# Patient Record
Sex: FEMALE | Race: BLACK OR AFRICAN AMERICAN | Employment: OTHER | URBAN - METROPOLITAN AREA
[De-identification: names, ages, dates, MRNs, and addresses within clinical notes are randomized per-mention and may not be internally consistent; named-entity substitution may affect disease eponyms.]

---

## 2017-02-17 ENCOUNTER — PATIENT OUTREACH (OUTPATIENT)
Dept: CARDIOLOGY CLINIC | Age: 77
End: 2017-02-17

## 2017-02-17 ENCOUNTER — OFFICE VISIT (OUTPATIENT)
Dept: CARDIOLOGY CLINIC | Age: 77
End: 2017-02-17

## 2017-02-17 VITALS
SYSTOLIC BLOOD PRESSURE: 166 MMHG | RESPIRATION RATE: 18 BRPM | HEIGHT: 63 IN | DIASTOLIC BLOOD PRESSURE: 90 MMHG | BODY MASS INDEX: 24.41 KG/M2 | WEIGHT: 137.8 LBS | OXYGEN SATURATION: 98 % | HEART RATE: 84 BPM

## 2017-02-17 DIAGNOSIS — E78.2 MIXED HYPERLIPIDEMIA: ICD-10-CM

## 2017-02-17 DIAGNOSIS — I25.10 ATHEROSCLEROSIS OF NATIVE CORONARY ARTERY OF NATIVE HEART WITHOUT ANGINA PECTORIS: Primary | ICD-10-CM

## 2017-02-17 DIAGNOSIS — I44.7 OTHER LEFT BUNDLE BRANCH BLOCK: ICD-10-CM

## 2017-02-17 DIAGNOSIS — Z91.14 NONCOMPLIANCE WITH MEDICATION REGIMEN: ICD-10-CM

## 2017-02-17 DIAGNOSIS — I10 ESSENTIAL HYPERTENSION, BENIGN: ICD-10-CM

## 2017-02-17 DIAGNOSIS — Z86.73 H/O: CVA (CEREBROVASCULAR ACCIDENT): ICD-10-CM

## 2017-02-17 NOTE — PROGRESS NOTES
Subjective/HPI:     Niraj Griffith is a 64 y.o. female is here for f/u appt. Pt has sob with exertion, she is not doing any activity essentially, sitting on the sofa mostly. She hasnt been taking her meds, doesn't know for how long she hasnt been off meds. She lives with her son who helps her but isnt sure what is happening. The patient denies chest pain, orthopnea, PND, LE edema, palpitations, syncope, presyncope or fatigue. 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 ([address] I11.0    Mixed hyperlipidemia E78.2    Essential hypertension, benign I10    Other left bundle branch block I44.7    Coronary atherosclerosis of native coronary artery I25.10    Late effects of cerebrovascular disease, 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    Atherosclerosis of renal artery ([facility]) I70.1    Atherosclerosis of native arteries of the extremities with intermittent claudication I70.219    Tobacco use disorder F17.200    Cerebrovascular accident [facility]) I63.9    Mitral valve disorders I05.9    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   Allergies   Allergen Reactions    Pcn [Penicillins] Hives      Family History   Problem Relation Age of Onset    Coronary Artery Disease Other      grandmother age 76      Current Outpatient Prescriptions   Medication Sig    lisinopril (PRINIVIL, ZESTRIL) 5 mg tablet Take 1 Tab by mouth daily.  carvedilol (COREG) 6.25 mg tablet Take 1 Tab by mouth two (2) times a day.  furosemide (LASIX) 40 mg tablet One pill daily    isosorbide mononitrate ER (IMDUR) 30 mg tablet Take 1 Tab by mouth every morning.  potassium chloride (K-DUR, KLOR-CON) 20 mEq tablet Take 1 Tab by mouth two (2) times a day.  albuterol (PROVENTIL HFA, VENTOLIN HFA, PROAIR HFA) 90 mcg/actuation inhaler Take 2 Puffs by inhalation every four (4) hours as needed for Wheezing.  aspirin delayed-release 81 mg tablet Take 1 Tab by mouth daily.  atorvastatin (LIPITOR) 20 mg tablet Take 1 Tab by mouth nightly.  aspirin 81 mg tablet Take 81 mg by mouth.  atorvastatin (LIPITOR) 40 mg tablet TAKE ONE TABLET BY MOUTH EVERY DAY  **PATIENT MUST BE SEEN FOR MORE REFILLS**     No current facility-administered medications for this visit. Vitals:    02/17/17 1025 02/17/17 1038   BP: (!) 170/94 166/90   Pulse: 84    Resp: 18    SpO2: 98%    Weight: 137 lb 12.8 oz (62.5 kg)    Height: 5' 3\" (1.6 m)      Social History     Social History    Marital status:      Spouse name: N/A    Number of children: N/A    Years of education: N/A     Occupational History    Not on file. Social History Main Topics    Smoking status: Unknown If Ever Smoked    Smokeless tobacco: Never Used    Alcohol use No      Comment: beer every day    Drug use: No    Sexual activity: Not on file     Other Topics Concern    Not on file     Social History Narrative    ** Merged History Encounter **            I have reviewed the nurses notes, vitals, problem list, allergy list, medical history, family, social history and medications. Review of Symptoms:    General: Pt denies excessive weight gain or loss.  Pt is able to conduct ADL's  HEENT: Denies blurred vision, headaches, epistaxis and difficulty swallowing. Respiratory: Denies shortness of breath, +CHRISTINE, denies wheezing or stridor. Cardiovascular: Denies precordial pain, palpitations, edema or PND  Gastrointestinal: Denies poor appetite, indigestion, abdominal pain or blood in stool  Urinary: Denies dysuria, pyuria  Musculoskeletal: Denies pain or swelling from muscles or joints  Neurologic: Denies new onset of tremor, paresthesias, or sensory motor disturbance  Skin: Denies rash, itching or texture change. Psych: Denies depression        Physical Exam:      General: Well developed, in no acute distress, cooperative and alert  HEENT: No carotid bruits, no JVD, trach is midline. Neck Supple, PEERL, EOM intact. Heart:  Normal S1/S2 negative S3 or S4. Regular, no murmur, gallop or rub.   Respiratory: diminished anterior/posterior left sided BS. no wheezing or rales  Abdomen:   Soft, non-tender, no masses, bowel sounds are active.   Extremities:  No edema, normal cap refill, no cyanosis, atraumatic. Neuro: Alert, in walker   Skin: Skin color is normal. No rashes or lesions. Non diaphoretic  Vascular: 2+ pulses symmetric in all extremities    Cardiographics    ECG: Sinus  Rhythm  -With rate variation   -Left bundle branch block. -Right atrial enlargement.      Results for orders placed or performed during the hospital encounter of 07/21/16   EKG, 12 LEAD, INITIAL   Result Value Ref Range    Ventricular Rate 86 BPM    Atrial Rate 86 BPM    P-R Interval 130 ms    QRS Duration 130 ms    Q-T Interval 422 ms    QTC Calculation (Bezet) 504 ms    Calculated P Axis 45 degrees    Calculated R Axis -27 degrees    Calculated T Axis 114 degrees    Diagnosis       Normal sinus rhythm  Possible Left atrial enlargement  Left bundle branch block  When compared with ECG of 01-MAR-2010 05:18,  No significant change was found  Confirmed by Abel Islas ([id number]) on 7/22/2016 1:40:16 PM           Cardiology Labs:  Lab Results   Component Value Date/Time    Cholesterol, total 239 07/15/2016 03:39 AM    HDL Cholesterol 60 07/15/2016 03:39 AM    LDL, calculated 157.2 07/15/2016 03:39 AM    Triglyceride 109 07/15/2016 03:39 AM    CHOL/HDL Ratio 4.0 07/15/2016 03:39 AM       Lab Results   Component Value Date/Time    Sodium 142 07/21/2016 02:20 PM    Potassium 4.4 07/21/2016 02:20 PM    Chloride 105 07/21/2016 02:20 PM    CO2 29 07/21/2016 02:20 PM    Anion gap 8 07/21/2016 02:20 PM    Glucose 102 07/21/2016 02:20 PM    BUN 26 07/21/2016 02:20 PM    Creatinine 1.37 07/21/2016 02:20 PM    BUN/Creatinine ratio 19 07/21/2016 02:20 PM    GFR est AA 45 07/21/2016 02:20 PM    GFR est non-AA 37 07/21/2016 02:20 PM    Calcium 9.0 07/21/2016 02:20 PM    AST (SGOT) 20 07/21/2016 02:20 PM    Alk. phosphatase 87 07/21/2016 02:20 PM    Protein, total 7.3 07/21/2016 02:20 PM    Albumin 3.4 07/21/2016 02:20 PM    Globulin 3.9 07/21/2016 02:20 PM    A-G Ratio 0.9 07/21/2016 02:20 PM    ALT (SGPT) 14 07/21/2016 02:20 PM           Assessment:     Assessment:     Mir Slater was seen today for hypertension.     Diagnoses and all orders for this visit:    Atherosclerosis of native coronary artery of native heart without angina pectoris  -     AMB POC EKG ROUTINE W/ 12 LEADS, INTER & REP  -     LIPID PANEL  -     MAGNESIUM  -     METABOLIC PANEL, COMPREHENSIVE  -     BNP    Essential hypertension, benign  -     AMB POC EKG ROUTINE W/ 12 LEADS, INTER & REP  -     LIPID PANEL  -     MAGNESIUM  -     METABOLIC PANEL, COMPREHENSIVE  -     BNP    Mixed hyperlipidemia  -     AMB POC EKG ROUTINE W/ 12 LEADS, INTER & REP  -     LIPID PANEL  -     MAGNESIUM  -     METABOLIC PANEL, COMPREHENSIVE  -     BNP    Other left bundle branch block  -     AMB POC EKG ROUTINE W/ 12 LEADS, INTER & REP  -     LIPID PANEL  -     MAGNESIUM  -     METABOLIC PANEL, COMPREHENSIVE  -     BNP    H/O: CVA (cerebrovascular accident)  -     AMB POC EKG ROUTINE W/ 12 LEADS, INTER & REP  -     LIPID PANEL  - MAGNESIUM  -     METABOLIC PANEL, COMPREHENSIVE  -     BNP    Noncompliance with medication regimen        ICD-10-CM ICD-9-CM    1. Atherosclerosis of native coronary artery of native heart without angina pectoris I25.10 414.01 AMB POC EKG ROUTINE W/ 12 LEADS, INTER & REP      LIPID PANEL      MAGNESIUM      METABOLIC PANEL, COMPREHENSIVE      BNP   2. Essential hypertension, benign I10 401.1 AMB POC EKG ROUTINE W/ 12 LEADS, INTER & REP      LIPID PANEL      MAGNESIUM      METABOLIC PANEL, COMPREHENSIVE      BNP   3. Mixed hyperlipidemia E78.2 272.2 AMB POC EKG ROUTINE W/ 12 LEADS, INTER & REP      LIPID PANEL      MAGNESIUM      METABOLIC PANEL, COMPREHENSIVE      BNP   4. Other left bundle branch block I44.7 426.3 AMB POC EKG ROUTINE W/ 12 LEADS, INTER & REP      LIPID PANEL      MAGNESIUM      METABOLIC PANEL, COMPREHENSIVE      BNP   5. H/O: CVA (cerebrovascular accident) Z86.73 V12.54 AMB POC EKG ROUTINE W/ 12 LEADS, INTER & REP      LIPID PANEL      MAGNESIUM      METABOLIC PANEL, COMPREHENSIVE      BNP   6. Noncompliance with medication regimen Z91.14 V15.81      Orders Placed This Encounter    LIPID PANEL    MAGNESIUM    METABOLIC PANEL, COMPREHENSIVE    BNP    AMB POC EKG ROUTINE W/ 12 LEADS, INTER & REP     Order Specific Question:   Reason for Exam:     Answer:   routine        Plan:        Chronic systolic HF:  Pt reports she is sob with min exertion, has been out of meds for some time without an explanation of why. She has diminished left sided BS. I will evaluate with a CMP, Mg, and BNP. Advised she needs to get her meds and start taking them again.     CAD:  Continue ASA, statin, BB, and nitrate  H/o very mild nonobstructive CAD by cath in 2009  [address] [place] [address] 7/16, negative for ischemia     Noncompliance with meds:  States she ran out of meds, having MAY Jenkins meet with her today in our office to discuss. Disposition follow-up in 1 month, family to bring medications.     Raven Marquez Matilda Rivas MD    Addendum:  Nurse navigator Brian Bray met with the patient and her family. They expressed that she has some confusion since her stroke but that she is taking her medications under their supervision. Confirmed by Ms. Bond.

## 2017-02-17 NOTE — MR AVS SNAPSHOT
Visit Information Date & Time Provider Department Dept. Phone Encounter #  
 2/17/2017 10:30 AM Flory Castano, [address] [facility] [id number] Upcoming Health Maintenance Date Due DTaP/Tdap/Td series (1 - Tdap) 10/2/1959 ZOSTER VACCINE AGE 60> 10/2/1998 GLAUCOMA SCREENING Q2Y 10/2/2003 OSTEOPOROSIS SCREENING (DEXA) 10/2/2003 Pneumococcal 65+ High/Highest Risk (1 of 2 - PCV13) 10/2/2003 MEDICARE YEARLY EXAM 10/2/2003 INFLUENZA AGE 9 TO ADULT 8/1/2016 Allergies as of 2/17/2017  Review Complete On: 2/17/2017 By: Flory Castano MD  
  
 Severity Noted Reaction Type Reactions Pcn [Penicillins]  07/14/2016    Hives Current Immunizations  Never Reviewed No immunizations on file. Not reviewed this visit You Were Diagnosed With   
  
 Codes Comments Atherosclerosis of native coronary artery of native heart without angina pectoris    -  Primary ICD-10-CM: I25.10 ICD-9-CM: 414.01 Essential hypertension, benign     ICD-10-CM: I10 
ICD-9-CM: 401.1 Mixed hyperlipidemia     ICD-10-CM: E78.2 ICD-9-CM: 272.2 Other left bundle branch block     ICD-10-CM: I44.7 ICD-9-CM: 426.3 H/O: CVA (cerebrovascular accident)     ICD-10-CM: Z86.73 
ICD-9-CM: V12.54 Noncompliance with medication regimen     ICD-10-CM: Z91.14 
ICD-9-CM: V15.81 Vitals BP Pulse Resp Height(growth percentile) Weight(growth percentile) SpO2  
 166/90 (BP 1 Location: Right arm, BP Patient Position: Sitting) 84 18 5' 3\" (1.6 m) 137 lb 12.8 oz (62.5 kg) 98% BMI Smoking Status 24.41 kg/m2 Unknown If Ever Smoked Vitals History BMI and BSA Data Body Mass Index Body Surface Area  
 24.41 kg/m 2 1.67 m 2 Preferred Pharmacy Pharmacy Name Phone [facility] [address], [address] [place] [address] Avenue [phone] Your Updated Medication List  
  
   
 This list is accurate as of: 2/17/17 11:40 AM.  Always use your most recent med list.  
  
  
  
  
 albuterol 90 mcg/actuation inhaler Commonly known as:  PROVENTIL HFA, VENTOLIN HFA, PROAIR HFA Take 2 Puffs by inhalation every four (4) hours as needed for Wheezing. * aspirin 81 mg tablet Take 81 mg by mouth. * aspirin delayed-release 81 mg tablet Take 1 Tab by mouth daily. * atorvastatin 40 mg tablet Commonly known as:  LIPITOR  
TAKE ONE TABLET BY MOUTH EVERY DAY  **PATIENT MUST BE SEEN FOR MORE REFILLS**  
  
 * atorvastatin 20 mg tablet Commonly known as:  LIPITOR Take 1 Tab by mouth nightly. carvedilol 6.25 mg tablet Commonly known as:  Micheline Nina Take 1 Tab by mouth two (2) times a day. furosemide 40 mg tablet Commonly known as:  LASIX One pill daily  
  
 isosorbide mononitrate ER 30 mg tablet Commonly known as:  IMDUR Take 1 Tab by mouth every morning. lisinopril 5 mg tablet Commonly known as:  Aundra Anthony Take 1 Tab by mouth daily. potassium chloride 20 mEq tablet Commonly known as:  K-DUR, KLOR-CON Take 1 Tab by mouth two (2) times a day. * Notice: This list has 4 medication(s) that are the same as other medications prescribed for you. Read the directions carefully, and ask your doctor or other care provider to review them with you. We Performed the Following AMB POC EKG ROUTINE W/ 12 LEADS, INTER & REP [43138 CPT(R)] BNP [id number] CPT(R)] LIPID PANEL [85776 CPT(R)] MAGNESIUM [id number] CPT(R)] METABOLIC PANEL, COMPREHENSIVE [17131 CPT(R)] Introducing \[facility] [place] [facility]\"" & HEALTH SERVICES! [facility] introduces [employer] patient portal. Now you can access parts of your medical record, email your doctor's office, and request medication refills online. 1. In your internet browser, go to [email or web address] 2. Click on the First Time User? Click Here link in the Sign In box.  You will see the New Member Sign Up page. 3. Enter your [employer] Access Code exactly as it appears below. You will not need to use this code after youve completed the sign-up process. If you do not sign up before the expiration date, you must request a new code. · [employer] Access Code: [email or web address] Expires: 2/28/2017  4:25 PM 
 
4. Enter the last four digits of your Social Security Number (xxxx) and Date of Birth (mm/dd/yyyy) as indicated and click Submit. You will be taken to the next sign-up page. 5. Create a [employer] ID. This will be your [employer] login ID and cannot be changed, so think of one that is secure and easy to remember. 6. Create a [employer] password. You can change your password at any time. 7. Enter your Password Reset Question and Answer. This can be used at a later time if you forget your password. 8. Enter your e-mail address. You will receive e-mail notification when new information is available in [address] 9. Click Sign Up. You can now view and download portions of your medical record. 10. Click the Download Summary menu link to download a portable copy of your medical information. If you have questions, please visit the Frequently Asked Questions section of the [employer] website. Remember, [employer] is NOT to be used for urgent needs. For medical emergencies, dial 911. Now available from your iPhone and Android! Please provide this summary of care documentation to your next provider. Your primary care clinician is listed as Anthony Rea. If you have any questions after today's visit, please call [phone].

## 2017-02-17 NOTE — PROGRESS NOTES
Met with pt and her nephew concerning pt's medications. Pt states that she does not take medications. Pt's nephew states that pt does take medications and uses a pillbox that he and pt's son fill. Pt's son and nephew live with pt. Pt's son provided medications to nurse who checked medications. Pt's son is to check BP this weekend at [facility] and will check with family on Monday.

## 2017-02-17 NOTE — PROGRESS NOTES
Chief Complaint   Patient presents with    Hypertension     3 mo appt. Denied cardiac symptoms. Doesn't know her meds.

## 2017-02-27 ENCOUNTER — TELEPHONE (OUTPATIENT)
Dept: CARDIOLOGY CLINIC | Age: 77
End: 2017-02-27

## 2017-03-01 ENCOUNTER — PATIENT OUTREACH (OUTPATIENT)
Dept: CARDIOLOGY CLINIC | Age: 77
End: 2017-03-01

## 2017-03-01 NOTE — PROGRESS NOTES
Called pt and spoke with son Maegan Espitia who states that pt has [facility] coming out to check on pt and son reports BP on 2/28/17 was 127/79. Pt's son states that pt is doing well and is taking her medications. Pt's son states that I may continue to call.

## 2017-03-07 ENCOUNTER — HOSPITAL ENCOUNTER (INPATIENT)
Age: 77
LOS: 7 days | Discharge: HOME HEALTH | DRG: 291 | End: 2017-03-14
Attending: INTERNAL MEDICINE | Admitting: INTERNAL MEDICINE
Payer: COMMERCIAL

## 2017-03-07 PROBLEM — J44.1 COPD EXACERBATION (HCC): Status: ACTIVE | Noted: 2017-03-07

## 2017-03-07 PROCEDURE — 65660000000 HC RM CCU STEPDOWN

## 2017-03-07 RX ORDER — ATORVASTATIN CALCIUM 40 MG/1
40 TABLET, FILM COATED ORAL
Status: DISCONTINUED | OUTPATIENT
Start: 2017-03-08 | End: 2017-03-14 | Stop reason: HOSPADM

## 2017-03-07 RX ORDER — CARVEDILOL 6.25 MG/1
6.25 TABLET ORAL 2 TIMES DAILY
Status: DISCONTINUED | OUTPATIENT
Start: 2017-03-08 | End: 2017-03-14 | Stop reason: HOSPADM

## 2017-03-07 RX ORDER — SODIUM CHLORIDE 0.9 % (FLUSH) 0.9 %
5-10 SYRINGE (ML) INJECTION EVERY 8 HOURS
Status: DISCONTINUED | OUTPATIENT
Start: 2017-03-08 | End: 2017-03-14 | Stop reason: HOSPADM

## 2017-03-07 RX ORDER — ISOSORBIDE MONONITRATE 30 MG/1
30 TABLET, EXTENDED RELEASE ORAL DAILY
Status: DISCONTINUED | OUTPATIENT
Start: 2017-03-08 | End: 2017-03-14 | Stop reason: HOSPADM

## 2017-03-07 RX ORDER — GUAIFENESIN 600 MG/1
600 TABLET, EXTENDED RELEASE ORAL EVERY 12 HOURS
Status: DISCONTINUED | OUTPATIENT
Start: 2017-03-08 | End: 2017-03-10

## 2017-03-07 RX ORDER — HEPARIN SODIUM 5000 [USP'U]/ML
5000 INJECTION, SOLUTION INTRAVENOUS; SUBCUTANEOUS EVERY 8 HOURS
Status: DISCONTINUED | OUTPATIENT
Start: 2017-03-08 | End: 2017-03-14 | Stop reason: HOSPADM

## 2017-03-07 RX ORDER — GUAIFENESIN/DEXTROMETHORPHAN 100-10MG/5
10 SYRUP ORAL
Status: DISCONTINUED | OUTPATIENT
Start: 2017-03-07 | End: 2017-03-14 | Stop reason: HOSPADM

## 2017-03-07 RX ORDER — ASPIRIN 81 MG/1
81 TABLET ORAL DAILY
Status: DISCONTINUED | OUTPATIENT
Start: 2017-03-08 | End: 2017-03-14 | Stop reason: HOSPADM

## 2017-03-07 RX ORDER — SODIUM CHLORIDE 0.9 % (FLUSH) 0.9 %
5-10 SYRINGE (ML) INJECTION AS NEEDED
Status: DISCONTINUED | OUTPATIENT
Start: 2017-03-07 | End: 2017-03-14 | Stop reason: HOSPADM

## 2017-03-07 RX ORDER — AZITHROMYCIN 250 MG/1
500 TABLET, FILM COATED ORAL DAILY
Status: DISCONTINUED | OUTPATIENT
Start: 2017-03-08 | End: 2017-03-12

## 2017-03-07 RX ORDER — SODIUM CHLORIDE 9 MG/ML
75 INJECTION, SOLUTION INTRAVENOUS CONTINUOUS
Status: DISCONTINUED | OUTPATIENT
Start: 2017-03-08 | End: 2017-03-08

## 2017-03-07 NOTE — IP AVS SNAPSHOT
[address] [facility] 
[phone] Patient: Dayanara Hill MRN: [id number] :1940 You are allergic to the following Allergen Reactions Decadron (Dexamethasone Sodium Phosphate) Hives Pcn (Penicillins) Hives Recent Documentation Height Weight BMI Smoking Status 1.6 m 61.9 kg 24.18 kg/m2 Unknown If Ever Smoked Emergency Contacts Name Discharge Info Relation Home Work Mobile Refused,Refused N/A  AT THIS TIME [6] Willis Valdez  Child [2] [phone] Chivo Valdez DISCHARGE CAREGIVER [3] Brother [24]   [phone] Chivo Valdez  Other Relative [6] [phone] About your hospitalization You were admitted on:  2017 You last received care in the:  [facility] You were discharged on:  2017 Unit phone number:  [phone] Why you were hospitalized Your primary diagnosis was:  Not on File Your diagnoses also included:  Acute Respiratory Failure (Hcc), Copd Exacerbation (Hcc) Providers Seen During Your Hospitalizations Provider Role Specialty Primary office phone Suellen Abebe MD Attending Provider Internal Medicine [phone] Merle Hugo MD Attending Provider Hospitalist [phone] Lily Bassett MD Attending Provider Internal Medicine [phone] Umair Zavala MD Attending Provider Internal Medicine [phone] Your Primary Care Physician (PCP) Primary Care Physician Office Phone Office Fax Rendall Curling [phone] Follow-up Information Follow up With Details Comments Contact Info Petar Rodriguez NP Schedule an appointment as soon as possible for a visit in 1 week  [address] [phone]  Gina Beatty MD Go on 4/10/2017 appointment is 10:30. [phone]. [phone] BMP (basic metabolic panel) In 1 week Home health can check and send results to primary care physician Your Appointments Monday April 10, 2017 10:30 AM EDT  
2 MONTH with Nette Ayala MD  
[facility] [address] [place] [address]) [address]. [phone] Current Discharge Medication List  
  
START taking these medications Dose & Instructions Dispensing Information Comments Morning Noon Evening Bedtime  
 amLODIPine 2.5 mg tablet Commonly known as:  Mildred Begumin Your last dose was: Your next dose is: Other:  _________ Dose:  2.5 mg Take 1 Tab by mouth daily. Quantity:  30 Tab Refills:  0  
     
   
   
   
  
 doxycycline 100 mg tablet Commonly known as:  VIBRA-TABS Your last dose was: Your next dose is: Other:  _________ Dose:  100 mg Take 1 Tab by mouth every twelve (12) hours for 3 days. Quantity:  6 Tab Refills:  0  
     
   
   
   
  
 guaiFENesin 1,200 mg Ta12 ER tablet Commonly known as:  Nolberto & Nolberto Your last dose was: Your next dose is: Other:  _________ Dose:  1200 mg Take 1 Tab by mouth every twelve (12) hours for 5 days. Quantity:  20 Tab Refills:  0  
     
   
   
   
  
 predniSONE 10 mg tablet Commonly known as:  Jhony Lever Your last dose was: Your next dose is: Other:  _________  
   
   
 3 tabs daily for 2 days  2 tabs daily for 2 days  1 tab   daily for 2 days Quantity:  12 Tab Refills:  0 CONTINUE these medications which have CHANGED Dose & Instructions Dispensing Information Comments Morning Noon Evening Bedtime  
 aspirin delayed-release 81 mg tablet What changed:  Another medication with the same name was removed.  Continue taking this medication, and follow the directions you see here. Your last dose was: Your next dose is: Other:  _________ Dose:  81 mg Take 1 Tab by mouth daily. Quantity:  100 Tab Refills:  1  
     
   
   
   
  
 atorvastatin 40 mg tablet Commonly known as:  LIPITOR What changed:  Another medication with the same name was removed. Continue taking this medication, and follow the directions you see here. Your last dose was: Your next dose is: Other:  _________ TAKE ONE TABLET BY MOUTH EVERY DAY  **PATIENT MUST BE SEEN FOR MORE REFILLS** Quantity:  30 Tab Refills:  0  
     
   
   
   
  
 furosemide 40 mg tablet Commonly known as:  LASIX What changed:   
- how much to take 
- how to take this - when to take this Your last dose was: Your next dose is: Other:  _________ Dose:  40 mg Take 1 Tab by mouth two (2) times a day. One pill daily Quantity:  60 Tab Refills:  12 CONTINUE these medications which have NOT CHANGED Dose & Instructions Dispensing Information Comments Morning Noon Evening Bedtime  
 albuterol 90 mcg/actuation inhaler Commonly known as:  PROVENTIL HFA, VENTOLIN HFA, PROAIR HFA Your last dose was: Your next dose is: Other:  _________ Dose:  2 Puff Take 2 Puffs by inhalation every four (4) hours as needed for Wheezing. Quantity:  1 Inhaler Refills:  1  
     
   
   
   
  
 carvedilol 6.25 mg tablet Commonly known as:  Juline Balzarine Your last dose was: Your next dose is: Other:  _________ Dose:  6.25 mg Take 1 Tab by mouth two (2) times a day. Quantity:  60 Tab Refills:  12  
     
   
   
   
  
 isosorbide mononitrate ER 30 mg tablet Commonly known as:  IMDUR Your last dose was: Your next dose is: Other:  _________  Dose:  30 mg  
 Take 1 Tab by mouth every morning. Quantity:  30 Tab Refills:  12 STOP taking these medications   
 lisinopril 5 mg tablet Commonly known as:  PRINIVIL, ZESTRIL  
   
  
 potassium chloride 20 mEq tablet Commonly known as:  K-DUR KLOR-CON Where to Get Your Medications Information on where to get these meds will be given to you by the nurse or doctor. ! Ask your nurse or doctor about these medications  
  amLODIPine 2.5 mg tablet  
 doxycycline 100 mg tablet  
 furosemide 40 mg tablet  
 guaiFENesin 1,200 mg Ta12 ER tablet  
 predniSONE 10 mg tablet Discharge Instructions HOSPITALIST DISCHARGE INSTRUCTIONS 
 
NAME: Godwin Anderson :  1940 MRN:  [id number] Date/Time:  3/14/2017 9:58 AM 
 
ADMIT DATE: 3/7/2017 DISCHARGE DATE: 3/14/2017 DISCHARGE DIAGNOSIS: 
Acute Resp failure Acute Bronchitis Acute on chronic systolic heart failure Acute renal Failure POA on ckd stage 3 HTN 
CAD/Old LBBB on EKG (unchanged) H/o CVA/aphasia at baseline Hyperlipidemia MEDICATIONS: 
· It is important that you take the medication exactly as they are prescribed. · Keep your medication in the bottles provided by the pharmacist and keep a list of the medication names, dosages, and times to be taken in your wallet. · Do not take other medications without consulting your doctor. Pain Management: per above medications What to do at [facility] Recommended diet:  Cardiac Diet and Low Sodium Recommended activity: Activity as tolerated If you have questions regarding the hospital related prescriptions or hospital related issues please call Corinne Bruno at . If you experience any of the following symptoms then please call your primary care physician or return to the emergency room if you cannot get hold of your doctor: Fever, chills, nausea, vomiting, diarrhea, change in mentation, falling, bleeding, shortness of breath Information obtained by : 
I understand that if any problems occur once I am at home I am to contact my physician. I understand and acknowledge receipt of the instructions indicated above. Physician's or R.N.'s Signature                                                                  Date/Time Patient or Representative Signature                                                          Date/Time Discharge Orders None [employer] Announcement We are excited to announce that we are making your provider's discharge notes available to you in [employer]. You will see these notes when they are completed and signed by the physician that discharged you from your recent hospital stay. If you have any questions or concerns about any information you see in [employer], please call the Health Information Department where you were seen or reach out to your Primary Care Provider for more information about your plan of care. Introducing [facility]! Shreya Bennett introduces [employer] patient portal. Now you can access parts of your medical record, email your doctor's office, and request medication refills online. 1. In your internet browser, go to [email or web address] 2. Click on the First Time User? Click Here link in the Sign In box. You will see the New Member Sign Up page. 3. Enter your [employer] Access Code exactly as it appears below. You will not need to use this code after youve completed the sign-up process. If you do not sign up before the expiration date, you must request a new code. · [employer] Access Code: [email or web address] Expires: 6/5/2017 11:45 PM 
 
4. Enter the last four digits of your Social Security Number (xxxx) and Date of Birth (mm/dd/yyyy) as indicated and click Submit. You will be taken to the next sign-up page. 5. Create a [employer] ID. This will be your [employer] login ID and cannot be changed, so think of one that is secure and easy to remember. 6. Create a [employer] password. You can change your password at any time. 7. Enter your Password Reset Question and Answer. This can be used at a later time if you forget your password. 8. Enter your e-mail address. You will receive e-mail notification when new information is available in [address] 9. Click Sign Up. You can now view and download portions of your medical record. 10. Click the Download Summary menu link to download a portable copy of your medical information. If you have questions, please visit the Frequently Asked Questions section of the [employer] website. Remember, [employer] is NOT to be used for urgent needs. For medical emergencies, dial 911. Now available from your iPhone and Android! General Information Please provide this summary of care documentation to your next provider. Patient Signature:  ____________________________________________________________ Date:  ____________________________________________________________  
  
Julio C Artist Provider Signature:  ____________________________________________________________ Date:  ____________________________________________________________

## 2017-03-07 NOTE — IP AVS SNAPSHOT
Current Discharge Medication List  
  
Take these medications at their scheduled times Dose & Instructions Dispensing Information Comments Morning Noon Evening Bedtime  
 amLODIPine 2.5 mg tablet Commonly known as:  Pilo Young Your next dose is: Today, Tomorrow Comments:  __________ Dose:  2.5 mg Take 1 Tab by mouth daily. Quantity:  30 Tab Refills:  0  
     
   
   
   
  
 aspirin delayed-release 81 mg tablet Your next dose is: Today, Tomorrow Comments:  __________ Dose:  81 mg Take 1 Tab by mouth daily. Quantity:  100 Tab Refills:  1  
     
   
   
   
  
 carvedilol 6.25 mg tablet Commonly known as:  Osman Geronimo Your next dose is: Today, Tomorrow Comments:  __________ Dose:  6.25 mg Take 1 Tab by mouth two (2) times a day. Quantity:  60 Tab Refills:  12  
     
   
   
   
  
 doxycycline 100 mg tablet Commonly known as:  VIBRA-TABS Your next dose is: Today, Tomorrow Comments:  __________ Dose:  100 mg Take 1 Tab by mouth every twelve (12) hours for 3 days. Quantity:  6 Tab Refills:  0  
     
   
   
   
  
 furosemide 40 mg tablet Commonly known as:  LASIX Your next dose is: Today, Tomorrow Comments:  __________ Dose:  40 mg Take 1 Tab by mouth two (2) times a day. One pill daily Quantity:  60 Tab Refills:  12  
     
   
   
   
  
 guaiFENesin 1,200 mg Ta12 ER tablet Commonly known as:  Nolberto & Nolberto Your next dose is: Today, Tomorrow Comments:  __________ Dose:  1200 mg Take 1 Tab by mouth every twelve (12) hours for 5 days. Quantity:  20 Tab Refills:  0  
     
   
   
   
  
 isosorbide mononitrate ER 30 mg tablet Commonly known as:  IMDUR Your next dose is: Today, Tomorrow Comments:  __________ Dose:  30 mg Take 1 Tab by mouth every morning. Quantity:  30 Tab Refills:  12 Take these medications as needed Dose & Instructions Dispensing Information Comments Morning Noon Evening Bedtime  
 albuterol 90 mcg/actuation inhaler Commonly known as:  PROVENTIL HFA, VENTOLIN HFA, PROAIR HFA Your next dose is: Today, Tomorrow Comments:  __________ Dose:  2 Puff Take 2 Puffs by inhalation every four (4) hours as needed for Wheezing. Quantity:  1 Inhaler Refills:  1 Take these medications as directed Dose & Instructions Dispensing Information Comments Morning Noon Evening Bedtime  
 atorvastatin 40 mg tablet Commonly known as:  LIPITOR Your next dose is: Today, Tomorrow Comments:  __________ TAKE ONE TABLET BY MOUTH EVERY DAY  **PATIENT MUST BE SEEN FOR MORE REFILLS** Quantity:  30 Tab Refills:  0  
     
   
   
   
  
 predniSONE 10 mg tablet Commonly known as:  Florentin Raza Your next dose is: Today, Tomorrow Comments:  __________  
   
   
 3 tabs daily for 2 days  2 tabs daily for 2 days  1 tab   daily for 2 days Quantity:  12 Tab Refills:  0 Where to Get Your Medications Information about where to get these medications is not yet available ! Ask your nurse or doctor about these medications  
  amLODIPine 2.5 mg tablet  
 doxycycline 100 mg tablet  
 furosemide 40 mg tablet  
 guaiFENesin 1,200 mg Ta12 ER tablet  
 predniSONE 10 mg tablet

## 2017-03-08 ENCOUNTER — APPOINTMENT (OUTPATIENT)
Dept: ULTRASOUND IMAGING | Age: 77
DRG: 291 | End: 2017-03-08
Attending: HOSPITALIST
Payer: COMMERCIAL

## 2017-03-08 ENCOUNTER — APPOINTMENT (OUTPATIENT)
Dept: CT IMAGING | Age: 77
DRG: 291 | End: 2017-03-08
Attending: HOSPITALIST
Payer: COMMERCIAL

## 2017-03-08 LAB
ANION GAP BLD CALC-SCNC: 10 MMOL/L (ref 5–15)
BUN SERPL-MCNC: 30 MG/DL (ref 6–20)
BUN/CREAT SERPL: 14 (ref 12–20)
CALCIUM SERPL-MCNC: 9 MG/DL (ref 8.5–10.1)
CHLORIDE SERPL-SCNC: 105 MMOL/L (ref 97–108)
CO2 SERPL-SCNC: 24 MMOL/L (ref 21–32)
CREAT SERPL-MCNC: 2.22 MG/DL (ref 0.55–1.02)
ERYTHROCYTE [DISTWIDTH] IN BLOOD BY AUTOMATED COUNT: 14 % (ref 11.5–14.5)
GLUCOSE SERPL-MCNC: 255 MG/DL (ref 65–100)
HCT VFR BLD AUTO: 34.8 % (ref 35–47)
HGB BLD-MCNC: 11.5 G/DL (ref 11.5–16)
MCH RBC QN AUTO: 26.1 PG (ref 26–34)
MCHC RBC AUTO-ENTMCNC: 33 G/DL (ref 30–36.5)
MCV RBC AUTO: 79.1 FL (ref 80–99)
PLATELET # BLD AUTO: 172 K/UL (ref 150–400)
POTASSIUM SERPL-SCNC: 4.6 MMOL/L (ref 3.5–5.1)
RBC # BLD AUTO: 4.4 M/UL (ref 3.8–5.2)
SODIUM SERPL-SCNC: 139 MMOL/L (ref 136–145)
WBC # BLD AUTO: 3.5 K/UL (ref 3.6–11)

## 2017-03-08 PROCEDURE — 65660000000 HC RM CCU STEPDOWN

## 2017-03-08 PROCEDURE — 94640 AIRWAY INHALATION TREATMENT: CPT

## 2017-03-08 PROCEDURE — 74011250637 HC RX REV CODE- 250/637: Performed by: INTERNAL MEDICINE

## 2017-03-08 PROCEDURE — 74011250636 HC RX REV CODE- 250/636: Performed by: INTERNAL MEDICINE

## 2017-03-08 PROCEDURE — 74011000250 HC RX REV CODE- 250: Performed by: HOSPITALIST

## 2017-03-08 PROCEDURE — 85027 COMPLETE CBC AUTOMATED: CPT | Performed by: INTERNAL MEDICINE

## 2017-03-08 PROCEDURE — 71250 CT THORAX DX C-: CPT

## 2017-03-08 PROCEDURE — 93970 EXTREMITY STUDY: CPT

## 2017-03-08 PROCEDURE — 36415 COLL VENOUS BLD VENIPUNCTURE: CPT | Performed by: INTERNAL MEDICINE

## 2017-03-08 PROCEDURE — 80048 BASIC METABOLIC PNL TOTAL CA: CPT | Performed by: INTERNAL MEDICINE

## 2017-03-08 PROCEDURE — 77030029684 HC NEB SM VOL KT MONA -A

## 2017-03-08 RX ORDER — IPRATROPIUM BROMIDE AND ALBUTEROL SULFATE 2.5; .5 MG/3ML; MG/3ML
3 SOLUTION RESPIRATORY (INHALATION)
Status: DISCONTINUED | OUTPATIENT
Start: 2017-03-08 | End: 2017-03-08

## 2017-03-08 RX ORDER — IPRATROPIUM BROMIDE AND ALBUTEROL SULFATE 2.5; .5 MG/3ML; MG/3ML
3 SOLUTION RESPIRATORY (INHALATION)
Status: DISCONTINUED | OUTPATIENT
Start: 2017-03-08 | End: 2017-03-13

## 2017-03-08 RX ADMIN — METHYLPREDNISOLONE SODIUM SUCCINATE 60 MG: 125 INJECTION, POWDER, FOR SOLUTION INTRAMUSCULAR; INTRAVENOUS at 09:39

## 2017-03-08 RX ADMIN — HEPARIN SODIUM 5000 UNITS: 5000 INJECTION, SOLUTION INTRAVENOUS; SUBCUTANEOUS at 14:51

## 2017-03-08 RX ADMIN — METHYLPREDNISOLONE SODIUM SUCCINATE 60 MG: 125 INJECTION, POWDER, FOR SOLUTION INTRAMUSCULAR; INTRAVENOUS at 17:29

## 2017-03-08 RX ADMIN — SODIUM CHLORIDE 75 ML/HR: 900 INJECTION, SOLUTION INTRAVENOUS at 01:02

## 2017-03-08 RX ADMIN — Medication 10 ML: at 01:03

## 2017-03-08 RX ADMIN — Medication 10 ML: at 21:22

## 2017-03-08 RX ADMIN — ASPIRIN 81 MG: 81 TABLET, COATED ORAL at 09:40

## 2017-03-08 RX ADMIN — IPRATROPIUM BROMIDE AND ALBUTEROL SULFATE 3 ML: .5; 3 SOLUTION RESPIRATORY (INHALATION) at 10:09

## 2017-03-08 RX ADMIN — AZITHROMYCIN 500 MG: 250 TABLET, FILM COATED ORAL at 09:40

## 2017-03-08 RX ADMIN — Medication 10 ML: at 14:52

## 2017-03-08 RX ADMIN — CARVEDILOL 6.25 MG: 6.25 TABLET, FILM COATED ORAL at 17:29

## 2017-03-08 RX ADMIN — CARVEDILOL 6.25 MG: 6.25 TABLET, FILM COATED ORAL at 09:40

## 2017-03-08 RX ADMIN — ISOSORBIDE MONONITRATE 30 MG: 30 TABLET, EXTENDED RELEASE ORAL at 09:40

## 2017-03-08 RX ADMIN — ATORVASTATIN CALCIUM 40 MG: 40 TABLET, FILM COATED ORAL at 01:03

## 2017-03-08 RX ADMIN — ATORVASTATIN CALCIUM 40 MG: 40 TABLET, FILM COATED ORAL at 21:22

## 2017-03-08 RX ADMIN — GUAIFENESIN AND DEXTROMETHORPHAN 10 ML: 100; 10 SYRUP ORAL at 17:29

## 2017-03-08 RX ADMIN — Medication 10 ML: at 17:32

## 2017-03-08 RX ADMIN — IPRATROPIUM BROMIDE AND ALBUTEROL SULFATE 3 ML: .5; 3 SOLUTION RESPIRATORY (INHALATION) at 20:34

## 2017-03-08 RX ADMIN — IPRATROPIUM BROMIDE AND ALBUTEROL SULFATE 3 ML: .5; 3 SOLUTION RESPIRATORY (INHALATION) at 11:32

## 2017-03-08 RX ADMIN — GUAIFENESIN 600 MG: 600 TABLET, EXTENDED RELEASE ORAL at 01:03

## 2017-03-08 RX ADMIN — HEPARIN SODIUM 5000 UNITS: 5000 INJECTION, SOLUTION INTRAVENOUS; SUBCUTANEOUS at 21:21

## 2017-03-08 RX ADMIN — GUAIFENESIN 600 MG: 600 TABLET, EXTENDED RELEASE ORAL at 21:22

## 2017-03-08 RX ADMIN — GUAIFENESIN 600 MG: 600 TABLET, EXTENDED RELEASE ORAL at 09:39

## 2017-03-08 RX ADMIN — HEPARIN SODIUM 5000 UNITS: 5000 INJECTION, SOLUTION INTRAVENOUS; SUBCUTANEOUS at 06:39

## 2017-03-08 RX ADMIN — METHYLPREDNISOLONE SODIUM SUCCINATE 60 MG: 125 INJECTION, POWDER, FOR SOLUTION INTRAMUSCULAR; INTRAVENOUS at 01:03

## 2017-03-08 NOTE — PROGRESS NOTES
PCU SHIFT NURSING NOTE    [facility] Shift Summary:     2256: Patient arrived to room via EMS. VSS, assessment complete at this time. Spoke with Dr. Negra Jaramillo. He will be up to see patient in about 15 minutes, but will place some orders now. Primary Nurse Lance Desouza RN and Chaya Holden RN performed a dual skin assessment on this patient No impairment noted  Nando score is 18         Admission Date 3/7/2017   Admission Diagnosis Shortness of breath  Acute respiratory failure ([facility] [address]  COPD exacerbation ([facility] [address]   Consults None        Consults   []PT   []OT   []Speech   []Case Management      [] Palliative      Cardiac Monitoring Order   [x]Yes   []No     IV drips   []Yes    Drip:                            Dose:  Drip:                            Dose:  Drip:                            Dose:   [x]No     GI Prophylaxis   []Yes   [x]No         DVT Prophylaxis   SCDs:             Jose stockings:         [x] Medication   []Contraindicated   []None      Activity Level           Purposeful Rounding every 1-2 hour? [x]Yes   Delatorre Score      Bed Alarm (If score 3 or >)   [x]Yes   [] Refused (See signed refusal form in chart)   Nando Score      Nando Score (if score 14 or less)   []PMT consult   []Wound Care consult      []Specialty bed   [] Nutrition consult          Needs prior to discharge:   Home O2 required:    []Yes   [x]No    If yes, how much O2 required? Other:    Last Bowel Movement:        Influenza Vaccine          Pneumonia Vaccine           Diet Active Orders   There are no active orders of the following type(s): Diet.       LDAs               Peripheral IV 07/14/16 Right Antecubital (Active)                      Urinary Catheter      Intake & Output        Readmission Risk Assessment Tool Score High Risk            24       Total Score        3 Relationship with PCP    4 More than 1 Admission in calendar year    5 Patient Insurance is Medicare, Medicaid or Self Pay    12 Charlson Comorbidity Score        Criteria that do not apply:    Patient Living Status    Patient Length of Stay > 5       Expected Length of Stay - - -   Actual Length of Stay 0

## 2017-03-08 NOTE — PROGRESS NOTES
Pharmacy Medication Reconciliation     The patient was interviewed regarding current PTA medication list, use and drug allergies. The patient was questioned regarding use of any other inhalers, topical products, over the counter medications, herbal medications, vitamin products or ophthalmic/nasal/otic medication use. Recommendations/Findings: The following amendments were made to the patient's active medication list on file at [facility]:     No additions, deletions, or changes. Patient's PTA medication list is up-to-date. Patient uses [address] on [place]. -Clarified PTA med list with patient and prescription bottles. PTA medication list was corrected to the following:     Prior to Admission Medications   Prescriptions Last Dose Informant Patient Reported? Taking? albuterol (PROVENTIL HFA, VENTOLIN HFA, PROAIR HFA) 90 mcg/actuation inhaler 3/7/2017 at Unknown time  No Yes   Sig: Take 2 Puffs by inhalation every four (4) hours as needed for Wheezing. aspirin delayed-release 81 mg tablet Not Taking at Unknown time  No No   Sig: Take 1 Tab by mouth daily. atorvastatin (LIPITOR) 40 mg tablet 3/6/2017 at Unknown time  No Yes   Sig: TAKE ONE TABLET BY MOUTH EVERY DAY  **PATIENT MUST BE SEEN FOR MORE REFILLS**   carvedilol (COREG) 6.25 mg tablet 3/7/2017 at Unknown time  No Yes   Sig: Take 1 Tab by mouth two (2) times a day. furosemide (LASIX) 40 mg tablet 3/7/2017 at Unknown time  No Yes   Sig: One pill daily   isosorbide mononitrate ER (IMDUR) 30 mg tablet 3/7/2017 at Unknown time  No Yes   Sig: Take 1 Tab by mouth every morning. lisinopril (PRINIVIL, ZESTRIL) 5 mg tablet 3/7/2017 at Unknown time  No Yes   Sig: Take 1 Tab by mouth daily. potassium chloride (K-DUR, KLOR-CON) 20 mEq tablet 3/7/2017 at Unknown time  No Yes   Sig: Take 1 Tab by mouth two (2) times a day.       Facility-Administered Medications: None     Thank you,  Mimi Kennedy  PharmD Candidate [id number]

## 2017-03-08 NOTE — H&P
Hospitalist Admission Note    NAME: Allan Lopez   :  1940   MRN:  [id number]     Date/Time:  3/7/2017 11:50 PM    Patient PCP: Yrn Kelly NP Cardiology= Dr Sanaz Hazel  ________________________________________________________________________    My assessment of this patient's clinical condition and my plan of care is as follows. Assessment / Plan:  Acute Resp failure POA  Acute Bronchitis POA  CXR neg  Flu test neg (as per signout by ED MD Dumont)    Pt directly admitted to Telemetry bed from the Outside ED  IV solumedrol, taper quickly with response  Cont scheduled nebs  Mucinex  Robitussin DM  Empiric azithromycin  Send sputum Cx  Gentle IVF, hold lasix    Acute renal Failure POA  Cr 2.0 at [facility] ED, baseline Cr ~ 1.3    IVF  BMP in AM  Holding lasix & Lisinopril for now    HTN  CAD/Old LBBB on EKG (unchanged)  H/o CVA/aphasia at baseline  Hyperlipidemia    Cont ASA  Cont Coreg, Imdur  Holding Lisinopril , lasix as above  Holding lipitor for now      Code Status: Full  Surrogate Decision Maker: son     DVT Prophylaxis: SQ heparin  GI Prophylaxis: not indicated    Baseline: Pt lives with son at home, is independent with ADLs        Subjective:   CHIEF COMPLAINT: Worsening SOB x 1 day    HISTORY OF PRESENT ILLNESS:     Aggie Britt is a 64 y.o.  female who presents with above complains from home to [facility] ED. Pt was transferred to [address] [place] [address] after presenting initially with CC of Worsening SOB x 1 day  H/o associated Cough & sputum  No h/o COPD but use inhaler at home as needed as per pt. H/o CHF for which she is on Lasix daily  Denies sick contact    Pt was found to have diffuse Wheezing in Outside ER - some response to Nebs & IV steroids but remained SOB with wheezing & difficulty completing sentence in one breath     We were asked to admit for work up and evaluation of the above problems.      Past Medical History:   Diagnosis Date    Acute kidney failure ([address] 5/23/6662    Acute systolic heart failure ([facility] [address] 7/14/2016    CAD (coronary artery disease)     Heart failure ([facility] [address]     Stroke [facility]) 2009    Stroke    Stroke [facility])         No past surgical history on file. Social History   Substance Use Topics    Smoking status: Unknown If Ever Smoked    Smokeless tobacco: Never Used    Alcohol use No      Comment: beer every day        Family History   Problem Relation Age of Onset    Coronary Artery Disease Other      grandmother age 76     Allergies   Allergen Reactions    Pcn [Penicillins] Hives        Prior to Admission medications    Medication Sig Start Date End Date Taking? Authorizing Provider   lisinopril (PRINIVIL, ZESTRIL) 5 mg tablet Take 1 Tab by mouth daily. 11/30/16  Yes Cyril Thorne MD   carvedilol (COREG) 6.25 mg tablet Take 1 Tab by mouth two (2) times a day. 8/3/16  Yes ROME Biswas   furosemide (LASIX) 40 mg tablet One pill daily 8/3/16  Yes ROME Biswas   isosorbide mononitrate ER (IMDUR) 30 mg tablet Take 1 Tab by mouth every morning. 8/3/16  Yes ROME Biswas   potassium chloride (K-DUR, KLOR-CON) 20 mEq tablet Take 1 Tab by mouth two (2) times a day. 8/3/16  Yes ROME Biswas   albuterol (PROVENTIL HFA, VENTOLIN HFA, PROAIR HFA) 90 mcg/actuation inhaler Take 2 Puffs by inhalation every four (4) hours as needed for Wheezing. 7/21/16  Yes Steven Pryor PA-C   atorvastatin (LIPITOR) 40 mg tablet TAKE ONE TABLET BY MOUTH EVERY DAY  **PATIENT MUST BE SEEN FOR MORE REFILLS** 4/29/12  Yes Cyril Thorne MD   aspirin delayed-release 81 mg tablet Take 1 Tab by mouth daily.  7/18/16   Santy Ji MD       REVIEW OF SYSTEMS:           Total of 12 systems reviewed as follows:       POSITIVE= underlined text  Negative = text not underlined  General:  fever, chills, sweats, generalized weakness, weight loss/gain,      loss of appetite   Eyes:    blurred vision, eye pain, loss of vision, double vision  ENT:    rhinorrhea, pharyngitis   Respiratory:   cough, sputum production, SOB, CHRISTINE, wheezing, pleuritic pain   Cardiology:   chest pain, palpitations, orthopnea, PND, edema, syncope   Gastrointestinal:  abdominal pain , N/V, diarrhea, dysphagia, constipation, bleeding   Genitourinary:  frequency, urgency, dysuria, hematuria, incontinence   Muskuloskeletal :  arthralgia, myalgia, back pain  Hematology:  easy bruising, nose or gum bleeding, lymphadenopathy   Dermatological: rash, ulceration, pruritis, color change / jaundice  Endocrine:   hot flashes or polydipsia   Neurological:  headache, dizziness, confusion, focal weakness, paresthesia,     Speech difficulties, memory loss, gait difficulty  Psychological: Feelings of anxiety, depression, agitation    Objective:   VITALS:    Visit Vitals    /79 (BP 1 Location: Left arm, BP Patient Position: At rest)    Pulse 90    Temp 98.7 °F (37.1 °C)    Resp 20    Ht 5' 3\" (1.6 m)    Wt 63.3 kg (139 lb 8.8 oz)    SpO2 94%    BMI 24.72 kg/m2       PHYSICAL EXAM:    General:    Alert, cooperative, no distress, appears stated age. HEENT: Atraumatic, anicteric sclerae, pink conjunctivae     No oral ulcers, mucosa moist, throat clear, dentition fair  Neck:  Supple, symmetrical,  thyroid: non tender  Lungs:    Wheezing bilaterally +. No rales. Chest wall:  No tenderness  No Accessory muscle use. Heart:   Regular  rhythm,  No  murmur   No edema  Abdomen:   Soft, non-tender. Not distended. Bowel sounds normal  Extremities: No cyanosis. No clubbing    Skin:     Not pale. Not Jaundiced  No rashes   Psych:  Good insight. Not depressed. Not anxious or agitated. Neurologic: EOMs intact. No facial asymmetry. baseline aphasia from old CVA noted + Symmetrical strength, Sensation grossly intact.  Alert and oriented X 4.     _______________________________________________________________________  Care Plan discussed with:    Comments   Patient x    Family      RN x    Care Manager Consultant:  zackery Dumont   _______________________________________________________________________  Expected  Disposition:   Home with Family x   HH/PT/OT/RN ?   SNF/LTC    SOLA    ________________________________________________________________________  TOTAL TIME:  48 Minutes    Critical Care Provided     Minutes non procedure based      Comments    x Reviewed previous records   >50% of visit spent in counseling and coordination of care  Discussion with patient and/or family and questions answered       ________________________________________________________________________  Signed: Jojo Downey MD    Procedures: see electronic medical records for all procedures/Xrays and details which were not copied into this note but were reviewed prior to creation of Plan. LAB DATA REVIEWED:    No results found for this or any previous visit (from the past 24 hour(s)).

## 2017-03-08 NOTE — PROGRESS NOTES
Interdisciplinary team rounds were held 3/8/2017 with the following team members:Care Management, Nursing, Nutrition, Pharmacy and Physician and the patient. Plan of care discussed. See clinical pathway and/or care plan for interventions and desired outcomes.     IVF; nebs; transfer to tele; discharge disposition: 1-2 days

## 2017-03-08 NOTE — CARDIO/PULMONARY
C/P Rehab Note:    Chart Reviewed. Admitting diagnosis of Acute Respiratory Failure/Bronchitis. PMH significant for;  -HTN  -CAD  -CVA  -CHF LV EF by Echo 11/30/16    Pt resides with her son. Pt last received teaching from our department on 7/16. She was admitted late last night and sleeping in bed, no family present. CHF folder left at bedside will try to return later.

## 2017-03-08 NOTE — PROGRESS NOTES
Hospitalist Progress Note    NAME: Marga Whitehead   :  1940   MRN:  [id number]       Interim Hospital Summary: 64 y.o. female whom presented on 3/7/2017 with      Assessment / Plan:  Acute Resp failure POA  Acute Bronchitis POA    CXR neg  Flu test neg (as per signout by ED MD Dumont)       Bronchodilators, zithromax and steroids  Mucinex,Robitussin DM  Send sputum Cx  Stop the ivf, lasix pretty soon  If not improving needs further ct chest       Acute renal Failure POA on ckd stage 3  Cr 2.0 at [facility] ED, baseline Cr ~ 1.3  Today still 2.2     Stop the ivf  holding lisinopril, if not improving,In next 24 hours further work up  Need lasix pretty soon     HTN  CAD/Old LBBB on EKG (unchanged)  H/o CVA/aphasia at baseline  Hyperlipidemia     Cont ASA  Cont Coreg, Imdur  Holding Lisinopril ,  Holding lipitor for now   last echo in 71/0357  Systolic function was moderately reduced. Ejection fraction was estimated in the range of 35 % to 40 %. There was moderate diffuse hypokinesis. There was moderate concentric hypertrophy. Had stress in 2016 neg for ischemia  Need to resume lasix soon  Seen by devan cardiology in the last admission     Code Status: Full  Surrogate Decision Maker: son      DVT Prophylaxis: SQ heparin  GI Prophylaxis: not indicated     Baseline: Pt lives with son at home, is independent with ADLs           Subjective:     Chief Complaint / Reason for Physician Visit  Cough and chest tightness   Discussed with RN events overnight. Review of Systems:  Symptom Y/N Comments  Symptom Y/N Comments   Fever/Chills n   Chest Pain n    Poor Appetite    Edema     Cough y   Abdominal Pain n    Sputum y   Joint Pain     SOB/CHRISTINE y   Pruritis/Rash     Nausea/vomit    Tolerating PT/OT     Diarrhea n   Tolerating Diet     Constipation    Other       Could NOT obtain due to:      Objective:     VITALS:   Last 24hrs VS reviewed since prior progress note.  Most recent are:  Patient Vitals for the past 24 hrs:   Temp Pulse Resp BP SpO2   03/08/17 1009 - - - - 97 %   03/08/17 0852 97.9 °F (36.6 °C) 80 18 (!) 160/93 97 %   03/08/17 0333 98.1 °F (36.7 °C) 78 16 140/65 97 %   03/07/17 2256 98.7 °F (37.1 °C) 90 20 138/79 94 %       Intake/Output Summary (Last 24 hours) at 03/08/17 1017  Last data filed at 03/08/17 0333   Gross per 24 hour   Intake           188.75 ml   Output                0 ml   Net           188.75 ml        PHYSICAL EXAM:  General: WD, WN. Alert, cooperative, no acute distress    EENT:  EOMI. Anicteric sclerae. MMM  Resp:  B/l decreased air entry, + wheezing bilateral.  No accessory muscle use  CV:  Regular  rhythm,  No edema  GI:  Soft, Non distended, Non tender.  +Bowel sounds  Neurologic:  Alert and oriented X 3, normal speech,   Psych:   Good insight. Not anxious nor agitated  Skin:  No rashes. No jaundice    Reviewed most current lab test results and cultures  YES  Reviewed most current radiology test results   YES  Review and summation of old records today    NO  Reviewed patient's current orders and MAR    YES  PMH/ reviewed - no change compared to H&P  ________________________________________________________________________  Care Plan discussed with:    Comments   Patient y    Family      RN y    Care Manager     Consultant                        Multidiciplinary team rounds were held today with , nursing, pharmacist and clinical coordinator. Patient's plan of care was discussed; medications were reviewed and discharge planning was addressed.      ________________________________________________________________________  Total NON critical care TIME: 35  Minutes    Total CRITICAL CARE TIME Spent:   Minutes non procedure based      Comments   >50% of visit spent in counseling and coordination of care     ________________________________________________________________________  Garo Pierce MD     Procedures: see electronic medical records for all procedures/Xrays and details which were not copied into this note but were reviewed prior to creation of Plan. LABS:  I reviewed today's most current labs and imaging studies.   Pertinent labs include:  Recent Labs      03/08/17 0337   WBC  3.5*   HGB  11.5   HCT  34.8*   PLT  172     Recent Labs      03/08/17 0337   NA  139   K  4.6   CL  105   CO2  24   GLU  255*   BUN  30*   CREA  2.22*   CA  9.0       Signed: Braydon Russell MD

## 2017-03-08 NOTE — PROGRESS NOTES
.    PCU SHIFT NURSING NOTE      Bedside shift change report given to India Bernard RN (oncoming nurse) by Joetta Epley RN (offgoing nurse). Report included the following information SBAR, Kardex, Intake/Output, MAR and Recent Results. Shift Summary:   46- Notified Dr. Schmid Grary of patient's wheezing and no nebs ordered. Ordered received. Notified RT.  1200- Patient continues to have exp. Wheezing with activity, eating or getting on bed pan. sats remain good. 1500- Patient off unit for chest CT and dopplers. No wheezing and VSS. Admission Date 3/7/2017   Admission Diagnosis Shortness of breath  Acute respiratory failure (HCC)  COPD exacerbation (HCC)   Consults None        Consults   []PT   []OT   []Speech   []Case Management      [] Palliative      Cardiac Monitoring Order   [x]Yes   []No     IV drips   []Yes    Drip:                            Dose:  Drip:                            Dose:  Drip:                            Dose:   [x]No     GI Prophylaxis   []Yes   [x]No         DVT Prophylaxis   SCDs:             Jose stockings:         [x] Medication   []Contraindicated   []None      Activity Level Activity Level: Bed Rest         Purposeful Rounding every 1-2 hour? [x]Yes   Delatorre Score  Total Score: 3   Bed Alarm (If score 3 or >)   [x]Yes   [] Refused (See signed refusal form in chart)   Nando Score  Nando Score: 18   Nando Score (if score 14 or less)   []PMT consult   []Wound Care consult      []Specialty bed   [] Nutrition consult          Needs prior to discharge:   Home O2 required:    []Yes   [x]No    If yes, how much O2 required?     Other:    Last Bowel Movement: Last Bowel Movement Date: 03/08/17      Influenza Vaccine Received Flu Vaccine for Current Season (usually Sept-March): Yes        Pneumonia Vaccine           Diet Active Orders   Diet    DIET CARDIAC Regular; 2 GM NA (House Low NA)      LDAs               Peripheral IV 03/08/17 Right Forearm (Active)   Site Assessment Clean, dry, & intact 3/8/2017 8:55 AM   Phlebitis Assessment 0 3/8/2017  8:55 AM   Infiltration Assessment 0 3/8/2017  8:55 AM   Dressing Status Clean, dry, & intact 3/8/2017  8:55 AM   Dressing Type Tape;Transparent 3/8/2017  8:55 AM   Hub Color/Line Status Blue; Infusing 3/8/2017  8:55 AM   Action Taken Blood drawn 3/8/2017  3:33 AM                      Urinary Catheter      Intake & Output   Date 03/07/17 0700 - 03/08/17 0659 03/08/17 0700 - 03/09/17 0659   Shift [id number] 1900-0659 24 Hour Total 1987-9354 4528-6352 24 Hour Total   I  N  T  A  K  E   I.V.  188.8  (0.2) 188.8  (0.1)         Volume (0.9% sodium chloride infusion)  188.8 188.8       Shift Total  (mL/kg)  188.8  (3) 188.8  (3)      O  U  T  P  U  T   Urine            Urine Occurrence(s)  1 x 1 x       Shift Total  (mL/kg)         NET  188.8 188.8      Weight (kg)  63.3 63.3 63.3 63.3 63.3         Readmission Risk Assessment Tool Score High Risk            24       Total Score        3 Relationship with PCP    4 More than 1 Admission in calendar year    5 Patient Insurance is Medicare, Medicaid or Self Pay    12 Charlson Comorbidity Score        Criteria that do not apply:    Patient Living Status    Patient Length of Stay > 5       Expected Length of Stay - - -   Actual Length of Stay 1

## 2017-03-09 LAB
ANION GAP BLD CALC-SCNC: 11 MMOL/L (ref 5–15)
BUN SERPL-MCNC: 48 MG/DL (ref 6–20)
BUN/CREAT SERPL: 24 (ref 12–20)
CALCIUM SERPL-MCNC: 8.3 MG/DL (ref 8.5–10.1)
CHLORIDE SERPL-SCNC: 105 MMOL/L (ref 97–108)
CO2 SERPL-SCNC: 23 MMOL/L (ref 21–32)
CREAT SERPL-MCNC: 2.03 MG/DL (ref 0.55–1.02)
ERYTHROCYTE [DISTWIDTH] IN BLOOD BY AUTOMATED COUNT: 13.8 % (ref 11.5–14.5)
GLUCOSE SERPL-MCNC: 215 MG/DL (ref 65–100)
HCT VFR BLD AUTO: 32.9 % (ref 35–47)
HGB BLD-MCNC: 11.1 G/DL (ref 11.5–16)
MCH RBC QN AUTO: 26.4 PG (ref 26–34)
MCHC RBC AUTO-ENTMCNC: 33.7 G/DL (ref 30–36.5)
MCV RBC AUTO: 78.1 FL (ref 80–99)
PLATELET # BLD AUTO: 155 K/UL (ref 150–400)
POTASSIUM SERPL-SCNC: 4.9 MMOL/L (ref 3.5–5.1)
RBC # BLD AUTO: 4.21 M/UL (ref 3.8–5.2)
SODIUM SERPL-SCNC: 139 MMOL/L (ref 136–145)
WBC # BLD AUTO: 11.3 K/UL (ref 3.6–11)

## 2017-03-09 PROCEDURE — 94640 AIRWAY INHALATION TREATMENT: CPT

## 2017-03-09 PROCEDURE — 65660000000 HC RM CCU STEPDOWN

## 2017-03-09 PROCEDURE — 74011250637 HC RX REV CODE- 250/637: Performed by: HOSPITALIST

## 2017-03-09 PROCEDURE — 80048 BASIC METABOLIC PNL TOTAL CA: CPT | Performed by: HOSPITALIST

## 2017-03-09 PROCEDURE — 74011000250 HC RX REV CODE- 250: Performed by: HOSPITALIST

## 2017-03-09 PROCEDURE — 74011250636 HC RX REV CODE- 250/636: Performed by: INTERNAL MEDICINE

## 2017-03-09 PROCEDURE — 74011250637 HC RX REV CODE- 250/637: Performed by: INTERNAL MEDICINE

## 2017-03-09 PROCEDURE — 51798 US URINE CAPACITY MEASURE: CPT

## 2017-03-09 PROCEDURE — 85027 COMPLETE CBC AUTOMATED: CPT | Performed by: HOSPITALIST

## 2017-03-09 PROCEDURE — 74011000250 HC RX REV CODE- 250: Performed by: INTERNAL MEDICINE

## 2017-03-09 PROCEDURE — 36415 COLL VENOUS BLD VENIPUNCTURE: CPT | Performed by: HOSPITALIST

## 2017-03-09 RX ORDER — FUROSEMIDE 40 MG/1
40 TABLET ORAL DAILY
Status: DISCONTINUED | OUTPATIENT
Start: 2017-03-09 | End: 2017-03-09

## 2017-03-09 RX ORDER — HYDRALAZINE HYDROCHLORIDE 20 MG/ML
10 INJECTION INTRAMUSCULAR; INTRAVENOUS
Status: DISCONTINUED | OUTPATIENT
Start: 2017-03-09 | End: 2017-03-14 | Stop reason: HOSPADM

## 2017-03-09 RX ORDER — HALOPERIDOL 5 MG/ML
2 INJECTION INTRAMUSCULAR
Status: DISCONTINUED | OUTPATIENT
Start: 2017-03-09 | End: 2017-03-14 | Stop reason: HOSPADM

## 2017-03-09 RX ORDER — ACETYLCYSTEINE 200 MG/ML
600 SOLUTION ORAL; RESPIRATORY (INHALATION) EVERY 12 HOURS
Status: DISCONTINUED | OUTPATIENT
Start: 2017-03-09 | End: 2017-03-10

## 2017-03-09 RX ORDER — FUROSEMIDE 10 MG/ML
40 INJECTION INTRAMUSCULAR; INTRAVENOUS DAILY
Status: DISCONTINUED | OUTPATIENT
Start: 2017-03-09 | End: 2017-03-12

## 2017-03-09 RX ADMIN — HEPARIN SODIUM 5000 UNITS: 5000 INJECTION, SOLUTION INTRAVENOUS; SUBCUTANEOUS at 21:28

## 2017-03-09 RX ADMIN — ASPIRIN 81 MG: 81 TABLET, COATED ORAL at 08:18

## 2017-03-09 RX ADMIN — HYDRALAZINE HYDROCHLORIDE 10 MG: 20 INJECTION INTRAMUSCULAR; INTRAVENOUS at 06:10

## 2017-03-09 RX ADMIN — IPRATROPIUM BROMIDE AND ALBUTEROL SULFATE 3 ML: .5; 3 SOLUTION RESPIRATORY (INHALATION) at 19:57

## 2017-03-09 RX ADMIN — HALOPERIDOL LACTATE 2 MG: 5 INJECTION, SOLUTION INTRAMUSCULAR at 23:02

## 2017-03-09 RX ADMIN — GUAIFENESIN 600 MG: 600 TABLET, EXTENDED RELEASE ORAL at 21:28

## 2017-03-09 RX ADMIN — Medication 1 LOZENGE: at 17:13

## 2017-03-09 RX ADMIN — Medication 10 ML: at 15:52

## 2017-03-09 RX ADMIN — METHYLPREDNISOLONE SODIUM SUCCINATE 60 MG: 125 INJECTION, POWDER, FOR SOLUTION INTRAMUSCULAR; INTRAVENOUS at 17:14

## 2017-03-09 RX ADMIN — IPRATROPIUM BROMIDE AND ALBUTEROL SULFATE 3 ML: .5; 3 SOLUTION RESPIRATORY (INHALATION) at 07:31

## 2017-03-09 RX ADMIN — ISOSORBIDE MONONITRATE 30 MG: 30 TABLET, EXTENDED RELEASE ORAL at 08:18

## 2017-03-09 RX ADMIN — ATORVASTATIN CALCIUM 40 MG: 40 TABLET, FILM COATED ORAL at 21:28

## 2017-03-09 RX ADMIN — METHYLPREDNISOLONE SODIUM SUCCINATE 60 MG: 125 INJECTION, POWDER, FOR SOLUTION INTRAMUSCULAR; INTRAVENOUS at 08:17

## 2017-03-09 RX ADMIN — Medication 1 LOZENGE: at 08:31

## 2017-03-09 RX ADMIN — METHYLPREDNISOLONE SODIUM SUCCINATE 60 MG: 125 INJECTION, POWDER, FOR SOLUTION INTRAMUSCULAR; INTRAVENOUS at 00:53

## 2017-03-09 RX ADMIN — HEPARIN SODIUM 5000 UNITS: 5000 INJECTION, SOLUTION INTRAVENOUS; SUBCUTANEOUS at 15:51

## 2017-03-09 RX ADMIN — HYDRALAZINE HYDROCHLORIDE 10 MG: 20 INJECTION INTRAMUSCULAR; INTRAVENOUS at 19:30

## 2017-03-09 RX ADMIN — GUAIFENESIN 600 MG: 600 TABLET, EXTENDED RELEASE ORAL at 08:18

## 2017-03-09 RX ADMIN — Medication 10 ML: at 06:16

## 2017-03-09 RX ADMIN — IPRATROPIUM BROMIDE AND ALBUTEROL SULFATE 3 ML: .5; 3 SOLUTION RESPIRATORY (INHALATION) at 13:38

## 2017-03-09 RX ADMIN — Medication 10 ML: at 21:28

## 2017-03-09 RX ADMIN — HEPARIN SODIUM 5000 UNITS: 5000 INJECTION, SOLUTION INTRAVENOUS; SUBCUTANEOUS at 06:16

## 2017-03-09 RX ADMIN — CARVEDILOL 6.25 MG: 6.25 TABLET, FILM COATED ORAL at 08:17

## 2017-03-09 RX ADMIN — Medication 10 ML: at 08:18

## 2017-03-09 RX ADMIN — IPRATROPIUM BROMIDE AND ALBUTEROL SULFATE 3 ML: .5; 3 SOLUTION RESPIRATORY (INHALATION) at 01:26

## 2017-03-09 RX ADMIN — Medication 10 ML: at 23:02

## 2017-03-09 RX ADMIN — FUROSEMIDE 40 MG: 10 INJECTION, SOLUTION INTRAMUSCULAR; INTRAVENOUS at 11:00

## 2017-03-09 RX ADMIN — GUAIFENESIN AND DEXTROMETHORPHAN 10 ML: 100; 10 SYRUP ORAL at 21:28

## 2017-03-09 RX ADMIN — AZITHROMYCIN 500 MG: 250 TABLET, FILM COATED ORAL at 08:17

## 2017-03-09 RX ADMIN — ACETYLCYSTEINE 800 MG: 200 INHALANT RESPIRATORY (INHALATION) at 19:58

## 2017-03-09 RX ADMIN — Medication 10 ML: at 19:30

## 2017-03-09 RX ADMIN — CARVEDILOL 6.25 MG: 6.25 TABLET, FILM COATED ORAL at 17:14

## 2017-03-09 NOTE — PROGRESS NOTES
Hospitalist Progress Note    NAME: Stacey Williamson   :  1940   MRN:  [id number]       Interim Hospital Summary: 64 y.o. female whom presented on 3/7/2017 with      Assessment / Plan:  Acute Resp failure POA, now on O2 2LNC, actively wheezing. Acute Bronchitis POA: c/w Z-max, bronchodilators, mucolytics, Steroids. Add Mucomyst.  Acute renal Failure POA on ckd stage 3 Cr 2.0 at [facility] ED, baseline Cr ~ 1.3  Today still 2.03 slowly trending down, monitor on diuretics  HTN  CAD/Old LBBB on EKG (unchanged)  H/o CVA/aphasia at baseline  Hyperlipidemia  Cont ASA  Cont Coreg, Imdur  Holding Lisinopril ,  Holding lipitor for now  Chronic Combined Heart Failure: last echo in   Systolic function was moderately reduced. Ejection fraction was estimated in the range of 35 % to 40 %. There was moderate diffuse hypokinesis. There was moderate concentric hypertrophy. Had stress in 2016 neg for ischemia  Resume lasix, weight is trending up  Seen by devan cardiology in the last admission  Code Status: Full  Surrogate Decision Maker: son   DVT Prophylaxis: SQ heparin  GI Prophylaxis: not indicated  Baseline: Pt lives with son at home, is independent with ADLs     Subjective:     Chief Complaint / Reason for Physician Visit  Cough and chest tightness   Discussed with RN events overnight. Review of Systems:  Symptom Y/N Comments  Symptom Y/N Comments   Fever/Chills n   Chest Pain n    Poor Appetite    Edema     Cough y   Abdominal Pain n    Sputum y   Joint Pain     SOB/CHRISTINE y   Pruritis/Rash     Nausea/vomit    Tolerating PT/OT     Diarrhea n   Tolerating Diet y    Constipation    Other       Could NOT obtain due to:      Objective:     VITALS:   Last 24hrs VS reviewed since prior progress note.  Most recent are:  Patient Vitals for the past 24 hrs:   Temp Pulse Resp BP SpO2   17 0752 97.8 °F (36.6 °C) 98 - 160/72 97 %   17 0732 - - - - 96 %   17 0610 - 90 - (!) 189/92 -   17 1232 - 92 - 169/85 96 %   03/09/17 0408 97.7 °F (36.5 °C) 96 22 (!) 178/103 96 %   03/09/17 0127 - - - - 97 %   03/09/17 0054 98 °F (36.7 °C) 97 22 186/85 96 %   03/08/17 2342 98.1 °F (36.7 °C) (!) 103 22 184/87 95 %   03/08/17 2034 - - - - 97 %   03/1940 98.1 °F (36.7 °C) 92 22 161/87 94 %   03/08/17 1625 98.2 °F (36.8 °C) 95 20 (!) 162/92 96 %   03/08/17 1452 98.4 °F (36.9 °C) 94 20 137/74 94 %   03/08/17 1132 - - - - 94 %       Intake/Output Summary (Last 24 hours) at 03/09/17 1041  Last data filed at 03/09/17 0800   Gross per 24 hour   Intake              150 ml   Output              325 ml   Net             -175 ml        PHYSICAL EXAM:  General: WD, WN. Alert, cooperative, no acute distress    EENT:  EOMI. Anicteric sclerae. MMM  Resp:  B/l decreased air entry, + wheezing bilateral.  No accessory muscle use  CV:  Regular  rhythm,  No edema  GI:  Soft, Non distended, Non tender.  +Bowel sounds  Neurologic:  Alert and oriented X 3, normal speech,   Psych:   Good insight. Not anxious nor agitated  Skin:  No rashes. No jaundice    Reviewed most current lab test results and cultures  YES  Reviewed most current radiology test results   YES  Review and summation of old records today    NO  Reviewed patient's current orders and MAR    YES  PMH/ reviewed - no change compared to H&P  ________________________________________________________________________  Care Plan discussed with:    Comments   Patient y    Family  y    RN y    Care Manager     Consultant                        Multidiciplinary team rounds were held today with , nursing, pharmacist and clinical coordinator. Patient's plan of care was discussed; medications were reviewed and discharge planning was addressed.      ________________________________________________________________________  Total NON critical care TIME: 35  Minutes    Total CRITICAL CARE TIME Spent:   Minutes non procedure based      Comments   >50% of visit spent in counseling and coordination of care     ________________________________________________________________________  Efraín Andrade MD     Procedures: see electronic medical records for all procedures/Xrays and details which were not copied into this note but were reviewed prior to creation of Plan. LABS:  I reviewed today's most current labs and imaging studies.   Pertinent labs include:  Recent Labs      03/09/17 0108 03/08/17 0337   WBC  11.3*  3.5*   HGB  11.1*  11.5   HCT  32.9*  34.8*   PLT  155  172     Recent Labs      03/09/17 0108 03/08/17 0337   NA  139  139   K  4.9  4.6   CL  105  105   CO2  23  24   GLU  215*  255*   BUN  48*  30*   CREA  2.03*  2.22*   CA  8.3*  9.0       Signed: Efraín Andrade MD

## 2017-03-09 NOTE — PROGRESS NOTES
Bedside shift change report given to Nils Phelan RN (oncoming nurse) by Lidya Almaraz RN (offgoing nurse). Report included the following information SBAR, Kardex, MAR and Recent Results.

## 2017-03-09 NOTE — PROGRESS NOTES
.    PCU SHIFT NURSING NOTE      Bedside shift change report given to Bushra Ellington RN (oncoming nurse) by Thuy Mcrae (offgoing nurse). Report included the following information SBAR, Kardex, Intake/Output, MAR and Recent Results. Shift Summary:   0800- Patient received with neb tx, wheezing. Used bedpan. Complained of sore throat. Admission Date 3/7/2017   Admission Diagnosis Shortness of breath  Acute respiratory failure (HCC)  COPD exacerbation ([facility] [address]   Consults None        Consults   []PT   []OT   []Speech   []Case Management      [] Palliative      Cardiac Monitoring Order   [x]Yes   []No     IV drips   []Yes    Drip:                            Dose:  Drip:                            Dose:  Drip:                            Dose:   [x]No     GI Prophylaxis   []Yes   [x]No         DVT Prophylaxis   SCDs:             Jose stockings:         [x] Medication   []Contraindicated   []None      Activity Level Activity Level: Up with Assistance     Activity Assistance: Partial (two people)   Purposeful Rounding every 1-2 hour? [x]Yes   Delatorre Score  Total Score: 3   Bed Alarm (If score 3 or >)   [x]Yes   [] Refused (See signed refusal form in chart)   Nando Score  Nando Score: 17   Nando Score (if score 14 or less)   []PMT consult   []Wound Care consult      []Specialty bed   [] Nutrition consult          Needs prior to discharge:   Home O2 required:    []Yes   [x]No    If yes, how much O2 required?     Other:    Last Bowel Movement: Last Bowel Movement Date: 03/08/17      Influenza Vaccine Received Flu Vaccine for Current Season (usually Sept-March): Yes        Pneumonia Vaccine           Diet Active Orders   Diet    DIET CARDIAC Regular; 2 GM NA (House Low NA)      LDAs               Peripheral IV 03/09/17 Right Arm (Active)   Site Assessment Clean, dry, & intact 3/9/2017  1:27 AM   Phlebitis Assessment 0 3/9/2017  1:27 AM   Infiltration Assessment 0 3/9/2017  1:27 AM   Dressing Status Clean, dry, & intact 3/9/2017 1: 27 AM   Dressing Type Transparent;Tape 3/9/2017  1:27 AM   Hub Color/Line Status Blue;Flushed;Patent 3/9/2017  1:27 AM   Action Taken Blood drawn 3/9/2017  1:27 AM                      Urinary Catheter      Intake & Output   Date 03/08/17 0700 - 03/09/17 0659 03/09/17 0700 - 03/10/17 0659   Shift 0700-1859 1900-0659 24 Hour Total 0700-1859 1900-0659 24 Hour Total   I  N  T  A  K  E   P.O. 300  300         P. O. 300  300       Shift Total  (mL/kg) 300  (4.7)  300  (4.7)      O  U  T  P  U  T   Urine  (mL/kg/hr) 150  (0.2) 100  (0.1) 250  (0.2) 225  225      Urine Voided 150 100 250 225  225    Shift Total  (mL/kg) 150  (2.4) 100  (1.6) 250  (4) 225  (3.6)  225  (3.6)    -100 50 -225  -225   Weight (kg) 63.3 63.2 63.2 63.2 63.2 63.2         Readmission Risk Assessment Tool Score High Risk            24       Total Score        3 Relationship with PCP    4 More than 1 Admission in calendar year    5 Patient Insurance is Medicare, Medicaid or Self Pay    12 Charlson Comorbidity Score        Criteria that do not apply:    Patient Living Status    Patient Length of Stay > 5       Expected Length of Stay 3d 19h   Actual Length of Stay 2

## 2017-03-09 NOTE — CARDIO/PULMONARY
Cardiopulmonary Rehab Nursing Entry:    Chart Reviewed. Admitting diagnosis of Acute Respiratory Failure/Bronchitis.      PMH significant for;  -HTN  -CAD  -CVA  -CHF LV EF by Echo 11/30/16     Pt resides with her son.      Pt last received teaching from our department on 7/16. Met with pt who is having increased cough and c/o sore throat today. This was a f/u visit to reinforce prior CHF teaching. Educational materials referred to during session. Pt able to state that her son helps take care of her at home. He helps with meals, she does not use added table salt, and with her prescriptions. She has a scale that admittedly does not use daily. Encouraged every day weight check and explained rationale. Pt reports walking indoors at home with a walker. Instructed on chair exercises. Session kept brief due to pt condition. No questions voiced.

## 2017-03-09 NOTE — PROGRESS NOTES
03/08/17 2342   Vitals   Temp 98.1 °F (36.7 °C)   Temp Source Oral   Pulse (Heart Rate) (!) 103   Heart Rate Source Monitor   Resp Rate 22   O2 Sat (%) 95 %   Level of Consciousness Alert   /87   MAP (Calculated) 119   MEWS Score 3   Height/Weight   Weight 63.2 kg (139 lb 4.8 oz)   Weight Source Bed   BMI (calculated) 24.7   Post activity, NAD. Will reassess HR and b/p frequently, until back to baseline.

## 2017-03-09 NOTE — PROGRESS NOTES
03/09/17 0408   Vitals   Temp 97.7 °F (36.5 °C)   Temp Source Oral   Pulse (Heart Rate) 96   Heart Rate Source Monitor   Resp Rate 22   O2 Sat (%) 96 %   Level of Consciousness Alert   BP (!) 178/103   MAP (Calculated) 128   BP 1 Location Right arm   BP 1 Method Automatic   BP Patient Position At rest;Head of bed elevated (Comment degrees)   MEWS Score 2   Pain 1   Pain Scale 1 Numeric (0 - 10)   Pain Intensity 1 0   Patient Stated Pain Goal 0   Oxygen Therapy   O2 Device Room air    04:18  Called Dr. Cristobal Pizarro to notify of increasingly elevated b/p. Dr. Cristobal Pizarro asked me to call him back in 10 minutes because he is with a patient. 04:40  Notified Dr. Cristobal Pizarro of above. Orders received for prn hydralazine with parameters. Pt remains asymptomatic. Will continue to monitor. Ingris Morin RN

## 2017-03-09 NOTE — PROGRESS NOTES
Interdisciplinary team rounds were held 3/9/2017 with the following team members:Care Management, Nursing, Nutrition, Pharmacy and Physician and the patient. Plan of care discussed. See clinical pathway and/or care plan for interventions and desired outcomes.     Arabella; nebs; PT/OT; possible d/c tomorrow

## 2017-03-09 NOTE — PROGRESS NOTES
TRANSFER - OUT REPORT:    Verbal report given to [facility] on Etha Boots for routine progression of care       Report consisted of patients Situation, Background, Assessment and   Recommendations(SBAR). Information from the following report(s) SBAR, Kardex, Admission Summary and Recent Results was reviewed with the receiving nurse. Opportunity for questions and clarification was provided.

## 2017-03-10 LAB
ALBUMIN SERPL BCP-MCNC: 3.4 G/DL (ref 3.5–5)
ALBUMIN/GLOB SERPL: 0.9 {RATIO} (ref 1.1–2.2)
ALP SERPL-CCNC: 106 U/L (ref 45–117)
ALT SERPL-CCNC: 13 U/L (ref 12–78)
ANION GAP BLD CALC-SCNC: 14 MMOL/L (ref 5–15)
AST SERPL W P-5'-P-CCNC: 15 U/L (ref 15–37)
BASOPHILS # BLD AUTO: 0 K/UL (ref 0–0.1)
BASOPHILS # BLD: 0 % (ref 0–1)
BILIRUB SERPL-MCNC: 0.3 MG/DL (ref 0.2–1)
BUN SERPL-MCNC: 52 MG/DL (ref 6–20)
BUN/CREAT SERPL: 34 (ref 12–20)
CALCIUM SERPL-MCNC: 8.4 MG/DL (ref 8.5–10.1)
CHLORIDE SERPL-SCNC: 106 MMOL/L (ref 97–108)
CO2 SERPL-SCNC: 21 MMOL/L (ref 21–32)
CREAT SERPL-MCNC: 1.53 MG/DL (ref 0.55–1.02)
DIFFERENTIAL METHOD BLD: ABNORMAL
EOSINOPHIL # BLD: 0 K/UL (ref 0–0.4)
EOSINOPHIL NFR BLD: 0 % (ref 0–7)
ERYTHROCYTE [DISTWIDTH] IN BLOOD BY AUTOMATED COUNT: 13.7 % (ref 11.5–14.5)
GLOBULIN SER CALC-MCNC: 3.9 G/DL (ref 2–4)
GLUCOSE SERPL-MCNC: 165 MG/DL (ref 65–100)
HCT VFR BLD AUTO: 34.2 % (ref 35–47)
HGB BLD-MCNC: 11.8 G/DL (ref 11.5–16)
LYMPHOCYTES # BLD AUTO: 6 % (ref 12–49)
LYMPHOCYTES # BLD: 0.8 K/UL (ref 0.8–3.5)
MAGNESIUM SERPL-MCNC: 2.5 MG/DL (ref 1.6–2.4)
MCH RBC QN AUTO: 26.4 PG (ref 26–34)
MCHC RBC AUTO-ENTMCNC: 34.5 G/DL (ref 30–36.5)
MCV RBC AUTO: 76.5 FL (ref 80–99)
MONOCYTES # BLD: 0.3 K/UL (ref 0–1)
MONOCYTES NFR BLD AUTO: 2 % (ref 5–13)
NEUTS SEG # BLD: 12.6 K/UL (ref 1.8–8)
NEUTS SEG NFR BLD AUTO: 92 % (ref 32–75)
PLATELET # BLD AUTO: 144 K/UL (ref 150–400)
PLATELET COMMENTS,PCOM: ABNORMAL
POTASSIUM SERPL-SCNC: 4.1 MMOL/L (ref 3.5–5.1)
PROT SERPL-MCNC: 7.3 G/DL (ref 6.4–8.2)
RBC # BLD AUTO: 4.47 M/UL (ref 3.8–5.2)
RBC MORPH BLD: ABNORMAL
SODIUM SERPL-SCNC: 141 MMOL/L (ref 136–145)
WBC # BLD AUTO: 13.7 K/UL (ref 3.6–11)
WBC MORPH BLD: ABNORMAL

## 2017-03-10 PROCEDURE — 94640 AIRWAY INHALATION TREATMENT: CPT

## 2017-03-10 PROCEDURE — 97161 PT EVAL LOW COMPLEX 20 MIN: CPT

## 2017-03-10 PROCEDURE — 97116 GAIT TRAINING THERAPY: CPT

## 2017-03-10 PROCEDURE — 74011000250 HC RX REV CODE- 250: Performed by: INTERNAL MEDICINE

## 2017-03-10 PROCEDURE — 83735 ASSAY OF MAGNESIUM: CPT | Performed by: INTERNAL MEDICINE

## 2017-03-10 PROCEDURE — 85025 COMPLETE CBC W/AUTO DIFF WBC: CPT | Performed by: INTERNAL MEDICINE

## 2017-03-10 PROCEDURE — 74011250637 HC RX REV CODE- 250/637: Performed by: INTERNAL MEDICINE

## 2017-03-10 PROCEDURE — 74011000250 HC RX REV CODE- 250: Performed by: HOSPITALIST

## 2017-03-10 PROCEDURE — 65660000000 HC RM CCU STEPDOWN

## 2017-03-10 PROCEDURE — 80053 COMPREHEN METABOLIC PANEL: CPT | Performed by: INTERNAL MEDICINE

## 2017-03-10 PROCEDURE — 74011250636 HC RX REV CODE- 250/636: Performed by: INTERNAL MEDICINE

## 2017-03-10 PROCEDURE — 74011250636 HC RX REV CODE- 250/636: Performed by: HOSPITALIST

## 2017-03-10 PROCEDURE — 74011250637 HC RX REV CODE- 250/637: Performed by: HOSPITALIST

## 2017-03-10 PROCEDURE — 97165 OT EVAL LOW COMPLEX 30 MIN: CPT

## 2017-03-10 PROCEDURE — 36415 COLL VENOUS BLD VENIPUNCTURE: CPT | Performed by: INTERNAL MEDICINE

## 2017-03-10 PROCEDURE — G8978 MOBILITY CURRENT STATUS: HCPCS

## 2017-03-10 PROCEDURE — G8987 SELF CARE CURRENT STATUS: HCPCS

## 2017-03-10 PROCEDURE — G8988 SELF CARE GOAL STATUS: HCPCS

## 2017-03-10 PROCEDURE — 77010033678 HC OXYGEN DAILY

## 2017-03-10 PROCEDURE — 97535 SELF CARE MNGMENT TRAINING: CPT

## 2017-03-10 PROCEDURE — G8979 MOBILITY GOAL STATUS: HCPCS

## 2017-03-10 RX ORDER — GUAIFENESIN 600 MG/1
1200 TABLET, EXTENDED RELEASE ORAL EVERY 12 HOURS
Status: DISCONTINUED | OUTPATIENT
Start: 2017-03-10 | End: 2017-03-14 | Stop reason: HOSPADM

## 2017-03-10 RX ADMIN — IPRATROPIUM BROMIDE AND ALBUTEROL SULFATE 3 ML: .5; 3 SOLUTION RESPIRATORY (INHALATION) at 13:05

## 2017-03-10 RX ADMIN — IPRATROPIUM BROMIDE AND ALBUTEROL SULFATE 3 ML: .5; 3 SOLUTION RESPIRATORY (INHALATION) at 20:33

## 2017-03-10 RX ADMIN — HEPARIN SODIUM 5000 UNITS: 5000 INJECTION, SOLUTION INTRAVENOUS; SUBCUTANEOUS at 21:48

## 2017-03-10 RX ADMIN — GUAIFENESIN 1200 MG: 600 TABLET, EXTENDED RELEASE ORAL at 21:48

## 2017-03-10 RX ADMIN — METHYLPREDNISOLONE SODIUM SUCCINATE 40 MG: 40 INJECTION, POWDER, FOR SOLUTION INTRAMUSCULAR; INTRAVENOUS at 17:00

## 2017-03-10 RX ADMIN — IPRATROPIUM BROMIDE AND ALBUTEROL SULFATE 3 ML: .5; 3 SOLUTION RESPIRATORY (INHALATION) at 01:50

## 2017-03-10 RX ADMIN — METHYLPREDNISOLONE SODIUM SUCCINATE 60 MG: 125 INJECTION, POWDER, FOR SOLUTION INTRAMUSCULAR; INTRAVENOUS at 01:50

## 2017-03-10 RX ADMIN — AZITHROMYCIN 500 MG: 250 TABLET, FILM COATED ORAL at 09:37

## 2017-03-10 RX ADMIN — ACETYLCYSTEINE 800 MG: 200 INHALANT RESPIRATORY (INHALATION) at 10:18

## 2017-03-10 RX ADMIN — CARVEDILOL 6.25 MG: 6.25 TABLET, FILM COATED ORAL at 09:37

## 2017-03-10 RX ADMIN — HYDRALAZINE HYDROCHLORIDE 10 MG: 20 INJECTION INTRAMUSCULAR; INTRAVENOUS at 09:36

## 2017-03-10 RX ADMIN — ISOSORBIDE MONONITRATE 30 MG: 30 TABLET, EXTENDED RELEASE ORAL at 09:37

## 2017-03-10 RX ADMIN — FUROSEMIDE 40 MG: 10 INJECTION, SOLUTION INTRAMUSCULAR; INTRAVENOUS at 09:36

## 2017-03-10 RX ADMIN — Medication 10 ML: at 14:15

## 2017-03-10 RX ADMIN — ATORVASTATIN CALCIUM 40 MG: 40 TABLET, FILM COATED ORAL at 21:48

## 2017-03-10 RX ADMIN — ASPIRIN 81 MG: 81 TABLET, COATED ORAL at 09:37

## 2017-03-10 RX ADMIN — METHYLPREDNISOLONE SODIUM SUCCINATE 40 MG: 40 INJECTION, POWDER, FOR SOLUTION INTRAMUSCULAR; INTRAVENOUS at 09:42

## 2017-03-10 RX ADMIN — CARVEDILOL 6.25 MG: 6.25 TABLET, FILM COATED ORAL at 18:32

## 2017-03-10 RX ADMIN — IPRATROPIUM BROMIDE AND ALBUTEROL SULFATE 3 ML: .5; 3 SOLUTION RESPIRATORY (INHALATION) at 07:18

## 2017-03-10 RX ADMIN — Medication 10 ML: at 09:42

## 2017-03-10 RX ADMIN — Medication 10 ML: at 21:48

## 2017-03-10 RX ADMIN — GUAIFENESIN 600 MG: 600 TABLET, EXTENDED RELEASE ORAL at 09:37

## 2017-03-10 RX ADMIN — HEPARIN SODIUM 5000 UNITS: 5000 INJECTION, SOLUTION INTRAVENOUS; SUBCUTANEOUS at 15:23

## 2017-03-10 NOTE — CARDIO/PULMONARY
Cardiopulmonary Rehab Nursing Entry:     Chart Reviewed. Admitting diagnosis of Acute Respiratory Failure/Bronchitis.       PMH significant for;  -HTN  -CAD  -CVA  -CHF LV EF by Echo 11/30/16      Pt resides with her son.   Radha Palomino  Pt last received teaching from our department on 7/16.     Met with pt who is in distress over needing to go to bathroom. Requested nursing assistance. Nurse with patient at bedside. CP Rehab will follow up.

## 2017-03-10 NOTE — PROGRESS NOTES
Hospitalist Progress Note    NAME: Jo Ann Sharif   :  1940   MRN:  [id number]       Interim Hospital Summary: 64 y.o. female whom presented on 3/7/2017 with      Assessment / Plan:  Acute Resp failure POA  Acute Bronchitis POA    CXR neg  Flu test neg (as per signout by ED MD Dumont)       Bronchodilators, zithromax and steroids  Mucinex,Robitussin DM  Send sputum Cx never sent  Resumed lasix  CT chest There is opacities in the right middle lobe and volume loss. This is  consistent with partial collapse of the right middle lobe. This was not present  previously. Follow-up to resolution is necessary. There does not appear to be an  endobronchial lesion but follow-up exam to evaluate for clearing or worsening is  suggested. 2. No pneumonia    Reviewed CT with dr. Erik Cole, recommended to stop mucomyst and increase robitussin, and cont current management  No concern for mucous plug  Or pneumonia at this time         Acute renal Failure POA on ckd stage 3  Cr 2.0 at [facility] ED, baseline Cr ~ 1.3       Monitor on iv diuresis  holding lisinopril, if not improving,In next 24 hours further work up       HTN  CAD/Old LBBB on EKG (unchanged)  H/o CVA/aphasia at baseline  Hyperlipidemia     Cont ASA  Cont Coreg, Imdur  Holding Lisinopril ,  Holding lipitor for now   last echo in   Systolic function was moderately reduced. Ejection fraction was estimated in the range of 35 % to 40 %. There was moderate diffuse hypokinesis. There was moderate concentric hypertrophy. Had stress in 2016 neg for ischemia  lasix  Seen by [facility] cardiology in the last admission     Code Status: Full  Surrogate Decision Maker: son      DVT Prophylaxis: SQ heparin  GI Prophylaxis: not indicated     Baseline: Pt lives with son at home, is independent with ADLs         Subjective:     Chief Complaint / Reason for Physician Visit  Still wheezing, and cough   Discussed with RN events overnight.      Review of Systems:  Symptom Y/N Comments  Symptom Y/N Comments   Fever/Chills n   Chest Pain n    Poor Appetite    Edema     Cough y   Abdominal Pain n    Sputum y   Joint Pain     SOB/CHRISTINE y   Pruritis/Rash     Nausea/vomit    Tolerating PT/OT     Diarrhea n   Tolerating Diet     Constipation    Other       Could NOT obtain due to:      Objective:     VITALS:   Last 24hrs VS reviewed since prior progress note. Most recent are:  Patient Vitals for the past 24 hrs:   Temp Pulse Resp BP SpO2   03/10/17 1305 - - - - 96 %   03/10/17 1130 98 °F (36.7 °C) 88 20 160/62 97 %   03/10/17 1019 - - - - 96 %   03/10/17 0724 97.9 °F (36.6 °C) 89 22 170/65 97 %   03/10/17 0718 - - - - 96 %   03/10/17 0411 97.4 °F (36.3 °C) 86 22 167/81 95 %   03/09/17 2302 97.6 °F (36.4 °C) (!) 105 22 149/76 97 %   03/09/17 1930 97.9 °F (36.6 °C) 98 24 183/78 97 %   03/09/17 1624 97.1 °F (36.2 °C) 63 20 157/75 98 %       Intake/Output Summary (Last 24 hours) at 03/10/17 1507  Last data filed at 03/10/17 1400   Gross per 24 hour   Intake              300 ml   Output              600 ml   Net             -300 ml        PHYSICAL EXAM:  General: WD, WN. Alert, cooperative,  no acute distress    EENT:  EOMI. Anicteric sclerae. MMM  Resp:  B/l decreased air entry, + wheezing bilateral.  No accessory muscle use  CV:  Regular  rhythm,  No edema  GI:  Soft, Non distended, Non tender.  +Bowel sounds  Neurologic:  Alert and oriented X 3, normal speech,   Psych:   Good insight. Not anxious nor agitated  Skin:  No rashes.   No jaundice    Reviewed most current lab test results and cultures  YES  Reviewed most current radiology test results   YES  Review and summation of old records today    NO  Reviewed patient's current orders and MAR    YES  PMH/SH reviewed - no change compared to H&P  ________________________________________________________________________  Care Plan discussed with:    Comments   Patient y    Family      RN y    Care Manager     Consultant Multidiciplinary team rounds were held today with , nursing, pharmacist and clinical coordinator. Patient's plan of care was discussed; medications were reviewed and discharge planning was addressed. ________________________________________________________________________  Total NON critical care TIME: 30  Minutes    Total CRITICAL CARE TIME Spent:   Minutes non procedure based      Comments   >50% of visit spent in counseling and coordination of care     ________________________________________________________________________  Garo Pierce MD     Procedures: see electronic medical records for all procedures/Xrays and details which were not copied into this note but were reviewed prior to creation of Plan. LABS:  I reviewed today's most current labs and imaging studies.   Pertinent labs include:  Recent Labs      03/10/17   0415  03/09/17   0108  03/08/17   0337   WBC  13.7*  11.3*  3.5*   HGB  11.8  11.1*  11.5   HCT  34.2*  32.9*  34.8*   PLT  144*  155  172     Recent Labs      03/10/17   0415  03/09/17   0108  03/08/17   0337   NA  141  139  139   K  4.1  4.9  4.6   CL  106  105  105   CO2  21  23  24   GLU  165*  215*  255*   BUN  52*  48*  30*   CREA  1.53*  2.03*  2.22*   CA  8.4*  8.3*  9.0   MG  2.5*   --    --    ALB  3.4*   --    --    TBILI  0.3   --    --    SGOT  15   --    --    ALT  13   --    --        Signed: Garo Pierce MD

## 2017-03-10 NOTE — PROGRESS NOTES
Problem: Self Care Deficits Care Plan (Adult)  Goal: *Acute Goals and Plan of Care (Insert Text)  Occupational Therapy Goals  Initiated 3/10/2017  1. Patient will perform lower body dressing using adaptive dressing aides with minimal assistance/contact guard assist within 7 day(s). 2. Patient will perform seated bathing with minimal assistance/contact guard assist within 7 day(s). 3. Patient will perform toilet transfers with supervision/set-up within 7 day(s). 4. Patient will perform all aspects of toileting with supervision/set-up within 7 day(s). 5. Patient will participate in upper extremity therapeutic exercise/activities with supervision/set-up for >=10 minutes within 7 day(s). OCCUPATIONAL THERAPY EVALUATION  Patient: Lisa Aguilar (37 y.o. female)  Date: 3/10/2017  Primary Diagnosis: Shortness of breath  Acute respiratory failure (HCC)  COPD exacerbation (HCC)        Precautions: fall, bed alarm         ASSESSMENT :  Based on the objective data described below, the patient presents with decreased L sided strength from previous CVA, generalized weakness, poor activity tolerance, CHRISTINE and risk for falls s/p admission for SOB and COPD exacerbation. Patient received in bedside chair, CGA to stand and CGA to min A for toilet transfer and CGA for standing hygiene task. Pt became dyspneic attempting to don R sock via cross leg, pt with decreased functional use of L hand and decreased bilateral use to assist with ADL tasks. O2 stable via room air with activity, max  bpm. Pt is currently needing CGA to mod A for ADLs. Recommend discharge home with HHOT, PT and 24 hr. Pt would also benefit from personal care aides. Patient will benefit from skilled intervention to address the above impairments.   Patients rehabilitation potential is considered to be Fair  Factors which may influence rehabilitation potential include:   [ ]             None noted  [X]             Mental ability/status  [X] Medical condition  [ ]             Home/family situation and support systems  [ ]             Safety awareness  [ ]             Pain tolerance/management  [ ]             Other:        PLAN :  Recommendations and Planned Interventions:  [X]               Self Care Training                  [X]        Therapeutic Activities  [X]               Functional Mobility Training    [ ]        Cognitive Retraining  [X]               Therapeutic Exercises           [X]        Endurance Activities  [X]               Balance Training                   [ ]        Neuromuscular Re-Education  [ ]               Visual/Perceptual Training     [X]   Home Safety Training  [X]               Patient Education                 [X]        Family Training/Education  [ ]               Other (comment):     Frequency/Duration: Patient will be followed by occupational therapy 3 times a week to address goals. Discharge Recommendations: HHOT and PT, continued 24 hr support  Further Equipment Recommendations for Discharge: TBD       SUBJECTIVE:   Patient stated I was having a hard time.       OBJECTIVE DATA SUMMARY:   HISTORY:   Past Medical History:   Diagnosis Date    Acute kidney failure ([facility] [address] 0/21/3262    Acute systolic heart failure ([facility] [address] 7/14/2016    CAD (coronary artery disease)      Heart failure ([facility] [address]      Stroke ([facility] [address] 2009     Stroke    Stroke [facility])     No past surgical history on file. Prior Level of Function/Home Situation: lives with family, someone always providing 24 hour support and assist for ADLs. Although pt reported she was able to dress herself, increased difficulty with bowel hygiene.  Pt reported fear of showering, preferred sponge bathing   Expanded or extensive additional review of patient history: CVA 2009     Home Situation  Home Environment: Private residence  One/Two Story Residence: One story  Living Alone: No  Support Systems: Child(nicci), [employer], Family member(s), Friends \ neighbors  Patient Expects to be Discharged to[de-identified] Private residence  Current DME Used/Available at Home: Nebulizer, Walker  [X]  Right hand dominant             [ ]  Left hand dominant     EXAMINATION OF PERFORMANCE DEFICITS:  Cognitive/Behavioral Status:  Neurologic State: Alert  Orientation Level: Oriented X4  Cognition: Appropriate decision making; Follows commands           Skin: intact  Edema: none noted  Hearing: Auditory  Auditory Impairment: None  Vision/Perceptual:    Tracking: Able to track stimulus in all quadrants w/o difficulty                      Acuity: Within Defined Limits    Corrective Lenses: Glasses  Range of Motion:     AROM: Generally decreased, functional                       Strength:     Strength: Generally decreased, functional              Coordination:  Coordination: Generally decreased, functional  Fine Motor Skills-Upper: Left Intact; Right Intact    Gross Motor Skills-Upper: Left Intact; Right Intact  Tone & Sensation:     Tone: Normal  Sensation: Intact                       Balance:  Sitting: Intact  Standing: Impaired (RW, CGA)  Standing - Static: Fair  Standing - Dynamic : Fair     Functional Mobility and Transfers for ADLs:  Bed Mobility:  Rolling: Additional time;Stand-by asssistance  Supine to Sit: Additional time;Stand-by asssistance  Scooting: Additional time;Minimum assistance     Transfers:  Sit to Stand: Additional time;Contact guard assistance  Stand to Sit: Additional time;Contact guard assistance (Poor technique)  Toilet Transfer : Minimum assistance     ADL Assessment:  Feeding: Independent     Oral Facial Hygiene/Grooming: Contact guard assistance     Bathing: Moderate assistance     Upper Body Dressing: Minimum assistance     Lower Body Dressing:  Moderate assistance (pt with increase CHRISTINE donning R sock cross leg)     Toileting: Minimum assistance                 ADL Intervention and task modifications:           Pt educated on role of OT and required verbal cues for safety and proper hand placement during ADLs/functional transfers. Functional Measure:  Barthel Index:      Bathin  Bladder: 0  Bowels: 10  Groomin  Dressin  Feedin  Mobility: 5  Stairs: 0  Toilet Use: 5  Transfer (Bed to Chair and Back): 10  Total: 35         Barthel and G-code impairment scale:  Percentage of impairment CH  0% CI  1-19% CJ  20-39% CK  40-59% CL  60-79% CM  80-99% CN  100%   Barthel Score 0-100 100 99-80 79-60 59-40 20-39 1-19    0   Barthel Score 0-20 20 17-19 13-16 9-12 5-8 1-4 0      The Barthel ADL Index: Guidelines  1. The index should be used as a record of what a patient does, not as a record of what a patient could do. 2. The main aim is to establish degree of independence from any help, physical or verbal, however minor and for whatever reason. 3. The need for supervision renders the patient not independent. 4. A patient's performance should be established using the best available evidence. Asking the patient, friends/relatives and nurses are the usual sources, but direct observation and common sense are also important. However direct testing is not needed. 5. Usually the patient's performance over the preceding 24-48 hours is important, but occasionally longer periods will be relevant. 6. Middle categories imply that the patient supplies over 50 per cent of the effort. 7. Use of aids to be independent is allowed. Shefali Vigil., Barthel, DCyrusW. (4979). Functional evaluation: the Barthel Index. [facility] (14)2. ALCIRA Reardon, Mati Roman., [place]t [place]., [place], [facility] (). Measuring the change indisability after inpatient rehabilitation; comparison of the responsiveness of the Barthel Index and Functional [place] Measure. Journal of Neurology, Neurosurgery, and Psychiatry, 66(4), 072-018.   Juan Diego Barney N.ROSE MARY.SANDRA, COREY Mayen, & Kia Bazan MCyrusA. (2004.) Assessment of post-stroke quality of life in cost-effectiveness studies: The usefulness of the Barthel Index and the EuroQoL-5D. Quality of Life Research, 13, 549-49            G codes: In compliance with CMSs Claims Based Outcome Reporting, the following G-code set was chosen for this patient based on their primary functional limitation being treated: The outcome measure chosen to determine the severity of the functional limitation was the Barthel Index with a score of 35/100 which was correlated with the impairment scale. · Self Care:               - CURRENT STATUS:    CL - 60%-79% impaired, limited or restricted               - GOAL STATUS:           CJ - 20%-39% impaired, limited or restricted               - D/C STATUS:                       ---------------To be determined---------------      Occupational Therapy Evaluation Charge Determination   History Examination Decision-Making   MEDIUM Complexity : Expanded review of history including physical, cognitive and psychosocial  history  MEDIUM Complexity : 3-5 performance deficits relating to physical, cognitive , or psychosocial skils that result in activity limitations and / or participation restrictions MEDIUM Complexity : Patient may present with comorbidities that affect occupational performnce. Miniml to moderate modification of tasks or assistance (eg, physical or verbal ) with assesment(s) is necessary to enable patient to complete evaluation       Based on the above components, the patient evaluation is determined to be of the following complexity level: MEDIUM  Pain:  Pain Scale 1: Numeric (0 - 10)  Pain Intensity 1: 0              Activity Tolerance:   VSS  Please refer to the flowsheet for vital signs taken during this treatment.   After treatment:   [X] Patient left in no apparent distress sitting up in chair  [ ] Patient left in no apparent distress in bed  [X] Call bell left within reach  [X] Nursing notified  [ ] Caregiver present  [X] Bed alarm activated COMMUNICATION/EDUCATION:   The patients plan of care was discussed with: Physical Therapist and Registered Nurse. [ ] Home safety education was provided and the patient/caregiver indicated understanding. [X] Patient/family have participated as able in goal setting and plan of care. [ ] Patient/family agree to work toward stated goals and plan of care. [ ] Patient understands intent and goals of therapy, but is neutral about his/her participation. [ ] Patient is unable to participate in goal setting and plan of care. This patients plan of care is appropriate for delegation to [facility].      Thank you for this referral.  Zachary Lal OT  Time Calculation: 15 mins

## 2017-03-10 NOTE — PROGRESS NOTES
PCU SHIFT NURSING NOTE      Bedside and Verbal shift change report given to Danae Aguilar RN (oncoming nurse) by Meta Kanner, RN (offgoing nurse). Report included the following information SBAR, Kardex, ED Summary and OR Summary. Shift Summary:         Admission Date 3/7/2017   Admission Diagnosis Shortness of breath  Acute respiratory failure (HCC)  COPD exacerbation (HCC)   Consults None        Consults   [x]PT   [x]OT   []Speech   [x]Case Management      [] Palliative      Cardiac Monitoring Order   [x]Yes   []No     IV drips   []Yes    Drip:                            Dose:  Drip:                            Dose:  Drip:                            Dose:   [x]No     GI Prophylaxis   [x]Yes   []No         DVT Prophylaxis   SCDs:             Jose stockings:         [x] Medication   []Contraindicated   []None      Activity Level Activity Level: Up with Assistance     Activity Assistance: Partial (two people)   Purposeful Rounding every 1-2 hour? [x]Yes   Delatorre Score  Total Score: 3   Bed Alarm (If score 3 or >)   [x]Yes   [] Refused (See signed refusal form in chart)   Nando Score  Nando Score: 16   Nando Score (if score 14 or less)   []PMT consult   [x]Wound Care consult      []Specialty bed   [] Nutrition consult          Needs prior to discharge:   Home O2 required:    []Yes   [x]No    If yes, how much O2 required?     Other:    Last Bowel Movement: Last Bowel Movement Date: 03/08/17      Influenza Vaccine Received Flu Vaccine for Current Season (usually Sept-March): Yes        Pneumonia Vaccine           Diet Active Orders   Diet    DIET CARDIAC Regular; 2 GM NA (House Low NA)      LDAs               Peripheral IV 03/09/17 Right Arm (Active)   Site Assessment Clean, dry, & intact 3/10/2017  7:24 AM   Phlebitis Assessment 0 3/10/2017  7:24 AM   Infiltration Assessment 0 3/10/2017  7:24 AM   Dressing Status Clean, dry, & intact 3/10/2017  7:24 AM   Dressing Type Tape;Transparent 3/10/2017  7:24 AM   Hub Color/Line Status Blue;Capped;Flushed 3/10/2017  7:24 AM   Action Taken Blood drawn 3/9/2017  1:27 AM                      Urinary Catheter      Intake & Output   Date 03/09/17 0700 - 03/10/17 0659 03/10/17 0700 - 03/11/17 0659   Shift 1274-7169 0454-1943 24 Hour Total 9228-6989 9889-3750 24 Hour Total   I  N  T  A  K  E   P.O. 300  300         P. O. 300  300       Shift Total  (mL/kg) 300  (4.7)  300  (4.7)      O  U  T  P  U  T   Urine  (mL/kg/hr) 1075  (1.4)  1075  (0.7)         Urine Voided 1075  1075         Urine Occurrence(s)  2 x 2 x 1 x  1 x    Shift Total  (mL/kg) 1075  (17)  1075  (17)      NET -[phone]      Weight (kg) 63.2 63.2 63.2 63.2 63.2 63.2         Readmission Risk Assessment Tool Score High Risk            24       Total Score        3 Relationship with PCP    4 More than 1 Admission in calendar year    5 Patient Insurance is Medicare, Medicaid or Self Pay    12 Charlson Comorbidity Score        Criteria that do not apply:    Patient Living Status    Patient Length of Stay > 5       Expected Length of Stay 3d 19h   Actual Length of Stay 3

## 2017-03-10 NOTE — PROGRESS NOTES
Problem: Mobility Impaired (Adult and Pediatric)  Goal: *Acute Goals and Plan of Care (Insert Text)  Physical Therapy Goals  Initiated 3/10/2017  1. Patient will move from supine to sit and sit to supine , scoot up and down and roll side to side in bed with independence within 7 day(s). 2. Patient will transfer from bed to chair and chair to bed with supervision/set-up using the least restrictive device within 7 day(s). 3. Patient will perform sit to stand with supervision/set-up within 7 day(s). 4. Patient will ambulate with supervision/set-up for 150 feet with the least restrictive device within 7 day(s). 5. Patient will ascend/descend 6 stairs with 1 handrail(s) with minimal assistance/contact guard assist within 7 day(s). PHYSICAL THERAPY EVALUATION  Patient: Vipin Virgen (46 y.o. female)  Date: 3/10/2017  Primary Diagnosis: Shortness of breath  Acute respiratory failure (HCC)  COPD exacerbation (HCC)        Precautions: Fall, aphasia         ASSESSMENT :  Based on the objective data described below, the patient presents with decreased balance/strength/activity tolerance, overall SBA to min assist for mobility, able to amb around her room with RW/CGA. RA VSS. RA VSS. Pt is very difficult to understand d/t aphasia, but pt 's son Marlynn Bosworth who lives with her called while PT with pt. He reports that a cousin stays with pt at night when he is at work so pt is seldom alone. Pt reports difficulty with bathing/toileting. SNF rehab vs HHPT/OT + HHA recommended. Patient will benefit from skilled intervention to address the above impairments.   Patients rehabilitation potential is considered to be Fair  Factors which may influence rehabilitation potential include:   [ ]         None noted  [ ]         Mental ability/status  [X]         Medical condition  [ ]         Home/family situation and support systems  [ ]         Safety awareness  [ ]         Pain tolerance/management  [ ]         Other:        PLAN :  Recommendations and Planned Interventions:  [X]           Bed Mobility Training             [ ]    Neuromuscular Re-Education  [X]           Transfer Training                   [ ]    Orthotic/Prosthetic Training  [X]           Gait Training                         [ ]    Modalities  [X]           Therapeutic Exercises           [ ]    Edema Management/Control  [X]           Therapeutic Activities            [X]    Patient and Family Training/Education  [ ]           Other (comment):     Frequency/Duration: Patient will be followed by physical therapy  4 times a week to address goals. Discharge Recommendations: [facility] for rehab vs HHPT/OT HHA  Further Equipment Recommendations for Discharge: Pt has RW       SUBJECTIVE:   Patient stated Lima Baez every time I move ( Lasix given, very upset by this.)      OBJECTIVE DATA SUMMARY:   HISTORY:    Past Medical History:   Diagnosis Date    Acute kidney failure ([facility] 5/20/3776    Acute systolic heart failure ([facility] 7/14/2016    CAD (coronary artery disease)      Heart failure ([facility]      Stroke ([facility] 2009     Stroke    Stroke [facility])     No past surgical history on file.   Prior Level of Function/Home Situation: Household amb with RW, seldom goes out, sedentary, difficulty with ADL tasks per pt )  Personal factors and/or comorbidities impacting plan of care: remote CVA with aphasia, CHF     Home Situation  Home Environment: Private residence  One/Two Story Residence: One story  Living Alone: No  Support Systems: Child(nicci), Buddhist / irving community, Family member(s), Friends \ neighbors  Patient Expects to be Discharged to[de-identified] Private residence  Current DME Used/Available at Home: Malou Lara     EXAMINATION/PRESENTATION/DECISION MAKING:   Critical Behavior:  Neurologic State: Alert  Orientation Level: Oriented X4  Cognition: Appropriate decision making, Follows commands        Range Of Motion:  AROM: Generally decreased, functional Strength:    Strength: Generally decreased, functional                    Tone & Sensation:   Tone: Normal              Sensation: Intact               Coordination:  Coordination: Generally decreased, functional  Vision:      Functional Mobility:  Bed Mobility:  Rolling: Additional time;Stand-by asssistance  Supine to Sit: Additional time;Stand-by asssistance     Scooting: Additional time;Minimum assistance  Transfers:  Sit to Stand: Additional time;Contact guard assistance  Stand to Sit: Additional time;Contact guard assistance (Poor technique)                       Balance:   Sitting: Intact  Standing: Impaired (RW, CGA)  Standing - Static: Fair  Standing - Dynamic : Fair  Ambulation/Gait Training:  Distance (ft): 30 Feet (ft)  Assistive Device: Gait belt;Walker, rolling  Ambulation - Level of Assistance: Contact guard assistance        Gait Abnormalities: Decreased step clearance        Base of Support: Narrowed     Speed/Brianda: Pace decreased (<100 feet/min)     Functional Measure:     Elder Mobility Scale      9/20            EMS and G-code impairment scale:  Percentage of impairment CH  0% CI  1-19% CJ  20-39% CK  40-59% CL  60-79% CM  80-99% CN  100%   EMS Score 0-20 20 17-19 13-16 9-12 5-8 1-4 0      Scores under 10 - generally these patients are dependent in mobility maneuvers; require help with  basic ADL, such as transfers, toileting and dressing. Scores between 10 - 13 - generally these patients are borderline in terms of safe mobility and  independence in ADL i.e. they require some help with some mobility maneuvers. Scores over 14 - Generally these patients are able to perform mobility maneuvers alone and safely  and are independent in basic ADL. G codes: In compliance with CMSs Claims Based Outcome Reporting, the following G-code set was chosen for this patient based on their primary functional limitation being treated:      The outcome measure chosen to determine the severity of the functional limitation was the EMS with a score of 9/20 which was correlated with the impairment scale. · Mobility - Walking and Moving Around:               - CURRENT STATUS:    CK - 40%-59% impaired, limited or restricted               - GOAL STATUS:           CJ - 20%-39% impaired, limited or restricted               - D/C STATUS:                       ---------------To be determined---------------      Physical Therapy Evaluation Charge Determination   History Examination Presentation Decision-Making   LOW Complexity : Zero comorbidities / personal factors that will impact the outcome / POC LOW Complexity : 1-2 Standardized tests and measures addressing body structure, function, activity limitation and / or participation in recreation  LOW Complexity : Stable, uncomplicated  LOW Complexity : FOTO score of       Based on the above components, the patient evaluation is determined to be of the following complexity level: LOW      Pain:  Pain Scale 1: Numeric (0 - 10)  Pain Intensity 1: 0              Activity Tolerance:   RA VSS  Please refer to the flowsheet for vital signs taken during this treatment. After treatment:   [X]         Patient left in no apparent distress sitting up in chair  [ ]         Patient left in no apparent distress in bed  [X]         Call bell left within reach  [X]         Nursing notified  [ ]         Caregiver present  [X]         Bed alarm activated      COMMUNICATION/EDUCATION:   The patients plan of care was discussed with: Occupational Therapist and Registered Nurse.  [X]         Fall prevention education was provided and the patient/caregiver indicated understanding. [X]         Patient/family have participated as able in goal setting and plan of care. [X]         Patient/family agree to work toward stated goals and plan of care. [ ]         Patient understands intent and goals of therapy, but is neutral about his/her participation.   [ ] Patient is unable to participate in goal setting and plan of care.      Thank you for this referral.  Sunil Mcnamara, PT   Time Calculation: 26 mins

## 2017-03-10 NOTE — PROGRESS NOTES
PCU SHIFT NURSING NOTE      Bedside shift change report given to Monika RN (oncoming nurse) by Cynthia Marley RN (offgoing nurse). Report included the following information SBAR. Shift Summary:     7883: Patient hallucinating. States there are bugs in her bed biting her. Spoke with Dr. Shannan Landin. 2 mg IV haldol prn ordered. Admission Date 3/7/2017   Admission Diagnosis Shortness of breath  Acute respiratory failure (HCC)  COPD exacerbation (HCC)   Consults None        Consults   []PT   []OT   []Speech   []Case Management      [] Palliative      Cardiac Monitoring Order   []Yes   []No     IV drips   []Yes    Drip:                            Dose:  Drip:                            Dose:  Drip:                            Dose:   []No     GI Prophylaxis   []Yes   []No         DVT Prophylaxis   SCDs:             Jose stockings:         [] Medication   []Contraindicated   []None      Activity Level Activity Level: Up with Assistance     Activity Assistance: Partial (two people)   Purposeful Rounding every 1-2 hour? []Yes   Delatorre Score  Total Score: 3   Bed Alarm (If score 3 or >)   []Yes   [] Refused (See signed refusal form in chart)   Nando Score  Nando Score: 17   Nando Score (if score 14 or less)   []PMT consult   []Wound Care consult      []Specialty bed   [] Nutrition consult          Needs prior to discharge:   Home O2 required:    []Yes   []No    If yes, how much O2 required?     Other:    Last Bowel Movement: Last Bowel Movement Date: 03/08/17      Influenza Vaccine Received Flu Vaccine for Current Season (usually Sept-March): Yes        Pneumonia Vaccine           Diet Active Orders   Diet    DIET CARDIAC Regular; 2 GM NA (House Low NA)      LDAs               Peripheral IV 03/09/17 Right Arm (Active)   Site Assessment Clean, dry, & intact 3/9/2017  4:25 PM   Phlebitis Assessment 0 3/9/2017  4:25 PM   Infiltration Assessment 0 3/9/2017  4:25 PM   Dressing Status Clean, dry, & intact 3/9/2017  4:25 PM Dressing Type Tape;Transparent 3/9/2017  4:25 PM   Hub Color/Line Status Blue;Capped 3/9/2017  4:25 PM   Action Taken Blood drawn 3/9/2017  1:27 AM                      Urinary Catheter      Intake & Output   Date 03/08/17 1900 - 03/09/17 0659 03/09/17 0700 - 03/10/17 0659   Shift 5517-0132 24 Hour Total 8741-0357 7946-9326 24 Hour Total   I  N  T  A  K  E   P.O.  300 300  300      P. O.  300 300  300    Shift Total  (mL/kg)  300  (4.7) 300  (4.7)  300  (4.7)   O  U  T  P  U  T   Urine  (mL/kg/hr)   (1.4)  1075      Urine Voided   1075    Shift Total  (mL/kg) 100  (1.6) 250  (4) 1075  (17)  1075  (17)   NET -100 17 -588 -588   Weight (kg) 63.2 63.2 63.2 63.2 63.2         Readmission Risk Assessment Tool Score High Risk            24       Total Score        3 Relationship with PCP    4 More than 1 Admission in calendar year    5 Patient Insurance is Medicare, Medicaid or Self Pay    12 Charlson Comorbidity Score        Criteria that do not apply:    Patient Living Status    Patient Length of Stay > 5       Expected Length of Stay 3d 19h   Actual Length of Stay 2

## 2017-03-10 NOTE — PROGRESS NOTES
Bedside and Verbal shift change report given to ADELE Frank (oncoming nurse) by Nahed Hernandez RN (offgoing nurse). Report included the following information SBAR, Kardex, Intake/Output and MAR.

## 2017-03-10 NOTE — WOUND CARE
Wound care consult from staff nurse for \"buttocks, foot wounds. Sees outpatient wound care. \". Patient assessed and found NO wounds, anywhere. Believe staff nurse placed consult on wrong patient.   Joslyn Ormond, RN, ON, zone ph# [phone]

## 2017-03-11 ENCOUNTER — APPOINTMENT (OUTPATIENT)
Dept: GENERAL RADIOLOGY | Age: 77
DRG: 291 | End: 2017-03-11
Attending: INTERNAL MEDICINE
Payer: COMMERCIAL

## 2017-03-11 LAB
ANION GAP BLD CALC-SCNC: 11 MMOL/L (ref 5–15)
BUN SERPL-MCNC: 60 MG/DL (ref 6–20)
BUN/CREAT SERPL: 33 (ref 12–20)
CALCIUM SERPL-MCNC: 8.1 MG/DL (ref 8.5–10.1)
CHLORIDE SERPL-SCNC: 105 MMOL/L (ref 97–108)
CO2 SERPL-SCNC: 25 MMOL/L (ref 21–32)
CREAT SERPL-MCNC: 1.84 MG/DL (ref 0.55–1.02)
ERYTHROCYTE [DISTWIDTH] IN BLOOD BY AUTOMATED COUNT: 13.9 % (ref 11.5–14.5)
GLUCOSE SERPL-MCNC: 167 MG/DL (ref 65–100)
HCT VFR BLD AUTO: 33.9 % (ref 35–47)
HGB BLD-MCNC: 11.7 G/DL (ref 11.5–16)
MCH RBC QN AUTO: 26.8 PG (ref 26–34)
MCHC RBC AUTO-ENTMCNC: 34.5 G/DL (ref 30–36.5)
MCV RBC AUTO: 77.8 FL (ref 80–99)
PLATELET # BLD AUTO: 156 K/UL (ref 150–400)
POTASSIUM SERPL-SCNC: 4.2 MMOL/L (ref 3.5–5.1)
RBC # BLD AUTO: 4.36 M/UL (ref 3.8–5.2)
SODIUM SERPL-SCNC: 141 MMOL/L (ref 136–145)
WBC # BLD AUTO: 11.5 K/UL (ref 3.6–11)

## 2017-03-11 PROCEDURE — 65660000000 HC RM CCU STEPDOWN

## 2017-03-11 PROCEDURE — 71020 XR CHEST PA LAT: CPT

## 2017-03-11 PROCEDURE — 74011250636 HC RX REV CODE- 250/636: Performed by: INTERNAL MEDICINE

## 2017-03-11 PROCEDURE — 36415 COLL VENOUS BLD VENIPUNCTURE: CPT | Performed by: HOSPITALIST

## 2017-03-11 PROCEDURE — 85027 COMPLETE CBC AUTOMATED: CPT | Performed by: HOSPITALIST

## 2017-03-11 PROCEDURE — 74011250637 HC RX REV CODE- 250/637: Performed by: HOSPITALIST

## 2017-03-11 PROCEDURE — 74011250636 HC RX REV CODE- 250/636: Performed by: HOSPITALIST

## 2017-03-11 PROCEDURE — 74011000250 HC RX REV CODE- 250: Performed by: HOSPITALIST

## 2017-03-11 PROCEDURE — 74011250637 HC RX REV CODE- 250/637: Performed by: INTERNAL MEDICINE

## 2017-03-11 PROCEDURE — 94640 AIRWAY INHALATION TREATMENT: CPT

## 2017-03-11 PROCEDURE — 77010033678 HC OXYGEN DAILY

## 2017-03-11 PROCEDURE — 80048 BASIC METABOLIC PNL TOTAL CA: CPT | Performed by: HOSPITALIST

## 2017-03-11 RX ORDER — ACETAMINOPHEN 325 MG/1
650 TABLET ORAL
Status: DISCONTINUED | OUTPATIENT
Start: 2017-03-11 | End: 2017-03-14 | Stop reason: HOSPADM

## 2017-03-11 RX ADMIN — ACETAMINOPHEN 650 MG: 325 TABLET, FILM COATED ORAL at 21:16

## 2017-03-11 RX ADMIN — AZITHROMYCIN 500 MG: 250 TABLET, FILM COATED ORAL at 08:49

## 2017-03-11 RX ADMIN — HEPARIN SODIUM 5000 UNITS: 5000 INJECTION, SOLUTION INTRAVENOUS; SUBCUTANEOUS at 08:48

## 2017-03-11 RX ADMIN — HEPARIN SODIUM 5000 UNITS: 5000 INJECTION, SOLUTION INTRAVENOUS; SUBCUTANEOUS at 21:16

## 2017-03-11 RX ADMIN — FUROSEMIDE 40 MG: 10 INJECTION, SOLUTION INTRAMUSCULAR; INTRAVENOUS at 08:48

## 2017-03-11 RX ADMIN — CARVEDILOL 6.25 MG: 6.25 TABLET, FILM COATED ORAL at 17:36

## 2017-03-11 RX ADMIN — METHYLPREDNISOLONE SODIUM SUCCINATE 60 MG: 125 INJECTION, POWDER, FOR SOLUTION INTRAMUSCULAR; INTRAVENOUS at 17:36

## 2017-03-11 RX ADMIN — GUAIFENESIN 1200 MG: 600 TABLET, EXTENDED RELEASE ORAL at 08:48

## 2017-03-11 RX ADMIN — IPRATROPIUM BROMIDE AND ALBUTEROL SULFATE 3 ML: .5; 3 SOLUTION RESPIRATORY (INHALATION) at 07:43

## 2017-03-11 RX ADMIN — METHYLPREDNISOLONE SODIUM SUCCINATE 60 MG: 125 INJECTION, POWDER, FOR SOLUTION INTRAMUSCULAR; INTRAVENOUS at 13:02

## 2017-03-11 RX ADMIN — HEPARIN SODIUM 5000 UNITS: 5000 INJECTION, SOLUTION INTRAVENOUS; SUBCUTANEOUS at 14:28

## 2017-03-11 RX ADMIN — GUAIFENESIN 1200 MG: 600 TABLET, EXTENDED RELEASE ORAL at 21:16

## 2017-03-11 RX ADMIN — ISOSORBIDE MONONITRATE 30 MG: 30 TABLET, EXTENDED RELEASE ORAL at 08:49

## 2017-03-11 RX ADMIN — IPRATROPIUM BROMIDE AND ALBUTEROL SULFATE 3 ML: .5; 3 SOLUTION RESPIRATORY (INHALATION) at 14:28

## 2017-03-11 RX ADMIN — Medication 10 ML: at 21:16

## 2017-03-11 RX ADMIN — ASPIRIN 81 MG: 81 TABLET, COATED ORAL at 08:49

## 2017-03-11 RX ADMIN — ATORVASTATIN CALCIUM 40 MG: 40 TABLET, FILM COATED ORAL at 21:16

## 2017-03-11 RX ADMIN — CARVEDILOL 6.25 MG: 6.25 TABLET, FILM COATED ORAL at 08:49

## 2017-03-11 RX ADMIN — METHYLPREDNISOLONE SODIUM SUCCINATE 40 MG: 40 INJECTION, POWDER, FOR SOLUTION INTRAMUSCULAR; INTRAVENOUS at 01:48

## 2017-03-11 RX ADMIN — Medication 10 ML: at 14:30

## 2017-03-11 RX ADMIN — METHYLPREDNISOLONE SODIUM SUCCINATE 40 MG: 40 INJECTION, POWDER, FOR SOLUTION INTRAMUSCULAR; INTRAVENOUS at 08:47

## 2017-03-11 RX ADMIN — IPRATROPIUM BROMIDE AND ALBUTEROL SULFATE 3 ML: .5; 3 SOLUTION RESPIRATORY (INHALATION) at 01:22

## 2017-03-11 RX ADMIN — Medication 10 ML: at 17:36

## 2017-03-11 RX ADMIN — IPRATROPIUM BROMIDE AND ALBUTEROL SULFATE 3 ML: .5; 3 SOLUTION RESPIRATORY (INHALATION) at 19:58

## 2017-03-11 NOTE — PROGRESS NOTES
PCU SHIFT NURSING NOTE      Bedside and Verbal shift change report given to Colby Martinez RN (oncoming nurse) by Emily Lim RN (offgoing nurse). Report included the following information SBAR, Kardex, ED Summary, Procedure Summary, Intake/Output, MAR, Recent Results, Med Rec Status, Cardiac Rhythm NSR and Alarm Parameters . Shift Summary:         Admission Date 3/7/2017   Admission Diagnosis Shortness of breath  Acute respiratory failure (HCC)  COPD exacerbation (HCC)   Consults None        Consults   [x]PT   [x]OT   []Speech   [x]Case Management      [] Palliative      Cardiac Monitoring Order   [x]Yes   []No     IV drips   []Yes    Drip:                            Dose:  Drip:                            Dose:  Drip:                            Dose:   [x]No     GI Prophylaxis   [x]Yes   []No         DVT Prophylaxis   SCDs:             Jose stockings:         [x] Medication   []Contraindicated   []None      Activity Level Activity Level: Up with Assistance     Activity Assistance: Partial (two people)   Purposeful Rounding every 1-2 hour? [x]Yes   Delatorre Score  Total Score: 4   Bed Alarm (If score 3 or >)   [x]Yes   [] Refused (See signed refusal form in chart)   Nando Score  Nando Score: 16   Nando Score (if score 14 or less)   [x]PMT consult   []Wound Care consult      []Specialty bed   [] Nutrition consult          Needs prior to discharge:   Home O2 required:    []Yes   [x]No    If yes, how much O2 required?     Other:    Last Bowel Movement: Last Bowel Movement Date: 03/08/17      Influenza Vaccine Received Flu Vaccine for Current Season (usually Sept-March): Yes        Pneumonia Vaccine           Diet Active Orders   Diet    DIET CARDIAC Regular; 2 GM NA (House Low NA)      LDAs               Peripheral IV 03/09/17 Right Arm (Active)   Site Assessment Clean, dry, & intact 3/11/2017  7:11 AM   Phlebitis Assessment 0 3/11/2017  7:11 AM   Infiltration Assessment 0 3/11/2017  7:11 AM   Dressing Status Clean, dry, & intact 3/11/2017  7:11 AM   Dressing Type Tape;Transparent 3/11/2017  7:11 AM   Hub Color/Line Status Blue;Capped;Flushed 3/11/2017  7:11 AM   Action Taken Blood drawn 3/9/2017  1:27 AM                      Urinary Catheter      Intake & Output   Date 03/10/17 0700 - 03/11/17 0659 03/11/17 0700 - 03/12/17 0659   Shift 0700-1859 1900-0659 24 Hour Total 0700-1859 1900-0659 24 Hour Total   I  N  T  A  K  E   Shift Total  (mL/kg)         O  U  T  P  U  T   Urine  (mL/kg/hr) 750  (1) 300  (0.4) 1050  (0.7) 225  225      Urine Voided  225  225      Urine Occurrence(s) 2 x  2 x       Shift Total  (mL/kg) 750  (11.9) 300  (4.8) 1050  (16.7) 225  (3.6)  225  (3.6)   NET -750 -300 -1050 -225  -225   Weight (kg) 63.2 62.9 62.9 62.9 62.9 62.9         Readmission Risk Assessment Tool Score High Risk            24       Total Score        3 Relationship with PCP    4 More than 1 Admission in calendar year    5 Patient Insurance is Medicare, Medicaid or Self Pay    12 Charlson Comorbidity Score        Criteria that do not apply:    Patient Living Status    Patient Length of Stay > 5       Expected Length of Stay 3d 19h   Actual Length of Stay 4

## 2017-03-11 NOTE — PROGRESS NOTES
Spiritual Care Assessment/Progress Notes    Lisa Aguilar [id number]  [id number]    1940  64 y.o.  female    Patient Telephone Number: [phone] (home)   Confucianist Affiliation: Babak Akhtar   Language: English   Extended Emergency Contact Information  Primary Emergency Contact: Refused,Refused   [address]  Relation: None  Secondary Emergency Contact: Willis Valdez  Address: [facility] [address] [address], [address] [place] [address] Phone: [phone]  Relation: Child   Patient Active Problem List    Diagnosis Date Noted    COPD exacerbation ([address] 03/07/2017    CAD (coronary artery disease), native coronary artery 07/15/2016    H/O: CVA (cerebrovascular accident) 07/15/2016    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 ([address] 07/15/2016    Severe sepsis ([address] 07/14/2016    Pneumonia 07/14/2016    Acute respiratory failure ([address] 07/14/2016    Elevated troponin 07/14/2016    Left bundle branch block (LBBB) on electrocardiogram 01/63/0408    Acute systolic heart failure ([address] 07/14/2016    Acute kidney failure ([address] 07/14/2016    Mixed hyperlipidemia 09/21/2012    Essential hypertension, benign 09/21/2012    Other left bundle branch block 09/21/2012    Coronary atherosclerosis of native coronary artery 09/21/2012    Late effects of cerebrovascular disease, dysarthria 09/21/2012    Atherosclerosis of renal artery ([address] 09/21/2012    Atherosclerosis of native arteries of the extremities with intermittent claudication 09/21/2012    Tobacco use disorder 09/21/2012    Cerebrovascular accident ([address] 09/21/2012    Mitral valve disorders 09/21/2012    Tricuspid valve disorder 09/21/2012        Date: 3/11/2017       Level of Confucianist/Spiritual Activity:  [x]         Involved in irving tradition/spiritual practice    []         Not involved in irving tradition/spiritual practice  [x]         Spiritually oriented    []         Claims no spiritual orientation    []         seeking spiritual identity  []         Feels alienated from Mormon practice/tradition  []         Feels angry about Mormon practice/tradition  [x]         Spirituality/Mormon tradition is a resource for coping at this time. []         Not able to assess due to medical condition    Services Provided Today:  []         crisis intervention    []         reading Scriptures  [x]         spiritual assessment    [x]         prayer  [x]         empathic listening/emotional support  []         rites and rituals (cite in comments)  []         life review     []         Mormon support  []         theological development    []         advocacy  []         ethical dialog     []         blessing  []         bereavement support    []         support to family  []         anticipatory grief support   []         help with AMD  []         spiritual guidance    []         meditation      Spiritual Care Needs  []         Emotional Support  []         Spiritual/Lutheran Care  []         Loss/Adjustment  []         Advocacy/Referral                /Ethics  [x]         No needs expressed at               this time  []         Other: (note in               comments)  [address]  []         Follow up visits with               pt/family  []         Provide materials  []         Schedule sacraments  []         Contact Community               Clergy  [x]         Follow up as needed  []         Other: (note in               comments)     Comments:   Initial visit on PCU for spiritual assessment. No visitors present. Patient seems to be coping well at this time. She shared she attends [place] [facility]. She expressed no needs or concerns today except to request help finding a television station she wanted to watch. Assisted with that and offered assurance of prayer and  availability for ongoing support as needed.   MEREDITH West, [facility], [address] [address] [facility]  [phone])

## 2017-03-11 NOTE — PROGRESS NOTES
PCU SHIFT NURSING NOTE      Bedside shift change report given to ADELE Frank (oncoming nurse) by Salvador Obando RN (offgoing nurse). Report included the following information SBAR. Shift Summary:     2023: Patient is requesting to get out of bed to chair. She is tachypneic, wheezing, with a her hr in the 110s-130s, and per day shift she had trouble getting up today. I do not feel comfortable getting her up at this time. Advised her she needed to stay in the bed a this time and relax until after her breathing treatment and then we attempt to get her up to the chair. Admission Date 3/7/2017   Admission Diagnosis Shortness of breath  Acute respiratory failure (HCC)  COPD exacerbation (HCC)   Consults None        Consults   []PT   []OT   []Speech   []Case Management      [] Palliative      Cardiac Monitoring Order   []Yes   []No     IV drips   []Yes    Drip:                            Dose:  Drip:                            Dose:  Drip:                            Dose:   []No     GI Prophylaxis   []Yes   []No         DVT Prophylaxis   SCDs:             Jose stockings:         [] Medication   []Contraindicated   []None      Activity Level Activity Level: Up with Assistance     Activity Assistance: Partial (two people)   Purposeful Rounding every 1-2 hour? []Yes   Delatorre Score  Total Score: 3   Bed Alarm (If score 3 or >)   []Yes   [] Refused (See signed refusal form in chart)   Nando Score  Nando Score: 16   Nando Score (if score 14 or less)   []PMT consult   []Wound Care consult      []Specialty bed   [] Nutrition consult          Needs prior to discharge:   Home O2 required:    []Yes   []No    If yes, how much O2 required?     Other:    Last Bowel Movement: Last Bowel Movement Date: 03/08/17      Influenza Vaccine Received Flu Vaccine for Current Season (usually Sept-March): Yes        Pneumonia Vaccine           Diet Active Orders   Diet    DIET CARDIAC Regular; 2 GM NA (House Low NA)      LDAs Peripheral IV 03/09/17 Right Arm (Active)   Site Assessment Clean, dry, & intact 3/10/2017  5:00 PM   Phlebitis Assessment 0 3/10/2017  5:00 PM   Infiltration Assessment 0 3/10/2017  5:00 PM   Dressing Status Clean, dry, & intact 3/10/2017  5:00 PM   Dressing Type Tape;Transparent 3/10/2017  5:00 PM   Hub Color/Line Status Blue;Capped;Flushed 3/10/2017  5:00 PM   Action Taken Blood drawn 3/9/2017  1:27 AM                      Urinary Catheter      Intake & Output   Date 03/09/17 1900 - 03/10/17 0659 03/10/17 0700 - 03/11/17 0659   Shift 2078-7350 24 Hour Total 4019-5602 5680-7322 24 Hour Total   I  N  T  A  K  E   P.O.  300         P. O.  300       Shift Total  (mL/kg)  300  (4.7)      O  U  T  P  U  T   Urine  (mL/kg/hr)  1075 750  (1)  750      Urine Voided  1075 750  750      Urine Occurrence(s) 2 x 2 x 2 x  2 x    Shift Total  (mL/kg)  1075  (17) 750  (11.9)  750  (11.9)   NET  -775 -750  -750   Weight (kg) 63.2 63.2 63.2 63.2 63.2         Readmission Risk Assessment Tool Score High Risk            24       Total Score        3 Relationship with PCP    4 More than 1 Admission in calendar year    5 Patient Insurance is Medicare, Medicaid or Self Pay    12 Charlson Comorbidity Score        Criteria that do not apply:    Patient Living Status    Patient Length of Stay > 5       Expected Length of Stay 3d 19h   Actual Length of Stay 3

## 2017-03-11 NOTE — PROGRESS NOTES
Hospitalist Progress Note    NAME: Joesph Story   :  1940   MRN:  [id number]       Interim Hospital Summary: 64 y.o. female whom presented on 3/7/2017 with      Assessment / Plan:  Acute Resp failure POA  Due to Acute Bronchitis POA  Continue to wheeze and feel short of breath  Increase IV steroids  Continue mucolytics and antitussives  CXR neg  Flu test neg   Bronchodilators, zithromax   CT chest There is opacities in the right middle lobe and volume loss. This is  consistent with partial collapse of the right middle lobe. This was not present  previously. Follow-up to resolution is necessary. There does not appear to be an  endobronchial lesion but follow-up exam to evaluate for clearing or worsening is  Suggested  My partner discussed findings of CT with pulmonary and at time didn't show concern for mucous plugging  Will repeat CXR to followup on partial collapse    Acute renal Failure POA on ckd stage 3  Cr 2.0 at [facility] ED, baseline Cr ~ 1.3  Cr labile   Monitor on iv diuresis       HTN  CAD/Old LBBB on EKG (unchanged)  H/o CVA/aphasia at baseline  Hyperlipidemia  Cont ASA  Cont Coreg, Imdur  Holding Lisinopril   Holding lipitor for now    Chronic systolic heart failure with EF 35-40%  Monitor lytes and I/o and weight  On IV lasix to see the response to wheezing  Dad stress in 2016 neg for ischemia  Seen by [facility] cardi[facility] in the last admission     Code Status: Full  Surrogate Decision Maker: son      DVT Prophylaxis: SQ heparin  GI Prophylaxis: not indicated     Baseline: Pt lives with son at home, is independent with ADLs         Subjective: Pt seen and examined at bedside. Feels short of breath.  Overnight events d/w RN     Chief Complaint / Reason for Physician Visit: f/u \"shortness of breath\"    Review of Systems:  Symptom Y/N Comments  Symptom Y/N Comments   Fever/Chills n   Chest Pain n    Poor Appetite    Edema     Cough y   Abdominal Pain n    Sputum y   Joint Pain     SOB/CHRISTINE y Pruritis/Rash     Nausea/vomit    Tolerating PT/OT     Diarrhea n   Tolerating Diet     Constipation    Other       Could NOT obtain due to:      Objective:     VITALS:   Last 24hrs VS reviewed since prior progress note. Most recent are:  Patient Vitals for the past 24 hrs:   Temp Pulse Resp BP SpO2   03/11/17 1116 98 °F (36.7 °C) 80 20 149/74 98 %   03/11/17 0711 97.9 °F (36.6 °C) 86 20 160/68 97 %   03/11/17 0452 97.6 °F (36.4 °C) 91 20 126/59 98 %   03/10/17 1929 97.4 °F (36.3 °C) (!) 109 21 140/68 96 %   03/10/17 1520 98.2 °F (36.8 °C) (!) 102 20 133/77 96 %   03/10/17 1305 - - - - 96 %       Intake/Output Summary (Last 24 hours) at 03/11/17 1141  Last data filed at 03/11/17 1100   Gross per 24 hour   Intake                0 ml   Output             1575 ml   Net            -1575 ml        PHYSICAL EXAM:  General: WD, WN. Alert, cooperative,  no acute distress    EENT:  EOMI. Anicteric sclerae. MMM  Resp:  B/l decreased air entry, + wheezing bilateral.  No accessory muscle use  CV:  Regular  rhythm,  No edema  GI:  Soft, Non distended, Non tender.  +Bowel sounds  Neurologic:  Alert and oriented X 3, normal speech,   Psych:   Good insight. Not anxious nor agitated  Skin:  No rashes. No jaundice    Reviewed most current lab test results and cultures  YES  Reviewed most current radiology test results   YES  Review and summation of old records today    NO  Reviewed patient's current orders and MAR    YES  PMH/ reviewed - no change compared to H&P  ________________________________________________________________________  Care Plan discussed with:    Comments   Patient y    Family      RN y    Care Manager     Consultant                        Multidiciplinary team rounds were held today with , nursing, pharmacist and clinical coordinator. Patient's plan of care was discussed; medications were reviewed and discharge planning was addressed. ________________________________________________________________________  Total NON critical care TIME: 35  Minutes    Total CRITICAL CARE TIME Spent:   Minutes non procedure based      Comments   >50% of visit spent in counseling and coordination of care y Chart review   ________________________________________________________________________  Falguni Chung MD     Procedures: see electronic medical records for all procedures/Xrays and details which were not copied into this note but were reviewed prior to creation of Plan. LABS:  I reviewed today's most current labs and imaging studies.   Pertinent labs include:  Recent Labs      03/11/17   0453  03/10/17   0415  03/09/17   0108   WBC  11.5*  13.7*  11.3*   HGB  11.7  11.8  11.1*   HCT  33.9*  34.2*  32.9*   PLT  156  144*  155     Recent Labs      03/11/17   0453  03/10/17   0415  03/09/17   0108   NA  141  141  139   K  4.2  4.1  4.9   CL  105  106  105   CO2  25  21  23   GLU  167*  165*  215*   BUN  60*  52*  48*   CREA  1.84*  1.53*  2.03*   CA  8.1*  8.4*  8.3*   MG   --   2.5*   --    ALB   --   3.4*   --    TBILI   --   0.3   --    SGOT   --   15   --    ALT   --   13   --        Signed: Falguni Chung MD

## 2017-03-12 LAB
ANION GAP BLD CALC-SCNC: 13 MMOL/L (ref 5–15)
ARTERIAL PATENCY WRIST A: YES
BASE EXCESS BLDA CALC-SCNC: 1.5 MMOL/L
BDY SITE: ABNORMAL
BUN SERPL-MCNC: 65 MG/DL (ref 6–20)
BUN/CREAT SERPL: 38 (ref 12–20)
CALCIUM SERPL-MCNC: 8 MG/DL (ref 8.5–10.1)
CHLORIDE SERPL-SCNC: 105 MMOL/L (ref 97–108)
CO2 SERPL-SCNC: 22 MMOL/L (ref 21–32)
CREAT SERPL-MCNC: 1.7 MG/DL (ref 0.55–1.02)
GAS FLOW.O2 O2 DELIVERY SYS: 1.5 L/MIN
GLUCOSE SERPL-MCNC: 198 MG/DL (ref 65–100)
HCO3 BLDA-SCNC: 27 MMOL/L (ref 22–26)
PCO2 BLDA: 45 MMHG (ref 35–45)
PH BLDA: 7.39 [PH] (ref 7.35–7.45)
PO2 BLDA: 87 MMHG (ref 80–100)
POTASSIUM SERPL-SCNC: 4 MMOL/L (ref 3.5–5.1)
SAO2 % BLD: 97 % (ref 92–97)
SAO2% DEVICE SAO2% SENSOR NAME: ABNORMAL
SODIUM SERPL-SCNC: 140 MMOL/L (ref 136–145)
SPECIMEN SITE: ABNORMAL

## 2017-03-12 PROCEDURE — 77010033678 HC OXYGEN DAILY

## 2017-03-12 PROCEDURE — 80048 BASIC METABOLIC PNL TOTAL CA: CPT | Performed by: INTERNAL MEDICINE

## 2017-03-12 PROCEDURE — 94640 AIRWAY INHALATION TREATMENT: CPT

## 2017-03-12 PROCEDURE — 74011250637 HC RX REV CODE- 250/637: Performed by: INTERNAL MEDICINE

## 2017-03-12 PROCEDURE — 36415 COLL VENOUS BLD VENIPUNCTURE: CPT | Performed by: INTERNAL MEDICINE

## 2017-03-12 PROCEDURE — 74011250637 HC RX REV CODE- 250/637: Performed by: HOSPITALIST

## 2017-03-12 PROCEDURE — 74011250636 HC RX REV CODE- 250/636: Performed by: INTERNAL MEDICINE

## 2017-03-12 PROCEDURE — 36600 WITHDRAWAL OF ARTERIAL BLOOD: CPT | Performed by: INTERNAL MEDICINE

## 2017-03-12 PROCEDURE — 82803 BLOOD GASES ANY COMBINATION: CPT | Performed by: INTERNAL MEDICINE

## 2017-03-12 PROCEDURE — 65660000000 HC RM CCU STEPDOWN

## 2017-03-12 PROCEDURE — 74011000250 HC RX REV CODE- 250: Performed by: HOSPITALIST

## 2017-03-12 RX ORDER — FUROSEMIDE 10 MG/ML
40 INJECTION INTRAMUSCULAR; INTRAVENOUS 2 TIMES DAILY
Status: COMPLETED | OUTPATIENT
Start: 2017-03-12 | End: 2017-03-13

## 2017-03-12 RX ORDER — DOXYCYCLINE HYCLATE 100 MG
100 TABLET ORAL EVERY 12 HOURS
Status: DISCONTINUED | OUTPATIENT
Start: 2017-03-12 | End: 2017-03-14 | Stop reason: HOSPADM

## 2017-03-12 RX ADMIN — GUAIFENESIN 1200 MG: 600 TABLET, EXTENDED RELEASE ORAL at 09:17

## 2017-03-12 RX ADMIN — GUAIFENESIN 1200 MG: 600 TABLET, EXTENDED RELEASE ORAL at 21:40

## 2017-03-12 RX ADMIN — CARVEDILOL 6.25 MG: 6.25 TABLET, FILM COATED ORAL at 18:55

## 2017-03-12 RX ADMIN — Medication 10 ML: at 06:58

## 2017-03-12 RX ADMIN — HYDRALAZINE HYDROCHLORIDE 10 MG: 20 INJECTION INTRAMUSCULAR; INTRAVENOUS at 00:06

## 2017-03-12 RX ADMIN — HEPARIN SODIUM 5000 UNITS: 5000 INJECTION, SOLUTION INTRAVENOUS; SUBCUTANEOUS at 06:58

## 2017-03-12 RX ADMIN — CARVEDILOL 6.25 MG: 6.25 TABLET, FILM COATED ORAL at 09:17

## 2017-03-12 RX ADMIN — FUROSEMIDE 40 MG: 10 INJECTION, SOLUTION INTRAMUSCULAR; INTRAVENOUS at 09:16

## 2017-03-12 RX ADMIN — ASPIRIN 81 MG: 81 TABLET, COATED ORAL at 09:17

## 2017-03-12 RX ADMIN — ATORVASTATIN CALCIUM 40 MG: 40 TABLET, FILM COATED ORAL at 21:40

## 2017-03-12 RX ADMIN — Medication 10 ML: at 18:55

## 2017-03-12 RX ADMIN — FUROSEMIDE 40 MG: 10 INJECTION, SOLUTION INTRAMUSCULAR; INTRAVENOUS at 18:55

## 2017-03-12 RX ADMIN — METHYLPREDNISOLONE SODIUM SUCCINATE 40 MG: 125 INJECTION, POWDER, FOR SOLUTION INTRAMUSCULAR; INTRAVENOUS at 21:40

## 2017-03-12 RX ADMIN — HEPARIN SODIUM 5000 UNITS: 5000 INJECTION, SOLUTION INTRAVENOUS; SUBCUTANEOUS at 14:45

## 2017-03-12 RX ADMIN — ISOSORBIDE MONONITRATE 30 MG: 30 TABLET, EXTENDED RELEASE ORAL at 09:17

## 2017-03-12 RX ADMIN — METHYLPREDNISOLONE SODIUM SUCCINATE 60 MG: 125 INJECTION, POWDER, FOR SOLUTION INTRAMUSCULAR; INTRAVENOUS at 03:30

## 2017-03-12 RX ADMIN — IPRATROPIUM BROMIDE AND ALBUTEROL SULFATE 3 ML: .5; 3 SOLUTION RESPIRATORY (INHALATION) at 14:59

## 2017-03-12 RX ADMIN — HEPARIN SODIUM 5000 UNITS: 5000 INJECTION, SOLUTION INTRAVENOUS; SUBCUTANEOUS at 21:41

## 2017-03-12 RX ADMIN — DOXYCYCLINE HYCLATE 100 MG: 100 TABLET, COATED ORAL at 21:40

## 2017-03-12 RX ADMIN — ACETAMINOPHEN 650 MG: 325 TABLET, FILM COATED ORAL at 21:40

## 2017-03-12 RX ADMIN — METHYLPREDNISOLONE SODIUM SUCCINATE 40 MG: 125 INJECTION, POWDER, FOR SOLUTION INTRAMUSCULAR; INTRAVENOUS at 09:16

## 2017-03-12 RX ADMIN — IPRATROPIUM BROMIDE AND ALBUTEROL SULFATE 3 ML: .5; 3 SOLUTION RESPIRATORY (INHALATION) at 19:59

## 2017-03-12 RX ADMIN — Medication 10 ML: at 14:15

## 2017-03-12 RX ADMIN — DOXYCYCLINE HYCLATE 100 MG: 100 TABLET, COATED ORAL at 09:17

## 2017-03-12 RX ADMIN — IPRATROPIUM BROMIDE AND ALBUTEROL SULFATE 3 ML: .5; 3 SOLUTION RESPIRATORY (INHALATION) at 07:30

## 2017-03-12 RX ADMIN — Medication 10 ML: at 21:41

## 2017-03-12 NOTE — PROGRESS NOTES
PCU SHIFT NURSING NOTE      Bedside and Verbal shift change report given to Lin Booker RN (oncoming nurse) by Chris Olmedo RN (offgoing nurse). Report included the following information SBAR, Kardex, Intake/Output, MAR, Recent Results and Cardiac Rhythm NSR. Shift Summary:  2104 - Pt c/o back pain despite repositioning; notified Dr. Gricel Fajardo and received order for 650mg Tylenol every 4 hours as needed for pain. 0200 - The beginning of Daylight Saving Time occurred at 0200 hrs. Documentation of patient care and medications administered is done with respect to the time change. Admission Date 3/7/2017   Admission Diagnosis Shortness of breath  Acute respiratory failure (HCC)  COPD exacerbation (HCC)   Consults None        Consults   [x]PT   [x]OT   []Speech   []Case Management      [] Palliative      Cardiac Monitoring Order   [x]Yes   []No     IV drips   []Yes    Drip:                            Dose:  Drip:                            Dose:  Drip:                            Dose:   [x]No     GI Prophylaxis   []Yes   []No         DVT Prophylaxis   SCDs:             Jose stockings:         [] Medication   []Contraindicated   []None      Activity Level Activity Level: Head of bed elevated (degrees)     Activity Assistance: Partial (two people)   Purposeful Rounding every 1-2 hour? [x]Yes   Delatorre Score  Total Score: 4   Bed Alarm (If score 3 or >)   [x]Yes   [] Refused (See signed refusal form in chart)   Nando Score  Nando Score: 15   Nando Score (if score 14 or less)   []PMT consult   []Wound Care consult      []Specialty bed   [] Nutrition consult          Needs prior to discharge:   Home O2 required:    [x]Yes   []No    If yes, how much O2 required?     Other:    Last Bowel Movement: Last Bowel Movement Date: 03/08/17      Influenza Vaccine Received Flu Vaccine for Current Season (usually Sept-March): Yes        Pneumonia Vaccine           Diet Active Orders   Diet    DIET CARDIAC Regular; 2 GM NA (House Low NA)      LDAs               Peripheral IV 03/09/17 Right Arm (Active)   Site Assessment Clean, dry, & intact 3/11/2017  7:45 PM   Phlebitis Assessment 0 3/11/2017  7:45 PM   Infiltration Assessment 0 3/11/2017  7:45 PM   Dressing Status Clean, dry, & intact 3/11/2017  7:45 PM   Dressing Type Tape;Transparent 3/11/2017  7:45 PM   Hub Color/Line Status Blue;Capped 3/11/2017  7:45 PM   Action Taken Blood drawn 3/9/2017  1:27 AM                      Urinary Catheter      Intake & Output   Date 03/10/17 1900 - 03/11/17 0659 03/11/17 0700 - 03/12/17 0659   Shift 7682-8185 24 Hour Total 1008-1512 6966-5702 24 Hour Total   I  N  T  A  K  E   P.O.   1150  1150      P. O.   1150  1150    Shift Total  (mL/kg)   1150  (18.3)  1150  (18.3)   O  U  T  P  U  T   Urine  (mL/kg/hr) 300 1050 1175  (1.6)  1175      Urine Voided 300 1050 1175  1175      Urine Occurrence(s)  2 x       Shift Total  (mL/kg) 300  (4.8) 1050  (16.7) 1175  (18.7)  1175  (18.7)   NET -300 -1050 -25  -25   Weight (kg) 62.9 62.9 62.9 62.9 62.9         Readmission Risk Assessment Tool Score High Risk            24       Total Score        3 Relationship with PCP    4 More than 1 Admission in calendar year    5 Patient Insurance is Medicare, Medicaid or Self Pay    12 Charlson Comorbidity Score        Criteria that do not apply:    Patient Living Status    Patient Length of Stay > 5       Expected Length of Stay 3d 19h   Actual Length of Stay 4

## 2017-03-12 NOTE — PROGRESS NOTES
PCU SHIFT NURSING NOTE      Bedside and Verbal shift change report given to Alec Reaves RN (oncoming nurse) by Dandy Mason RN (offgoing nurse). Report included the following information SBAR, Kardex, ED Summary, Procedure Summary, Intake/Output, MAR, Recent Results, Med Rec Status, Cardiac Rhythm NSR to ST and Alarm Parameters . Shift Summary:         Admission Date 3/7/2017   Admission Diagnosis Shortness of breath  Acute respiratory failure (HCC)  COPD exacerbation (HCC)   Consults None        Consults   [x]PT   [x]OT   []Speech   [x]Case Management      [] Palliative      Cardiac Monitoring Order   [x]Yes   []No     IV drips   []Yes    Drip:                            Dose:  Drip:                            Dose:  Drip:                            Dose:   [x]No     GI Prophylaxis   [x]Yes   []No         DVT Prophylaxis   SCDs:             Jose stockings:         [x] Medication   []Contraindicated   []None      Activity Level Activity Level: Head of bed elevated (degrees)     Activity Assistance: Partial (two people)   Purposeful Rounding every 1-2 hour? [x]Yes   Delatorre Score  Total Score: 4   Bed Alarm (If score 3 or >)   [x]Yes   [] Refused (See signed refusal form in chart)   Nando Score  Nando Score: 15   Nando Score (if score 14 or less)   [x]PMT consult   []Wound Care consult      []Specialty bed   [] Nutrition consult          Needs prior to discharge:   Home O2 required:    []Yes   [x]No    If yes, how much O2 required?     Other:    Last Bowel Movement: Last Bowel Movement Date: 03/08/17      Influenza Vaccine Received Flu Vaccine for Current Season (usually Sept-March): Yes        Pneumonia Vaccine           Diet Active Orders   Diet    DIET CARDIAC Regular; 2 GM NA (House Low NA)      LDAs               Peripheral IV 03/11/17 Right Hand (Active)   Site Assessment Clean, dry, & intact 3/12/2017  4:20 AM   Phlebitis Assessment 0 3/12/2017  4:20 AM   Infiltration Assessment 0 3/12/2017  4:20 AM   Dressing Status Clean, dry, & intact 3/12/2017  4:20 AM   Dressing Type Tape;Transparent 3/12/2017  4:20 AM   Hub Color/Line Status Blue;Capped;Flushed 3/12/2017  4:20 AM                      Urinary Catheter      Intake & Output   Date 03/11/17 0700 - 03/12/17 0659 03/12/17 0700 - 03/13/17 0659   Shift 6028-3288 5777-0948 24 Hour Total 2139-4440 0786-1101 24 Hour Total   I  N  T  A  K  E   P.O. 1150  1150         P. O. 1150  1150       Shift Total  (mL/kg) 1150  (18.3)  1150  (17.3)      O  U  T  P  U  T   Urine  (mL/kg/hr) 1175  (1.6) 200  (0.3) 1375  (0.9)         Urine Voided [phone]         Urine Occurrence(s)  2 x 2 x       Shift Total  (mL/kg) 1175  (18.7) 200  (3) 1375  (20.7)      NET -25 -200 -225      Weight (kg) 62.9 66.4 66.4 66.4 66.4 66.4         Readmission Risk Assessment Tool Score High Risk            24       Total Score        3 Relationship with PCP    4 More than 1 Admission in calendar year    5 Patient Insurance is Medicare, Medicaid or Self Pay    12 Charlson Comorbidity Score        Criteria that do not apply:    Patient Living Status    Patient Length of Stay > 5       Expected Length of Stay 3d 19h   Actual Length of Stay 5

## 2017-03-13 LAB
ANION GAP BLD CALC-SCNC: 13 MMOL/L (ref 5–15)
BASOPHILS # BLD AUTO: 0 K/UL (ref 0–0.1)
BASOPHILS # BLD: 0 % (ref 0–1)
BUN SERPL-MCNC: 75 MG/DL (ref 6–20)
BUN/CREAT SERPL: 43 (ref 12–20)
CALCIUM SERPL-MCNC: 8.5 MG/DL (ref 8.5–10.1)
CHLORIDE SERPL-SCNC: 102 MMOL/L (ref 97–108)
CO2 SERPL-SCNC: 24 MMOL/L (ref 21–32)
CREAT SERPL-MCNC: 1.74 MG/DL (ref 0.55–1.02)
EOSINOPHIL # BLD: 0 K/UL (ref 0–0.4)
EOSINOPHIL NFR BLD: 0 % (ref 0–7)
ERYTHROCYTE [DISTWIDTH] IN BLOOD BY AUTOMATED COUNT: 13.9 % (ref 11.5–14.5)
GLUCOSE SERPL-MCNC: 253 MG/DL (ref 65–100)
HCT VFR BLD AUTO: 34.2 % (ref 35–47)
HGB BLD-MCNC: 11.8 G/DL (ref 11.5–16)
LYMPHOCYTES # BLD AUTO: 8 % (ref 12–49)
LYMPHOCYTES # BLD: 0.6 K/UL (ref 0.8–3.5)
MCH RBC QN AUTO: 26.8 PG (ref 26–34)
MCHC RBC AUTO-ENTMCNC: 34.5 G/DL (ref 30–36.5)
MCV RBC AUTO: 77.6 FL (ref 80–99)
MONOCYTES # BLD: 0.2 K/UL (ref 0–1)
MONOCYTES NFR BLD AUTO: 3 % (ref 5–13)
NEUTS SEG # BLD: 7 K/UL (ref 1.8–8)
NEUTS SEG NFR BLD AUTO: 89 % (ref 32–75)
PLATELET # BLD AUTO: 162 K/UL (ref 150–400)
POTASSIUM SERPL-SCNC: 3.9 MMOL/L (ref 3.5–5.1)
RBC # BLD AUTO: 4.41 M/UL (ref 3.8–5.2)
SODIUM SERPL-SCNC: 139 MMOL/L (ref 136–145)
WBC # BLD AUTO: 7.8 K/UL (ref 3.6–11)

## 2017-03-13 PROCEDURE — 74011250637 HC RX REV CODE- 250/637: Performed by: HOSPITALIST

## 2017-03-13 PROCEDURE — 77030029684 HC NEB SM VOL KT MONA -A

## 2017-03-13 PROCEDURE — 74011250636 HC RX REV CODE- 250/636: Performed by: INTERNAL MEDICINE

## 2017-03-13 PROCEDURE — 74011000250 HC RX REV CODE- 250: Performed by: HOSPITALIST

## 2017-03-13 PROCEDURE — 74011250637 HC RX REV CODE- 250/637: Performed by: INTERNAL MEDICINE

## 2017-03-13 PROCEDURE — 94640 AIRWAY INHALATION TREATMENT: CPT

## 2017-03-13 PROCEDURE — 74011000250 HC RX REV CODE- 250: Performed by: INTERNAL MEDICINE

## 2017-03-13 PROCEDURE — 65660000000 HC RM CCU STEPDOWN

## 2017-03-13 PROCEDURE — 77010033678 HC OXYGEN DAILY

## 2017-03-13 PROCEDURE — 80048 BASIC METABOLIC PNL TOTAL CA: CPT | Performed by: INTERNAL MEDICINE

## 2017-03-13 PROCEDURE — 85025 COMPLETE CBC W/AUTO DIFF WBC: CPT | Performed by: INTERNAL MEDICINE

## 2017-03-13 PROCEDURE — 97116 GAIT TRAINING THERAPY: CPT | Performed by: PHYSICAL THERAPIST

## 2017-03-13 PROCEDURE — 36415 COLL VENOUS BLD VENIPUNCTURE: CPT | Performed by: INTERNAL MEDICINE

## 2017-03-13 RX ORDER — IPRATROPIUM BROMIDE AND ALBUTEROL SULFATE 2.5; .5 MG/3ML; MG/3ML
3 SOLUTION RESPIRATORY (INHALATION)
Status: DISCONTINUED | OUTPATIENT
Start: 2017-03-13 | End: 2017-03-14 | Stop reason: HOSPADM

## 2017-03-13 RX ORDER — FUROSEMIDE 40 MG/1
40 TABLET ORAL
Status: DISCONTINUED | OUTPATIENT
Start: 2017-03-13 | End: 2017-03-14 | Stop reason: HOSPADM

## 2017-03-13 RX ADMIN — Medication 10 ML: at 05:06

## 2017-03-13 RX ADMIN — IPRATROPIUM BROMIDE AND ALBUTEROL SULFATE 3 ML: .5; 3 SOLUTION RESPIRATORY (INHALATION) at 22:43

## 2017-03-13 RX ADMIN — Medication 10 ML: at 21:50

## 2017-03-13 RX ADMIN — GUAIFENESIN 1200 MG: 600 TABLET, EXTENDED RELEASE ORAL at 08:55

## 2017-03-13 RX ADMIN — IPRATROPIUM BROMIDE AND ALBUTEROL SULFATE 3 ML: .5; 3 SOLUTION RESPIRATORY (INHALATION) at 07:17

## 2017-03-13 RX ADMIN — ISOSORBIDE MONONITRATE 30 MG: 30 TABLET, EXTENDED RELEASE ORAL at 08:56

## 2017-03-13 RX ADMIN — IPRATROPIUM BROMIDE AND ALBUTEROL SULFATE 3 ML: .5; 3 SOLUTION RESPIRATORY (INHALATION) at 19:17

## 2017-03-13 RX ADMIN — HEPARIN SODIUM 5000 UNITS: 5000 INJECTION, SOLUTION INTRAVENOUS; SUBCUTANEOUS at 13:21

## 2017-03-13 RX ADMIN — DOXYCYCLINE HYCLATE 100 MG: 100 TABLET, COATED ORAL at 08:55

## 2017-03-13 RX ADMIN — ATORVASTATIN CALCIUM 40 MG: 40 TABLET, FILM COATED ORAL at 21:49

## 2017-03-13 RX ADMIN — GUAIFENESIN 1200 MG: 600 TABLET, EXTENDED RELEASE ORAL at 21:49

## 2017-03-13 RX ADMIN — Medication 10 ML: at 13:11

## 2017-03-13 RX ADMIN — ASPIRIN 81 MG: 81 TABLET, COATED ORAL at 08:55

## 2017-03-13 RX ADMIN — GUAIFENESIN AND DEXTROMETHORPHAN 10 ML: 100; 10 SYRUP ORAL at 22:35

## 2017-03-13 RX ADMIN — IPRATROPIUM BROMIDE AND ALBUTEROL SULFATE 3 ML: .5; 3 SOLUTION RESPIRATORY (INHALATION) at 02:53

## 2017-03-13 RX ADMIN — HEPARIN SODIUM 5000 UNITS: 5000 INJECTION, SOLUTION INTRAVENOUS; SUBCUTANEOUS at 08:55

## 2017-03-13 RX ADMIN — DOXYCYCLINE HYCLATE 100 MG: 100 TABLET, COATED ORAL at 21:49

## 2017-03-13 RX ADMIN — HEPARIN SODIUM 5000 UNITS: 5000 INJECTION, SOLUTION INTRAVENOUS; SUBCUTANEOUS at 21:49

## 2017-03-13 RX ADMIN — CARVEDILOL 6.25 MG: 6.25 TABLET, FILM COATED ORAL at 08:55

## 2017-03-13 RX ADMIN — FUROSEMIDE 40 MG: 40 TABLET ORAL at 17:32

## 2017-03-13 RX ADMIN — FUROSEMIDE 40 MG: 10 INJECTION, SOLUTION INTRAMUSCULAR; INTRAVENOUS at 08:55

## 2017-03-13 RX ADMIN — CARVEDILOL 6.25 MG: 6.25 TABLET, FILM COATED ORAL at 17:32

## 2017-03-13 NOTE — PROGRESS NOTES
CM acknowledged consult for HH/PT/OT and 24/7 supervision. CM spoke with Pamela Sarkar. He has confirmed that patient lives with him and family can provide 24/7 care. [facility]. Has asked that [employer] provide transportation at time of discharge if possible. Patient had a PCG up until October/November 2016 when they quit. Mr Kojo Yu did not hire anyone new but would like to do so now. New Medicaid PCG list left in room. DME - patient has rw at home. NO nebulizer at home as recorded in an earlier note. Care Management Interventions  PCP Verified by CM: Yes  Mode of Transport at Discharge: Other (see comment)  Transition of Care Consult (CM Consult): [address]: Yes  Discharge Durable Medical Equipment: No (patient has RW)  Physical Therapy Consult: Yes  Occupational Therapy Consult: Yes  Current Support Network: Lives with Caregiver, Relative's Home  Confirm Follow Up Transport: Other (see comment) (family has requested [employer])  Plan discussed with Pt/Family/Caregiver: Yes  Freedom of Choice Offered: Yes  Discharge Location  Discharge Placement: Home with home health      CM will continue to follow.     [address] [facility]  Ext [phone]

## 2017-03-13 NOTE — CARDIO/PULMONARY
Cardiopulmonary Rehab Nursing Entry:      Chart Reviewed. Admitting diagnosis of Acute Respiratory Failure/Bronchitis.       PMH significant for;  -HTN  -CAD  -CVA  -CHF LV EF by Echo 11/30/16      Pt resides with her son.   Best Jiménez  Pt last received teaching from our department on 7/16.      Reviewed with patient s/s of CHF, checking weight every am and calling MD if weight is up 2-3 lbs in a day or 5 lbs in a week (or as directed by the physician), fluid/Na restrictions, s/s of worsening CHF and when to call MD.  Discussed the CHF \"zones\" and subsequent actions with pt. Reviewed activity as tolerated with frequent rest periods as needed, taking medications as prescribed, and the importance of follow up visits with physician. Encouraged patient to verbalize concerns/questions. Patient speech difficult to understand but patient was interactive, followed commands and appreciated visit today. Patient would benefit from reinforcement. No family was present today. CP Rehab will follow up.

## 2017-03-13 NOTE — PROGRESS NOTES
Problem: Mobility Impaired (Adult and Pediatric)  Goal: *Acute Goals and Plan of Care (Insert Text)  Physical Therapy Goals  Initiated 3/10/2017  1. Patient will move from supine to sit and sit to supine , scoot up and down and roll side to side in bed with independence within 7 day(s). 2. Patient will transfer from bed to chair and chair to bed with supervision/set-up using the least restrictive device within 7 day(s). 3. Patient will perform sit to stand with supervision/set-up within 7 day(s). 4. Patient will ambulate with supervision/set-up for 150 feet with the least restrictive device within 7 day(s). 5. Patient will ascend/descend 6 stairs with 1 handrail(s) with minimal assistance/contact guard assist within 7 day(s). PHYSICAL THERAPY TREATMENT  Patient: Carson Dakins (81 y.o. female)  Date: 3/13/2017  Diagnosis: Shortness of breath  Acute respiratory failure (HCC)  COPD exacerbation (HCC) <principal problem not specified>       Precautions:        ASSESSMENT:  Patient making slow progress toward goals. Continues to be difficult to understand secondary to dysarthria from previous CVA. Currently SBA for bed mobility and CGA-Kameron for transfers. Amb approx 10 feet with RW and CGA-Kameron. No overt LOB noted during session but generally decreased activity tolerance and generally unstable. Patient left up in chair with needs in reach and nursing notified. Recommend SNF level rehab vs  PT based on progress. Progression toward goals:  [ ]    Improving appropriately and progressing toward goals  [X]    Improving slowly and progressing toward goals  [ ]    Not making progress toward goals and plan of care will be adjusted       PLAN:  Patient continues to benefit from skilled intervention to address the above impairments. Continue treatment per established plan of care.   Discharge Recommendations:  Home Health and [place]  Further Equipment Recommendations for Discharge:  TBD SUBJECTIVE:   Patient stated I would like to sit up.       OBJECTIVE DATA SUMMARY:   Critical Behavior:  Neurologic State: Alert, Confused  Orientation Level: Oriented to person, Oriented to place, Oriented to situation, Disoriented to time  Cognition: Decreased attention/concentration, Decreased command following, Poor safety awareness, Follows commands     Functional Mobility Training:  Bed Mobility:     Supine to Sit: Stand-by asssistance; Additional time     Scooting: Minimum assistance; Additional time        Transfers:   si tot stand with CGA-Kameron                                Balance:  Sitting: Intact  Standing: Impaired  Standing - Static: Fair  Standing - Dynamic : Fair  Ambulation/Gait Training:  Distance (ft): 10 Feet (ft)  Assistive Device: Gait belt;Walker, rolling  Ambulation - Level of Assistance: Contact guard assistance        Gait Abnormalities: Decreased step clearance        Base of Support: Narrowed     Speed/Brianda: Pace decreased (<100 feet/min); Slow                 Pain:  Pain Scale 1: Numeric (0 - 10)  Pain Intensity 1: 0              Activity Tolerance:   SpO2 96% on 1 L O2 during activity  Please refer to the flowsheet for vital signs taken during this treatment.   After treatment:   [X]    Patient left in no apparent distress sitting up in chair  [ ]    Patient left in no apparent distress in bed  [X]    Call bell left within reach  [X]    Nursing notified  [ ]    Caregiver present  [X]    Chair alarm activated      COMMUNICATION/COLLABORATION:   The patients plan of care was discussed with: Physical Therapist and Registered Nurse     Mercedes Lang PT, DPT   Time Calculation: 12 mins

## 2017-03-13 NOTE — PROGRESS NOTES
Hospitalist Progress Note    NAME: Pravin Barnes   :  1940   MRN:  [id number]       Interim Hospital Summary: 64 y.o. female whom presented on 3/7/2017 with      Assessment / Plan:  Acute Resp failure POA  Due to Acute Bronchitis POA  May be component of acute on chronic systolic heart failure with EF 35% recently as wheezing started to get better with IV lasix  Taper steroids  Switch diuretics to PO today  Monitor I/Os, daily weight and lytes  Continue mucolytics and antitussives  Flu test neg   Bronchodilators  Changed Zithromax to Doxycycline due to QTc polongation   CT chest There is opacities in the right middle lobe and volume loss. This is  consistent with partial collapse of the right middle lobe. This was not present  previously. Follow-up to resolution is necessary. There does not appear to be an  endobronchial lesion but follow-up exam to evaluate for clearing or worsening is  Suggested  My partner dr Fe Danielson and I personally discussed findings of CT with pulmonary and at time didn't show concern for mucous plugging  CXR stable  Dad stress in 2016 neg for ischemia  Seen by [place] cardiology in the last admission  Pt seems confuse and somnolent, check ABGs    Acute renal Failure POA on ckd stage 3  Cr 2.0 at [facility] ED, baseline Cr ~ 1.3  Cr labile   Monitor with diuresis     HTN  CAD/Old LBBB on EKG (unchanged)  H/o CVA/aphasia at baseline  Hyperlipidemia  Cont ASA  Cont Coreg, Imdur  Holding Lisinopril   Holding lipitor for now    Code Status: Full  Surrogate Decision Maker: son      DVT Prophylaxis: SQ heparin  GI Prophylaxis: not indicated     Baseline: Pt lives with son at home, is independent with ADLs         Subjective: Pt seen and examined at bedside. Feels short of breath.  Overnight events d/w RN     Chief Complaint / Reason for Physician Visit: f/u \"shortness of breath\"    Review of Systems:  Symptom Y/N Comments  Symptom Y/N Comments   Fever/Chills n   Chest Pain n    Poor Appetite Edema     Cough y   Abdominal Pain n    Sputum y   Joint Pain     SOB/CHRISTINE y   Pruritis/Rash     Nausea/vomit    Tolerating PT/OT     Diarrhea n   Tolerating Diet     Constipation    Other       Could NOT obtain due to:      Objective:     VITALS:   Last 24hrs VS reviewed since prior progress note. Most recent are:  Patient Vitals for the past 24 hrs:   Temp Pulse Resp BP SpO2   03/13/17 0855 - 72 - (!) 155/95 -   03/13/17 0717 - - - - 98 %   03/13/17 0252 - - - - 99 %   03/12/17 2300 98 °F (36.7 °C) 89 18 137/63 97 %   03/12/17 1958 - - - - 98 %   03/12/17 1925 98.2 °F (36.8 °C) 95 18 129/67 99 %   03/12/17 1550 98.4 °F (36.9 °C) 82 18 152/78 98 %   03/12/17 1459 - - - - 98 %   03/12/17 1118 98.4 °F (36.9 °C) 69 18 138/63 99 %       Intake/Output Summary (Last 24 hours) at 03/13/17 0948  Last data filed at 03/12/17 2025   Gross per 24 hour   Intake              300 ml   Output             1725 ml   Net            -1425 ml        PHYSICAL EXAM:  General: WD, WN. Alert, cooperative,  no acute distress    EENT:  EOMI. Anicteric sclerae. MMM  Resp:  B/l decreased air entry, + wheezing bilateral.  No accessory muscle use  CV:  Regular  rhythm,  No edema  GI:  Soft, Non distended, Non tender.  +Bowel sounds  Neurologic:  Alert and oriented X 3, normal speech,   Psych:   Good insight. Not anxious nor agitated  Skin:  No rashes. No jaundice    Reviewed most current lab test results and cultures  YES  Reviewed most current radiology test results   YES  Review and summation of old records today    NO  Reviewed patient's current orders and MAR    YES  PMH/SH reviewed - no change compared to H&P  ________________________________________________________________________  Care Plan discussed with:    Comments   Patient y    Family      RN y    Care Manager     Consultant                        Multidiciplinary team rounds were held today with , nursing, pharmacist and clinical coordinator.   Patient's plan of care was discussed; medications were reviewed and discharge planning was addressed. ________________________________________________________________________  Total NON critical care TIME: 25 Minutes    Total CRITICAL CARE TIME Spent:   Minutes non procedure based      Comments   >50% of visit spent in counseling and coordination of care     ________________________________________________________________________  Minh Cote MD     Procedures: see electronic medical records for all procedures/Xrays and details which were not copied into this note but were reviewed prior to creation of Plan. LABS:  I reviewed today's most current labs and imaging studies.   Pertinent labs include:  Recent Labs      03/13/17 0357 03/11/17   0453   WBC  7.8  11.5*   HGB  11.8  11.7   HCT  34.2*  33.9*   PLT  162  156     Recent Labs      03/13/17 0357 03/12/17   0433  03/11/17   0453   NA  139  140  141   K  3.9  4.0  4.2   CL  102  105  105   CO2  24  22  25   GLU  253*  198*  167*   BUN  75*  65*  60*   CREA  1.74*  1.70*  1.84*   CA  8.5  8.0*  8.1*       Signed: Minh Cote MD

## 2017-03-13 NOTE — PROGRESS NOTES
Interdisciplinary team rounds were held 3/13/2017 with the following team members:Care Management, Nursing, Nutrition, Pharmacy, Physical Therapy and Physician and the patient. Plan of care discussed. See clinical pathway and/or care plan for interventions and desired outcomes.     Off O2; discharge disposition: discharge tomorrow if kidney function continues to improve- pt lives at home with 24 hour care

## 2017-03-13 NOTE — PROGRESS NOTES
PCU SHIFT NURSING NOTE      Bedside and Verbal shift change report given to Melany Iyer RN (oncoming nurse) by Taryn Belol RN (offgoing nurse). Report included the following information SBAR, Kardex, Intake/Output, MAR, Recent Results and Cardiac Rhythm NSR. Shift Summary:       Admission Date 3/7/2017   Admission Diagnosis Shortness of breath  Acute respiratory failure (HCC)  COPD exacerbation (HCC)   Consults None        Consults   [x]PT   [x]OT   []Speech   []Case Management      [] Palliative      Cardiac Monitoring Order   [x]Yes   []No     IV drips   []Yes    Drip:                            Dose:  Drip:                            Dose:  Drip:                            Dose:   [x]No     GI Prophylaxis   []Yes   []No         DVT Prophylaxis   SCDs:             Jose stockings:         [] Medication   []Contraindicated   []None      Activity Level Activity Level: Up with Assistance     Activity Assistance: Partial (two people)   Purposeful Rounding every 1-2 hour? [x]Yes   Delatorre Score  Total Score: 4   Bed Alarm (If score 3 or >)   [x]Yes   [] Refused (See signed refusal form in chart)   Nando Score  Nando Score: 15   Nando Score (if score 14 or less)   []PMT consult   []Wound Care consult      []Specialty bed   [] Nutrition consult          Needs prior to discharge:   Home O2 required:    [x]Yes   []No    If yes, how much O2 required?     Other:    Last Bowel Movement: Last Bowel Movement Date: 03/08/17      Influenza Vaccine Received Flu Vaccine for Current Season (usually Sept-March): Yes        Pneumonia Vaccine           Diet Active Orders   Diet    DIET CARDIAC Regular; 2 GM NA (House Low NA)      LDAs               Peripheral IV 03/11/17 Right Hand (Active)   Site Assessment Clean, dry, & intact 3/12/2017  6:00 PM   Phlebitis Assessment 0 3/12/2017  6:00 PM   Infiltration Assessment 0 3/12/2017  6:00 PM   Dressing Status Clean, dry, & intact 3/12/2017  6:00 PM   Dressing Type Tape;Transparent 3/12/2017 6:00 PM   Hub Color/Line Status Blue;Capped;Flushed 3/12/2017  6:00 PM                      Urinary Catheter      Intake & Output   Date 03/11/17 1900 - 03/12/17 0659 03/12/17 0700 - 03/13/17 0659   Shift 6189-1534 24 Hour Total 5825-0737 9992-9343 24 Hour Total   I  N  T  A  K  E   P.O.  1150 600  600      P. O.  1150 600  600    Shift Total  (mL/kg)  1150  (17.3) 600  (9)  600  (9)   O  U  T  P  U  T   Urine  (mL/kg/hr) 200 1375 1850  (2.3)  1850      Urine Voided 200 1375 1850  1850      Urine Occurrence(s) 2 x 2 x       Shift Total  (mL/kg) 200  (3) 1375  (20.7) 1850  (27.9)  1850  (27.9)   NET -200 -225 -1250  -1250   Weight (kg) 66.4 66.4 66.4 66.4 66.4         Readmission Risk Assessment Tool Score High Risk            24       Total Score        3 Relationship with PCP    4 More than 1 Admission in calendar year    5 Patient Insurance is Medicare, Medicaid or Self Pay    12 Charlson Comorbidity Score        Criteria that do not apply:    Patient Living Status    Patient Length of Stay > 5       Expected Length of Stay 3d 19h   Actual Length of Stay 5

## 2017-03-14 ENCOUNTER — HOME HEALTH ADMISSION (OUTPATIENT)
Dept: HOME HEALTH SERVICES | Facility: HOME HEALTH | Age: 77
End: 2017-03-14
Payer: COMMERCIAL

## 2017-03-14 VITALS
OXYGEN SATURATION: 96 % | HEIGHT: 63 IN | SYSTOLIC BLOOD PRESSURE: 125 MMHG | HEART RATE: 75 BPM | TEMPERATURE: 98.4 F | RESPIRATION RATE: 18 BRPM | WEIGHT: 136.5 LBS | BODY MASS INDEX: 24.19 KG/M2 | DIASTOLIC BLOOD PRESSURE: 78 MMHG

## 2017-03-14 LAB
ANION GAP BLD CALC-SCNC: 8 MMOL/L (ref 5–15)
BASOPHILS # BLD AUTO: 0 K/UL (ref 0–0.1)
BASOPHILS # BLD: 0 % (ref 0–1)
BUN SERPL-MCNC: 74 MG/DL (ref 6–20)
BUN/CREAT SERPL: 46 (ref 12–20)
CALCIUM SERPL-MCNC: 8.3 MG/DL (ref 8.5–10.1)
CHLORIDE SERPL-SCNC: 101 MMOL/L (ref 97–108)
CO2 SERPL-SCNC: 28 MMOL/L (ref 21–32)
CREAT SERPL-MCNC: 1.61 MG/DL (ref 0.55–1.02)
EOSINOPHIL # BLD: 0 K/UL (ref 0–0.4)
EOSINOPHIL NFR BLD: 0 % (ref 0–7)
ERYTHROCYTE [DISTWIDTH] IN BLOOD BY AUTOMATED COUNT: 13.6 % (ref 11.5–14.5)
GLUCOSE SERPL-MCNC: 183 MG/DL (ref 65–100)
HCT VFR BLD AUTO: 34.7 % (ref 35–47)
HGB BLD-MCNC: 11.9 G/DL (ref 11.5–16)
LYMPHOCYTES # BLD AUTO: 9 % (ref 12–49)
LYMPHOCYTES # BLD: 0.9 K/UL (ref 0.8–3.5)
MCH RBC QN AUTO: 26.3 PG (ref 26–34)
MCHC RBC AUTO-ENTMCNC: 34.3 G/DL (ref 30–36.5)
MCV RBC AUTO: 76.6 FL (ref 80–99)
MONOCYTES # BLD: 0.5 K/UL (ref 0–1)
MONOCYTES NFR BLD AUTO: 5 % (ref 5–13)
NEUTS SEG # BLD: 8.6 K/UL (ref 1.8–8)
NEUTS SEG NFR BLD AUTO: 86 % (ref 32–75)
PLATELET # BLD AUTO: 149 K/UL (ref 150–400)
POTASSIUM SERPL-SCNC: 4 MMOL/L (ref 3.5–5.1)
RBC # BLD AUTO: 4.53 M/UL (ref 3.8–5.2)
SODIUM SERPL-SCNC: 137 MMOL/L (ref 136–145)
WBC # BLD AUTO: 10 K/UL (ref 3.6–11)

## 2017-03-14 PROCEDURE — 74011250636 HC RX REV CODE- 250/636: Performed by: INTERNAL MEDICINE

## 2017-03-14 PROCEDURE — 74011250637 HC RX REV CODE- 250/637: Performed by: INTERNAL MEDICINE

## 2017-03-14 PROCEDURE — 36415 COLL VENOUS BLD VENIPUNCTURE: CPT | Performed by: INTERNAL MEDICINE

## 2017-03-14 PROCEDURE — 74011636637 HC RX REV CODE- 636/637: Performed by: INTERNAL MEDICINE

## 2017-03-14 PROCEDURE — 74011250637 HC RX REV CODE- 250/637: Performed by: HOSPITALIST

## 2017-03-14 PROCEDURE — 80048 BASIC METABOLIC PNL TOTAL CA: CPT | Performed by: INTERNAL MEDICINE

## 2017-03-14 PROCEDURE — 85025 COMPLETE CBC W/AUTO DIFF WBC: CPT | Performed by: INTERNAL MEDICINE

## 2017-03-14 RX ORDER — FUROSEMIDE 40 MG/1
40 TABLET ORAL 2 TIMES DAILY
Qty: 30 TAB | Refills: 12 | Status: SHIPPED | OUTPATIENT
Start: 2017-03-14 | End: 2017-03-14

## 2017-03-14 RX ORDER — FUROSEMIDE 40 MG/1
40 TABLET ORAL 2 TIMES DAILY
Qty: 60 TAB | Refills: 12 | Status: ON HOLD | OUTPATIENT
Start: 2017-03-14 | End: 2017-08-04

## 2017-03-14 RX ORDER — AMLODIPINE BESYLATE 2.5 MG/1
2.5 TABLET ORAL DAILY
Status: DISCONTINUED | OUTPATIENT
Start: 2017-03-14 | End: 2017-03-14 | Stop reason: HOSPADM

## 2017-03-14 RX ORDER — AMLODIPINE BESYLATE 2.5 MG/1
2.5 TABLET ORAL DAILY
Qty: 30 TAB | Refills: 0 | Status: ON HOLD | OUTPATIENT
Start: 2017-03-14 | End: 2017-08-04

## 2017-03-14 RX ORDER — DOXYCYCLINE HYCLATE 100 MG
100 TABLET ORAL EVERY 12 HOURS
Qty: 6 TAB | Refills: 0 | Status: SHIPPED | OUTPATIENT
Start: 2017-03-14 | End: 2017-03-17

## 2017-03-14 RX ORDER — PREDNISONE 10 MG/1
TABLET ORAL
Qty: 12 TAB | Refills: 0 | Status: SHIPPED | OUTPATIENT
Start: 2017-03-14 | End: 2017-08-04

## 2017-03-14 RX ADMIN — Medication 10 ML: at 09:05

## 2017-03-14 RX ADMIN — FUROSEMIDE 40 MG: 40 TABLET ORAL at 07:55

## 2017-03-14 RX ADMIN — ISOSORBIDE MONONITRATE 30 MG: 30 TABLET, EXTENDED RELEASE ORAL at 08:47

## 2017-03-14 RX ADMIN — Medication 10 ML: at 14:00

## 2017-03-14 RX ADMIN — DOXYCYCLINE HYCLATE 100 MG: 100 TABLET, COATED ORAL at 08:47

## 2017-03-14 RX ADMIN — AMLODIPINE BESYLATE 2.5 MG: 2.5 TABLET ORAL at 12:03

## 2017-03-14 RX ADMIN — HEPARIN SODIUM 5000 UNITS: 5000 INJECTION, SOLUTION INTRAVENOUS; SUBCUTANEOUS at 06:45

## 2017-03-14 RX ADMIN — ASPIRIN 81 MG: 81 TABLET, COATED ORAL at 08:47

## 2017-03-14 RX ADMIN — HYDRALAZINE HYDROCHLORIDE 10 MG: 20 INJECTION INTRAMUSCULAR; INTRAVENOUS at 09:05

## 2017-03-14 RX ADMIN — CARVEDILOL 6.25 MG: 6.25 TABLET, FILM COATED ORAL at 08:46

## 2017-03-14 RX ADMIN — GUAIFENESIN 1200 MG: 600 TABLET, EXTENDED RELEASE ORAL at 08:47

## 2017-03-14 RX ADMIN — Medication 5 ML: at 06:46

## 2017-03-14 RX ADMIN — PREDNISONE 30 MG: 20 TABLET ORAL at 08:46

## 2017-03-14 NOTE — PROGRESS NOTES
Care Management:    Patient discharging today. I have spoke with her son Elizabeth Bojorquez on the phone and he is in agreement. [address] to follow and are aware of discharge. Transport set up through her coordinated care for 1-4  time. Number is 5 . This is stretcher transport and ID number for this  is [id number]. Son Pao Larios says he will be there for her arrival home. She has follow up appointment with  Dr Raghu Alarcon for April 10th at 10:30. Care Management Interventions  PCP Verified by CM:  Yes  Mode of Transport at Discharge: BLS  Transition of Care Consult (CM Consult): [address]: Yes  Discharge Durable Medical Equipment: No (patient has RW)  Physical Therapy Consult: Yes  Occupational Therapy Consult: Yes  Current Support Network: Lives with Caregiver, Relative's Home  Confirm Follow Up Transport: Other (see comment) (family has requested [employer])  Plan discussed with Pt/Family/Caregiver: Yes  Freedom of Choice Offered: Yes  Discharge Location  Discharge Placement: Home with home health     Serina Myers [facility] [id number]

## 2017-03-14 NOTE — PROGRESS NOTES
PCU SHIFT NURSING NOTE      Bedside and Verbal shift change report given to Daisy Moseley RN (oncoming nurse) by Hu Peterson RN (offgoing nurse). Report included the following information SBAR, Kardex, ED Summary, Procedure Summary, Intake/Output, MAR, Recent Results, Med Rec Status, Cardiac Rhythm NSR to ST and Alarm Parameters . Shift Summary:     1430--[employer] transporting patient to home. Family is waiting for patient to arrive. Admission Date 3/7/2017   Admission Diagnosis Shortness of breath  Acute respiratory failure (HCC)  COPD exacerbation (HCC)   Consults None        Consults   [x]PT   [x]OT   []Speech   [x]Case Management      [] Palliative      Cardiac Monitoring Order   [x]Yes   []No     IV drips   []Yes    Drip:                            Dose:  Drip:                            Dose:  Drip:                            Dose:   [x]No     GI Prophylaxis   [x]Yes   []No         DVT Prophylaxis   SCDs:             Jose stockings:         [x] Medication   []Contraindicated   []None      Activity Level Activity Level: Up with Assistance     Activity Assistance: Partial (one person)   Purposeful Rounding every 1-2 hour? [x]Yes   Delatorre Score  Total Score: 3   Bed Alarm (If score 3 or >)   [x]Yes   [] Refused (See signed refusal form in chart)   Nando Score  Nando Score: 17   Nando Score (if score 14 or less)   [x]PMT consult   []Wound Care consult      []Specialty bed   [] Nutrition consult          Needs prior to discharge:   Home O2 required:    []Yes   [x]No    If yes, how much O2 required?     Other:    Last Bowel Movement: Last Bowel Movement Date: 03/13/17      Influenza Vaccine Received Flu Vaccine for Current Season (usually Sept-March): Yes        Pneumonia Vaccine           Diet Active Orders   Diet    DIET CARDIAC Regular; 2 GM NA (House Low NA)      LDAs               Peripheral IV 03/11/17 Right Hand (Active)   Site Assessment Clean, dry, & intact 3/14/2017  1:54 AM   Phlebitis Assessment 0 3/14/2017  1:54 AM   Infiltration Assessment 0 3/14/2017  1:54 AM   Dressing Status Clean, dry, & intact 3/14/2017  1:54 AM   Dressing Type Tape;Transparent 3/14/2017  1:54 AM   Hub Color/Line Status Blue;Capped 3/14/2017  1:54 AM                      Urinary Catheter      Intake & Output   Date 03/13/17 0700 - 03/14/17 0659 03/14/17 0700 - 03/15/17 0659   Shift 0664-35731859 1900-0659 24 Hour Total 3090-9266 7278-4162 24 Hour Total   I  N  T  A  K  E   P.O. 250  250         P. O. 250  250       Shift Total  (mL/kg) 250  (3.9)  250  (3.9)      O  U  T  P  U  T   Urine  (mL/kg/hr)            Urine Occurrence(s) 1 x  1 x       Stool            Stool Occurrence(s)  1 x 1 x       Shift Total  (mL/kg)           250      Weight (kg) 63.5 63.5 63.5 61.9 61.9 61.9         Readmission Risk Assessment Tool Score High Risk            27       Total Score        3 Relationship with PCP    3 Patient Length of Stay > 5    4 More than 1 Admission in calendar year    5 Patient Insurance is Medicare, Medicaid or Self Pay    12 Charlson Comorbidity Score        Criteria that do not apply:    Patient Living Status       Expected Length of Stay 3d 19h   Actual Length of Stay 7

## 2017-03-14 NOTE — PROGRESS NOTES
Nutrition Services      Nutrition Screen:  Wt Readings from Last 10 Encounters:   03/13/17 63.5 kg (140 lb)   02/17/17 62.5 kg (137 lb 12.8 oz)   11/30/16 62.3 kg (137 lb 6.4 oz)   08/03/16 58.7 kg (129 lb 8 oz)   07/21/16 60 kg (132 lb 4.4 oz)   07/20/16 60.1 kg (132 lb 8.9 oz)   09/03/13 65.7 kg (144 lb 14.4 oz)   09/21/12 65.7 kg (144 lb 14.4 oz)     Body mass index is 24.8 kg/(m^2). Supplements:                        _____ ordered ______  declined. __ __  Pt is nutritionally stable at this time, will rescreen in 7 days. _x __    Pt is at nutritional risk and will be rescreened in 3-5 days. __ __  Pt is at moderate or high nutritional risk, will refer to RD for assessment.        Chyna Pham  Dietetic Technician, Registered

## 2017-03-14 NOTE — FACE TO FACE
Home Health Care Discharge Planning: [facility]  Face to Face Encounter      NAME: Vipin Virgen   :  1940   MRN:  [id number]     Primary Diagnosis: CHF, Bronchitis    Date of Face to Face:  3/14/2017 10:05 AM                                  Face to Face Encounter findings are related to primary reason for home care:   YES    1. I certify that the patient needs intermittent skilled nursing care, physical therapy and/or speech therapy. I will not be following this patient in the Community and Dr. Abhay Salazar, NP will be responsible for signing the [address] 2. Initial Orders for Care: Skilled Nursing, Physical Therapy and Occupational Therapy    3. I certify that this patient is homebound because of illness or injury, need the aid of supportive devices such as crutches, canes, wheelchairs, and walkers; the use of special transportation; or the assistance of another person in order to leave their place of residence. There exists a normal inability to leave home and leaving home requires a considerable and taxing effort. 4. I certify that this patient is under my care and that I had a Face-to-Face Encounter that meets the physician Face-to-Face Encounter requirements. Document the physical findings from the Face-to-Face Encounter that support the need for skilled services: Has new diagnosis that requires skilled nursing teaching and intervention, Needs skilled safety assessment and interventions  and Has new finding of weakness and altered mobility that requires skilled physical/occupational and/or speech therapy services for evaluation and interventions.      Minh Cote MD  Discharging Physician                 Office:  [phone]  Fax:    [phone]

## 2017-03-14 NOTE — DISCHARGE INSTRUCTIONS
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 · Keep your medication in the bottles provided by the pharmacist and keep a list of the medication names, dosages, and times to be taken in your wallet. · Do not take other medications without consulting your doctor. Pain Management: per above medications    What to do at Home    Recommended diet:  Cardiac Diet and Low Sodium    Recommended activity: Activity as tolerated    If you have questions regarding the hospital related prescriptions or hospital related issues please call [facility] ricardo Williamson at [phone]. If you experience any of the following symptoms then please call your primary care physician or return to the emergency room if you cannot get hold of your doctor:  Fever, chills, nausea, vomiting, diarrhea, change in mentation, falling, bleeding, shortness of breath        Information obtained by :  I understand that if any problems occur once I am at home I am to contact my physician. I understand and acknowledge receipt of the instructions indicated above.                                                                                                                                            Physician's or R.N.'s Signature                                                                  Date/Time                                                                                                                                              Patient or Representative Signature                                                          Date/Time

## 2017-03-14 NOTE — PROGRESS NOTES
Bedside and Verbal shift change report given to [address] (oncoming nurse) by Sandrine Brantley (offgoing nurse). Report included the following information SBAR, Kardex, MAR and Recent Results.

## 2017-03-14 NOTE — DISCHARGE SUMMARY
Hospitalist Discharge Summary     Patient ID:  Manuel Hidalgo  [id number]  39 y.o.  1940    PCP on record: Nany Montenegro NP    Admit date: 3/7/2017  Discharge date and time: 3/14/2017      DISCHARGE DIAGNOSIS:  Acute Resp failure   Acute Bronchitis   Acute on chronic systolic heart failure   Acute renal Failure POA on ckd stage 3  HTN  CAD/Old LBBB on EKG (unchanged)  H/o CVA/aphasia at baseline  Hyperlipidemia    CONSULTATIONS:  None    Excerpted HPI from H&P of Dajuan Lynn MD:  Louisa Landrum is a 64 y.o.  female who presents with above complains from home to [facility] ED. Pt was transferred to [facility] after presenting initially with CC of Worsening SOB x 1 day  H/o associated Cough & sputum  No h/o COPD but use inhaler at home as needed as per pt. H/o CHF for which she is on Lasix daily  Denies sick contact     Pt was found to have diffuse Wheezing in Outside ER - some response to Nebs & IV steroids but remained SOB with wheezing & difficulty completing sentence in one breath      We were asked to admit for work up and evaluation of the above problems. ______________________________________________________________________  DISCHARGE SUMMARY/HOSPITAL COURSE:  for full details see H&P, daily progress notes, labs, consult notes. Acute Resp failure POA  Due to Acute Bronchitis POA  May be component of Acute on chronic systolic heart failure with EF 35% recently as wheezing started to get better with IV lasix  Taper steroids  Switched diuretics to PO today (increased to lasix to BID, was on daily at home)  Cr now stable  Continue mucolytics and antitussives  Flu test neg   Bronchodilators  Changed Zithromax to Doxycycline due to QTc polongation   CT chest There is opacities in the right middle lobe and volume loss. This is  consistent with partial collapse of the right middle lobe. This was not present  previously. Follow-up to resolution is necessary.  There does not appear to be an  endobronchial lesion but follow-up exam to evaluate for clearing or worsening is  Suggested  My partner dr Vanesa Smith and I personally discussed findings of CT with pulmonary and at time didn't show concern for mucous plugging  CXR stable  Dad stress in 7/2016 neg for ischemia  Seen by [facility] cardiology in the last admission, will recommend close outpatient followup  Weaned off O2     Acute renal Failure POA on ckd stage 3  Cr 2.0 at [facility] ED, baseline Cr ~ 1.3  Cr labile but now stable      HTN  CAD/Old LBBB on EKG (unchanged)  H/o CVA/aphasia at baseline  Hyperlipidemia  Cont ASA  Cont Coreg, Imdur, added low dose norvasc as BP was running high while holding ACE inh  Holding Lisinopril due to renal issue, may resume as an outpatient is cr remain stable  Resume statins on discharge    Note: PT recommended SNF vs HH with 24 hours of supervision. I spoke with the son who claims he wants to take her back home and she has someone with her 24/7  _______________________________________________________________________  Patient seen and examined by me on discharge day. Pertinent Findings:  Gen:    Not in distress  Chest: Clear lungs  CVS:   Regular rhythm. No edema  Abd:  Soft, not distended, not tender  Neuro:  Alert  _______________________________________________________________________  DISCHARGE MEDICATIONS:   Current Discharge Medication List      START taking these medications    Details   amLODIPine (NORVASC) 2.5 mg tablet Take 1 Tab by mouth daily. Qty: 30 Tab, Refills: 0      doxycycline (VIBRA-TABS) 100 mg tablet Take 1 Tab by mouth every twelve (12) hours for 3 days. Qty: 6 Tab, Refills: 0      guaiFENesin ER (MUCINEX) 1,200 mg Ta12 ER tablet Take 1 Tab by mouth every twelve (12) hours for 5 days.   Qty: 20 Tab, Refills: 0      predniSONE (DELTASONE) 10 mg tablet 3 tabs daily for 2 days   2 tabs daily for 2 days   1 tab   daily for 2 days  Qty: 12 Tab, Refills: 0         CONTINUE these medications which have CHANGED    Details   furosemide (LASIX) 40 mg tablet Take 1 Tab by mouth two (2) times a day. One pill daily  Qty: 30 Tab, Refills: 12         CONTINUE these medications which have NOT CHANGED    Details   carvedilol (COREG) 6.25 mg tablet Take 1 Tab by mouth two (2) times a day. Qty: 60 Tab, Refills: 12      isosorbide mononitrate ER (IMDUR) 30 mg tablet Take 1 Tab by mouth every morning. Qty: 30 Tab, Refills: 12      albuterol (PROVENTIL HFA, VENTOLIN HFA, PROAIR HFA) 90 mcg/actuation inhaler Take 2 Puffs by inhalation every four (4) hours as needed for Wheezing. Qty: 1 Inhaler, Refills: 1      atorvastatin (LIPITOR) 40 mg tablet TAKE ONE TABLET BY MOUTH EVERY DAY  **PATIENT MUST BE SEEN FOR MORE REFILLS**  Qty: 30 Tab, Refills: 0      aspirin delayed-release 81 mg tablet Take 1 Tab by mouth daily. Qty: 100 Tab, Refills: 1         STOP taking these medications       lisinopril (PRINIVIL, ZESTRIL) 5 mg tablet Comments:   Reason for Stopping:         potassium chloride (K-DUR, KLOR-CON) 20 mEq tablet Comments:   Reason for Stopping:                        My Recommended Diet, Activity, Wound Care, and follow-up labs are listed in the patient's Discharge Insturctions which I have personally completed and reviewed.     _______________________________________________________________________  DISPOSITION:    Home with Family:    Home with HH/PT/OT/RN: y   SNF/LTC:    SOLA:    OTHER:        Condition at Discharge:  Stable  _______________________________________________________________________    Follow-up Information     Follow up With Details Comments Geremias Salazar 89, NP Schedule an appointment as soon as possible for a visit in 1 week  [address] [address]  [phone]      Opal Kauffman MD Schedule an appointment as soon as possible for a visit in 1 week  Ke[facility] [place] [facility] [address]  [phone]      BMP (basic metabolic panel) In 1 week Home health can check and send results to primary care physician             Total time in minutes spent coordinating this discharge (includes going over instructions, follow-up, prescriptions, and preparing report for sign off to her PCP) : 35 minutes    Signed:  Tatiana Summers MD

## 2017-03-16 ENCOUNTER — HOME CARE VISIT (OUTPATIENT)
Dept: SCHEDULING | Facility: HOME HEALTH | Age: 77
End: 2017-03-16
Payer: COMMERCIAL

## 2017-03-16 PROCEDURE — G0299 HHS/HOSPICE OF RN EA 15 MIN: HCPCS

## 2017-03-16 PROCEDURE — 3331090002 HH PPS REVENUE DEBIT

## 2017-03-16 PROCEDURE — 400013 HH SOC

## 2017-03-16 PROCEDURE — 3331090001 HH PPS REVENUE CREDIT

## 2017-03-17 ENCOUNTER — HOME CARE VISIT (OUTPATIENT)
Dept: SCHEDULING | Facility: HOME HEALTH | Age: 77
End: 2017-03-17
Payer: COMMERCIAL

## 2017-03-17 PROCEDURE — 3331090002 HH PPS REVENUE DEBIT

## 2017-03-17 PROCEDURE — G0151 HHCP-SERV OF PT,EA 15 MIN: HCPCS

## 2017-03-17 PROCEDURE — 3331090001 HH PPS REVENUE CREDIT

## 2017-03-18 VITALS
DIASTOLIC BLOOD PRESSURE: 70 MMHG | RESPIRATION RATE: 16 BRPM | TEMPERATURE: 97.5 F | OXYGEN SATURATION: 98 % | SYSTOLIC BLOOD PRESSURE: 110 MMHG | HEART RATE: 88 BPM

## 2017-03-18 PROCEDURE — 3331090002 HH PPS REVENUE DEBIT

## 2017-03-18 PROCEDURE — 3331090001 HH PPS REVENUE CREDIT

## 2017-03-19 PROCEDURE — 3331090001 HH PPS REVENUE CREDIT

## 2017-03-19 PROCEDURE — 3331090002 HH PPS REVENUE DEBIT

## 2017-03-20 ENCOUNTER — HOME CARE VISIT (OUTPATIENT)
Dept: SCHEDULING | Facility: HOME HEALTH | Age: 77
End: 2017-03-20
Payer: COMMERCIAL

## 2017-03-20 VITALS
OXYGEN SATURATION: 97 % | DIASTOLIC BLOOD PRESSURE: 62 MMHG | RESPIRATION RATE: 16 BRPM | SYSTOLIC BLOOD PRESSURE: 124 MMHG | HEART RATE: 76 BPM | TEMPERATURE: 98.2 F

## 2017-03-20 VITALS
DIASTOLIC BLOOD PRESSURE: 70 MMHG | TEMPERATURE: 98 F | SYSTOLIC BLOOD PRESSURE: 120 MMHG | OXYGEN SATURATION: 96 % | HEART RATE: 98 BPM

## 2017-03-20 PROCEDURE — 3331090001 HH PPS REVENUE CREDIT

## 2017-03-20 PROCEDURE — G0152 HHCP-SERV OF OT,EA 15 MIN: HCPCS

## 2017-03-20 PROCEDURE — 3331090002 HH PPS REVENUE DEBIT

## 2017-03-21 ENCOUNTER — HOME CARE VISIT (OUTPATIENT)
Dept: SCHEDULING | Facility: HOME HEALTH | Age: 77
End: 2017-03-21
Payer: COMMERCIAL

## 2017-03-21 VITALS
RESPIRATION RATE: 15 BRPM | SYSTOLIC BLOOD PRESSURE: 126 MMHG | OXYGEN SATURATION: 96 % | HEART RATE: 86 BPM | DIASTOLIC BLOOD PRESSURE: 84 MMHG | TEMPERATURE: 97.4 F

## 2017-03-21 PROCEDURE — G0300 HHS/HOSPICE OF LPN EA 15 MIN: HCPCS

## 2017-03-21 PROCEDURE — 3331090002 HH PPS REVENUE DEBIT

## 2017-03-21 PROCEDURE — 3331090001 HH PPS REVENUE CREDIT

## 2017-03-21 PROCEDURE — G0157 HHC PT ASSISTANT EA 15: HCPCS

## 2017-03-22 VITALS
TEMPERATURE: 97.4 F | HEART RATE: 88 BPM | OXYGEN SATURATION: 97 % | SYSTOLIC BLOOD PRESSURE: 126 MMHG | RESPIRATION RATE: 17 BRPM | DIASTOLIC BLOOD PRESSURE: 84 MMHG

## 2017-03-22 PROCEDURE — 3331090002 HH PPS REVENUE DEBIT

## 2017-03-22 PROCEDURE — 3331090001 HH PPS REVENUE CREDIT

## 2017-03-23 ENCOUNTER — HOME CARE VISIT (OUTPATIENT)
Dept: SCHEDULING | Facility: HOME HEALTH | Age: 77
End: 2017-03-23
Payer: COMMERCIAL

## 2017-03-23 PROCEDURE — 3331090001 HH PPS REVENUE CREDIT

## 2017-03-23 PROCEDURE — 3331090002 HH PPS REVENUE DEBIT

## 2017-03-23 PROCEDURE — G0157 HHC PT ASSISTANT EA 15: HCPCS

## 2017-03-24 ENCOUNTER — HOME CARE VISIT (OUTPATIENT)
Dept: SCHEDULING | Facility: HOME HEALTH | Age: 77
End: 2017-03-24
Payer: COMMERCIAL

## 2017-03-24 VITALS
HEART RATE: 89 BPM | RESPIRATION RATE: 17 BRPM | SYSTOLIC BLOOD PRESSURE: 126 MMHG | OXYGEN SATURATION: 96 % | DIASTOLIC BLOOD PRESSURE: 80 MMHG | TEMPERATURE: 99.2 F

## 2017-03-24 PROCEDURE — 3331090001 HH PPS REVENUE CREDIT

## 2017-03-24 PROCEDURE — 3331090002 HH PPS REVENUE DEBIT

## 2017-03-24 PROCEDURE — G0300 HHS/HOSPICE OF LPN EA 15 MIN: HCPCS

## 2017-03-25 VITALS
OXYGEN SATURATION: 97 % | SYSTOLIC BLOOD PRESSURE: 140 MMHG | DIASTOLIC BLOOD PRESSURE: 74 MMHG | RESPIRATION RATE: 16 BRPM | TEMPERATURE: 98 F | HEART RATE: 78 BPM

## 2017-03-25 PROCEDURE — 3331090001 HH PPS REVENUE CREDIT

## 2017-03-25 PROCEDURE — 3331090002 HH PPS REVENUE DEBIT

## 2017-03-26 PROCEDURE — 3331090001 HH PPS REVENUE CREDIT

## 2017-03-26 PROCEDURE — 3331090002 HH PPS REVENUE DEBIT

## 2017-03-27 ENCOUNTER — HOME CARE VISIT (OUTPATIENT)
Dept: SCHEDULING | Facility: HOME HEALTH | Age: 77
End: 2017-03-27
Payer: COMMERCIAL

## 2017-03-27 PROCEDURE — 3331090002 HH PPS REVENUE DEBIT

## 2017-03-27 PROCEDURE — 3331090001 HH PPS REVENUE CREDIT

## 2017-03-27 PROCEDURE — G0157 HHC PT ASSISTANT EA 15: HCPCS

## 2017-03-28 ENCOUNTER — HOME CARE VISIT (OUTPATIENT)
Dept: SCHEDULING | Facility: HOME HEALTH | Age: 77
End: 2017-03-28
Payer: COMMERCIAL

## 2017-03-28 ENCOUNTER — HOME CARE VISIT (OUTPATIENT)
Dept: HOME HEALTH SERVICES | Facility: HOME HEALTH | Age: 77
End: 2017-03-28
Payer: COMMERCIAL

## 2017-03-28 VITALS
SYSTOLIC BLOOD PRESSURE: 110 MMHG | HEART RATE: 98 BPM | OXYGEN SATURATION: 98 % | TEMPERATURE: 98.3 F | RESPIRATION RATE: 18 BRPM | DIASTOLIC BLOOD PRESSURE: 76 MMHG

## 2017-03-28 VITALS
SYSTOLIC BLOOD PRESSURE: 126 MMHG | RESPIRATION RATE: 16 BRPM | DIASTOLIC BLOOD PRESSURE: 72 MMHG | HEART RATE: 76 BPM | OXYGEN SATURATION: 97 %

## 2017-03-28 PROCEDURE — 3331090001 HH PPS REVENUE CREDIT

## 2017-03-28 PROCEDURE — G0299 HHS/HOSPICE OF RN EA 15 MIN: HCPCS

## 2017-03-28 PROCEDURE — 3331090002 HH PPS REVENUE DEBIT

## 2017-03-29 PROCEDURE — 3331090002 HH PPS REVENUE DEBIT

## 2017-03-29 PROCEDURE — 3331090001 HH PPS REVENUE CREDIT

## 2017-03-30 PROCEDURE — 3331090002 HH PPS REVENUE DEBIT

## 2017-03-30 PROCEDURE — 3331090001 HH PPS REVENUE CREDIT

## 2017-03-31 ENCOUNTER — HOME CARE VISIT (OUTPATIENT)
Dept: SCHEDULING | Facility: HOME HEALTH | Age: 77
End: 2017-03-31
Payer: COMMERCIAL

## 2017-03-31 VITALS
OXYGEN SATURATION: 96 % | SYSTOLIC BLOOD PRESSURE: 138 MMHG | DIASTOLIC BLOOD PRESSURE: 80 MMHG | WEIGHT: 140 LBS | HEART RATE: 87 BPM | RESPIRATION RATE: 18 BRPM | BODY MASS INDEX: 24.8 KG/M2 | TEMPERATURE: 97.6 F

## 2017-03-31 PROCEDURE — 3331090001 HH PPS REVENUE CREDIT

## 2017-03-31 PROCEDURE — 3331090002 HH PPS REVENUE DEBIT

## 2017-03-31 PROCEDURE — G0299 HHS/HOSPICE OF RN EA 15 MIN: HCPCS

## 2017-03-31 PROCEDURE — G0157 HHC PT ASSISTANT EA 15: HCPCS

## 2017-04-01 PROCEDURE — 3331090002 HH PPS REVENUE DEBIT

## 2017-04-01 PROCEDURE — 3331090001 HH PPS REVENUE CREDIT

## 2017-04-02 PROCEDURE — 3331090002 HH PPS REVENUE DEBIT

## 2017-04-02 PROCEDURE — 3331090001 HH PPS REVENUE CREDIT

## 2017-04-03 VITALS
RESPIRATION RATE: 17 BRPM | OXYGEN SATURATION: 96 % | TEMPERATURE: 98.2 F | DIASTOLIC BLOOD PRESSURE: 84 MMHG | SYSTOLIC BLOOD PRESSURE: 132 MMHG | HEART RATE: 87 BPM

## 2017-04-03 PROCEDURE — 3331090001 HH PPS REVENUE CREDIT

## 2017-04-03 PROCEDURE — 3331090002 HH PPS REVENUE DEBIT

## 2017-04-04 ENCOUNTER — HOME CARE VISIT (OUTPATIENT)
Dept: SCHEDULING | Facility: HOME HEALTH | Age: 77
End: 2017-04-04
Payer: COMMERCIAL

## 2017-04-04 VITALS
SYSTOLIC BLOOD PRESSURE: 118 MMHG | TEMPERATURE: 98.2 F | RESPIRATION RATE: 20 BRPM | HEART RATE: 102 BPM | OXYGEN SATURATION: 98 % | DIASTOLIC BLOOD PRESSURE: 70 MMHG

## 2017-04-04 VITALS
HEART RATE: 102 BPM | SYSTOLIC BLOOD PRESSURE: 118 MMHG | TEMPERATURE: 98.2 F | DIASTOLIC BLOOD PRESSURE: 70 MMHG | RESPIRATION RATE: 20 BRPM | OXYGEN SATURATION: 98 %

## 2017-04-04 PROCEDURE — G0299 HHS/HOSPICE OF RN EA 15 MIN: HCPCS

## 2017-04-04 PROCEDURE — G0157 HHC PT ASSISTANT EA 15: HCPCS

## 2017-04-04 PROCEDURE — 3331090001 HH PPS REVENUE CREDIT

## 2017-04-04 PROCEDURE — 3331090002 HH PPS REVENUE DEBIT

## 2017-04-05 PROCEDURE — 3331090002 HH PPS REVENUE DEBIT

## 2017-04-05 PROCEDURE — 3331090001 HH PPS REVENUE CREDIT

## 2017-04-06 ENCOUNTER — HOME CARE VISIT (OUTPATIENT)
Dept: SCHEDULING | Facility: HOME HEALTH | Age: 77
End: 2017-04-06
Payer: COMMERCIAL

## 2017-04-06 PROCEDURE — 3331090003 HH PPS REVENUE ADJ

## 2017-04-06 PROCEDURE — G0151 HHCP-SERV OF PT,EA 15 MIN: HCPCS

## 2017-04-06 PROCEDURE — 3331090001 HH PPS REVENUE CREDIT

## 2017-04-06 PROCEDURE — 3331090002 HH PPS REVENUE DEBIT

## 2017-04-07 VITALS
SYSTOLIC BLOOD PRESSURE: 132 MMHG | DIASTOLIC BLOOD PRESSURE: 66 MMHG | TEMPERATURE: 98 F | RESPIRATION RATE: 14 BRPM | HEART RATE: 94 BPM | OXYGEN SATURATION: 99 %

## 2017-04-07 PROCEDURE — 3331090001 HH PPS REVENUE CREDIT

## 2017-04-07 PROCEDURE — 3331090002 HH PPS REVENUE DEBIT

## 2017-04-08 PROCEDURE — 3331090001 HH PPS REVENUE CREDIT

## 2017-04-08 PROCEDURE — 3331090002 HH PPS REVENUE DEBIT

## 2017-04-09 PROCEDURE — 3331090001 HH PPS REVENUE CREDIT

## 2017-04-09 PROCEDURE — 3331090002 HH PPS REVENUE DEBIT

## 2017-05-15 ENCOUNTER — OFFICE VISIT (OUTPATIENT)
Dept: CARDIOLOGY CLINIC | Age: 77
End: 2017-05-15

## 2017-05-15 VITALS
BODY MASS INDEX: 24.73 KG/M2 | RESPIRATION RATE: 24 BRPM | SYSTOLIC BLOOD PRESSURE: 160 MMHG | DIASTOLIC BLOOD PRESSURE: 100 MMHG | HEART RATE: 85 BPM | OXYGEN SATURATION: 97 % | WEIGHT: 139.6 LBS | HEIGHT: 63 IN

## 2017-05-15 DIAGNOSIS — I10 ESSENTIAL HYPERTENSION, BENIGN: ICD-10-CM

## 2017-05-15 DIAGNOSIS — E78.2 MIXED HYPERLIPIDEMIA: ICD-10-CM

## 2017-05-15 DIAGNOSIS — I50.22 CHRONIC SYSTOLIC CONGESTIVE HEART FAILURE (HCC): ICD-10-CM

## 2017-05-15 DIAGNOSIS — R06.09 DOE (DYSPNEA ON EXERTION): Primary | ICD-10-CM

## 2017-05-15 DIAGNOSIS — Z86.73 H/O: CVA (CEREBROVASCULAR ACCIDENT): ICD-10-CM

## 2017-05-15 DIAGNOSIS — I44.7 OTHER LEFT BUNDLE BRANCH BLOCK: ICD-10-CM

## 2017-05-15 DIAGNOSIS — I25.10 ATHEROSCLEROSIS OF NATIVE CORONARY ARTERY OF NATIVE HEART WITHOUT ANGINA PECTORIS: ICD-10-CM

## 2017-05-15 NOTE — PROGRESS NOTES
Subjective/HPI:     Beba Kinney is a 64 y.o. female is here for f/u appt. Pt with some aphasia making conversation more difficult. She is denying worsening sob on exertion or at rest despite notable sob with walking. She reports she is not taking her meds every day, states she didn't know she was supposed to take them daily. The patient denies chest pain, orthopnea, PND, LE edema, palpitations, syncope, presyncope or fatigue. PCP Provider  Angelina Ortiz NP  Past Medical History:   Diagnosis Date    Acute kidney failure ([facility] 7/85/0723    Acute systolic heart failure ([facility] [facility] 7/14/2016    CAD (coronary artery disease)     [facility]     [facility]roke [facility]) 2009    Stroke    Stroke [facility])       No past surgical history on file. Allergies   Allergen Reactions    Decadron [Dexamethasone Sodium Phosphate] Hives    Pcn [Penicillins] Hives      Family History   Problem Relation Age of Onset    Coronary Artery Disease Other      grandmother age 76      Current Outpatient Prescriptions   Medication Sig    amLODIPine (NORVASC) 2.5 mg tablet Take 1 Tab by mouth daily.  furosemide (LASIX) 40 mg tablet Take 1 Tab by mouth two (2) times a day. One pill daily    carvedilol (COREG) 6.25 mg tablet Take 1 Tab by mouth two (2) times a day.  isosorbide mononitrate ER (IMDUR) 30 mg tablet Take 1 Tab by mouth every morning.  atorvastatin (LIPITOR) 40 mg tablet TAKE ONE TABLET BY MOUTH EVERY DAY  **PATIENT MUST BE SEEN FOR MORE REFILLS**    predniSONE (DELTASONE) 10 mg tablet 3 tabs daily for 2 days   2 tabs daily for 2 days   1 tab   daily for 2 days (Patient not taking: Reported on 3/24/2017)    albuterol (PROVENTIL HFA, VENTOLIN HFA, PROAIR HFA) 90 mcg/actuation inhaler Take 2 Puffs by inhalation every four (4) hours as needed for Wheezing.  aspirin delayed-release 81 mg tablet Take 1 Tab by mouth daily. No current facility-administered medications for this visit. Vitals:    05/15/17 1514 05/15/17 1515 05/15/17 1516   BP:  160/90 (!) 160/100   Pulse: 100 85    Resp: 30 24    SpO2: 93% 97%    Weight:  139 lb 9.6 oz (63.3 kg)    Height:  5' 3\" (1.6 m)      Social History     Social History    Marital status:      Spouse name: N/A    Number of children: N/A    Years of education: N/A     Occupational History    Not on file. Social History Main Topics    Smoking status: Unknown If Ever Smoked    Smokeless tobacco: Never Used    Alcohol use No      Comment: beer every day    Drug use: No    Sexual activity: Not on file     Other Topics Concern    Not on file     Social History Narrative    ** Merged History Encounter **            I have reviewed the nurses notes, vitals, problem list, allergy list, medical history, family, social history and medications. Review of Symptoms:    General: Pt denies excessive weight gain or loss. Pt is able to conduct ADL's  HEENT: Denies blurred vision, headaches, epistaxis and difficulty swallowing. Respiratory: Denies worsening shortness of breath, CHRISTINE, wheezing or stridor. Cardiovascular: Denies precordial pain, palpitations, edema or PND  Gastrointestinal: Denies abdominal pain or blood in stool  Urinary: Denies dysuria, pyuria  Musculoskeletal: Denies pain or swelling from muscles or joints  Neurologic: Denies tremor, paresthesias, or sensory motor disturbance  Skin: Denies rash, itching or texture change. Psych: Denies depression        Physical Exam:      General: Well developed, cooperative and alert  HEENT: No carotid bruits, no JVD, trach is midline. Neck Supple, PEERL, EOM intact. Heart:  Normal S1/S2 negative S3 or S4. Regular, no murmur, gallop or rub.   Respiratory: audible wheezes, diminished BS throughout   Abdomen:   Soft, non-tender, no masses, bowel sounds are active.   Extremities:  No edema, normal cap refill, no cyanosis, atraumatic. Neuro: Alert, gait stable.    Skin: Skin color is normal. No rashes or lesions. Non diaphoretic  Vascular: 2+ pulses symmetric in all extremities    Cardiographics    ECG: Sinus  Rhythm  - occasional ectopic ventricular beat     -Left bundle branch block.    -Left atrial enlargement. Results for orders placed or performed during the hospital encounter of 07/21/16   EKG, 12 LEAD, INITIAL   Result Value Ref Range    Ventricular Rate 86 BPM    Atrial Rate 86 BPM    P-R Interval 130 ms    QRS Duration 130 ms    Q-T Interval 422 ms    QTC Calculation (Bezet) 504 ms    Calculated P Axis 45 degrees    Calculated R Axis -27 degrees    Calculated T Axis 114 degrees    Diagnosis       Normal sinus rhythm  Possible Left atrial enlargement  Left bundle branch block  When compared with ECG of 01-MAR-2010 05:18,  No significant change was found  Confirmed by Dayanara Fuentes ([id number]) on 7/22/2016 1:40:16 PM           Cardiology Labs:  Lab Results   Component Value Date/Time    Cholesterol, total 239 07/15/2016 03:39 AM    HDL Cholesterol 60 07/15/2016 03:39 AM    LDL, calculated 157.2 07/15/2016 03:39 AM    Triglyceride 109 07/15/2016 03:39 AM    CHOL/HDL Ratio 4.0 07/15/2016 03:39 AM       Lab Results   Component Value Date/Time    Sodium 137 03/14/2017 04:47 AM    Potassium 4.0 03/14/2017 04:47 AM    Chloride 101 03/14/2017 04:47 AM    CO2 28 03/14/2017 04:47 AM    Anion gap 8 03/14/2017 04:47 AM    Glucose 183 03/14/2017 04:47 AM    BUN 74 03/14/2017 04:47 AM    Creatinine 1.61 03/14/2017 04:47 AM    BUN/Creatinine ratio 46 03/14/2017 04:47 AM    GFR est AA 38 03/14/2017 04:47 AM    GFR est non-AA 31 03/14/2017 04:47 AM    Calcium 8.3 03/14/2017 04:47 AM    AST (SGOT) 15 03/10/2017 04:15 AM    Alk.  phosphatase 106 03/10/2017 04:15 AM    Protein, total 7.3 03/10/2017 04:15 AM    Albumin 3.4 03/10/2017 04:15 AM    Globulin 3.9 03/10/2017 04:15 AM    A-G Ratio 0.9 03/10/2017 04:15 AM    ALT (SGPT) 13 03/10/2017 04:15 AM           Assessment:     Assessment:     Sergio Schmidt was seen today for shortness of breath. Diagnoses and all orders for this visit:    CHRISTINE (dyspnea on exertion)  -     AMB POC EKG ROUTINE W/ 12 LEADS, INTER & REP  -     METABOLIC PANEL, BASIC  -     MAGNESIUM  -     2D ECHO COMPLETE ADULT (TTE) W OR WO CONTR; Future  -     BNP    Chronic systolic congestive heart failure (HCC)  -     AMB POC EKG ROUTINE W/ 12 LEADS, INTER & REP  -     METABOLIC PANEL, BASIC  -     MAGNESIUM  -     2D ECHO COMPLETE ADULT (TTE) W OR WO CONTR; Future  -     BNP    Atherosclerosis of native coronary artery of native heart without angina pectoris  -     AMB POC EKG ROUTINE W/ 12 LEADS, INTER & REP  -     METABOLIC PANEL, BASIC  -     MAGNESIUM  -     2D ECHO COMPLETE ADULT (TTE) W OR WO CONTR; Future  -     BNP    Essential hypertension, benign  -     AMB POC EKG ROUTINE W/ 12 LEADS, INTER & REP  -     METABOLIC PANEL, BASIC  -     MAGNESIUM  -     2D ECHO COMPLETE ADULT (TTE) W OR WO CONTR; Future  -     BNP    Mixed hyperlipidemia  -     AMB POC EKG ROUTINE W/ 12 LEADS, INTER & REP  -     METABOLIC PANEL, BASIC  -     MAGNESIUM  -     2D ECHO COMPLETE ADULT (TTE) W OR WO CONTR; Future  -     BNP    Other left bundle branch block  -     AMB POC EKG ROUTINE W/ 12 LEADS, INTER & REP  -     METABOLIC PANEL, BASIC  -     MAGNESIUM  -     2D ECHO COMPLETE ADULT (TTE) W OR WO CONTR; Future  -     BNP    H/O: CVA (cerebrovascular accident)  -     AMB POC EKG ROUTINE W/ 12 LEADS, INTER & REP  -     METABOLIC PANEL, BASIC  -     MAGNESIUM  -     2D ECHO COMPLETE ADULT (TTE) W OR WO CONTR; Future  -     BNP        ICD-10-CM ICD-9-CM    1. CHRISTINE (dyspnea on exertion) R06.09 786.09 AMB POC EKG ROUTINE W/ 12 LEADS, INTER & REP      METABOLIC PANEL, BASIC      MAGNESIUM      2D ECHO COMPLETE ADULT (TTE) W OR WO CONTR      BNP   2.  Chronic systolic congestive heart failure (HCC) I50.22 428.22 AMB POC EKG ROUTINE W/ 12 LEADS, INTER & REP     396.1 METABOLIC PANEL, BASIC      MAGNESIUM      2D ECHO COMPLETE ADULT (TTE) W OR WO CONTR      BNP   3. Atherosclerosis of native coronary artery of native heart without angina pectoris I25.10 414.01 AMB POC EKG ROUTINE W/ 12 LEADS, INTER & REP      METABOLIC PANEL, BASIC      MAGNESIUM      2D ECHO COMPLETE ADULT (TTE) W OR WO CONTR      BNP   4. Essential hypertension, benign I10 401.1 AMB POC EKG ROUTINE W/ 12 LEADS, INTER & REP      METABOLIC PANEL, BASIC      MAGNESIUM      2D ECHO COMPLETE ADULT (TTE) W OR WO CONTR      BNP   5. Mixed hyperlipidemia E78.2 272.2 AMB POC EKG ROUTINE W/ 12 LEADS, INTER & REP      METABOLIC PANEL, BASIC      MAGNESIUM      2D ECHO COMPLETE ADULT (TTE) W OR WO CONTR      BNP   6. Other left bundle branch block I44.7 426.3 AMB POC EKG ROUTINE W/ 12 LEADS, INTER & REP      METABOLIC PANEL, BASIC      MAGNESIUM      2D ECHO COMPLETE ADULT (TTE) W OR WO CONTR      BNP   7. H/O: CVA (cerebrovascular accident) Z86.73 V12.54 AMB POC EKG ROUTINE W/ 12 LEADS, INTER & REP      METABOLIC PANEL, BASIC      MAGNESIUM      2D ECHO COMPLETE ADULT (TTE) W OR WO CONTR      BNP     Orders Placed This Encounter    METABOLIC PANEL, BASIC    MAGNESIUM    BNP    AMB POC EKG ROUTINE W/ 12 LEADS, INTER & REP     Order Specific Question:   Reason for Exam:     Answer:   routine    2D ECHO COMPLETE ADULT (TTE) W OR WO CONTR     Standing Status:   Future     Standing Expiration Date:   11/14/2017     Order Specific Question:   Reason for Exam:     Answer:   EF 35-40%        Plan:     Patient presents today for f/u appt, is denying worsening SOB/CHRISTINE despite notable dyspnea with ambulation, mild audible wheezing, diminished BS. Appears she is not taking her medications daily based on the amount of pills left in all her pill bottles. However, her weight is stable from her discharge weight from last hospitalization.    I discussed with her nephew to please set up a pillbox that he feels at the beginning of every week and verified during and at the end of the week that all the pills are gone. Will ask a nurse navigator to call and check on her as well. EKG remains SR. BP elevated. I will evaluate with a BMP, BNP and Mg. Will repeat an echo, last EF 35-40% 11/16. Follow-up in 6 weeks.     Haim Alvarado MD

## 2017-05-15 NOTE — MR AVS SNAPSHOT
Visit Information Date & Time Provider Department Dept. Phone Encounter #  
 5/15/2017  3:00 PM Leisa Summers, [address] [facility] [id number] Upcoming Health Maintenance Date Due DTaP/Tdap/Td series (1 - Tdap) 10/2/1961 ZOSTER VACCINE AGE 60> 10/2/1998 GLAUCOMA SCREENING Q2Y 10/2/2003 OSTEOPOROSIS SCREENING (DEXA) 10/2/2003 Pneumococcal 65+ High/Highest Risk (1 of 2 - PCV13) 10/2/2003 MEDICARE YEARLY EXAM 10/2/2003 INFLUENZA AGE 9 TO ADULT 8/1/2017 Allergies as of 5/15/2017  Review Complete On: 5/15/2017 By: Kristy Torres NP Severity Noted Reaction Type Reactions Decadron [Dexamethasone Sodium Phosphate]  03/09/2017    Hives Pcn [Penicillins]  07/14/2016    Hives Current Immunizations  Never Reviewed Name Date Influenza Vaccine 10/1/2016 Not reviewed this visit You Were Diagnosed With   
  
 Codes Comments CHRISTINE (dyspnea on exertion)    -  Primary ICD-10-CM: R06.09 
ICD-9-CM: 786.09 Chronic systolic congestive heart failure (HCC)     ICD-10-CM: I50.22 ICD-9-CM: 428.22, 428.0 Atherosclerosis of native coronary artery of native heart without angina pectoris     ICD-10-CM: I25.10 ICD-9-CM: 414.01 Essential hypertension, benign     ICD-10-CM: I10 
ICD-9-CM: 401.1 Mixed hyperlipidemia     ICD-10-CM: E78.2 ICD-9-CM: 272.2 Other left bundle branch block     ICD-10-CM: I44.7 ICD-9-CM: 426.3 H/O: CVA (cerebrovascular accident)     ICD-10-CM: Z86.73 
ICD-9-CM: V12.54 Vitals BP Pulse Resp Height(growth percentile) Weight(growth percentile) SpO2  
 (!) 160/100 (BP 1 Location: Right arm, BP Patient Position: Sitting) 85 24 5' 3\" (1.6 m) 139 lb 9.6 oz (63.3 kg) 97% BMI Smoking Status 24.73 kg/m2 Unknown If Ever Smoked Vitals History BMI and BSA Data Body Mass Index Body Surface Area 24.73 kg/m 2 1.68 m 2 Preferred Pharmacy Pharmacy Name Phone [facility] [address] [place] [address] Dr Burns, [address] [phone] Your Updated Medication List  
  
   
This list is accurate as of: 5/15/17  3:55 PM.  Always use your most recent med list.  
  
  
  
  
 albuterol 90 mcg/actuation inhaler Commonly known as:  PROVENTIL HFA, VENTOLIN HFA, PROAIR HFA Take 2 Puffs by inhalation every four (4) hours as needed for Wheezing. amLODIPine 2.5 mg tablet Commonly known as:  Jeni Colleen Take 1 Tab by mouth daily. aspirin delayed-release 81 mg tablet Take 1 Tab by mouth daily. atorvastatin 40 mg tablet Commonly known as:  LIPITOR  
TAKE ONE TABLET BY MOUTH EVERY DAY  **PATIENT MUST BE SEEN FOR MORE REFILLS**  
  
 carvedilol 6.25 mg tablet Commonly known as:  Jeannette Haven Take 1 Tab by mouth two (2) times a day. furosemide 40 mg tablet Commonly known as:  LASIX Take 1 Tab by mouth two (2) times a day. One pill daily  
  
 isosorbide mononitrate ER 30 mg tablet Commonly known as:  IMDUR Take 1 Tab by mouth every morning. predniSONE 10 mg tablet Commonly known as:  DELTASONE  
3 tabs daily for 2 days  2 tabs daily for 2 days  1 tab   daily for 2 days We Performed the Following AMB POC EKG ROUTINE W/ 12 LEADS, INTER & REP [16647 CPT(R)] BNP [id number] CPT(R)] MAGNESIUM [id number] CPT(R)] METABOLIC PANEL, BASIC [76188 CPT(R)] To-Do List   
 05/18/2017 ECHO:  2D ECHO COMPLETE ADULT (TTE) W OR WO CONTR Introducing [facility]! David Moon introduces [employer] patient portal. Now you can access parts of your medical record, email your doctor's office, and request medication refills online. 1. In your internet browser, go to [email or web address] 2. Click on the First Time User? Click Here link in the Sign In box. You will see the New Member Sign Up page. 3. Enter your [employer] Access Code exactly as it appears below.  You will not need to use this code after youve completed the sign-up process. If you do not sign up before the expiration date, you must request a new code. · [employer] Access Code: [email or web address] Expires: 6/5/2017 11:45 PM 
 
4. Enter the last four digits of your Social Security Number (xxxx) and Date of Birth (mm/dd/yyyy) as indicated and click Submit. You will be taken to the next sign-up page. 5. Create a [employer]omi ID. This will be your [employer]omi login ID and cannot be changed, so think of one that is secure and easy to remember. 6. Create a [employer]omi password. You can change your password at any time. 7. Enter your Password Reset Question and Answer. This can be used at a later time if you forget your password. 8. Enter your e-mail address. You will receive e-mail notification when new information is available in [address] 9. Click Sign Up. You can now view and download portions of your medical record. 10. Click the Download Summary menu link to download a portable copy of your medical information. If you have questions, please visit the Frequently Asked Questions section of the [employer] website. Remember, [employer] is NOT to be used for urgent needs. For medical emergencies, dial 911. Now available from your iPhone and Android! Please provide this summary of care documentation to your next provider. Your primary care clinician is listed as Marshall Krabbe. If you have any questions after today's visit, please call [phone].

## 2017-05-24 ENCOUNTER — CLINICAL SUPPORT (OUTPATIENT)
Dept: CARDIOLOGY CLINIC | Age: 77
End: 2017-05-24

## 2017-05-24 DIAGNOSIS — I50.22 CHRONIC SYSTOLIC CONGESTIVE HEART FAILURE (HCC): ICD-10-CM

## 2017-05-24 DIAGNOSIS — I44.7 OTHER LEFT BUNDLE BRANCH BLOCK: ICD-10-CM

## 2017-05-24 DIAGNOSIS — E78.2 MIXED HYPERLIPIDEMIA: ICD-10-CM

## 2017-05-24 DIAGNOSIS — I10 ESSENTIAL HYPERTENSION, BENIGN: ICD-10-CM

## 2017-05-24 DIAGNOSIS — R06.09 DOE (DYSPNEA ON EXERTION): ICD-10-CM

## 2017-05-24 DIAGNOSIS — I25.10 ATHEROSCLEROSIS OF NATIVE CORONARY ARTERY OF NATIVE HEART WITHOUT ANGINA PECTORIS: ICD-10-CM

## 2017-05-24 DIAGNOSIS — Z86.73 H/O: CVA (CEREBROVASCULAR ACCIDENT): ICD-10-CM

## 2017-06-02 ENCOUNTER — TELEPHONE (OUTPATIENT)
Dept: CARDIOLOGY CLINIC | Age: 77
End: 2017-06-02

## 2017-06-02 NOTE — TELEPHONE ENCOUNTER
Spoke with nephew her caregiver needs to be on HIPPA (Verified patient with two identifiers.) informed of echo results and request for follow up please call to schedule.

## 2017-06-02 NOTE — TELEPHONE ENCOUNTER
----- Message from Anya Dooley MD sent at 6/1/2017  4:44 PM EDT -----  Echo stable, follow-up in the next month or so to reassess.

## 2017-08-01 ENCOUNTER — APPOINTMENT (OUTPATIENT)
Dept: GENERAL RADIOLOGY | Age: 77
DRG: 291 | End: 2017-08-01
Attending: EMERGENCY MEDICINE
Payer: COMMERCIAL

## 2017-08-01 ENCOUNTER — HOSPITAL ENCOUNTER (INPATIENT)
Age: 77
LOS: 3 days | Discharge: NURSING, REHAB OR LONG-TERM CARE | DRG: 291 | End: 2017-08-04
Attending: EMERGENCY MEDICINE | Admitting: INTERNAL MEDICINE
Payer: COMMERCIAL

## 2017-08-01 DIAGNOSIS — I11.0 HYPERTENSIVE HEART DISEASE WITH HEART FAILURE (HCC): Primary | ICD-10-CM

## 2017-08-01 DIAGNOSIS — R06.00 DYSPNEA, UNSPECIFIED TYPE: ICD-10-CM

## 2017-08-01 PROBLEM — I50.9 CHF (CONGESTIVE HEART FAILURE) (HCC): Status: ACTIVE | Noted: 2017-08-01

## 2017-08-01 PROBLEM — I16.0 HYPERTENSIVE URGENCY: Status: ACTIVE | Noted: 2017-08-01

## 2017-08-01 LAB
ANION GAP BLD CALC-SCNC: 6 MMOL/L (ref 5–15)
APPEARANCE UR: CLEAR
ATRIAL RATE: 90 BPM
BACTERIA URNS QL MICRO: NEGATIVE /HPF
BASOPHILS # BLD AUTO: 0 K/UL (ref 0–0.1)
BASOPHILS # BLD: 0 % (ref 0–1)
BILIRUB UR QL: NEGATIVE
BNP SERPL-MCNC: 6057 PG/ML (ref 0–450)
BUN SERPL-MCNC: 19 MG/DL (ref 6–20)
BUN/CREAT SERPL: 16 (ref 12–20)
CALCIUM SERPL-MCNC: 9.3 MG/DL (ref 8.5–10.1)
CALCULATED P AXIS, ECG09: 57 DEGREES
CALCULATED R AXIS, ECG10: -22 DEGREES
CALCULATED T AXIS, ECG11: 117 DEGREES
CHLORIDE SERPL-SCNC: 105 MMOL/L (ref 97–108)
CO2 SERPL-SCNC: 30 MMOL/L (ref 21–32)
COLOR UR: ABNORMAL
CREAT SERPL-MCNC: 1.22 MG/DL (ref 0.55–1.02)
DIAGNOSIS, 93000: NORMAL
EOSINOPHIL # BLD: 0.1 K/UL (ref 0–0.4)
EOSINOPHIL NFR BLD: 2 % (ref 0–7)
EPITH CASTS URNS QL MICRO: ABNORMAL /LPF
ERYTHROCYTE [DISTWIDTH] IN BLOOD BY AUTOMATED COUNT: 13.5 % (ref 11.5–14.5)
GLUCOSE SERPL-MCNC: 96 MG/DL (ref 65–100)
GLUCOSE UR STRIP.AUTO-MCNC: NEGATIVE MG/DL
HCT VFR BLD AUTO: 37.4 % (ref 35–47)
HGB BLD-MCNC: 12.5 G/DL (ref 11.5–16)
HGB UR QL STRIP: NEGATIVE
HYALINE CASTS URNS QL MICRO: ABNORMAL /LPF (ref 0–5)
KETONES UR QL STRIP.AUTO: NEGATIVE MG/DL
LEUKOCYTE ESTERASE UR QL STRIP.AUTO: NEGATIVE
LYMPHOCYTES # BLD AUTO: 31 % (ref 12–49)
LYMPHOCYTES # BLD: 1.8 K/UL (ref 0.8–3.5)
MCH RBC QN AUTO: 25.9 PG (ref 26–34)
MCHC RBC AUTO-ENTMCNC: 33.4 G/DL (ref 30–36.5)
MCV RBC AUTO: 77.6 FL (ref 80–99)
MONOCYTES # BLD: 0.2 K/UL (ref 0–1)
MONOCYTES NFR BLD AUTO: 4 % (ref 5–13)
NEUTS SEG # BLD: 3.8 K/UL (ref 1.8–8)
NEUTS SEG NFR BLD AUTO: 63 % (ref 32–75)
NITRITE UR QL STRIP.AUTO: NEGATIVE
P-R INTERVAL, ECG05: 142 MS
PH UR STRIP: 6 [PH] (ref 5–8)
PLATELET # BLD AUTO: 171 K/UL (ref 150–400)
POTASSIUM SERPL-SCNC: 3.7 MMOL/L (ref 3.5–5.1)
PROT UR STRIP-MCNC: 30 MG/DL
Q-T INTERVAL, ECG07: 412 MS
QRS DURATION, ECG06: 132 MS
QTC CALCULATION (BEZET), ECG08: 504 MS
RBC # BLD AUTO: 4.82 M/UL (ref 3.8–5.2)
RBC #/AREA URNS HPF: ABNORMAL /HPF (ref 0–5)
SODIUM SERPL-SCNC: 141 MMOL/L (ref 136–145)
SP GR UR REFRACTOMETRY: 1.01 (ref 1–1.03)
TROPONIN I SERPL-MCNC: 0.17 NG/ML
TROPONIN I SERPL-MCNC: 0.18 NG/ML
UROBILINOGEN UR QL STRIP.AUTO: 0.2 EU/DL (ref 0.2–1)
VENTRICULAR RATE, ECG03: 90 BPM
WBC # BLD AUTO: 6 K/UL (ref 3.6–11)
WBC URNS QL MICRO: ABNORMAL /HPF (ref 0–4)

## 2017-08-01 PROCEDURE — 71020 XR CHEST PA LAT: CPT

## 2017-08-01 PROCEDURE — 84484 ASSAY OF TROPONIN QUANT: CPT | Performed by: EMERGENCY MEDICINE

## 2017-08-01 PROCEDURE — 96365 THER/PROPH/DIAG IV INF INIT: CPT

## 2017-08-01 PROCEDURE — 74011250637 HC RX REV CODE- 250/637: Performed by: INTERNAL MEDICINE

## 2017-08-01 PROCEDURE — 99285 EMERGENCY DEPT VISIT HI MDM: CPT

## 2017-08-01 PROCEDURE — 74011250637 HC RX REV CODE- 250/637: Performed by: NURSE PRACTITIONER

## 2017-08-01 PROCEDURE — 65660000000 HC RM CCU STEPDOWN

## 2017-08-01 PROCEDURE — 85025 COMPLETE CBC W/AUTO DIFF WBC: CPT | Performed by: EMERGENCY MEDICINE

## 2017-08-01 PROCEDURE — 36415 COLL VENOUS BLD VENIPUNCTURE: CPT | Performed by: EMERGENCY MEDICINE

## 2017-08-01 PROCEDURE — 81001 URINALYSIS AUTO W/SCOPE: CPT | Performed by: EMERGENCY MEDICINE

## 2017-08-01 PROCEDURE — 93005 ELECTROCARDIOGRAM TRACING: CPT

## 2017-08-01 PROCEDURE — 74011250637 HC RX REV CODE- 250/637: Performed by: EMERGENCY MEDICINE

## 2017-08-01 PROCEDURE — 96366 THER/PROPH/DIAG IV INF ADDON: CPT

## 2017-08-01 PROCEDURE — 83880 ASSAY OF NATRIURETIC PEPTIDE: CPT | Performed by: EMERGENCY MEDICINE

## 2017-08-01 PROCEDURE — 80048 BASIC METABOLIC PNL TOTAL CA: CPT | Performed by: EMERGENCY MEDICINE

## 2017-08-01 PROCEDURE — 74011000250 HC RX REV CODE- 250: Performed by: EMERGENCY MEDICINE

## 2017-08-01 RX ORDER — ONDANSETRON 2 MG/ML
4 INJECTION INTRAMUSCULAR; INTRAVENOUS
Status: DISCONTINUED | OUTPATIENT
Start: 2017-08-01 | End: 2017-08-04 | Stop reason: HOSPADM

## 2017-08-01 RX ORDER — SODIUM CHLORIDE 0.9 % (FLUSH) 0.9 %
5-10 SYRINGE (ML) INJECTION AS NEEDED
Status: DISCONTINUED | OUTPATIENT
Start: 2017-08-01 | End: 2017-08-04 | Stop reason: HOSPADM

## 2017-08-01 RX ORDER — SODIUM CHLORIDE 9 MG/ML
250 INJECTION, SOLUTION INTRAVENOUS AS NEEDED
Status: DISCONTINUED | OUTPATIENT
Start: 2017-08-01 | End: 2017-08-04 | Stop reason: HOSPADM

## 2017-08-01 RX ORDER — FACIAL-BODY WIPES
10 EACH TOPICAL DAILY PRN
Status: DISCONTINUED | OUTPATIENT
Start: 2017-08-01 | End: 2017-08-04 | Stop reason: HOSPADM

## 2017-08-01 RX ORDER — NITROGLYCERIN 20 MG/100ML
5-20 INJECTION INTRAVENOUS
Status: DISCONTINUED | OUTPATIENT
Start: 2017-08-01 | End: 2017-08-01

## 2017-08-01 RX ORDER — NALOXONE HYDROCHLORIDE 0.4 MG/ML
0.4 INJECTION, SOLUTION INTRAMUSCULAR; INTRAVENOUS; SUBCUTANEOUS AS NEEDED
Status: DISCONTINUED | OUTPATIENT
Start: 2017-08-01 | End: 2017-08-04 | Stop reason: HOSPADM

## 2017-08-01 RX ORDER — ATORVASTATIN CALCIUM 40 MG/1
40 TABLET, FILM COATED ORAL
Status: DISCONTINUED | OUTPATIENT
Start: 2017-08-01 | End: 2017-08-04 | Stop reason: HOSPADM

## 2017-08-01 RX ORDER — GUAIFENESIN 100 MG/5ML
324 LIQUID (ML) ORAL
Status: COMPLETED | OUTPATIENT
Start: 2017-08-01 | End: 2017-08-01

## 2017-08-01 RX ORDER — ALBUTEROL SULFATE 90 UG/1
2 AEROSOL, METERED RESPIRATORY (INHALATION)
Status: DISCONTINUED | OUTPATIENT
Start: 2017-08-01 | End: 2017-08-04 | Stop reason: HOSPADM

## 2017-08-01 RX ORDER — ENOXAPARIN SODIUM 100 MG/ML
30 INJECTION SUBCUTANEOUS EVERY 24 HOURS
Status: DISCONTINUED | OUTPATIENT
Start: 2017-08-02 | End: 2017-08-04 | Stop reason: HOSPADM

## 2017-08-01 RX ORDER — ACETAMINOPHEN 325 MG/1
650 TABLET ORAL
Status: DISCONTINUED | OUTPATIENT
Start: 2017-08-01 | End: 2017-08-04 | Stop reason: HOSPADM

## 2017-08-01 RX ORDER — AMLODIPINE BESYLATE 2.5 MG/1
2.5 TABLET ORAL DAILY
Status: DISCONTINUED | OUTPATIENT
Start: 2017-08-01 | End: 2017-08-03

## 2017-08-01 RX ORDER — OXYCODONE AND ACETAMINOPHEN 5; 325 MG/1; MG/1
1 TABLET ORAL
Status: DISCONTINUED | OUTPATIENT
Start: 2017-08-01 | End: 2017-08-04 | Stop reason: HOSPADM

## 2017-08-01 RX ORDER — CARVEDILOL 12.5 MG/1
12.5 TABLET ORAL 2 TIMES DAILY
Status: DISCONTINUED | OUTPATIENT
Start: 2017-08-01 | End: 2017-08-04 | Stop reason: HOSPADM

## 2017-08-01 RX ORDER — ASPIRIN 81 MG/1
81 TABLET ORAL DAILY
Status: DISCONTINUED | OUTPATIENT
Start: 2017-08-02 | End: 2017-08-04 | Stop reason: HOSPADM

## 2017-08-01 RX ORDER — SODIUM CHLORIDE 0.9 % (FLUSH) 0.9 %
5-10 SYRINGE (ML) INJECTION EVERY 8 HOURS
Status: DISCONTINUED | OUTPATIENT
Start: 2017-08-01 | End: 2017-08-04 | Stop reason: HOSPADM

## 2017-08-01 RX ORDER — HYDRALAZINE HYDROCHLORIDE 20 MG/ML
20 INJECTION INTRAMUSCULAR; INTRAVENOUS
Status: DISCONTINUED | OUTPATIENT
Start: 2017-08-01 | End: 2017-08-04 | Stop reason: HOSPADM

## 2017-08-01 RX ADMIN — NITROGLYCERIN 5 MCG/MIN: 20 INJECTION INTRAVENOUS at 15:14

## 2017-08-01 RX ADMIN — ATORVASTATIN CALCIUM 40 MG: 40 TABLET, FILM COATED ORAL at 22:08

## 2017-08-01 RX ADMIN — CARVEDILOL 12.5 MG: 12.5 TABLET, FILM COATED ORAL at 16:59

## 2017-08-01 RX ADMIN — AMLODIPINE BESYLATE 2.5 MG: 2.5 TABLET ORAL at 20:24

## 2017-08-01 RX ADMIN — ASPIRIN 81 MG 324 MG: 81 TABLET ORAL at 15:37

## 2017-08-01 RX ADMIN — NITROGLYCERIN 2 INCH: 20 OINTMENT TOPICAL at 20:24

## 2017-08-01 RX ADMIN — Medication 10 ML: at 22:08

## 2017-08-01 NOTE — ED NOTES
Bedside and Verbal shift change report given to Florencioika 46 (oncoming nurse) by Rommel Lubin (offgoing nurse). Report included the following information SBAR, ED Summary and Recent Results. Updated pt on plan of care. Pt resting in position of comfort with call bell.
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
## 2017-08-01 NOTE — CONSULTS
[address] [facility] [place] [address] [facility]     Date of  Admission: 8/1/2017 12:52 PM     Admission type:Emergency    Consult for: elevated troponin, HF  Consult by: ED physician     Subjective:     Enriqueta Mccall is a 64 y.o. female with PMH CVA, HOLLY, CAD, sHF who presented to the ED sent from her doctor's office for SOB. Ms. Sean Diaz states that she's been SOB for \"months\" and that she is \"used to it\". Difficulty qualifying her SOB. She also endorses current chest \"tightness\". She denies cough, leg swelling, orthopnea, n/v, dizziness. She endorses that she hasn't had her medications in \"months\". When trying to figure out why she was \"out of pills\", I believe she was trying to say she was out of refills. When asked if she runs her own errands or if someone helps her, she states her \"son\" helps her. Ms. Sean Diaz follows with Dr. Robina Juan for cardiology. During their last visit in May 2017, it was discussed that Ms. Sean Diaz was not taking her medications every day and Dr. Sisi Castro note states that the patient said she didn't realize she was supposed to take her medicines every day. Last ECHO 05/17 with EF 35-40%; hypokinesis; grade 1 diastolic dysfunction; mild MR and mild TR. Stress 07/16 with some likely artifact and not definite signs of ischemia.       Cardiac risk factors: smoking/ tobacco exposure, family history, sedentary life style, hypertension, post-menopausal.      Patient Active Problem List    Diagnosis Date Noted    Hypertensive urgency 08/01/2017    COPD exacerbation ([address] 03/07/2017    CAD (coronary artery disease), native coronary artery 07/15/2016    H/O: CVA (cerebrovascular accident) 07/15/2016    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 ([address] 07/15/2016    Severe sepsis ([address] 07/14/2016    Pneumonia 07/14/2016    Acute respiratory failure ([address] 07/14/2016    Elevated troponin 07/14/2016    Left bundle branch block (LBBB) on electrocardiogram 28/53/9351    Acute systolic heart failure ([address] 07/14/2016    Acute kidney failure ([address] 07/14/2016    Mixed hyperlipidemia 09/21/2012    Essential hypertension, benign 09/21/2012    Other left bundle branch block 09/21/2012    Coronary atherosclerosis of native coronary artery 09/21/2012    Late effects of cerebrovascular disease, dysarthria 09/21/2012    Atherosclerosis of renal artery ([address] 09/21/2012    Atherosclerosis of native arteries of the extremities with intermittent claudication 09/21/2012    Tobacco use disorder 09/21/2012    Cerebrovascular accident ([address] 09/21/2012    Mitral valve disorders 09/21/2012    Tricuspid valve disorder 09/21/2012      Bonifacio Oliva NP  Past Medical History:   Diagnosis Date    Acute kidney failure ([address] 7/41/4428    Acute systolic heart failure ([address] 7/14/2016    CAD (coronary artery disease)     Heart failure ([address]     Stroke ([address] 2009    Stroke    Stroke [facility])       Social History     Social History    Marital status:      Spouse name: N/A    Number of children: N/A    Years of education: N/A     Social History Main Topics    Smoking status: Former Smoker     Quit date: 9/21/2009    Smokeless tobacco: Never Used    Alcohol use 0.0 oz/week     0 Standard drinks or equivalent per week      Comment: Very little    Drug use: No    Sexual activity: Not Asked     Other Topics Concern    None     Social History Narrative    ** Merged History Encounter **          Allergies   Allergen Reactions    Decadron [Dexamethasone Sodium Phosphate] Hives    Pcn [Penicillins] Hives      Family History   Problem Relation Age of Onset    Coronary Artery Disease Other      grandmother age 76      Current Facility-Administered Medications   Medication Dose Route Frequency    sodium chloride (NS) flush 5-10 mL  5-10 mL IntraVENous PRN    0.9% sodium chloride infusion 250 mL  250 mL IntraVENous PRN    nitroglycerin (Tridil) 200 mcg/ml infusion  5-20 mcg/min IntraVENous TITRATE     Current Outpatient Prescriptions   Medication Sig    amLODIPine (NORVASC) 2.5 mg tablet Take 1 Tab by mouth daily.  predniSONE (DELTASONE) 10 mg tablet 3 tabs daily for 2 days   2 tabs daily for 2 days   1 tab   daily for 2 days (Patient not taking: Reported on 3/24/2017)    furosemide (LASIX) 40 mg tablet Take 1 Tab by mouth two (2) times a day. One pill daily    carvedilol (COREG) 6.25 mg tablet Take 1 Tab by mouth two (2) times a day.  isosorbide mononitrate ER (IMDUR) 30 mg tablet Take 1 Tab by mouth every morning.  albuterol (PROVENTIL HFA, VENTOLIN HFA, PROAIR HFA) 90 mcg/actuation inhaler Take 2 Puffs by inhalation every four (4) hours as needed for Wheezing.  aspirin delayed-release 81 mg tablet Take 1 Tab by mouth daily.  atorvastatin (LIPITOR) 40 mg tablet TAKE ONE TABLET BY MOUTH EVERY DAY  **PATIENT MUST BE SEEN FOR MORE REFILLS**        Review of Symptoms:   Constitutional: negative  Eyes: negative   Ears, nose, mouth, throat, and face: negative  Respiratory: SOB  Cardiovascular: chest tightness   Gastrointestinal: negative  Genitourinary:negative   Musculoskeletal:negative   Neurological: negative   Endocrine: negative          Objective:      Visit Vitals    /90    Pulse 90    Temp 98.4 °F (36.9 °C)    Resp 20    Ht 4' 11\" (1.499 m)    Wt 64.4 kg (141 lb 15.6 oz)    SpO2 98%    BMI 28.68 kg/m2       Physical:   General: pleasant, elderly, AAF resting on stretcher in NAD  Heart: RRR, no m/S3/JVD  Lungs: clear, diminished in bases. Becomes labored when talking for long periods.      Abdomen: Soft, +BS, NTND   Extremities: LE иван +DP/PT, no edema   Neurologic: aphasia from prior CVA    Data Review:   Recent Labs      08/01/17   1342   WBC  6.0   HGB  12.5   HCT  37.4   PLT  171     Recent Labs      08/01/17   1342   NA  141   K  3.7   CL  105   CO2  30   GLU  96 BUN  19   CREA  1.22*   CA  9.3       Recent Labs      08/01/17   1342   TROIQ  0.18*       No intake or output data in the 24 hours ending 08/01/17 1557     Cardiographics    Telemetry: SR, BBB  ECG: SR, LBBB, t-wave inversion lat leads  Echocardiogram: last as above   CXRAY: no acute process        Assessment:       Active Problems:    Other left bundle branch block (9/21/2012)      Elevated troponin (7/14/2016)      Hypertensive urgency (8/1/2017)         Plan:     Lonny Granger is a 64 y.o. female who was sent from her doctor's office with SOB and was found to be in a hypertensive urgency with some chest tightness and troponin of 0.18. No significant changes on EKG. Creat 1.22. Pro-BNP elevated at 6057. Ms. Young Pi states she hasn't taken her medications in months. · Believe elevated pro-BNP and troponin related to uncontrolled blood pressure. · ED has initiated nitro gtt with titration. Discussed with Dr. Elisa Cosby for goal SBP less than 140 (arrival at 180). · Patient does not appear to be in fluid overload despite the HTN strain on her heart and chronic HF. · Will restart her prior home ASA, BB, statin. Has not been on ACEi with history of kidney dysfunction. Creat 1.22 now, but will wait and monitor. · Repeat troponin tonight and check lipids in AM.        Thank you for consulting [facility]    Lyla Felder NP  DNP, RN, [facility]    8/1/2017         Agree with note as outlined by  NP. I confirm findings in history and physical exam. No additional findings noted. Agree with plan as outlined above. Need assistance with home medications.     [address], MD

## 2017-08-01 NOTE — PROGRESS NOTES
Lovenox 40 mg sc daily ordered for DVTP  Will change order to lovenox 30 mg sc daily for CrCl of 27 ml/min  Will monitor and adjust dose if renal function improves   BMP ordered for morning of 8/2    YAMIL Boo

## 2017-08-01 NOTE — ED NOTES
Bedside and Verbal shift change report given to [address] [facility] (oncoming nurse) by Yen Ruelas (offgoing nurse). Report included the following information SBAR, ED Summary, Intake/Output, MAR and Recent Results. Pt on monitor x 3. Call bell in reach. Side rails up. Automatic bp not working, bps obtained manually. Per Dr. Lotus Cat, parameters for Nitro drip are to titrate to SBP less than 180, hold for SBP less than 140.

## 2017-08-01 NOTE — Clinical Note
Status[de-identified] Inpatient [101] Type of Bed: Telemetry [19] Inpatient Hospitalization Certified Necessary for the Following Reasons: 3. Patient receiving treatment that can only be provided in an inpatient setting (further clarification in H&P documentation) Admitting Diagnosis: CHF (congestive heart failure) ([facility] [[id number]] Admitting Physician: Jose Roberto Ferguson Attending Physician: Jose Roberto Ferguson Estimated Length of Stay: 2 Midnights Discharge Plan[de-identified] Home with Office Follow-up
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
## 2017-08-01 NOTE — H&P
Hospitalist Admission Note    NAME:  Maverick Brunner   :   1940   MRN:   [id number]     Date of admit: 2017    PCP: Mercy Rand NP    Assessment/Plan:     Hypertensive urgency POA increasing SOB and /120  Not taking BP meds in months  Resume coreg, norvasc  IV lasix  Wean nitroglycerine gtt off to NTP, use PRN labetalol  Need to be sure she gets her home medicines  PCP involved APS    ? Acute on chronic systolic CHF POA LEVF 64-66%  No edema on CXR and clinically no severe volume overload  Suspect the CHRISTINE is CHF in setting of HTN  Check BNP  IV diuretics  Cardiology following    Elevated troponin POA 0.18  CAD POA  ASA, lipitor, coreg  Serial labs  No chest pain    Stage 3 chronic kidney disease POA  Seems stable, watch with diuresis    Remote CVA with aphasia POA   Continue ASA, neuro exam stable    Hyperlipidemia POA continue statin    Left BBB POA chronic    DVT prophylaxis with lovenox    Code status full code      History     CC I was short of breath the past few days    HISTORY OF PRESENT ILLNESS: A 49-year-old [facility]   female with history of stroke in , which left her with significant   dysarthria. At baseline she lives with her son and uses a walker to get   around. She was sent in by her primary care doctor because of   shortness of breath for several days per the history she told me. Apparently the PCP recently filed an APS report because of concern for   neglect and the patient has not taken any medications at all in months. She says her breathing is worse. Her shortness of breath is worse   when she lies down, better when she sits up. She has not had any   significant lower extremity edema or chest pain. She says she gets   lightheaded when gets up and stands, but denies she gets more short   of breath with walking.  She has not had any fevers or cough,   abdominal pain, nausea, vomiting, diarrhea, weight loss or weight gain.     In the emergency room, she was hypertensive with a blood pressure of   180/120. She was seen by Cardiology and started on nitroglycerin drip. She was given Coreg. Her troponin was borderline elevated at 0.18. Her chest x-ray had cardiomegaly, but no clear edema. EKG had her   old left bundle-branch block. We were called to admit the patient with   hypertensive urgency. Past Medical History:   Diagnosis Date    Acute kidney failure ([facility] 3/15/5219    Acute systolic heart failure ([facility] 7/14/2016    CAD (coronary artery disease)     Heart failure ([facility]     Stroke ([facility] 2009    Stroke    Stroke [facility])        Social History Main Topics    Smoking status: Former Smoker     Quit date: 9/21/2009    Smokeless tobacco: Never Used    Alcohol use 0.0 oz/week     0 Standard drinks or equivalent per week      Comment: Very little    Drug use: No    Sexual activity: Not Asked       Family History   Problem Relation Age of Onset    Coronary Artery Disease Other      grandmother age 76   1 son healthy    Allergies   Allergen Reactions    Decadron [Dexamethasone Sodium Phosphate] Hives    Pcn [Penicillins] Hives        Prior to Admission medications    Medication Sig Start Date End Date Taking? Authorizing Provider   amLODIPine (NORVASC) 2.5 mg tablet Take 1 Tab by mouth daily. 3/14/17   Ricardo Drummond MD   predniSONE (DELTASONE) 10 mg tablet 3 tabs daily for 2 days   2 tabs daily for 2 days   1 tab   daily for 2 days  Patient not taking: Reported on 3/24/2017 3/14/17   Ricardo Drummond MD   furosemide (LASIX) 40 mg tablet Take 1 Tab by mouth two (2) times a day. One pill daily 3/14/17   Ricardo Drummond MD   carvedilol (COREG) 6.25 mg tablet Take 1 Tab by mouth two (2) times a day. 8/3/16   ROME Biswas   isosorbide mononitrate ER (IMDUR) 30 mg tablet Take 1 Tab by mouth every morning.  8/3/16   ROME Biswas   albuterol (PROVENTIL HFA, VENTOLIN HFA, PROAIR HFA) 90 mcg/actuation inhaler Take 2 Puffs by inhalation every four (4) hours as needed for Wheezing. 7/21/16   KESHIA San   aspirin delayed-release 81 mg tablet Take 1 Tab by mouth daily.  7/18/16   August Delacruz MD   atorvastatin (LIPITOR) 40 mg tablet TAKE ONE TABLET BY MOUTH EVERY DAY  **PATIENT MUST BE SEEN FOR MORE REFILLS** 4/29/12   Jocelyne Delatorre MD       Review of symptoms:     POSITIVE= Bold  Negative = not bold  General:  fever, chills, sweats  Eyes:    blurred vision, eye pain, double vision  ENT:    Coryza, sore throat, trouble swallowing  Respiratory:   cough, sputum, SOB  Cardiology:   chest pain, orthopnea, edema  Gastrointestinal:  abdominal pain , N/V, diarrhea, constipation, melena or BRBPR  Genitourinary:  Urgency, dysuria, hematuria  Muskuloskeletal :  Joint redness, swelling or acute joint pain, myalgias  Hematology:  easy bruising, nose or gum bleeding  Dermatological: rash, ulceration  Endocrine:   Polyuria or polydipsia, heat or hold intolerance  Neurological:  Headache, focal motor or sensory changes     Speech difficulties, memory loss  Psychological: depression, agitation      Objective:   VITALS:    Patient Vitals for the past 24 hrs:   Temp Pulse Resp BP SpO2   08/01/17 1845 - 76 24 141/75 97 %   08/01/17 1830 - 81 18 149/81 96 %   08/01/17 1818 - 78 - 136/82 -   08/01/17 1815 - 88 23 136/82 95 %   08/01/17 1811 - 80 19 141/74 96 %   08/01/17 1745 - 98 19 155/83 97 %   08/01/17 1730 - 99 17 (!) 175/92 96 %   08/01/17 1715 - 98 21 (!) 182/94 96 %   08/01/17 1701 - 86 - 192/90 -   08/01/17 1700 - 92 20 192/90 97 %   08/01/17 1645 - 97 22 (!) 186/95 96 %   08/01/17 1630 - 81 16 168/83 97 %   08/01/17 1618 - 87 19 177/84 97 %   08/01/17 1600 - 90 20 (!) 164/93 97 %   08/01/17 1547 - 90 20 181/90 98 %   08/01/17 1545 - 89 - (!) 194/98 -   08/01/17 1530 - 90 22 - 97 %   08/01/17 1515 - 87 22 - 97 %   08/01/17 1514 - - - (!) 190/120 -   08/01/17 1500 - 84 18 - 96 %   08/01/17 1405 - 90 (!) 32 (!) 180/120 97 %   08/01/17 1304 98.4 °F (36.9 °C) 94 27 - 97 % Temp (24hrs), Av.4 °F (36.9 °C), Min:98.4 °F (36.9 °C), Max:98.4 °F (36.9 °C)      O2 Device: Room air    PHYSICAL EXAM:   General:    Alert, cooperative in no distress     HEENT: Normocephalic, atraumatic    PERRL, EOMI  Sclera no icterus    Nasal mucosa without masses or discharge  Hearing intact to voice    Oropharynx without erythema or exudate  No thrush  Pink MM  Neck:  No meningismus, trachea midline, no carotid bruits     Thyroid not enlarged, no nodules or tenderness  Lungs:   Decreased BS bilaterally. No wheezing or rales    No accessory muscle use or retractions. Heart:   Regular rate and rhythm,  no murmur or gallop. No LE edema  Abdomen:   Soft, non-tender. Not distended. Bowel sounds normal.     No masses, No Hepatosplenomegaly, No Rebound or guarding  Lymph nodes: No cervical or inguinal PRINCESS  Musculoskeletal:  No Joint swelling, erythema, warmth.  No Cyanosis or clubbing  Skin:      No rashes    Not Jaundiced   No nodules or thickening    Capillary refill normal  Neurologic: Alert and oriented X 3, follows commands, speech dysarthric, hard to understand at times    Cranial nerves 2 to 12 intact    Symmetric motor strength bilaterally       LAB DATA REVIEWED:    Recent Results (from the past 12 hour(s))   EKG, 12 LEAD, INITIAL    Collection Time: 17 12:56 PM   Result Value Ref Range    Ventricular Rate 90 BPM    Atrial Rate 90 BPM    P-R Interval 142 ms    QRS Duration 132 ms    Q-T Interval 412 ms    QTC Calculation (Bezet) 504 ms    Calculated P Axis 57 degrees    Calculated R Axis -22 degrees    Calculated T Axis 117 degrees    Diagnosis       ** Poor data quality, interpretation may be adversely affected  Normal sinus rhythm  Possible Left atrial enlargement  Left bundle branch block  Abnormal ECG    Confirmed by Luis Enrique Min MD, Isidra Tyler ([id number]) on 2017 6:42:11 PM     CBC WITH AUTOMATED DIFF    Collection Time: 17  1:42 PM   Result Value Ref Range    WBC 6.0 3.6 - 11.0 K/uL RBC 4.82 3.80 - 5.20 M/uL    HGB 12.5 11.5 - 16.0 g/dL    HCT 37.4 35.0 - 47.0 %    MCV 77.6 (L) 80.0 - 99.0 FL    MCH 25.9 (L) 26.0 - 34.0 PG    MCHC 33.4 30.0 - 36.5 g/dL    RDW 13.5 11.5 - 14.5 %    PLATELET 012 158 - 941 K/uL    NEUTROPHILS 63 32 - 75 %    LYMPHOCYTES 31 12 - 49 %    MONOCYTES 4 (L) 5 - 13 %    EOSINOPHILS 2 0 - 7 %    BASOPHILS 0 0 - 1 %    ABS. NEUTROPHILS 3.8 1.8 - 8.0 K/UL    ABS. LYMPHOCYTES 1.8 0.8 - 3.5 K/UL    ABS. MONOCYTES 0.2 0.0 - 1.0 K/UL    ABS. EOSINOPHILS 0.1 0.0 - 0.4 K/UL    ABS.  BASOPHILS 0.0 0.0 - 0.1 K/UL   METABOLIC PANEL, BASIC    Collection Time: 08/01/17  1:42 PM   Result Value Ref Range    Sodium 141 136 - 145 mmol/L    Potassium 3.7 3.5 - 5.1 mmol/L    Chloride 105 97 - 108 mmol/L    CO2 30 21 - 32 mmol/L    Anion gap 6 5 - 15 mmol/L    Glucose 96 65 - 100 mg/dL    BUN 19 6 - 20 MG/DL    Creatinine 1.22 (H) 0.55 - 1.02 MG/DL    BUN/Creatinine ratio 16 12 - 20      GFR est AA 52 (L) >60 ml/min/1.73m2    GFR est non-AA 43 (L) >60 ml/min/1.73m2    Calcium 9.3 8.5 - 10.1 MG/DL   TROPONIN I    Collection Time: 08/01/17  1:42 PM   Result Value Ref Range    Troponin-I, Qt. 0.18 (H) <0.05 ng/mL   NT-PRO BNP    Collection Time: 08/01/17  1:42 PM   Result Value Ref Range    NT pro-BNP 6057 (H) 0 - 450 PG/ML   URINALYSIS W/ RFLX MICROSCOPIC    Collection Time: 08/01/17  1:42 PM   Result Value Ref Range    Color YELLOW/STRAW      Appearance CLEAR CLEAR      Specific gravity 1.012 1.003 - 1.030      pH (UA) 6.0 5.0 - 8.0      Protein 30 (A) NEG mg/dL    Glucose NEGATIVE  NEG mg/dL    Ketone NEGATIVE  NEG mg/dL    Bilirubin NEGATIVE  NEG      Blood NEGATIVE  NEG      Urobilinogen 0.2 0.2 - 1.0 EU/dL    Nitrites NEGATIVE  NEG      Leukocyte Esterase NEGATIVE  NEG      WBC 0-4 0 - 4 /hpf    RBC 0-5 0 - 5 /hpf    Epithelial cells FEW FEW /lpf    Bacteria NEGATIVE  NEG /hpf    Hyaline cast 0-2 0 - 5 /lpf       EKG as read by me shows NSR rate 90, left BBB(Old)    CXR read by radiology and reviewed by myself shows FINDINGS:   PA and lateral views of the chest demonstrate a stable  cardiomediastinal silhouette and clear lungs bilaterally. There is chronic  cardiomegaly. The visualized osseous structures are unremarkable. IMPRESSION: No acute process    Inpatient is warranted for this patient because they presents with  SOB  I have a high level of concern for HTN urgency, Systolic CHF, elevated troponin  I anticipate the stay in the hospital will span at least 2 midnights. My assessment of the clinical condition and my plan of care is as outlined above    I saw the patient personally, took a history and did a complete physical exam at the bedside. I performed complex decision making in coming up with a diagnostic and treatment plan for the patient. I reviewed the patient's past medical records, current laboratory and radiology results, and actual Xray films/EKG. I have also discussed this case with the involved ED physician.     Care Plan discussed with:    Patient, ED Doc    Risk of deterioration:  High    Total Time Coordinating Admission: 65    minutes    Total Critical Care Time:         Gala Fields MD

## 2017-08-01 NOTE — ED TRIAGE NOTES
Assumed care of pt from EMS. Pt A&O x 4. EMS were called to Pt's doctors office (Dr. Saud Benjamin) for \"Shortness of breath\". According to EMS, Dr. Saud Benjamin also reported concerns and thought APS may need to be contacted. EMS stated Dr. Saud Benjamin was going to call in her report to ER doc. Pt on monitor x 3.

## 2017-08-01 NOTE — IP AVS SNAPSHOT
[address] [facility] 
[phone] Patient: Skinny Freire MRN: [id number] :1940 You are allergic to the following Allergen Reactions Decadron (Dexamethasone Sodium Phosphate) Hives Pcn (Penicillins) Hives Recent Documentation Height Weight BMI Smoking Status 1.499 m 64.2 kg 28.58 kg/m2 Former Smoker Emergency Contacts Name Discharge Info Relation Home Work Mobile Refused,Refused N/A  AT THIS TIME [6] CourtneyWillis martinez  Child [2] [phone] Chivo Valdez DISCHARGE CAREGIVER [3] Brother [24]   [phone] Chivo Valdez  Other Relative [6] [phone] About your hospitalization You were admitted on:  2017 You last received care in the:  [facility]GR[facility]ARE You were discharged on:  2017 Unit phone number:  [phone] Why you were hospitalized Your primary diagnosis was:  Not on File Your diagnoses also included:  Hypertensive Urgency, Elevated Troponin, Other Left Bundle Branch Block, Chf (Congestive Heart Failure) (Hcc) Providers Seen During Your Hospitalizations Provider Role Specialty Primary office phone [employer], DO Attending Provider Emergency Medicine [phone] Yumiko Fuchs MD Attending Provider Hospitalist [phone] Dee Dee Remy MD Attending Provider Hospitalist [phone] Your Primary Care Physician (PCP) Primary Care Physician Office Phone Office Fax Ran Philip [phone] Follow-up Information Follow up With Details Comments Contact Info Pauline Moore MD Schedule an appointment as soon as possible for a visit in 2 weeks Bello will make patient's follow up appointment. [address] [facility] 
[phone] Rosa Corbin, AKBAR Monsalve will make patient's follow up appointment. [address] [phone] [address] [address] ARMENTA [address] 
[phone] Current Discharge Medication List  
  
START taking these medications Dose & Instructions Dispensing Information Comments Morning Noon Evening Bedtime  
 lisinopril 2.5 mg tablet Commonly known as:  Dilip Little Your last dose was: Your next dose is:    
   
   
 Dose:  2.5 mg Take 1 Tab by mouth daily. Quantity:  30 Tab Refills:  1  
     
   
   
   
  
 oxyCODONE-acetaminophen 5-325 mg per tablet Commonly known as:  PERCOCET Your last dose was: Your next dose is:    
   
   
 Dose:  1 Tab Take 1 Tab by mouth every six (6) hours as needed. Max Daily Amount: 4 Tabs. Quantity:  20 Tab Refills:  0 CONTINUE these medications which have CHANGED Dose & Instructions Dispensing Information Comments Morning Noon Evening Bedtime  
 carvedilol 12.5 mg tablet Commonly known as:  Jay Elam What changed:   
- medication strength 
- how much to take Your last dose was: Your next dose is:    
   
   
 Dose:  12.5 mg Take 1 Tab by mouth two (2) times a day. Quantity:  60 Tab Refills:  1  
     
   
   
   
  
 furosemide 40 mg tablet Commonly known as:  LASIX What changed:  when to take this Your last dose was: Your next dose is:    
   
   
 Dose:  40 mg Take 1 Tab by mouth daily. One pill daily Quantity:  30 Tab Refills:  1 CONTINUE these medications which have NOT CHANGED Dose & Instructions Dispensing Information Comments Morning Noon Evening Bedtime  
 albuterol 90 mcg/actuation inhaler Commonly known as:  PROVENTIL HFA, VENTOLIN HFA, PROAIR HFA Your last dose was: Your next dose is:    
   
   
 Dose:  2 Puff Take 2 Puffs by inhalation every four (4) hours as needed for Wheezing. Quantity:  1 Inhaler Refills:  1  
     
   
   
   
  
 amLODIPine 2.5 mg tablet Commonly known as:  Luis Dory Your last dose was: Your next dose is:    
   
   
 Dose:  2.5 mg Take 1 Tab by mouth daily. Quantity:  30 Tab Refills:  1  
     
   
   
   
  
 aspirin delayed-release 81 mg tablet Your last dose was: Your next dose is:    
   
   
 Dose:  81 mg Take 1 Tab by mouth daily. Quantity:  100 Tab Refills:  1  
     
   
   
   
  
 atorvastatin 40 mg tablet Commonly known as:  LIPITOR Your last dose was: Your next dose is: TAKE ONE TABLET BY MOUTH EVERY DAY  **PATIENT MUST BE SEEN FOR MORE REFILLS** Quantity:  30 Tab Refills:  0  
     
   
   
   
  
 isosorbide mononitrate ER 30 mg tablet Commonly known as:  IMDUR Your last dose was: Your next dose is:    
   
   
 Dose:  30 mg Take 1 Tab by mouth every morning. Quantity:  30 Tab Refills:  12 STOP taking these medications   
 predniSONE 10 mg tablet Commonly known as:  Graydon Taberg Where to Get Your Medications Information on where to get these meds will be given to you by the nurse or doctor. ! Ask your nurse or doctor about these medications  
  amLODIPine 2.5 mg tablet  
 carvedilol 12.5 mg tablet  
 furosemide 40 mg tablet  
 lisinopril 2.5 mg tablet  
 oxyCODONE-acetaminophen 5-325 mg per tablet Discharge Instructions HOSPITALIST DISCHARGE INSTRUCTIONS 
 
NAME: Jolanta Owens :  1940 MRN:  [id number] Date/Time:  2017 8:13 AM 
 
ADMIT DATE: 2017 DISCHARGE DATE: 2017 Attending Physician: Jason Choi MD 
 
DISCHARGE DIAGNOSIS: 
Hypertensive Urgency, CHF, CAD. CKD, Hyperlipidemia, Increased Troponin Medications: Per above medication reconciliation. Pain Management: per above medications Recommended diet: Cardiac Diet Recommended activity: Activity as tolerated Wound care: None Indwelling devices:  None Supplemental Oxygen: None Required Lab work: Per SNF routine Glucose management:  None Code status: Full CHF specific discharge instructions Weight: 
· Daily weights, Notify your Doctor if Wt gain of 3 lb in a day or 5 lb in a week · Daily Intake & Output Diet : 
· Low salt cardiac diet · Fluid restriction of 1500 ml daily Outside physician follow up: Follow-up Information Follow up With Details Comments Contact Info Jovan Estrada MD Schedule an appointment as soon as possible for a visit in 2 weeks  [address] [place] [address] 
[phone] Lilly Suárez NP   [address] [phone] F/U PCP 
F/U Cardiology Skilled nursing facility/ SNF MD responsible for above on discharge. Information obtained by : 
I understand that if any problems occur once I am at home I am to contact my physician. I understand and acknowledge receipt of the instructions indicated above. Physician's or R.N.'s Signature                                                                  Date/Time Patient or Repres Discharge Instructions Attachments/References HEART FAILURE ZONES: GENERAL INFO (ENGLISH) Discharge Orders None [employer] Announcement We are excited to announce that we are making your provider's discharge notes available to you in Air[employer].   You will see these notes when they are completed and signed by the physician that discharged you from your recent hospital stay. If you have any questions or concerns about any information you see in Conjectur, please call the Health Information Department where you were seen or reach out to your Primary Care Provider for more information about your plan of care. Introducing [facility]! [facility] introduces Conjectur patient portal. Now you can access parts of your medical record, email your doctor's office, and request medication refills online. 1. In your internet browser, go to [email or web address] 2. Click on the First Time User? Click Here link in the Sign In box. You will see the New Member Sign Up page. 3. Enter your Conjectur Access Code exactly as it appears below. You will not need to use this code after youve completed the sign-up process. If you do not sign up before the expiration date, you must request a new code. · Conjectur Access Code: [email or web address] Expires: 11/1/2017  3:37 PM 
 
4. Enter the last four digits of your Social Security Number (xxxx) and Date of Birth (mm/dd/yyyy) as indicated and click Submit. You will be taken to the next sign-up page. 5. Create a Conjectur ID. This will be your Conjectur login ID and cannot be changed, so think of one that is secure and easy to remember. 6. Create a Conjectur password. You can change your password at any time. 7. Enter your Password Reset Question and Answer. This can be used at a later time if you forget your password. 8. Enter your e-mail address. You will receive e-mail notification when new information is available in [address] 9. Click Sign Up. You can now view and download portions of your medical record. 10. Click the Download Summary menu link to download a portable copy of your medical information.  
 
If you have questions, please visit the Frequently Asked Questions section of [employer]. Remember, MyChart is NOT to be used for urgent needs. For medical emergencies, dial 911. Now available from your iPhone and Android! General Information Please provide this summary of care documentation to your next provider. Patient Signature:  ____________________________________________________________ Date:  ____________________________________________________________  
  
Dara Jimenez Provider Signature:  ____________________________________________________________ Date:  ____________________________________________________________ More Information Learning About Heart Failure Zones What are heart failure zones? Heart failure zones give you an easy way to see changes in your heart failure symptoms. They also tell you when you need to get help. Check every day to see which zone you are in. Green zone. You are doing well. This is where you want to be. · Your weight is stable. This means it is not going up or down. · You breathe easily. · You are sleeping well. You are able to lie flat without shortness of breath. · You can do your usual activities. Yellow zone. Be careful. Your symptoms are changing. Call your doctor. · You have new or increased shortness of breath. · You are dizzy or lightheaded, or you feel like you may faint. · You have sudden weight gain, such as more than 2 to 3 pounds in a day or 5 pounds in a week. (Your doctor may suggest a different range of weight gain.) · You have increased swelling in your legs, ankles, or feet. · You are so tired or weak that you cannot do your usual activities. · You are not sleeping well. Shortness of breath wakes you up at night. You need extra pillows. Your doctor's name: ____________________________________________________________ Your doctor's contact information: _________________________________________________ Red zone. This is an emergency. Call 911. You have symptoms of sudden heart failure, such as: 
· You have severe trouble breathing. · You cough up pink, foamy mucus. · You have a new irregular or fast heartbeat. You have symptoms of a heart attack. These may include: · Chest pain or pressure, or a strange feeling in the chest. 
· Sweating. · Shortness of breath. · Nausea or vomiting. · Pain, pressure, or a strange feeling in the back, neck, jaw, or upper belly or in one or both shoulders or arms. · Lightheadedness or sudden weakness. · A fast or irregular heartbeat. If you have symptoms of a heart attack: After you call 911, the  may tell you to chew 1 adult-strength or 2 to 4 low-dose aspirin. Wait for an ambulance. Do not try to drive yourself. Follow-up care is a key part of your treatment and safety. Be sure to make and go to all appointments, and call your doctor if you are having problems. It's also a good idea to know your test results and keep a list of the medicines you take. Where can you learn more? Go to http://kerri-saba.info/. Enter T174 in the search box to learn more about \"Learning About Heart Failure Zones. \" Current as of: February 23, 2017 Content Version: 11.3 © 7587-6734 [employer]. Care instructions adapted under license by [employer] (which disclaims liability or warranty for this information). If you have questions about a medical condition or this instruction, always ask your healthcare professional. [place] [address] any warranty or liability for your use of this information.

## 2017-08-01 NOTE — ED NOTES
Pt's son called and requested an update on pt and plan of care. Pt's son updated that pt is being admitted, and given assigned room number at this time. Pt's son, Taiwo Giordano, can be reached at [phone] or [phone]. Mr. Jonathon Thacker reports to this RN that he lives with his mother, is primary caregiver, and POA.

## 2017-08-02 LAB
ALBUMIN SERPL BCP-MCNC: 3.2 G/DL (ref 3.5–5)
ALBUMIN/GLOB SERPL: 0.9 {RATIO} (ref 1.1–2.2)
ALP SERPL-CCNC: 105 U/L (ref 45–117)
ALT SERPL-CCNC: 10 U/L (ref 12–78)
ANION GAP BLD CALC-SCNC: 3 MMOL/L (ref 5–15)
AST SERPL W P-5'-P-CCNC: 15 U/L (ref 15–37)
BASOPHILS # BLD AUTO: 0 K/UL (ref 0–0.1)
BASOPHILS # BLD: 0 % (ref 0–1)
BILIRUB SERPL-MCNC: 0.5 MG/DL (ref 0.2–1)
BNP SERPL-MCNC: 307 PG/ML (ref 0–100)
BUN SERPL-MCNC: 21 MG/DL (ref 6–20)
BUN/CREAT SERPL: 17 (ref 12–20)
CALCIUM SERPL-MCNC: 9 MG/DL (ref 8.5–10.1)
CHLORIDE SERPL-SCNC: 106 MMOL/L (ref 97–108)
CHOLEST SERPL-MCNC: 193 MG/DL
CK MB CFR SERPL CALC: 1.1 % (ref 0–2.5)
CK MB SERPL-MCNC: 1.8 NG/ML (ref 5–25)
CK SERPL-CCNC: 157 U/L (ref 26–192)
CO2 SERPL-SCNC: 32 MMOL/L (ref 21–32)
CREAT SERPL-MCNC: 1.25 MG/DL (ref 0.55–1.02)
EOSINOPHIL # BLD: 0.1 K/UL (ref 0–0.4)
EOSINOPHIL NFR BLD: 2 % (ref 0–7)
ERYTHROCYTE [DISTWIDTH] IN BLOOD BY AUTOMATED COUNT: 13.6 % (ref 11.5–14.5)
GLOBULIN SER CALC-MCNC: 3.7 G/DL (ref 2–4)
GLUCOSE SERPL-MCNC: 116 MG/DL (ref 65–100)
HCT VFR BLD AUTO: 34.8 % (ref 35–47)
HDLC SERPL-MCNC: 56 MG/DL
HDLC SERPL: 3.4 {RATIO} (ref 0–5)
HGB BLD-MCNC: 11.4 G/DL (ref 11.5–16)
LDLC SERPL CALC-MCNC: 116.2 MG/DL (ref 0–100)
LIPID PROFILE,FLP: ABNORMAL
LYMPHOCYTES # BLD AUTO: 30 % (ref 12–49)
LYMPHOCYTES # BLD: 1.5 K/UL (ref 0.8–3.5)
MCH RBC QN AUTO: 25.9 PG (ref 26–34)
MCHC RBC AUTO-ENTMCNC: 32.8 G/DL (ref 30–36.5)
MCV RBC AUTO: 78.9 FL (ref 80–99)
MONOCYTES # BLD: 0.4 K/UL (ref 0–1)
MONOCYTES NFR BLD AUTO: 7 % (ref 5–13)
NEUTS SEG # BLD: 3 K/UL (ref 1.8–8)
NEUTS SEG NFR BLD AUTO: 61 % (ref 32–75)
NRBC # BLD: 0.04 K/UL (ref 0–0.01)
NRBC BLD-RTO: 0.8 PER 100 WBC
PLATELET # BLD AUTO: 178 K/UL (ref 150–400)
POTASSIUM SERPL-SCNC: 3.8 MMOL/L (ref 3.5–5.1)
PROT SERPL-MCNC: 6.9 G/DL (ref 6.4–8.2)
RBC # BLD AUTO: 4.41 M/UL (ref 3.8–5.2)
SODIUM SERPL-SCNC: 141 MMOL/L (ref 136–145)
TRIGL SERPL-MCNC: 104 MG/DL (ref ?–150)
TROPONIN I SERPL-MCNC: 0.15 NG/ML
VLDLC SERPL CALC-MCNC: 20.8 MG/DL
WBC # BLD AUTO: 4.9 K/UL (ref 3.6–11)
WBC NRBC COR # BLD: ABNORMAL 10*3/UL

## 2017-08-02 PROCEDURE — 74011250636 HC RX REV CODE- 250/636: Performed by: INTERNAL MEDICINE

## 2017-08-02 PROCEDURE — 36415 COLL VENOUS BLD VENIPUNCTURE: CPT | Performed by: NURSE PRACTITIONER

## 2017-08-02 PROCEDURE — 80061 LIPID PANEL: CPT | Performed by: NURSE PRACTITIONER

## 2017-08-02 PROCEDURE — 82550 ASSAY OF CK (CPK): CPT | Performed by: INTERNAL MEDICINE

## 2017-08-02 PROCEDURE — 65660000000 HC RM CCU STEPDOWN

## 2017-08-02 PROCEDURE — 77030027138 HC INCENT SPIROMETER -A

## 2017-08-02 PROCEDURE — 83880 ASSAY OF NATRIURETIC PEPTIDE: CPT | Performed by: NURSE PRACTITIONER

## 2017-08-02 PROCEDURE — 97161 PT EVAL LOW COMPLEX 20 MIN: CPT

## 2017-08-02 PROCEDURE — 85025 COMPLETE CBC W/AUTO DIFF WBC: CPT | Performed by: INTERNAL MEDICINE

## 2017-08-02 PROCEDURE — 84484 ASSAY OF TROPONIN QUANT: CPT | Performed by: INTERNAL MEDICINE

## 2017-08-02 PROCEDURE — 97165 OT EVAL LOW COMPLEX 30 MIN: CPT | Performed by: OCCUPATIONAL THERAPIST

## 2017-08-02 PROCEDURE — 74011250637 HC RX REV CODE- 250/637: Performed by: NURSE PRACTITIONER

## 2017-08-02 PROCEDURE — 74011250637 HC RX REV CODE- 250/637: Performed by: INTERNAL MEDICINE

## 2017-08-02 PROCEDURE — 80053 COMPREHEN METABOLIC PANEL: CPT | Performed by: INTERNAL MEDICINE

## 2017-08-02 PROCEDURE — 97116 GAIT TRAINING THERAPY: CPT

## 2017-08-02 RX ORDER — SODIUM CHLORIDE 0.9 % (FLUSH) 0.9 %
SYRINGE (ML) INJECTION
Status: DISPENSED
Start: 2017-08-02 | End: 2017-08-03

## 2017-08-02 RX ORDER — LISINOPRIL 5 MG/1
2.5 TABLET ORAL DAILY
Status: DISCONTINUED | OUTPATIENT
Start: 2017-08-03 | End: 2017-08-04 | Stop reason: HOSPADM

## 2017-08-02 RX ADMIN — CARVEDILOL 12.5 MG: 12.5 TABLET, FILM COATED ORAL at 11:13

## 2017-08-02 RX ADMIN — Medication 10 ML: at 21:39

## 2017-08-02 RX ADMIN — Medication 10 ML: at 18:32

## 2017-08-02 RX ADMIN — ENOXAPARIN SODIUM 30 MG: 40 INJECTION SUBCUTANEOUS at 11:18

## 2017-08-02 RX ADMIN — ASPIRIN 81 MG: 81 TABLET, COATED ORAL at 11:14

## 2017-08-02 RX ADMIN — AMLODIPINE BESYLATE 2.5 MG: 2.5 TABLET ORAL at 11:13

## 2017-08-02 RX ADMIN — ATORVASTATIN CALCIUM 40 MG: 40 TABLET, FILM COATED ORAL at 21:39

## 2017-08-02 RX ADMIN — CARVEDILOL 12.5 MG: 12.5 TABLET, FILM COATED ORAL at 18:29

## 2017-08-02 NOTE — PROGRESS NOTES
TRANSFER - IN REPORT:    Verbal report received from [facility]  RN(name) on Ruel Michelle  being received from ED(unit) for routine progression of care      Report consisted of patients Situation, Background, Assessment and   Recommendations(SBAR). Information from the following report(s) SBAR, Kardex, ED Summary, Recent Results and Med Rec Status was reviewed with the receiving nurse. Opportunity for questions and clarification was provided. Assessment completed upon patients arrival to unit and care assumed.

## 2017-08-02 NOTE — PROGRESS NOTES
Spiritual Care Partner Volunteer visited patient in [address] on 8/2/17.   Documented by:  Franky Salinas [address] [facility] Nathaniel ([id number])

## 2017-08-02 NOTE — PROGRESS NOTES
Problem: Mobility Impaired (Adult and Pediatric)  Goal: *Acute Goals and Plan of Care (Insert Text)  Physical Therapy Goals  Initiated 8/2/2017  1. Patient will move from supine to sit and sit to supine in bed with minimal assistance/contact guard assist within 4 day(s). 2. Patient will transfer from bed to chair and chair to bed with independence using the least restrictive device within 4 day(s). 3. Patient will perform sit to stand with independence within 4 day(s). 4. Patient will ambulate with modified independence for 100 feet with the least restrictive device within 4 day(s). 5. Patient will ascend/descend 3 stairs with bilateral handrail(s) with independence within 4 day(s). PHYSICAL THERAPY EVALUATION  Patient: Enriqueta Mccall (58 y.o. female)  Date: 8/2/2017  Primary Diagnosis: CHF (congestive heart failure) (HCC)  Hypertensive urgency        Precautions:  Fall, Bed Alarm      ASSESSMENT :  Based on the objective data described below, the patient presents with decreased activity tolerance, CHRISTINE, and generalized weakness. Pt was seen with PT/OT in order to maximize functional outcomes. Pt was received sitting in bed side chair post OT transferring pt bed<>chair (see OT note). OT filled PT in on PLOF and living conditions as pt appeared extremely dyspneic. Pt lives in one story home with son and daughter in law and 3 steps to enter home with DME including RW, rollator (which she mainly uses but does not sit on it), and shower chair. Both son and daughter in law work during the day. Prior to mobilization pt SpO2 levels were assessed and found to be WNL. Pt was able to transfer sit<>stand/stand<>sit and ambulate approx 15 ft with CGA (see description below). Upon return to bed side chair pt appeared to have increased dyspnea with SpO2 at 88%, however post deep breaths (with PLB)  Increased levels >90% and remained through rest of therapy.  Pt was able to complete seated therex with no exacerbation of dyspnea. Pt verbalized post therapy that she was not fatigued just SOB. Pt was left sitting in bed side chair with all needs met and nursing in room. Provided pt with incentive spirometry and required verbal cues for proper technique. Pt would benefit from continued therapy (with short bouts of activity and long rest breaks to maximize functional performance) in order to address functional deficits above. PT recommends d/c to SNF pending further success with acute care PT. Patients rehabilitation potential is considered to be Fair  Factors which may influence rehabilitation potential include:   [ ]         None noted  [ ]         Mental ability/status  [X]         Medical condition  [X]         Home/family situation and support systems  [ ]         Safety awareness  [ ]         Pain tolerance/management  [ ]         Other:        PLAN :  Recommendations and Planned Interventions:  [X]           Bed Mobility Training             [X]    Neuromuscular Re-Education  [X]           Transfer Training                   [ ]    Orthotic/Prosthetic Training  [X]           Gait Training                         [ ]    Modalities  [X]           Therapeutic Exercises           [ ]    Edema Management/Control  [X]           Therapeutic Activities            [X]    Patient and Family Training/Education  [ ]           Other (comment):     Frequency/Duration: Patient will be followed by physical therapy  4 times a week to address goals. Discharge Recommendations: Skilled Nursing Facility  Further Equipment Recommendations for Discharge: TBD at rehab        SUBJECTIVE:   Patient stated I haven't smoked in 10 years.       OBJECTIVE DATA SUMMARY:   HISTORY:    Past Medical History:   Diagnosis Date    Acute kidney failure ([facility] 2/85/7485    Acute systolic heart failure ([facility] [facility] 7/14/2016    CAD (coronary artery disease)      Heart failure ([facility]      Stroke [facility]) 2009     Stroke    Stroke [facility])     History reviewed.  No pertinent surgical history. Prior Level of Function/Home Situation: Piero via RW, rollator (main device), assist with IADL's, no driving, only goes to store 1x/month   Personal factors and/or comorbidities impacting plan of care:      Home Situation  Home Environment: Private residence  # Steps to Enter: 3  Rails to Enter: Yes  Hand Rails : Bilateral  One/Two Story Residence: One story  Living Alone: No  Support Systems: Child(nicci) (pt lives with son)  Patient Expects to be Discharged to[de-identified] Private residence  Current DME Used/Available at Home: Ginette Augustine, rollator, [address] chair  Tub or Shower Type: Tub/Shower combination     EXAMINATION/PRESENTATION/DECISION MAKING:   Critical Behavior:  Neurologic State: Alert  Orientation Level: Oriented to person, Oriented to place, Disoriented to time, Oriented to situation  Cognition: Follows commands  Safety/Judgement: Awareness of environment, Decreased awareness of need for safety, Fall prevention, Insight into deficits  Hearing: Auditory  Auditory Impairment: None  Range Of Motion:  AROM: Generally decreased, functional           PROM: Generally decreased, functional           Strength:    Strength: Generally decreased, functional   Hip flexion R/L: 4/5  Knee extension R/L: 3+/5  Knee flexion R/L: 4/5          Coordination:  Coordination: Generally decreased, functional (due to effort of breathing)  Vision:   Acuity: Able to read clock/calendar on wall without difficulty  Corrective Lenses: Glasses  Functional Mobility:  Bed Mobility:     Supine to Sit: Moderate assistance; Additional time;Assist x1 (due to decreased strength and effort of breathing)     Scooting: Contact guard assistance; Additional time;Assist x1  Transfers:  Sit to Stand: Contact guard assistance; Additional time;Assist x1 (HHA)  Stand to Sit: Contact guard assistance   -   Balance:   Sitting: Intact  Standing: Intact; With support  Ambulation/Gait Training:  Distance (ft): 15 Feet (ft)  Assistive Device: Gait belt;Walker, rolling  Ambulation - Level of Assistance: Contact guard assistance   Observed gait abnormalities: Narrow AUDRA, decreased step length, decreased step clearance, slowed gait velocity/paz, shuffling gait pattern  Physiological ambulation responses: linearly increasing CHRISTINE         Therapeutic Exercises:   Seated marching, LAQ, heel raises      Functional Measure:  Tinetti test:      Sitting Balance: 1  Arises: 1  Attempts to Rise: 2  Immediate Standing Balance: 2  Standing Balance: 1  Nudged: 1  Eyes Closed: 1  Turn 360 Degrees - Continuous/Discontinuous: 1  Turn 360 Degrees - Steady/Unsteady: 1  Sitting Down: 2  Balance Score: 13  Indication of Gait: 1  R Step Length/Height: 1  L Step Length/Height: 1  R Foot Clearance: 1  L Foot Clearance: 1  Step Symmetry: 1  Step Continuity: 1  Path: 1  Trunk: 2  Walking Time: 0  Gait Score: 10  Total Score: 23         Tinetti Test and G-code impairment scale:  Percentage of Impairment CH     0%    CI     1-19% CJ     20-39% CK     40-59% CL     60-79% CM     80-99% CN      100%   Tinetti  Score 0-28 28 23-27 17-22 12-16 6-11 1-5 0          Tinetti Tool Score Risk of Falls  <19 = High Fall Risk  19-24 = Moderate Fall Risk  25-28 = Low Fall Risk  Tinetti ME. Performance-Oriented Assessment of Mobility Problems in Elderly Patients. Tran 66; [id number]. (Scoring Description: PT Bulletin Feb. 10, 1993)     Older adults: Debby Hughes et al, 2009; n = 1000 [facility] elderly evaluated with ABC, DARIEL, ADL, and IADL)  · Mean DARIEL score for males aged 69-68 years = 26.21(3.40)  · Mean DARIEL score for females age 69-68 years = 25.16(4.30)  · Mean DARIEL score for males over 80 years = 23.29(6.02)  · Mean DARIEL score for females over 80 years = 17.20(8.32)            G codes: In compliance with CMSs Claims Based Outcome Reporting, the following G-code set was chosen for this patient based on their primary functional limitation being treated:      The outcome measure chosen to determine the severity of the functional limitation was the Tinetti with a score of 23/28 which was correlated with the impairment scale. · Mobility - Walking and Moving Around:               - CURRENT STATUS:    CI - 1%-19% impaired, limited or restricted               - GOAL STATUS:           CH - 0% impaired, limited or restricted               - D/C STATUS:                       ---------------To be determined---------------         Based on the above components, the patient evaluation is determined to be of the following complexity level: LOW      Pain:  Pain Scale 1: Numeric (0 - 10)  Pain Intensity 1: 0              Activity Tolerance:   Fair due to respiratory impairments/CHRISTINE     Please refer to the flowsheet for vital signs taken during this treatment. After treatment:   [X]         Patient left in no apparent distress sitting up in chair  [ ]         Patient left in no apparent distress in bed  [X]         Call bell left within reach  [X]         Nursing notified  [ ]         Caregiver present  [ ]         Bed alarm activated      COMMUNICATION/EDUCATION:   The patients plan of care was discussed with: Occupational Therapist and Registered Nurse.  [X]         Fall prevention education was provided and the patient/caregiver indicated understanding. [X]         Patient/family have participated as able in goal setting and plan of care. [X]         Patient/family agree to work toward stated goals and plan of care. [ ]         Patient understands intent and goals of therapy, but is neutral about his/her participation. [ ]         Patient is unable to participate in goal setting and plan of care. Thank you for this referral.  Dominic Quintana, SPT   Time Calculation: 17 mins        Regarding student involvement in patient care:  A student participated in this treatment session. Per CMS Medicare statements and APTA guidelines I certify that the following was true:  1.  I was present and directly observed the entire session. 2. I made all skilled judgments and clinical decisions regarding care. 3. I am the practitioner responsible for assessment, treatment, and documentation.

## 2017-08-02 NOTE — ED NOTES
TRANSFER - OUT REPORT:    Verbal report given to [place], RN on Bobby Mireles  being transferred to PCU for routine progression of care       Report consisted of patients Situation, Background, Assessment and   Recommendations(SBAR). Information from the following report(s) SBAR, Kardex, ED Summary, [facility] and Recent Results was reviewed with the receiving nurse. Lines:   Peripheral IV 08/01/17 Right Antecubital (Active)   Site Assessment Clean, dry, & intact 8/1/2017  1:11 PM   Phlebitis Assessment 0 8/1/2017  1:11 PM   Infiltration Assessment 0 8/1/2017  1:11 PM   Dressing Status Clean, dry, & intact 8/1/2017  1:11 PM        Opportunity for questions and clarification was provided.       Patient transported with:   Monitor  Registered Nurse

## 2017-08-02 NOTE — PROGRESS NOTES
Problem: Self Care Deficits Care Plan (Adult)  Goal: *Acute Goals and Plan of Care (Insert Text)  Occupational Therapy Goals  Initiated 8/2/2017  1. Patient will perform lower body dressing with supervision/set-up within 7 day(s). 2. Patient will perform grooming standing at sink for at least 3 minutes with supervision/set-up within 7 day(s). 3. Patient will perform toilet transfers with supervision/set-up using RW within 7 day(s). 4. Patient will perform all aspects of toileting with supervision/set-up within 7 day(s). 5. Patient will participate in upper extremity therapeutic exercise/activities with independence for 10 minutes within 7 day(s). 6. Patient will utilize energy conservation techniques during functional activities with verbal and visual cues within 7 day(s). OCCUPATIONAL THERAPY EVALUATION  Patient: Sana Weeks (89 y.o. female)  Date: 8/2/2017  Primary Diagnosis: CHF (congestive heart failure) ([facility])  Hypertensive urgency        Precautions:  Fall, Bed Alarm      ASSESSMENT :  Based on the objective data described below, the patient presents with dyspnea at rest and with exertion using accessory muscles to breathe with increase effort and O2 sats 94% and greater on RA throughout session. In addition she is limited by decreased strength, endurance, mobility, balance and safety following admission for CHF. She currently requires up to mod A for LE ADLs, max A for toileting and min A for brief functional mobility in bed and chair. Per pt she states she is mod I with ADLs and amb with rollator at home. She states her son performs all IADLs. Recommend SNF at discharge to improve her safety and independence with all functional tasks. Patient will benefit from skilled intervention to address the above impairments.   Patients rehabilitation potential is considered to be Guarded  Factors which may influence rehabilitation potential include:   [ ]             None noted  [ ] Mental ability/status  [X]             Medical condition  [X]             Home/family situation and support systems  [ ]             Safety awareness  [ ]             Pain tolerance/management  [ ]             Other:        PLAN :  Recommendations and Planned Interventions:  [X]               Self Care Training                  [X]        Therapeutic Activities  [X]               Functional Mobility Training    [ ]        Cognitive Retraining  [X]               Therapeutic Exercises           [X]        Endurance Activities  [X]               Balance Training                   [ ]        Neuromuscular Re-Education  [ ]               Visual/Perceptual Training     [X]   Home Safety Training  [X]               Patient Education                 [X]        Family Training/Education  [ ]               Other (comment):     Frequency/Duration: Patient will be followed by occupational therapy 4 times a week to address goals. Discharge Recommendations: Skilled Nursing Facility  Further Equipment Recommendations for Discharge: TBD       SUBJECTIVE:   Patient stated I feel like I can't breathe.       OBJECTIVE DATA SUMMARY:   HISTORY:   Past Medical History:   Diagnosis Date    Acute kidney failure ([facility] [address] 2/21/3624    Acute systolic heart failure ([facility] [address] 7/14/2016    CAD (coronary artery disease)      Heart failure ([facility] [address]      Stroke [facility]) 2009     Stroke    Stroke [facility])     History reviewed. No pertinent surgical history. Prior Level of Function/Home Situation: Per pt she states she is mod I with ADLs and amb with rollator at home. She states her son performs all IADLs.     Home Situation  Home Environment: Private residence  # Steps to Enter: 3  Rails to Enter: Yes  Hand Rails : Bilateral  One/Two Story Residence: One story  Living Alone: No  Support Systems: Child(nicci) (pt lives with son)  Patient Expects to be Discharged to[de-identified] Private residence  Current DME Used/Available at Home: dileep Cancino, [address] chair  Tub or Shower Type: Tub/Shower combination  [X]  Right hand dominant             [ ]  Left hand dominant     EXAMINATION OF PERFORMANCE DEFICITS:  Cognitive/Behavioral Status:  Neurologic State: Alert  Orientation Level: Oriented to person;Oriented to place; Disoriented to time;Oriented to situation  Cognition: Follows commands  Perception: Appears intact  Perseveration: No perseveration noted  Safety/Judgement: Awareness of environment;Decreased awareness of need for safety; Fall prevention; Insight into deficits     Hearing: Auditory  Auditory Impairment: None     Vision/Perceptual:    Acuity: Able to read clock/calendar on wall without difficulty    Corrective Lenses: Glasses     Range of Motion:  AROM: Generally decreased, functional  PROM: Generally decreased, functional                       Strength:  Strength: Generally decreased, functional                 Coordination:  Coordination: Generally decreased, functional (due to effort of breathing)  Fine Motor Skills-Upper: Left Impaired;Right Impaired    Gross Motor Skills-Upper: Left Intact; Right Intact     Tone & Sensation:  Tone: Normal                          Balance:  Sitting: Intact  Standing: Intact; With support     Functional Mobility and Transfers for ADLs:  Bed Mobility:  Supine to Sit: Moderate assistance; Additional time;Assist x1 (due to decreased strength and effort of breathing)  Scooting: Contact guard assistance; Additional time;Assist x1     Transfers:  Sit to Stand: Contact guard assistance; Additional time;Assist x1 (HHA)  Stand to Sit: Contact guard assistance  Toilet Transfer : Additional time;Assist x1;Minimum assistance (HHA to [facility]- CHRISTINE noted with O2 sats 95% on RA)     ADL Assessment:  Feeding: Modified independent; Additional time     Oral Facial Hygiene/Grooming: Setup; Additional time (seated in chair with frequent rest breaks)     Bathing: Moderate assistance; Additional time;Assist x1 (A to reach buttocks and feet due to CHRISTINE)     Upper Body Dressing: Setup; Additional time (seated)     Lower Body Dressing: Moderate assistance; Additional time;Assist x1 (crossed leg to reach feet- A due to CHRISTINE and dec safety)     Toileting: Maximum assistance; Additional time;Assist x1 (A for bowel hygiene and clothing management)                 ADL Intervention and task modifications:  Patient was educated on the benefits of maintaining activity tolerance, functional mobility, and independence with self care tasks during acute stay. Encouraged patient to be out of bed for all meals, perform daily ADLs (as approved by RN/MD regarding bathing etc), performing functional mobility to/from bathroom, and increasing time OOB daily with assist. Patient educated about the importance of maintaining activity tolerance to ensure safe return home and to baseline. Patient verbalized understanding of education. Cognitive Retraining  Safety/Judgement: Awareness of environment;Decreased awareness of need for safety; Fall prevention; Insight into deficits     Functional Measure:  Barthel Index:      Bathin  Bladder: 5  Bowels: 10  Groomin  Dressin  Feeding: 10  Mobility: 0  Stairs: 0  Toilet Use: 5  Transfer (Bed to Chair and Back): 10  Total: 45         Barthel and G-code impairment scale:  Percentage of impairment CH  0% CI  1-19% CJ  20-39% CK  40-59% CL  60-79% CM  80-99% CN  100%   Barthel Score 0-100 100 99-80 79-60 59-40 20-39 1-19    0   Barthel Score 0-20 20 17-19 13-16 9-12 5-8 1-4 0      The Barthel ADL Index: Guidelines  1. The index should be used as a record of what a patient does, not as a record of what a patient could do. 2. The main aim is to establish degree of independence from any help, physical or verbal, however minor and for whatever reason. 3. The need for supervision renders the patient not independent. 4. A patient's performance should be established using the best available evidence.  Asking the patient, friends/relatives and nurses are the usual sources, but direct observation and common sense are also important. However direct testing is not needed. 5. Usually the patient's performance over the preceding 24-48 hours is important, but occasionally longer periods will be relevant. 6. Middle categories imply that the patient supplies over 50 per cent of the effort. 7. Use of aids to be independent is allowed. Torrie Antis., Barthel, D.W. (1707). Functional evaluation: the Barthel Index. [address] [facility] (14)2. Vicky Perez romina ALCIRA Palomares, Haley Alanis., Lupis, [address] (1999). Measuring the change indisability after inpatient rehabilitation; comparison of the responsiveness of the Barthel Index and Functional Mcdonough Measure. Journal of Neurology, Neurosurgery, and Psychiatry, 66(4), 561-627. GIOVANNA Amor, COREY Mayen, & Margy Mills M.A. (2004.) Assessment of post-stroke quality of life in cost-effectiveness studies: The usefulness of the Barthel Index and the EuroQoL-5D. Quality of Life Research, 13, 076-78         G codes: In compliance with CMSs Claims Based Outcome Reporting, the following G-code set was chosen for this patient based on their primary functional limitation being treated: The outcome measure chosen to determine the severity of the functional limitation was the Barthel Index with a score of 45/100 which was correlated with the impairment scale.       · Self Care:               - CURRENT STATUS:    CK - 40%-59% impaired, limited or restricted               - GOAL STATUS:           CK - 40%-59% impaired, limited or restricted               - D/C STATUS:                       ---------------To be determined---------------      Occupational Therapy Evaluation Charge Determination   History Examination Decision-Making   LOW Complexity : Brief history review  MEDIUM Complexity : 3-5 performance deficits relating to physical, cognitive , or psychosocial skils that result in activity limitations and / or participation restrictions LOW Complexity : No comorbidities that affect functional and no verbal or physical assistance needed to complete eval tasks       Based on the above components, the patient evaluation is determined to be of the following complexity level: LOW   Pain:  Pain Scale 1: Numeric (0 - 10)  Pain Intensity 1: 0              Activity Tolerance:   Fair  Please refer to the flowsheet for vital signs taken during this treatment. After treatment:   [X] Patient left in no apparent distress sitting up in chair  [ ] Patient left in no apparent distress in bed  [X] Call bell left within reach  [X] Nursing notified  [ ] Caregiver present  [X] Bed alarm activated      COMMUNICATION/EDUCATION:   The patients plan of care was discussed with: Physical Therapist and Registered Nurse.  [X] Home safety education was provided and the patient/caregiver indicated understanding. [X] Patient/family have participated as able in goal setting and plan of care. [X] Patient/family agree to work toward stated goals and plan of care. [ ] Patient understands intent and goals of therapy, but is neutral about his/her participation. [ ] Patient is unable to participate in goal setting and plan of care. This patients plan of care is appropriate for delegation to [facility].      Thank you for this referral.  Darron Toure OT  Time Calculation: 18 mins

## 2017-08-02 NOTE — PROGRESS NOTES
Hospitalist Progress Note    NAME: Valentina Richards   :  1940   MRN:  [id number]       Assessment / Plan:  Hypertensive urgency POA BP had improved, c/w Norvasc, Coreg and Lisinopril. Monitor. ? Acute on chronic systolic CHF POA LEVF 42-07% monitor I/O and weight, start low dose diuretic. Elevated troponin POA 0.18  CAD POA  ASA, lipitor, coreg  Stage 3 chronic kidney disease POA Seems stable, watch with diuresis  Remote CVA with aphasia POA  Continue ASA, neuro exam stable  Hyperlipidemia POA continue statin  Left BBB POA chronic  Body mass index is 27.89 kg/(m^2). Code status: Full son Rigo Hawkins [id number] or nephew Jerzy [facility] [phone] [id number]  Prophylaxis: Lovenox  Recommended Disposition:  PT, OT, RN     If patient is stable may be able to D/c Home tomorrow     Subjective:     Chief Complaint / Reason for Physician Visit  \"I feel better\". Discussed with RN events overnight. Review of Systems:  Symptom Y/N Comments  Symptom Y/N Comments   Fever/Chills    Chest Pain     Poor Appetite    Edema     Cough    Abdominal Pain     Sputum    Joint Pain     SOB/CHRISTINE    Pruritis/Rash     Nausea/vomit    Tolerating PT/OT     Diarrhea    Tolerating Diet y    Constipation    Other       Could NOT obtain due to:      Objective:     VITALS:   Last 24hrs VS reviewed since prior progress note.  Most recent are:  Patient Vitals for the past 24 hrs:   Temp Pulse Resp BP SpO2   17 1440 98 °F (36.7 °C) 75 22 114/59 94 %   17 1113 - 88 - 157/63 -   17 1058 97.8 °F (36.6 °C) 87 26 157/63 95 %   17 0833 97.8 °F (36.6 °C) 76 18 158/75 96 %   17 0720 - 82 - (!) 133/114 97 %   17 0312 98 °F (36.7 °C) 69 18 140/72 96 %   17 2239 98.1 °F (36.7 °C) 83 19 160/83 96 %   17 2123 98.2 °F (36.8 °C) 73 20 156/74 95 %   17 -  20 179/90 98 %   17 19 (!) 174/92 94 %   17 - 167/76 -   17 15 167/76 96 %   17 15 (!) 167/93 96 % 08/01/17 1945 - 76 21 (!) 158/97 97 %   08/01/17 1930 - 76 23 164/87 97 %   08/01/17 1915 - 71 22 148/79 96 %   08/01/17 1901 - 76 20 - 97 %   08/01/17 1845 - 76 24 141/75 97 %   08/01/17 1830 - 81 18 149/81 96 %   08/01/17 1818 - 78 - 136/82 -   08/01/17 1815 - 88 23 136/82 95 %   08/01/17 1811 - 80 19 141/74 96 %   08/01/17 1745 - 98 19 155/83 97 %   08/01/17 1730 - 99 17 (!) 175/92 96 %   08/01/17 1715 - 98 21 (!) 182/94 96 %   08/01/17 1701 - 86 - 192/90 -   08/01/17 1700 - 92 20 192/90 97 %       Intake/Output Summary (Last 24 hours) at 08/02/17 1652  Last data filed at 08/02/17 1311   Gross per 24 hour   Intake              360 ml   Output              175 ml   Net              185 ml        PHYSICAL EXAM:  General: WD, WN. Alert, cooperative, no acute distress    EENT:  EOMI. Anicteric sclerae. MMM  Resp:  Coarse BS. No accessory muscle use  CV:  Regular  rhythm,  No edema  GI:  Soft, Non distended, Non tender.  +Bowel sounds  Neurologic:  Alert and oriented X 3, normal speech,   Psych:   Fair  insight. Not anxious nor agitated  Skin:  No rashes. No jaundice    Reviewed most current lab test results and cultures  YES  Reviewed most current radiology test results   YES  Review and summation of old records today    NO  Reviewed patient's current orders and MAR    YES  PMH/SH reviewed - no change compared to H&P  ________________________________________________________________________  Care Plan discussed with:    Comments   Patient y    Family  y Son Shalom Alva RN y    Care Manager     Consultant                        Multidiciplinary team rounds were held today with , nursing, pharmacist and clinical coordinator. Patient's plan of care was discussed; medications were reviewed and discharge planning was addressed.      ________________________________________________________________________  Total NON critical care TIME: 35   Minutes    Total CRITICAL CARE TIME Spent:   Minutes non procedure based      Comments   >50% of visit spent in counseling and coordination of care y    ________________________________________________________________________  Jeanette White MD     Procedures: see electronic medical records for all procedures/Xrays and details which were not copied into this note but were reviewed prior to creation of Plan. LABS:  I reviewed today's most current labs and imaging studies.   Pertinent labs include:  Recent Labs      08/02/17   0831  08/01/17   1342   WBC  4.9  6.0   HGB  11.4*  12.5   HCT  34.8*  37.4   PLT  178  171     Recent Labs      08/02/17   0831  08/01/17   1342   NA  141  141   K  3.8  3.7   CL  106  105   CO2  32  30   GLU  116*  96   BUN  21*  19   CREA  1.25*  1.22*   CA  9.0  9.3   ALB  3.2*   --    TBILI  0.5   --    SGOT  15   --    ALT  10*   --        Signed: Jeanette White MD

## 2017-08-02 NOTE — H&P
[facility], [address]   HISTORY AND PHYSICAL       Name:  Yogesh Benedict   MR#:  [id number]   :  1940   Account #:  [de-identified]        Date of Adm:  2017   Callie Brewster MD      [facility] /    D:  2017   19:57   T:  2017   20:14   Job #:  [id number]

## 2017-08-02 NOTE — PROGRESS NOTES
[id number] [address]  [phone]      Cardiology Progress Note      8/2/2017 10:55 AM    Admit Date: 8/1/2017    Admit Diagnosis:   CHF (congestive heart failure) (HCC)  Hypertensive urgency    Subjective:     Maverick Brunner is a 64 y.o. female with PMH CVA, HOLLY, CAD, sHF who presented to the ED sent from her doctor's office for SOB. Overnight events:  -BP still elevated, but much improved. Off nitro gtt  -troponin flat  -MsCyrus Patel is feeling well today. She denies any further chest tightness. She states her SOB is at her usual baseline.       Visit Vitals    /63    Pulse 88    Temp 97.8 °F (36.6 °C)    Resp 26    Ht 4' 11\" (1.499 m)    Wt 62.6 kg (138 lb 1.6 oz)    SpO2 95%    BMI 27.89 kg/m2       Current Facility-Administered Medications   Medication Dose Route Frequency    sodium chloride (NS) flush 5-10 mL  5-10 mL IntraVENous PRN    0.9% sodium chloride infusion 250 mL  250 mL IntraVENous PRN    aspirin delayed-release tablet 81 mg  81 mg Oral DAILY    atorvastatin (LIPITOR) tablet 40 mg  40 mg Oral QHS    carvedilol (COREG) tablet 12.5 mg  12.5 mg Oral BID    sodium chloride (NS) flush 5-10 mL  5-10 mL IntraVENous Q8H    sodium chloride (NS) flush 5-10 mL  5-10 mL IntraVENous PRN    acetaminophen (TYLENOL) tablet 650 mg  650 mg Oral Q6H PRN    oxyCODONE-acetaminophen (PERCOCET) 5-325 mg per tablet 1 Tab  1 Tab Oral Q6H PRN    naloxone (NARCAN) injection 0.4 mg  0.4 mg IntraVENous PRN    ondansetron (ZOFRAN) injection 4 mg  4 mg IntraVENous Q4H PRN    bisacodyl (DULCOLAX) suppository 10 mg  10 mg Rectal DAILY PRN    enoxaparin (LOVENOX) injection 30 mg  30 mg SubCUTAneous Q24H    amLODIPine (NORVASC) tablet 2.5 mg  2.5 mg Oral DAILY    albuterol (PROVENTIL HFA, VENTOLIN HFA, PROAIR HFA) inhaler 2 Puff  2 Puff Inhalation Q4H PRN    nitroglycerin (NITROBID) 2 % ointment 2 Inch  2 Inch Topical Q6H PRN    hydrALAZINE (APRESOLINE) 20 mg/mL injection 20 mg  20 mg IntraVENous Q6H PRN       Objective:      Physical Exam:  General: pleasant, elderly, AAF resting in bed in NAD  Heart: RRR, no m/S3/JVD  Lungs: clear, diminished in bases. Abdomen: Soft, +BS, NTND   Extremities: LE иван +DP/PT, no edema   Neurologic: aphasia from prior CVA  Skin:  Warm and dry.     Data Review:   Recent Labs      08/02/17   0831  08/01/17   1342   WBC  4.9  6.0   HGB  11.4*  12.5   HCT  34.8*  37.4   PLT  178  171     Recent Labs      08/02/17   0831  08/01/17   1342   NA  141  141   K  3.8  3.7   CL  106  105   CO2  32  30   GLU  116*  96   BUN  21*  19   CREA  1.25*  1.22*   CA  9.0  9.3   ALB  3.2*   --    TBILI  0.5   --    SGOT  15   --    ALT  10*   --        Recent Labs      08/02/17   0831  08/01/17   1919  08/01/17   1342   TROIQ  0.15*  0.17*  0.18*   CPK  157   --    --    CKMB  1.8   --    --          Intake/Output Summary (Last 24 hours) at 08/02/17 1155  Last data filed at 08/01/17 2142   Gross per 24 hour   Intake                0 ml   Output              175 ml   Net             -175 ml          Telemetry: SR  ECG: SR, LBBB, t-wave inversion lat leads  CXRAY: no acute process        Assessment:     Active Problems:    Other left bundle branch block (9/21/2012)      Elevated troponin (7/14/2016)      Hypertensive urgency (8/1/2017)      CHF (congestive heart failure) ([facility] [address] (8/1/2017)        Plan:     Hypertensive Urgency:  BP much improved. Chest tightness resolved with improvement in BP. Troponin flat: 0.18,0.17,0.15. Troponin and elevated pro-BNP 2/2 to hypertensive urgency from not taking medications at home. · Patient is tolerating higher BB and prior home amlodipine dose. · Patient still does not appear to need any diuretics. Kidney function holding well with the decreased BP so far.     · Will try a small amount of ACEi tomorrow morning for both HTN and chronic HF.      CAD risk:    · Continue ASA, BB, statin   · Lipid panel not yet janae De La Rosa NP  DNP, RN, [facility] Cardiology    8/2/2017         Agree with note as outlined by  NP. I confirm findings in history and physical exam. No additional findings noted. Agree with plan as outlined above.      Toñito Dooley MD

## 2017-08-02 NOTE — CARDIO/PULMONARY
[facility] NOTE    Chart Review: Patient admitted for SOB and chest tightness. Medical History: HOLLY, HF, CAD, stroke  EF 35-40%, Echo 5/24/2017  Former Smoker    Chart Review for IB referral  Seen by [facility] in March 2017      Met with patient who was sitting up in the chair. This was a follow-up visit to answer questions and reinforce prior teaching re: CHF, S&Ss, medication management, Low NA diet, daily weights and when to call the doctor. When asked if she weighed daily she laughed and admitted not every day. I explained the importance of daily weights and how weighing can help us catch fluid shifts early. Reviewed relationship between sodium and weight. She stated she does eat low sodium. Reinforced the importance of her daily meds and daily exercise. Son arrived at the end of our teaching session so we reviewed information with son. Son is active in mother's care. He cooks for her and does keep to low sodium cooking and helps her with her meds but he stated sometimes she does not take them. Stressed again the importance of her medications and encouraged patient to listen to her son as he cares about her and her care. We will continue to follow.

## 2017-08-02 NOTE — PROGRESS NOTES
PCU SHIFT NURSING NOTE      Bedside shift change report given to Clay Ling and Indy Phillips (oncoming nurses) by Justine Mei (offgoing nurses). Report included the following information SBAR, Kardex, Intake/Output and MAR. Shift Summary:   1 - Blood work drawn and sent to lab per Dr. Jacinto Perez request.     [phone] - PT/OT in to see patient. Pt up in chair and appears comfortable. Voices no c/o at present. 36 - PCT's in to assist with incontinence care. +BM noted    Admission Date 8/1/2017   Admission Diagnosis CHF (congestive heart failure) ([facility] [address]  Hypertensive urgency   Consults IP CONSULT TO CARDIOLOGY        Consults   []PT   []OT   []Speech   []Case Management      [] Palliative      Cardiac Monitoring Order   []Yes   []No     IV drips   []Yes    Drip:                            Dose:  Drip:                            Dose:  Drip:                            Dose:   []No     GI Prophylaxis   []Yes   []No         DVT Prophylaxis   SCDs:             Jose stockings:         [] Medication   []Contraindicated   []None      Activity Level Activity Level: Up with Assistance     Activity Assistance: Partial (one person)   Purposeful Rounding every 1-2 hour? []Yes   Delatorre Score  Total Score: 3   Bed Alarm (If score 3 or >)   []Yes   [] Refused (See signed refusal form in chart)   Nando Score  Nando Score: 15   Nando Score (if score 14 or less)   []PMT consult   []Wound Care consult      []Specialty bed   [] Nutrition consult          Needs prior to discharge:   Home O2 required:    []Yes   []No    If yes, how much O2 required?     Other:    Last Bowel Movement:        Influenza Vaccine          Pneumonia Vaccine           Diet Active Orders   Diet    DIET CARDIAC Regular      LDAs               Peripheral IV 08/01/17 Right Antecubital (Active)   Site Assessment Clean, dry, & intact 8/2/2017  2:40 PM   Phlebitis Assessment 1 8/2/2017  2:40 PM   Infiltration Assessment 0 8/2/2017  2:40 PM   Dressing Status Clean, dry, & intact 8/2/2017  2:40 PM   Dressing Type Tape;Transparent 8/2/2017  2:40 PM   Hub Color/Line Status Flushed;Pink 8/2/2017  7:20 AM                      Urinary Catheter      Intake & Output   Date 08/01/17 0700 - 08/02/17 0659 08/02/17 0700 - 08/03/17 0659   Shift 9043-2315 9653-1527 24 Hour Total 5172-8693 4934-4638 24 Hour Total   I  N  T  A  K  E   P.O.    360  360      P. O.    360  360    Shift Total  (mL/kg)    360  (5.7)  360  (5.7)   O  U  T  P  U  T   Urine  (mL/kg/hr)  175  (0.2) 175  (0.1)         Urine Voided  175 175         Urine Occurrence(s)  1 x 1 x 1 x  1 x    Stool            Stool Occurrence(s)    1 x  1 x    Shift Total  (mL/kg)  175  (2.8) 175  (2.8)      NET  -175 -175 360  360   Weight (kg) 64.4 62.6 62.6 62.6 62.6 62.6         Readmission Risk Assessment Tool Score High Risk            24       Total Score        3 Has Seen PCP in Last 6 Months (Yes=3, No=0)    4 IP Visits Last 12 Months (1-3=4, 4=9, >4=11)    5 Pt. Coverage (Medicare=5 , Medicaid, or Self-Pay=4)    12 Charlson Comorbidity Score (Age + Comorbid Conditions)        Criteria that do not apply:    . Living with Significant Other. Assisted Living. LTAC. SNF.  or   Rehab    Patient Length of Stay (>5 days = 3)       Expected Length of Stay 2d 4h   Actual Length of Stay 1

## 2017-08-03 LAB
ALBUMIN SERPL BCP-MCNC: 3 G/DL (ref 3.5–5)
ALBUMIN/GLOB SERPL: 0.9 {RATIO} (ref 1.1–2.2)
ALP SERPL-CCNC: 99 U/L (ref 45–117)
ALT SERPL-CCNC: 10 U/L (ref 12–78)
ANION GAP BLD CALC-SCNC: 5 MMOL/L (ref 5–15)
AST SERPL W P-5'-P-CCNC: 11 U/L (ref 15–37)
BASOPHILS # BLD AUTO: 0 K/UL (ref 0–0.1)
BASOPHILS # BLD: 1 % (ref 0–1)
BILIRUB SERPL-MCNC: 0.5 MG/DL (ref 0.2–1)
BUN SERPL-MCNC: 30 MG/DL (ref 6–20)
BUN/CREAT SERPL: 23 (ref 12–20)
CALCIUM SERPL-MCNC: 8.6 MG/DL (ref 8.5–10.1)
CHLORIDE SERPL-SCNC: 106 MMOL/L (ref 97–108)
CO2 SERPL-SCNC: 29 MMOL/L (ref 21–32)
CREAT SERPL-MCNC: 1.32 MG/DL (ref 0.55–1.02)
EOSINOPHIL # BLD: 0.1 K/UL (ref 0–0.4)
EOSINOPHIL NFR BLD: 2 % (ref 0–7)
ERYTHROCYTE [DISTWIDTH] IN BLOOD BY AUTOMATED COUNT: 13.6 % (ref 11.5–14.5)
GLOBULIN SER CALC-MCNC: 3.5 G/DL (ref 2–4)
GLUCOSE SERPL-MCNC: 117 MG/DL (ref 65–100)
HCT VFR BLD AUTO: 33.1 % (ref 35–47)
HGB BLD-MCNC: 10.9 G/DL (ref 11.5–16)
LYMPHOCYTES # BLD AUTO: 36 % (ref 12–49)
LYMPHOCYTES # BLD: 2.1 K/UL (ref 0.8–3.5)
MAGNESIUM SERPL-MCNC: 2.2 MG/DL (ref 1.6–2.4)
MCH RBC QN AUTO: 26 PG (ref 26–34)
MCHC RBC AUTO-ENTMCNC: 32.9 G/DL (ref 30–36.5)
MCV RBC AUTO: 79 FL (ref 80–99)
MONOCYTES # BLD: 0.2 K/UL (ref 0–1)
MONOCYTES NFR BLD AUTO: 4 % (ref 5–13)
NEUTS SEG # BLD: 3.3 K/UL (ref 1.8–8)
NEUTS SEG NFR BLD AUTO: 57 % (ref 32–75)
PLATELET # BLD AUTO: 153 K/UL (ref 150–400)
POTASSIUM SERPL-SCNC: 3.8 MMOL/L (ref 3.5–5.1)
PROT SERPL-MCNC: 6.5 G/DL (ref 6.4–8.2)
RBC # BLD AUTO: 4.19 M/UL (ref 3.8–5.2)
SODIUM SERPL-SCNC: 140 MMOL/L (ref 136–145)
WBC # BLD AUTO: 5.7 K/UL (ref 3.6–11)

## 2017-08-03 PROCEDURE — 85025 COMPLETE CBC W/AUTO DIFF WBC: CPT | Performed by: INTERNAL MEDICINE

## 2017-08-03 PROCEDURE — 74011250637 HC RX REV CODE- 250/637: Performed by: NURSE PRACTITIONER

## 2017-08-03 PROCEDURE — 97535 SELF CARE MNGMENT TRAINING: CPT | Performed by: OCCUPATIONAL THERAPIST

## 2017-08-03 PROCEDURE — 80053 COMPREHEN METABOLIC PANEL: CPT | Performed by: INTERNAL MEDICINE

## 2017-08-03 PROCEDURE — 74011250637 HC RX REV CODE- 250/637: Performed by: INTERNAL MEDICINE

## 2017-08-03 PROCEDURE — 94640 AIRWAY INHALATION TREATMENT: CPT

## 2017-08-03 PROCEDURE — 74011000250 HC RX REV CODE- 250: Performed by: INTERNAL MEDICINE

## 2017-08-03 PROCEDURE — 36415 COLL VENOUS BLD VENIPUNCTURE: CPT | Performed by: INTERNAL MEDICINE

## 2017-08-03 PROCEDURE — 97116 GAIT TRAINING THERAPY: CPT

## 2017-08-03 PROCEDURE — 74011250636 HC RX REV CODE- 250/636: Performed by: INTERNAL MEDICINE

## 2017-08-03 PROCEDURE — 83735 ASSAY OF MAGNESIUM: CPT | Performed by: INTERNAL MEDICINE

## 2017-08-03 PROCEDURE — 65660000000 HC RM CCU STEPDOWN

## 2017-08-03 PROCEDURE — 77030029684 HC NEB SM VOL KT MONA -A

## 2017-08-03 RX ORDER — AMLODIPINE BESYLATE 5 MG/1
5 TABLET ORAL DAILY
Status: DISCONTINUED | OUTPATIENT
Start: 2017-08-03 | End: 2017-08-04

## 2017-08-03 RX ORDER — IPRATROPIUM BROMIDE AND ALBUTEROL SULFATE 2.5; .5 MG/3ML; MG/3ML
3 SOLUTION RESPIRATORY (INHALATION)
Status: DISCONTINUED | OUTPATIENT
Start: 2017-08-03 | End: 2017-08-04 | Stop reason: HOSPADM

## 2017-08-03 RX ADMIN — ATORVASTATIN CALCIUM 40 MG: 40 TABLET, FILM COATED ORAL at 21:11

## 2017-08-03 RX ADMIN — IPRATROPIUM BROMIDE AND ALBUTEROL SULFATE 3 ML: .5; 3 SOLUTION RESPIRATORY (INHALATION) at 21:58

## 2017-08-03 RX ADMIN — Medication 10 ML: at 21:50

## 2017-08-03 RX ADMIN — ENOXAPARIN SODIUM 30 MG: 40 INJECTION SUBCUTANEOUS at 08:44

## 2017-08-03 RX ADMIN — LISINOPRIL 2.5 MG: 5 TABLET ORAL at 08:43

## 2017-08-03 RX ADMIN — ASPIRIN 81 MG: 81 TABLET, COATED ORAL at 08:43

## 2017-08-03 RX ADMIN — AMLODIPINE BESYLATE 5 MG: 5 TABLET ORAL at 08:43

## 2017-08-03 RX ADMIN — CARVEDILOL 12.5 MG: 12.5 TABLET, FILM COATED ORAL at 08:44

## 2017-08-03 RX ADMIN — Medication 10 ML: at 02:01

## 2017-08-03 RX ADMIN — CARVEDILOL 12.5 MG: 12.5 TABLET, FILM COATED ORAL at 19:20

## 2017-08-03 NOTE — PROGRESS NOTES
Problem: Mobility Impaired (Adult and Pediatric)  Goal: *Acute Goals and Plan of Care (Insert Text)  Physical Therapy Goals  Initiated 8/2/2017  1. Patient will move from supine to sit and sit to supine in bed with minimal assistance/contact guard assist within 4 day(s). 2. Patient will transfer from bed to chair and chair to bed with independence using the least restrictive device within 4 day(s). 3. Patient will perform sit to stand with independence within 4 day(s). 4. Patient will ambulate with modified independence for 100 feet with the least restrictive device within 4 day(s). 5. Patient will ascend/descend 3 stairs with bilateral handrail(s) with independence within 4 day(s). PHYSICAL THERAPY TREATMENT  Patient: Jerad Klein (1 y.o. female)  Date: 8/3/2017  Diagnosis: CHF (congestive heart failure) ([facility])  Hypertensive urgency <principal problem not specified>       Precautions: Fall, Bed Alarm      ASSESSMENT:  Pt chart reviewed and cleared by nursing. Pt was received sitting in bed side chair and agreeable to therapy. Pt is making progress but continues to be verbally and physically limited secondary to dysarthria and CHRISTINE. Currently pt completes sit<>stand/stand<>sit transfers with CGA and ambulation with SBA as her standing and dynamic balance are showing gains. However, during ambulation pt had noted path deviations and required a rest break secondary to increased dyspnea. Upon return to bed side chair pt had some wheezing but SpO2 levels were assessed and found to be WNL. Pt completed seated therex without further exacerbation of fatigue and dyspnea. Overall pt is showing gains with mobility and balance but continues to have decreased activity tolerance. Pt was left sitting in bed side chair with all needs met, eating lunch. Pt would benefit from continued therapy in order to address functional deficits listed above. PT recommends d/c to SNF upon completion of acute care therapy. Progression toward goals:  [ ]    Improving appropriately and progressing toward goals  [X]    Improving slowly and progressing toward goals  [ ]    Not making progress toward goals and plan of care will be adjusted       PLAN:  Patient continues to benefit from skilled intervention to address the above impairments. Continue treatment per established plan of care. Discharge Recommendations:  [facility]  Further Equipment Recommendations for Discharge:  TBD at SNF       SUBJECTIVE:   Patient stated Valentina Prude.  (when PT said they could help comb her hair)      OBJECTIVE DATA SUMMARY:   Critical Behavior:  Neurologic State: Alert  Orientation Level: Oriented to person  Cognition: Follows commands, Decreased attention/concentration  Safety/Judgement: Awareness of environment, Decreased awareness of need for safety, Decreased insight into deficits, Fall prevention  Functional Mobility Training:     Transfers:  Sit to Stand: Contact guard assistance  Stand to Sit: Contact guard assistance  Stand Pivot Transfers: Contact guard assistance        Balance:  Sitting: Intact  Standing: Intact; With support (RW)  Ambulation/Gait Training:  Distance (ft): 100 Feet (ft)  Assistive Device: Gait belt;Walker, rolling  Ambulation - Level of Assistance: Stand-by asssistance        Therapeutic Exercises:   Seated marching, BRIGHT     Pain:  Pain Scale 1: Numeric (0 - 10)  Pain Intensity 1: 0         Activity Tolerance:   Fair due to decreased respiratory endurance      Please refer to the flowsheet for vital signs taken during this treatment.   After treatment:   [ ]    Patient left in no apparent distress sitting up in chair  [X]    Patient left in no apparent distress in bed  [X]    Call bell left within reach  [X]    Nursing notified  [ ]    Caregiver present  [X]    Bed alarm activated      COMMUNICATION/COLLABORATION:   The patients plan of care was discussed with: Registered Nurse     Shelia Carrion, SPT   Time Calculation: 16 mins           Regarding student involvement in patient care:  A student participated in this treatment session. Per CMS Medicare statements and APTA guidelines I certify that the following was true:  1. I was present and directly observed the entire session. 2. I made all skilled judgments and clinical decisions regarding care. 3. I am the practitioner responsible for assessment, treatment, and documentation.

## 2017-08-03 NOTE — PROGRESS NOTES
Initial note done by Geovanny Bui CM  Spoke with Royal Das dcp. He states patient lives with him and his cousin. He states he works during the day and the cousin does not work. He states they cook for the patient however she is able to feed herself at home. He states they assist her with adl's and she was ambulating at home without the use of dme. She does have rolling walker at home. Discussed dcp and he is in favor of patient having some rehab. [facility] and [facility] Referral sent via cc link and will await their response. He states patient has been to [facility] in the past and she has had home health in   the past however he did not remember the name of the agency.

## 2017-08-03 NOTE — PROGRESS NOTES
Problem: Self Care Deficits Care Plan (Adult)  Goal: *Acute Goals and Plan of Care (Insert Text)  Occupational Therapy Goals  Initiated 8/2/2017  1. Patient will perform lower body dressing with supervision/set-up within 7 day(s). 2. Patient will perform grooming standing at sink for at least 3 minutes with supervision/set-up within 7 day(s). 3. Patient will perform toilet transfers with supervision/set-up using RW within 7 day(s). 4. Patient will perform all aspects of toileting with supervision/set-up within 7 day(s). 5. Patient will participate in upper extremity therapeutic exercise/activities with independence for 10 minutes within 7 day(s). 6. Patient will utilize energy conservation techniques during functional activities with verbal and visual cues within 7 day(s). OCCUPATIONAL THERAPY TREATMENT  Patient: Khris Smith (37 y.o. female)  Date: 8/3/2017  Diagnosis: CHF (congestive heart failure) (HCC)  Hypertensive urgency <principal problem not specified>       Precautions: Fall, Bed Alarm      ASSESSMENT:  Pt is making slow, steady progress towards goals. She remains dyspneic at rest and with activity using accessory and abdominal muscle to breathe, but with O2 sats 90% and greater throughout session on RA. Educated pt on energy conservation techniques to increase her safety and independence with all functional tasks. Written handout provided and pt verbalized fair understanding. Recommend SNF with cardio-pulmonary focus vs HH therapy depending on progress and available assist at home. Noted APS involved per chart. Progression toward goals:  [ ]       Improving appropriately and progressing toward goals  [X]       Improving slowly and progressing toward goals  [ ]       Not making progress toward goals and plan of care will be adjusted       PLAN:  Patient continues to benefit from skilled intervention to address the above impairments.   Continue treatment per established plan of care.  Discharge Recommendations:  Home Health vs [facility]  Further Equipment Recommendations for Discharge:  TBD       SUBJECTIVE:   Patient stated I am still having a hard time breathing.       OBJECTIVE DATA SUMMARY:   Cognitive/Behavioral Status:  Neurologic State: Alert  Orientation Level: Oriented to person  Cognition: Follows commands;Decreased attention/concentration  Perception: Appears intact  Perseveration: No perseveration noted  Safety/Judgement: Awareness of environment;Decreased awareness of need for safety;Decreased insight into deficits; Fall prevention     Functional Mobility and Transfers for ADLs:  Transfers:  Functional Transfers  Bathroom Mobility: Contact guard assistance (using RW- pt exiting BR with RN upon arrival)  Cues: Tactile cues provided;Verbal cues provided;Visual cues provided  Adaptive Equipment: Walker (comment) (rolling)     Balance:  Sitting: Intact  Standing: Intact; With support (RW)     ADL Intervention:  Patient instructed and indicated understanding energy conservation techniques to increase independence and safety during all ADLs for end goal of returning back home. Provided instruction body is like a jar of marbles, marbles represent energy, at end of day need as many marbles as possible to obtain a good night sleep, REM sleep is when the body repairs itself. This ensures a full jar of marbles, full of energy when wake up to start a new day. Use energy conservation techniques during ADLs so can increase participation in life activities patient prefers, to ensure more frequent good days. If having a bad day, evaluate tasks completed day before and re-plan how to save energy to complete same tasks, for example if going grocery shopping do not complete full bathing/dressing/grooming. Visual handout provided. Patient indicated understanding by stating tasks already completing to save energy ie sitting to don all clothing.       Lower Body Dressing Assistance  Socks: Minimum assistance (A to thread over toes and cues for attention to task)  Leg Crossed Method Used: Yes  Position Performed: Seated in chair  Cues: Don;Doff;Physical assistance; Tactile cues provided;Verbal cues provided;Visual cues provided     Cognitive Retraining  Safety/Judgement: Awareness of environment;Decreased awareness of need for safety;Decreased insight into deficits; Fall prevention     Pain:  Pain Scale 1: Numeric (0 - 10)  Pain Intensity 1: 0              Activity Tolerance:   Fair-poor  Please refer to the flowsheet for vital signs taken during this treatment.   After treatment:   [X] Patient left in no apparent distress sitting up in chair  [ ] Patient left in no apparent distress in bed  [X] Call bell left within reach  [X] Nursing notified  [ ] Caregiver present  [X] Bed alarm activated      COMMUNICATION/COLLABORATION:   The patients plan of care was discussed with: Physical Therapist and Registered Nurse     Silas Ortega OT  Time Calculation: 14 mins

## 2017-08-03 NOTE — PROGRESS NOTES
Hospitalist Progress Note    NAME: Lonny Granger   :  1940   MRN:  [id number]       Assessment / Plan:  Hypertensive urgency POA BP had improved, c/w Norvasc, Coreg and Lisinopril. Monitor. So far better control  ? Acute on chronic systolic CHF POA LEVF 71-75% monitor I/O and weight, c/w  low dose diuretic. Elevated troponin POA 0.18  CAD POA  ASA, lipitor, coreg  Stage 3 chronic kidney disease POA Seems stable, watch with diuresis  Remote CVA with aphasia POA  Continue ASA, neuro exam stable  Hyperlipidemia POA continue statin  Left BBB POA chronic  Body mass index is 28.84 kg/(m^2). Code status: Full son Phil Griffiths [id number] or nephbindu Coley [id number]  Prophylaxis: Lovenox  Recommended Disposition: SNF    Will plan for D/c tomorrow to SNF D/W maisha Griffiths, CM to ultimate the details     Subjective:     Chief Complaint / Reason for Physician Visit  \"I feel OK\". Discussed with RN events overnight. Review of Systems:  Symptom Y/N Comments  Symptom Y/N Comments   Fever/Chills    Chest Pain     Poor Appetite    Edema     Cough    Abdominal Pain     Sputum    Joint Pain     SOB/CHRISTINE    Pruritis/Rash     Nausea/vomit    Tolerating PT/OT     Diarrhea    Tolerating Diet y    Constipation    Other       Could NOT obtain due to:      Objective:     VITALS:   Last 24hrs VS reviewed since prior progress note. Most recent are:  Patient Vitals for the past 24 hrs:   Temp Pulse Resp BP SpO2   17 0841 97.6 °F (36.4 °C) 68 20 163/73 96 %   17 0338 97.8 °F (36.6 °C) 80 20 153/75 97 %   17 2256 97.8 °F (36.6 °C) 72 22 156/72 97 %   17 1914 98.2 °F (36.8 °C) 78 20 140/77 96 %   17 1829 - 82 - 131/66 -   17 1440 98 °F (36.7 °C) 75 22 114/59 94 %       Intake/Output Summary (Last 24 hours) at 17 1223  Last data filed at 17 0904   Gross per 24 hour   Intake              360 ml   Output              225 ml   Net              135 ml        PHYSICAL EXAM:  General: WD, WN.  Alert, cooperative, no acute distress    EENT:  EOMI. Anicteric sclerae. MMM  Resp:  Coarse BS. No accessory muscle use  CV:  Regular  rhythm,  No edema  GI:  Soft, Non distended, Non tender.  +Bowel sounds  Neurologic:  Alert and oriented X 2, normal speech,   Psych:   Fair  insight. Not anxious nor agitated  Skin:  No rashes. No jaundice    Reviewed most current lab test results and cultures  YES  Reviewed most current radiology test results   YES  Review and summation of old records today    NO  Reviewed patient's current orders and MAR    YES  PMH/SH reviewed - no change compared to H&P  ________________________________________________________________________  Care Plan discussed with:    Comments   Patient y    Family  y Son Shira Abraham   ADELE y    Care Manager     Consultant                        Multidiciplinary team rounds were held today with , nursing, pharmacist and clinical coordinator. Patient's plan of care was discussed; medications were reviewed and discharge planning was addressed. ________________________________________________________________________  Total NON critical care TIME: 20  Minutes    Total CRITICAL CARE TIME Spent:   Minutes non procedure based      Comments   >50% of visit spent in counseling and coordination of care y    ________________________________________________________________________  Dayanara Villagran MD     Procedures: see electronic medical records for all procedures/Xrays and details which were not copied into this note but were reviewed prior to creation of Plan. LABS:  I reviewed today's most current labs and imaging studies.   Pertinent labs include:  Recent Labs      08/03/17   0155  08/02/17   0831  08/01/17   1342   WBC  5.7  4.9  6.0   HGB  10.9*  11.4*  12.5   HCT  33.1*  34.8*  37.4   PLT  153  178  171     Recent Labs      08/03/17   0155  08/02/17   0831  08/01/17   1342   NA  140  141  141   K  3.8  3.8  3.7   CL  106  106  105   CO2  29  32  30 GLU  117*  116*  96   BUN  30*  21*  19   CREA  1.32*  1.25*  1.22*   CA  8.6  9.0  9.3   MG  2.2   --    --    ALB  3.0*  3.2*   --    TBILI  0.5  0.5   --    SGOT  11*  15   --    ALT  10*  10*   --        Signed: Crescencio Jarvis MD

## 2017-08-03 NOTE — PROGRESS NOTES
PCU SHIFT NURSING NOTE      Bedside and Verbal shift change report given to ADELE Landaverde and Alex Sandy RN (oncoming nurse) by Susana Menjivar RN and Bhavesh Tinoco RN (offgoing nurse). Report included the following information SBAR, Kardex, MAR, Recent Results, Med Rec Status and Cardiac Rhythm NSR. Shift Summary:       Admission Date 8/1/2017   Admission Diagnosis CHF (congestive heart failure) (HCC)  Hypertensive urgency   Consults IP CONSULT TO CARDIOLOGY        Consults   []PT   []OT   []Speech   []Case Management      [] Palliative      Cardiac Monitoring Order   []Yes   []No     IV drips   []Yes    Drip:                            Dose:  Drip:                            Dose:  Drip:                            Dose:   []No     GI Prophylaxis   []Yes   []No         DVT Prophylaxis   SCDs:             Jose stockings:         [] Medication   []Contraindicated   []None      Activity Level Activity Level: Up with Assistance     Activity Assistance: Partial (one person)   Purposeful Rounding every 1-2 hour? []Yes   Delatorre Score  Total Score: 3   Bed Alarm (If score 3 or >)   []Yes   [] Refused (See signed refusal form in chart)   Nando Score  Nando Score: 15   Nando Score (if score 14 or less)   []PMT consult   []Wound Care consult      []Specialty bed   [] Nutrition consult          Needs prior to discharge:   Home O2 required:    []Yes   []No    If yes, how much O2 required?     Other:    Last Bowel Movement:        Influenza Vaccine          Pneumonia Vaccine           Diet Active Orders   Diet    DIET CARDIAC Regular      LDAs               Peripheral IV 08/01/17 Right Antecubital (Active)   Site Assessment Clean, dry, & intact 8/2/2017  7:15 PM   Phlebitis Assessment 1 8/2/2017  7:15 PM   Infiltration Assessment 0 8/2/2017  7:15 PM   Dressing Status Clean, dry, & intact 8/2/2017  7:15 PM   Dressing Type Tape;Transparent 8/2/2017  7:15 PM   Hub Color/Line Status Pink;Flushed 8/2/2017  7:15 PM                      Urinary Catheter      Intake & Output   Date 08/02/17 0700 - 08/03/17 0659 08/03/17 0700 - 08/04/17 0659   Shift 0700-1859 1900-0659 24 Hour Total 0700-1859 1900-0659 24 Hour Total   I  N  T  A  K  E   P. O. 720  720         P. O. 720  720       Shift Total  (mL/kg) 720  (11.5)  720  (11.1)      O  U  T  P  U  T   Urine  (mL/kg/hr)  150 150         Urine Voided  150 150         Urine Occurrence(s) 1 x 1 x 2 x       Stool            Stool Occurrence(s) 1 x  1 x       Shift Total  (mL/kg)  150  (2.3) 150  (2.3)       -150 570      Weight (kg) 62.6 64.8 64.8 64.8 64.8 64.8         Readmission Risk Assessment Tool Score High Risk            24       Total Score        3 Has Seen PCP in Last 6 Months (Yes=3, No=0)    4 IP Visits Last 12 Months (1-3=4, 4=9, >4=11)    5 Pt. Coverage (Medicare=5 , Medicaid, or Self-Pay=4)    12 Charlson Comorbidity Score (Age + Comorbid Conditions)        Criteria that do not apply:    . Living with Significant Other. Assisted Living. LTAC. SNF.  or   Rehab    Patient Length of Stay (>5 days = 3)       Expected Length of Stay 2d 4h   Actual Length of Stay 2

## 2017-08-04 VITALS
WEIGHT: 141.5 LBS | BODY MASS INDEX: 28.52 KG/M2 | RESPIRATION RATE: 18 BRPM | HEART RATE: 63 BPM | TEMPERATURE: 98 F | DIASTOLIC BLOOD PRESSURE: 74 MMHG | HEIGHT: 59 IN | SYSTOLIC BLOOD PRESSURE: 143 MMHG | OXYGEN SATURATION: 100 %

## 2017-08-04 LAB
ALBUMIN SERPL BCP-MCNC: 2.9 G/DL (ref 3.5–5)
ALBUMIN/GLOB SERPL: 0.9 {RATIO} (ref 1.1–2.2)
ALP SERPL-CCNC: 95 U/L (ref 45–117)
ALT SERPL-CCNC: 10 U/L (ref 12–78)
ANION GAP BLD CALC-SCNC: 6 MMOL/L (ref 5–15)
AST SERPL W P-5'-P-CCNC: 11 U/L (ref 15–37)
BASOPHILS # BLD AUTO: 0 K/UL (ref 0–0.1)
BASOPHILS # BLD: 0 % (ref 0–1)
BILIRUB SERPL-MCNC: 0.3 MG/DL (ref 0.2–1)
BUN SERPL-MCNC: 27 MG/DL (ref 6–20)
BUN/CREAT SERPL: 23 (ref 12–20)
CALCIUM SERPL-MCNC: 8.5 MG/DL (ref 8.5–10.1)
CHLORIDE SERPL-SCNC: 107 MMOL/L (ref 97–108)
CO2 SERPL-SCNC: 27 MMOL/L (ref 21–32)
CREAT SERPL-MCNC: 1.17 MG/DL (ref 0.55–1.02)
EOSINOPHIL # BLD: 0.1 K/UL (ref 0–0.4)
EOSINOPHIL NFR BLD: 2 % (ref 0–7)
ERYTHROCYTE [DISTWIDTH] IN BLOOD BY AUTOMATED COUNT: 13.6 % (ref 11.5–14.5)
GLOBULIN SER CALC-MCNC: 3.4 G/DL (ref 2–4)
GLUCOSE SERPL-MCNC: 116 MG/DL (ref 65–100)
HCT VFR BLD AUTO: 32.7 % (ref 35–47)
HGB BLD-MCNC: 11 G/DL (ref 11.5–16)
LYMPHOCYTES # BLD AUTO: 34 % (ref 12–49)
LYMPHOCYTES # BLD: 1.8 K/UL (ref 0.8–3.5)
MAGNESIUM SERPL-MCNC: 2.2 MG/DL (ref 1.6–2.4)
MCH RBC QN AUTO: 26.6 PG (ref 26–34)
MCHC RBC AUTO-ENTMCNC: 33.6 G/DL (ref 30–36.5)
MCV RBC AUTO: 79.2 FL (ref 80–99)
MONOCYTES # BLD: 0.5 K/UL (ref 0–1)
MONOCYTES NFR BLD AUTO: 9 % (ref 5–13)
NEUTS SEG # BLD: 2.9 K/UL (ref 1.8–8)
NEUTS SEG NFR BLD AUTO: 55 % (ref 32–75)
PLATELET # BLD AUTO: 159 K/UL (ref 150–400)
POTASSIUM SERPL-SCNC: 3.7 MMOL/L (ref 3.5–5.1)
PROT SERPL-MCNC: 6.3 G/DL (ref 6.4–8.2)
RBC # BLD AUTO: 4.13 M/UL (ref 3.8–5.2)
SODIUM SERPL-SCNC: 140 MMOL/L (ref 136–145)
WBC # BLD AUTO: 5.2 K/UL (ref 3.6–11)

## 2017-08-04 PROCEDURE — 74011250637 HC RX REV CODE- 250/637: Performed by: INTERNAL MEDICINE

## 2017-08-04 PROCEDURE — 74011250637 HC RX REV CODE- 250/637: Performed by: NURSE PRACTITIONER

## 2017-08-04 PROCEDURE — 85025 COMPLETE CBC W/AUTO DIFF WBC: CPT | Performed by: INTERNAL MEDICINE

## 2017-08-04 PROCEDURE — 80053 COMPREHEN METABOLIC PANEL: CPT | Performed by: INTERNAL MEDICINE

## 2017-08-04 PROCEDURE — 74011250636 HC RX REV CODE- 250/636: Performed by: INTERNAL MEDICINE

## 2017-08-04 PROCEDURE — 74011000250 HC RX REV CODE- 250: Performed by: INTERNAL MEDICINE

## 2017-08-04 PROCEDURE — 94640 AIRWAY INHALATION TREATMENT: CPT

## 2017-08-04 PROCEDURE — 36415 COLL VENOUS BLD VENIPUNCTURE: CPT | Performed by: INTERNAL MEDICINE

## 2017-08-04 PROCEDURE — 83735 ASSAY OF MAGNESIUM: CPT | Performed by: INTERNAL MEDICINE

## 2017-08-04 RX ORDER — FUROSEMIDE 40 MG/1
40 TABLET ORAL DAILY
Status: DISCONTINUED | OUTPATIENT
Start: 2017-08-04 | End: 2017-08-04 | Stop reason: HOSPADM

## 2017-08-04 RX ORDER — AMLODIPINE BESYLATE 5 MG/1
5 TABLET ORAL DAILY
Status: DISCONTINUED | OUTPATIENT
Start: 2017-08-04 | End: 2017-08-04

## 2017-08-04 RX ORDER — FUROSEMIDE 40 MG/1
40 TABLET ORAL DAILY
Qty: 30 TAB | Refills: 1 | Status: SHIPPED | OUTPATIENT
Start: 2017-08-04 | End: 2017-08-29 | Stop reason: SDUPTHER

## 2017-08-04 RX ORDER — AMLODIPINE BESYLATE 5 MG/1
10 TABLET ORAL DAILY
Status: DISCONTINUED | OUTPATIENT
Start: 2017-08-04 | End: 2017-08-04

## 2017-08-04 RX ORDER — AMLODIPINE BESYLATE 2.5 MG/1
2.5 TABLET ORAL DAILY
Qty: 30 TAB | Refills: 1 | Status: SHIPPED | OUTPATIENT
Start: 2017-08-04 | End: 2017-08-29 | Stop reason: SDUPTHER

## 2017-08-04 RX ORDER — AMLODIPINE BESYLATE 2.5 MG/1
2.5 TABLET ORAL DAILY
Status: DISCONTINUED | OUTPATIENT
Start: 2017-08-04 | End: 2017-08-04 | Stop reason: HOSPADM

## 2017-08-04 RX ORDER — LISINOPRIL 2.5 MG/1
2.5 TABLET ORAL DAILY
Qty: 30 TAB | Refills: 1 | Status: SHIPPED | OUTPATIENT
Start: 2017-08-04 | End: 2017-08-29 | Stop reason: SDUPTHER

## 2017-08-04 RX ORDER — CARVEDILOL 12.5 MG/1
12.5 TABLET ORAL 2 TIMES DAILY
Qty: 60 TAB | Refills: 1 | Status: SHIPPED | OUTPATIENT
Start: 2017-08-04 | End: 2017-08-29 | Stop reason: SDUPTHER

## 2017-08-04 RX ORDER — OXYCODONE AND ACETAMINOPHEN 5; 325 MG/1; MG/1
1 TABLET ORAL
Qty: 20 TAB | Refills: 0 | Status: SHIPPED | OUTPATIENT
Start: 2017-08-04 | End: 2018-02-05

## 2017-08-04 RX ORDER — AMLODIPINE BESYLATE 5 MG/1
5 TABLET ORAL DAILY
Qty: 30 TAB | Refills: 1 | Status: SHIPPED | OUTPATIENT
Start: 2017-08-04 | End: 2017-08-04

## 2017-08-04 RX ADMIN — ASPIRIN 81 MG: 81 TABLET, COATED ORAL at 09:10

## 2017-08-04 RX ADMIN — CARVEDILOL 12.5 MG: 12.5 TABLET, FILM COATED ORAL at 09:10

## 2017-08-04 RX ADMIN — LISINOPRIL 2.5 MG: 5 TABLET ORAL at 09:10

## 2017-08-04 RX ADMIN — IPRATROPIUM BROMIDE AND ALBUTEROL SULFATE 3 ML: .5; 3 SOLUTION RESPIRATORY (INHALATION) at 10:28

## 2017-08-04 RX ADMIN — FUROSEMIDE 40 MG: 40 TABLET ORAL at 09:10

## 2017-08-04 RX ADMIN — ENOXAPARIN SODIUM 30 MG: 40 INJECTION SUBCUTANEOUS at 09:09

## 2017-08-04 RX ADMIN — AMLODIPINE BESYLATE 2.5 MG: 2.5 TABLET ORAL at 09:10

## 2017-08-04 NOTE — PROGRESS NOTES
Called Jen Becerra NP to inform her of patient's discharge to [facility] however number at the office was not working.

## 2017-08-04 NOTE — DISCHARGE SUMMARY
Hospitalist Discharge Summary     Patient ID:  Robe Jackson  [id number]  18 y.o.  1940    PCP on record: Nabila Corcoran NP    Admit date: 8/1/2017  Discharge date and time: 8/4/2017      DISCHARGE DIAGNOSIS:    Hypertensive Urgency, CHF, CAD. CKD, Hyperlipidemia, Increased Troponin      CONSULTATIONS:  IP CONSULT TO CARDIOLOGY    Excerpted HPI from H&P of Brady Banks MD:  A 25-year-old Pending sale to [employer]   female with history of stroke in 1994, which left her with significant   dysarthria. At baseline she lives with her son and uses a walker to get   around. She was sent in by her primary care doctor because of   shortness of breath for several days per the history she told me. Apparently the PCP recently filed an APS report because of concern for   neglect and the patient has not taken any medications at all in months. She says her breathing is worse. Her shortness of breath is worse   when she lies down, better when she sits up. She has not had any   significant lower extremity edema or chest pain. She says she gets   lightheaded when gets up and stands, but denies she gets more short   of breath with walking. She has not had any fevers or cough,   abdominal pain, nausea, vomiting, diarrhea, weight loss or weight gain.      In the emergency room, she was hypertensive with a blood pressure of   180/120. She was seen by Cardiology and started on nitroglycerin drip. She was given Coreg. Her troponin was borderline elevated at 0.18. Her chest x-ray had cardiomegaly, but no clear edema. EKG had her   old left bundle-branch block. We were called to admit the patient with   hypertensive urgency. ______________________________________________________________________  DISCHARGE SUMMARY/HOSPITAL COURSE:  for full details see H&P, daily progress notes, labs, consult notes. Hypertensive urgency POA BP had improved, c/w Norvasc, Coreg and Lisinopril. Monitor.  So far better control  Acute on chronic systolic CHF POA LEVF 12-05% monitor I/O and weight, c/w  low dose diuretic. Elevated troponin POA 0.18  CAD POA  ASA, lipitor, coreg  Stage 3 chronic kidney disease POA Seems stable, watch with diuresis  Remote CVA with aphasia POA  Continue ASA, neuro exam stable  Hyperlipidemia POA continue statin  Left BBB POA chronic  Body mass index is 28.84 kg/(m^2). Code status: Full son Phil Griffiths [id number] or nephew Sarai Coley [id number]  Prophylaxis: Lovenox  Recommended Disposition: SNF    D/c to SNF today and F/U with PCP and Cardiology as outpatient  _______________________________________________________________________  Patient seen and examined by me on discharge day. Pertinent Findings:  Gen:    Not in distress  Chest: Coarse BS  CVS:   Regular rhythm. No edema  Abd:  Soft, not distended, not tender  Neuro:  Alert, GCS [id number]  _______________________________________________________________________  DISCHARGE MEDICATIONS:   Current Discharge Medication List      START taking these medications    Details   lisinopril (PRINIVIL, ZESTRIL) 2.5 mg tablet Take 1 Tab by mouth daily. Qty: 30 Tab, Refills: 1      oxyCODONE-acetaminophen (PERCOCET) 5-325 mg per tablet Take 1 Tab by mouth every six (6) hours as needed. Max Daily Amount: 4 Tabs. Qty: 20 Tab, Refills: 0         CONTINUE these medications which have CHANGED    Details   !! amLODIPine (NORVASC) 5 mg tablet Take 1 Tab by mouth daily. Qty: 30 Tab, Refills: 1      carvedilol (COREG) 12.5 mg tablet Take 1 Tab by mouth two (2) times a day. Qty: 60 Tab, Refills: 1      furosemide (LASIX) 40 mg tablet Take 1 Tab by mouth daily. One pill daily  Qty: 30 Tab, Refills: 1      !! amLODIPine (NORVASC) 2.5 mg tablet Take 1 Tab by mouth daily. Qty: 30 Tab, Refills: 1       !! - Potential duplicate medications found. Please discuss with provider.       CONTINUE these medications which have NOT CHANGED    Details   isosorbide mononitrate ER (IMDUR) 30 mg tablet Take 1 Tab by mouth every morning. Qty: 30 Tab, Refills: 12      albuterol (PROVENTIL HFA, VENTOLIN HFA, PROAIR HFA) 90 mcg/actuation inhaler Take 2 Puffs by inhalation every four (4) hours as needed for Wheezing. Qty: 1 Inhaler, Refills: 1      aspirin delayed-release 81 mg tablet Take 1 Tab by mouth daily. Qty: 100 Tab, Refills: 1      atorvastatin (LIPITOR) 40 mg tablet TAKE ONE TABLET BY MOUTH EVERY DAY  **PATIENT MUST BE SEEN FOR MORE REFILLS**  Qty: 30 Tab, Refills: 0         STOP taking these medications       predniSONE (DELTASONE) 10 mg tablet Comments:   Reason for Stopping:               My Recommended Diet, Activity, Wound Care, and follow-up labs are listed in the patient's Discharge Insturctions which I have personally completed and reviewed.     _______________________________________________________________________  DISPOSITION:    Home with Family:    Home with HH/PT/OT/RN:    SNF/LTC: y   SOLA:    OTHER:        Condition at Discharge:  Stable  _______________________________________________________________________  Follow up with:   PCP : Joselin Colunga NP  Follow-up Information     Follow up With Details Comments Contact Info    Pauline Moore MD Schedule an appointment as soon as possible for a visit in 2 weeks  [facility]  [address], [address] [facility] [address]  [phone]        F/U PCP  F/U Cardiology        Total time in minutes spent coordinating this discharge (includes going over instructions, follow-up, prescriptions, and preparing report for sign off to her PCP) :  35 minutes    Signed:  Dee Dee Remy MD

## 2017-08-04 NOTE — CARDIO/PULMONARY
CP REHAB NOTE     Chart Review: Patient admitted for SOB and chest tightness. Medical History: HOLLY, HF, CAD, stroke  EF 35-40%, Echo 5/24/2017  Former Smoker     Chart Review for IB referral  Seen by [facility] in March 2017        Pt visited. Met with patient who was sitting up in the chair, receiving breathing treatment. .    This was a follow-up visit to answer questions and reinforce prior teaching re: CHF, S&Ss, medication management, Low NA diet, daily weights and when to call the doctor. Pt reports she tracks her daily weights. Usually around 134 lbs. Reviewed s&s of fluid overload. Reminded pt to request daily weights at SNF before eating breakfast tray. She denies being discharged to a SNF and wishes to home. Pt without questions at this time.

## 2017-08-04 NOTE — PROGRESS NOTES
PCU SHIFT NURSING NOTE      Bedside and Verbal shift change report given to Aranza Camargo, RN and Alaina Cast, RN (oncoming nurse) by Jaylene Mckeon RN (offgoing nurse). Report included the following information SBAR, Kardex, Intake/Output, MAR, Recent Results, Med Rec Status, Cardiac Rhythm NSR and Alarm Parameters . Shift Summary:     9:26 PM - Paged hospitalist for respiratory treatment. Admission Date 8/1/2017   Admission Diagnosis CHF (congestive heart failure) ([facility] [address]  Hypertensive urgency   Consults IP CONSULT TO CARDIOLOGY        Consults   []PT   []OT   []Speech   []Case Management      [] Palliative      Cardiac Monitoring Order   []Yes   []No     IV drips   []Yes    Drip:                            Dose:  Drip:                            Dose:  Drip:                            Dose:   []No     GI Prophylaxis   []Yes   []No         DVT Prophylaxis   SCDs:             Jose stockings:         [] Medication   []Contraindicated   []None      Activity Level Activity Level: Up with Assistance     Activity Assistance: Partial (one person)   Purposeful Rounding every 1-2 hour? []Yes   Delatorre Score  Total Score: 3   Bed Alarm (If score 3 or >)   []Yes   [] Refused (See signed refusal form in chart)   Nando Score  Nando Score: 15   Nando Score (if score 14 or less)   []PMT consult   []Wound Care consult      []Specialty bed   [] Nutrition consult          Needs prior to discharge:   Home O2 required:    []Yes   []No    If yes, how much O2 required?     Other:    Last Bowel Movement: Last Bowel Movement Date: 08/03/17      Influenza Vaccine          Pneumonia Vaccine           Diet Active Orders   Diet    DIET CARDIAC Regular      LDAs               Peripheral IV 08/01/17 Right Antecubital (Active)   Site Assessment Clean, dry, & intact 8/3/2017  4:59 PM   Phlebitis Assessment 0 8/3/2017  4:59 PM   Infiltration Assessment 0 8/3/2017  4:59 PM   Dressing Status Clean, dry, & intact 8/3/2017  4:59 PM   Dressing Type Tape;Transparent 8/3/2017  4:59 PM   Hub Color/Line Status Blue 8/3/2017  4:59 PM                      Urinary Catheter      Intake & Output   Date 08/02/17 1900 - 08/03/17 0659 08/03/17 0700 - 08/04/17 0659   Shift 9779-1906 24 Hour Total 6786-7803 4483-1012 24 Hour Total   I  N  T  A  K  E   P. O.  720 840  840      P. O.  720 840  840    Shift Total  (mL/kg)  720  (11.1) 840  (13)  840  (13)   O  U  T  P  U  T   Urine  (mL/kg/hr) 150 150 75  (0.1)  75      Urine Voided 150 150 75  75      Urine Occurrence(s) 2 x 3 x 2 x  2 x    Stool           Stool Occurrence(s)  1 x 1 x  1 x    Shift Total  (mL/kg) 150  (2.3) 150  (2.3) 75  (1.2)  75  (1.2)   NET -150 570 765  765   Weight (kg) 64.8 64.8 64.8 64.8 64.8         Readmission Risk Assessment Tool Score High Risk            24       Total Score        3 Has Seen PCP in Last 6 Months (Yes=3, No=0)    4 IP Visits Last 12 Months (1-3=4, 4=9, >4=11)    5 Pt. Coverage (Medicare=5 , Medicaid, or Self-Pay=4)    12 Charlson Comorbidity Score (Age + Comorbid Conditions)        Criteria that do not apply:    . Living with Significant Other. Assisted Living. LTAC. SNF.  or   Rehab    Patient Length of Stay (>5 days = 3)       Expected Length of Stay 2d 4h   Actual Length of Stay 2

## 2017-08-04 NOTE — PROGRESS NOTES
[facility] will pick patient up at 1800pm to transport to [facility]. PCS completed with initial paperwork and given to nursing. Called son to inform him of time of  however no answer. Nursing aware and will follow up. Auth number for [employer]--[id number].

## 2017-08-04 NOTE — PROGRESS NOTES
[facility] has accepted patient however they will need to obtain insurance authorization. Awaiting approval from insurance.

## 2017-08-04 NOTE — DISCHARGE INSTRUCTIONS
HOSPITALIST DISCHARGE INSTRUCTIONS    NAME: Librado Donnelly   :  1940   MRN:  [id number]     Date/Time:  2017 8:13 AM    ADMIT DATE: 2017   DISCHARGE DATE: 2017     Attending Physician: Dayanara Villagran MD    DISCHARGE DIAGNOSIS:  Hypertensive Urgency, CHF, CAD. CKD, Hyperlipidemia, Increased Troponin    Medications: Per above medication reconciliation. Pain Management: per above medications    Recommended diet: Cardiac Diet    Recommended activity: Activity as tolerated    Wound care: None    Indwelling devices:  None    Supplemental Oxygen: None    Required Lab work: Per SNF routine    Glucose management:  None    Code status: Full    CHF specific discharge instructions    Weight:  · Daily weights, Notify your Doctor if Wt gain of 3 lb in a day or 5 lb in a week  · Daily Intake & Output    Diet :  · Low salt cardiac diet   · Fluid restriction of 1500 ml daily          Outside physician follow up: Follow-up Information     Follow up With Details Comments Contact Info    Lulu Santillan MD Schedule an appointment as soon as possible for a visit in 2 weeks  [id number] [phone]        F/U PCP  F/U Cardiology         Skilled nursing facility/ SNF MD responsible for above on discharge. Information obtained by :  I understand that if any problems occur once I am at home I am to contact my physician. I understand and acknowledge receipt of the instructions indicated above.                                                                                                                                            Physician's or R.N.'s Signature                                                                  Date/Time Patient or Repres

## 2017-08-04 NOTE — PROGRESS NOTES
[facility] have received authorization. They can accept the patient today. Nursing to call report to [phone]. Called son Neela Tsang and made him aware and he is in agreement. Informed patient . FOC signed and placed on chart. Second medicare im letter talked to son over the telephone and he was fine with this. Copy of second medicare im letter left in patient's room. Referral sent to [facility] and will await their callback.

## 2017-08-22 ENCOUNTER — HOME HEALTH ADMISSION (OUTPATIENT)
Dept: HOME HEALTH SERVICES | Facility: HOME HEALTH | Age: 77
End: 2017-08-22
Payer: MEDICARE

## 2017-08-26 ENCOUNTER — HOME CARE VISIT (OUTPATIENT)
Dept: SCHEDULING | Facility: HOME HEALTH | Age: 77
End: 2017-08-26
Payer: MEDICARE

## 2017-08-26 PROCEDURE — 3331090001 HH PPS REVENUE CREDIT

## 2017-08-26 PROCEDURE — G0299 HHS/HOSPICE OF RN EA 15 MIN: HCPCS

## 2017-08-26 PROCEDURE — 3331090002 HH PPS REVENUE DEBIT

## 2017-08-26 PROCEDURE — 400013 HH SOC

## 2017-08-27 PROCEDURE — 3331090001 HH PPS REVENUE CREDIT

## 2017-08-27 PROCEDURE — 3331090002 HH PPS REVENUE DEBIT

## 2017-08-28 ENCOUNTER — HOME CARE VISIT (OUTPATIENT)
Dept: SCHEDULING | Facility: HOME HEALTH | Age: 77
End: 2017-08-28
Payer: MEDICARE

## 2017-08-28 ENCOUNTER — HOME CARE VISIT (OUTPATIENT)
Dept: HOME HEALTH SERVICES | Facility: HOME HEALTH | Age: 77
End: 2017-08-28
Payer: MEDICARE

## 2017-08-28 VITALS
SYSTOLIC BLOOD PRESSURE: 152 MMHG | OXYGEN SATURATION: 99 % | TEMPERATURE: 98.1 F | DIASTOLIC BLOOD PRESSURE: 88 MMHG | HEART RATE: 72 BPM | RESPIRATION RATE: 20 BRPM

## 2017-08-28 PROCEDURE — 3331090002 HH PPS REVENUE DEBIT

## 2017-08-28 PROCEDURE — G0300 HHS/HOSPICE OF LPN EA 15 MIN: HCPCS

## 2017-08-28 PROCEDURE — 3331090001 HH PPS REVENUE CREDIT

## 2017-08-29 ENCOUNTER — OFFICE VISIT (OUTPATIENT)
Dept: CARDIOLOGY CLINIC | Age: 77
End: 2017-08-29

## 2017-08-29 VITALS
RESPIRATION RATE: 17 BRPM | OXYGEN SATURATION: 97 % | SYSTOLIC BLOOD PRESSURE: 120 MMHG | TEMPERATURE: 97.8 F | HEART RATE: 70 BPM | DIASTOLIC BLOOD PRESSURE: 80 MMHG

## 2017-08-29 VITALS
HEART RATE: 70 BPM | RESPIRATION RATE: 24 BRPM | HEIGHT: 59 IN | SYSTOLIC BLOOD PRESSURE: 160 MMHG | WEIGHT: 142.4 LBS | DIASTOLIC BLOOD PRESSURE: 90 MMHG | OXYGEN SATURATION: 99 % | BODY MASS INDEX: 28.71 KG/M2

## 2017-08-29 DIAGNOSIS — E78.2 MIXED HYPERLIPIDEMIA: ICD-10-CM

## 2017-08-29 DIAGNOSIS — I44.7 OTHER LEFT BUNDLE BRANCH BLOCK: ICD-10-CM

## 2017-08-29 DIAGNOSIS — I11.0: Primary | ICD-10-CM

## 2017-08-29 DIAGNOSIS — I10 ESSENTIAL HYPERTENSION, BENIGN: ICD-10-CM

## 2017-08-29 DIAGNOSIS — I50.22 CHRONIC SYSTOLIC CONGESTIVE HEART FAILURE (HCC): ICD-10-CM

## 2017-08-29 PROCEDURE — 3331090001 HH PPS REVENUE CREDIT

## 2017-08-29 PROCEDURE — 3331090002 HH PPS REVENUE DEBIT

## 2017-08-29 RX ORDER — ISOSORBIDE MONONITRATE 30 MG/1
30 TABLET, EXTENDED RELEASE ORAL
Qty: 30 TAB | Refills: 3 | Status: SHIPPED | OUTPATIENT
Start: 2017-08-29 | End: 2017-10-18 | Stop reason: SDUPTHER

## 2017-08-29 RX ORDER — ATORVASTATIN CALCIUM 40 MG/1
TABLET, FILM COATED ORAL
Qty: 30 TAB | Refills: 3 | Status: SHIPPED | OUTPATIENT
Start: 2017-08-29 | End: 2017-10-18 | Stop reason: SDUPTHER

## 2017-08-29 RX ORDER — CARVEDILOL 12.5 MG/1
12.5 TABLET ORAL 2 TIMES DAILY
Qty: 60 TAB | Refills: 3 | Status: SHIPPED | OUTPATIENT
Start: 2017-08-29 | End: 2017-10-18 | Stop reason: SDUPTHER

## 2017-08-29 RX ORDER — AMLODIPINE BESYLATE 2.5 MG/1
2.5 TABLET ORAL DAILY
Qty: 30 TAB | Refills: 3 | Status: SHIPPED | OUTPATIENT
Start: 2017-08-29 | End: 2017-10-18 | Stop reason: SDUPTHER

## 2017-08-29 RX ORDER — LISINOPRIL 2.5 MG/1
2.5 TABLET ORAL DAILY
Qty: 30 TAB | Refills: 3 | Status: SHIPPED | OUTPATIENT
Start: 2017-08-29 | End: 2017-10-18 | Stop reason: SDUPTHER

## 2017-08-29 RX ORDER — FUROSEMIDE 40 MG/1
40 TABLET ORAL DAILY
Qty: 30 TAB | Refills: 3 | Status: SHIPPED | OUTPATIENT
Start: 2017-08-29 | End: 2017-10-18 | Stop reason: SDUPTHER

## 2017-08-29 NOTE — MR AVS SNAPSHOT
Visit Information Date & Time Provider Department Dept. Phone Encounter #  
 8/29/2017  1:00 PM Jasmeet Muir NP [facility] [phone] Your Appointments 10/2/2017  3:45 PM  
1 MONTH with MD [facility]) Appt Note: Dr. Ana Pruitt [facility] [address]  
[phone] [address] [facility] [address] Upcoming Health Maintenance Date Due DTaP/Tdap/Td series (1 - Tdap) 10/2/1959 ZOSTER VACCINE AGE 60> 8/2/1998 GLAUCOMA SCREENING Q2Y 10/2/2003 OSTEOPOROSIS SCREENING (DEXA) 10/2/2003 Pneumococcal 65+ High/Highest Risk (1 of 2 - PCV13) 10/2/2003 MEDICARE YEARLY EXAM 10/2/2003 INFLUENZA AGE 9 TO ADULT 8/1/2017 Allergies as of 8/29/2017  Review Complete On: 8/29/2017 By: Pat Mack LPN Severity Noted Reaction Type Reactions Decadron [Dexamethasone Sodium Phosphate]  03/09/2017    Hives Pcn [Penicillins]  07/14/2016    Hives Current Immunizations  Never Reviewed Name Date Influenza Vaccine 10/1/2016 Not reviewed this visit You Were Diagnosed With   
  
 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 
ICD-9-CM: 702. 1 Chronic systolic congestive heart failure (HCC)     ICD-10-CM: I50.22 ICD-9-CM: 428.22, 428.0 Mixed hyperlipidemia     ICD-10-CM: E78.2 ICD-9-CM: 272.2 Other left bundle branch block     ICD-10-CM: I44.7 ICD-9-CM: 426. 3 Vitals BP Pulse Resp Height(growth percentile) Weight(growth percentile) SpO2  
 160/90 (BP 1 Location: Right arm, BP Patient Position: Sitting) 70 24 4' 11\" (1.499 m) 142 lb 6.4 oz (64.6 kg) 99% BMI Smoking Status 28.76 kg/m2 Former Smoker Vitals History BMI and BSA Data Body Mass Index Body Surface Area 28.76 kg/m 2 1.64 m 2 Preferred Pharmacy Pharmacy Name Phone [facility] [phone] Your Updated Medication List  
  
   
This list is accurate as of: 8/29/17  1:19 PM.  Always use your most recent med list.  
  
  
  
  
 albuterol 90 mcg/actuation inhaler Commonly known as:  PROVENTIL HFA, VENTOLIN HFA, PROAIR HFA Take 2 Puffs by inhalation every four (4) hours as needed for Wheezing. amLODIPine 2.5 mg tablet Commonly known as:  Unknown Livingston Take 1 Tab by mouth daily. aspirin delayed-release 81 mg tablet Take 1 Tab by mouth daily. atorvastatin 40 mg tablet Commonly known as:  LIPITOR  
TAKE ONE TABLET BY MOUTH EVERY DAY  **PATIENT MUST BE SEEN FOR MORE REFILLS**  
  
 carvedilol 12.5 mg tablet Commonly known as:  Manual Ashwin Take 1 Tab by mouth two (2) times a day. furosemide 40 mg tablet Commonly known as:  LASIX Take 1 Tab by mouth daily. One pill daily  
  
 isosorbide mononitrate ER 30 mg tablet Commonly known as:  IMDUR Take 1 Tab by mouth every morning. lisinopril 2.5 mg tablet Commonly known as:  Joe Dc Take 1 Tab by mouth daily. oxyCODONE-acetaminophen 5-325 mg per tablet Commonly known as:  PERCOCET Take 1 Tab by mouth every six (6) hours as needed. Max Daily Amount: 4 Tabs. OXYGEN-AIR DELIVERY SYSTEMS  
2 L by Nasal route continuous. Prescriptions Printed Refills  
 carvedilol (COREG) 12.5 mg tablet 3 Sig: Take 1 Tab by mouth two (2) times a day. Class: Print Route: Oral  
  
Prescriptions Sent to Pharmacy Refills  
 furosemide (LASIX) 40 mg tablet 3 Sig: Take 1 Tab by mouth daily. One pill daily Class: Normal  
 Pharmacy: [facility] [facility] #: [phone]  Route: Oral  
 lisinopril (PRINIVIL, ZESTRIL) 2.5 mg tablet 3 Sig: Take 1 Tab by mouth daily. Class: Normal  
 Pharmacy: [address] Ph #: [phone] Route: Oral  
 amLODIPine (NORVASC) 2.5 mg tablet 3 Sig: Take 1 Tab by mouth daily. Class: Normal  
 Pharmacy: [address] Ph #: [phone] Route: Oral  
 isosorbide mononitrate ER (IMDUR) 30 mg tablet 3 Sig: Take 1 Tab by mouth every morning. Class: Normal  
 Pharmacy: [address] Ph #: [phone] Route: Oral  
 atorvastatin (LIPITOR) 40 mg tablet 3 Sig: TAKE ONE TABLET BY MOUTH EVERY DAY  **PATIENT MUST BE SEEN FOR MORE REFILLS** Class: Normal  
 Pharmacy: [address] Ph #: [phone] We Performed the Following AMB POC EKG ROUTINE W/ 12 LEADS, INTER & REP [10682 CPT(R)] To-Do List   
 08/30/2017 To Be Determined Appointment with Keily Johnson LPN at [place] [address]  
  
 08/30/2017 To Be Determined Appointment with Timmy Villalpando at [place] [address]  
  
 08/30/2017 7:00 AM  
  Appointment with Adair Regalado at [place] [address]  
  
 09/01/2017 To Be Determined Appointment with Keily Johnson LPN at [place] [address]  
  
 09/01/2017 To Be Determined Appointment with Fabiola Parker OT at [place] [address]  
  
 09/05/2017 To Be Determined Appointment with Keily Johnson LPN at [place] [address]  
  
 09/07/2017 To Be Determined Appointment with Keily Johnson LPN at [place] [address]  
  
 09/12/2017 To Be Determined Appointment with Jason Raymundo LPN at [place] [address]  
  
 09/14/2017 To Be Determined Appointment with Shirley Gutierrez RN at [place] [address]  
  
 09/14/2017 To Be Determined Appointment with Shirley Gutierrez RN at [place] [address] Introducing hospitals SERVICES! Mattie oRdney introduces [employer] patient portal. Now you can access parts of your medical record, email your doctor's office, and request medication refills online. 1. In your internet browser, go to [email or web address] 2. Click on the First Time User? Click Here link in the Sign In box. You will see the New Member Sign Up page. 3. Enter your [employer] Access Code exactly as it appears below. You will not need to use this code after youve completed the sign-up process. If you do not sign up before the expiration date, you must request a new code. · [employer] Access Code: [email or web address] Expires: 11/1/2017  3:37 PM 
 
4. Enter the last four digits of your Social Security Number (xxxx) and Date of Birth (mm/dd/yyyy) as indicated and click Submit. You will be taken to the next sign-up page. 5. Create a [employer] ID. This will be your [employer] login ID and cannot be changed, so think of one that is secure and easy to remember. 6. Create a [employer] password. You can change your password at any time. 7. Enter your Password Reset Question and Answer. This can be used at a later time if you forget your password. 8. Enter your e-mail address. You will receive e-mail notification when new information is available in [address] 9. Click Sign Up. You can now view and download portions of your medical record. 10. Click the Download Summary menu link to download a portable copy of your medical information.  
 
If you have questions, please visit the Frequently Asked Questions section of the [employer]. Remember, [facility] is NOT to be used for urgent needs. For medical emergencies, dial 911. Now available from your iPhone and Android! Please provide this summary of care documentation to your next provider. Your primary care clinician is listed as Gunner Jimenez. If you have any questions after today's visit, please call [phone].

## 2017-08-29 NOTE — PROGRESS NOTES
Jaja Londono DNP, ANP-BC  Subjective/HPI:     Shelly Craig is a 64 y.o. female is here for hospital follow up. Admitted for HTN crisis. Pt is back home under care of home health, primary home providers is son and nephew. Pt is accompanied by nephew today, he reports due to rushing to get ready for appt she did not take AM medications. Reviewed normal BP report from home health visit yesterday. Pt reports she is feeling well today, denies headache, SOB, CHRISTINE or chest pain. Confirms she was given medications yesterday. Excerpted HPI from H&P of Gala Fields MD:  A 68-year-old [facility] [employer]   female with history of stroke in 1994, which left her with significant   dysarthria. At baseline she lives with her son and uses a walker to get   around. She was sent in by her primary care doctor because of   shortness of breath for several days per the history she told me. Apparently the PCP recently filed an APS report because of concern for   neglect and the patient has not taken any medications at all in months. She says her breathing is worse. Her shortness of breath is worse   when she lies down, better when she sits up. She has not had any   significant lower extremity edema or chest pain. She says she gets   lightheaded when gets up and stands, but denies she gets more short   of breath with walking. She has not had any fevers or cough,   abdominal pain, nausea, vomiting, diarrhea, weight loss or weight gain.      In the emergency room, she was hypertensive with a blood pressure of   180/120. She was seen by Cardiology and started on nitroglycerin drip. She was given Coreg. Her troponin was borderline elevated at 0.18. Her chest x-ray had cardiomegaly, but no clear edema. EKG had her   old left bundle-branch block.  We were called to admit the patient with   hypertensive urgency.     ______________________________________________________________________  DISCHARGE SUMMARY/HOSPITAL COURSE: for full details see H&P, daily progress notes, labs, consult notes. Hypertensive urgency POA BP had improved, c/w Norvasc, Coreg and Lisinopril. Monitor. So far better control  Acute on chronic systolic CHF POA LEVF 59-60% monitor I/O and weight, c/w  low dose diuretic. Elevated troponin POA 0.18  CAD POA  ASA, lipitor, coreg  Stage 3 chronic kidney disease POA Seems stable, watch with diuresis  Remote CVA with aphasia POA  Continue ASA, neuro exam stable  Hyperlipidemia POA continue statin  Left BBB POA chronic  Body mass index is 28.84 kg/(m^2). Code status: Heron Lopes [id number] or [id number]  Prophylaxis: Lovenox  Recommended Disposition: SNF     D/c to SNF today and F/U with PCP and Cardiology as outpatient    ECHO: 5/2017  SUMMARY:  Left ventricle: The cavity was small. Systolic function was moderately  reduced. Ejection fraction was estimated in the range of 35 % to 40 %. There was moderate diffuse hypokinesis. Wall thickness was markedly  increased. Doppler parameters were consistent with abnormal left  ventricular relaxation (grade 1 diastolic dysfunction). Mitral valve: There was mild regurgitation. Tricuspid valve: There was mild regurgitation. INDICATIONS: Congestive heart failure, Dyspnea on exertion    PROCEDURE: This was a routine study. The study included complete 2D  imaging, M-mode, complete spectral Doppler, and color Doppler. The heart  rate was 44 bpm, at the start of the study. Systolic blood pressure was  160 mmHg, at the start of the study. Diastolic blood pressure was 100  mmHg, at the start of the study. Image quality was adequate. LEFT VENTRICLE: The cavity was small. Systolic function was moderately  reduced. Ejection fraction was estimated in the range of 35 % to 40 %. There was moderate diffuse hypokinesis. Wall thickness was markedly  increased.  DOPPLER: Doppler parameters were consistent with abnormal left  ventricular relaxation (grade 1 diastolic dysfunction). RIGHT VENTRICLE: The size was normal. Systolic function was normal.        PCP Provider  Jeanette Burks MD  Past Medical History:   Diagnosis Date    Acute kidney failure ([facility] [address] 0/54/7597    Acute systolic heart failure ([facility] [address] 7/14/2016    CAD (coronary artery disease)     Heart failure ([facility] [address]     Stroke [facility]) 2009    Stroke    Stroke [facility])       No past surgical history on file. Allergies   Allergen Reactions    Decadron [Dexamethasone Sodium Phosphate] Hives    Pcn [Penicillins] Hives      Family History   Problem Relation Age of Onset    Coronary Artery Disease Other      grandmother age 76      Current Outpatient Prescriptions   Medication Sig    carvedilol (COREG) 12.5 mg tablet Take 1 Tab by mouth two (2) times a day.  furosemide (LASIX) 40 mg tablet Take 1 Tab by mouth daily. One pill daily    lisinopril (PRINIVIL, ZESTRIL) 2.5 mg tablet Take 1 Tab by mouth daily.  amLODIPine (NORVASC) 2.5 mg tablet Take 1 Tab by mouth daily.  isosorbide mononitrate ER (IMDUR) 30 mg tablet Take 1 Tab by mouth every morning.  atorvastatin (LIPITOR) 40 mg tablet TAKE ONE TABLET BY MOUTH EVERY DAY  **PATIENT MUST BE SEEN FOR MORE REFILLS**    OXYGEN-AIR DELIVERY SYSTEMS 2 L by Nasal route continuous.  oxyCODONE-acetaminophen (PERCOCET) 5-325 mg per tablet Take 1 Tab by mouth every six (6) hours as needed. Max Daily Amount: 4 Tabs.  albuterol (PROVENTIL HFA, VENTOLIN HFA, PROAIR HFA) 90 mcg/actuation inhaler Take 2 Puffs by inhalation every four (4) hours as needed for Wheezing.  aspirin delayed-release 81 mg tablet Take 1 Tab by mouth daily. No current facility-administered medications for this visit.        Vitals:    08/29/17 1254 08/29/17 1255   BP: 170/90 160/90   Pulse: 70    Resp: 24    SpO2: 99%    Weight: 142 lb 6.4 oz (64.6 kg)    Height: 4' 11\" (1.499 m)      Social History     Social History    Marital status:      Spouse name: N/A    Number of children: N/A  Years of education: N/A     Occupational History    Not on file. Social History Main Topics    Smoking status: Former Smoker     Quit date: 9/21/2009    Smokeless tobacco: Never Used    Alcohol use 0.0 oz/week     0 Standard drinks or equivalent per week      Comment: Very little    Drug use: No    Sexual activity: Not on file     Other Topics Concern    Not on file     Social History Narrative    ** Merged History Encounter **            I have reviewed the nurses notes, vitals, problem list, allergy list, medical history, family, social history and medications. Review of Symptoms:    General: Pt denies excessive weight gain or loss. Pt is able to conduct ADL's  HEENT: Denies blurred vision, headaches, epistaxis and difficulty swallowing. Respiratory: Denies shortness of breath, CHRISTINE, +wheezing no stridor. Cardiovascular: Denies precordial pain, palpitations, edema or PND  Gastrointestinal: Denies poor appetite, indigestion, abdominal pain or blood in stool  Musculoskeletal: Denies pain or swelling from muscles or joints  Neurologic: Denies tremor, paresthesias, or sensory motor disturbance  Skin: Denies rash, itching or texture change. Physical Exam:      General: Well developed, in no acute distress, cooperative and alert  HEENT: No carotid bruits, no JVD, trach is midline. Neck Supple,  Heart:  Normal S1/S2 negative S3 or S4. Regular, no murmur, gallop or rub.   Respiratory: Diminished bilaterally at bases, no wheezing. Wearing 2lpm  Abdomen:   Soft, non-tender, no masses, bowel sounds are active.   Extremities:  No edema, normal cap refill, no cyanosis, atraumatic. Neuro: A&Ox3, speech clear, gait is unstable, requires assistance   Skin: Skin color is normal. No rashes or lesions.  Non diaphoretic  Vascular: 2+ pulses symmetric in all extremities    Cardiographics    ECG: sinus chronic LBBB  Results for orders placed or performed during the hospital encounter of 08/01/17   EKG, 12 LEAD, INITIAL   Result Value Ref Range    Ventricular Rate 90 BPM    Atrial Rate 90 BPM    P-R Interval 142 ms    QRS Duration 132 ms    Q-T Interval 412 ms    QTC Calculation (Bezet) 504 ms    Calculated P Axis 57 degrees    Calculated R Axis -22 degrees    Calculated T Axis 117 degrees    Diagnosis       ** Poor data quality, interpretation may be adversely affected  Normal sinus rhythm  Possible Left atrial enlargement  Left bundle branch block  Abnormal ECG    Confirmed by Alejandro David MD, Prema Salmon ([id number]) on 8/1/2017 6:42:11 PM           Cardiology Labs:  Lab Results   Component Value Date/Time    Cholesterol, total 193 08/02/2017 03:21 AM    HDL Cholesterol 56 08/02/2017 03:21 AM    LDL, calculated 116.2 08/02/2017 03:21 AM    Triglyceride 104 08/02/2017 03:21 AM    CHOL/HDL Ratio 3.4 08/02/2017 03:21 AM       Lab Results   Component Value Date/Time    Sodium 140 08/04/2017 03:52 AM    Potassium 3.7 08/04/2017 03:52 AM    Chloride 107 08/04/2017 03:52 AM    CO2 27 08/04/2017 03:52 AM    Anion gap 6 08/04/2017 03:52 AM    Glucose 116 08/04/2017 03:52 AM    BUN 27 08/04/2017 03:52 AM    Creatinine 1.17 08/04/2017 03:52 AM    BUN/Creatinine ratio 23 08/04/2017 03:52 AM    GFR est AA 54 08/04/2017 03:52 AM    GFR est non-AA 45 08/04/2017 03:52 AM    Calcium 8.5 08/04/2017 03:52 AM    Bilirubin, total 0.3 08/04/2017 03:52 AM    AST (SGOT) 11 08/04/2017 03:52 AM    Alk. phosphatase 95 08/04/2017 03:52 AM    Protein, total 6.3 08/04/2017 03:52 AM    Albumin 2.9 08/04/2017 03:52 AM    Globulin 3.4 08/04/2017 03:52 AM    A-G Ratio 0.9 08/04/2017 03:52 AM    ALT (SGPT) 10 08/04/2017 03:52 AM           Assessment:     Assessment:     Diagnoses and all orders for this visit:    1. 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 Essential hypertension, benign  -     AMB POC EKG ROUTINE W/ 12 LEADS, INTER & REP    3. Chronic systolic congestive heart failure ([facility] [address]    4. Mixed hyperlipidemia    5.  Other left bundle branch block    Other orders  -     carvedilol (COREG) 12.5 mg tablet; Take 1 Tab by mouth two (2) times a day. -     furosemide (LASIX) 40 mg tablet; Take 1 Tab by mouth daily. One pill daily  -     lisinopril (PRINIVIL, ZESTRIL) 2.5 mg tablet; Take 1 Tab by mouth daily. -     amLODIPine (NORVASC) 2.5 mg tablet; Take 1 Tab by mouth daily. -     isosorbide mononitrate ER (IMDUR) 30 mg tablet; Take 1 Tab by mouth every morning.  -     atorvastatin (LIPITOR) 40 mg tablet; TAKE ONE TABLET BY MOUTH EVERY DAY  **PATIENT MUST BE SEEN FOR MORE REFILLS**        ICD-10-CM ICD-9-CM    1. 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 Essential hypertension, benign I10 401.1 AMB POC EKG ROUTINE W/ 12 LEADS, INTER & REP   3. Chronic systolic congestive heart failure (HCC) I50.22 428.22      428.0    4. Mixed hyperlipidemia E78.2 272.2    5. Other left bundle branch block I44.7 426.3      Orders Placed This Encounter    AMB POC EKG ROUTINE W/ 12 LEADS, INTER & REP     Order Specific Question:   Reason for Exam:     Answer:   routine    carvedilol (COREG) 12.5 mg tablet     Sig: Take 1 Tab by mouth two (2) times a day. Dispense:  60 Tab     Refill:  3    furosemide (LASIX) 40 mg tablet     Sig: Take 1 Tab by mouth daily. One pill daily     Dispense:  30 Tab     Refill:  3    lisinopril (PRINIVIL, ZESTRIL) 2.5 mg tablet     Sig: Take 1 Tab by mouth daily. Dispense:  30 Tab     Refill:  3    amLODIPine (NORVASC) 2.5 mg tablet     Sig: Take 1 Tab by mouth daily. Dispense:  30 Tab     Refill:  3    isosorbide mononitrate ER (IMDUR) 30 mg tablet     Sig: Take 1 Tab by mouth every morning.      Dispense:  30 Tab     Refill:  3    atorvastatin (LIPITOR) 40 mg tablet     Sig: TAKE ONE TABLET BY MOUTH EVERY DAY  **PATIENT MUST BE SEEN FOR MORE REFILLS**     Dispense:  30 Tab     Refill:  3        Plan:     1: HTN: Elevated today, no AM medications, was normal yesterday, has home health visiting. 2. CHF: EF 40%, [facility] Class I continue current medications  3: High Cholesterol: On statin therapy, repeat labs at year end. Renewed all medications from Cardiology to [facility] as requested. Follow up with Dr Dasia Salazar in 1 month.      Taryn Vanegas NP

## 2017-08-29 NOTE — PATIENT INSTRUCTIONS

## 2017-08-29 NOTE — LETTER
8/29/2017 1:47 PM 
 
Patient:  Rahat Cuevas YOB: 1940 Date of Visit: 8/29/2017 Dear Jose Hinojosa MD 
[address] VIA Facsimile: [phone] 
 : Thank you for referring Ms. Lam Lomeli to me for evaluation/treatment. Below are the relevant portions of my assessment and plan of care. If you have questions, please do not hesitate to call me. I look forward to following Ms. Jonathon Thacker along with you. Sincerely, Julee Peabody, NP

## 2017-08-30 ENCOUNTER — HOME CARE VISIT (OUTPATIENT)
Dept: SCHEDULING | Facility: HOME HEALTH | Age: 77
End: 2017-08-30
Payer: MEDICARE

## 2017-08-30 ENCOUNTER — HOME CARE VISIT (OUTPATIENT)
Dept: HOME HEALTH SERVICES | Facility: HOME HEALTH | Age: 77
End: 2017-08-30
Payer: MEDICARE

## 2017-08-30 VITALS
HEART RATE: 98 BPM | OXYGEN SATURATION: 96 % | RESPIRATION RATE: 22 BRPM | DIASTOLIC BLOOD PRESSURE: 90 MMHG | SYSTOLIC BLOOD PRESSURE: 164 MMHG | HEIGHT: 60 IN | BODY MASS INDEX: 26.5 KG/M2 | TEMPERATURE: 97.8 F | WEIGHT: 135 LBS

## 2017-08-30 PROCEDURE — 3331090002 HH PPS REVENUE DEBIT

## 2017-08-30 PROCEDURE — G0155 HHCP-SVS OF CSW,EA 15 MIN: HCPCS

## 2017-08-30 PROCEDURE — G0151 HHCP-SERV OF PT,EA 15 MIN: HCPCS

## 2017-08-30 PROCEDURE — 3331090001 HH PPS REVENUE CREDIT

## 2017-08-31 PROCEDURE — 3331090002 HH PPS REVENUE DEBIT

## 2017-08-31 PROCEDURE — 3331090001 HH PPS REVENUE CREDIT

## 2017-09-01 ENCOUNTER — HOME CARE VISIT (OUTPATIENT)
Dept: SCHEDULING | Facility: HOME HEALTH | Age: 77
End: 2017-09-01
Payer: MEDICARE

## 2017-09-01 ENCOUNTER — HOME CARE VISIT (OUTPATIENT)
Dept: HOME HEALTH SERVICES | Facility: HOME HEALTH | Age: 77
End: 2017-09-01
Payer: MEDICARE

## 2017-09-01 VITALS
DIASTOLIC BLOOD PRESSURE: 76 MMHG | OXYGEN SATURATION: 95 % | HEART RATE: 78 BPM | TEMPERATURE: 98 F | SYSTOLIC BLOOD PRESSURE: 128 MMHG | RESPIRATION RATE: 14 BRPM

## 2017-09-01 PROCEDURE — G0152 HHCP-SERV OF OT,EA 15 MIN: HCPCS

## 2017-09-01 PROCEDURE — G0151 HHCP-SERV OF PT,EA 15 MIN: HCPCS

## 2017-09-01 PROCEDURE — 3331090002 HH PPS REVENUE DEBIT

## 2017-09-01 PROCEDURE — 3331090001 HH PPS REVENUE CREDIT

## 2017-09-02 PROCEDURE — 3331090001 HH PPS REVENUE CREDIT

## 2017-09-02 PROCEDURE — 3331090002 HH PPS REVENUE DEBIT

## 2017-09-03 VITALS
TEMPERATURE: 98 F | HEART RATE: 88 BPM | DIASTOLIC BLOOD PRESSURE: 78 MMHG | OXYGEN SATURATION: 95 % | SYSTOLIC BLOOD PRESSURE: 137 MMHG

## 2017-09-03 PROCEDURE — 3331090001 HH PPS REVENUE CREDIT

## 2017-09-03 PROCEDURE — 3331090002 HH PPS REVENUE DEBIT

## 2017-09-04 PROCEDURE — 3331090002 HH PPS REVENUE DEBIT

## 2017-09-04 PROCEDURE — 3331090001 HH PPS REVENUE CREDIT

## 2017-09-05 ENCOUNTER — HOME CARE VISIT (OUTPATIENT)
Dept: SCHEDULING | Facility: HOME HEALTH | Age: 77
End: 2017-09-05
Payer: MEDICARE

## 2017-09-05 VITALS
DIASTOLIC BLOOD PRESSURE: 70 MMHG | OXYGEN SATURATION: 97 % | RESPIRATION RATE: 14 BRPM | SYSTOLIC BLOOD PRESSURE: 120 MMHG | HEART RATE: 75 BPM | TEMPERATURE: 98 F

## 2017-09-05 PROCEDURE — 3331090002 HH PPS REVENUE DEBIT

## 2017-09-05 PROCEDURE — G0299 HHS/HOSPICE OF RN EA 15 MIN: HCPCS

## 2017-09-05 PROCEDURE — G0151 HHCP-SERV OF PT,EA 15 MIN: HCPCS

## 2017-09-05 PROCEDURE — 3331090001 HH PPS REVENUE CREDIT

## 2017-09-06 ENCOUNTER — HOME CARE VISIT (OUTPATIENT)
Dept: SCHEDULING | Facility: HOME HEALTH | Age: 77
End: 2017-09-06
Payer: MEDICARE

## 2017-09-06 VITALS
SYSTOLIC BLOOD PRESSURE: 140 MMHG | TEMPERATURE: 98 F | HEART RATE: 77 BPM | OXYGEN SATURATION: 97 % | DIASTOLIC BLOOD PRESSURE: 70 MMHG

## 2017-09-06 VITALS
RESPIRATION RATE: 22 BRPM | OXYGEN SATURATION: 98 % | HEART RATE: 80 BPM | TEMPERATURE: 97.9 F | SYSTOLIC BLOOD PRESSURE: 110 MMHG | BODY MASS INDEX: 26.37 KG/M2 | DIASTOLIC BLOOD PRESSURE: 70 MMHG | WEIGHT: 135 LBS

## 2017-09-06 VITALS
HEART RATE: 78 BPM | SYSTOLIC BLOOD PRESSURE: 132 MMHG | TEMPERATURE: 98.3 F | DIASTOLIC BLOOD PRESSURE: 80 MMHG | OXYGEN SATURATION: 97 %

## 2017-09-06 PROCEDURE — 3331090002 HH PPS REVENUE DEBIT

## 2017-09-06 PROCEDURE — G0152 HHCP-SERV OF OT,EA 15 MIN: HCPCS

## 2017-09-06 PROCEDURE — 3331090001 HH PPS REVENUE CREDIT

## 2017-09-06 PROCEDURE — G0157 HHC PT ASSISTANT EA 15: HCPCS

## 2017-09-07 ENCOUNTER — HOME CARE VISIT (OUTPATIENT)
Dept: SCHEDULING | Facility: HOME HEALTH | Age: 77
End: 2017-09-07
Payer: MEDICARE

## 2017-09-07 PROCEDURE — G0300 HHS/HOSPICE OF LPN EA 15 MIN: HCPCS

## 2017-09-07 PROCEDURE — G0152 HHCP-SERV OF OT,EA 15 MIN: HCPCS

## 2017-09-07 PROCEDURE — 3331090001 HH PPS REVENUE CREDIT

## 2017-09-07 PROCEDURE — 3331090002 HH PPS REVENUE DEBIT

## 2017-09-08 ENCOUNTER — HOME CARE VISIT (OUTPATIENT)
Dept: SCHEDULING | Facility: HOME HEALTH | Age: 77
End: 2017-09-08
Payer: MEDICARE

## 2017-09-08 VITALS
TEMPERATURE: 98 F | HEART RATE: 88 BPM | DIASTOLIC BLOOD PRESSURE: 70 MMHG | OXYGEN SATURATION: 97 % | SYSTOLIC BLOOD PRESSURE: 140 MMHG

## 2017-09-08 PROCEDURE — G0156 HHCP-SVS OF AIDE,EA 15 MIN: HCPCS

## 2017-09-08 PROCEDURE — 3331090002 HH PPS REVENUE DEBIT

## 2017-09-08 PROCEDURE — 3331090001 HH PPS REVENUE CREDIT

## 2017-09-09 PROCEDURE — 3331090002 HH PPS REVENUE DEBIT

## 2017-09-09 PROCEDURE — 3331090001 HH PPS REVENUE CREDIT

## 2017-09-10 PROCEDURE — 3331090001 HH PPS REVENUE CREDIT

## 2017-09-10 PROCEDURE — 3331090002 HH PPS REVENUE DEBIT

## 2017-09-11 ENCOUNTER — HOME CARE VISIT (OUTPATIENT)
Dept: SCHEDULING | Facility: HOME HEALTH | Age: 77
End: 2017-09-11
Payer: MEDICARE

## 2017-09-11 VITALS
RESPIRATION RATE: 18 BRPM | TEMPERATURE: 98.2 F | HEART RATE: 71 BPM | SYSTOLIC BLOOD PRESSURE: 112 MMHG | OXYGEN SATURATION: 97 % | DIASTOLIC BLOOD PRESSURE: 70 MMHG

## 2017-09-11 PROCEDURE — G0157 HHC PT ASSISTANT EA 15: HCPCS

## 2017-09-11 PROCEDURE — 3331090001 HH PPS REVENUE CREDIT

## 2017-09-11 PROCEDURE — 3331090002 HH PPS REVENUE DEBIT

## 2017-09-12 ENCOUNTER — HOME CARE VISIT (OUTPATIENT)
Dept: SCHEDULING | Facility: HOME HEALTH | Age: 77
End: 2017-09-12
Payer: MEDICARE

## 2017-09-12 VITALS
DIASTOLIC BLOOD PRESSURE: 70 MMHG | HEART RATE: 88 BPM | OXYGEN SATURATION: 97 % | TEMPERATURE: 97.3 F | SYSTOLIC BLOOD PRESSURE: 118 MMHG | RESPIRATION RATE: 16 BRPM

## 2017-09-12 PROCEDURE — G0300 HHS/HOSPICE OF LPN EA 15 MIN: HCPCS

## 2017-09-12 PROCEDURE — 3331090001 HH PPS REVENUE CREDIT

## 2017-09-12 PROCEDURE — 3331090002 HH PPS REVENUE DEBIT

## 2017-09-13 ENCOUNTER — HOME CARE VISIT (OUTPATIENT)
Dept: SCHEDULING | Facility: HOME HEALTH | Age: 77
End: 2017-09-13
Payer: MEDICARE

## 2017-09-13 VITALS
DIASTOLIC BLOOD PRESSURE: 84 MMHG | HEART RATE: 78 BPM | SYSTOLIC BLOOD PRESSURE: 124 MMHG | RESPIRATION RATE: 17 BRPM | OXYGEN SATURATION: 98 % | TEMPERATURE: 98.4 F

## 2017-09-13 VITALS
TEMPERATURE: 98.2 F | WEIGHT: 135 LBS | DIASTOLIC BLOOD PRESSURE: 70 MMHG | BODY MASS INDEX: 26.37 KG/M2 | HEART RATE: 78 BPM | RESPIRATION RATE: 17 BRPM | SYSTOLIC BLOOD PRESSURE: 124 MMHG | OXYGEN SATURATION: 97 %

## 2017-09-13 PROCEDURE — G0157 HHC PT ASSISTANT EA 15: HCPCS

## 2017-09-13 PROCEDURE — 3331090002 HH PPS REVENUE DEBIT

## 2017-09-13 PROCEDURE — 3331090001 HH PPS REVENUE CREDIT

## 2017-09-14 ENCOUNTER — HOME CARE VISIT (OUTPATIENT)
Dept: SCHEDULING | Facility: HOME HEALTH | Age: 77
End: 2017-09-14
Payer: MEDICARE

## 2017-09-14 PROCEDURE — 3331090001 HH PPS REVENUE CREDIT

## 2017-09-14 PROCEDURE — 3331090002 HH PPS REVENUE DEBIT

## 2017-09-14 PROCEDURE — G0299 HHS/HOSPICE OF RN EA 15 MIN: HCPCS

## 2017-09-15 ENCOUNTER — HOME CARE VISIT (OUTPATIENT)
Dept: SCHEDULING | Facility: HOME HEALTH | Age: 77
End: 2017-09-15
Payer: MEDICARE

## 2017-09-15 VITALS
HEART RATE: 85 BPM | OXYGEN SATURATION: 97 % | SYSTOLIC BLOOD PRESSURE: 120 MMHG | DIASTOLIC BLOOD PRESSURE: 80 MMHG | RESPIRATION RATE: 20 BRPM | TEMPERATURE: 97.6 F

## 2017-09-15 PROCEDURE — G0156 HHCP-SVS OF AIDE,EA 15 MIN: HCPCS

## 2017-09-15 PROCEDURE — 3331090002 HH PPS REVENUE DEBIT

## 2017-09-15 PROCEDURE — 3331090001 HH PPS REVENUE CREDIT

## 2017-09-16 PROCEDURE — 3331090002 HH PPS REVENUE DEBIT

## 2017-09-16 PROCEDURE — 3331090001 HH PPS REVENUE CREDIT

## 2017-09-17 PROCEDURE — 3331090002 HH PPS REVENUE DEBIT

## 2017-09-17 PROCEDURE — 3331090001 HH PPS REVENUE CREDIT

## 2017-09-18 PROCEDURE — 3331090002 HH PPS REVENUE DEBIT

## 2017-09-18 PROCEDURE — 3331090001 HH PPS REVENUE CREDIT

## 2017-09-19 ENCOUNTER — HOME CARE VISIT (OUTPATIENT)
Dept: SCHEDULING | Facility: HOME HEALTH | Age: 77
End: 2017-09-19
Payer: MEDICARE

## 2017-09-19 VITALS
OXYGEN SATURATION: 99 % | TEMPERATURE: 99 F | RESPIRATION RATE: 17 BRPM | DIASTOLIC BLOOD PRESSURE: 82 MMHG | HEART RATE: 94 BPM | SYSTOLIC BLOOD PRESSURE: 128 MMHG

## 2017-09-19 PROCEDURE — 3331090001 HH PPS REVENUE CREDIT

## 2017-09-19 PROCEDURE — 3331090002 HH PPS REVENUE DEBIT

## 2017-09-19 PROCEDURE — G0157 HHC PT ASSISTANT EA 15: HCPCS

## 2017-09-20 ENCOUNTER — HOME CARE VISIT (OUTPATIENT)
Dept: SCHEDULING | Facility: HOME HEALTH | Age: 77
End: 2017-09-20
Payer: MEDICARE

## 2017-09-20 PROCEDURE — G0151 HHCP-SERV OF PT,EA 15 MIN: HCPCS

## 2017-09-20 PROCEDURE — 3331090002 HH PPS REVENUE DEBIT

## 2017-09-20 PROCEDURE — 3331090001 HH PPS REVENUE CREDIT

## 2017-09-21 VITALS
DIASTOLIC BLOOD PRESSURE: 84 MMHG | SYSTOLIC BLOOD PRESSURE: 152 MMHG | WEIGHT: 134 LBS | OXYGEN SATURATION: 97 % | TEMPERATURE: 97.6 F | HEIGHT: 60 IN | RESPIRATION RATE: 18 BRPM | BODY MASS INDEX: 26.31 KG/M2 | HEART RATE: 88 BPM

## 2017-09-21 PROCEDURE — 3331090001 HH PPS REVENUE CREDIT

## 2017-09-21 PROCEDURE — 3331090002 HH PPS REVENUE DEBIT

## 2017-09-22 PROCEDURE — 3331090001 HH PPS REVENUE CREDIT

## 2017-09-22 PROCEDURE — 3331090002 HH PPS REVENUE DEBIT

## 2017-09-23 PROCEDURE — 3331090002 HH PPS REVENUE DEBIT

## 2017-09-23 PROCEDURE — 3331090001 HH PPS REVENUE CREDIT

## 2017-09-24 PROCEDURE — 3331090002 HH PPS REVENUE DEBIT

## 2017-09-24 PROCEDURE — 3331090001 HH PPS REVENUE CREDIT

## 2017-09-25 PROCEDURE — 3331090002 HH PPS REVENUE DEBIT

## 2017-09-25 PROCEDURE — 3331090001 HH PPS REVENUE CREDIT

## 2017-09-26 PROCEDURE — 3331090002 HH PPS REVENUE DEBIT

## 2017-09-26 PROCEDURE — 3331090001 HH PPS REVENUE CREDIT

## 2017-09-27 PROCEDURE — 3331090001 HH PPS REVENUE CREDIT

## 2017-09-27 PROCEDURE — 3331090002 HH PPS REVENUE DEBIT

## 2017-09-28 PROCEDURE — 3331090001 HH PPS REVENUE CREDIT

## 2017-09-28 PROCEDURE — 3331090002 HH PPS REVENUE DEBIT

## 2017-09-29 PROCEDURE — 3331090002 HH PPS REVENUE DEBIT

## 2017-09-29 PROCEDURE — 3331090001 HH PPS REVENUE CREDIT

## 2017-09-30 PROCEDURE — 3331090001 HH PPS REVENUE CREDIT

## 2017-09-30 PROCEDURE — 3331090002 HH PPS REVENUE DEBIT

## 2017-10-05 ENCOUNTER — TELEPHONE (OUTPATIENT)
Dept: CARDIOLOGY CLINIC | Age: 77
End: 2017-10-05

## 2017-10-05 NOTE — TELEPHONE ENCOUNTER
Please call Quinton Gold to discuss patient missing her medication for a week and having a different process for patient getting her refills. Thanks!

## 2017-10-06 NOTE — TELEPHONE ENCOUNTER
Spoke with Sheila Olivera on [address] [place] [address] St Verified patient with two identifiers. Reviewed medications and they said had refills at pharmacy to .

## 2017-10-18 ENCOUNTER — OFFICE VISIT (OUTPATIENT)
Dept: CARDIOLOGY CLINIC | Age: 77
End: 2017-10-18

## 2017-10-18 VITALS
HEART RATE: 96 BPM | WEIGHT: 134.3 LBS | OXYGEN SATURATION: 96 % | BODY MASS INDEX: 26.37 KG/M2 | SYSTOLIC BLOOD PRESSURE: 152 MMHG | DIASTOLIC BLOOD PRESSURE: 88 MMHG | RESPIRATION RATE: 20 BRPM | HEIGHT: 60 IN

## 2017-10-18 DIAGNOSIS — I25.10 CORONARY ARTERY DISEASE INVOLVING NATIVE CORONARY ARTERY OF NATIVE HEART WITHOUT ANGINA PECTORIS: Primary | ICD-10-CM

## 2017-10-18 DIAGNOSIS — E78.2 MIXED HYPERLIPIDEMIA: ICD-10-CM

## 2017-10-18 DIAGNOSIS — I50.22 CHRONIC SYSTOLIC CONGESTIVE HEART FAILURE (HCC): ICD-10-CM

## 2017-10-18 DIAGNOSIS — I11.0: ICD-10-CM

## 2017-10-18 RX ORDER — ATORVASTATIN CALCIUM 40 MG/1
TABLET, FILM COATED ORAL
Qty: 30 TAB | Refills: 12 | Status: ON HOLD | OUTPATIENT
Start: 2017-10-18 | End: 2018-05-11

## 2017-10-18 RX ORDER — CARVEDILOL 12.5 MG/1
12.5 TABLET ORAL 2 TIMES DAILY
Qty: 60 TAB | Refills: 12 | Status: SHIPPED | OUTPATIENT
Start: 2017-10-18 | End: 2018-05-11

## 2017-10-18 RX ORDER — LISINOPRIL 2.5 MG/1
2.5 TABLET ORAL DAILY
Qty: 30 TAB | Refills: 12 | Status: SHIPPED | OUTPATIENT
Start: 2017-10-18 | End: 2018-05-11

## 2017-10-18 RX ORDER — AMLODIPINE BESYLATE 2.5 MG/1
2.5 TABLET ORAL DAILY
Qty: 30 TAB | Refills: 12 | Status: SHIPPED | OUTPATIENT
Start: 2017-10-18 | End: 2018-05-11

## 2017-10-18 RX ORDER — FUROSEMIDE 40 MG/1
40 TABLET ORAL DAILY
Qty: 30 TAB | Refills: 12 | Status: SHIPPED | OUTPATIENT
Start: 2017-10-18 | End: 2018-05-11

## 2017-10-18 RX ORDER — ASPIRIN 81 MG/1
81 TABLET ORAL DAILY
Qty: 100 TAB | Refills: 6 | Status: SHIPPED | OUTPATIENT
Start: 2017-10-18 | End: 2020-03-04

## 2017-10-18 RX ORDER — ISOSORBIDE MONONITRATE 30 MG/1
30 TABLET, EXTENDED RELEASE ORAL
Qty: 30 TAB | Refills: 12 | Status: SHIPPED | OUTPATIENT
Start: 2017-10-18 | End: 2018-05-11

## 2017-10-18 NOTE — MR AVS SNAPSHOT
Visit Information Date & Time Provider Department Dept. Phone Encounter #  
 10/18/2017  1:45 PM Sera Goldstein, [address] [place] [address] [facility] [id number] Your Appointments 1/11/2018 10:45 AM  
3 MONTH with MD Edward C[facility]) Appt Note: Dr. Jacinto Prado [facility]  
[phone] [address] [facility] Upcoming Health Maintenance Date Due DTaP/Tdap/Td series (1 - Tdap) 10/2/1959 ZOSTER VACCINE AGE 60> 8/2/1998 GLAUCOMA SCREENING Q2Y 10/2/2003 OSTEOPOROSIS SCREENING (DEXA) 10/2/2003 Pneumococcal 65+ High/Highest Risk (1 of 2 - PCV13) 10/2/2003 MEDICARE YEARLY EXAM 10/2/2003 INFLUENZA AGE 9 TO ADULT 8/1/2017 Allergies as of 10/18/2017  Review Complete On: 10/18/2017 By: Sera Goldstein MD  
  
 Severity Noted Reaction Type Reactions Decadron [Dexamethasone Sodium Phosphate]  03/09/2017    Hives Pcn [Penicillins]  07/14/2016    Hives Current Immunizations  Never Reviewed Name Date Influenza Vaccine 10/1/2016 Not reviewed this visit You Were Diagnosed With   
  
 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 [address]     ICD-10-CM: I11.0 ICD-9-CM: 401.0, 428.0 Mixed hyperlipidemia     ICD-10-CM: E78.2 ICD-9-CM: 272.2 Vitals BP Pulse Resp Height(growth percentile) Weight(growth percentile) SpO2  
 152/88 (BP 1 Location: Right arm, BP Patient Position: Sitting) 96 20 5' (1.524 m) 134 lb 4.8 oz (60.9 kg) 96% BMI Smoking Status 26.23 kg/m2 Former Smoker Vitals History BMI and BSA Data Body Mass Index Body Surface Area  
 26.23 kg/m 2 1.61 m 2 Preferred Pharmacy Pharmacy Name Phone [facility] [address] [phone] Your Updated Medication List  
  
   
This list is accurate as of: 10/18/17  1:55 PM.  Always use your most recent med list.  
  
  
  
  
 albuterol 90 mcg/actuation inhaler Commonly known as:  PROVENTIL HFA, VENTOLIN HFA, PROAIR HFA Take 2 Puffs by inhalation every four (4) hours as needed for Wheezing. amLODIPine 2.5 mg tablet Commonly known as:  Lennis Eagles Take 1 Tab by mouth daily. aspirin delayed-release 81 mg tablet Take 1 Tab by mouth daily. atorvastatin 40 mg tablet Commonly known as:  LIPITOR  
TAKE ONE TABLET BY MOUTH EVERY DAY  
  
 carvedilol 12.5 mg tablet Commonly known as:  Gillermina Begun Take 1 Tab by mouth two (2) times a day. furosemide 40 mg tablet Commonly known as:  LASIX Take 1 Tab by mouth daily. One pill daily  
  
 isosorbide mononitrate ER 30 mg tablet Commonly known as:  IMDUR Take 1 Tab by mouth every morning. lisinopril 2.5 mg tablet Commonly known as:  Arelis Hu Take 1 Tab by mouth daily. oxyCODONE-acetaminophen 5-325 mg per tablet Commonly known as:  PERCOCET Take 1 Tab by mouth every six (6) hours as needed. Max Daily Amount: 4 Tabs. OXYGEN-AIR DELIVERY SYSTEMS  
2 L by Nasal route continuous. Prescriptions Sent to Pharmacy Refills  
 amLODIPine (NORVASC) 2.5 mg tablet 12 Sig: Take 1 Tab by mouth daily. Class: Normal  
 Pharmacy: [id number] Ph #: [phone] Route: Oral  
 furosemide (LASIX) 40 mg tablet 12 Sig: Take 1 Tab by mouth daily. One pill daily Class: Normal  
 Pharmacy: [id number] Ph #: [phone]  Route: Oral  
 atorvastatin (LIPITOR) 40 mg tablet 12 Sig: TAKE ONE TABLET BY MOUTH EVERY DAY Class: Normal  
 Pharmacy: [id number], [address] Ph #: [phone]  
 aspirin delayed-release 81 mg tablet 6 Sig: Take 1 Tab by mouth daily. Class: Normal  
 Pharmacy: [facility] [address], [address] [facility] Ph #: [phone] Route: Oral  
 lisinopril (PRINIVIL, ZESTRIL) 2.5 mg tablet 12 Sig: Take 1 Tab by mouth daily. Class: Normal  
 Pharmacy: [facility] [address], [address] [facility] Ph #: [phone] Route: Oral  
 carvedilol (COREG) 12.5 mg tablet 12 Sig: Take 1 Tab by mouth two (2) times a day. Class: Normal  
 Pharmacy: [facility] [address], [address] [place] [address] Ph #: [phone] Route: Oral  
 isosorbide mononitrate ER (IMDUR) 30 mg tablet 12 Sig: Take 1 Tab by mouth every morning. Class: Normal  
 Pharmacy: [facility] [address] [facility], [address] [place] [address] Ph #: [phone] Route: Oral  
  
We Performed the Following AMB POC EKG ROUTINE W/ 12 LEADS, INTER & REP [63377 CPT(R)] Introducing [facility]! Marce Nahum introduces [employer] patient portal. Now you can access parts of your medical record, email your doctor's office, and request medication refills online. 1. In your internet browser, go to [email or web address] 2. Click on the First Time User? Click Here link in the Sign In box. You will see the New Member Sign Up page. 3. Enter your [employer] Access Code exactly as it appears below. You will not need to use this code after youve completed the sign-up process. If you do not sign up before the expiration date, you must request a new code. · [employer] Access Code: [email or web address] Expires: 11/1/2017  3:37 PM 
 
4.  Enter the last four digits of your Social Security Number (xxxx) and Date of Birth (mm/dd/yyyy) as indicated and click Submit. You will be taken to the next sign-up page. 5. Create a [employer] ID. This will be your [employer] login ID and cannot be changed, so think of one that is secure and easy to remember. 6. Create a [employer] password. You can change your password at any time. 7. Enter your Password Reset Question and Answer. This can be used at a later time if you forget your password. 8. Enter your e-mail address. You will receive e-mail notification when new information is available in [address] 9. Click Sign Up. You can now view and download portions of your medical record. 10. Click the Download Summary menu link to download a portable copy of your medical information. If you have questions, please visit the Frequently Asked Questions section of the [employer] website. Remember, [employer] is NOT to be used for urgent needs. For medical emergencies, dial 911. Now available from your iPhone and Android! Please provide this summary of care documentation to your next provider. Your primary care clinician is listed as Lashaun Gold. If you have any questions after today's visit, please call [phone].

## 2017-10-18 NOTE — PROGRESS NOTES
NAME:  Niraj Griffith   :   1940   MRN:   [id number]   PCP:  Grey Marrero MD           Subjective: The patient is a 76y.o. year old female  who returns for a one month  follow-up. Since the last visit, patient reports dyspnea on exertion and occasionally at rest. She is not experiencing any chest pain. Has not had some medications for a while. Her Rx were for 30 days at discharge in August. Some bottles still have pills; some do not.      Patient Active Problem List   Diagnosis Code    Severe sepsis ([facility]) A41.9, R65.20    Pneumonia J18.9    Acute respiratory failure ([facility]) J96.00    Elevated troponin R74.8    Left bundle branch block (LBBB) on electrocardiogram V14.4    Acute systolic heart failure ([facility]) I50.21    Acute kidney failure (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) I70.1    Atherosclerosis of native arteries of the extremities with intermittent claudication I70.219    Tobacco use disorder F17.200    Cerebrovascular accident ([address] I63.9    Mitral valve disorders(424.0) I05.9    Tricuspid valve disorder I07.9    COPD exacerbation ([facility]) J44.1    Hypertensive urgency I16.0    CHF (congestive heart failure) ([facility]) I50.9       Past Medical History:   Diagnosis Date    Acute kidney failure ([address]     Acute systolic heart failure ([address] 2016    CAD (coronary artery disease)     Heart failure ([address]     Stroke ([address]     Stroke    Stroke [facility])        Social History   Substance Use Topics    Smoking status: Former Smoker     Quit date: 9/21/2009    Smokeless tobacco: Never Used    Alcohol use 0.0 oz/week     0 Standard drinks or equivalent per week      Comment: Very little      Family History   Problem Relation Age of Onset    Coronary Artery Disease Other      grandmother age 76        Review of Systems  Constitutional: Negative for fever, chills, and diaphoresis. Respiratory: Negative for cough, hemoptysis, sputum production, shortness of breath and wheezing. Cardiovascular: Negative for chest pain, palpitations, orthopnea, claudication, leg swelling and PND. Gastrointestinal: Negative for heartburn, nausea, vomiting, blood in stool and melena. Genitourinary: Negative for dysuria and flank pain. Musculoskeletal: Negative for joint pain and back pain. Skin: Negative for rash. Neurological: Negative for focal weakness, seizures, loss of consciousness, weakness and headaches. Endo/Heme/Allergies: Does not bruise/bleed easily. Psychiatric/Behavioral: Negative for memory loss. The patient does not have insomnia. Objective:       Vitals:    10/18/17 1304 10/18/17 1314   BP: 152/90 152/88   Pulse: 96    Resp: 20    SpO2: 96%    Weight: 134 lb 4.8 oz (60.9 kg)    Height: 5' (1.524 m)     Body mass index is 26.23 kg/(m^2). General PE    Gen: NAD     Mental Status - Alert. General Appearance - Not in acute distress. Neck - no JVD     Chest and Lung Exam     Inspection: Accessory muscles - No use of accessory muscles in breathing. Auscultation:   Breath sounds: - Normal. Expiratory wheezing. Cardiovascular   Inspection: Jugular vein - Bilateral - Inspection Normal.   Palpation/Percussion:   Apical Impulse: - Normal.   Auscultation: Rhythm - Regular. Heart Sounds - S1 WNL and S2 WNL. No S3 or S4. Murmurs & Other Heart Sounds: Auscultation of the heart reveals - No Murmurs. Peripheral Vascular   Upper Extremity: Inspection - Bilateral - No Cyanotic nailbeds or Digital clubbing.    Lower Extremity: Palpation: Edema - Bilateral - No edema. Abdomen: Soft, non-tender, bowel sounds are active. Neuro: A&O times 3, CN and motor grossly WNL      Data Review:     EKG -  Sinus  Rhythm   -Incomplete left bundle branch block.    -Left atrial enlargement.    -Inferior ST-elevation -repolarization variant. Allergies reviewed  Allergies   Allergen Reactions    Decadron [Dexamethasone Sodium Phosphate] Hives    Pcn [Penicillins] Hives       Medications reviewed  Current Outpatient Prescriptions   Medication Sig    amLODIPine (NORVASC) 2.5 mg tablet Take 1 Tab by mouth daily.  furosemide (LASIX) 40 mg tablet Take 1 Tab by mouth daily. One pill daily    atorvastatin (LIPITOR) 40 mg tablet TAKE ONE TABLET BY MOUTH EVERY DAY    aspirin delayed-release 81 mg tablet Take 1 Tab by mouth daily.  lisinopril (PRINIVIL, ZESTRIL) 2.5 mg tablet Take 1 Tab by mouth daily.  carvedilol (COREG) 12.5 mg tablet Take 1 Tab by mouth two (2) times a day.  isosorbide mononitrate ER (IMDUR) 30 mg tablet Take 1 Tab by mouth every morning.  OXYGEN-AIR DELIVERY SYSTEMS 2 L by Nasal route continuous.  oxyCODONE-acetaminophen (PERCOCET) 5-325 mg per tablet Take 1 Tab by mouth every six (6) hours as needed. Max Daily Amount: 4 Tabs.  albuterol (PROVENTIL HFA, VENTOLIN HFA, PROAIR HFA) 90 mcg/actuation inhaler Take 2 Puffs by inhalation every four (4) hours as needed for Wheezing. No current facility-administered medications for this visit. Assessment:       ICD-10-CM ICD-9-CM    1. Coronary artery disease involving native coronary artery of native heart without angina pectoris I25.10 414.01 AMB POC EKG ROUTINE W/ 12 LEADS, INTER & REP   2. Chronic systolic congestive heart failure (HCC) I50.22 428.22      428.0    3. 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  Mixed hyperlipidemia E78.2 272.2         Orders Placed This Encounter    AMB POC EKG ROUTINE W/ 12 LEADS, INTER & REP     Order Specific Question:   Reason for Exam:     Answer:   routine    amLODIPine (NORVASC) 2.5 mg tablet     Sig: Take 1 Tab by mouth daily. Dispense:  30 Tab     Refill:  12    furosemide (LASIX) 40 mg tablet     Sig: Take 1 Tab by mouth daily. One pill daily     Dispense:  30 Tab     Refill:  12    atorvastatin (LIPITOR) 40 mg tablet     Sig: TAKE ONE TABLET BY MOUTH EVERY DAY     Dispense:  30 Tab     Refill:  12    aspirin delayed-release 81 mg tablet     Sig: Take 1 Tab by mouth daily. Dispense:  100 Tab     Refill:  6    lisinopril (PRINIVIL, ZESTRIL) 2.5 mg tablet     Sig: Take 1 Tab by mouth daily. Dispense:  30 Tab     Refill:  12    carvedilol (COREG) 12.5 mg tablet     Sig: Take 1 Tab by mouth two (2) times a day. Dispense:  60 Tab     Refill:  12    isosorbide mononitrate ER (IMDUR) 30 mg tablet     Sig: Take 1 Tab by mouth every morning. Dispense:  30 Tab     Refill:  12         Plan:     Patient presents for follow up. 1: HTN: elevated today however she has not had all of her medications in a few weeks. Rx refilled. 2. CHF: EF 40%, [facility] Class I continue current medications - all refilled for a year. 3: High Cholesterol: On statin therapy, repeat labs at year end.   4. Wheezing: refer to PCP to evaluate for asthma. Her nephew confirms that she has an inhaler but has not been using it lately. Advised to have her use it every 4 hours until further notice. Does she need a preventive inhaler? Advised the nephew to discuss with the primary care physician. 5.  Mild nonobstructive CAD 2009. Continue aspirin and statin.     Oran Lennox, MD

## 2018-02-05 ENCOUNTER — APPOINTMENT (OUTPATIENT)
Dept: CT IMAGING | Age: 78
DRG: 069 | End: 2018-02-05
Attending: EMERGENCY MEDICINE
Payer: MEDICARE

## 2018-02-05 ENCOUNTER — APPOINTMENT (OUTPATIENT)
Dept: GENERAL RADIOLOGY | Age: 78
DRG: 069 | End: 2018-02-05
Attending: EMERGENCY MEDICINE
Payer: MEDICARE

## 2018-02-05 ENCOUNTER — HOSPITAL ENCOUNTER (INPATIENT)
Age: 78
LOS: 5 days | Discharge: NURSING, REHAB OR LONG-TERM CARE | DRG: 069 | End: 2018-02-10
Attending: EMERGENCY MEDICINE | Admitting: INTERNAL MEDICINE
Payer: MEDICARE

## 2018-02-05 DIAGNOSIS — R73.03 PREDIABETES: ICD-10-CM

## 2018-02-05 DIAGNOSIS — I10 ESSENTIAL HYPERTENSION, BENIGN: ICD-10-CM

## 2018-02-05 DIAGNOSIS — I63.9 CEREBROVASCULAR ACCIDENT (CVA), UNSPECIFIED MECHANISM (HCC): Primary | ICD-10-CM

## 2018-02-05 DIAGNOSIS — E78.2 MIXED HYPERLIPIDEMIA: ICD-10-CM

## 2018-02-05 PROBLEM — I25.10 CAD (CORONARY ARTERY DISEASE): Status: ACTIVE | Noted: 2018-02-05

## 2018-02-05 LAB
ANION GAP SERPL CALC-SCNC: 5 MMOL/L (ref 5–15)
APTT PPP: 27.4 SEC (ref 22.1–32.5)
BASOPHILS # BLD: 0 K/UL (ref 0–0.1)
BASOPHILS NFR BLD: 0 % (ref 0–1)
BUN SERPL-MCNC: 29 MG/DL (ref 6–20)
BUN/CREAT SERPL: 17 (ref 12–20)
CALCIUM SERPL-MCNC: 8.8 MG/DL (ref 8.5–10.1)
CHLORIDE SERPL-SCNC: 108 MMOL/L (ref 97–108)
CO2 SERPL-SCNC: 29 MMOL/L (ref 21–32)
CREAT SERPL-MCNC: 1.72 MG/DL (ref 0.55–1.02)
DIFFERENTIAL METHOD BLD: ABNORMAL
EOSINOPHIL # BLD: 0.1 K/UL (ref 0–0.4)
EOSINOPHIL NFR BLD: 2 % (ref 0–7)
ERYTHROCYTE [DISTWIDTH] IN BLOOD BY AUTOMATED COUNT: 13.6 % (ref 11.5–14.5)
GLUCOSE SERPL-MCNC: 120 MG/DL (ref 65–100)
HCT VFR BLD AUTO: 34.4 % (ref 35–47)
HGB BLD-MCNC: 11.5 G/DL (ref 11.5–16)
IMM GRANULOCYTES # BLD: 0 K/UL (ref 0–0.04)
IMM GRANULOCYTES NFR BLD AUTO: 0 % (ref 0–0.5)
INR PPP: 1 (ref 0.9–1.1)
LYMPHOCYTES # BLD: 1.6 K/UL (ref 0.8–3.5)
LYMPHOCYTES NFR BLD: 23 % (ref 12–49)
MCH RBC QN AUTO: 26.9 PG (ref 26–34)
MCHC RBC AUTO-ENTMCNC: 33.4 G/DL (ref 30–36.5)
MCV RBC AUTO: 80.4 FL (ref 80–99)
MONOCYTES # BLD: 0.6 K/UL (ref 0–1)
MONOCYTES NFR BLD: 9 % (ref 5–13)
NEUTS SEG # BLD: 4.4 K/UL (ref 1.8–8)
NEUTS SEG NFR BLD: 66 % (ref 32–75)
NRBC # BLD: 0 K/UL (ref 0–0.01)
NRBC BLD-RTO: 0 PER 100 WBC
PLATELET # BLD AUTO: 180 K/UL (ref 150–400)
PMV BLD AUTO: 11.5 FL (ref 8.9–12.9)
POTASSIUM SERPL-SCNC: 4.5 MMOL/L (ref 3.5–5.1)
PROTHROMBIN TIME: 10.4 SEC (ref 9–11.1)
RBC # BLD AUTO: 4.28 M/UL (ref 3.8–5.2)
SODIUM SERPL-SCNC: 142 MMOL/L (ref 136–145)
THERAPEUTIC RANGE,PTTT: NORMAL SECS (ref 58–77)
WBC # BLD AUTO: 6.7 K/UL (ref 3.6–11)

## 2018-02-05 PROCEDURE — 36415 COLL VENOUS BLD VENIPUNCTURE: CPT | Performed by: EMERGENCY MEDICINE

## 2018-02-05 PROCEDURE — 74011250637 HC RX REV CODE- 250/637: Performed by: INTERNAL MEDICINE

## 2018-02-05 PROCEDURE — 77030038269 HC DRN EXT URIN PURWCK BARD -A

## 2018-02-05 PROCEDURE — 74011250636 HC RX REV CODE- 250/636: Performed by: INTERNAL MEDICINE

## 2018-02-05 PROCEDURE — 70450 CT HEAD/BRAIN W/O DYE: CPT

## 2018-02-05 PROCEDURE — 71045 X-RAY EXAM CHEST 1 VIEW: CPT

## 2018-02-05 PROCEDURE — 85730 THROMBOPLASTIN TIME PARTIAL: CPT | Performed by: EMERGENCY MEDICINE

## 2018-02-05 PROCEDURE — 70498 CT ANGIOGRAPHY NECK: CPT

## 2018-02-05 PROCEDURE — 80048 BASIC METABOLIC PNL TOTAL CA: CPT | Performed by: EMERGENCY MEDICINE

## 2018-02-05 PROCEDURE — 65660000000 HC RM CCU STEPDOWN

## 2018-02-05 PROCEDURE — 93005 ELECTROCARDIOGRAM TRACING: CPT

## 2018-02-05 PROCEDURE — 99285 EMERGENCY DEPT VISIT HI MDM: CPT

## 2018-02-05 PROCEDURE — 74011250636 HC RX REV CODE- 250/636: Performed by: EMERGENCY MEDICINE

## 2018-02-05 PROCEDURE — 85025 COMPLETE CBC W/AUTO DIFF WBC: CPT | Performed by: EMERGENCY MEDICINE

## 2018-02-05 PROCEDURE — 74011636320 HC RX REV CODE- 636/320: Performed by: EMERGENCY MEDICINE

## 2018-02-05 PROCEDURE — 85610 PROTHROMBIN TIME: CPT | Performed by: EMERGENCY MEDICINE

## 2018-02-05 PROCEDURE — 94762 N-INVAS EAR/PLS OXIMTRY CONT: CPT

## 2018-02-05 RX ORDER — ACETAMINOPHEN 325 MG/1
650 TABLET ORAL
Status: DISCONTINUED | OUTPATIENT
Start: 2018-02-05 | End: 2018-02-10 | Stop reason: HOSPADM

## 2018-02-05 RX ORDER — SODIUM CHLORIDE 0.9 % (FLUSH) 0.9 %
5-10 SYRINGE (ML) INJECTION AS NEEDED
Status: DISCONTINUED | OUTPATIENT
Start: 2018-02-05 | End: 2018-02-10 | Stop reason: HOSPADM

## 2018-02-05 RX ORDER — CARVEDILOL 12.5 MG/1
12.5 TABLET ORAL 2 TIMES DAILY
Status: DISCONTINUED | OUTPATIENT
Start: 2018-02-05 | End: 2018-02-10 | Stop reason: HOSPADM

## 2018-02-05 RX ORDER — SODIUM CHLORIDE 9 MG/ML
75 INJECTION, SOLUTION INTRAVENOUS CONTINUOUS
Status: DISCONTINUED | OUTPATIENT
Start: 2018-02-05 | End: 2018-02-07

## 2018-02-05 RX ORDER — LISINOPRIL 5 MG/1
2.5 TABLET ORAL DAILY
Status: DISCONTINUED | OUTPATIENT
Start: 2018-02-06 | End: 2018-02-10 | Stop reason: HOSPADM

## 2018-02-05 RX ORDER — AMLODIPINE BESYLATE 2.5 MG/1
2.5 TABLET ORAL DAILY
Status: DISCONTINUED | OUTPATIENT
Start: 2018-02-06 | End: 2018-02-10 | Stop reason: HOSPADM

## 2018-02-05 RX ORDER — ATORVASTATIN CALCIUM 40 MG/1
40 TABLET, FILM COATED ORAL DAILY
Status: DISCONTINUED | OUTPATIENT
Start: 2018-02-06 | End: 2018-02-06

## 2018-02-05 RX ORDER — SODIUM CHLORIDE 9 MG/ML
50 INJECTION, SOLUTION INTRAVENOUS
Status: COMPLETED | OUTPATIENT
Start: 2018-02-05 | End: 2018-02-05

## 2018-02-05 RX ORDER — SODIUM CHLORIDE 0.9 % (FLUSH) 0.9 %
5-10 SYRINGE (ML) INJECTION EVERY 8 HOURS
Status: DISCONTINUED | OUTPATIENT
Start: 2018-02-05 | End: 2018-02-09

## 2018-02-05 RX ORDER — ASPIRIN 81 MG/1
81 TABLET ORAL DAILY
Status: DISCONTINUED | OUTPATIENT
Start: 2018-02-06 | End: 2018-02-06

## 2018-02-05 RX ORDER — ACETAMINOPHEN 650 MG/1
650 SUPPOSITORY RECTAL
Status: DISCONTINUED | OUTPATIENT
Start: 2018-02-05 | End: 2018-02-10 | Stop reason: HOSPADM

## 2018-02-05 RX ORDER — POTASSIUM CHLORIDE 20 MEQ/1
20 TABLET, EXTENDED RELEASE ORAL DAILY
COMMUNITY
End: 2018-05-11

## 2018-02-05 RX ORDER — SODIUM CHLORIDE 0.9 % (FLUSH) 0.9 %
10 SYRINGE (ML) INJECTION
Status: COMPLETED | OUTPATIENT
Start: 2018-02-05 | End: 2018-02-05

## 2018-02-05 RX ORDER — FUROSEMIDE 40 MG/1
40 TABLET ORAL DAILY
Status: DISCONTINUED | OUTPATIENT
Start: 2018-02-06 | End: 2018-02-07

## 2018-02-05 RX ORDER — ISOSORBIDE MONONITRATE 30 MG/1
30 TABLET, EXTENDED RELEASE ORAL DAILY
Status: DISCONTINUED | OUTPATIENT
Start: 2018-02-06 | End: 2018-02-10 | Stop reason: HOSPADM

## 2018-02-05 RX ORDER — POTASSIUM CHLORIDE 750 MG/1
20 TABLET, FILM COATED, EXTENDED RELEASE ORAL
COMMUNITY
End: 2018-02-05 | Stop reason: DRUGHIGH

## 2018-02-05 RX ADMIN — Medication 10 ML: at 17:21

## 2018-02-05 RX ADMIN — CARVEDILOL 12.5 MG: 12.5 TABLET, FILM COATED ORAL at 23:16

## 2018-02-05 RX ADMIN — IOPAMIDOL 100 ML: 755 INJECTION, SOLUTION INTRAVENOUS at 17:21

## 2018-02-05 RX ADMIN — SODIUM CHLORIDE 75 ML/HR: 900 INJECTION, SOLUTION INTRAVENOUS at 22:08

## 2018-02-05 RX ADMIN — Medication 10 ML: at 23:17

## 2018-02-05 RX ADMIN — SODIUM CHLORIDE 50 ML/HR: 900 INJECTION, SOLUTION INTRAVENOUS at 17:21

## 2018-02-05 NOTE — ED NOTES
Patient unable to answer orientation questions due to difficulties with speech. Patient appears to understand conversation and will nod head when asked yes or no questions.

## 2018-02-05 NOTE — IP AVS SNAPSHOT
[address] [facility] 
[phone] Patient: Mirian Andres MRN: [id number] :1940 A check williams indicates which time of day the medication should be taken. My Medications START taking these medications Instructions Each Dose to Equal  
 Morning Noon Evening Bedtime  
 clopidogrel 75 mg Tab Commonly known as:  PLAVIX Your last dose was: Your next dose is: Take 1 Tab by mouth daily. 75 mg  
    
   
   
   
  
 loratadine 10 mg tablet Commonly known as:  Annabel Furrow Your last dose was: Your next dose is: Take 1 Tab by mouth daily. 10 mg  
    
   
   
   
  
 predniSONE 10 mg tablet Commonly known as:  Rylee Moon Your last dose was: Your next dose is: Take 2 Tabs by mouth daily (with breakfast). 2 tabs for 3 days 1 tabs for 3 days  Then stop. 20 mg CHANGE how you take these medications Instructions Each Dose to Equal  
 Morning Noon Evening Bedtime  
 potassium chloride 20 mEq tablet Commonly known as:  K-DUR, KLOR-CON What changed:  Another medication with the same name was removed. Continue taking this medication, and follow the directions you see here. Your last dose was: Your next dose is: Take 20 mEq by mouth daily. 20 mEq CONTINUE taking these medications Instructions Each Dose to Equal  
 Morning Noon Evening Bedtime  
 albuterol 90 mcg/actuation inhaler Commonly known as:  PROVENTIL HFA, VENTOLIN HFA, PROAIR HFA Your last dose was: Your next dose is: Take 2 Puffs by inhalation every four (4) hours as needed for Wheezing. 2 Puff  
    
   
   
   
  
 amLODIPine 2.5 mg tablet Commonly known as:  Radha Clark Your last dose was: Your next dose is: Take 1 Tab by mouth daily. 2.5 mg  
    
   
   
   
  
 aspirin delayed-release 81 mg tablet Your last dose was: Your next dose is: Take 1 Tab by mouth daily. 81 mg  
    
   
   
   
  
 atorvastatin 40 mg tablet Commonly known as:  LIPITOR Your last dose was: Your next dose is: TAKE ONE TABLET BY MOUTH EVERY DAY  
     
   
   
   
  
 carvedilol 12.5 mg tablet Commonly known as:  Lanegayla Ruzachariahs Your last dose was: Your next dose is: Take 1 Tab by mouth two (2) times a day. 12.5 mg  
    
   
   
   
  
 furosemide 40 mg tablet Commonly known as:  LASIX Your last dose was: Your next dose is: Take 1 Tab by mouth daily. One pill daily 40 mg  
    
   
   
   
  
 isosorbide mononitrate ER 30 mg tablet Commonly known as:  IMDUR Your last dose was: Your next dose is: Take 1 Tab by mouth every morning. 30 mg  
    
   
   
   
  
 lisinopril 2.5 mg tablet Commonly known as:  Fabiola Grebe Your last dose was: Your next dose is: Take 1 Tab by mouth daily. 2.5 mg Where to Get Your Medications Information on where to get these meds will be given to you by the nurse or doctor. ! Ask your nurse or doctor about these medications  
  clopidogrel 75 mg Tab  
 loratadine 10 mg tablet  
 predniSONE 10 mg tablet

## 2018-02-05 NOTE — ED PROVIDER NOTES
EMERGENCY DEPARTMENT HISTORY AND PHYSICAL EXAM      Date: 2/5/2018  Patient Name: Rustam Cage    History of Presenting Illness     Chief Complaint   Patient presents with    Dysarthria     more slurred than normal.     Extremity Weakness     left sided weakness. History Provided By: Patient's Son    HPI: Rustam Cage, 76 y.o. female with PMHx significant for stroke, CAD, heart failure, and acute kidney failure, presents via EMS to the ED with cc of slurred speech and L sided weakness since ~ 2300 yesterday. Son reports the pt was staying up late watching the Super Bowl with him and was getting ready for bed when she tried to say something and it was not coming out right. This morning, the pt complained of lightheadedness and confusion and her slurred speech progressively worsened throughout the day. Son reports that he also had to help the pt into her room which is unusual as she is independently ambulatory. Pt tales ASA daily. She had a stroke in 2009 with residual deficits of mildly slurred speech and LUE weakness. Son reports her sxs are much worse than normal. Son notes that she has an appointment with her cardiologist in 4 days. Son denies any fevers, n/v/d, CP or SOB. PCP: Brigitte Angel MD    There are no other complaints, changes, or physical findings at this time. Current Outpatient Prescriptions   Medication Sig Dispense Refill    amLODIPine (NORVASC) 2.5 mg tablet Take 1 Tab by mouth daily. 30 Tab 12    furosemide (LASIX) 40 mg tablet Take 1 Tab by mouth daily. One pill daily 30 Tab 12    atorvastatin (LIPITOR) 40 mg tablet TAKE ONE TABLET BY MOUTH EVERY DAY 30 Tab 12    aspirin delayed-release 81 mg tablet Take 1 Tab by mouth daily. 100 Tab 6    lisinopril (PRINIVIL, ZESTRIL) 2.5 mg tablet Take 1 Tab by mouth daily. 30 Tab 12    carvedilol (COREG) 12.5 mg tablet Take 1 Tab by mouth two (2) times a day.  60 Tab 12    isosorbide mononitrate ER (IMDUR) 30 mg tablet Take 1 Tab by mouth every morning. 30 Tab 12    OXYGEN-AIR DELIVERY SYSTEMS 2 L by Nasal route continuous.  oxyCODONE-acetaminophen (PERCOCET) 5-325 mg per tablet Take 1 Tab by mouth every six (6) hours as needed. Max Daily Amount: 4 Tabs. 20 Tab 0    albuterol (PROVENTIL HFA, VENTOLIN HFA, PROAIR HFA) 90 mcg/actuation inhaler Take 2 Puffs by inhalation every four (4) hours as needed for Wheezing. 1 Inhaler 1       Past History     Past Medical History:  Past Medical History:   Diagnosis Date    Acute kidney failure ([facility] 8/39/2845    Acute systolic heart failure ([facility] 7/14/2016    CAD (coronary artery disease)     [facility]     Stroke ([facility] 2009    Stroke    Stroke [facility])        Past Surgical History:  History reviewed. No pertinent surgical history. Family History:  Family History   Problem Relation Age of Onset    Coronary Artery Disease Other      grandmother age 76       Social History:  Social History   Substance Use Topics    Smoking status: Former Smoker     Quit date: 9/21/2009    Smokeless tobacco: Never Used    Alcohol use 0.0 oz/week     0 Standard drinks or equivalent per week      Comment: Very little       Allergies: Allergies   Allergen Reactions    Decadron [Dexamethasone Sodium Phosphate] Hives    Pcn [Penicillins] Hives         Review of Systems   Review of Systems   Constitutional: Negative for chills and fever. HENT: Negative for congestion, ear pain, rhinorrhea, sore throat and trouble swallowing. Eyes: Negative for visual disturbance. Respiratory: Negative for cough, chest tightness and shortness of breath. Cardiovascular: Negative for chest pain and palpitations. Gastrointestinal: Negative for abdominal pain, blood in stool, constipation, diarrhea, nausea and vomiting. Genitourinary: Negative for decreased urine volume, difficulty urinating, dysuria and frequency. Musculoskeletal: Negative for back pain and neck pain.    Skin: Negative for color change and rash.   Neurological: Positive for facial asymmetry, speech difficulty and weakness (LUE). Negative for dizziness, light-headedness and headaches. Physical Exam   Physical Exam   Constitutional: She is oriented to person, place, and time. Vital signs are normal. She appears well-developed and well-nourished. She does not appear ill. No distress. HENT:   Mouth/Throat: Oropharynx is clear and moist.   Eyes: Conjunctivae are normal.   Neck: Neck supple. Cardiovascular: Normal rate and regular rhythm. Pulmonary/Chest: Effort normal and breath sounds normal. No accessory muscle usage. No respiratory distress. Abdominal: Soft. She exhibits no distension. There is no tenderness. Lymphadenopathy:     She has no cervical adenopathy. Neurological: She is alert and oriented to person, place, and time. She has normal strength. No cranial nerve deficit or sensory deficit. Slurred speech and expressive aphasia. L facial droop and LUE weakness. Skin: Skin is warm and dry. Nursing note and vitals reviewed. Diagnostic Study Results     Labs -     Recent Results (from the past 12 hour(s))   CBC WITH AUTOMATED DIFF    Collection Time: 02/05/18  4:47 PM   Result Value Ref Range    WBC 6.7 3.6 - 11.0 K/uL    RBC 4.28 3.80 - 5.20 M/uL    HGB 11.5 11.5 - 16.0 g/dL    HCT 34.4 (L) 35.0 - 47.0 %    MCV 80.4 80.0 - 99.0 FL    MCH 26.9 26.0 - 34.0 PG    MCHC 33.4 30.0 - 36.5 g/dL    RDW 13.6 11.5 - 14.5 %    PLATELET 700 394 - 582 K/uL    MPV 11.5 8.9 - 12.9 FL    NRBC 0.0 0  WBC    ABSOLUTE NRBC 0.00 0.00 - 0.01 K/uL    NEUTROPHILS 66 32 - 75 %    LYMPHOCYTES 23 12 - 49 %    MONOCYTES 9 5 - 13 %    EOSINOPHILS 2 0 - 7 %    BASOPHILS 0 0 - 1 %    IMMATURE GRANULOCYTES 0 0.0 - 0.5 %    ABS. NEUTROPHILS 4.4 1.8 - 8.0 K/UL    ABS. LYMPHOCYTES 1.6 0.8 - 3.5 K/UL    ABS. MONOCYTES 0.6 0.0 - 1.0 K/UL    ABS. EOSINOPHILS 0.1 0.0 - 0.4 K/UL    ABS. BASOPHILS 0.0 0.0 - 0.1 K/UL    ABS. IMM.  GRANS. 0.0 0.00 - 0.04 K/UL    DF AUTOMATED     METABOLIC PANEL, BASIC    Collection Time: 02/05/18  4:47 PM   Result Value Ref Range    Sodium 142 136 - 145 mmol/L    Potassium 4.5 3.5 - 5.1 mmol/L    Chloride 108 97 - 108 mmol/L    CO2 29 21 - 32 mmol/L    Anion gap 5 5 - 15 mmol/L    Glucose 120 (H) 65 - 100 mg/dL    BUN 29 (H) 6 - 20 MG/DL    Creatinine 1.72 (H) 0.55 - 1.02 MG/DL    BUN/Creatinine ratio 17 12 - 20      GFR est AA 35 (L) >60 ml/min/1.73m2    GFR est non-AA 29 (L) >60 ml/min/1.73m2    Calcium 8.8 8.5 - 10.1 MG/DL   PROTHROMBIN TIME + INR    Collection Time: 02/05/18  4:47 PM   Result Value Ref Range    INR 1.0 0.9 - 1.1      Prothrombin time 10.4 9.0 - 11.1 sec   PTT    Collection Time: 02/05/18  4:47 PM   Result Value Ref Range    aPTT 27.4 22.1 - 32.5 sec    aPTT, therapeutic range     58.0 - 77.0 SECS   EKG, 12 LEAD, INITIAL    Collection Time: 02/05/18  4:57 PM   Result Value Ref Range    Ventricular Rate 70 BPM    Atrial Rate 70 BPM    P-R Interval 142 ms    QRS Duration 128 ms    Q-T Interval 442 ms    QTC Calculation (Bezet) 477 ms    Calculated P Axis 48 degrees    Calculated R Axis -26 degrees    Calculated T Axis 142 degrees    Diagnosis       Normal sinus rhythm  Left bundle branch block  Abnormal ECG  When compared with ECG of 01-AUG-2017 12:56,  No significant change was found         Radiologic Studies -   CTA CODE NEURO HEAD AND NECK W CONT         XR CHEST PORT   Final Result      CT CODE NEURO HEAD WO CONTRAST   Final Result        CT Results  (Last 48 hours)               02/05/18 1745  CTA CODE NEURO HEAD AND NECK W CONT Preliminary result    Narrative:  **PRELIMINARY REPORT**       There is intracranial atherosclerosis. There is no intracranial aneurysm. Atherosclerosis involves both carotid arteries, but no evidence of flow-limiting   stenosis. Preliminary report was provided by Dr. Milind Pinzon, the on-call radiologist, at 6:05   PM       Final report to follow.        **END PRELIMINARY REPORT 02/05/18 1639  CT CODE NEURO HEAD WO CONTRAST Final result    Impression:  IMPRESSION:    1. No acute intracranial abnormality. 2. Chronic encephalomalacia in the right parietal lobe. 3. Chronic bilateral cerebellar infarcts, bilateral basal ganglia infarcts, and   left thalamic infarct. 4. Moderate to severe periventricular and subcortical white matter disease. Narrative:  EXAM:  CT CODE NEURO HEAD WO CONTRAST       INDICATION:   Stroke; aphasic       COMPARISON: CT 2/26/2010. CONTRAST:  None. TECHNIQUE: Unenhanced CT of the head was performed using 5 mm images. Brain and   bone windows were generated. CT dose reduction was achieved through use of a   standardized protocol tailored for this examination and automatic exposure   control for dose modulation. FINDINGS:   There is chronic encephalomalacia within the right parietal lobe. There is   extensive periventricular and subcortical white matter disease. There are   bilateral cerebellar and basal ganglia infarctions, and a left thalamic   infarction. There is no intracranial hemorrhage or evidence of acute infarction. The basilar cisterns are open. There is no hydrocephalus. The bone windows   demonstrate no abnormalities. There is a mucous retention cyst in the left   maxillary sinus, partially visualized. CXR Results  (Last 48 hours)               02/05/18 1722  XR CHEST PORT Final result    Impression:  IMPRESSION: No evidence of acute cardiopulmonary process. Narrative:  INDICATION:  CVA        COMPARISON: 8/1/2017       FINDINGS: Single AP portable view of the chest obtained at 511 demonstrates a   stable cardiomediastinal silhouette. The lungs are clear bilaterally. No osseous   abnormalities are seen. Medical Decision Making   I am the first provider for this patient.     I reviewed the vital signs, available nursing notes, past medical history, past surgical history, family history and social history. Vital Signs-Reviewed the patient's vital signs. Patient Vitals for the past 12 hrs:   Temp Pulse Resp BP SpO2   02/05/18 1618 97.5 °F (36.4 °C) 69 20 117/74 98 %       Pulse Oximetry Analysis - 98% on RA    Cardiac Monitor:   Rate: 74 bpm  Rhythm: Normal Sinus Rhythm      EKG interpretation: (Preliminary) 1657  Rhythm: normal sinus rhythm; and regular . Rate (approx.): 70; Axis: normal; NV interval: normal; QRS interval: normal ; ST/T wave: normal; Other findings: LBBB. Written by Todd Raymundo, ED Scribe, as dictated by Quinn Dale MD.    Records Reviewed: Old Medical Records    Provider Notes (Medical Decision Making):     DDx: stroke, extension of previous stroke. She is outside of the time window for tPA, and was assessed for large vessel occlusion given NIH>6, which none was found. She will be admitted for further stroke workup. ED Course:   Initial assessment performed. The patients presenting problems have been discussed, and they are in agreement with the care plan formulated and outlined with them. I have encouraged them to ask questions as they arise throughout their visit. 4:33 PM  Attempted to evaluate the pt but she was in CT. Jama Hendricks  1940    Arrival time to ED: [facility]: 6779    Physician at Bedside: [address], [address] [facility] Time: 1618    CONSULT NOTE:   Viri Fritz MD spoke with Dr. Sarath Caba,   Specialty: Hospitalist  Discussed pt's hx, disposition, and available diagnostic and imaging results. Reviewed care plans. Consultant will evaluate pt for admission. Written by Todd Raymundo, ED Scribe, as dictated by Quinn Dale MD.          Critical Care Time:   None    Disposition:  Admit Note:  8:10 PM  Pt is being admitted by Dr. Sarath Caba. The results of their tests and reason(s) for their admission have been discussed with pt and/or available family.  They convey agreement and understanding for the need to be admitted and for admission diagnosis. PLAN:  1. Admit    Diagnosis     Clinical Impression:   1. Cerebrovascular accident (CVA), unspecified mechanism ([facility]        Attestations:    Attestations: This note is prepared by Kathy Arellano, acting as Scribe for MD Markel Whitman MD: The scribe's documentation has been prepared under my direction and personally reviewed by me in its entirety. I confirm that the note above accurately reflects all work, treatment, procedures, and medical decision making performed by me.

## 2018-02-05 NOTE — IP AVS SNAPSHOT
[address] [facility] 
[phone] Patient: Ky Arthur MRN: [id number] :1940 About your hospitalization You were admitted on:  2018 You last received care in the:  [facility] 3 NEUROSCIENCE TELEMETRY You were discharged on:  February 10, 2018 Why you were hospitalized Your primary diagnosis was:  Not on File Your diagnoses also included:  Cad (Coronary Artery Disease), Stroke (Hcc) Follow-up Information Follow up With Details Comments Contact Info [id number] [facility]   [address] [place] [address] 
[phone] Maximo Mason MD  PCP office will call patient to schedule follow up appointment [address] [place] [address] 
[phone] Discharge Orders None A check williams indicates which time of day the medication should be taken. My Medications START taking these medications Instructions Each Dose to Equal  
 Morning Noon Evening Bedtime  
 clopidogrel 75 mg Tab Commonly known as:  PLAVIX Your last dose was: Your next dose is: Take 1 Tab by mouth daily. 75 mg  
    
   
   
   
  
 loratadine 10 mg tablet Commonly known as:  Lexi Thibodeaux Your last dose was: Your next dose is: Take 1 Tab by mouth daily. 10 mg  
    
   
   
   
  
 predniSONE 10 mg tablet Commonly known as:  Tiana Tam Your last dose was: Your next dose is: Take 2 Tabs by mouth daily (with breakfast). 2 tabs for 3 days 1 tabs for 3 days  Then stop. 20 mg CHANGE how you take these medications Instructions Each Dose to Equal  
 Morning Noon Evening Bedtime  
 potassium chloride 20 mEq tablet Commonly known as:  K-DUR, KLOR-CON What changed:  Another medication with the same name was removed.  Continue taking this medication, and follow the directions you see here. Your last dose was: Your next dose is: Take 20 mEq by mouth daily. 20 mEq CONTINUE taking these medications Instructions Each Dose to Equal  
 Morning Noon Evening Bedtime  
 albuterol 90 mcg/actuation inhaler Commonly known as:  PROVENTIL HFA, VENTOLIN HFA, PROAIR HFA Your last dose was: Your next dose is: Take 2 Puffs by inhalation every four (4) hours as needed for Wheezing. 2 Puff  
    
   
   
   
  
 amLODIPine 2.5 mg tablet Commonly known as:  Josemanuel Barney Your last dose was: Your next dose is: Take 1 Tab by mouth daily. 2.5 mg  
    
   
   
   
  
 aspirin delayed-release 81 mg tablet Your last dose was: Your next dose is: Take 1 Tab by mouth daily. 81 mg  
    
   
   
   
  
 atorvastatin 40 mg tablet Commonly known as:  LIPITOR Your last dose was: Your next dose is: TAKE ONE TABLET BY MOUTH EVERY DAY  
     
   
   
   
  
 carvedilol 12.5 mg tablet Commonly known as:  Madison Sloop Your last dose was: Your next dose is: Take 1 Tab by mouth two (2) times a day. 12.5 mg  
    
   
   
   
  
 furosemide 40 mg tablet Commonly known as:  LASIX Your last dose was: Your next dose is: Take 1 Tab by mouth daily. One pill daily 40 mg  
    
   
   
   
  
 isosorbide mononitrate ER 30 mg tablet Commonly known as:  IMDUR Your last dose was: Your next dose is: Take 1 Tab by mouth every morning. 30 mg  
    
   
   
   
  
 lisinopril 2.5 mg tablet Commonly known as:  Diana Arvizu Your last dose was: Your next dose is: Take 1 Tab by mouth daily. 2.5 mg Where to Get Your Medications Information on where to get these meds will be given to you by the nurse or doctor. ! Ask your nurse or doctor about these medications  
  clopidogrel 75 mg Tab  
 loratadine 10 mg tablet  
 predniSONE 10 mg tablet Discharge Instructions MyChart Activation Thank you for requesting access to [employer]. Please follow the instructions below to securely access and download your online medical record. [employer] allows you to send messages to your doctor, view your test results, renew your prescriptions, schedule appointments, and more. How Do I Sign Up? 1. In your internet browser, go to [email or web address] 
2. Click on the First Time User? Click Here link in the Sign In box. You will be redirect to the New Member Sign Up page. 3. Enter your [employer] Access Code exactly as it appears below. You will not need to use this code after youve completed the sign-up process. If you do not sign up before the expiration date, you must request a new code. [employer] Access Code: [email or web address] Expires: 2018  3:28 PM (This is the date your [employer] access code will ) 4. Enter the last four digits of your Social Security Number (xxxx) and Date of Birth (mm/dd/yyyy) as indicated and click Submit. You will be taken to the next sign-up page. 5. Create a [employer] ID. This will be your [employer] login ID and cannot be changed, so think of one that is secure and easy to remember. 6. Create a [employer] password. You can change your password at any time. 7. Enter your Password Reset Question and Answer. This can be used at a later time if you forget your password. 8. Enter your e-mail address. You will receive e-mail notification when new information is available in [address] 9. Click Sign Up. You can now view and download portions of your medical record. 10. Click the Download Summary menu link to download a portable copy of your medical information. Additional Information If you have questions, please visit the Frequently Asked Questions section of the [employer] website at [email or web address] Remember, MyChart is NOT to be used for urgent needs. For medical emergencies, dial 911. HOSPITALIST DISCHARGE INSTRUCTIONS 
 
NAME: Mirian Andres :  1940 MRN:  [id number] Date/Time:  2/10/2018 10:31 AM 
 
ADMIT DATE: 2018 DISCHARGE DATE: 2/10/2018 Attending Physician: Katheryn Soliman MD 
 
DISCHARGE DIAGNOSIS: 
worsening left upper ext weakness and dysarthria. Suspect TIA Patient with hx of previous strokes. The symptoms started last night so no candidate for TPA. At this time apparently patient recovering some how. CT head no acute findings. MRI W/WO head, showed Extensive chronic ischemic changes, with a moderate-sized area of acute rightMCA territory infarction in the posterior right frontal lobe as above. Echocardiogram,showed  Systolic function was normal. Ejection fraction was 
estimated in the range of 55 % to 60 %. There were no regional wall motion 
abnormalities. There was moderate concentric hypertrophy Neurology consult Inputs and recommendations Appreciated. PT and CM consulted on Dc planning Speech Recommend MBS . Will FU.speech recommended purees with no liquids with no overt s/s of aspiration 
  
SOB ,uncertain cause yet , felt better today. hyperactive air way disease  
chronic systolic CHF POA LEVF 86-94% ( note from past admission 2017) CXR No Acute Disease , breathing treatments DC IVF ,on  lasix , added pro air and clartin  
started on solumedrol and Dc cancelled for sob  
 will need taper her og oxygen as well. Saturating 95% on an 2 L low flow NC oxygen this morning. 
  
  
  
HOLLY On CKD (stage 3) 
will keep an eye in creatinine   
creatinine on 2017 1.17 and 1.72 on this admission Today 1.48 Decreased lasix from 40 to 20 md/day  
  
  
Hx CAD: 
 no issues at this time  Continue home meds at this time after s/s eval 
   
Hyperlipidemia ;  
Continue home meds (after s/s eval) Medications: Per above medication reconciliation. Pain Management: per above medications Recommended diet: Cardiac Diet Recommended activity: Activity as tolerated Wound care: None Indwelling devices:  None Supplemental Oxygen: 2 LNC,  wean as tolerated Required Lab work: Per SNF routine Glucose management:  None Code status: Full Outside physician follow up: Follow-up Information Follow up With Details Comments Contact Info [id number] Andrew AR[address] [facility]   [address] [place] [address] 
[phone] Travis Landry MD  PCP office will call patient to schedule follow up appointment 1720 [facility] [address] 
[phone] cityguru Announcement We are excited to announce that we are making your provider's discharge notes available to you in cityguru. You will see these notes when they are completed and signed by the physician that discharged you from your recent hospital stay. If you have any questions or concerns about any information you see in cityguru, please call the Health Information Department where you were seen or reach out to your Primary Care Provider for more information about your plan of care. Introducing [facility]! [employer] introduces cityguru patient portal. Now you can access parts of your medical record, email your doctor's office, and request medication refills online. 1. In your internet browser, go to [email or web address] 2. Click on the First Time User? Click Here link in the Sign In box. You will see the New Member Sign Up page. 3. Enter your city[employer]uru Access Code exactly as it appears below. You will not need to use this code after youve completed the sign-up process.  If you do not sign up before the expiration date, you must request a new code. · [employer] Access Code: [email or web address] Expires: 5/8/2018  3:28 PM 
 
4. Enter the last four digits of your Social Security Number (xxxx) and Date of Birth (mm/dd/yyyy) as indicated and click Submit. You will be taken to the next sign-up page. 5. Create a Shippter ID. This will be your [employer] login ID and cannot be changed, so think of one that is secure and easy to remember. 6. Create a Sh[employer]ter password. You can change your password at any time. 7. Enter your Password Reset Question and Answer. This can be used at a later time if you forget your password. 8. Enter your e-mail address. You will receive e-mail notification when new information is available in [address] 9. Click Sign Up. You can now view and download portions of your medical record. 10. Click the Download Summary menu link to download a portable copy of your medical information. If you have questions, please visit the Frequently Asked Questions section of the [employer] website. Remember, [employer]ter is NOT to be used for urgent needs. For medical emergencies, dial 911. Now available from your iPhone and Android! Providers Seen During Your Hospitalization Provider Specialty Primary office phone Malorie Alves MD Emergency Medicine [phone] Palmer Gusman MD Hospitalist [phone] Your Primary Care Physician (PCP) Primary Care Physician Office Phone Office Fax Coco Corbett [phone] You are allergic to the following Allergen Reactions Decadron (Dexamethasone Sodium Phosphate) Hives Pcn (Penicillins) Hives Recent Documentation Height Weight BMI Smoking Status 1.524 m 66.4 kg 28.57 kg/m2 Former Smoker Emergency Contacts Name Discharge Info Relation Home Work Mobile Willis Valdez DISCHARGE CAREGIVER [3] Child [2] [phone] Chivo Valdez DISCHARGE CAREGIVER [3] Brother [24]   [phone] Felipe Hou  Son [22] [phone] Patient Belongings The following personal items are in your possession at time of discharge: 
     Visual Aid: Glasses Discharge Instructions Attachments/References HEART FAILURE: AVOIDING TRIGGERS (ENGLISH) Patient Handouts Avoiding Triggers With Heart Failure: Care Instructions Your Care Instructions Triggers are anything that make your heart failure flare up. A flare-up is also called \"sudden heart failure\" or \"acute heart failure. \" When you have a flare-up, fluid builds up in your lungs, and you have problems breathing. You might need to go to the hospital. By watching for changes in your condition and avoiding triggers, you can prevent heart failure flare-ups. Follow-up care is a key part of your treatment and safety. Be sure to make and go to all appointments, and call your doctor if you are having problems. It's also a good idea to know your test results and keep a list of the medicines you take. How can you care for yourself at home? Watch for changes in your weight and condition · Weigh yourself without clothing at the same time each day. Record your weight. Call your doctor if you have sudden weight gain, such as more than 2 to 3 pounds in a day or 5 pounds in a week. (Your doctor may suggest a different range of weight gain.) A sudden weight gain may mean that your heart failure is getting worse. · Keep a daily record of your symptoms. Write down any changes in how you feel, such as new shortness of breath, cough, or problems eating. Also record if your ankles are more swollen than usual and if you feel more tired than usual. Note anything that you ate or did that could have triggered these changes. Limit sodium Sodium causes your body to hold on to extra water.  This may cause your heart failure symptoms to get worse. People get most of their sodium from processed foods. Fast food and restaurant meals also tend to be very high in sodium. · Your doctor may suggest that you limit sodium to 2,000 milligrams (mg) a day or less. That is less than 1 teaspoon of salt a day, including all the salt you eat in cooking or in packaged foods. · Read food labels on cans and food packages. They tell you how much sodium you get in one serving. Check the serving size. If you eat more than one serving, you are getting more sodium. · Be aware that sodium can come in forms other than salt, including monosodium glutamate (MSG), sodium citrate, and sodium bicarbonate (baking soda). MSG is often added to Asian food. You can sometimes ask for food without MSG or salt. · Slowly reducing salt will help you adjust to the taste. Take the salt shaker off the table. · Flavor your food with garlic, lemon juice, onion, vinegar, herbs, and spices instead of salt. Do not use soy sauce, steak sauce, onion salt, garlic salt, mustard, or ketchup on your food, unless it is labeled \"low-sodium\" or \"low-salt. \" 
· Make your own salad dressings, sauces, and ketchup without adding salt. · Use fresh or frozen ingredients, instead of canned ones, whenever you can. Choose low-sodium canned goods. · Eat less processed food and food from restaurants, including fast food. Exercise as directed Moderate, regular exercise is very good for your heart. It improves your blood flow and helps control your weight. But too much exercise can stress your heart and cause a heart failure flare-up. · Check with your doctor before you start an exercise program. 
· Walking is an easy way to get exercise. Start out slowly. Gradually increase the length and pace of your walk. Swimming, riding a bike, and using a treadmill are also good forms of exercise. · When you exercise, watch for signs that your heart is working too hard. You are pushing yourself too hard if you cannot talk while you are exercising. If you become short of breath or dizzy or have chest pain, stop, sit down, and rest. 
· Do not exercise when you do not feel well. Take medicines correctly · Take your medicines exactly as prescribed. Call your doctor if you think you are having a problem with your medicine. · Make a list of all the medicines you take. Include those prescribed to you by other doctors and any over-the-counter medicines, vitamins, or supplements you take. Take this list with you when you go to any doctor. · Take your medicines at the same time every day. It may help you to post a list of all the medicines you take every day and what time of day you take them. · Make taking your medicine as simple as you can. Plan times to take your medicines when you are doing other things, such as eating a meal or getting ready for bed. This will make it easier to remember to take your medicines. · Get organized. Use helpful tools, such as daily or weekly pill containers. When should you call for help? Call 911 if you have symptoms of sudden heart failure such as: 
? · You have severe trouble breathing. ? · You cough up pink, foamy mucus. ? · You have a new irregular or rapid heartbeat. ?Call your doctor now or seek immediate medical care if: 
? · You have new or increased shortness of breath. ? · You are dizzy or lightheaded, or you feel like you may faint. ? · You have sudden weight gain, such as more than 2 to 3 pounds in a day or 5 pounds in a week. (Your doctor may suggest a different range of weight gain.) ? · You have increased swelling in your legs, ankles, or feet. ? · You are suddenly so tired or weak that you cannot do your usual activities. ? Watch closely for changes in your health, and be sure to contact your doctor if you develop new symptoms. Where can you learn more? Go to http://kerri-saba.info/. Enter N510 in the search box to learn more about \"Avoiding Triggers With Heart Failure: Care Instructions. \" Current as of: September 21, 2016 Content Version: 11.4 © 2006-2017 Healthwise, Incorporated. Care instructions adapted under license by [employer] (which disclaims liability or warranty for this information). If you have questions about a medical condition or this instruction, always ask your healthcare professional. [address] any warranty or liability for your use of this information. Please provide this summary of care documentation to your next provider. Signatures-by signing, you are acknowledging that this After Visit Summary has been reviewed with you and you have received a copy. Patient Signature:  ____________________________________________________________ Date:  ____________________________________________________________  
  
Vania Mendoza Provider Signature:  ____________________________________________________________ Date:  ____________________________________________________________

## 2018-02-05 NOTE — ED NOTES
Vocal quality significantly compromised, dysphagia screening cannot be performed. Will consult MD and reevaluate.

## 2018-02-05 NOTE — ED NOTES
Patient incontinent of a large amount of urine. Brief changed, sheets changed, incontinence care provided, purewick placed on patient.

## 2018-02-05 NOTE — PROGRESS NOTES
Spiritual Care Assessment/Progress Notes    Raul Davila [id number]  [id number]    1940  76 y.o.  female    Patient Telephone Number: [phone] (home)   Spiritism Affiliation: Xiao Ware   Language: English   Extended Emergency Contact Information  Primary Emergency Contact: Willis Valdez  Address: [address] [facility],[place] [address], [address] [facility] [address] [facility] [facility] [address] Phone: [phone]  Relation: Child  Secondary Emergency Contact: [address] [place] [address]  Mobile Phone: [phone]  Relation: Brother   Patient Active Problem List    Diagnosis Date Noted    Hypertensive urgency 08/01/2017    CHF (congestive heart failure) ([address] 08/01/2017    COPD exacerbation ([address] 03/07/2017    CAD (coronary artery disease), native coronary artery 07/15/2016    H/O: CVA (cerebrovascular accident) 07/15/2016    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 ([address] 07/15/2016    Severe sepsis ([address] 07/14/2016    Pneumonia 07/14/2016    Acute respiratory failure ([address] 07/14/2016    Elevated troponin 07/14/2016    Left bundle branch block (LBBB) on electrocardiogram 55/12/3024    Acute systolic heart failure ([address] 07/14/2016    Acute kidney failure ([address] 07/14/2016    Mixed hyperlipidemia 09/21/2012    Essential hypertension, benign 09/21/2012    Other left bundle branch block 09/21/2012    Coronary atherosclerosis of native coronary artery 09/21/2012    Late effects of cerebrovascular disease, dysarthria 09/21/2012    Atherosclerosis of renal artery ([address] 09/21/2012    Atherosclerosis of native arteries of the extremities with intermittent claudication 09/21/2012    Tobacco use disorder 09/21/2012    Cerebrovascular accident ([address] 09/21/2012    Mitral valve disorders(424.0) 09/21/2012    Tricuspid valve disorder 09/21/2012        Date: 2/5/2018       Level of Spiritism/Spiritual Activity:  []         Involved in irving tradition/spiritual practice    []         Not involved in irving tradition/spiritual practice  []         Spiritually oriented    []         Claims no spiritual orientation    []         seeking spiritual identity  []         Feels alienated from Yazidism practice/tradition  []         Feels angry about Yazidism practice/tradition  []         Spirituality/Yazidism tradition a resource for coping at this time. [x]         Not able to assess due to medical condition    Services Provided Today:  []         crisis intervention    []         reading Scriptures  []         spiritual assessment    []         prayer  []         empathic listening/emotional support  []         rites and rituals (cite in comments)  []         life review     []         Yazidism support  []         theological development   []         advocacy  []         ethical dialog     []         blessing  []         bereavement support    []         support to family  []         anticipatory grief support   []         help with AMD  []         spiritual guidance    []         meditation      Spiritual Care Needs  []         Emotional Support  []         Spiritual/Jainism Care  []         Loss/Adjustment  []         Advocacy/Referral                /Ethics  []         No needs expressed at               this time  []         Other: (note in               comments)  [address]  []         Follow up visits with               pt/family  []         Provide materials  []         Schedule sacraments  []         Contact Community               Clergy  [x]         Follow up as needed  []         Other: (note in               comments)     Responded to Code S in ER. Consulted with staff, pt designated for room 14. Not in room, noticed clothes, was informed that son had been with pt, but son no longer present. Pt returned from CT. Unable to assess due to clinical activity. Will follow up as needed/able. RIVKA Voss. Nathaniel

## 2018-02-05 NOTE — ED NOTES
Patient reports to ED via EMS with complaints of lightheadedness. When EMS arrived on the scene patient had slurred speech and left sided weakness/facial droop. Patient's son is at bedside who states the patient's speech is more slurred than normal. He also reports a previous stroke that left her with some left sided weakness. The symptoms began last night, however, they contributed the symptoms to the patient being tired. Patient resting comfortably on stretcher, son at bedside, call bell within reach. Placed on the monitor x3. Per EMS, blood sugar was 165 in route.

## 2018-02-06 ENCOUNTER — APPOINTMENT (OUTPATIENT)
Dept: GENERAL RADIOLOGY | Age: 78
DRG: 069 | End: 2018-02-06
Attending: INTERNAL MEDICINE
Payer: MEDICARE

## 2018-02-06 LAB
ATRIAL RATE: 70 BPM
CALCULATED P AXIS, ECG09: 48 DEGREES
CALCULATED R AXIS, ECG10: -26 DEGREES
CALCULATED T AXIS, ECG11: 142 DEGREES
CHOLEST SERPL-MCNC: 171 MG/DL
DIAGNOSIS, 93000: NORMAL
EST. AVERAGE GLUCOSE BLD GHB EST-MCNC: 126 MG/DL
HBA1C MFR BLD: 6 % (ref 4.2–6.3)
HDLC SERPL-MCNC: 51 MG/DL
HDLC SERPL: 3.4 {RATIO} (ref 0–5)
LDLC SERPL CALC-MCNC: 102 MG/DL (ref 0–100)
LIPID PROFILE,FLP: ABNORMAL
P-R INTERVAL, ECG05: 142 MS
Q-T INTERVAL, ECG07: 442 MS
QRS DURATION, ECG06: 128 MS
QTC CALCULATION (BEZET), ECG08: 477 MS
TRIGL SERPL-MCNC: 90 MG/DL (ref ?–150)
VENTRICULAR RATE, ECG03: 70 BPM
VLDLC SERPL CALC-MCNC: 18 MG/DL

## 2018-02-06 PROCEDURE — 97116 GAIT TRAINING THERAPY: CPT | Performed by: PHYSICAL THERAPIST

## 2018-02-06 PROCEDURE — G8988 SELF CARE GOAL STATUS: HCPCS | Performed by: OCCUPATIONAL THERAPIST

## 2018-02-06 PROCEDURE — 36415 COLL VENOUS BLD VENIPUNCTURE: CPT | Performed by: INTERNAL MEDICINE

## 2018-02-06 PROCEDURE — 97161 PT EVAL LOW COMPLEX 20 MIN: CPT | Performed by: PHYSICAL THERAPIST

## 2018-02-06 PROCEDURE — 71045 X-RAY EXAM CHEST 1 VIEW: CPT

## 2018-02-06 PROCEDURE — 74011250637 HC RX REV CODE- 250/637: Performed by: INTERNAL MEDICINE

## 2018-02-06 PROCEDURE — G8987 SELF CARE CURRENT STATUS: HCPCS | Performed by: OCCUPATIONAL THERAPIST

## 2018-02-06 PROCEDURE — 92610 EVALUATE SWALLOWING FUNCTION: CPT

## 2018-02-06 PROCEDURE — 97535 SELF CARE MNGMENT TRAINING: CPT | Performed by: OCCUPATIONAL THERAPIST

## 2018-02-06 PROCEDURE — 83036 HEMOGLOBIN GLYCOSYLATED A1C: CPT | Performed by: INTERNAL MEDICINE

## 2018-02-06 PROCEDURE — 65660000000 HC RM CCU STEPDOWN

## 2018-02-06 PROCEDURE — 93306 TTE W/DOPPLER COMPLETE: CPT

## 2018-02-06 PROCEDURE — G8979 MOBILITY GOAL STATUS: HCPCS | Performed by: PHYSICAL THERAPIST

## 2018-02-06 PROCEDURE — 74011000250 HC RX REV CODE- 250: Performed by: INTERNAL MEDICINE

## 2018-02-06 PROCEDURE — 80061 LIPID PANEL: CPT | Performed by: INTERNAL MEDICINE

## 2018-02-06 PROCEDURE — 94640 AIRWAY INHALATION TREATMENT: CPT

## 2018-02-06 PROCEDURE — G8978 MOBILITY CURRENT STATUS: HCPCS | Performed by: PHYSICAL THERAPIST

## 2018-02-06 PROCEDURE — 97166 OT EVAL MOD COMPLEX 45 MIN: CPT | Performed by: OCCUPATIONAL THERAPIST

## 2018-02-06 PROCEDURE — 74011250636 HC RX REV CODE- 250/636: Performed by: INTERNAL MEDICINE

## 2018-02-06 PROCEDURE — 77010033678 HC OXYGEN DAILY

## 2018-02-06 PROCEDURE — 77030038269 HC DRN EXT URIN PURWCK BARD -A

## 2018-02-06 RX ORDER — HEPARIN SODIUM 5000 [USP'U]/ML
5000 INJECTION, SOLUTION INTRAVENOUS; SUBCUTANEOUS EVERY 12 HOURS
Status: DISCONTINUED | OUTPATIENT
Start: 2018-02-06 | End: 2018-02-10 | Stop reason: HOSPADM

## 2018-02-06 RX ORDER — FUROSEMIDE 10 MG/ML
40 INJECTION INTRAMUSCULAR; INTRAVENOUS ONCE
Status: COMPLETED | OUTPATIENT
Start: 2018-02-06 | End: 2018-02-06

## 2018-02-06 RX ORDER — IPRATROPIUM BROMIDE AND ALBUTEROL SULFATE 2.5; .5 MG/3ML; MG/3ML
3 SOLUTION RESPIRATORY (INHALATION)
Status: DISCONTINUED | OUTPATIENT
Start: 2018-02-06 | End: 2018-02-07

## 2018-02-06 RX ORDER — CLOPIDOGREL BISULFATE 75 MG/1
75 TABLET ORAL DAILY
Status: DISCONTINUED | OUTPATIENT
Start: 2018-02-07 | End: 2018-02-10 | Stop reason: HOSPADM

## 2018-02-06 RX ORDER — ATORVASTATIN CALCIUM 40 MG/1
80 TABLET, FILM COATED ORAL DAILY
Status: DISCONTINUED | OUTPATIENT
Start: 2018-02-07 | End: 2018-02-10 | Stop reason: HOSPADM

## 2018-02-06 RX ADMIN — Medication 10 ML: at 13:18

## 2018-02-06 RX ADMIN — ASPIRIN 81 MG: 81 TABLET, COATED ORAL at 09:47

## 2018-02-06 RX ADMIN — IPRATROPIUM BROMIDE AND ALBUTEROL SULFATE 3 ML: .5; 3 SOLUTION RESPIRATORY (INHALATION) at 20:54

## 2018-02-06 RX ADMIN — IPRATROPIUM BROMIDE AND ALBUTEROL SULFATE 3 ML: .5; 3 SOLUTION RESPIRATORY (INHALATION) at 16:23

## 2018-02-06 RX ADMIN — Medication 10 ML: at 05:16

## 2018-02-06 RX ADMIN — FUROSEMIDE 40 MG: 40 TABLET ORAL at 09:48

## 2018-02-06 RX ADMIN — HEPARIN SODIUM 5000 UNITS: 5000 INJECTION, SOLUTION INTRAVENOUS; SUBCUTANEOUS at 15:39

## 2018-02-06 RX ADMIN — ISOSORBIDE MONONITRATE 30 MG: 30 TABLET, EXTENDED RELEASE ORAL at 09:49

## 2018-02-06 RX ADMIN — LISINOPRIL 2.5 MG: 5 TABLET ORAL at 09:48

## 2018-02-06 RX ADMIN — IPRATROPIUM BROMIDE AND ALBUTEROL SULFATE 3 ML: .5; 3 SOLUTION RESPIRATORY (INHALATION) at 00:31

## 2018-02-06 RX ADMIN — ATORVASTATIN CALCIUM 40 MG: 40 TABLET, FILM COATED ORAL at 09:48

## 2018-02-06 RX ADMIN — CARVEDILOL 12.5 MG: 12.5 TABLET, FILM COATED ORAL at 17:33

## 2018-02-06 RX ADMIN — Medication 10 ML: at 22:37

## 2018-02-06 RX ADMIN — AMLODIPINE BESYLATE 2.5 MG: 2.5 TABLET ORAL at 09:48

## 2018-02-06 RX ADMIN — CARVEDILOL 12.5 MG: 12.5 TABLET, FILM COATED ORAL at 09:48

## 2018-02-06 RX ADMIN — FUROSEMIDE 40 MG: 10 INJECTION, SOLUTION INTRAMUSCULAR; INTRAVENOUS at 17:33

## 2018-02-06 NOTE — PROGRESS NOTES
Bedside and Verbal shift change report given to Amy (oncoming nurse) by Estiven Benjamin (offgoing nurse). Report included the following information SBAR, Kardex, ED Summary, Intake/Output, Recent Results and Cardiac Rhythm NSR. Zone Phone:   [phone]      Significant changes during shift:  Pt new admit, on admission pt with labored breathing while laying down, respiratory rate of 28, with expiratory wheezing bilaterally. Called Respiratory team to evaluate pt. They also felt was wheezing as well. Respiratory team recommended 1 L of 02 via NC and Duo Neb qh4 PRN. Called Dr Dale Cedeno to Receive new orders. Pt aphasic,admission data base and MRI screening not completed. As per ED nurse, son will be here in the morning. Will pass information off to day nurse.         Patient Information    Aakash Valdovinos  76 y.o.  2/5/2018  4:09 PM by Susan Koch MD. Aakash Valdovinos was admitted from Home    Problem List    Patient Active Problem List    Diagnosis Date Noted    CAD (coronary artery disease) 02/05/2018    Stroke ([address] 02/05/2018    Hypertensive urgency 08/01/2017    CHF (congestive heart failure) ([address] 08/01/2017    COPD exacerbation ([address] 03/07/2017    CAD (coronary artery disease), native coronary artery 07/15/2016    H/O: CVA (cerebrovascular accident) 07/15/2016    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 ([address] 07/15/2016    Severe sepsis ([address] 07/14/2016    Pneumonia 07/14/2016    Acute respiratory failure ([address] 07/14/2016    Elevated troponin 07/14/2016    Left bundle branch block (LBBB) on electrocardiogram 61/42/7456    Acute systolic heart failure ([address] 07/14/2016    Acute kidney failure ([address] 07/14/2016    Mixed hyperlipidemia 09/21/2012    Essential hypertension, benign 09/21/2012    Other left bundle branch block 09/21/2012    Coronary atherosclerosis of native coronary artery 09/21/2012    Late effects of cerebrovascular disease, dysarthria 09/21/2012   [facility] Atherosclerosis of renal artery ([facility] 09/21/2012    Atherosclerosis of native arteries of the extremities with intermittent claudication 09/21/2012    Tobacco use disorder 09/21/2012    Cerebrovascular accident ([facility] 09/21/2012    Mitral valve disorders(424.0) 09/21/2012    Tricuspid valve disorder 09/21/2012     Past Medical History:   Diagnosis Date    Acute kidney failure ([facility] 3/29/0514    Acute systolic heart failure ([facility] 7/14/2016    CAD (coronary artery disease)     Heart failure ([facility]     Stroke ([facility] 2009    Stroke    Stroke [facility])          Core Measures:    CVA: Yes Yes    Activity Status:    Bed Rest until evaluated by PT/OT  Supplemental O2: (If Applicable)    1 L via NC      LINES AND DRAINS:    PIV 20 gauge in R A/C    DVT prophylaxis:    SCD's    Wounds: (If Applicable)    N/A    Patient Safety:    Falls Score Total Score: 2  Safety Level_______  Bed Alarm On? Yes  Sitter? No    Plan for upcoming shift: Neuro consult, MRI, Echo, PT/OT/SLP.  Pt needs MRI screening and data base completed        Discharge Plan: Yes ,ongoing    Active Consults:  IP CONSULT TO HOSPITALIST  IP CONSULT TO NEUROLOGY

## 2018-02-06 NOTE — PROGRESS NOTES
Physical Therapy Goals  Initiated 2/6/2018  1. Patient will move from supine to sit and sit to supine , scoot up and down and roll side to side in bed with supervision/set-up within 7 day(s). 2.  Patient will transfer from bed to chair and chair to bed with supervision/set-up using the least restrictive device within 7 day(s). 3.  Patient will perform sit to stand with supervision/set-up within 7 day(s). 4.  Patient will ambulate with supervision/set-up for 75 feet with the least restrictive device within 7 day(s). 5.  Patient will ascend/descend 3 stairs with 1 handrail(s) with minimal assistance/contact guard assist within 7 day(s). 6.  Patient will improve Wilder Balance score by 7 points within 7 days. physical Therapy EVALUATION- neuro population    Patient: Jd Stephen (1 y.o. female)  Date: 2/6/2018  Primary Diagnosis: Stroke [facility])  CAD (coronary artery disease)        Precautions: falls       ASSESSMENT :  Based on the objective data described below, the patient presents with h/o previous CVAs with aphasia. She demonstrates increased L sided weakness UEs>>LEs with impaired functional mobility, balance and endurance. Patient's strength is non-functional in LUE but LLE is functional overall. Patient requiring min A for overall mobility. She was able to ambulate 10 feet x 2 first using dyllan-walker and then using RW in which she requires manual assistance to maintain L hand on walker. Gait is unsteady demonstrating delayed stepping and poor overall ability to advance LLE; non-functional paz noted. Patient fatigues quickly with minimal exertion but is motivated and continues to participate with extended rest.   Prior to admission patient reports modified independence with all mobility using walker for ambulation. Recommend SNF following discharge to improve overall functional mobility prior to returning to home with family.     Patient will benefit from skilled intervention to address the above impairments. Patients rehabilitation potential is considered to be Good  Factors which may influence rehabilitation potential include:   [x]           None noted  []           Mental ability/status  []           Medical condition  []           Home/family situation and support systems  []           Safety awareness  []           Pain tolerance/management  []           Other:      PLAN :  Recommendations and Planned Interventions:  [x]             Bed Mobility Training             [x]      Neuromuscular Re-Education  [x]             Transfer Training                   []      Orthotic/Prosthetic Training  [x]             Gait Training                         []      Modalities  []             Therapeutic Exercises           []      Edema Management/Control  [x]             Therapeutic Activities            [x]      Patient and Family Training/Education  []             Other (comment):  Frequency/Duration: Patient will be followed by physical therapy 5 times a week to address goals. Discharge Recommendations: SNF  Further Equipment Recommendations for Discharge: TBD by SNF     SUBJECTIVE:   Patient stated I'm tired.     OBJECTIVE DATA SUMMARY:   HISTORY:    Past Medical History:   Diagnosis Date    Acute kidney failure ([facility] 5/99/2763    Acute systolic heart failure ([facility] 7/14/2016    CAD (coronary artery disease)     Heart failure ([facility]     Stroke [facility]) 2009    Stroke    Stroke [facility])    History reviewed. No pertinent surgical history.   Prior Level of Function/Home Situation: patient reports modified independence with all mobility and ADLs; uses walker for ambulation; homebound      Home Situation  Home Environment: (P) Private residence  # Steps to Enter: (P) 3  Rails to Enter: (P) Yes  One/Two Story Residence: (P) One story  Living Alone: (P) No (son works during the day)  Current DME Used/Available at Home: (P) [address], Com[employer], bedside, Shower chair  Tub or Shower Type: (P) Tub/Shower combination    EXAMINATION/PRESENTATION/DECISION MAKING:   Critical Behavior:  Neurologic State: Alert  Orientation Level: Oriented to place, Oriented to person, Oriented to situation  Cognition: Follows commands  Safety/Judgement: (P) Fall prevention  Hearing: Auditory  Auditory Impairment: None    Range Of Motion:  AROM: Generally decreased, functional (except L UE is non-functional)                       Strength:    Strength: Generally decreased, functional (except L UE is non-functional)                    Tone & Sensation:   Tone: Abnormal (in L UE)                              Coordination:  Coordination: Generally decreased, functional (except LUE is non-functional)  Vision:      Functional Mobility:  Bed Mobility:     Supine to Sit: Minimum assistance;Assist x2     Scooting: Contact guard assistance; Additional time  Transfers:  Sit to Stand: Minimum assistance; Additional time  Stand to Sit: Minimum assistance; Additional time        Bed to Chair: Minimum assistance; Additional time              Balance:   Sitting: Intact  Standing: Impaired  Standing - Static: Good;Constant support  Standing - Dynamic : Fair (using RW and dyllan-walker)  Ambulation/Gait Training:  Distance (ft):  (10 feet x 2 with seated rest in bathroom)  Assistive Device:  (hemiwalker then transitioned to RW)  Ambulation - Level of Assistance: Minimal assistance        Gait Abnormalities: Decreased step clearance        Base of Support: Shift to right     Speed/Brianda: Pace decreased (<100 feet/min); Delayed;Slow;Shuffled  Step Length: Left shortened;Right shortened (L>R)      Patient ambulated 10 feet to bathroom using hemiwalker- gait is slow with delayed stepping, poor foot clearance on L; constant VC for proper use of dyllan-walker. Transitioned to RW and requiring manual assistance to maintain L hand on walker. Gait remains slow with delayed stepping but slightly more steady. Continues to demonstrate impaired ability to advance LLE. Functional Measure  Wilder Balance Test:    Sitting to Standin  Standing Unsupported: 0  Sitting with Back Unsupported: 4  Standing to Sittin  Transfers: 1  Standing Unsupported with Eyes Closed: 0  Standing Unsupported with Feet Together: 0  Reach Forward with Outstretched Arm: 0   Object: 0  Turn to Look Over Shoulders: 0  Turn 360 Degrees: 0  Alternate Foot on Step/Stool: 0  Standing Unsupported One Foot in Front: 0  Stand on One Le  Total: 6         56=Maximum possible score;   0-20=High fall risk  21-40=Moderate fall risk   41-56=Low fall risk     Wilder Balance Test and G-code impairment scale:  Percentage of Impairment CH    0%   CI    1-19% CJ    20-39% CK    40-59% CL    60-79% CM    80-99% CN     100%   Wilder   Score 0-56 56 45-55 34-44 23-33 12-22 1-11 0       G codes: In compliance with CMSs Claims Based Outcome Reporting, the following G-code set was chosen for this patient based on their primary functional limitation being treated: The outcome measure chosen to determine the severity of the functional limitation was the Hebert with a score of 6/56 which was correlated with the impairment scale. ? Mobility - Walking and Moving Around:     - CURRENT STATUS: CM - 80%-99% impaired, limited or restricted    - GOAL STATUS: CJ - 20%-39% impaired, limited or restricted    - D/C STATUS:  ---------------To be determined---------------       Pain:  Pain Scale 1: Numeric (0 - 10)  Pain Intensity 1: 0              Activity Tolerance:   O2 sats remained 96-98% on RA throughout tx session. Please refer to the flowsheet for vital signs taken during this treatment.   After treatment:   [x]     Patient left in no apparent distress sitting up in chair  []     Patient left in no apparent distress in bed  [x]     Call bell left within reach  [x]     Nursing notified  []     Caregiver present  []     Bed alarm activated    COMMUNICATION/EDUCATION:   The patients plan of care was discussed with: Occupational Therapist, Registered Nurse and . Patient was educated regarding Her deficit(s) of weakness and impaired balance as this relates to Her diagnosis of CVA. She demonstrated Good understanding of education      [x]  Fall prevention education was provided and the patient/caregiver indicated understanding. [x]  Patient/family have participated as able in goal setting and plan of care. [x]  Patient/family agree to work toward stated goals and plan of care. []  Patient understands intent and goals of therapy, but is neutral about his/her participation. []  Patient is unable to participate in goal setting and plan of care.     Thank you for this referral.  Jaleesa Wyatt, PT   Time Calculation: 34 mins

## 2018-02-06 NOTE — PROGRESS NOTES
Pharmacy Clarification of Prior to Admission Medication Regimen     The patient was not interviewed regarding clarification of the prior to admission medication regimen due to AMS. Patient's son was present in room and stated he gives the patient her medications and provided MHT with the patient's PTA medication history. Information Obtained From: Patient's son, prescription bottles    Pertinent Pharmacy Findings:   carvedilol (COREG) 12.5 mg tablet: Patient's son stated, the patient 'does not always take her evening dose'.  The prescription bottles the patient's son brought to [facility] were all dated 10/5/17, for 90 days. As of 2/5/18, the patient is currently taking medications out of these bottles. PTA medication list was corrected to the following:     Prior to Admission Medications   Prescriptions Last Dose Informant Patient Reported? Taking? albuterol (PROVENTIL HFA, VENTOLIN HFA, PROAIR HFA) 90 mcg/actuation inhaler 2/5/2018 at Unknown time Other No Yes   Sig: Take 2 Puffs by inhalation every four (4) hours as needed for Wheezing. amLODIPine (NORVASC) 2.5 mg tablet 2/5/2018 at Unknown time Other No Yes   Sig: Take 1 Tab by mouth daily. aspirin delayed-release 81 mg tablet 2/5/2018 at Unknown time Other No Yes   Sig: Take 1 Tab by mouth daily. atorvastatin (LIPITOR) 40 mg tablet 2/5/2018 at Unknown time Other No Yes   Sig: TAKE ONE TABLET BY MOUTH EVERY DAY   carvedilol (COREG) 12.5 mg tablet 2/5/2018 at Unknown time Other No Yes   Sig: Take 1 Tab by mouth two (2) times a day. furosemide (LASIX) 40 mg tablet 2/5/2018 at Unknown time Other No Yes   Sig: Take 1 Tab by mouth daily. One pill daily   isosorbide mononitrate ER (IMDUR) 30 mg tablet 2/5/2018 at Unknown time Other No Yes   Sig: Take 1 Tab by mouth every morning. lisinopril (PRINIVIL, ZESTRIL) 2.5 mg tablet 2/5/2018 at Unknown time Other No Yes   Sig: Take 1 Tab by mouth daily.    potassium chloride (K-DUR, KLOR-CON) 20 mEq tablet 2/5/2018 at Unknown time Other Yes Yes   Sig: Take 20 mEq by mouth daily.       Facility-Administered Medications: None          Thank you,  Elsy Poe CPhT  Medication History Pharmacy Technician

## 2018-02-06 NOTE — PROGRESS NOTES
In to assess patient as she was climbing out of the bed. Patient noted to be in respiratory distress. O2 sats were stable at this time however I did place the patient of 2 L of oxygen prophalyxis. MD on the floor at this time and made aware of the situation. Respiratory also called to administer a PRN breathing treatment. MD did give new orders    1800- Patient seems to be breathing a lot better.  No distress noted at this time

## 2018-02-06 NOTE — CDMP QUERY
Please clarify if this patient is (was) being treated/managed for:     ? HOLLY in the setting of  bun/crea 29/1.72/29 req ivfs @ 75/hr  ? Other Explanation of clinical findings  ? Unable to Determine (no explanation of clinical findings)    The medical record reflects the following risk factors, clinical indicators, and treatment    Risk Factors:  77f adm w/ stroke,   Clinical inficators: bun/crea 29/1.72/29:    Treatment: ivfs @ 75/hr      RIFLE CRITERIA    CATEGORY SERUM CREATININE   CRITERIA GFR CRITERIA URINE OUTPUT CRITERIA   Risk   Increased x 1.5   GFR decreased > 25%   < 0.5mL/kg/hr   x 6 hrs         High Sensitivity   Injury   Increased X 2   GFR decrease > 50%      < 0.5mL/kg/hr        x 12 hrs     Failure   Increased x 3, or sCr > 4mg/dL & acute rise > 0.5 mg/dL w/in 48 hrs       GFR decrease > 75%   < 0.3mL/kg/hr   x 24 hrs, or   anuria x 12 hrs         High Specificity   Loss    Perisistant ARF =  complete loss of kiney function > 4 weeks   ESRD    End Stage Renal Disease (> 3 months)        Please clarify and document your clinical opinion in the progress notes and discharge summary including the definitive and/or presumptive diagnosis, (suspected or probable), related to the above clinical findings.  Please include clinical findings supporting your diagnosis  ThanksMarco A RN/CDMP

## 2018-02-06 NOTE — PROGRESS NOTES
CM consult noted and this CM visited pt and she was not feeling well. CM informed pt's nurse. CM called pt's son and left a message to discuss discharge plan. CM will continue to follow pt for discharge planning needs.      Neha Tintah, [address]

## 2018-02-06 NOTE — PROGRESS NOTES
Problem: Dysphagia (Adult)  Goal: *Acute Goals and Plan of Care (Insert Text)  2/6/2018  Speech path goals:  1. Pt will tolerate purees with no liquids with no overt s/s of aspiration. 2. Pt will participate with MBS as medically indicated. Speech LAnguage Pathology bedside swallow evaluation  Patient: Manju Whelan (83 y.o. female)  Date: 2/6/2018  Primary Diagnosis: Stroke [facility])  CAD (coronary artery disease)        Precautions: aspiration       ASSESSMENT :  Based on the objective data described below, the patient presents with mod oropharyngeal dysphagia. Orally she accepts po from spoon, cup and straw with no anterior spillage. Slow oral manipulation and transit noted. No oral residue. With pharyngeal phase mild swallow delay and reduced hyolaryngeal excursion is noted. Coughed after sips of thins noted. No coughing after purees and meds whole in applesauce. Tried thickened liquids but her RR increased after liquid swallows so we will hold off on liquids for now. Expiratory wheezing was noted but worsened after liquids. Her speech is severely dysarthric and she appeared to be struggling to communicate. Will need to know her baseline. Patient will benefit from skilled intervention to address the above impairments. Patients rehabilitation potential is considered to be Fair  Factors which may influence rehabilitation potential include:   []            None noted  [x]            Mental ability/status  [x]            Medical condition  [x]            Home/family situation and support systems  []            Safety awareness  []            Pain tolerance/management  []            Other:      PLAN :  Recommendations and Planned Interventions:  reeval of swallowing  eval of language and speech ; need to know her baseline  Frequency/Duration: Patient will be followed by speech-language pathology 4 times a week to address goals. Discharge Recommendations:  To Be Determined     SUBJECTIVE:   Patient stated [facility]. OBJECTIVE:     Past Medical History:   Diagnosis Date    Acute kidney failure ([facility] 4/81/4789    Acute systolic heart failure ([facility] 7/14/2016    CAD (coronary artery disease)     [facility]     Stroke [facility]) 2009    Stroke    Stroke [facility])    History reviewed. No pertinent surgical history. Prior Level of Function/Home Situation:      Diet prior to admission:   Current Diet: NPO  Cognitive and Communication Status:  Neurologic State: Alert  Orientation Level: Oriented to person, Oriented to place, Disoriented to time  Cognition: Follows commands     Perseveration: No perseveration noted     Oral Assessment:  Oral Assessment  Labial: Left droop  Dentition: Edentulous; Upper & lower dentures  Lingual: Decreased rate  Velum: Unable to visualize  Mandible: No impairment  P.O. Trials:  Patient Position:  (upright in bed)  Vocal quality prior to P.O.: Constriction;Strain  Consistency Presented: Thin liquid;Puree; Solid  How Presented: Self-fed/presented;Cup/sip;Straw (could not get hand to mouth using R hand to drink from the cup)     Bolus Acceptance: No impairment  Bolus Formation/Control: Impaired  Type of Impairment: Delayed  Propulsion: Delayed (# of seconds)  Oral Residue: None  Initiation of Swallow: Delayed (# of seconds)  Laryngeal Elevation: Decreased;Weak  Aspiration Signs/Symptoms: Weak cough (after one of several sips of thins via straw.)                Oral Phase Severity: Moderate  Pharyngeal Phase Severity : Moderate    NOMS:   The NOMS functional outcome measure was used to quantify this patient's level of swallowing impairment. Based on the NOMS, the patient was determined to be at level 3 for swallow function     G Codes: In compliance with CMSs Claims Based Outcome Reporting, the following G-code set was chosen for this patient based the use of the NOMS functional outcome to quantify this patient's level of swallowing impairment.     Using the NOMS, the patient was determined to be at level 3 for swallow function which correlates with the CL= 60-79% level of severity. Based on the objective assessment provided within this note, the current, goal, and discharge g-codes are as follows:    Swallow  Swallowing:   Swallow Current Status CL= 60-79%   Swallow Goal Status CK= 40-59%      NOMS Swallowing Levels:  Level 1 (CN): NPO  Level 2 (CM): NPO but takes consistency in therapy  Level 3 (CL): Takes less than 50% of nutrition p.o. and continues with nonoral feedings; and/or safe with mod cues; and/or max diet restriction  Level 4 (CK): Safe swallow but needs mod cues; and/or mod diet restriction; and/or still requires some nonoral feeding/supplements  Level 5 (CJ): Safe swallow with min diet restriction; and/or needs min cues  Level 6 (CI): Independent with p.o.; rare cues; usually self cues; may need to avoid some foods or needs extra time  Level 7 ([address] [place] [address]): Independent for all p.o.  ANAYA. (2003). National Outcomes Measurement System (NOMS): Adult Speech-Language Pathology User's Guide. Pain:  Pain Scale 1: Numeric (0 - 10)  Pain Intensity 1: 0     After treatment:   []            Patient left in no apparent distress sitting up in chair  [x]            Patient left in no apparent distress in bed  [x]            Call bell left within reach  [x]            Nursing notified  []            Caregiver present  []            Bed alarm activated    COMMUNICATION/EDUCATION:   The patients plan of care including recommendations, planned interventions, and recommended diet changes were discussed with: Registered Nurse. [x]            Posted safety precautions in patient's room. []            Patient/family have participated as able in goal setting and plan of care. [x]            Patient/family agree to work toward stated goals and plan of care. []            Patient understands intent and goals of therapy, but is neutral about his/her participation.   [] Patient is unable to participate in goal setting and plan of care.     Thank you for this referral.  Tyra Taylor, SLP  Time Calculation: 15 mins

## 2018-02-06 NOTE — ED NOTES
TRANSFER - OUT REPORT:    Verbal report given to Mitesh Young RN (name) on Antionette Wallace  being transferred to Neuro (unit) for routine progression of care       Report consisted of patients Situation, Background, Assessment and   Recommendations(SBAR). Information from the following report(s) SBAR, Kardex, ED Summary, MAR, Accordion, Recent Results and Med Rec Status was reviewed with the receiving nurse. Lines:   Peripheral IV 02/05/18 Right Antecubital (Active)   Site Assessment Clean, dry, & intact 2/5/2018  4:15 PM   Phlebitis Assessment 0 2/5/2018  4:15 PM   Infiltration Assessment 0 2/5/2018  4:15 PM   Dressing Status Clean, dry, & intact 2/5/2018  4:15 PM   Hub Color/Line Status Pink;Flushed 2/5/2018  4:15 PM        Opportunity for questions and clarification was provided.

## 2018-02-06 NOTE — PROGRESS NOTES
Problem: Falls - Risk of  Goal: *Absence of Falls  Document Sobia Fall Risk and appropriate interventions in the flowsheet.    Outcome: Progressing Towards Goal  Fall Risk Interventions:            Medication Interventions: Bed/chair exit alarm, Evaluate medications/consider consulting pharmacy, Patient to call before getting OOB, Teach patient to arise slowly    Elimination Interventions: Bed/chair exit alarm, Call light in reach, Patient to call for help with toileting needs, Toileting schedule/hourly rounds

## 2018-02-06 NOTE — ROUTINE PROCESS

## 2018-02-06 NOTE — PROGRESS NOTES
TRANSFER - IN REPORT:    Verbal report received from philip(name) on Mariah Duran  being received from ED(unit) for routine progression of care      Report consisted of patients Situation, Background, Assessment and   Recommendations(SBAR). Information from the following report(s) SBAR, Kardex, [facility] and Cardiac Rhythm NSR was reviewed with the receiving nurse. Opportunity for questions and clarification was provided. Assessment completed upon patients arrival to unit and care assumed.

## 2018-02-06 NOTE — PROGRESS NOTES
Stroke Education provided to patient and the following topics were discussed    1. Patients personal risk factors for stroke are hypertension, hyperlipidemia and prior stroke    2. Warning signs of Stroke:        * Sudden numbness or weakness of the face, arm or leg, especially on one side of          The body            * Sudden confusion, trouble speaking or understanding        * Sudden trouble seeing in one or both eyes        * Sudden trouble walking, dizziness, loss of balance or coordination        * Sudden severe headache with no known cause      3. Importance of activation Emergency Medical Services ( 9-1-1 ) immediately if experience any warning signs of stroke. 4. Be sure and schedule a follow-up appointment with your primary care doctor or any specialists as instructed. 5. You must take medicine every day to treat your risk factors for stroke. Be sure to take your medicines exactly as your doctor tells you: no more, no less. Know what your medicines are for , what they do. Anti-thrombotics /anticoagulants can help prevent strokes. You are taking the following medicine(s)  Lipitor     6. Smoking and second-hand smoke greatly increase your risk of stroke, cardiovascular disease and death. Smoking history cigarettes, a few per day or ended year 2009      7. Information provided was [facility] Stroke Education Binder or Verbal Education stroke handout    8. Documentation of teaching completed in Patient Education Activity and on Care Plan with teaching response noted?   yes

## 2018-02-06 NOTE — PROGRESS NOTES
Pressure Ulcer Prevention Alert Received for Nando < 14 (moderate risk).        Care Plan/Interventions for Nursin. Complete Nando Pressure Ulcer Risk Scale and use sub scores to identify appropriate interventions. 2. Perform Assessment: skin, changes in LOC, visual cues for pain, monitor skin under medical devices  3. Respond to Reduced Sensory Perception: changes in LOC, check visual cues for pain, float heels, suspension boots, pressure redistribution bed/mattress/chair cushion, turning and reposition approximately every 2 hours (pillows & wedges), pad between skin to skin, turn & reposition  4. Manage Moisture: absorbent under pads, internal / external urinary device, internal /  external fecal device, minimize layers, contain wound drainage, access need for specialty bed, limit adult briefs, maintain skin hydration (lotion/cream), moisture barrier, offer toileting every hour  5. Promote Activity: increase time out of bed, chair cushion, PT/OT evaluation, trapeze to reposition, pressure redistribution bed/mattress/chair  6. Address Reduced Mobility: float heels / suspension boot, HOB 30 degrees or less, pressure redistribution bed/mattress/cushion, PT / OT evaluation, turn and reposition approximately every 2 hours (pillows & wedges)  7. Promote Nutrition: document food / fluid / supplement intake, encourage/assist with meals as needed  8. Reduce Friction and Shear: transferring/repositioning devices (lift/draw sheet), lift team/ patient mobility team, feet elevated on foot rest, minimize layers, foam dressing / transparent film / skin sealants, protective barrier creams and emollients, transfer aides (board, Carlos Eduardo lift, ceiling lift, stand assist), HOB 30 degrees or less, trapeze to reposition.   Wound Care Team

## 2018-02-06 NOTE — CONSULTS
NEUROLOGY NOTE       DATE OF CONSULTATION: 2/6/2018    CONSULTED BY: Oksana Carreno MD    Chief Complaint   Patient presents with    Dysarthria     more slurred than normal.     Extremity Weakness     left sided weakness. Reason for Consult  I have been asked to see the patient in neurological consultation to render advice and opinion regarding possible stroke    HISTORY OF PRESENT ILLNESS  Raul Davila is a 76 y.o. female who presents to the hospital because of increased left upper ext weakness and slurred speech. Unable to get hx from her and hence obtained by chart review. According to ER notes:  cc of slurred speech and L sided weakness since ~ 2300 yesterday. Son reports the pt was staying up late watching the Super Bowl with him and was getting ready for bed when she tried to say something and it was not coming out right. This morning, the pt complained of lightheadedness and confusion and her slurred speech progressively worsened throughout the day. Son reports that he also had to help the pt into her room which is unusual as she is independently ambulatory. Pt tales ASA daily. She had a stroke in 2009 with residual deficits of mildly slurred speech and LUE weakness.  Son reports her sxs are much worse than normal.    ROS  A ten system review of constitutional, cardiovascular, respiratory, musculoskeletal, endocrine, skin, SHEENT, genitourinary, psychiatric and neurologic systems was obtained and is unremarkable except as stated in HPI     PMH  Past Medical History:   Diagnosis Date    Acute kidney failure ([address] 8/77/3577    Acute systolic heart failure ([address] 7/14/2016    CAD (coronary artery disease)     Heart failure ([address]     Stroke ([address] 2009    Stroke    Stroke [facility])        FH  Family History   Problem Relation Age of Onset    Coronary Artery Disease Other      grandmother age 76       31 Keily Lanier  Social History     Social History    Marital status:      Spouse name: N/A  Number of children: N/A    Years of education: N/A     Social History Main Topics    Smoking status: Former Smoker     Quit date: 9/21/2009    Smokeless tobacco: Never Used    Alcohol use 0.0 oz/week     0 Standard drinks or equivalent per week      Comment: Very little    Drug use: No    Sexual activity: Not Asked     Other Topics Concern    None     Social History Narrative    ** Merged History Encounter **            ALLERGIES  Allergies   Allergen Reactions    Decadron [Dexamethasone Sodium Phosphate] Hives    Pcn [Penicillins] Hives       PHYSICAL EXAM  EXAMINATION:   Patient Vitals for the past 24 hrs:   Temp Pulse Resp BP SpO2   02/06/18 1100 - - - - 100 %   02/06/18 0947 - 66 - 127/67 -   02/06/18 0817 97.5 °F (36.4 °C) 60 16 162/83 100 %   02/06/18 0307 98.7 °F (37.1 °C) 80 20 120/75 100 %   02/06/18 0031 - - - - 98 %   02/05/18 2318 98 °F (36.7 °C) - 28 - -   02/05/18 2242 98.3 °F (36.8 °C) 80 18 147/69 97 %   02/05/18 2130 - 75 19 (!) 131/98 96 %   02/05/18 2030 - 86 25 158/71 92 %   02/05/18 2000 - 74 19 144/73 98 %   02/05/18 1951 - 79 20 149/78 97 %   02/05/18 1900 - 80 16 - 97 %   02/05/18 1618 97.5 °F (36.4 °C) 69 20 117/74 98 %        General:   General appearance: Pt is in no acute distress   Distal pulses are preserved  Fundoscopic exam: attempted    Neurological Examination:   Mental Status:  AAO x2 (couldnt tell year). Speech is very dysarthric. Follows commands, has fair fund of knowledge, attention, short term recall, comprehension and insight. Cranial Nerves: Visual fields are full. PERRL, Extraocular movements are full. Facial sensation intact. Facial movement intact. Hearing intact to conversation. Palate elevates symmetrically. Shoulder shrug symmetric. Tongue midline. Motor: Strength is 2/5 in the LUE ext. Normal tone. No atrophy. Sensation: Normal to light touch    Reflexes: DTRs 2+ on the right and 3+ on the left.       Coordination/Cerebellar: Intact to finger-nose-finger on the right    Gait: deferred    Skin: No significant bruising or lacerations. LAB DATA REVIEWED:    Recent Results (from the past 24 hour(s))   CBC WITH AUTOMATED DIFF    Collection Time: 02/05/18  4:47 PM   Result Value Ref Range    WBC 6.7 3.6 - 11.0 K/uL    RBC 4.28 3.80 - 5.20 M/uL    HGB 11.5 11.5 - 16.0 g/dL    HCT 34.4 (L) 35.0 - 47.0 %    MCV 80.4 80.0 - 99.0 FL    MCH 26.9 26.0 - 34.0 PG    MCHC 33.4 30.0 - 36.5 g/dL    RDW 13.6 11.5 - 14.5 %    PLATELET 390 335 - 189 K/uL    MPV 11.5 8.9 - 12.9 FL    NRBC 0.0 0  WBC    ABSOLUTE NRBC 0.00 0.00 - 0.01 K/uL    NEUTROPHILS 66 32 - 75 %    LYMPHOCYTES 23 12 - 49 %    MONOCYTES 9 5 - 13 %    EOSINOPHILS 2 0 - 7 %    BASOPHILS 0 0 - 1 %    IMMATURE GRANULOCYTES 0 0.0 - 0.5 %    ABS. NEUTROPHILS 4.4 1.8 - 8.0 K/UL    ABS. LYMPHOCYTES 1.6 0.8 - 3.5 K/UL    ABS. MONOCYTES 0.6 0.0 - 1.0 K/UL    ABS. EOSINOPHILS 0.1 0.0 - 0.4 K/UL    ABS. BASOPHILS 0.0 0.0 - 0.1 K/UL    ABS. IMM.  GRANS. 0.0 0.00 - 0.04 K/UL    DF AUTOMATED     METABOLIC PANEL, BASIC    Collection Time: 02/05/18  4:47 PM   Result Value Ref Range    Sodium 142 136 - 145 mmol/L    Potassium 4.5 3.5 - 5.1 mmol/L    Chloride 108 97 - 108 mmol/L    CO2 29 21 - 32 mmol/L    Anion gap 5 5 - 15 mmol/L    Glucose 120 (H) 65 - 100 mg/dL    BUN 29 (H) 6 - 20 MG/DL    Creatinine 1.72 (H) 0.55 - 1.02 MG/DL    BUN/Creatinine ratio 17 12 - 20      GFR est AA 35 (L) >60 ml/min/1.73m2    GFR est non-AA 29 (L) >60 ml/min/1.73m2    Calcium 8.8 8.5 - 10.1 MG/DL   PROTHROMBIN TIME + INR    Collection Time: 02/05/18  4:47 PM   Result Value Ref Range    INR 1.0 0.9 - 1.1      Prothrombin time 10.4 9.0 - 11.1 sec   PTT    Collection Time: 02/05/18  4:47 PM   Result Value Ref Range    aPTT 27.4 22.1 - 32.5 sec    aPTT, therapeutic range     58.0 - 77.0 SECS   EKG, 12 LEAD, INITIAL    Collection Time: 02/05/18  4:57 PM   Result Value Ref Range    Ventricular Rate 70 BPM    Atrial Rate 70 BPM P-R Interval 142 ms    QRS Duration 128 ms    Q-T Interval 442 ms    QTC Calculation (Bezet) 477 ms    Calculated P Axis 48 degrees    Calculated R Axis -26 degrees    Calculated T Axis 142 degrees    Diagnosis       Normal sinus rhythm  Left bundle branch block  Abnormal ECG  When compared with ECG of 01-AUG-2017 12:56,  No significant change was found     LIPID PANEL    Collection Time: 02/06/18  3:10 AM   Result Value Ref Range    LIPID PROFILE          Cholesterol, total 171 <200 MG/DL    Triglyceride 90 <150 MG/DL    HDL Cholesterol 51 MG/DL    LDL, calculated 102 (H) 0 - 100 MG/DL    VLDL, calculated 18 MG/DL    CHOL/HDL Ratio 3.4 0 - 5.0     HEMOGLOBIN A1C WITH EAG    Collection Time: 02/06/18  3:10 AM   Result Value Ref Range    Hemoglobin A1c 6.0 4.2 - 6.3 %    Est. average glucose 126 mg/dL        Imaging review:  CT Head  1. No acute intracranial abnormality. 2. Chronic encephalomalacia in the right parietal lobe. 3. Chronic bilateral cerebellar infarcts, bilateral basal ganglia infarcts, and  left thalamic infarct. 4. Moderate to severe periventricular and subcortical white matter disease    Stroke workup    MRI Brain  Pending    CTA Head and neck  There is intracranial atherosclerosis. There is no intracranial aneurysm. Atherosclerosis involves both carotid arteries, but no evidence of flow-limiting  stenosis. TTE:   Left ventricle: Systolic function was normal. Ejection fraction was  estimated in the range of 55 % to 60 %. There were no regional wall motion  abnormalities. There was moderate concentric hypertrophy. Stroke labs:  HgBA1c    Lab Results   Component Value Date/Time    Hemoglobin A1c 6.0 02/06/2018 03:10 AM     LDL   Lab Results   Component Value Date/Time    LDL, calculated 102 02/06/2018 03:10 AM       HOME MEDS  Prior to Admission Medications   Prescriptions Last Dose Informant Patient Reported? Taking?    albuterol (PROVENTIL HFA, VENTOLIN HFA, PROAIR HFA) 90 mcg/actuation inhaler 2/5/2018 at Unknown time Other No Yes   Sig: Take 2 Puffs by inhalation every four (4) hours as needed for Wheezing. amLODIPine (NORVASC) 2.5 mg tablet 2/5/2018 at Unknown time Other No Yes   Sig: Take 1 Tab by mouth daily. aspirin delayed-release 81 mg tablet 2/5/2018 at Unknown time Other No Yes   Sig: Take 1 Tab by mouth daily. atorvastatin (LIPITOR) 40 mg tablet 2/5/2018 at Unknown time Other No Yes   Sig: TAKE ONE TABLET BY MOUTH EVERY DAY   carvedilol (COREG) 12.5 mg tablet 2/5/2018 at Unknown time Other No Yes   Sig: Take 1 Tab by mouth two (2) times a day. furosemide (LASIX) 40 mg tablet 2/5/2018 at Unknown time Other No Yes   Sig: Take 1 Tab by mouth daily. One pill daily   isosorbide mononitrate ER (IMDUR) 30 mg tablet 2/5/2018 at Unknown time Other No Yes   Sig: Take 1 Tab by mouth every morning. lisinopril (PRINIVIL, ZESTRIL) 2.5 mg tablet 2/5/2018 at Unknown time Other No Yes   Sig: Take 1 Tab by mouth daily. potassium chloride (K-DUR, KLOR-CON) 20 mEq tablet 2/5/2018 at Unknown time Other Yes Yes   Sig: Take 20 mEq by mouth daily. Facility-Administered Medications: None       CURRENT MEDS  Current Facility-Administered Medications   Medication Dose Route Frequency    amLODIPine (NORVASC) tablet 2.5 mg  2.5 mg Oral DAILY    aspirin delayed-release tablet 81 mg  81 mg Oral DAILY    atorvastatin (LIPITOR) tablet 40 mg  40 mg Oral DAILY    carvedilol (COREG) tablet 12.5 mg  12.5 mg Oral BID    furosemide (LASIX) tablet 40 mg  40 mg Oral DAILY    isosorbide mononitrate ER (IMDUR) tablet 30 mg  30 mg Oral DAILY    lisinopril (PRINIVIL, ZESTRIL) tablet 2.5 mg  2.5 mg Oral DAILY    sodium chloride (NS) flush 5-10 mL  5-10 mL IntraVENous Q8H    0.9% sodium chloride infusion  75 mL/hr IntraVENous CONTINUOUS       IMPRESSION:  Rustam Cage is a 76 y.o. female who presents with worsening left upper ext weakness and dysarthria. Suspect TIA. She is undergoing stroke w/u.  Will switch ASA to plavix 75 mg daily and increase atorvastatin to 80 mg daily. RECOMMENDATIONS:  - MRI brain w/o C - Pending  - CTA Head and neck - no significant stenosis  - TTE - Normal  - Telemetry  - Permissive HTN (SBP<220/<120) for 24 hrs from symptom onset and then BP goal is less than 140/90  - Stroke labs (HgbA1c, lipid panel) - LDL is elevated. - Switch ASA to plavix to 75 mg daily  - Increase atorvastatin to 80 mg daily   - ST/OT/PT gillian    Thank you very much for this consultation.       Lolis Brooks MD  Neurologist

## 2018-02-06 NOTE — PROGRESS NOTES
Problem: Self Care Deficits Care Plan (Adult)  Goal: *Acute Goals and Plan of Care (Insert Text)  Occupational Therapy Goals:  Initiated 2/6/2018  1. Patient will perform grooming with SBA within 7 days. 2. Patient will perform toileting with moderate assistance  within 7 days. 3. Patient will perform upper body dressing with minimal assistance within 7 days. 4. Patient will perform lower body dressing with moderate assist within 7 days. 5. Patient will transfer from toilet with contact guard assist using the least restrictive device and appropriate durable medical equipment within 7 days. Occupational Therapy EVALUATION  Patient: Mariah Duran (4 y.o. female)  Date: 2/6/2018  Primary Diagnosis: Stroke [facility])  CAD (coronary artery disease)        Precautions: fall        ASSESSMENT :  Based on the objective data described below, the patient presents with left hemiparesis, signficant dysarthria, wheezing on exertion and decreased endurance. Pts O2 sat on room air was 96-97% with activity and nurse approved having O2 off. Pt is left handed and pt was unable to functionally use left hand this session. Slight wrist movement noted and substitution at the shoulder with attempt at flexion (abduction). Min assist needed for supine to sit edge of bed and to scoot to edge of bed. Pt uses walker at baseline and needed therapist to support hand on walker due to decreased functional hand use. Pt was able to mobilize to bathroom with dyllan-walker with min assist for mobility and moderate assist for device use. Total assist to manage depends due to posterior lean. Pt attempted but was unable. Able to wipe rear anal areas seated with lateral weight shift with initial CGA but was not through so needed max assist.  Moderate assist for sit to stand from toilet. Returned to bedside chair with RW due to fatigue and moderate assist to wash hands seated at bedside chair.   Pt is performing UB ADLS at a min to max assist level and lower body ADLS at a total assist level. Due to pts decreased endurance recommend SNF at discharge for rehab. Patient will benefit from skilled intervention to address the above impairments. Patients rehabilitation potential is considered to be Fair  Factors which may influence rehabilitation potential include:   [x]                None noted  []                Mental ability/status  []                Medical condition  []                Home/family situation and support systems  []                Safety awareness  []                Pain tolerance/management  []                Other:      PLAN :  Recommendations and Planned Interventions:  [x]                  Self Care Training                  [x]           Therapeutic Activities  [x]                  Functional Mobility Training    []           Cognitive Retraining  [x]                  Therapeutic Exercises           []           Endurance Activities  [x]                  Balance Training                   [x]           Neuromuscular Re-Education  []                  Visual/Perceptual Training     [x]      Home Safety Training  [x]                  Patient Education                 [x]           Family Training/Education  []                  Other (comment):    Frequency/Duration: Patient will be followed by occupational therapy 5 times a week to address goals. Discharge Recommendations: Edison Sidhu  Further Equipment Recommendations for Discharge: TBD     SUBJECTIVE:   Patient stated Thank you.     OBJECTIVE DATA SUMMARY:   HISTORY:   Past Medical History:   Diagnosis Date    Acute kidney failure ([facility] 0/76/2688    Acute systolic heart failure ([facility] 7/14/2016    CAD (coronary artery disease)     Heart failure ([facility]     Stroke [facility]) 2009    Stroke    Stroke [facility])    History reviewed. No pertinent surgical history.     Prior Level of Function/Environment/Context: per pt ambulated with standard walker; assists with showers from cousin and pt sits on shower chair; performed ADLs on her own and had weakness in left UE but could manage fasteners    Expanded or extensive additional review of patient history:     Home Situation  Home Environment: Private residence  # Steps to Enter: 3  Rails to Enter: Yes  One/Two Story Residence: One story  Living Alone: No (son works during the day)  Current DME Used/Available at Home: [employer], [employer], bedside, [id number] chair  Tub or Shower Type: Tub/Shower combination  [x]  Right hand dominant   []  Left hand dominant    EXAMINATION OF PERFORMANCE DEFICITS:  Cognitive/Behavioral Status:  Neurologic State: Alert  Orientation Level: Oriented to place;Oriented to person;Oriented to situation  Cognition: Follows commands  Perception: Appears intact  Perseveration: No perseveration noted  Safety/Judgement: Fall prevention    Hearing:   Auditory  Auditory Impairment: None    Vision/Perceptual:                                Corrective Lenses: Glasses    Range of Motion:    AROM: Generally decreased, functional (except L UE is non-functional)                         Strength:    Strength: Generally decreased, functional (except L UE is non-functional)           LUE Strength  L Shoulder Flexion: 3-  L Shoulder ABduction: 3-  L Elbow Flexion: 3-  L Elbow Extension: 3-  L  Forearm Pronation: 3  L  Forearm Supination: 2+  L Wrist Flexion: 2+  L Wrist Extension: 2+  L : 0    Coordination:  Coordination: Generally decreased, functional (except LUE is non-functional)  Fine Motor Skills-Upper: Left Impaired;Right Intact    Gross Motor Skills-Upper: Left Impaired;Right Intact    Tone & Sensation:    Tone: Abnormal (in L UE)                         Balance:  Sitting: Intact  Standing: Impaired  Standing - Static: Good;Constant support  Standing - Dynamic : Fair (using RW and dyllan-walker)    Functional Mobility and Transfers for ADLs:  Bed Mobility:  Supine to Sit: Minimum assistance;Assist x2  Scooting: Contact guard assistance; Additional time    Transfers:  Functional Transfers  Sit to Stand: Minimum assistance; Additional time  Stand to Sit: Minimum assistance; Additional time  Bed to Chair: Minimum assistance; Additional time  Toilet Transfer : Moderate assistance (sit to stand from toilet)    ADL Assessment:  Feeding: Minimum assistance (dyllan technique)    Oral Facial Hygiene/Grooming: Moderate assistance    Bathing: Maximum assistance    Upper Body Dressing: Maximum assistance    Lower Body Dressing: Total assistance    Toileting: Total assistance                ADL Intervention and task modifications:       Grooming  Washing Hands: Moderate assistance (seated due to fatigue from standing tasks)      Toileting  Bowel Hygiene: Maximum assistance  Clothing Management: Total assistance (dependent)  Cues: Physical assistance for pants down;Physical assistance for pants up; Tactile cues provided;Verbal cues provided    Cognitive Retraining  Safety/Judgement: Fall prevention      Functional Measure:   Fugl-Sheets Assessment of Motor Recovery after Stroke:     Reflex Activity  Flexors/Biceps/Fingers: Can be elicited  Extensors/Triceps: Can be elicited  Reflex Subtotal: 4    Volitional Movement Within Synergies  Shoulder Retraction: Partial  Shoulder Elevation: Partial  Shoulder Abduction (90 degrees): Partial  Shoulder External Rotation: Partial  Elbow Flexion: Partial  Forearm Supination: Partial  Shoulder Adduction/Internal Rotation: Partial  Elbow Extension: Partial  Forearm Pronation: Partial  Subtotal: 9    Volitional Movement Mixing Synergies  Hand to Lumbar Spine: None  Shoulder Flexion (0-90 degrees): Partial  Pronation-Supination: Partial  Subtotal: 2    Volitional Movement With Little or No Synergy  Shoulder Abduction (0-90 degrees): Partial  Shoulder Flexion ( degrees): None  Pronation/Supination: Partial  Subtotal : 2              Wrist  Stability at 15 Degree Dorsiflexion: None  Repeated Dorsiflexion/ Volar Flexion: Partial  Stability at 15 Degree Dorsiflexion: None  Repeated Dorsiflexion/ Volar Flexion: Partial  Circumduction: None  Wrist Total: 2    Hand  Mass Flexion: None  Mass Extension: None  Grasp A: None  Grasp B: None  Grasp C: None  Grasp D: None  Grasp E: None  Hand Total: 0    Coordination/Speed  Tremor: Marked  Dysmetria: Marked  Time: >5s  Coordination/Speed Total : 0     19/66     Percentage of impairment CH  0% CI  1-19% CJ  20-39% CK  40-59% CL  60-79% CM  80-99% CN  100%   Fugl-Sheets score: 0-66 66 53-65 39-52 26-38 13-25 1-12   0      This is a reliable/valid measure of arm function after a neurological event. It has established value to characterize functional status and for measuring spontaneous and therapy-induced recovery; tests proximal and distal motor functions. Fugl-Sheets Assessment  UE scores recorded between five and 30 days post neurologic event can be used to predict UE recovery at six months post neurologic event. Severe = 0-21 points   Moderately Severe = 22-33 points   Moderate = 34-47 points   Mild = 48-66 points  Molina MOISE Schwab, OMI Vásquez, & HOLLY Falcon (1992). Measurement of motor recovery after stroke: Outcome assessment and sample size requirements.  Stroke, 23, pp. 9823-8420.   ------------------------------------------------------------------------------------------------------------------------------------------------------------------  MCID:  Stroke:   Jacinto Ahn, 2001; n = 171; mean age 79 (5) years; assessed within 16 (12) days of stroke, Acute Stroke)  FMA Motor Scores from Admission to Discharge   10 point increase in FMA Upper Extremity = 1.5 change in discharge FIM   10 point increase in FMA Lower Extremity = 1.9 change in discharge FIM  MDC:   Stroke:   Jessie Diaz et al, 2008, n = 14, mean age = 59.9 (14.6) years, assessed on average 14 (6.5) months post stroke, Chronic Stroke)   FMA = 5.2 points for the Upper Extremity portion of the assessment     G codes: In compliance with CMSs Claims Based Outcome Reporting, the following G-code set was chosen for this patient based on their primary functional limitation being treated: The outcome measure chosen to determine the severity of the functional limitation was the fugl hatfield with a score of 19/66 which was correlated with the impairment scale. ? Self Care:     - CURRENT STATUS: CL - 60%-79% impaired, limited or restricted    - GOAL STATUS: CK - 40%-59% impaired, limited or restricted    - D/C STATUS:  ---------------To be determined---------------      Occupational Therapy Evaluation Charge Determination   History Examination Decision-Making   MEDIUM Complexity : Expanded review of history including physical, cognitive and psychosocial  history  MEDIUM Complexity : 3-5 performance deficits relating to physical, cognitive , or psychosocial skils that result in activity limitations and / or participation restrictions MEDIUM Complexity : Patient may present with comorbidities that affect occupational performnce. Miniml to moderate modification of tasks or assistance (eg, physical or verbal ) with assesment(s) is necessary to enable patient to complete evaluation       Based on the above components, the patient evaluation is determined to be of the following complexity level: MEDIUM    Pain:  Pain Scale 1: Numeric (0 - 10)  Pain Intensity 1: 0              Activity Tolerance:     Please refer to the flowsheet for vital signs taken during this treatment. After treatment:   []  Patient left in no apparent distress sitting up in chair  []  Patient left in no apparent distress in bed  []  Call bell left within reach  []  Nursing notified  []  Caregiver present  []  Bed alarm activated    COMMUNICATION/EDUCATION:   The patients plan of care was discussed with: Physical Therapist, Registered Nurse and patient.     Patient was educated regarding Her deficit(s) of left hemiparesis as this relates to Her diagnosis of CVA. She demonstrated Good understanding as evidenced by nodding of head. Patient and/or family was verbally educated on the BE FAST acronym for signs/symptoms of CVA and TIA. BE FAST was written on patient's communication board  for visual education and reinforcement. All questions answered with patient indicating good understanding. [x]      Home safety education was provided and the patient/caregiver indicated understanding. [x]      Patient have participated as able and agree with findings and recommendations. []      Patient is unable to participate in plan of care at this time. This patients plan of care is appropriate for delegation to ANANDA.     Thank you for this referral.  Margarette Rendon, OTR/L  Time Calculation: 34 mins

## 2018-02-06 NOTE — ED NOTES
Patient requesting something to drink. Per MD Kamila Snell, patient may have small sips of water if she is able to pass a dysphagia screening.

## 2018-02-06 NOTE — PROGRESS NOTES
Hospitalist Progress Note    NAME: Rosa Nieves   :  1940   MRN:  [id number]       Assessment / Plan:  worsening left upper ext weakness and dysarthria. Suspect TIA  Patient with hx of previous strokes. The symptoms started last night so no candidate for TPA. At this time apparently patient recovering some how. CT head no acute findings. brain MRI W/WO, MRA head/Neck W/WO,   echocardiogram,   Neurology consult Inputs and recommendations Appreciated. SOB , uncertain cause yet   CXR , breathing treatments   DC IVF , lasix extra dose        Hx CAD:  no issues at this time  Continue home meds at this time after s/s eval     Hyperlipidemia ;   Continue home meds (after s/s eval)         Body mass index is 27.34 kg/(m^2). Code status: Full  Prophylaxis: Hep SQ  Recommended Disposition: tbd     Subjective:     Chief Complaint / Reason for Physician Visit  Patient slurred when she talk . Discussed with RN events overnight. Review of Systems:  Symptom Y/N Comments  Symptom Y/N Comments   Fever/Chills    Chest Pain     Poor Appetite    Edema     Cough    Abdominal Pain     Sputum    Joint Pain     SOB/CHRISTINE    Pruritis/Rash     Nausea/vomit    Tolerating PT/OT     Diarrhea    Tolerating Diet     Constipation    Other       Could NOT obtain due to: Not able to do it because of slurring of speech      Objective:     VITALS:   Last 24hrs VS reviewed since prior progress note.  Most recent are:  Patient Vitals for the past 24 hrs:   Temp Pulse Resp BP SpO2   18 0947 - 66 - 127/67 -   18 0817 97.5 °F (36.4 °C) 60 16 162/83 100 %   18 0307 98.7 °F (37.1 °C) 80 20 120/75 100 %   18 0031 - - - - 98 %   18 2318 98 °F (36.7 °C) - 28 - -   18 2242 98.3 °F (36.8 °C) 80 18 147/69 97 %   180 -  19 (!) 131/98 96 %   18 -  25 158/71 92 %   18 -  19 144/73 98 %   18 -  20 149/78 97 %   18 -  16 - 97 %   18 1618 97.5 °F (36.4 °C) 69 20 117/74 98 %     No intake or output data in the 24 hours ending 02/06/18 1100     PHYSICAL EXAM:  General: WD, WN. Alert, cooperative, no acute distress  , slurring of speech   EENT:  EOMI. Anicteric sclerae. MMM  Resp:  CTA bilaterally, no wheezing or rales. No accessory muscle use  CV:  Regular  rhythm,  No edema  GI:  Soft, Non distended, Non tender.  +Bowel sounds  Neurologic:  Left side weakness , chronic   Psych:   Good insight. Not anxious nor agitated  Skin:  No rashes. No jaundice    Reviewed most current lab test results and cultures  YES  Reviewed most current radiology test results   YES  Review and summation of old records today    NO  Reviewed patient's current orders and MAR    YES  PMH/SH reviewed - no change compared to H&P  ________________________________________________________________________  Care Plan discussed with:    Comments   Patient y    Family      RN y    Care Manager     Consultant                        Multidiciplinary team rounds were held today with , nursing, pharmacist and clinical coordinator. Patient's plan of care was discussed; medications were reviewed and discharge planning was addressed. ________________________________________________________________________  Total NON critical care TIME:  35    Minutes    Total CRITICAL CARE TIME Spent:   Minutes non procedure based      Comments   >50% of visit spent in counseling and coordination of care     ________________________________________________________________________  Livier Bird MD     Procedures: see electronic medical records for all procedures/Xrays and details which were not copied into this note but were reviewed prior to creation of Plan. LABS:  I reviewed today's most current labs and imaging studies.   Pertinent labs include:  Recent Labs      02/05/18   1647   WBC  6.7   HGB  11.5   HCT  34.4*   PLT  180     Recent Labs      02/05/18   1647   NA 142   K  4.5   CL  108   CO2  29   GLU  120*   BUN  29*   CREA  1.72*   CA  8.8   INR  1.0       Signed:  Marija Patten MD

## 2018-02-07 ENCOUNTER — APPOINTMENT (OUTPATIENT)
Dept: GENERAL RADIOLOGY | Age: 78
DRG: 069 | End: 2018-02-07
Attending: INTERNAL MEDICINE
Payer: MEDICARE

## 2018-02-07 ENCOUNTER — APPOINTMENT (OUTPATIENT)
Dept: MRI IMAGING | Age: 78
DRG: 069 | End: 2018-02-07
Attending: INTERNAL MEDICINE
Payer: MEDICARE

## 2018-02-07 LAB
ALBUMIN SERPL-MCNC: 3.3 G/DL (ref 3.5–5)
ALBUMIN/GLOB SERPL: 1 {RATIO} (ref 1.1–2.2)
ALP SERPL-CCNC: 96 U/L (ref 45–117)
ALT SERPL-CCNC: 10 U/L (ref 12–78)
ANION GAP SERPL CALC-SCNC: 6 MMOL/L (ref 5–15)
AST SERPL-CCNC: 15 U/L (ref 15–37)
BASOPHILS # BLD: 0 K/UL (ref 0–0.1)
BASOPHILS NFR BLD: 1 % (ref 0–1)
BILIRUB SERPL-MCNC: 0.2 MG/DL (ref 0.2–1)
BUN SERPL-MCNC: 33 MG/DL (ref 6–20)
BUN/CREAT SERPL: 21 (ref 12–20)
CALCIUM SERPL-MCNC: 8.6 MG/DL (ref 8.5–10.1)
CHLORIDE SERPL-SCNC: 110 MMOL/L (ref 97–108)
CO2 SERPL-SCNC: 29 MMOL/L (ref 21–32)
CREAT SERPL-MCNC: 1.54 MG/DL (ref 0.55–1.02)
DIFFERENTIAL METHOD BLD: ABNORMAL
EOSINOPHIL # BLD: 0.1 K/UL (ref 0–0.4)
EOSINOPHIL NFR BLD: 2 % (ref 0–7)
ERYTHROCYTE [DISTWIDTH] IN BLOOD BY AUTOMATED COUNT: 13.5 % (ref 11.5–14.5)
GLOBULIN SER CALC-MCNC: 3.3 G/DL (ref 2–4)
GLUCOSE SERPL-MCNC: 109 MG/DL (ref 65–100)
HCT VFR BLD AUTO: 30.2 % (ref 35–47)
HGB BLD-MCNC: 10 G/DL (ref 11.5–16)
IMM GRANULOCYTES # BLD: 0 K/UL (ref 0–0.04)
IMM GRANULOCYTES NFR BLD AUTO: 0 % (ref 0–0.5)
LYMPHOCYTES # BLD: 2 K/UL (ref 0.8–3.5)
LYMPHOCYTES NFR BLD: 35 % (ref 12–49)
MCH RBC QN AUTO: 26.5 PG (ref 26–34)
MCHC RBC AUTO-ENTMCNC: 33.1 G/DL (ref 30–36.5)
MCV RBC AUTO: 80.1 FL (ref 80–99)
MONOCYTES # BLD: 0.6 K/UL (ref 0–1)
MONOCYTES NFR BLD: 10 % (ref 5–13)
NEUTS SEG # BLD: 3.1 K/UL (ref 1.8–8)
NEUTS SEG NFR BLD: 53 % (ref 32–75)
NRBC # BLD: 0 K/UL (ref 0–0.01)
NRBC BLD-RTO: 0 PER 100 WBC
PLATELET # BLD AUTO: 143 K/UL (ref 150–400)
PMV BLD AUTO: 11.2 FL (ref 8.9–12.9)
POTASSIUM SERPL-SCNC: 3.8 MMOL/L (ref 3.5–5.1)
PROT SERPL-MCNC: 6.6 G/DL (ref 6.4–8.2)
RBC # BLD AUTO: 3.77 M/UL (ref 3.8–5.2)
SODIUM SERPL-SCNC: 145 MMOL/L (ref 136–145)
WBC # BLD AUTO: 5.8 K/UL (ref 3.6–11)

## 2018-02-07 PROCEDURE — 74230 X-RAY XM SWLNG FUNCJ C+: CPT

## 2018-02-07 PROCEDURE — 85025 COMPLETE CBC W/AUTO DIFF WBC: CPT | Performed by: INTERNAL MEDICINE

## 2018-02-07 PROCEDURE — 65660000000 HC RM CCU STEPDOWN

## 2018-02-07 PROCEDURE — A9576 INJ PROHANCE MULTIPACK: HCPCS | Performed by: INTERNAL MEDICINE

## 2018-02-07 PROCEDURE — 74011250637 HC RX REV CODE- 250/637: Performed by: INTERNAL MEDICINE

## 2018-02-07 PROCEDURE — 70553 MRI BRAIN STEM W/O & W/DYE: CPT

## 2018-02-07 PROCEDURE — 94640 AIRWAY INHALATION TREATMENT: CPT

## 2018-02-07 PROCEDURE — 74011000250 HC RX REV CODE- 250: Performed by: INTERNAL MEDICINE

## 2018-02-07 PROCEDURE — 92611 MOTION FLUOROSCOPY/SWALLOW: CPT

## 2018-02-07 PROCEDURE — 36415 COLL VENOUS BLD VENIPUNCTURE: CPT | Performed by: INTERNAL MEDICINE

## 2018-02-07 PROCEDURE — 74011250636 HC RX REV CODE- 250/636: Performed by: INTERNAL MEDICINE

## 2018-02-07 PROCEDURE — 74011250637 HC RX REV CODE- 250/637: Performed by: PSYCHIATRY & NEUROLOGY

## 2018-02-07 PROCEDURE — 77010033678 HC OXYGEN DAILY

## 2018-02-07 PROCEDURE — 92526 ORAL FUNCTION THERAPY: CPT

## 2018-02-07 PROCEDURE — 80053 COMPREHEN METABOLIC PANEL: CPT | Performed by: INTERNAL MEDICINE

## 2018-02-07 RX ORDER — IPRATROPIUM BROMIDE AND ALBUTEROL SULFATE 2.5; .5 MG/3ML; MG/3ML
3 SOLUTION RESPIRATORY (INHALATION)
Status: DISCONTINUED | OUTPATIENT
Start: 2018-02-07 | End: 2018-02-10 | Stop reason: HOSPADM

## 2018-02-07 RX ORDER — IPRATROPIUM BROMIDE AND ALBUTEROL SULFATE 2.5; .5 MG/3ML; MG/3ML
3 SOLUTION RESPIRATORY (INHALATION)
Status: DISCONTINUED | OUTPATIENT
Start: 2018-02-07 | End: 2018-02-07

## 2018-02-07 RX ORDER — IPRATROPIUM BROMIDE AND ALBUTEROL SULFATE 2.5; .5 MG/3ML; MG/3ML
3 SOLUTION RESPIRATORY (INHALATION)
Status: DISCONTINUED | OUTPATIENT
Start: 2018-02-07 | End: 2018-02-09

## 2018-02-07 RX ORDER — FUROSEMIDE 20 MG/1
20 TABLET ORAL DAILY
Status: DISCONTINUED | OUTPATIENT
Start: 2018-02-08 | End: 2018-02-10 | Stop reason: HOSPADM

## 2018-02-07 RX ADMIN — GADOTERIDOL 15 ML: 279.3 INJECTION, SOLUTION INTRAVENOUS at 08:33

## 2018-02-07 RX ADMIN — IPRATROPIUM BROMIDE AND ALBUTEROL SULFATE 3 ML: .5; 3 SOLUTION RESPIRATORY (INHALATION) at 14:06

## 2018-02-07 RX ADMIN — HEPARIN SODIUM 5000 UNITS: 5000 INJECTION, SOLUTION INTRAVENOUS; SUBCUTANEOUS at 04:05

## 2018-02-07 RX ADMIN — FUROSEMIDE 40 MG: 40 TABLET ORAL at 09:51

## 2018-02-07 RX ADMIN — AMLODIPINE BESYLATE 2.5 MG: 2.5 TABLET ORAL at 09:51

## 2018-02-07 RX ADMIN — IPRATROPIUM BROMIDE AND ALBUTEROL SULFATE 3 ML: .5; 3 SOLUTION RESPIRATORY (INHALATION) at 00:32

## 2018-02-07 RX ADMIN — LISINOPRIL 2.5 MG: 5 TABLET ORAL at 09:50

## 2018-02-07 RX ADMIN — HEPARIN SODIUM 5000 UNITS: 5000 INJECTION, SOLUTION INTRAVENOUS; SUBCUTANEOUS at 15:58

## 2018-02-07 RX ADMIN — ATORVASTATIN CALCIUM 80 MG: 40 TABLET, FILM COATED ORAL at 09:51

## 2018-02-07 RX ADMIN — Medication 10 ML: at 15:59

## 2018-02-07 RX ADMIN — CLOPIDOGREL BISULFATE 75 MG: 75 TABLET ORAL at 09:51

## 2018-02-07 RX ADMIN — ISOSORBIDE MONONITRATE 30 MG: 30 TABLET, EXTENDED RELEASE ORAL at 09:51

## 2018-02-07 RX ADMIN — CARVEDILOL 12.5 MG: 12.5 TABLET, FILM COATED ORAL at 09:51

## 2018-02-07 RX ADMIN — Medication 10 ML: at 04:00

## 2018-02-07 RX ADMIN — IPRATROPIUM BROMIDE AND ALBUTEROL SULFATE 3 ML: .5; 3 SOLUTION RESPIRATORY (INHALATION) at 19:35

## 2018-02-07 RX ADMIN — CARVEDILOL 12.5 MG: 12.5 TABLET, FILM COATED ORAL at 17:05

## 2018-02-07 RX ADMIN — Medication 10 ML: at 23:10

## 2018-02-07 NOTE — PROGRESS NOTES
Bedside and Verbal shift change report given to ADELE Kim (oncoming nurse) by Miriam Hazel (offgoing nurse). Report included the following information SBAR, Kardex, ED Summary, Intake/Output, Recent Results and Cardiac Rhythm NSR. Zone Phone:   [phone]      Significant changes during shift:  One episode of respiratory distress, resolved with O2 placement and meds.       Patient Information    Faiza Mondragon  76 y.o.  2/5/2018  4:09 PM by Deana Jacob MD. Faiza Mondragon was admitted from Home    Problem List    Patient Active Problem List    Diagnosis Date Noted    CAD (coronary artery disease) 02/05/2018    Stroke ([address] 02/05/2018    Hypertensive urgency 08/01/2017    CHF (congestive heart failure) ([address] 08/01/2017    COPD exacerbation ([address] 03/07/2017    CAD (coronary artery disease), native coronary artery 07/15/2016    H/O: CVA (cerebrovascular accident) 07/15/2016    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 ([address] 07/15/2016    Severe sepsis ([address] 07/14/2016    Pneumonia 07/14/2016    Acute respiratory failure ([address] 07/14/2016    Elevated troponin 07/14/2016    Left bundle branch block (LBBB) on electrocardiogram 47/65/7103    Acute systolic heart failure ([address] 07/14/2016    Acute kidney failure ([address] 07/14/2016    Mixed hyperlipidemia 09/21/2012    Essential hypertension, benign 09/21/2012    Other left bundle branch block 09/21/2012    Coronary atherosclerosis of native coronary artery 09/21/2012    Late effects of cerebrovascular disease, dysarthria 09/21/2012    Atherosclerosis of renal artery ([address] 09/21/2012    Atherosclerosis of native arteries of the extremities with intermittent claudication 09/21/2012    Tobacco use disorder 09/21/2012    Cerebrovascular accident ([address] 09/21/2012    Mitral valve disorders(424.0) 09/21/2012    Tricuspid valve disorder 09/21/2012     Past Medical History:   Diagnosis Date    Acute kidney failure ([address] 7/14/2016  Acute systolic heart failure ([facility] 7/14/2016    CAD (coronary artery disease)     Heart failure ([facility]     Stroke ([facility] 2009    Stroke    Stroke [facility])          Core Measures:    CVA: Yes Yes    Activity Status:   Up with one assist  Supplemental O2: (If Applicable)    2 L via NC      LINES AND DRAINS:    PIV 20 gauge in R A/C    DVT prophylaxis:    SCD's    Wounds: (If Applicable)    N/A    Patient Safety:    Falls Score Total Score: 2  Safety Level_______  Bed Alarm On? Yes  Sitter? No    Plan for upcoming shift: Neuro consult, MRI, Echo, PT/OT/SLP.  Pt needs MRI screening and data base completed        Discharge Plan: Yes ,ongoing    Active Consults:  IP CONSULT TO HOSPITALIST  IP CONSULT TO NEUROLOGY

## 2018-02-07 NOTE — PROGRESS NOTES
Problem: Dysphagia (Adult)  Goal: *Acute Goals and Plan of Care (Insert Text)  2/6/2018  Speech path goals:  1. Pt will tolerate purees with no liquids with no overt s/s of aspiration. Changed to nectar thick liquids 2/7.  2. Pt will participate with MBS as medically indicated. Completed 2/7.  3. Pt will tolerate dys 2/minced and thins with no overt s/s of aspiration. Speech Pathology Modified barium swallow Study  Patient: Rupa Mack (20 y.o. female)  Date: 2/7/2018  Primary Diagnosis: Stroke [facility])  CAD (coronary artery disease)        Precautions:        ASSESSMENT :  Based on the objective data described below, the patient presents with mild to mod oropharyngeal dysphagia. Oral control is reduced with only trace premature spillage with thins. Poor mastication of solids. No oral residue. Pharyngeal phase is characterized by mild swallow delay. There was no pharyngeal residue and hyolaryngeal excursion was wnl. No penetration or aspiration. The MBS did not record. Patient will benefit from skilled intervention to address the above impairments. Patients rehabilitation potential is considered to be Good  Factors which may influence rehabilitation potential include:   []              None noted  [x]              Mental ability/status  [x]              Medical condition  [x]              Home/family situation and support systems  []              Safety awareness  []              Pain tolerance/management  []              Other:      PLAN :  Recommendations and Planned Interventions:  dys 2/minced with nectar thick liquids. Frequency/Duration: Patient will be followed by speech-language pathology 4 times a week to address goals. Discharge Recommendations: Inpatient Rehab     SUBJECTIVE:   Patient stated she was trying to chew the cracker.      OBJECTIVE:     Past Medical History:   Diagnosis Date    Acute kidney failure ([facility] [address] 0/04/7725    Acute systolic heart failure ([address] 7/14/2016    CAD (coronary artery disease)     Heart failure ([facility] [address]     Stroke [facility]) 2009    Stroke    Stroke [facility])    History reviewed. No pertinent surgical history. Prior Level of Function/Home Situation:   Home Situation  Home Environment: Private residence  # Steps to Enter: 3  Rails to Enter: Yes  One/Two Story Residence: One story  Living Alone: No (son works during the day)  Current DME Used/Available at Home: Ephriam Koyanagi, [employer], bedside, [address] [employer] chair  Tub or Shower Type: Tub/Shower combination  Diet prior to admission: reg/thins  Current Diet:  Purees and no liquids   Radiologist: Dr. Raissa Penaloza Views: Lateral;Fluoro  Patient Position: upright in transmotion chair    Trial 1:   Consistency Presented: Thin liquid;Puree; Solid; Nectar thick liquid   How Presented: Self-fed/presented;Straw;Cup/sip       Bolus Acceptance: No impairment   Bolus Formation/Control: Impaired: Delayed   Propulsion: Delayed (# of seconds)   Oral Residue: None   Initiation of Swallow: Triggered at vallecula   Timing: Pooling 1-5 sec   Penetration: None   Aspiration/Timing: No evidence of aspiration   Pharyngeal Clearance: No residue   Attempted Modifications: Small sips and bites                      Laryngeal Elevation: WFL (within functional limits)  Aspiration/Penetration Score: 1 (No penetration or aspiration-Contrast does not enter the airway)  Pharyngeal Symmetry: Not assessed  Pharyngeal-Esophageal Segment: No impairment  Pharyngeal Dysfunction: None    Oral Phase Severity: Mild  Pharyngeal Phase Severity: Mild  NOMS:   The NOMS functional outcome measure was used to quantify this patient's level of swallowing impairment. Based on the NOMS, the patient was determined to be at level 4 for swallow function     G Codes: In compliance with CMSs Claims Based Outcome Reporting, the following G-code set was chosen for this patient based the use of the NOMS functional outcome to quantify this patient's level of swallowing impairment.     Using the NOMS, the patient was determined to be at level 4 for swallow function which correlates with the CK= 40-59% level of severity. Based on the objective assessment provided within this note, the current, goal, and discharge g-codes are as follows:    Swallow  Swallowing:   Swallow Goal Status CK= 40-59%      NOMS Swallowing Levels:  Level 1 (CN): NPO  Level 2 (CM): NPO but takes consistency in therapy  Level 3 (CL): Takes less than 50% of nutrition p.o. and continues with nonoral feedings; and/or safe with mod cues; and/or max diet restriction  Level 4 (CK): Safe swallow but needs mod cues; and/or mod diet restriction; and/or still requires some nonoral feeding/supplements  Level 5 (CJ): Safe swallow with min diet restriction; and/or needs min cues  Level 6 (CI): Independent with p.o.; rare cues; usually self cues; may need to avoid some foods or needs extra time  Level 7 ([address] [place] [address]): Independent for all p.o.  ANAYA. (2003). National Outcomes Measurement System (NOMS): Adult Speech-Language Pathology User's Guide. COMMUNICATION/EDUCATION:   Pt educated that we will start her on nectar thick liquids. Despite not aspirating on thins on MBS am concerned she may have difficulty with thins   The patients plan of care including findings from [facility], recommendations, planned interventions, and recommended diet changes were discussed with: Registered Nurse.  []  Posted safety precautions in patient's room. [x]  Patient/family have participated as able in goal setting and plan of care. [x]  Patient/family agree to work toward stated goals and plan of care. []  Patient understands intent and goals of therapy, but is neutral about his/her participation. []  Patient is unable to participate in goal setting and plan of care.     Thank you for this referral.  Alexandru Gonzalez, SLP  Time Calculation: 20 mins

## 2018-02-07 NOTE — PROGRESS NOTES
Physical Therapy  Attempting to see patient for PT this pm. Patient off floor for MBS. Will continue to follow.   Maria Victoria Brothers, PT

## 2018-02-07 NOTE — PROGRESS NOTES
Attempted to see pt for therapy services again in PM.  Pt hit call bell when attempting to change channel. Unable to effectively push button to change channel. Then transport arrived to take pt for MBS. Pt was off floor AM as well for MRI. Will defer and continue to follow. Continue to recommend SNF for rehab at discharge.

## 2018-02-07 NOTE — PROGRESS NOTES
Hospitalist Progress Note    NAME: Aakash Valdovinos   :  1940   MRN:  [id number]       Assessment / Plan:  worsening left upper ext weakness and dysarthria. Suspect TIA  Patient with hx of previous strokes. The symptoms started last night so no candidate for TPA. At this time apparently patient recovering some how. CT head no acute findings. MRI W/WO head, showed Extensive chronic ischemic changes, with a moderate-sized area of acute rightMCA territory infarction in the posterior right frontal lobe as above. Echocardiogram,showed  Systolic function was normal. Ejection fraction was  estimated in the range of 55 % to 60 %. There were no regional wall motion  abnormalities. There was moderate concentric hypertrophy  Neurology consult Inputs and recommendations Appreciated. PT and CM consulted on Dc planning   Speech Recommend MBS . Will FU. SOB , uncertain cause yet , felt better today. chronic systolic CHF POA LEVF 45-23% ( note from past admission 2017)   CXR No Acute Disease , breathing treatments   DC IVF ,on  lasix     HOLLY On CKD (stage 3)  will keep an eye in creatinine    creatinine on 2017 1.17 and 1.72 on this admission   Today 1.54   Decreased lasix from 40 to 20 md/day       Hx CAD:  no issues at this time  Continue home meds at this time after s/s eval     Hyperlipidemia ;   Continue home meds (after s/s eval)         Body mass index is 27.34 kg/(m^2). Code status: Full  Prophylaxis: Hep SQ  Recommended Disposition: tbd     Subjective:     Chief Complaint / Reason for Physician Visit  Patient slurred when she talk . Discussed with RN events overnight.      Review of Systems:  Symptom Y/N Comments  Symptom Y/N Comments   Fever/Chills    Chest Pain     Poor Appetite    Edema     Cough    Abdominal Pain     Sputum    Joint Pain     SOB/CHRISTINE    Pruritis/Rash     Nausea/vomit    Tolerating PT/OT     Diarrhea    Tolerating Diet     Constipation    Other       Could NOT obtain due to: Not able to do it because of slurring of speech      Objective:     VITALS:   Last 24hrs VS reviewed since prior progress note. Most recent are:  Patient Vitals for the past 24 hrs:   Temp Pulse Resp BP SpO2   02/07/18 0732 97.8 °F (36.6 °C) 67 18 (!) 167/91 100 %   02/07/18 0405 98 °F (36.7 °C) 68 18 123/59 98 %   02/07/18 0032 - - - - 100 %   02/06/18 2240 97.5 °F (36.4 °C) 63 20 129/68 98 %   02/06/18 2055 - - - - 100 %   02/06/18 1951 98 °F (36.7 °C) 66 18 148/76 99 %   02/06/18 1733 - 67 - 145/80 -   02/06/18 1624 - - - - 98 %   02/06/18 1520 98.2 °F (36.8 °C) 65 18 118/69 100 %   02/06/18 1303 97.7 °F (36.5 °C) 68 18 119/67 97 %   02/06/18 1100 - - - - 100 %       Intake/Output Summary (Last 24 hours) at 02/07/18 1027  Last data filed at 02/06/18 2017   Gross per 24 hour   Intake                0 ml   Output              550 ml   Net             -550 ml        PHYSICAL EXAM:  General: WD, WN. Alert, cooperative, no acute distress  , slurring of speech   EENT:  EOMI. Anicteric sclerae. MMM  Resp:  CTA bilaterally, no wheezing or rales. No accessory muscle use  CV:  Regular  rhythm,  No edema  GI:  Soft, Non distended, Non tender.  +Bowel sounds  Neurologic:  Left side weakness , chronic   Psych:   Good insight. Not anxious nor agitated  Skin:  No rashes. No jaundice    Reviewed most current lab test results and cultures  YES  Reviewed most current radiology test results   YES  Review and summation of old records today    NO  Reviewed patient's current orders and MAR    YES  PMH/SH reviewed - no change compared to H&P  ________________________________________________________________________  Care Plan discussed with:    Comments   Patient y    Family      RN y    Care Manager     Consultant                        Multidiciplinary team rounds were held today with , nursing, pharmacist and clinical coordinator.   Patient's plan of care was discussed; medications were reviewed and discharge planning was addressed. ________________________________________________________________________  Total NON critical care TIME:  35    Minutes    Total CRITICAL CARE TIME Spent:   Minutes non procedure based      Comments   >50% of visit spent in counseling and coordination of care     ________________________________________________________________________  Lissette Martinez MD     Procedures: see electronic medical records for all procedures/Xrays and details which were not copied into this note but were reviewed prior to creation of Plan. LABS:  I reviewed today's most current labs and imaging studies. Pertinent labs include:  Recent Labs      02/07/18 0413 02/05/18   1647   WBC  5.8  6.7   HGB  10.0*  11.5   HCT  30.2*  34.4*   PLT  143*  180     Recent Labs      02/07/18 0413 02/05/18   1647   NA  145  142   K  3.8  4.5   CL  110*  108   CO2  29  29   GLU  109*  120*   BUN  33*  29*   CREA  1.54*  1.72*   CA  8.6  8.8   ALB  3.3*   --    TBILI  0.2   --    SGOT  15   --    ALT  10*   --    INR   --   1.0       Signed:  Lissette Martinez MD

## 2018-02-07 NOTE — PROGRESS NOTES
Problem: Dysphagia (Adult)  Goal: *Acute Goals and Plan of Care (Insert Text)  2/6/2018  Speech path goals:  1. Pt will tolerate purees with no liquids with no overt s/s of aspiration. 2. Pt will participate with MBS as medically indicated. Speech language pathology dysphagia treatment  Patient: Raul Davila (16 y.o. female)  Date: 2/7/2018  Diagnosis: Stroke [facility])  CAD (coronary artery disease) <principal problem not specified>       Precautions:       ASSESSMENT:  Pt seen today for swallowing therapy. She was hungry and eager to eat her breakfast. Slow oral prep and  propulsion with thicker purees, no oral residue. Pharyngeal phase was characterized by mild swallow delay and reduced hyolaryngeal excursion. Coughing was noted after thin liquids most likely due to premature spillage and swallow delay. Occasionally needs two swallows most likely due to pharyngeal residue. Appeared to tolerate nectar thick liquids well but due to multiple CVAs recommend MBS to objectively evaluate swallowing. Wheezing is noted during po trials which the pt indicated is new. She appears to have STM deficits. Progression toward goals:  []         Improving appropriately and progressing toward goals  [x]         Improving slowly and progressing toward goals  []         Not making progress toward goals and plan of care will be adjusted     PLAN:  Recommendations and Planned Interventions:  Recommend MBS today   Patient continues to benefit from skilled intervention to address the above impairments. Continue treatment per established plan of care. Discharge Recommendations:  Intensive rehab      SUBJECTIVE:   Patient's speech is severely dysarthric.      OBJECTIVE:   Cognitive and Communication Status:  Neurologic State: Alert  Orientation Level: Oriented to person, Oriented to place  Cognition: Follows commands  Perception: Appears intact  Perseveration: No perseveration noted  Safety/Judgement: Fall prevention  Dysphagia Treatment:  Oral Assessment:  Oral Assessment  Labial: Left droop  Dentition: Edentulous; Upper & lower dentures  Lingual: Decreased rate  Velum: Other (comment)  Mandible: No impairment  P.O. Trials:  Patient Position: upright in bed  Vocal quality prior to P.O.: Constriction;Strain  Consistency Presented: Nectar thick liquid; Thin liquid;Puree  How Presented: Self-fed/presented;Straw     Bolus Acceptance: No impairment  Bolus Formation/Control: Impaired  Type of Impairment: Delayed  Propulsion: Delayed (# of seconds)  Oral Residue: None  Initiation of Swallow: Delayed (# of seconds)  Laryngeal Elevation: Decreased;Weak  Aspiration Signs/Symptoms: Strong cough  Pharyngeal Phase Characteristics: Easily fatigued              Oral Phase Severity: Moderate  Pharyngeal Phase Severity : Moderate                  Pain:  Pain Scale 1: Numeric (0 - 10)  Pain Intensity 1: 0     After treatment:   []              Patient left in no apparent distress sitting up in chair  [x]              Patient left in no apparent distress in bed  [x]              Call bell left within reach  [x]              Nursing notified  []              Caregiver present  []              Bed alarm activated    COMMUNICATION/EDUCATION:       The patients plan of care including recommendations, planned interventions, and recommended diet changes were discussed with: Registered Nurse. []              Posted safety precautions in patient's room.     DEREK Meade  Time Calculation: 30 mins

## 2018-02-07 NOTE — PROGRESS NOTES
NEUROLOGY NOTE           Chief Complaint   Patient presents with    Dysarthria     more slurred than normal.     Extremity Weakness     left sided weakness. SUBJECTIVE:  Slight improvement  MRI brain shows right mca infarct. HISTORY OF PRESENT ILLNESS  Sandra Mendes is a 76 y.o. female who presents to the hospital because of increased left upper ext weakness and slurred speech. Unable to get hx from her and hence obtained by chart review. According to ER notes:  cc of slurred speech and L sided weakness since ~ 2300 yesterday. Son reports the pt was staying up late watching the Super Bowl with him and was getting ready for bed when she tried to say something and it was not coming out right. This morning, the pt complained of lightheadedness and confusion and her slurred speech progressively worsened throughout the day. Son reports that he also had to help the pt into her room which is unusual as she is independently ambulatory. Pt tales ASA daily. She had a stroke in 2009 with residual deficits of mildly slurred speech and LUE weakness.  Son reports her sxs are much worse than normal.    ROS  A ten system review of constitutional, cardiovascular, respiratory, musculoskeletal, endocrine, skin, SHEENT, genitourinary, psychiatric and neurologic systems was obtained and is unremarkable except as stated in HPI     PMH  Past Medical History:   Diagnosis Date    Acute kidney failure ([address] 9/55/9293    Acute systolic heart failure ([address] 7/14/2016    CAD (coronary artery disease)     Heart failure ([address]     Stroke ([address] 2009    Stroke    Stroke [facility])        FH  Family History   Problem Relation Age of Onset    Coronary Artery Disease Other      grandmother age 76       [address] [facility]  Social History     Social History    Marital status:      Spouse name: N/A    Number of children: N/A    Years of education: N/A     Social History Main Topics    Smoking status: Former Smoker     Quit date: 9/21/2009    Smokeless tobacco: Never Used    Alcohol use 0.0 oz/week     0 Standard drinks or equivalent per week      Comment: Very little    Drug use: No    Sexual activity: Not Asked     Other Topics Concern    None     Social History Narrative    ** Merged History Encounter **            ALLERGIES  Allergies   Allergen Reactions    Decadron [Dexamethasone Sodium Phosphate] Hives    Pcn [Penicillins] Hives       PHYSICAL EXAM  EXAMINATION:   Patient Vitals for the past 24 hrs:   Temp Pulse Resp BP SpO2   02/07/18 0732 97.8 °F (36.6 °C) 67 18 (!) 167/91 100 %   02/07/18 0405 98 °F (36.7 °C) 68 18 123/59 98 %   02/07/18 0032 - - - - 100 %   02/06/18 2240 97.5 °F (36.4 °C) 63 20 129/68 98 %   02/06/18 2055 - - - - 100 %   02/06/18 1951 98 °F (36.7 °C) 66 18 148/76 99 %   02/06/18 1733 - 67 - 145/80 -   02/06/18 1624 - - - - 98 %   02/06/18 1520 98.2 °F (36.8 °C) 65 18 118/69 100 %   02/06/18 1303 97.7 °F (36.5 °C) 68 18 119/67 97 %        General:   General appearance: Pt is in no acute distress   Distal pulses are preserved    Neurological Examination:   Mental Status:  AAO x2 (couldnt tell year). Speech is very dysarthric. Follows commands, has fair fund of knowledge, attention, short term recall, comprehension and insight. Cranial Nerves: Visual fields are full. PERRL, Extraocular movements are full. Facial sensation intact. Facial movement decreased on the left. Hearing intact to conversation. Palate elevates symmetrically. Shoulder shrug symmetric. Tongue midline. Motor: Strength is 3/5 in the LUE ext. Normal tone. No atrophy. Sensation: Normal to light touch    Coordination/Cerebellar: Intact to finger-nose-finger on the right    Gait: deferred    Skin: No significant bruising or lacerations.     LAB DATA REVIEWED:    Recent Results (from the past 24 hour(s))   CBC WITH AUTOMATED DIFF    Collection Time: 02/07/18  4:13 AM   Result Value Ref Range    WBC 5.8 3.6 - 11.0 K/uL    RBC 3.77 (L) 3.80 - 5.20 M/uL HGB 10.0 (L) 11.5 - 16.0 g/dL    HCT 30.2 (L) 35.0 - 47.0 %    MCV 80.1 80.0 - 99.0 FL    MCH 26.5 26.0 - 34.0 PG    MCHC 33.1 30.0 - 36.5 g/dL    RDW 13.5 11.5 - 14.5 %    PLATELET 559 (L) 710 - 400 K/uL    MPV 11.2 8.9 - 12.9 FL    NRBC 0.0 0  WBC    ABSOLUTE NRBC 0.00 0.00 - 0.01 K/uL    NEUTROPHILS 53 32 - 75 %    LYMPHOCYTES 35 12 - 49 %    MONOCYTES 10 5 - 13 %    EOSINOPHILS 2 0 - 7 %    BASOPHILS 1 0 - 1 %    IMMATURE GRANULOCYTES 0 0.0 - 0.5 %    ABS. NEUTROPHILS 3.1 1.8 - 8.0 K/UL    ABS. LYMPHOCYTES 2.0 0.8 - 3.5 K/UL    ABS. MONOCYTES 0.6 0.0 - 1.0 K/UL    ABS. EOSINOPHILS 0.1 0.0 - 0.4 K/UL    ABS. BASOPHILS 0.0 0.0 - 0.1 K/UL    ABS. IMM. GRANS. 0.0 0.00 - 0.04 K/UL    DF AUTOMATED     METABOLIC PANEL, COMPREHENSIVE    Collection Time: 02/07/18  4:13 AM   Result Value Ref Range    Sodium 145 136 - 145 mmol/L    Potassium 3.8 3.5 - 5.1 mmol/L    Chloride 110 (H) 97 - 108 mmol/L    CO2 29 21 - 32 mmol/L    Anion gap 6 5 - 15 mmol/L    Glucose 109 (H) 65 - 100 mg/dL    BUN 33 (H) 6 - 20 MG/DL    Creatinine 1.54 (H) 0.55 - 1.02 MG/DL    BUN/Creatinine ratio 21 (H) 12 - 20      GFR est AA 39 (L) >60 ml/min/1.73m2    GFR est non-AA 32 (L) >60 ml/min/1.73m2    Calcium 8.6 8.5 - 10.1 MG/DL    Bilirubin, total 0.2 0.2 - 1.0 MG/DL    ALT (SGPT) 10 (L) 12 - 78 U/L    AST (SGOT) 15 15 - 37 U/L    Alk. phosphatase 96 45 - 117 U/L    Protein, total 6.6 6.4 - 8.2 g/dL    Albumin 3.3 (L) 3.5 - 5.0 g/dL    Globulin 3.3 2.0 - 4.0 g/dL    A-G Ratio 1.0 (L) 1.1 - 2.2          Imaging review:  CT Head  1. No acute intracranial abnormality. 2. Chronic encephalomalacia in the right parietal lobe. 3. Chronic bilateral cerebellar infarcts, bilateral basal ganglia infarcts, and  left thalamic infarct.   4. Moderate to severe periventricular and subcortical white matter disease    Stroke workup    MRI Brain  Extensive chronic ischemic changes, with a moderate-sized area of acute right  MCA territory infarction in the posterior right frontal lobe as above. CTA Head and neck  There is intracranial atherosclerosis. There is no intracranial aneurysm. Atherosclerosis involves both carotid arteries, but no evidence of flow-limiting  stenosis. TTE:   Left ventricle: Systolic function was normal. Ejection fraction was  estimated in the range of 55 % to 60 %. There were no regional wall motion  abnormalities. There was moderate concentric hypertrophy. Stroke labs:  HgBA1c    Lab Results   Component Value Date/Time    Hemoglobin A1c 6.0 02/06/2018 03:10 AM     LDL   Lab Results   Component Value Date/Time    LDL, calculated 102 (H) 02/06/2018 03:10 AM       HOME MEDS  Prior to Admission Medications   Prescriptions Last Dose Informant Patient Reported? Taking? albuterol (PROVENTIL HFA, VENTOLIN HFA, PROAIR HFA) 90 mcg/actuation inhaler 2/5/2018 at Unknown time Other No Yes   Sig: Take 2 Puffs by inhalation every four (4) hours as needed for Wheezing. amLODIPine (NORVASC) 2.5 mg tablet 2/5/2018 at Unknown time Other No Yes   Sig: Take 1 Tab by mouth daily. aspirin delayed-release 81 mg tablet 2/5/2018 at Unknown time Other No Yes   Sig: Take 1 Tab by mouth daily. atorvastatin (LIPITOR) 40 mg tablet 2/5/2018 at Unknown time Other No Yes   Sig: TAKE ONE TABLET BY MOUTH EVERY DAY   carvedilol (COREG) 12.5 mg tablet 2/5/2018 at Unknown time Other No Yes   Sig: Take 1 Tab by mouth two (2) times a day. furosemide (LASIX) 40 mg tablet 2/5/2018 at Unknown time Other No Yes   Sig: Take 1 Tab by mouth daily. One pill daily   isosorbide mononitrate ER (IMDUR) 30 mg tablet 2/5/2018 at Unknown time Other No Yes   Sig: Take 1 Tab by mouth every morning. lisinopril (PRINIVIL, ZESTRIL) 2.5 mg tablet 2/5/2018 at Unknown time Other No Yes   Sig: Take 1 Tab by mouth daily. potassium chloride (K-DUR, KLOR-CON) 20 mEq tablet 2/5/2018 at Unknown time Other Yes Yes   Sig: Take 20 mEq by mouth daily.       Facility-Administered Medications: None       CURRENT MEDS  Current Facility-Administered Medications   Medication Dose Route Frequency    albuterol-ipratropium (DUO-NEB) 2.5 MG-0.5 MG/3 ML  3 mL Nebulization Q6HWA RT    clopidogrel (PLAVIX) tablet 75 mg  75 mg Oral DAILY    atorvastatin (LIPITOR) tablet 80 mg  80 mg Oral DAILY    heparin (porcine) injection 5,000 Units  5,000 Units SubCUTAneous Q12H    amLODIPine (NORVASC) tablet 2.5 mg  2.5 mg Oral DAILY    carvedilol (COREG) tablet 12.5 mg  12.5 mg Oral BID    furosemide (LASIX) tablet 40 mg  40 mg Oral DAILY    isosorbide mononitrate ER (IMDUR) tablet 30 mg  30 mg Oral DAILY    lisinopril (PRINIVIL, ZESTRIL) tablet 2.5 mg  2.5 mg Oral DAILY    sodium chloride (NS) flush 5-10 mL  5-10 mL IntraVENous Q8H       IMPRESSION:  Rustam Cage is a 76 y.o. female who presents with worsening left upper ext weakness and dysarthria. MRI br + right mca infarct. Continue plavix 75 mg daily and atorvastatin to 80 mg daily. 1. Right MCA infarct  2. HTN  3. HLD  4. Prediabetes    RECOMMENDATIONS:  - MRI brain w/o C - right MCA infarct  - CTA Head and neck - no significant stenosis  - TTE - Normal  - Telemetry  - BP goal is less than 140/90  - Stroke labs (HgbA1c, lipid panel) - LDL is elevated. - Continue plavix to 75 mg daily  - Continue atorvastatin to 80 mg daily   - ST/OT/PT eval    Call with questions.        Tiffanie Ugalde MD  Neurologist

## 2018-02-07 NOTE — PROGRESS NOTES
Pt is a 76 y.o  [place] female admitted with Stroke, CAD. Pt was resting in bed and unable to answer CM questions. CM called pt's son ([phone]) and verified demographic information and all is correct. Pt lives with her son and grandson in a 1 story home with 5 steps to the entrance. Prior to admission, pt needed assistance with her ADL's and IADL's and her son assists pt. Pt doesn't drive and her son transports. Pt had home health in the past and has gone to [facility] for rehab. Pt uses oxygen at home and pt's son couldn't remember the name of the company. [facility] [place] CM discussed therapy's recommendation for SNF at discharge and pt's son was in agreement. Pt's son would like pt to go to [facility] since she had a good stay the last time. FOC form completed and referral sent via [facility]. CM will continue to follow pt for discharge planning needs. Care Management Interventions  PCP Verified by CM: Yes (Dr. Antoine Alexander)  Mode of Transport at Discharge: Other (see comment) (medical transport or pt's family by car)  Transition of Care Consult (CM Consult): SNF, Discharge Planning [facility])  Discharge Durable Medical Equipment: No (oxygen at home )  Physical Therapy Consult: Yes  Occupational Therapy Consult: Yes  Speech Therapy Consult: Yes  Current Support Network:  Other, Own Home (lives with her son in a 1 story home with 5 steps to the entrance)  Confirm Follow Up Transport: Family  Plan discussed with Pt/Family/Caregiver: Yes  Freedom of Choice Offered: Yes  Discharge Location  Discharge Placement: Skilled nursing facility    Derek Love, [id number]

## 2018-02-07 NOTE — ROUTINE PROCESS
* No surgery found *  * No surgery found *  Bedside and Verbal shift change report given to Amy RN (oncoming nurse) by Meño Henry RN (offgoing nurse). Report included the following information SBAR, Kardex, MAR and Recent Results. Zone Phone:   [phone]      Significant changes during shift:    1) got MRI form done (over phone) with son Selena Mays.   Witnessed/countersigned by Lelo Mac RN      Patient Information    Vipin Holbrook  76 y.o.  2/5/2018  4:09 PM by Concepción Casas MD. Vipin Holbrook was admitted from Home    Problem List    Patient Active Problem List    Diagnosis Date Noted    CAD (coronary artery disease) 02/05/2018    Stroke ([address] 02/05/2018    Hypertensive urgency 08/01/2017    CHF (congestive heart failure) ([address] 08/01/2017    COPD exacerbation ([address] 03/07/2017    CAD (coronary artery disease), native coronary artery 07/15/2016    H/O: CVA (cerebrovascular accident) 07/15/2016    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 ([address] 07/15/2016    Severe sepsis ([address] 07/14/2016    Pneumonia 07/14/2016    Acute respiratory failure ([address] 07/14/2016    Elevated troponin 07/14/2016    Left bundle branch block (LBBB) on electrocardiogram 41/17/5550    Acute systolic heart failure ([address] 07/14/2016    Acute kidney failure ([address] 07/14/2016    Mixed hyperlipidemia 09/21/2012    Essential hypertension, benign 09/21/2012    Other left bundle branch block 09/21/2012    Coronary atherosclerosis of native coronary artery 09/21/2012    Late effects of cerebrovascular disease, dysarthria 09/21/2012    Atherosclerosis of renal artery ([address] 09/21/2012    Atherosclerosis of native arteries of the extremities with intermittent claudication 09/21/2012    Tobacco use disorder 09/21/2012    Cerebrovascular accident ([address] 09/21/2012    Mitral valve disorders(424.0) 09/21/2012    Tricuspid valve disorder 09/21/2012     Past Medical History:   Diagnosis Date    Acute kidney failure ([facility] 3/95/3443    Acute systolic heart failure ([facility] 7/14/2016    CAD (coronary artery disease)     Heart failure ([facility]     Stroke ([facility] 2009    Stroke    Stroke [facility])          Core Measures:    CVA: Yes Yes  CHF:No Not applicable  PNA:No Not applicable    Activity Status:    OOB to Chair No  Ambulated this shift No   Bed Rest No    Supplemental O2: (If Applicable)    NC Yes  NRB No  Venti-mask No  On 3 Liters/min      LINES AND DRAINS: PIV only        DVT prophylaxis:    DVT prophylaxis Med- Yes  DVT prophylaxis SCD or MALA- Yes     Wounds: (If Applicable)    Wounds- No    Location none    Patient Safety:    Falls Score Total Score: 3  Safety Level_______  Bed Alarm On? Yes  Sitter?  No    Plan for upcoming shift: MRI, PT, OT    Discharge Plan: Yes CM following    Active Consults:  IP CONSULT TO HOSPITALIST  IP CONSULT TO NEUROLOGY

## 2018-02-07 NOTE — PROGRESS NOTES
Problem: Falls - Risk of  Goal: *Absence of Falls  Document Sobia Fall Risk and appropriate interventions in the flowsheet.    Outcome: Progressing Towards Goal  Fall Risk Interventions:       Mentation Interventions: Bed/chair exit alarm, Increase mobility, More frequent rounding    Medication Interventions: Evaluate medications/consider consulting pharmacy, Bed/chair exit alarm    Elimination Interventions: Bed/chair exit alarm, Call light in reach, Patient to call for help with toileting needs

## 2018-02-07 NOTE — PROGRESS NOTES
Problem: Falls - Risk of  Goal: *Absence of Falls  Document Sobia Fall Risk and appropriate interventions in the flowsheet.    Outcome: Progressing Towards Goal  Fall Risk Interventions:  Mobility Interventions: Bed/chair exit alarm, Patient to call before getting OOB, PT Consult for mobility concerns    Mentation Interventions: Bed/chair exit alarm, Increase mobility, More frequent rounding    Medication Interventions: Bed/chair exit alarm, Evaluate medications/consider consulting pharmacy, Patient to call before getting OOB    Elimination Interventions: Bed/chair exit alarm, Call light in reach, Toilet paper/wipes in reach

## 2018-02-07 NOTE — CARDIO/PULMONARY
C/p rehab note- chart reviewed due to pt flagged to be seen. Adm with Stroke. HX includes CAD, HTN,LBBB, CVA,CHF. Former smoker. LVEF 55-60%. Per internal med notes-HX CAD ,no issues at this time. CHF teaching by Cardiac rehab 8/2017.     Added Avoiding Triggers with Heart failure to AVS.

## 2018-02-07 NOTE — PROGRESS NOTES
Bedside and Verbal shift change report given to Mayur Saucedo RN (oncoming nurse) by Khari Rubio (offgoing nurse). Report included the following information SBAR, Kardex, ED Summary, Intake/Output, Recent Results and Cardiac Rhythm NSR.     Zone Phone:   [phone]      Significant changes during shift:  Modified Barrium test      Patient Information    Zach Westbrook  76 y.o.  2/5/2018  4:09 PM by Tammy Hogan MD. Zach Westbrook was admitted from Home    Problem List    Patient Active Problem List    Diagnosis Date Noted    CAD (coronary artery disease) 02/05/2018    Stroke ([address] 02/05/2018    Hypertensive urgency 08/01/2017    CHF (congestive heart failure) ([address] 08/01/2017    COPD exacerbation ([address] 03/07/2017    CAD (coronary artery disease), native coronary artery 07/15/2016    H/O: CVA (cerebrovascular accident) 07/15/2016    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 ([address] 07/15/2016    Severe sepsis ([address] 07/14/2016    Pneumonia 07/14/2016    Acute respiratory failure ([address] 07/14/2016    Elevated troponin 07/14/2016    Left bundle branch block (LBBB) on electrocardiogram 77/42/4729    Acute systolic heart failure ([address] 07/14/2016    Acute kidney failure ([address] 07/14/2016    Mixed hyperlipidemia 09/21/2012    Essential hypertension, benign 09/21/2012    Other left bundle branch block 09/21/2012    Coronary atherosclerosis of native coronary artery 09/21/2012    Late effects of cerebrovascular disease, dysarthria 09/21/2012    Atherosclerosis of renal artery ([address] 09/21/2012    Atherosclerosis of native arteries of the extremities with intermittent claudication 09/21/2012    Tobacco use disorder 09/21/2012    Cerebrovascular accident ([address] 09/21/2012    Mitral valve disorders(424.0) 09/21/2012    Tricuspid valve disorder 09/21/2012     Past Medical History:   Diagnosis Date    Acute kidney failure ([address] 4/55/6687    Acute systolic heart failure ([address] 7/14/2016  CAD (coronary artery disease)     [facility]     Stroke ([facility] 2009    Stroke    Stroke [facility])          Core Measures:    CVA: Yes Yes    Activity Status:   Up with one assist  Supplemental O2: (If Applicable)    2 L via NC      LINES AND DRAINS:    PIV 20 gauge in R A/C    DVT prophylaxis:    SCD's    Wounds: (If Applicable)    N/A    Patient Safety:    Falls Score Total Score: 3  Safety Level_______  Bed Alarm On? Yes  Sitter?  No    Plan for upcoming shift: Tolerate Nectar liquids          Discharge Plan: Yes ,ongoing    Active Consults:  IP CONSULT TO HOSPITALIST  IP CONSULT TO NEUROLOGY

## 2018-02-08 ENCOUNTER — APPOINTMENT (OUTPATIENT)
Dept: GENERAL RADIOLOGY | Age: 78
DRG: 069 | End: 2018-02-08
Attending: INTERNAL MEDICINE
Payer: MEDICARE

## 2018-02-08 LAB
ALBUMIN SERPL-MCNC: 3.5 G/DL (ref 3.5–5)
ALBUMIN/GLOB SERPL: 1 {RATIO} (ref 1.1–2.2)
ALP SERPL-CCNC: 104 U/L (ref 45–117)
ALT SERPL-CCNC: 11 U/L (ref 12–78)
ANION GAP SERPL CALC-SCNC: 5 MMOL/L (ref 5–15)
AST SERPL-CCNC: 13 U/L (ref 15–37)
BILIRUB SERPL-MCNC: 0.3 MG/DL (ref 0.2–1)
BUN SERPL-MCNC: 40 MG/DL (ref 6–20)
BUN/CREAT SERPL: 23 (ref 12–20)
CALCIUM SERPL-MCNC: 8.7 MG/DL (ref 8.5–10.1)
CHLORIDE SERPL-SCNC: 111 MMOL/L (ref 97–108)
CO2 SERPL-SCNC: 29 MMOL/L (ref 21–32)
CREAT SERPL-MCNC: 1.74 MG/DL (ref 0.55–1.02)
GLOBULIN SER CALC-MCNC: 3.5 G/DL (ref 2–4)
GLUCOSE SERPL-MCNC: 142 MG/DL (ref 65–100)
POTASSIUM SERPL-SCNC: 4.2 MMOL/L (ref 3.5–5.1)
PROT SERPL-MCNC: 7 G/DL (ref 6.4–8.2)
SODIUM SERPL-SCNC: 145 MMOL/L (ref 136–145)

## 2018-02-08 PROCEDURE — 36415 COLL VENOUS BLD VENIPUNCTURE: CPT | Performed by: INTERNAL MEDICINE

## 2018-02-08 PROCEDURE — 65660000000 HC RM CCU STEPDOWN

## 2018-02-08 PROCEDURE — 71045 X-RAY EXAM CHEST 1 VIEW: CPT

## 2018-02-08 PROCEDURE — 80053 COMPREHEN METABOLIC PANEL: CPT | Performed by: INTERNAL MEDICINE

## 2018-02-08 PROCEDURE — 92526 ORAL FUNCTION THERAPY: CPT | Performed by: SPEECH-LANGUAGE PATHOLOGIST

## 2018-02-08 PROCEDURE — 74011250637 HC RX REV CODE- 250/637: Performed by: INTERNAL MEDICINE

## 2018-02-08 PROCEDURE — 97116 GAIT TRAINING THERAPY: CPT

## 2018-02-08 PROCEDURE — 74011250636 HC RX REV CODE- 250/636: Performed by: INTERNAL MEDICINE

## 2018-02-08 PROCEDURE — 74011000250 HC RX REV CODE- 250: Performed by: INTERNAL MEDICINE

## 2018-02-08 PROCEDURE — 77010033678 HC OXYGEN DAILY

## 2018-02-08 PROCEDURE — 94640 AIRWAY INHALATION TREATMENT: CPT

## 2018-02-08 PROCEDURE — 97535 SELF CARE MNGMENT TRAINING: CPT | Performed by: OCCUPATIONAL THERAPIST

## 2018-02-08 PROCEDURE — 74011250637 HC RX REV CODE- 250/637: Performed by: PSYCHIATRY & NEUROLOGY

## 2018-02-08 PROCEDURE — 77030038269 HC DRN EXT URIN PURWCK BARD -A

## 2018-02-08 RX ORDER — ALBUTEROL SULFATE 90 UG/1
2 AEROSOL, METERED RESPIRATORY (INHALATION)
Status: DISCONTINUED | OUTPATIENT
Start: 2018-02-08 | End: 2018-02-09

## 2018-02-08 RX ORDER — LORATADINE 10 MG/1
10 TABLET ORAL DAILY
Status: DISCONTINUED | OUTPATIENT
Start: 2018-02-09 | End: 2018-02-10 | Stop reason: HOSPADM

## 2018-02-08 RX ADMIN — ISOSORBIDE MONONITRATE 30 MG: 30 TABLET, EXTENDED RELEASE ORAL at 09:19

## 2018-02-08 RX ADMIN — IPRATROPIUM BROMIDE AND ALBUTEROL SULFATE 3 ML: .5; 3 SOLUTION RESPIRATORY (INHALATION) at 21:19

## 2018-02-08 RX ADMIN — HEPARIN SODIUM 5000 UNITS: 5000 INJECTION, SOLUTION INTRAVENOUS; SUBCUTANEOUS at 17:59

## 2018-02-08 RX ADMIN — IPRATROPIUM BROMIDE AND ALBUTEROL SULFATE 3 ML: .5; 3 SOLUTION RESPIRATORY (INHALATION) at 01:09

## 2018-02-08 RX ADMIN — CARVEDILOL 12.5 MG: 12.5 TABLET, FILM COATED ORAL at 09:19

## 2018-02-08 RX ADMIN — CLOPIDOGREL BISULFATE 75 MG: 75 TABLET ORAL at 09:19

## 2018-02-08 RX ADMIN — IPRATROPIUM BROMIDE AND ALBUTEROL SULFATE 3 ML: .5; 3 SOLUTION RESPIRATORY (INHALATION) at 13:21

## 2018-02-08 RX ADMIN — HEPARIN SODIUM 5000 UNITS: 5000 INJECTION, SOLUTION INTRAVENOUS; SUBCUTANEOUS at 03:16

## 2018-02-08 RX ADMIN — AMLODIPINE BESYLATE 2.5 MG: 2.5 TABLET ORAL at 09:19

## 2018-02-08 RX ADMIN — FUROSEMIDE 20 MG: 20 TABLET ORAL at 09:19

## 2018-02-08 RX ADMIN — Medication 10 ML: at 04:47

## 2018-02-08 RX ADMIN — ATORVASTATIN CALCIUM 80 MG: 40 TABLET, FILM COATED ORAL at 09:19

## 2018-02-08 RX ADMIN — Medication 10 ML: at 13:24

## 2018-02-08 RX ADMIN — CARVEDILOL 12.5 MG: 12.5 TABLET, FILM COATED ORAL at 17:56

## 2018-02-08 RX ADMIN — IPRATROPIUM BROMIDE AND ALBUTEROL SULFATE 3 ML: .5; 3 SOLUTION RESPIRATORY (INHALATION) at 07:29

## 2018-02-08 RX ADMIN — LISINOPRIL 2.5 MG: 5 TABLET ORAL at 09:19

## 2018-02-08 RX ADMIN — Medication 10 ML: at 22:08

## 2018-02-08 NOTE — PROGRESS NOTES
Problem: Falls - Risk of  Goal: *Absence of Falls  Document Sobia Fall Risk and appropriate interventions in the flowsheet.    Outcome: Progressing Towards Goal  Fall Risk Interventions:  Mobility Interventions: Bed/chair exit alarm, Patient to call before getting OOB, PT Consult for mobility concerns, PT Consult for assist device competence    Mentation Interventions: Bed/chair exit alarm, Increase mobility, More frequent rounding, Reorient patient    Medication Interventions: Bed/chair exit alarm, Patient to call before getting OOB, Teach patient to arise slowly    Elimination Interventions: Bed/chair exit alarm, Call light in reach, Patient to call for help with toileting needs

## 2018-02-08 NOTE — PROGRESS NOTES
Problem: Mobility Impaired (Adult and Pediatric)  Goal: *Acute Goals and Plan of Care (Insert Text)  Physical Therapy Goals  Initiated 2/6/2018  1. Patient will move from supine to sit and sit to supine , scoot up and down and roll side to side in bed with supervision/set-up within 7 day(s). 2.  Patient will transfer from bed to chair and chair to bed with supervision/set-up using the least restrictive device within 7 day(s). 3.  Patient will perform sit to stand with supervision/set-up within 7 day(s). 4.  Patient will ambulate with supervision/set-up for 75 feet with the least restrictive device within 7 day(s). 5.  Patient will ascend/descend 3 stairs with 1 handrail(s) with minimal assistance/contact guard assist within 7 day(s). 6.  Patient will improve Wilder Balance score by 7 points within 7 days. physical Therapy TREATMENT  Patient: Abi Romano (34 y.o. female)  Date: 2/8/2018  Diagnosis: Stroke [facility])  CAD (coronary artery disease) <principal problem not specified>       Precautions: Bed Alarm, Fall  Chart, physical therapy assessment, plan of care and goals were reviewed. ASSESSMENT:  While patient appears to have made progress since last session, advancing gait ability and improving in use of RW device, session largely limited by activity tolerance. Received at bedside chair, with audible wheezing at times although VSS on 2L NCO2. Upon initiation of gait, with continued mod A of LUE placement onto RW device, noted rapid increase in wheezing with tachypnea that did not improve despite standing rest, pursed lip breathing (which patient suprisingly performed very well). Further activity aborted with RN to request breathing treatment. Continue to recommend SNF at ID.      Progression toward goals:  []    Improving appropriately and progressing toward goals  [x]    Improving slowly and progressing toward goals  []    Not making progress toward goals and plan of care will be adjusted PLAN:  Patient continues to benefit from skilled intervention to address the above impairments. Continue treatment per established plan of care. Discharge Recommendations:  Edison Sidhu  Further Equipment Recommendations for Discharge:  defer     SUBJECTIVE:   Patient stated yes.  Patient remains fairly aphasic, with unintelligible speech majority of session     OBJECTIVE DATA SUMMARY:   Critical Behavior:  Neurologic State: Alert  Orientation Level: Unable to verbalize  Cognition: Follows commands  Safety/Judgement: Fall prevention  Functional Mobility Training:  Bed Mobility:           Scooting: Contact guard assistance        Transfers:  Sit to Stand: Minimum assistance (mod A of LUE placement )  Stand to Sit: Minimum assistance (mod A of LUE placement )                             Balance:  Sitting: Intact; Without support  Standing: Impaired; With support (RW)  Standing - Static: Fair;Constant support  Standing - Dynamic : Fair  Ambulation/Gait Training:  Distance (ft): 30 Feet (ft)  Assistive Device: Gait belt;Walker, rolling  Ambulation - Level of Assistance: Minimal assistance; Moderate assistance (mod A of LUE placement )        Gait Abnormalities: Decreased step clearance              Speed/Brianda: Slow;Shuffled  Step Length: Right shortened;Left shortened        Interventions: Manual cues; Safety awareness training; Tactile cues; Verbal cues          Pain:  Pain Scale 1: Numeric (0 - 10)  Pain Intensity 1: 0              Activity Tolerance:   Poor 2/2 wheezing     Please refer to the flowsheet for vital signs taken during this treatment.   After treatment:   [x]    Patient left in no apparent distress sitting up in chair  []    Patient left in no apparent distress in bed  [x]    Call bell left within reach  [x]    Nursing notified  []    Caregiver present  [x]    Bed alarm activated    COMMUNICATION/COLLABORATION:   The patients plan of care was discussed with: Registered Nurse and Physician    Christie Biggs, PT, DPT, CEEAA      Time Calculation: 13 mins

## 2018-02-08 NOTE — PROGRESS NOTES
Problem: Self Care Deficits Care Plan (Adult)  Goal: *Acute Goals and Plan of Care (Insert Text)  Occupational Therapy Goals:  Initiated 2/6/2018  1. Patient will perform grooming with SBA within 7 days. 2. Patient will perform toileting with moderate assistance  within 7 days. 3. Patient will perform upper body dressing with minimal assistance within 7 days. 4. Patient will perform lower body dressing with moderate assist within 7 days. 5. Patient will transfer from toilet with contact guard assist using the least restrictive device and appropriate durable medical equipment within 7 days. Occupational Therapy TREATMENT  Patient: Kenn Lopez (89 y.o. female)  Date: 2/8/2018  Diagnosis: Stroke [facility])  CAD (coronary artery disease) <principal problem not specified>       Precautions:  fall, bed alarm  Chart, occupational therapy assessment, plan of care, and goals were reviewed. ASSESSMENT:  Pt was seated at bedside chair upon arrival with O2 on due to wheezing. Pt was more dysarthric and difficult to understand today. Increased assist needed (max assist) for sit to stand this session from bedside chair with arms. Left UE placed in WB position with sit to stand and stand to sit. Increased flexor tone noted today in pts left shoulder and elbow. Left hand remains flaccid. Pt was able to mobilize to bathroom with RW for support and occasional assist to keep left hand one walker. Min assist needed to manage walker and for balance. Transferred to standard toilet with max assist for gown management. Able to void and wipe jocelyn areas seated using right hand with lateral weight shift. Moderate assist for sit to stand from standard toilet. Unable to stand at sink to wash hands due to fatigue. Returned to bedside chair and hand over hand assist needed for bilateral integration washing hands with hand . Pt was not interested in pureed eggs and was requesting apple sauce.   Pt attempted to eat with left UE but needed total hand over hand assist to do so. Pt was able to scoop with spoon in right hand but this was awkward. Continue to recommend SNF at discharge for rehab. Progression toward goals:  []       Improving appropriately and progressing toward goals  [x]       Improving slowly and progressing toward goals  []       Not making progress toward goals and plan of care will be adjusted     PLAN:  Patient continues to benefit from skilled intervention to address the above impairments. Continue treatment per established plan of care. Discharge Recommendations:  Skilled Nursing Facility  Further Equipment Recommendations for Discharge:  TBD     SUBJECTIVE:   Patient stated I think. ..good. ..idea.     OBJECTIVE DATA SUMMARY:   Cognitive/Behavioral Status:  Neurologic State: Alert  Orientation Level: Oriented to person  Cognition: Follows commands  Perception: Appears intact  Perseveration: No perseveration noted  Safety/Judgement: Fall prevention    Functional Mobility and Transfers for ADLs:  Bed Mobility:  Scooting: Contact guard assistance    Transfers:  Sit to Stand: Maximum assistance (from bedside chair; assist with WB through left UE)  Functional Transfers  Bathroom Mobility: Minimum assistance (with rolling walker)  Toilet Transfer : Moderate assistance (sit to stand from standard toilet)    Balance:  Sitting: Intact  Standing - Static: Constant support; Fair  Standing - Dynamic : Fair    ADL Intervention:  Feeding  Container Management: Total assistance (dependent)  Utensil Management: Total assistance (dependent)  Food to Mouth: Stand-by assistance (right non dominant; total with hand over hand left)    Grooming  Washing Hands: Minimum assistance (seated with bilateral hand over hand for intergration)                        Toileting  Bladder Hygiene: Contact guard assistance (seated on commode using right UE)  Clothing Management: Maximum assistance (hosptial gown in standing)    Cognitive Retraining  Safety/Judgement: Fall prevention    Neuro Re-Education:   WB with sit to stand and stand to sit left hand with facilitation of triceps. Bilateral integration with ADLs at able        Therapeutic Exercises:   AAROM left shoulder rowing and abduction seated at bedside chair 10 reps  Pain:                    Activity Tolerance:     Please refer to the flowsheet for vital signs taken during this treatment.   After treatment:   [x] Patient left in no apparent distress sitting up in chair  [] Patient left in no apparent distress in bed  [x] Call bell left within reach  [x] Nursing notified  [] Caregiver present  [x] Bed alarm activated    COMMUNICATION/COLLABORATION:   The patients plan of care was discussed with: Physical Therapist, Registered Nurse and patient    Julio C Agustin OTR/L  Time Calculation: 23 mins

## 2018-02-08 NOTE — PROGRESS NOTES
Bedside and Verbal shift change report given to Ruben aGy RN (oncoming nurse) by Jolanta Weldon (offgoing nurse). Report included the following information SBAR, Kardex, ED Summary, Intake/Output, Recent Results and Cardiac Rhythm NSR.     Zone Phone:   [phone]      Significant changes during shift:  none      Patient Information    Vipin Holbrook  76 y.o.  2/5/2018  4:09 PM by Concepción Casas MD. Vipin Holbrook was admitted from Home    Problem List    Patient Active Problem List    Diagnosis Date Noted    CAD (coronary artery disease) 02/05/2018    Stroke ([address] 02/05/2018    Hypertensive urgency 08/01/2017    CHF (congestive heart failure) ([address] 08/01/2017    COPD exacerbation ([address] 03/07/2017    CAD (coronary artery disease), native coronary artery 07/15/2016    H/O: CVA (cerebrovascular accident) 07/15/2016    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 ([address] 07/15/2016    Severe sepsis ([address] 07/14/2016    Pneumonia 07/14/2016    Acute respiratory failure ([address] 07/14/2016    Elevated troponin 07/14/2016    Left bundle branch block (LBBB) on electrocardiogram 45/43/0580    Acute systolic heart failure ([address] 07/14/2016    Acute kidney failure ([address] 07/14/2016    Mixed hyperlipidemia 09/21/2012    Essential hypertension, benign 09/21/2012    Other left bundle branch block 09/21/2012    Coronary atherosclerosis of native coronary artery 09/21/2012    Late effects of cerebrovascular disease, dysarthria 09/21/2012    Atherosclerosis of renal artery ([address] 09/21/2012    Atherosclerosis of native arteries of the extremities with intermittent claudication 09/21/2012    Tobacco use disorder 09/21/2012    Cerebrovascular accident ([address] 09/21/2012    Mitral valve disorders(424.0) 09/21/2012    Tricuspid valve disorder 09/21/2012     Past Medical History:   Diagnosis Date    Acute kidney failure ([address] 2/82/5472    Acute systolic heart failure ([address] 7/14/2016    CAD (coronary artery disease)     [facility]     Stroke ([facility] 2009    Stroke    Stroke [facility])          Core Measures:    CVA: Yes Yes    Activity Status:   Up with one assist  Supplemental O2: (If Applicable)    2 L via NC      LINES AND DRAINS:    PIV 20 gauge in R A/C    DVT prophylaxis:    SCD's    Wounds: (If Applicable)    N/A    Patient Safety:    Falls Score Total Score: 3  Safety Level_______  Bed Alarm On? Yes  Sitter?  No    Plan for upcoming shift: Tolerate Nectar liquids          Discharge Plan: Yes ,ongoing    Active Consults:  IP CONSULT TO HOSPITALIST  IP CONSULT TO NEUROLOGY

## 2018-02-08 NOTE — PROGRESS NOTES
Pt has been accepted to [facility] and can take pt after a 3 midnight stay.     Carly Brothers, [address]

## 2018-02-08 NOTE — PROGRESS NOTES
Hospitalist Progress Note    NAME: Aakash Valdovinos   :  1940   MRN:  [id number]       Assessment / Plan:  worsening left upper ext weakness and dysarthria. Suspect TIA  Patient with hx of previous strokes. The symptoms started last night so no candidate for TPA. At this time apparently patient recovering some how. CT head no acute findings. MRI W/WO head, showed Extensive chronic ischemic changes, with a moderate-sized area of acute rightMCA territory infarction in the posterior right frontal lobe as above. Echocardiogram,showed  Systolic function was normal. Ejection fraction was  estimated in the range of 55 % to 60 %. There were no regional wall motion  abnormalities. There was moderate concentric hypertrophy  Neurology consult Inputs and recommendations Appreciated. PT and CM consulted on Dc planning   Speech Recommend MBS . Will FU.speech recommended purees with no liquids with no overt s/s of aspiration    SOB ,uncertain cause yet , felt better today. chronic systolic CHF POA LEVF 54-23% ( note from past admission 2017)   CXR No Acute Disease , breathing treatments   DC IVF ,on  lasix , added pro air and clartin     HOLLY On CKD (stage 3)  will keep an eye in creatinine    creatinine on 2017 1.17 and 1.72 on this admission   Today 1.74   Decreased lasix from 40 to 20 md/day       Hx CAD:  no issues at this time  Continue home meds at this time after s/s eval     Hyperlipidemia ;   Continue home meds (after s/s eval)         Body mass index is 26.66 kg/(m^2). Code status: Full  Prophylaxis: Hep SQ  Recommended Disposition: tbd     Subjective:     Chief Complaint / Reason for Physician Visit  Patient slurred when she talk . Discussed with RN events overnight.      Review of Systems:  Symptom Y/N Comments  Symptom Y/N Comments   Fever/Chills n   Chest Pain n    Poor Appetite n   Edema n    Cough y   Abdominal Pain n    Sputum    Joint Pain     SOB/CHRISTINE    Pruritis/Rash Nausea/vomit    Tolerating PT/OT     Diarrhea n   Tolerating Diet     Constipation n   Other       Could NOT obtain due to: Not able to do it because of slurring of speech      Objective:     VITALS:   Last 24hrs VS reviewed since prior progress note. Most recent are:  Patient Vitals for the past 24 hrs:   Temp Pulse Resp BP SpO2   02/08/18 1512 98.7 °F (37.1 °C) 73 18 119/62 96 %   02/08/18 1322 - - - - 100 %   02/08/18 1200 98.1 °F (36.7 °C) 73 18 112/61 100 %   02/08/18 0743 98.6 °F (37 °C) 61 18 132/88 100 %   02/08/18 0730 - - - - 100 %   02/08/18 0318 98.2 °F (36.8 °C) 74 18 121/76 100 %   02/08/18 0109 - - - - 100 %   02/07/18 2318 98 °F (36.7 °C) 69 20 144/75 99 %   02/07/18 1939 98.9 °F (37.2 °C) 61 20 117/62 100 %   02/07/18 1935 - - - - 98 %     No intake or output data in the 24 hours ending 02/08/18 1632     PHYSICAL EXAM:  General: WD, WN. Alert, cooperative, no acute distress  , slurring of speech   EENT:  EOMI. Anicteric sclerae. MMM  Resp:  CTA bilaterally, no wheezing or rales. No accessory muscle use  CV:  Regular  rhythm,  No edema  GI:  Soft, Non distended, Non tender.  +Bowel sounds  Neurologic:  Left side weakness , chronic   Psych:   Good insight. Not anxious nor agitated  Skin:  No rashes. No jaundice    Reviewed most current lab test results and cultures  YES  Reviewed most current radiology test results   YES  Review and summation of old records today    NO  Reviewed patient's current orders and MAR    YES  PMH/SH reviewed - no change compared to H&P  ________________________________________________________________________  Care Plan discussed with:    Comments   Patient y    Family      RN y    Care Manager     Consultant                        Multidiciplinary team rounds were held today with , nursing, pharmacist and clinical coordinator. Patient's plan of care was discussed; medications were reviewed and discharge planning was addressed. ________________________________________________________________________  Total NON critical care TIME:  35    Minutes    Total CRITICAL CARE TIME Spent:   Minutes non procedure based      Comments   >50% of visit spent in counseling and coordination of care     ________________________________________________________________________  Reji Tinoco MD     Procedures: see electronic medical records for all procedures/Xrays and details which were not copied into this note but were reviewed prior to creation of Plan. LABS:  I reviewed today's most current labs and imaging studies. Pertinent labs include:  Recent Labs      02/07/18   0413  02/05/18   1647   WBC  5.8  6.7   HGB  10.0*  11.5   HCT  30.2*  34.4*   PLT  143*  180     Recent Labs      02/08/18   0320  02/07/18   0413  02/05/18   1647   NA  145  145  142   K  4.2  3.8  4.5   CL  111*  110*  108   CO2  29  29  29   GLU  142*  109*  120*   BUN  40*  33*  29*   CREA  1.74*  1.54*  1.72*   CA  8.7  8.6  8.8   ALB  3.5  3.3*   --    TBILI  0.3  0.2   --    SGOT  13*  15   --    ALT  11*  10*   --    INR   --    --   1.0       Signed:  Reji Tinoco MD

## 2018-02-08 NOTE — PROGRESS NOTES
Problem: Dysphagia (Adult)  Goal: *Acute Goals and Plan of Care (Insert Text)  2/6/2018  Speech path goals:  1. Pt will tolerate purees with no liquids with no overt s/s of aspiration. Changed to nectar thick liquids 2/7.  2. Pt will participate with MBS as medically indicated. Completed 2/7.  3. Pt will tolerate dys 2/minced and thins with no overt s/s of aspiration. Speech language pathology dysphagia treatment  Patient: Nazario Burgess (9 y.o. female)  Date: 2/8/2018  Diagnosis: Stroke [facility])  CAD (coronary artery disease) <principal problem not specified>       Precautions: nectar liquids      ASSESSMENT:  Patient had MBS yesterday, upgrade to dysphagia 2 was recommended but not yet ordered. Today some staff reporting coughing and possible increased weakness. Seen with nectar liquids at bedside with no difficulty. I am concerned about reports of coughing, however, do not want to downgrade liquids at this point 2/2 risk of dehydration with honey, MBS results, and bedside results today. Given her level of difficulty closing mouth around spoon and opening mouth for puree, may be best to stick with this now, however. Progression toward goals:  []         Improving appropriately and progressing toward goals  [x]         Improving slowly and progressing toward goals  []         Not making progress toward goals and plan of care will be adjusted     PLAN:  Recommendations and Planned Interventions:  1. Continue puree and nectar liquids for now  2. Will work towards upgrade to dysphagia 2  Patient continues to benefit from skilled intervention to address the above impairments. Continue treatment per established plan of care. Discharge Recommendations:  [facility] or [facility]     SUBJECTIVE:   Patient stated Ill take it.     OBJECTIVE:   Cognitive and Communication Status:  Neurologic State: Alert  Orientation Level: Unable to verbalize  Cognition: Follows commands  Perception: Appears intact  Perseveration: No perseveration noted  Safety/Judgement: Fall prevention  Dysphagia Treatment:  Oral Assessment:  Oral Assessment  Labial: Left droop; Decreased seal  Dentition: Edentulous; Upper & lower dentures  Oral Hygiene: clean   Lingual: Decreased strength; Incoordinated  Mandible: No impairment  P.O. Trials:  Patient Position: up in chair  Vocal quality prior to P.O.: Strain  Consistency Presented: Thin liquid;Puree;Nectar thick liquid  How Presented: SLP-fed/presented;Cup/sip;Straw     Bolus Acceptance: Impaired  Bolus Formation/Control: Impaired  Type of Impairment: Lip closure (difficulty with labial seal on both spoon and sup, difficulty removing puree from spoon)  Propulsion: Delayed (# of seconds)  Oral Residue: None  Initiation of Swallow: Delayed (# of seconds)  Laryngeal Elevation: Functional  Aspiration Signs/Symptoms: None (patient with wheezing throughout session but this started prior to  PO)  Pharyngeal Phase Characteristics: Easily fatigued   Effective Modifications:  (SLP paced slow sips with puases for breathing)          Oral Phase Severity: Moderate  Pharyngeal Phase Severity : Moderate                          Pain:  Pain Scale 1: Numeric (0 - 10)  Pain Intensity 1: 0     After treatment:   []              Patient left in no apparent distress sitting up in chair  []              Patient left in no apparent distress in bed  []              Call bell left within reach  []              Nursing notified  []              Caregiver present  []              Bed alarm activated    COMMUNICATION/EDUCATION:     The patients plan of care including recommendations, planned interventions, and recommended diet changes were discussed with: Registered Nurse. [x]              Posted safety precautions in patient's room.     DEREK Trevino  Time Calculation: 30 mins

## 2018-02-08 NOTE — PROGRESS NOTES
Bedside and Verbal shift change report given to Amy RN (oncoming nurse) by Jomar Hayes RN (offgoing nurse). Report included the following information SBAR, Kardex, ED Summary, Intake/Output, Recent Results and Cardiac Rhythm NSR/ST with PVC's     Zone Phone:   [phone]        Significant changes during shift: pt had episode of respiratory distress, had labored breathing and expiratory wheezing.  Respiratory team called, received PRN duo-neb treatment        Patient Information     Estrada Maki  76 y.o.  2/5/2018  4:09 PM by Colby Brown MD. Estrada aMki was admitted from Home     Problem List          Patient Active Problem List     Diagnosis Date Noted    CAD (coronary artery disease) 02/05/2018    Stroke ([address] 02/05/2018    Hypertensive urgency 08/01/2017    CHF (congestive heart failure) ([address] 08/01/2017    COPD exacerbation ([address] 03/07/2017    CAD (coronary artery disease), native coronary artery 07/15/2016    H/O: CVA (cerebrovascular accident) 07/15/2016    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 ([address] 07/15/2016    Severe sepsis ([address] 07/14/2016    Pneumonia 07/14/2016    Acute respiratory failure ([address] 07/14/2016    Elevated troponin 07/14/2016    Left bundle branch block (LBBB) on electrocardiogram 97/17/6309    Acute systolic heart failure ([address] 07/14/2016    Acute kidney failure ([address] 07/14/2016    Mixed hyperlipidemia 09/21/2012    Essential hypertension, benign 09/21/2012    Other left bundle branch block 09/21/2012    Coronary atherosclerosis of native coronary artery 09/21/2012    Late effects of cerebrovascular disease, dysarthria 09/21/2012    Atherosclerosis of renal artery ([address] 09/21/2012    Atherosclerosis of native arteries of the extremities with intermittent claudication 09/21/2012    Tobacco use disorder 09/21/2012    Cerebrovascular accident ([address] 09/21/2012    Mitral valve disorders(424.0) 09/21/2012    Tricuspid valve disorder 09/21/2012           Past Medical History:   Diagnosis Date    Acute kidney failure ([facility] 2/38/3952    Acute systolic heart failure ([facility] 7/14/2016    CAD (coronary artery disease)      Heart failure ([facility]      [facility]     Stroke    Stroke [facility])              Core Measures:     CVA: Yes Yes     Activity Status:   Up with one assist  Supplemental O2: (If Applicable)     2 L via NC        LINES AND DRAINS:     PIV 20 gauge in R A/C     DVT prophylaxis:     SCD's/ Heparin     Wounds: (If Applicable)     N/A     Patient Safety:     Falls Score Total Score: 3  Safety Level_______  Bed Alarm On? Yes  Sitter?  No     Plan for upcoming shift: Tolerate [place] liquids, neuro checks, safety              Discharge Plan: Yes, [facility] when medically stable     Active Consults:  IP CONSULT TO HOSPITALIST  IP CONSULT TO NEUROLOGY

## 2018-02-08 NOTE — PROGRESS NOTES
ADULT PROTOCOL: JET AEROSOL  REASSESSMENT    Patient  Laquita Vargas     76 y.o.   female     2/8/2018  9:37 AM    Breath Sounds Pre Procedure: Right Breath Sounds: Expiratory wheezing                               Left Breath Sounds: Expiratory wheezing    Breath Sounds Post Procedure: Right Breath Sounds: Expiratory wheezing                                 Left Breath Sounds: Expiratory wheezing    Breathing pattern: Pre procedure Breathing Pattern: Regular          Post procedure Breathing Pattern: Regular    Heart Rate: Pre procedure Pulse: 74           Post procedure Pulse: 76    Resp Rate: Pre procedure Respirations: 18           Post procedure Respirations: 18    Cough: Pre procedure Cough: Non-productive               Post procedure Cough: Non-productive    Oxygen: O2 Device: Nasal cannula   2. 5LNC     Changed: NO    SpO2: Pre procedure SpO2: 100 %   with oxygen              Post procedure SpO2: 99 %  with oxygen    Nebulizer Therapy: Current medications Aerosolized Medications: DuoNeb      Changed: NO    Problem List:   Patient Active Problem List   Diagnosis Code    Severe sepsis (HCC) A41.9, R65.20    Pneumonia J18.9    Acute respiratory failure (HCC) J96.00    Elevated troponin R74.8    Left bundle branch block (LBBB) on electrocardiogram O59.3    Acute systolic heart failure (HCC) I50.21    Acute kidney failure (HCC) N17.9    CAD (coronary artery disease), native coronary artery I25.10    H/O: CVA (cerebrovascular accident) Z80.78    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 (HCC) I11.0    Mixed hyperlipidemia E78.2    Essential hypertension, benign I10    Other left bundle branch block I44.7    Coronary atherosclerosis of native coronary artery I25.10    Late effects of cerebrovascular disease, 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    Atherosclerosis of renal artery (HCC) I70.1    Atherosclerosis of native arteries of the extremities with intermittent claudication I70.219    Tobacco use disorder F17.200    C[facility]vascular [facility] ([facility] I63.9    Mitral valve disorders(424.0) I05.9    Tricuspid valve disorder I07.9    COPD exacerbation ([facility]) J44.1    Hypertensive urgency I16.0    CHF (congestive heart failure) ([facility]) I50.9    CAD (coronary artery disease) I25.10    Stroke [facility]) I63.9       Respiratory Therapist: Ari Hinojosa, RT

## 2018-02-09 PROCEDURE — 74011250637 HC RX REV CODE- 250/637: Performed by: INTERNAL MEDICINE

## 2018-02-09 PROCEDURE — 74011000250 HC RX REV CODE- 250: Performed by: INTERNAL MEDICINE

## 2018-02-09 PROCEDURE — 77030038269 HC DRN EXT URIN PURWCK BARD -A

## 2018-02-09 PROCEDURE — 94761 N-INVAS EAR/PLS OXIMETRY MLT: CPT

## 2018-02-09 PROCEDURE — 65660000000 HC RM CCU STEPDOWN

## 2018-02-09 PROCEDURE — 74011250636 HC RX REV CODE- 250/636: Performed by: INTERNAL MEDICINE

## 2018-02-09 PROCEDURE — 74011250637 HC RX REV CODE- 250/637: Performed by: PSYCHIATRY & NEUROLOGY

## 2018-02-09 PROCEDURE — 94640 AIRWAY INHALATION TREATMENT: CPT

## 2018-02-09 PROCEDURE — 77010033678 HC OXYGEN DAILY

## 2018-02-09 PROCEDURE — 92526 ORAL FUNCTION THERAPY: CPT

## 2018-02-09 RX ORDER — IPRATROPIUM BROMIDE AND ALBUTEROL SULFATE 2.5; .5 MG/3ML; MG/3ML
3 SOLUTION RESPIRATORY (INHALATION)
Status: DISCONTINUED | OUTPATIENT
Start: 2018-02-09 | End: 2018-02-10 | Stop reason: HOSPADM

## 2018-02-09 RX ORDER — IPRATROPIUM BROMIDE AND ALBUTEROL SULFATE 2.5; .5 MG/3ML; MG/3ML
3 SOLUTION RESPIRATORY (INHALATION)
Status: DISCONTINUED | OUTPATIENT
Start: 2018-02-09 | End: 2018-02-09

## 2018-02-09 RX ORDER — CLOPIDOGREL BISULFATE 75 MG/1
75 TABLET ORAL DAILY
Qty: 30 TAB | Refills: 0 | Status: SHIPPED | OUTPATIENT
Start: 2018-02-10 | End: 2018-09-11

## 2018-02-09 RX ORDER — LORATADINE 10 MG/1
10 TABLET ORAL DAILY
Qty: 20 TAB | Refills: 0 | Status: ON HOLD | OUTPATIENT
Start: 2018-02-10 | End: 2018-06-01

## 2018-02-09 RX ORDER — ALBUTEROL SULFATE 90 UG/1
2 AEROSOL, METERED RESPIRATORY (INHALATION)
Status: DISCONTINUED | OUTPATIENT
Start: 2018-02-09 | End: 2018-02-10 | Stop reason: HOSPADM

## 2018-02-09 RX ADMIN — CARVEDILOL 12.5 MG: 12.5 TABLET, FILM COATED ORAL at 08:10

## 2018-02-09 RX ADMIN — ISOSORBIDE MONONITRATE 30 MG: 30 TABLET, EXTENDED RELEASE ORAL at 08:10

## 2018-02-09 RX ADMIN — IPRATROPIUM BROMIDE AND ALBUTEROL SULFATE 3 ML: .5; 3 SOLUTION RESPIRATORY (INHALATION) at 16:32

## 2018-02-09 RX ADMIN — IPRATROPIUM BROMIDE AND ALBUTEROL SULFATE 3 ML: .5; 3 SOLUTION RESPIRATORY (INHALATION) at 21:41

## 2018-02-09 RX ADMIN — METHYLPREDNISOLONE SODIUM SUCCINATE 20 MG: 40 INJECTION, POWDER, FOR SOLUTION INTRAMUSCULAR; INTRAVENOUS at 20:47

## 2018-02-09 RX ADMIN — ALBUTEROL SULFATE 2 PUFF: 90 AEROSOL, METERED RESPIRATORY (INHALATION) at 14:30

## 2018-02-09 RX ADMIN — ATORVASTATIN CALCIUM 80 MG: 40 TABLET, FILM COATED ORAL at 08:09

## 2018-02-09 RX ADMIN — FUROSEMIDE 20 MG: 20 TABLET ORAL at 08:09

## 2018-02-09 RX ADMIN — Medication 10 ML: at 06:25

## 2018-02-09 RX ADMIN — ALBUTEROL SULFATE 2 PUFF: 90 AEROSOL, METERED RESPIRATORY (INHALATION) at 03:00

## 2018-02-09 RX ADMIN — LORATADINE 10 MG: 10 TABLET ORAL at 08:09

## 2018-02-09 RX ADMIN — LISINOPRIL 2.5 MG: 5 TABLET ORAL at 08:10

## 2018-02-09 RX ADMIN — CLOPIDOGREL BISULFATE 75 MG: 75 TABLET ORAL at 08:10

## 2018-02-09 RX ADMIN — IPRATROPIUM BROMIDE AND ALBUTEROL SULFATE 3 ML: .5; 3 SOLUTION RESPIRATORY (INHALATION) at 06:38

## 2018-02-09 RX ADMIN — METHYLPREDNISOLONE SODIUM SUCCINATE 20 MG: 40 INJECTION, POWDER, FOR SOLUTION INTRAMUSCULAR; INTRAVENOUS at 11:09

## 2018-02-09 RX ADMIN — HEPARIN SODIUM 5000 UNITS: 5000 INJECTION, SOLUTION INTRAVENOUS; SUBCUTANEOUS at 02:58

## 2018-02-09 RX ADMIN — HEPARIN SODIUM 5000 UNITS: 5000 INJECTION, SOLUTION INTRAVENOUS; SUBCUTANEOUS at 14:33

## 2018-02-09 RX ADMIN — CARVEDILOL 12.5 MG: 12.5 TABLET, FILM COATED ORAL at 17:03

## 2018-02-09 RX ADMIN — AMLODIPINE BESYLATE 2.5 MG: 2.5 TABLET ORAL at 08:09

## 2018-02-09 RX ADMIN — Medication 10 ML: at 11:11

## 2018-02-09 RX ADMIN — IPRATROPIUM BROMIDE AND ALBUTEROL SULFATE 3 ML: .5; 3 SOLUTION RESPIRATORY (INHALATION) at 07:35

## 2018-02-09 NOTE — DISCHARGE SUMMARY
Hospitalist Discharge Summary     Patient ID:  Nicole Nelson  [id number]  89 y.o.  1940    PCP on record: Bubba Purdy MD    Admit date: 2/5/2018  Discharge date and time: 2/9/2018      DISCHARGE DIAGNOSIS:    worsening left upper ext weakness and dysarthria. Suspect TIA  Patient with hx of previous strokes. The symptoms started last night so no candidate for TPA. At this time apparently patient recovering some how. CT head no acute findings. MRI W/WO head, showed Extensive chronic ischemic changes, with a moderate-sized area of acute rightMCA territory infarction in the posterior right frontal lobe as above. Echocardiogram,showed  Systolic function was normal. Ejection fraction was  estimated in the range of 55 % to 60 %. There were no regional wall motion  abnormalities. There was moderate concentric hypertrophy  Neurology consult Inputs and recommendations Appreciated. PT and CM consulted on Dc planning   Speech Recommend MBS . Will FU.speech recommended purees with no liquids with no overt s/s of aspiration     SOB ,uncertain cause yet , felt better today. chronic systolic CHF POA LEVF 87-82% ( note from past admission august 2017)   CXR No Acute Disease , breathing treatments   DC IVF ,on  lasix , added pro air and clartin      HOLLY On CKD (stage 3)  will keep an eye in creatinine    creatinine on 8/4/2017 1.17 and 1.72 on this admission   Today 1.74   Decreased lasix from 40 to 20 md/day         Hx CAD:  no issues at this time  Continue home meds at this time after s/s eval      Hyperlipidemia ;   Continue home meds (after s/s eval)      CONSULTATIONS:  IP CONSULT TO HOSPITALIST  IP CONSULT TO NEUROLOGY    Excerpted HPI from H&P of Dotty Gold MD:  Donell Carrillo is a 76 y.o.   PMH Strokes, CAD,hyperlipidemia who presents from home after family noticed since last night that the patient was having  Slurred speech \" more than usual\" and her LUE was weaker than usual. They did not think much of it last night , but this morning she was still feeling the same reason why they came here for further eval. Patient denies fevers, CP,SOB or any other symptom    ______________________________________________________________________  DISCHARGE SUMMARY/HOSPITAL COURSE:  for full details see H&P, daily progress notes, labs, consult notes. _______________________________________________________________________  General:                    WD, WN. Alert, cooperative, no acute distress  , slurring of speech   EENT:                                  EOMI. Anicteric sclerae. MMM  Resp:                                   CTA bilaterally, no wheezing or rales. No accessory muscle use  CV:                                      Regular  rhythm,  No edema  GI:                                       Soft, Non distended, Non tender.  +Bowel sounds  Neurologic:                Left side weakness , chronic   Psych:                       Good insight. Not anxious nor agitated  Skin:                                    No rashes. No jaundice  _______________________________________________________________________  DISCHARGE MEDICATIONS:   Current Discharge Medication List      START taking these medications    Details   loratadine (CLARITIN) 10 mg tablet Take 1 Tab by mouth daily. Qty: 20 Tab, Refills: 0      clopidogrel (PLAVIX) 75 mg tab Take 1 Tab by mouth daily. Qty: 30 Tab, Refills: 0         CONTINUE these medications which have NOT CHANGED    Details   potassium chloride (K-DUR, KLOR-CON) 20 mEq tablet Take 20 mEq by mouth daily. amLODIPine (NORVASC) 2.5 mg tablet Take 1 Tab by mouth daily. Qty: 30 Tab, Refills: 12      furosemide (LASIX) 40 mg tablet Take 1 Tab by mouth daily.  One pill daily  Qty: 30 Tab, Refills: 12      atorvastatin (LIPITOR) 40 mg tablet TAKE ONE TABLET BY MOUTH EVERY DAY  Qty: 30 Tab, Refills: 12      aspirin delayed-release 81 mg tablet Take 1 Tab by mouth daily.  Qty: 100 Tab, Refills: 6      lisinopril (PRINIVIL, ZESTRIL) 2.5 mg tablet Take 1 Tab by mouth daily. Qty: 30 Tab, Refills: 12      carvedilol (COREG) 12.5 mg tablet Take 1 Tab by mouth two (2) times a day. Qty: 60 Tab, Refills: 12      isosorbide mononitrate ER (IMDUR) 30 mg tablet Take 1 Tab by mouth every morning. Qty: 30 Tab, Refills: 12      albuterol (PROVENTIL HFA, VENTOLIN HFA, PROAIR HFA) 90 mcg/actuation inhaler Take 2 Puffs by inhalation every four (4) hours as needed for Wheezing. Qty: 1 Inhaler, Refills: 1         STOP taking these medications       potassium chloride SR (KLOR-CON 10) 10 mEq tablet Comments:   Reason for Stopping:               My Recommended Diet, Activity, Wound Care, and follow-up labs are listed in the patient's Discharge Insturctions which I have personally completed and reviewed.     ______________________________________________________________________    Risk of deterioration: Moderate    Condition at Discharge:  Stable  ______________________________________________________________________    Disposition  SNF/LTC    ______________________________________________________________________    Care Plan discussed with:   Patient, Family, RN, Care Manager, Consultant    Comment: none   ______________________________________________________________________    Code Status: Full Code  ___

## 2018-02-09 NOTE — PROGRESS NOTES
ADULT PROTOCOL: JET AEROSOL  REASSESSMENT    Patient  Carlos Arrieta     76 y.o.   female     2/9/2018  8:46 AM    Breath Sounds Pre Procedure: Right Breath Sounds: Diminished                               Left Breath Sounds: Diminished    Breath Sounds Post Procedure: Right Breath Sounds: Diminished                                 Left Breath Sounds: Diminished    Breathing pattern: Pre procedure Breathing Pattern: Regular          Post procedure Breathing Pattern: Regular    Heart Rate: Pre procedure Pulse: 71           Post procedure Pulse: 72    Resp Rate: Pre procedure Respirations: 18           Post procedure Respirations: 18    Cough: Pre procedure Cough: Non-productive               Post procedure Cough: Non-productive    Oxygen: O2 Device: Nasal cannula        Changed: No    SpO2: Pre procedure SpO2: 100 %   2LNC              Post procedure SpO2: 99 %  2LNC    Nebulizer Therapy: Current medications Aerosolized Medications: DuoNeb Q6WA      Changed: Yes to PRN    Smoking History:   Former Smoker  Problem List:   Patient Active Problem List   Diagnosis Code    Severe sepsis ([facility] A41.9, R65.20    Pneumonia J18.9    Acute respiratory failure ([facility] J96.00    Elevated troponin R74.8    Left bundle branch block (LBBB) on electrocardiogram E41.0    Acute systolic heart failure (HCC) I50.21    Acute kidney failure (HCC) N17.9    CAD (coronary artery disease), native coronary artery I25.10    H/O: CVA (cerebrovascular accident) Z86.73    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 ([facility] I11.0    Mixed hyperlipidemia E78.2    Essential hypertension, benign I10    Other left bundle branch block I44.7    Coronary atherosclerosis of native coronary artery I25.10    Late effects of cerebrovascular disease, 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    Atherosclerosis of renal artery (HCC) I70.1    Atherosclerosis of native arteries of the extremities with intermittent claudication I70.219    Tobacco use disorder F17.200    Cerebrovascular accident ([facility] I63.9    Mitral valve disorders(424.0) I05.9    Tricuspid valve disorder I07.9    COPD exacerbation ([facility]) J44.1    Hypertensive urgency I16.0    CHF (congestive heart failure) ([facility]) I50.9    CAD (coronary artery disease) I25.10    Stroke [facility]) I63.9       Respiratory Therapist: Christy Hunt RT

## 2018-02-09 NOTE — PROGRESS NOTES
Problem: Dysphagia (Adult)  Goal: *Acute Goals and Plan of Care (Insert Text)  2/6/2018  Speech path goals:  1. Pt will tolerate purees with no liquids with no overt s/s of aspiration. Changed to nectar thick liquids 2/7.  2. Pt will participate with MBS as medically indicated. Completed 2/7.  3. Pt will tolerate dys 2/minced and thins with no overt s/s of aspiration. Speech language pathology dysphagia treatment  Patient: Abi Romano (89 y.o. female)  Date: 2/9/2018  Diagnosis: Stroke [facility])  CAD (coronary artery disease) <principal problem not specified>       Precautions:  Bed Alarm, Fall    ASSESSMENT:  Pt seen today for swallowing therapy. She is tolerating nectar thick liquids with a short straw. It takes a lot of effort to drink via long straw for this pt. She does best with a short straw. She wheezes after a few sips. She has been wheezing all week which is why an MBS was done. Progression toward goals:  []         Improving appropriately and progressing toward goals  []         Improving slowly and progressing toward goals  []         Not making progress toward goals and plan of care will be adjusted     PLAN:  Recommendations and Planned Interventions:  Continue with current diet. Patient continues to benefit from skilled intervention to address the above impairments. Continue treatment per established plan of care. Discharge Recommendations:  None     SUBJECTIVE:   Patient is very pleasant. OBJECTIVE:   Cognitive and Communication Status:  Neurologic State: Alert  Orientation Level: Oriented to person, Oriented to place  Cognition: Follows commands, Poor safety awareness  Perception: Appears intact  Perseveration: No perseveration noted  Safety/Judgement: Fall prevention  Dysphagia Treatment:  Oral Assessment:     P.O.  Trials:  Patient Position: upright in bed  Vocal quality prior to P.O.: Strain  Consistency Presented: Nectar thick liquid  How Presented: Straw;Cup/sip     Bolus Acceptance: Impaired  Bolus Formation/Control: Impaired  Type of Impairment: Delayed  Propulsion: Delayed (# of seconds)  Oral Residue: None  Initiation of Swallow: Delayed (# of seconds)  Laryngeal Elevation: Functional  Aspiration Signs/Symptoms: None  Pharyngeal Phase Characteristics: Easily fatigued              Oral Phase Severity: Mild-moderate  Pharyngeal Phase Severity : Mild-moderate                      Pain:  Pain Scale 1: Numeric (0 - 10)  Pain Intensity 1: 0     After treatment:   []              Patient left in no apparent distress sitting up in chair  [x]              Patient left in no apparent distress in bed  [x]              Call bell left within reach  [x]              Nursing notified  [x]              Caregiver present  []              Bed alarm activated    COMMUNICATION/EDUCATION:       The patients plan of care including recommendations, planned interventions, and recommended diet changes were discussed with: Registered Nurse. []              Posted safety precautions in patient's room.     DEREK Sin  Time Calculation: 15 mins

## 2018-02-09 NOTE — PROGRESS NOTES
Bedside and Verbal shift change report given to [facility], RN (oncoming nurse) by Chilo barboza. Report included the following information SBAR, Kardex, ED Summary, Intake/Output, Recent Results and Cardiac Rhythm NSR.       Zone Phone:   [phone]          Significant changes during shift:  some wheezing,nebs given by RT          Patient Information      Kelly Summers  76 y.o.  2/5/2018  4:09 [address] Mary Lou Duckworth MD. [place] admitted from Home      Problem List              Patient Active Problem List      Diagnosis Date Noted    CAD (coronary artery disease) 02/05/2018    Stroke ([address] 02/05/2018    Hypertensive urgency 08/01/2017    CHF (congestive heart failure) ([address] 08/01/2017    COPD exacerbation ([address] 03/07/2017    CAD (coronary artery disease), native coronary artery 07/15/2016    H/O: CVA (cerebrovascular accident) 07/15/2016    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 ([address] 07/15/2016    Severe sepsis ([address] 07/14/2016    Pneumonia 07/14/2016    Acute respiratory failure ([address] 07/14/2016    Elevated troponin 07/14/2016    Left bundle branch block (LBBB) on electrocardiogram 68/70/0382    Acute systolic heart failure ([address] 07/14/2016    Acute kidney failure ([address] 07/14/2016    Mixed hyperlipidemia 09/21/2012    Essential hypertension, benign 09/21/2012    Other left bundle branch block 09/21/2012    Coronary atherosclerosis of native coronary artery 09/21/2012    Late effects of cerebrovascular disease, dysarthria 09/21/2012    Atherosclerosis of renal artery ([address] 09/21/2012    Atherosclerosis of native arteries of the extremities with intermittent claudication 09/21/2012    Tobacco use disorder 09/21/2012    Cerebrovascular accident ([address] 09/21/2012    Mitral valve disorders(424.0) 09/21/2012    Tricuspid valve disorder 09/21/2012               Past Medical History:   Diagnosis Date    Acute kidney failure ([address] 7/14/2016    Acute systolic heart failure ([facility] 7/14/2016    CAD (coronary artery disease)       Heart failure ([facility]       Stroke ([facility] 2009      Stroke    Stroke ([facility]                  Core Measures:      CVA: Yes Yes      Activity Status:   Up with one assist  Supplemental R1: (PJ Applicable)      2 L via NC          LINES AND DRAINS:      PIV 20 gauge in R A/C      DVT prophylaxis:      SCD's      Wounds: (If Applicable)      N/A      Patient Safety:      Falls Score Total Score: 3  Safety Level_______  Bed Alarm On? Yes  Sitter?  No      Plan for upcoming shift: [facility] tomorrow                  Discharge Plan: Yes ,ongoing      Active Consults:  IP CONSULT TO HOSPITALIST  IP CONSULT TO NEUROLOGY

## 2018-02-09 NOTE — PROGRESS NOTES
Bedside and Verbal shift change report given to Eleuterio Herrmann RN (oncoming nurse) by Marco A Kohli (offgoing nurse).  Report included the following information SBAR, Kardex, ED Summary, Intake/Output, Recent Results and Cardiac Rhythm NSR.     Zone Phone:   [phone]        Significant changes during shift:  some SOB treated with nebs        Patient Information     Beata Reece  76 y.o.  2/5/2018  4:09 PM by Ruel Chew MD. Beata Reece was admitted from Home     Problem List          Patient Active Problem List     Diagnosis Date Noted    CAD (coronary artery disease) 02/05/2018    Stroke ([address] 02/05/2018    Hypertensive urgency 08/01/2017    CHF (congestive heart failure) ([address] 08/01/2017    COPD exacerbation ([address] 03/07/2017    CAD (coronary artery disease), native coronary artery 07/15/2016    H/O: CVA (cerebrovascular accident) 07/15/2016    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 ([address] 07/15/2016    Severe sepsis ([address] 07/14/2016    Pneumonia 07/14/2016    Acute respiratory failure ([address] 07/14/2016    Elevated troponin 07/14/2016    Left bundle branch block (LBBB) on electrocardiogram 37/30/5001    Acute systolic heart failure ([address] 07/14/2016    Acute kidney failure ([address] 07/14/2016    Mixed hyperlipidemia 09/21/2012    Essential hypertension, benign 09/21/2012    Other left bundle branch block 09/21/2012    Coronary atherosclerosis of native coronary artery 09/21/2012    Late effects of cerebrovascular disease, dysarthria 09/21/2012    Atherosclerosis of renal artery ([address] 09/21/2012    Atherosclerosis of native arteries of the extremities with intermittent claudication 09/21/2012    Tobacco use disorder 09/21/2012    Cerebrovascular accident ([address] 09/21/2012    Mitral valve disorders(424.0) 09/21/2012    Tricuspid valve disorder 09/21/2012           Past Medical History:   Diagnosis Date    Acute kidney failure ([address] 7/96/7408    Acute systolic heart failure ([facility] 7/14/2016    CAD (coronary artery disease)      Heart failure ([facility]      Stroke ([facility] 2009     Stroke    Stroke [facility])              Core Measures:     CVA: Yes Yes     Activity Status:   Up with one assist  Supplemental O2: (If Applicable)     2 L via NC        LINES AND DRAINS:     PIV 20 gauge in R A/C     DVT prophylaxis:     SCD's     Wounds: (If Applicable)     N/A     Patient Safety:     Falls Score Total Score: 3  Safety Level_______  Bed Alarm On? Yes  Sitter?  No     Plan for upcoming shift: [facility] [place] liquids, [facility]              Discharge Plan: Yes ,ongoing     Active Consults:  IP CONSULT TO HOSPITALIST  IP CONSULT TO NEUROLOGY

## 2018-02-09 NOTE — PROGRESS NOTES
Pt's discharge held today and this CM informed [facility] that pt is not discharging. Pt will need  tomorrow at 11am. CM called pt's son and informed him of the update.  CM called [employer] and informed Marley Wells in admissions that pt should be discharged tomorrow and the transport time is [address], [address] [facility]

## 2018-02-09 NOTE — PROGRESS NOTES
Hospitalist Progress Note    NAME: Mariah Duran   :  1940   MRN:  [id number]       Assessment / Plan:  worsening left upper ext weakness and dysarthria. Suspect TIA  Patient with hx of previous strokes. The symptoms started last night so no candidate for TPA. At this time apparently patient recovering some how. CT head no acute findings. MRI W/WO head, showed Extensive chronic ischemic changes, with a moderate-sized area of acute rightMCA territory infarction in the posterior right frontal lobe as above. Echocardiogram,showed  Systolic function was normal. Ejection fraction was  estimated in the range of 55 % to 60 %. There were no regional wall motion  abnormalities. There was moderate concentric hypertrophy  Neurology consult Inputs and recommendations Appreciated. PT and CM consulted on Dc planning   Speech Recommend MBS . Will FU.speech recommended purees with no liquids with no overt s/s of aspiration    SOB ,uncertain cause yet , felt better today. chronic systolic CHF POA LEVF 33-45% ( note from past admission 2017)   CXR No Acute Disease , breathing treatments   DC IVF ,on  lasix , added pro air and clartin   started on solumedrol and Dc cancelled for sob    will need taper her og oxygen as well. Saturating 95% on an 2 L low flow NC oxygen this morning. HOLLY On CKD (stage 3)  will keep an eye in creatinine    creatinine on 2017 1.17 and 1.72 on this admission   Today 1.74   Decreased lasix from 40 to 20 md/day       Hx CAD:  no issues at this time  Continue home meds at this time after s/s eval     Hyperlipidemia ;   Continue home meds (after s/s eval)         Body mass index is 26.66 kg/(m^2). Code status: Full  Prophylaxis: Hep SQ  Recommended Disposition: tbd     Subjective:     Chief Complaint / Reason for Physician Visit  Patient slurred when she talk . Discussed with RN events overnight.      Review of Systems:  Symptom Y/N Comments  Symptom Y/N Comments Fever/Chills n   Chest Pain n    Poor Appetite n   Edema n    Cough y   Abdominal Pain n    Sputum    Joint Pain     SOB/CHRISTINE    Pruritis/Rash     Nausea/vomit    Tolerating PT/OT     Diarrhea n   Tolerating Diet     Constipation n   Other       Could NOT obtain due to: Not able to do it because of slurring of speech      Objective:     VITALS:   Last 24hrs VS reviewed since prior progress note. Most recent are:  Patient Vitals for the past 24 hrs:   Temp Pulse Resp BP SpO2   02/09/18 1525 98.7 °F (37.1 °C) 85 20 149/69 97 %   02/09/18 1109 98.5 °F (36.9 °C) 75 20 109/65 100 %   02/09/18 0734 - - - - 100 %   02/09/18 0731 98.5 °F (36.9 °C) 74 20 141/88 100 %   02/09/18 0259 97.6 °F (36.4 °C) 82 20 145/70 98 %   02/08/18 2312 98 °F (36.7 °C) 82 18 (!) 154/93 100 %   02/08/18 2119 - - - - 100 %   02/08/18 2000 98.6 °F (37 °C) 65 18 118/64 100 %   02/08/18 1756 - 81 - 145/85 -       Intake/Output Summary (Last 24 hours) at 02/09/18 1541  Last data filed at 02/09/18 0631   Gross per 24 hour   Intake                0 ml   Output              300 ml   Net             -300 ml        PHYSICAL EXAM:  General: WD, WN. Alert, cooperative, no acute distress  , slurring of speech   EENT:  EOMI. Anicteric sclerae. MMM  Resp:  CTA bilaterally, no wheezing or rales. No accessory muscle use  CV:  Regular  rhythm,  No edema  GI:  Soft, Non distended, Non tender.  +Bowel sounds  Neurologic:  Left side weakness , chronic   Psych:   Good insight. Not anxious nor agitated  Skin:  No rashes.   No jaundice    Reviewed most current lab test results and cultures  YES  Reviewed most current radiology test results   YES  Review and summation of old records today    NO  Reviewed patient's current orders and MAR    YES  PMH/SH reviewed - no change compared to H&P  ________________________________________________________________________  Care Plan discussed with:    Comments   Patient y    Family      RN y    Care Manager     Consultant Multidiciplinary team rounds were held today with , nursing, pharmacist and clinical coordinator. Patient's plan of care was discussed; medications were reviewed and discharge planning was addressed. ________________________________________________________________________  Total NON critical care TIME:  35    Minutes    Total CRITICAL CARE TIME Spent:   Minutes non procedure based      Comments   >50% of visit spent in counseling and coordination of care     ________________________________________________________________________  Adia Koehler MD     Procedures: see electronic medical records for all procedures/Xrays and details which were not copied into this note but were reviewed prior to creation of Plan. LABS:  I reviewed today's most current labs and imaging studies. Pertinent labs include:  Recent Labs      02/07/18   0413   WBC  5.8   HGB  10.0*   HCT  30.2*   PLT  143*     Recent Labs      02/08/18   0320  02/07/18   0413   NA  145  145   K  4.2  3.8   CL  111*  110*   CO2  29  29   GLU  142*  109*   BUN  40*  33*   CREA  1.74*  1.54*   CA  8.7  8.6   ALB  3.5  3.3*   TBILI  0.3  0.2   SGOT  13*  15   ALT  11*  10*       Signed:  Adia Koehler MD

## 2018-02-10 VITALS
BODY MASS INDEX: 28.72 KG/M2 | RESPIRATION RATE: 20 BRPM | TEMPERATURE: 98.4 F | HEIGHT: 60 IN | HEART RATE: 79 BPM | WEIGHT: 146.3 LBS | OXYGEN SATURATION: 99 % | SYSTOLIC BLOOD PRESSURE: 168 MMHG | DIASTOLIC BLOOD PRESSURE: 92 MMHG

## 2018-02-10 LAB
ALBUMIN SERPL-MCNC: 3.3 G/DL (ref 3.5–5)
ALBUMIN/GLOB SERPL: 0.8 {RATIO} (ref 1.1–2.2)
ALP SERPL-CCNC: 105 U/L (ref 45–117)
ALT SERPL-CCNC: 12 U/L (ref 12–78)
ANION GAP SERPL CALC-SCNC: 5 MMOL/L (ref 5–15)
AST SERPL-CCNC: 9 U/L (ref 15–37)
BASOPHILS # BLD: 0 K/UL (ref 0–0.1)
BASOPHILS NFR BLD: 0 % (ref 0–1)
BILIRUB SERPL-MCNC: 0.2 MG/DL (ref 0.2–1)
BUN SERPL-MCNC: 37 MG/DL (ref 6–20)
BUN/CREAT SERPL: 25 (ref 12–20)
CALCIUM SERPL-MCNC: 8.9 MG/DL (ref 8.5–10.1)
CHLORIDE SERPL-SCNC: 106 MMOL/L (ref 97–108)
CO2 SERPL-SCNC: 30 MMOL/L (ref 21–32)
CREAT SERPL-MCNC: 1.48 MG/DL (ref 0.55–1.02)
DIFFERENTIAL METHOD BLD: ABNORMAL
EOSINOPHIL # BLD: 0 K/UL (ref 0–0.4)
EOSINOPHIL NFR BLD: 0 % (ref 0–7)
ERYTHROCYTE [DISTWIDTH] IN BLOOD BY AUTOMATED COUNT: 13.2 % (ref 11.5–14.5)
GLOBULIN SER CALC-MCNC: 3.9 G/DL (ref 2–4)
GLUCOSE SERPL-MCNC: 248 MG/DL (ref 65–100)
HCT VFR BLD AUTO: 33.8 % (ref 35–47)
HGB BLD-MCNC: 10.9 G/DL (ref 11.5–16)
IMM GRANULOCYTES # BLD: 0 K/UL (ref 0–0.04)
IMM GRANULOCYTES NFR BLD AUTO: 0 % (ref 0–0.5)
LYMPHOCYTES # BLD: 0.6 K/UL (ref 0.8–3.5)
LYMPHOCYTES NFR BLD: 8 % (ref 12–49)
MCH RBC QN AUTO: 26.1 PG (ref 26–34)
MCHC RBC AUTO-ENTMCNC: 32.2 G/DL (ref 30–36.5)
MCV RBC AUTO: 80.9 FL (ref 80–99)
MONOCYTES # BLD: 0.1 K/UL (ref 0–1)
MONOCYTES NFR BLD: 1 % (ref 5–13)
NEUTS SEG # BLD: 7.4 K/UL (ref 1.8–8)
NEUTS SEG NFR BLD: 91 % (ref 32–75)
NRBC # BLD: 0 K/UL (ref 0–0.01)
NRBC BLD-RTO: 0 PER 100 WBC
PLATELET # BLD AUTO: 147 K/UL (ref 150–400)
PMV BLD AUTO: 11.3 FL (ref 8.9–12.9)
POTASSIUM SERPL-SCNC: 4.5 MMOL/L (ref 3.5–5.1)
PROT SERPL-MCNC: 7.2 G/DL (ref 6.4–8.2)
RBC # BLD AUTO: 4.18 M/UL (ref 3.8–5.2)
RBC MORPH BLD: ABNORMAL
SODIUM SERPL-SCNC: 141 MMOL/L (ref 136–145)
WBC # BLD AUTO: 8.1 K/UL (ref 3.6–11)

## 2018-02-10 PROCEDURE — 74011250636 HC RX REV CODE- 250/636: Performed by: INTERNAL MEDICINE

## 2018-02-10 PROCEDURE — 36415 COLL VENOUS BLD VENIPUNCTURE: CPT | Performed by: INTERNAL MEDICINE

## 2018-02-10 PROCEDURE — 85025 COMPLETE CBC W/AUTO DIFF WBC: CPT | Performed by: INTERNAL MEDICINE

## 2018-02-10 PROCEDURE — 80053 COMPREHEN METABOLIC PANEL: CPT | Performed by: INTERNAL MEDICINE

## 2018-02-10 PROCEDURE — 74011000250 HC RX REV CODE- 250: Performed by: INTERNAL MEDICINE

## 2018-02-10 PROCEDURE — 77010033678 HC OXYGEN DAILY

## 2018-02-10 PROCEDURE — 74011250637 HC RX REV CODE- 250/637: Performed by: INTERNAL MEDICINE

## 2018-02-10 PROCEDURE — 74011250637 HC RX REV CODE- 250/637: Performed by: PSYCHIATRY & NEUROLOGY

## 2018-02-10 RX ORDER — PREDNISONE 10 MG/1
20 TABLET ORAL
Qty: 9 TAB | Refills: 0 | Status: ON HOLD | OUTPATIENT
Start: 2018-02-10 | End: 2018-06-01

## 2018-02-10 RX ORDER — ALBUTEROL SULFATE 90 UG/1
2 AEROSOL, METERED RESPIRATORY (INHALATION)
Qty: 1 INHALER | Refills: 0 | Status: SHIPPED | OUTPATIENT
Start: 2018-02-10 | End: 2018-02-10

## 2018-02-10 RX ADMIN — ALBUTEROL SULFATE 2 PUFF: 90 AEROSOL, METERED RESPIRATORY (INHALATION) at 03:16

## 2018-02-10 RX ADMIN — ISOSORBIDE MONONITRATE 30 MG: 30 TABLET, EXTENDED RELEASE ORAL at 08:22

## 2018-02-10 RX ADMIN — Medication 10 ML: at 06:15

## 2018-02-10 RX ADMIN — HEPARIN SODIUM 5000 UNITS: 5000 INJECTION, SOLUTION INTRAVENOUS; SUBCUTANEOUS at 03:16

## 2018-02-10 RX ADMIN — FUROSEMIDE 20 MG: 20 TABLET ORAL at 08:21

## 2018-02-10 RX ADMIN — CARVEDILOL 12.5 MG: 12.5 TABLET, FILM COATED ORAL at 08:21

## 2018-02-10 RX ADMIN — IPRATROPIUM BROMIDE AND ALBUTEROL SULFATE 3 ML: .5; 3 SOLUTION RESPIRATORY (INHALATION) at 06:15

## 2018-02-10 RX ADMIN — METHYLPREDNISOLONE SODIUM SUCCINATE 20 MG: 40 INJECTION, POWDER, FOR SOLUTION INTRAMUSCULAR; INTRAVENOUS at 03:20

## 2018-02-10 RX ADMIN — LISINOPRIL 2.5 MG: 5 TABLET ORAL at 08:20

## 2018-02-10 RX ADMIN — AMLODIPINE BESYLATE 2.5 MG: 2.5 TABLET ORAL at 08:21

## 2018-02-10 RX ADMIN — ALBUTEROL SULFATE 2 PUFF: 90 AEROSOL, METERED RESPIRATORY (INHALATION) at 08:22

## 2018-02-10 RX ADMIN — ATORVASTATIN CALCIUM 80 MG: 40 TABLET, FILM COATED ORAL at 08:21

## 2018-02-10 RX ADMIN — LORATADINE 10 MG: 10 TABLET ORAL at 08:21

## 2018-02-10 RX ADMIN — CLOPIDOGREL BISULFATE 75 MG: 75 TABLET ORAL at 08:21

## 2018-02-10 NOTE — PROGRESS NOTES
Bedside and Verbal shift change report given to An Hogan RN (oncoming nurse) by Tiffani muñiz). Report included the following information SBAR, Kardex, ED Summary, Intake/Output, Recent Results and Cardiac Rhythm NSR.       Zone Phone:   [phone]          Significant changes during shift:  none          Patient Information  Kathryn Veloz  76 y.o.  2/5/2018  4:09 Manuel Myers MD. Esperanza Purvis admitted from Home      Problem List                 Patient Active Problem List      Diagnosis Date Noted    CAD (coronary artery disease) 02/05/2018    Stroke ([address] 02/05/2018    Hypertensive urgency 08/01/2017    CHF (congestive heart failure) ([address] 08/01/2017    COPD exacerbation ([address] 03/07/2017    CAD (coronary artery disease), native coronary artery 07/15/2016    H/O: CVA (cerebrovascular accident) 07/15/2016    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 ([address] 07/15/2016    Severe sepsis ([address] 07/14/2016    Pneumonia 07/14/2016    Acute respiratory failure ([address] 07/14/2016    Elevated troponin 07/14/2016    Left bundle branch block (LBBB) on electrocardiogram 68/75/0726    Acute systolic heart failure ([address] 07/14/2016    Acute kidney failure ([address] 07/14/2016    Mixed hyperlipidemia 09/21/2012    Essential hypertension, benign 09/21/2012    Other left bundle branch block 09/21/2012    Coronary atherosclerosis of native coronary artery 09/21/2012    Late effects of cerebrovascular disease, dysarthria 09/21/2012    Atherosclerosis of renal artery ([address] 09/21/2012    Atherosclerosis of native arteries of the extremities with intermittent claudication 09/21/2012    Tobacco use disorder 09/21/2012    Cerebrovascular accident ([address] 09/21/2012    Mitral valve disorders(424.0) 09/21/2012    Tricuspid valve disorder 09/21/2012                  Past Medical History:   Diagnosis Date    Acute kidney failure ([address] 4/78/5789    Acute systolic heart failure ([facility] 7/14/2016    CAD (coronary artery disease)       Heart failure ([facility]       Stroke ([facility] 2009      Stroke    Stroke ([facility]                  Core Measures:      CVA: Yes Yes      Activity Status:   Up with one assist  Supplemental T9: (SK Applicable)      2 L via NC          LINES AND DRAINS:      PIV 20 gauge in R A/C      DVT prophylaxis:      SCD's      Wounds: (If Applicable)      N/A      Patient Safety:      Falls Score Total Score: 3  Safety Level_______  Bed Alarm On? Yes  Sitter?  No      Plan for upcoming shift: [facility] today                  Discharge Plan: Yes ,ongoing      Active Consults:  IP CONSULT TO HOSPITALIST  IP CONSULT TO NEUROLOGY

## 2018-02-10 NOTE — DISCHARGE INSTRUCTIONS
[employer] Activation    Thank you for requesting access to [employer]. Please follow the instructions below to securely access and download your online medical record. [employer] allows you to send messages to your doctor, view your test results, renew your prescriptions, schedule appointments, and more. How Do I Sign Up? 1. In your internet browser, go to [email or web address] [email or web address]  2. Click on the First Time User? Click Here link in the Sign In box. You will be redirect to the New Member Sign Up page. 3. Enter your [employer] Access Code exactly as it appears below. You will not need to use this code after youve completed the sign-up process. If you do not sign up before the expiration date, you must request a new code. [employer] Access Code: [email or web address]  Expires: 2018  3:28 PM (This is the date your [employer] access code will )    4. Enter the last four digits of your Social Security Number (xxxx) and Date of Birth (mm/dd/yyyy) as indicated and click Submit. You will be taken to the next sign-up page. 5. Create a [employer] ID. This will be your [employer] login ID and cannot be changed, so think of one that is secure and easy to remember. 6. Create a [employer] password. You can change your password at any time. 7. Enter your Password Reset Question and Answer. This can be used at a later time if you forget your password. 8. Enter your e-mail address. You will receive e-mail notification when new information is available in [address]. 9. Click Sign Up. You can now view and download portions of your medical record. 10. Click the Download Summary menu link to download a portable copy of your medical information. Additional Information    If you have questions, please visit the Frequently Asked Questions section of the [employer] website at [email or web address] Remember, [employer] is NOT to be used for urgent needs. For medical emergencies, dial 911.           HOSPITALIST DISCHARGE INSTRUCTIONS    NAME: Raul Davila   :  1940   MRN:  [id number]     Date/Time:  2/10/2018 10:31 AM    ADMIT DATE: 2018   DISCHARGE DATE: 2/10/2018     Attending Physician: Abraham Pires MD    DISCHARGE DIAGNOSIS:  worsening left upper ext weakness and dysarthria. Suspect TIA  Patient with hx of previous strokes. The symptoms started last night so no candidate for TPA. At this time apparently patient recovering some how. CT head no acute findings. MRI W/WO head, showed Extensive chronic ischemic changes, with a moderate-sized area of acute rightMCA territory infarction in the posterior right frontal lobe as above. Echocardiogram,showed  Systolic function was normal. Ejection fraction was  estimated in the range of 55 % to 60 %. There were no regional wall motion  abnormalities. There was moderate concentric hypertrophy  Neurology consult Inputs and recommendations Appreciated. PT and CM consulted on Dc planning   Speech Recommend MBS . Will FU.speech recommended purees with no liquids with no overt s/s of aspiration     SOB ,uncertain cause yet , felt better today. hyperactive air way disease   chronic systolic CHF POA LEVF 75-19% ( note from past admission 2017)   CXR No Acute Disease , breathing treatments   DC IVF ,on  lasix , added pro air and clartin   started on solumedrol and Dc cancelled for sob    will need taper her og oxygen as well. Saturating 95% on an 2 L low flow NC oxygen this morning.           HOLLY On CKD (stage 3)  will keep an eye in creatinine    creatinine on 2017 1.17 and 1.72 on this admission   Today 1.48  Decreased lasix from 40 to 20 md/day         Hx CAD:  no issues at this time  Continue home meds at this time after s/s eval      Hyperlipidemia ;   Continue home meds (after s/s eval)      Medications: Per above medication reconciliation.     Pain Management: per above medications    Recommended diet: Cardiac Diet    Recommended activity: Activity as tolerated    Wound care: None    Indwelling devices:  None    Supplemental Oxygen: 2 LNC,  wean as tolerated    Required Lab work: Per SNF routine    Glucose management:  None    Code status: Full        Outside physician follow up:     Follow-up Information     Follow up With Details Comments Contact Info    [address] [address]  [phone]      Salazar Mac MD  PCP office will call patient to schedule follow up appointment [address] [place] [address]  [phone]

## 2018-02-10 NOTE — PROGRESS NOTES
Discharged to [facility] without complaints. Belongings sent with pt. Including eyeglasses and dentures ( upper and lower).

## 2018-02-10 NOTE — DISCHARGE SUMMARY
Hospitalist Discharge Summary     Patient ID:  Zach Westbrook  [id number]  89 y.o.  1940    PCP on record: Jakub Paul MD    Admit date: 2/5/2018  Discharge date and time: 2/10/2018      DISCHARGE DIAGNOSIS:    worsening left upper ext weakness and dysarthria. Suspect TIA  Patient with hx of previous strokes. The symptoms started last night so no candidate for TPA. At this time apparently patient recovering some how. CT head no acute findings. Placed om Plavix as well. MRI W/WO head, showed Extensive chronic ischemic changes, with a moderate-sized area of acute rightMCA territory infarction in the posterior right frontal lobe as above. Echocardiogram,showed  Systolic function was normal. Ejection fraction was  estimated in the range of 55 % to 60 %. There were no regional wall motion  abnormalities. There was moderate concentric hypertrophy  Neurology consult Inputs and recommendations Appreciated. PT and CM consulted on Dc planning   Speech Recommend MBS . Will FU.speech recommended purees with no liquids with no overt s/s of aspiration      SOB ,uncertain cause yet , felt better today. hyperactive air way disease . smoked but never told to have copd. chronic systolic CHF POA LEVF 69-12% ( note from past admission august 2017)   CXR No Acute Disease , breathing treatments   DC IVF ,on  lasix , added pro air and clartin   Dc with tapering steroids       HOLLY On CKD (stage 3)  will keep an eye in creatinine    creatinine on 8/4/2017 1.17 and 1.72 on this admission   Today 1.48 now  Decreased lasix from 40 to 20 md/day           Hx CAD:  no issues at this time  Continue home meds at this time after s/s eval      Hyperlipidemia ;   Continue home meds (after s/s eval)         CONSULTATIONS:  IP CONSULT TO HOSPITALIST  IP CONSULT TO NEUROLOGY    Excerpted HPI from H&P of Tammy Hogan MD:  Enmanuel Maguire is a 76 y.o.   PMH Strokes, CAD,hyperlipidemia who presents from home after family noticed since last night that the patient was having  Slurred speech \" more than usual\" and her LUE was weaker than usual. They did not think much of it last night , but this morning she was still feeling the same reason why they came here for further eval. Patient denies fevers, CP,SOB or any other symptom    ______________________________________________________________________  DISCHARGE SUMMARY/HOSPITAL COURSE:  for full details see H&P, daily progress notes, labs, consult notes. _______________________________________________________________________  General:                    WD, WN. Alert, cooperative, no acute distress  , slurring of speech   EENT:                                  EOMI. Anicteric sclerae. MMM  Resp:                                   CTA bilaterally, no wheezing or rales.  No accessory muscle use  CV:                                      Regular  rhythm,  No edema  GI:                                       Soft, Non distended, Non tender.  +Bowel sounds  Neurologic:                Left side weakness , chronic   Psych:                       Good insight. Not anxious nor agitated  Skin:                                    No rashes.  No jaundice  _______________________________________________________________________    _______________________________________________________________________  DISCHARGE MEDICATIONS:   Current Discharge Medication List      START taking these medications    Details   !! albuterol (PROVENTIL HFA, VENTOLIN HFA, PROAIR HFA) 90 mcg/actuation inhaler Take 2 Puffs by inhalation every six (6) hours as needed for Wheezing. Qty: 1 Inhaler, Refills: 0      predniSONE (DELTASONE) 10 mg tablet Take 2 Tabs by mouth daily (with breakfast). 2 tabs for 3 days  1 tabs for 3 days  Then stop. Qty: 9 Tab, Refills: 0      loratadine (CLARITIN) 10 mg tablet Take 1 Tab by mouth daily.   Qty: 20 Tab, Refills: 0      clopidogrel (PLAVIX) 75 mg tab Take 1 Tab by mouth daily.  Qty: 30 Tab, Refills: 0       !! - Potential duplicate medications found. Please discuss with provider. CONTINUE these medications which have NOT CHANGED    Details   potassium chloride (K-DUR, KLOR-CON) 20 mEq tablet Take 20 mEq by mouth daily. amLODIPine (NORVASC) 2.5 mg tablet Take 1 Tab by mouth daily. Qty: 30 Tab, Refills: 12      furosemide (LASIX) 40 mg tablet Take 1 Tab by mouth daily. One pill daily  Qty: 30 Tab, Refills: 12      atorvastatin (LIPITOR) 40 mg tablet TAKE ONE TABLET BY MOUTH EVERY DAY  Qty: 30 Tab, Refills: 12      aspirin delayed-release 81 mg tablet Take 1 Tab by mouth daily. Qty: 100 Tab, Refills: 6      lisinopril (PRINIVIL, ZESTRIL) 2.5 mg tablet Take 1 Tab by mouth daily. Qty: 30 Tab, Refills: 12      carvedilol (COREG) 12.5 mg tablet Take 1 Tab by mouth two (2) times a day. Qty: 60 Tab, Refills: 12      isosorbide mononitrate ER (IMDUR) 30 mg tablet Take 1 Tab by mouth every morning. Qty: 30 Tab, Refills: 12      !! albuterol (PROVENTIL HFA, VENTOLIN HFA, PROAIR HFA) 90 mcg/actuation inhaler Take 2 Puffs by inhalation every four (4) hours as needed for Wheezing. Qty: 1 Inhaler, Refills: 1       !! - Potential duplicate medications found. Please discuss with provider. STOP taking these medications       potassium chloride SR (KLOR-CON 10) 10 mEq tablet Comments:   Reason for Stopping:               My Recommended Diet, Activity, Wound Care, and follow-up labs are listed in the patient's Discharge Insturctions which I have personally completed and reviewed.     ______________________________________________________________________    Risk of deterioration: Moderate    Condition at Discharge:  Stable  ______________________________________________________________________    Disposition  SNF/LTC  _____________________________________________    Care Plan discussed with:   Patient, Family, RN, Care Manager, Consultant    Comment: none ______________________________________________________________________    Code Status: Full Code  ___

## 2018-02-14 ENCOUNTER — PATIENT OUTREACH (OUTPATIENT)
Dept: CASE MANAGEMENT | Age: 78
End: 2018-02-14

## 2018-02-14 NOTE — PROGRESS NOTES
Community Care Team Documentation for Patient in [facility]  Initial Follow Up       Patient was admitted to [facility] from 2/5 to 2/10. Patient was discharged to [facility], [facility], on 2/10 (date). See previous [facility] Care Team documentation under Patient Outreach. Hospital Discharge diagnosis:  worsening left upper ext weakness and dysarthria. Suspect TIA    RRAT score:  28    Advance Medical Directive on file in EMR? Not on file     Total Hospitalizations/ED visits last 6 months? IP - 1; ED - 0    PCP : Gracie Tan MD    Per Angela Jay at [facility], Reviewed [place] [facility] questions with SNF to ensure patient arrived with admission packet in order. Pt being skilled by PT/OT. Currently transferring with min assist. Ambulating 16ft with RW at min assist. Mod assist with toileting and lower body ADLs. Pt had [address] PTA. Plan to return home with son. SNF Attending:  Nestor Pollard MD    Medications were not reconciled and general patient assessment was not completed during this skilled nursing facility outreach.      JOSELIN GarciaW

## 2018-03-30 ENCOUNTER — HOME HEALTH ADMISSION (OUTPATIENT)
Dept: HOME HEALTH SERVICES | Facility: HOME HEALTH | Age: 78
End: 2018-03-30
Payer: MEDICARE

## 2018-03-30 ENCOUNTER — PATIENT OUTREACH (OUTPATIENT)
Dept: CASE MANAGEMENT | Age: 78
End: 2018-03-30

## 2018-03-30 NOTE — PROGRESS NOTES
Community Care Team Documentation for Patient in [facility]  Discharge Note    Per SNF staff, patient discharged from [facility] ([facility]). See previous [facility] Team notes. PCP : Luke Miles MD    Per Yumiko Mckeon at [facility], [place] 3/27 to home with son and grandmaisha and [facility]. Community Care Team will sign off at this time. Medications were not reconciled and general patient assessment was not completed during this skilled nursing facility outreach.

## 2018-03-31 ENCOUNTER — HOME CARE VISIT (OUTPATIENT)
Dept: SCHEDULING | Facility: HOME HEALTH | Age: 78
End: 2018-03-31
Payer: MEDICARE

## 2018-03-31 PROCEDURE — 400013 HH SOC

## 2018-03-31 PROCEDURE — 3331090001 HH PPS REVENUE CREDIT

## 2018-03-31 PROCEDURE — G0299 HHS/HOSPICE OF RN EA 15 MIN: HCPCS

## 2018-03-31 PROCEDURE — 3331090002 HH PPS REVENUE DEBIT

## 2018-04-01 PROCEDURE — 3331090001 HH PPS REVENUE CREDIT

## 2018-04-01 PROCEDURE — 3331090002 HH PPS REVENUE DEBIT

## 2018-04-02 ENCOUNTER — HOME CARE VISIT (OUTPATIENT)
Dept: SCHEDULING | Facility: HOME HEALTH | Age: 78
End: 2018-04-02
Payer: MEDICARE

## 2018-04-02 ENCOUNTER — HOME CARE VISIT (OUTPATIENT)
Dept: HOME HEALTH SERVICES | Facility: HOME HEALTH | Age: 78
End: 2018-04-02
Payer: MEDICARE

## 2018-04-02 VITALS
HEART RATE: 68 BPM | DIASTOLIC BLOOD PRESSURE: 92 MMHG | TEMPERATURE: 98.4 F | RESPIRATION RATE: 20 BRPM | SYSTOLIC BLOOD PRESSURE: 152 MMHG | OXYGEN SATURATION: 97 %

## 2018-04-02 PROCEDURE — 3331090002 HH PPS REVENUE DEBIT

## 2018-04-02 PROCEDURE — G0300 HHS/HOSPICE OF LPN EA 15 MIN: HCPCS

## 2018-04-02 PROCEDURE — 3331090001 HH PPS REVENUE CREDIT

## 2018-04-03 PROCEDURE — 3331090001 HH PPS REVENUE CREDIT

## 2018-04-03 PROCEDURE — 3331090002 HH PPS REVENUE DEBIT

## 2018-04-04 ENCOUNTER — HOME CARE VISIT (OUTPATIENT)
Dept: SCHEDULING | Facility: HOME HEALTH | Age: 78
End: 2018-04-04
Payer: MEDICARE

## 2018-04-04 VITALS
DIASTOLIC BLOOD PRESSURE: 70 MMHG | WEIGHT: 135 LBS | SYSTOLIC BLOOD PRESSURE: 110 MMHG | BODY MASS INDEX: 26.37 KG/M2 | HEART RATE: 65 BPM | OXYGEN SATURATION: 99 % | TEMPERATURE: 97 F | RESPIRATION RATE: 16 BRPM

## 2018-04-04 PROCEDURE — 3331090001 HH PPS REVENUE CREDIT

## 2018-04-04 PROCEDURE — G0156 HHCP-SVS OF AIDE,EA 15 MIN: HCPCS

## 2018-04-04 PROCEDURE — G0300 HHS/HOSPICE OF LPN EA 15 MIN: HCPCS

## 2018-04-04 PROCEDURE — 3331090002 HH PPS REVENUE DEBIT

## 2018-04-05 ENCOUNTER — HOME CARE VISIT (OUTPATIENT)
Dept: SCHEDULING | Facility: HOME HEALTH | Age: 78
End: 2018-04-05
Payer: MEDICARE

## 2018-04-05 PROCEDURE — G0152 HHCP-SERV OF OT,EA 15 MIN: HCPCS

## 2018-04-05 PROCEDURE — 3331090001 HH PPS REVENUE CREDIT

## 2018-04-05 PROCEDURE — 3331090002 HH PPS REVENUE DEBIT

## 2018-04-06 ENCOUNTER — HOME CARE VISIT (OUTPATIENT)
Dept: HOME HEALTH SERVICES | Facility: HOME HEALTH | Age: 78
End: 2018-04-06
Payer: MEDICARE

## 2018-04-06 VITALS
SYSTOLIC BLOOD PRESSURE: 110 MMHG | TEMPERATURE: 98 F | BODY MASS INDEX: 26.37 KG/M2 | DIASTOLIC BLOOD PRESSURE: 60 MMHG | RESPIRATION RATE: 17 BRPM | HEART RATE: 77 BPM | OXYGEN SATURATION: 96 % | WEIGHT: 135 LBS

## 2018-04-06 PROCEDURE — 3331090001 HH PPS REVENUE CREDIT

## 2018-04-06 PROCEDURE — 3331090002 HH PPS REVENUE DEBIT

## 2018-04-07 PROCEDURE — 3331090002 HH PPS REVENUE DEBIT

## 2018-04-07 PROCEDURE — 3331090001 HH PPS REVENUE CREDIT

## 2018-04-08 ENCOUNTER — HOME CARE VISIT (OUTPATIENT)
Dept: SCHEDULING | Facility: HOME HEALTH | Age: 78
End: 2018-04-08
Payer: MEDICARE

## 2018-04-08 PROCEDURE — 3331090001 HH PPS REVENUE CREDIT

## 2018-04-08 PROCEDURE — 3331090002 HH PPS REVENUE DEBIT

## 2018-04-09 ENCOUNTER — HOME CARE VISIT (OUTPATIENT)
Dept: SCHEDULING | Facility: HOME HEALTH | Age: 78
End: 2018-04-09
Payer: MEDICARE

## 2018-04-09 VITALS
OXYGEN SATURATION: 98 % | TEMPERATURE: 97.9 F | RESPIRATION RATE: 18 BRPM | HEART RATE: 85 BPM | SYSTOLIC BLOOD PRESSURE: 130 MMHG | DIASTOLIC BLOOD PRESSURE: 88 MMHG

## 2018-04-09 PROCEDURE — 3331090001 HH PPS REVENUE CREDIT

## 2018-04-09 PROCEDURE — G0156 HHCP-SVS OF AIDE,EA 15 MIN: HCPCS

## 2018-04-09 PROCEDURE — G0151 HHCP-SERV OF PT,EA 15 MIN: HCPCS

## 2018-04-09 PROCEDURE — 3331090002 HH PPS REVENUE DEBIT

## 2018-04-10 ENCOUNTER — HOME CARE VISIT (OUTPATIENT)
Dept: HOME HEALTH SERVICES | Facility: HOME HEALTH | Age: 78
End: 2018-04-10
Payer: MEDICARE

## 2018-04-10 ENCOUNTER — HOME CARE VISIT (OUTPATIENT)
Dept: SCHEDULING | Facility: HOME HEALTH | Age: 78
End: 2018-04-10
Payer: MEDICARE

## 2018-04-10 PROCEDURE — 3331090001 HH PPS REVENUE CREDIT

## 2018-04-10 PROCEDURE — 3331090002 HH PPS REVENUE DEBIT

## 2018-04-10 PROCEDURE — G0300 HHS/HOSPICE OF LPN EA 15 MIN: HCPCS

## 2018-04-11 ENCOUNTER — HOME CARE VISIT (OUTPATIENT)
Dept: SCHEDULING | Facility: HOME HEALTH | Age: 78
End: 2018-04-11
Payer: MEDICARE

## 2018-04-11 ENCOUNTER — HOME CARE VISIT (OUTPATIENT)
Dept: HOME HEALTH SERVICES | Facility: HOME HEALTH | Age: 78
End: 2018-04-11
Payer: MEDICARE

## 2018-04-11 VITALS
OXYGEN SATURATION: 95 % | TEMPERATURE: 98 F | SYSTOLIC BLOOD PRESSURE: 140 MMHG | DIASTOLIC BLOOD PRESSURE: 78 MMHG | HEART RATE: 88 BPM

## 2018-04-11 VITALS
BODY MASS INDEX: 25.97 KG/M2 | SYSTOLIC BLOOD PRESSURE: 156 MMHG | WEIGHT: 133 LBS | OXYGEN SATURATION: 97 % | RESPIRATION RATE: 20 BRPM | HEART RATE: 78 BPM | DIASTOLIC BLOOD PRESSURE: 84 MMHG

## 2018-04-11 VITALS
SYSTOLIC BLOOD PRESSURE: 125 MMHG | TEMPERATURE: 98 F | DIASTOLIC BLOOD PRESSURE: 75 MMHG | HEART RATE: 76 BPM | OXYGEN SATURATION: 98 %

## 2018-04-11 VITALS
SYSTOLIC BLOOD PRESSURE: 126 MMHG | HEART RATE: 76 BPM | OXYGEN SATURATION: 98 % | DIASTOLIC BLOOD PRESSURE: 76 MMHG | TEMPERATURE: 97.9 F | RESPIRATION RATE: 18 BRPM

## 2018-04-11 PROCEDURE — 3331090002 HH PPS REVENUE DEBIT

## 2018-04-11 PROCEDURE — 3331090001 HH PPS REVENUE CREDIT

## 2018-04-11 PROCEDURE — G0151 HHCP-SERV OF PT,EA 15 MIN: HCPCS

## 2018-04-11 PROCEDURE — G0152 HHCP-SERV OF OT,EA 15 MIN: HCPCS

## 2018-04-12 ENCOUNTER — HOME CARE VISIT (OUTPATIENT)
Dept: SCHEDULING | Facility: HOME HEALTH | Age: 78
End: 2018-04-12
Payer: MEDICARE

## 2018-04-12 PROCEDURE — G0300 HHS/HOSPICE OF LPN EA 15 MIN: HCPCS

## 2018-04-12 PROCEDURE — 3331090001 HH PPS REVENUE CREDIT

## 2018-04-12 PROCEDURE — 3331090002 HH PPS REVENUE DEBIT

## 2018-04-13 ENCOUNTER — HOME CARE VISIT (OUTPATIENT)
Dept: HOME HEALTH SERVICES | Facility: HOME HEALTH | Age: 78
End: 2018-04-13
Payer: MEDICARE

## 2018-04-13 VITALS
WEIGHT: 130 LBS | TEMPERATURE: 97.8 F | DIASTOLIC BLOOD PRESSURE: 86 MMHG | OXYGEN SATURATION: 96 % | RESPIRATION RATE: 16 BRPM | HEART RATE: 79 BPM | BODY MASS INDEX: 25.39 KG/M2 | SYSTOLIC BLOOD PRESSURE: 144 MMHG

## 2018-04-13 PROCEDURE — 3331090002 HH PPS REVENUE DEBIT

## 2018-04-13 PROCEDURE — 3331090001 HH PPS REVENUE CREDIT

## 2018-04-14 PROCEDURE — 3331090002 HH PPS REVENUE DEBIT

## 2018-04-14 PROCEDURE — 3331090001 HH PPS REVENUE CREDIT

## 2018-04-15 PROCEDURE — 3331090001 HH PPS REVENUE CREDIT

## 2018-04-15 PROCEDURE — 3331090002 HH PPS REVENUE DEBIT

## 2018-04-16 ENCOUNTER — HOME CARE VISIT (OUTPATIENT)
Dept: SCHEDULING | Facility: HOME HEALTH | Age: 78
End: 2018-04-16
Payer: MEDICARE

## 2018-04-16 ENCOUNTER — HOME CARE VISIT (OUTPATIENT)
Dept: HOME HEALTH SERVICES | Facility: HOME HEALTH | Age: 78
End: 2018-04-16
Payer: MEDICARE

## 2018-04-16 VITALS
TEMPERATURE: 97.8 F | RESPIRATION RATE: 18 BRPM | DIASTOLIC BLOOD PRESSURE: 68 MMHG | SYSTOLIC BLOOD PRESSURE: 128 MMHG | OXYGEN SATURATION: 96 % | HEART RATE: 85 BPM

## 2018-04-16 VITALS
OXYGEN SATURATION: 95 % | SYSTOLIC BLOOD PRESSURE: 115 MMHG | HEART RATE: 76 BPM | TEMPERATURE: 98 F | DIASTOLIC BLOOD PRESSURE: 75 MMHG

## 2018-04-16 PROCEDURE — G0152 HHCP-SERV OF OT,EA 15 MIN: HCPCS

## 2018-04-16 PROCEDURE — G0151 HHCP-SERV OF PT,EA 15 MIN: HCPCS

## 2018-04-16 PROCEDURE — 3331090001 HH PPS REVENUE CREDIT

## 2018-04-16 PROCEDURE — 3331090002 HH PPS REVENUE DEBIT

## 2018-04-17 ENCOUNTER — HOME CARE VISIT (OUTPATIENT)
Dept: HOME HEALTH SERVICES | Facility: HOME HEALTH | Age: 78
End: 2018-04-17
Payer: MEDICARE

## 2018-04-17 ENCOUNTER — HOME CARE VISIT (OUTPATIENT)
Dept: SCHEDULING | Facility: HOME HEALTH | Age: 78
End: 2018-04-17
Payer: MEDICARE

## 2018-04-17 PROCEDURE — G0300 HHS/HOSPICE OF LPN EA 15 MIN: HCPCS

## 2018-04-17 PROCEDURE — 3331090002 HH PPS REVENUE DEBIT

## 2018-04-17 PROCEDURE — 3331090001 HH PPS REVENUE CREDIT

## 2018-04-18 ENCOUNTER — HOME CARE VISIT (OUTPATIENT)
Dept: SCHEDULING | Facility: HOME HEALTH | Age: 78
End: 2018-04-18
Payer: MEDICARE

## 2018-04-18 VITALS
HEART RATE: 66 BPM | BODY MASS INDEX: 25.97 KG/M2 | TEMPERATURE: 98.3 F | OXYGEN SATURATION: 97 % | RESPIRATION RATE: 17 BRPM | SYSTOLIC BLOOD PRESSURE: 110 MMHG | DIASTOLIC BLOOD PRESSURE: 80 MMHG | WEIGHT: 133 LBS

## 2018-04-18 VITALS
SYSTOLIC BLOOD PRESSURE: 138 MMHG | DIASTOLIC BLOOD PRESSURE: 68 MMHG | OXYGEN SATURATION: 97 % | RESPIRATION RATE: 18 BRPM | HEART RATE: 78 BPM | TEMPERATURE: 97.9 F

## 2018-04-18 PROCEDURE — 3331090001 HH PPS REVENUE CREDIT

## 2018-04-18 PROCEDURE — G0151 HHCP-SERV OF PT,EA 15 MIN: HCPCS

## 2018-04-18 PROCEDURE — 3331090002 HH PPS REVENUE DEBIT

## 2018-04-19 ENCOUNTER — HOME CARE VISIT (OUTPATIENT)
Dept: SCHEDULING | Facility: HOME HEALTH | Age: 78
End: 2018-04-19
Payer: MEDICARE

## 2018-04-19 VITALS
HEART RATE: 86 BPM | TEMPERATURE: 97.9 F | SYSTOLIC BLOOD PRESSURE: 130 MMHG | OXYGEN SATURATION: 95 % | WEIGHT: 132 LBS | RESPIRATION RATE: 18 BRPM | BODY MASS INDEX: 25.78 KG/M2 | DIASTOLIC BLOOD PRESSURE: 84 MMHG

## 2018-04-19 PROCEDURE — 3331090001 HH PPS REVENUE CREDIT

## 2018-04-19 PROCEDURE — G0300 HHS/HOSPICE OF LPN EA 15 MIN: HCPCS

## 2018-04-19 PROCEDURE — 3331090002 HH PPS REVENUE DEBIT

## 2018-04-20 ENCOUNTER — HOME CARE VISIT (OUTPATIENT)
Dept: HOME HEALTH SERVICES | Facility: HOME HEALTH | Age: 78
End: 2018-04-20
Payer: MEDICARE

## 2018-04-20 ENCOUNTER — HOME CARE VISIT (OUTPATIENT)
Dept: SCHEDULING | Facility: HOME HEALTH | Age: 78
End: 2018-04-20
Payer: MEDICARE

## 2018-04-20 PROCEDURE — G0156 HHCP-SVS OF AIDE,EA 15 MIN: HCPCS

## 2018-04-20 PROCEDURE — 3331090001 HH PPS REVENUE CREDIT

## 2018-04-20 PROCEDURE — 3331090002 HH PPS REVENUE DEBIT

## 2018-04-21 PROCEDURE — 3331090002 HH PPS REVENUE DEBIT

## 2018-04-21 PROCEDURE — 3331090001 HH PPS REVENUE CREDIT

## 2018-04-22 PROCEDURE — 3331090002 HH PPS REVENUE DEBIT

## 2018-04-22 PROCEDURE — 3331090001 HH PPS REVENUE CREDIT

## 2018-04-23 ENCOUNTER — HOME CARE VISIT (OUTPATIENT)
Dept: HOME HEALTH SERVICES | Facility: HOME HEALTH | Age: 78
End: 2018-04-23
Payer: MEDICARE

## 2018-04-23 ENCOUNTER — HOME CARE VISIT (OUTPATIENT)
Dept: SCHEDULING | Facility: HOME HEALTH | Age: 78
End: 2018-04-23
Payer: MEDICARE

## 2018-04-23 PROCEDURE — G0156 HHCP-SVS OF AIDE,EA 15 MIN: HCPCS

## 2018-04-23 PROCEDURE — G0151 HHCP-SERV OF PT,EA 15 MIN: HCPCS

## 2018-04-23 PROCEDURE — 3331090002 HH PPS REVENUE DEBIT

## 2018-04-23 PROCEDURE — 3331090001 HH PPS REVENUE CREDIT

## 2018-04-24 VITALS
DIASTOLIC BLOOD PRESSURE: 68 MMHG | RESPIRATION RATE: 18 BRPM | HEART RATE: 68 BPM | SYSTOLIC BLOOD PRESSURE: 130 MMHG | OXYGEN SATURATION: 97 % | TEMPERATURE: 97.6 F

## 2018-04-24 PROCEDURE — 3331090002 HH PPS REVENUE DEBIT

## 2018-04-24 PROCEDURE — 3331090001 HH PPS REVENUE CREDIT

## 2018-04-25 ENCOUNTER — HOME CARE VISIT (OUTPATIENT)
Dept: SCHEDULING | Facility: HOME HEALTH | Age: 78
End: 2018-04-25
Payer: MEDICARE

## 2018-04-25 ENCOUNTER — HOME CARE VISIT (OUTPATIENT)
Dept: HOME HEALTH SERVICES | Facility: HOME HEALTH | Age: 78
End: 2018-04-25
Payer: MEDICARE

## 2018-04-25 VITALS
OXYGEN SATURATION: 98 % | SYSTOLIC BLOOD PRESSURE: 128 MMHG | DIASTOLIC BLOOD PRESSURE: 78 MMHG | TEMPERATURE: 97.8 F | RESPIRATION RATE: 18 BRPM | HEART RATE: 72 BPM

## 2018-04-25 PROCEDURE — 3331090002 HH PPS REVENUE DEBIT

## 2018-04-25 PROCEDURE — G0156 HHCP-SVS OF AIDE,EA 15 MIN: HCPCS

## 2018-04-25 PROCEDURE — 3331090001 HH PPS REVENUE CREDIT

## 2018-04-25 PROCEDURE — G0151 HHCP-SERV OF PT,EA 15 MIN: HCPCS

## 2018-04-26 ENCOUNTER — HOME CARE VISIT (OUTPATIENT)
Dept: SCHEDULING | Facility: HOME HEALTH | Age: 78
End: 2018-04-26
Payer: MEDICARE

## 2018-04-26 VITALS
TEMPERATURE: 98.2 F | SYSTOLIC BLOOD PRESSURE: 105 MMHG | HEART RATE: 80 BPM | DIASTOLIC BLOOD PRESSURE: 76 MMHG | OXYGEN SATURATION: 98 %

## 2018-04-26 PROCEDURE — 3331090002 HH PPS REVENUE DEBIT

## 2018-04-26 PROCEDURE — 3331090001 HH PPS REVENUE CREDIT

## 2018-04-26 PROCEDURE — G0152 HHCP-SERV OF OT,EA 15 MIN: HCPCS

## 2018-04-27 ENCOUNTER — HOME CARE VISIT (OUTPATIENT)
Dept: HOME HEALTH SERVICES | Facility: HOME HEALTH | Age: 78
End: 2018-04-27
Payer: MEDICARE

## 2018-04-27 PROCEDURE — 3331090002 HH PPS REVENUE DEBIT

## 2018-04-27 PROCEDURE — 3331090001 HH PPS REVENUE CREDIT

## 2018-04-28 PROCEDURE — 3331090002 HH PPS REVENUE DEBIT

## 2018-04-28 PROCEDURE — 3331090001 HH PPS REVENUE CREDIT

## 2018-04-29 PROCEDURE — 3331090001 HH PPS REVENUE CREDIT

## 2018-04-29 PROCEDURE — 3331090002 HH PPS REVENUE DEBIT

## 2018-04-30 PROCEDURE — 3331090002 HH PPS REVENUE DEBIT

## 2018-04-30 PROCEDURE — 3331090001 HH PPS REVENUE CREDIT

## 2018-05-01 ENCOUNTER — HOME CARE VISIT (OUTPATIENT)
Dept: SCHEDULING | Facility: HOME HEALTH | Age: 78
End: 2018-05-01
Payer: MEDICARE

## 2018-05-01 VITALS
HEART RATE: 100 BPM | TEMPERATURE: 98 F | SYSTOLIC BLOOD PRESSURE: 130 MMHG | OXYGEN SATURATION: 94 % | DIASTOLIC BLOOD PRESSURE: 65 MMHG

## 2018-05-01 PROCEDURE — 3331090002 HH PPS REVENUE DEBIT

## 2018-05-01 PROCEDURE — 3331090001 HH PPS REVENUE CREDIT

## 2018-05-02 ENCOUNTER — HOME CARE VISIT (OUTPATIENT)
Dept: SCHEDULING | Facility: HOME HEALTH | Age: 78
End: 2018-05-02
Payer: MEDICARE

## 2018-05-02 PROCEDURE — G0151 HHCP-SERV OF PT,EA 15 MIN: HCPCS

## 2018-05-02 PROCEDURE — 3331090001 HH PPS REVENUE CREDIT

## 2018-05-02 PROCEDURE — 3331090002 HH PPS REVENUE DEBIT

## 2018-05-03 ENCOUNTER — HOME CARE VISIT (OUTPATIENT)
Dept: SCHEDULING | Facility: HOME HEALTH | Age: 78
End: 2018-05-03
Payer: MEDICARE

## 2018-05-03 VITALS
TEMPERATURE: 98 F | RESPIRATION RATE: 16 BRPM | OXYGEN SATURATION: 97 % | DIASTOLIC BLOOD PRESSURE: 62 MMHG | HEART RATE: 74 BPM | SYSTOLIC BLOOD PRESSURE: 122 MMHG

## 2018-05-03 VITALS
DIASTOLIC BLOOD PRESSURE: 75 MMHG | OXYGEN SATURATION: 97 % | HEART RATE: 93 BPM | TEMPERATURE: 98 F | SYSTOLIC BLOOD PRESSURE: 130 MMHG

## 2018-05-03 PROCEDURE — G0152 HHCP-SERV OF OT,EA 15 MIN: HCPCS

## 2018-05-03 PROCEDURE — G0299 HHS/HOSPICE OF RN EA 15 MIN: HCPCS

## 2018-05-03 PROCEDURE — 3331090002 HH PPS REVENUE DEBIT

## 2018-05-03 PROCEDURE — 3331090001 HH PPS REVENUE CREDIT

## 2018-05-04 ENCOUNTER — HOME CARE VISIT (OUTPATIENT)
Dept: SCHEDULING | Facility: HOME HEALTH | Age: 78
End: 2018-05-04
Payer: MEDICARE

## 2018-05-04 VITALS
OXYGEN SATURATION: 97 % | DIASTOLIC BLOOD PRESSURE: 78 MMHG | TEMPERATURE: 98 F | RESPIRATION RATE: 18 BRPM | SYSTOLIC BLOOD PRESSURE: 130 MMHG | HEART RATE: 74 BPM

## 2018-05-04 PROCEDURE — 3331090001 HH PPS REVENUE CREDIT

## 2018-05-04 PROCEDURE — G0151 HHCP-SERV OF PT,EA 15 MIN: HCPCS

## 2018-05-04 PROCEDURE — 3331090003 HH PPS REVENUE ADJ

## 2018-05-04 PROCEDURE — 3331090002 HH PPS REVENUE DEBIT

## 2018-05-04 PROCEDURE — G0156 HHCP-SVS OF AIDE,EA 15 MIN: HCPCS

## 2018-05-05 PROCEDURE — 3331090001 HH PPS REVENUE CREDIT

## 2018-05-05 PROCEDURE — 3331090002 HH PPS REVENUE DEBIT

## 2018-05-06 ENCOUNTER — HOSPITAL ENCOUNTER (INPATIENT)
Age: 78
LOS: 3 days | Discharge: NURSING, REHAB OR LONG-TERM CARE | DRG: 041 | End: 2018-05-11
Attending: EMERGENCY MEDICINE | Admitting: INTERNAL MEDICINE
Payer: MEDICARE

## 2018-05-06 ENCOUNTER — APPOINTMENT (OUTPATIENT)
Dept: GENERAL RADIOLOGY | Age: 78
DRG: 041 | End: 2018-05-06
Attending: EMERGENCY MEDICINE
Payer: MEDICARE

## 2018-05-06 DIAGNOSIS — I63.9 ACUTE CEREBRAL INFARCTION (HCC): ICD-10-CM

## 2018-05-06 DIAGNOSIS — I25.118 CORONARY ARTERY DISEASE OF NATIVE ARTERY OF NATIVE HEART WITH STABLE ANGINA PECTORIS (HCC): ICD-10-CM

## 2018-05-06 DIAGNOSIS — N17.9 AKI (ACUTE KIDNEY INJURY) (HCC): ICD-10-CM

## 2018-05-06 DIAGNOSIS — I63.311 CEREBROVASCULAR ACCIDENT (CVA) DUE TO THROMBOSIS OF RIGHT MIDDLE CEREBRAL ARTERY (HCC): ICD-10-CM

## 2018-05-06 DIAGNOSIS — I69.922 DYSARTHRIA AS LATE EFFECT OF CEREBROVASCULAR DISEASE: ICD-10-CM

## 2018-05-06 DIAGNOSIS — I63.9 CEREBROVASCULAR ACCIDENT (CVA), UNSPECIFIED MECHANISM (HCC): ICD-10-CM

## 2018-05-06 DIAGNOSIS — Z86.73 H/O: CVA (CEREBROVASCULAR ACCIDENT): ICD-10-CM

## 2018-05-06 DIAGNOSIS — I65.23 BILATERAL CAROTID ARTERY STENOSIS: ICD-10-CM

## 2018-05-06 DIAGNOSIS — R55 SYNCOPE AND COLLAPSE: Primary | ICD-10-CM

## 2018-05-06 DIAGNOSIS — I10 ESSENTIAL HYPERTENSION, BENIGN: ICD-10-CM

## 2018-05-06 DIAGNOSIS — J44.1 COPD EXACERBATION (HCC): ICD-10-CM

## 2018-05-06 LAB
ALBUMIN SERPL-MCNC: 3.6 G/DL (ref 3.5–5)
ALBUMIN/GLOB SERPL: 1.1 {RATIO} (ref 1.1–2.2)
ALP SERPL-CCNC: 107 U/L (ref 45–117)
ALT SERPL-CCNC: 13 U/L (ref 12–78)
ANION GAP SERPL CALC-SCNC: 4 MMOL/L (ref 5–15)
APTT PPP: 24.3 SEC (ref 22.1–32)
AST SERPL-CCNC: 13 U/L (ref 15–37)
BASOPHILS # BLD: 0 K/UL (ref 0–0.1)
BASOPHILS NFR BLD: 0 % (ref 0–1)
BILIRUB SERPL-MCNC: 0.3 MG/DL (ref 0.2–1)
BUN SERPL-MCNC: 28 MG/DL (ref 6–20)
BUN/CREAT SERPL: 16 (ref 12–20)
CALCIUM SERPL-MCNC: 8.9 MG/DL (ref 8.5–10.1)
CHLORIDE SERPL-SCNC: 108 MMOL/L (ref 97–108)
CK SERPL-CCNC: 96 U/L (ref 26–192)
CO2 SERPL-SCNC: 30 MMOL/L (ref 21–32)
CREAT SERPL-MCNC: 1.72 MG/DL (ref 0.55–1.02)
DIFFERENTIAL METHOD BLD: ABNORMAL
EOSINOPHIL # BLD: 0.1 K/UL (ref 0–0.4)
EOSINOPHIL NFR BLD: 1 % (ref 0–7)
ERYTHROCYTE [DISTWIDTH] IN BLOOD BY AUTOMATED COUNT: 13.9 % (ref 11.5–14.5)
GLOBULIN SER CALC-MCNC: 3.4 G/DL (ref 2–4)
GLUCOSE SERPL-MCNC: 141 MG/DL (ref 65–100)
HCT VFR BLD AUTO: 32.4 % (ref 35–47)
HGB BLD-MCNC: 10.5 G/DL (ref 11.5–16)
IMM GRANULOCYTES # BLD: 0 K/UL (ref 0–0.04)
IMM GRANULOCYTES NFR BLD AUTO: 1 % (ref 0–0.5)
INR PPP: 1 (ref 0.9–1.1)
LYMPHOCYTES # BLD: 1.4 K/UL (ref 0.8–3.5)
LYMPHOCYTES NFR BLD: 16 % (ref 12–49)
MCH RBC QN AUTO: 26.3 PG (ref 26–34)
MCHC RBC AUTO-ENTMCNC: 32.4 G/DL (ref 30–36.5)
MCV RBC AUTO: 81 FL (ref 80–99)
MONOCYTES # BLD: 0.6 K/UL (ref 0–1)
MONOCYTES NFR BLD: 7 % (ref 5–13)
NEUTS SEG # BLD: 6.4 K/UL (ref 1.8–8)
NEUTS SEG NFR BLD: 75 % (ref 32–75)
NRBC # BLD: 0 K/UL (ref 0–0.01)
NRBC BLD-RTO: 0 PER 100 WBC
PLATELET # BLD AUTO: 188 K/UL (ref 150–400)
PMV BLD AUTO: 10.7 FL (ref 8.9–12.9)
POTASSIUM SERPL-SCNC: 5.1 MMOL/L (ref 3.5–5.1)
PROT SERPL-MCNC: 7 G/DL (ref 6.4–8.2)
PROTHROMBIN TIME: 10.4 SEC (ref 9–11.1)
RBC # BLD AUTO: 4 M/UL (ref 3.8–5.2)
SODIUM SERPL-SCNC: 142 MMOL/L (ref 136–145)
THERAPEUTIC RANGE,PTTT: NORMAL SECS (ref 58–77)
TROPONIN I SERPL-MCNC: 0.04 NG/ML
WBC # BLD AUTO: 8.6 K/UL (ref 3.6–11)

## 2018-05-06 PROCEDURE — 93005 ELECTROCARDIOGRAM TRACING: CPT

## 2018-05-06 PROCEDURE — 36415 COLL VENOUS BLD VENIPUNCTURE: CPT | Performed by: EMERGENCY MEDICINE

## 2018-05-06 PROCEDURE — 84484 ASSAY OF TROPONIN QUANT: CPT | Performed by: EMERGENCY MEDICINE

## 2018-05-06 PROCEDURE — 82550 ASSAY OF CK (CPK): CPT | Performed by: EMERGENCY MEDICINE

## 2018-05-06 PROCEDURE — 85025 COMPLETE CBC W/AUTO DIFF WBC: CPT | Performed by: EMERGENCY MEDICINE

## 2018-05-06 PROCEDURE — 3331090001 HH PPS REVENUE CREDIT

## 2018-05-06 PROCEDURE — 3331090002 HH PPS REVENUE DEBIT

## 2018-05-06 PROCEDURE — 85730 THROMBOPLASTIN TIME PARTIAL: CPT | Performed by: EMERGENCY MEDICINE

## 2018-05-06 PROCEDURE — 85610 PROTHROMBIN TIME: CPT | Performed by: EMERGENCY MEDICINE

## 2018-05-06 PROCEDURE — 99218 HC RM OBSERVATION: CPT

## 2018-05-06 PROCEDURE — 71045 X-RAY EXAM CHEST 1 VIEW: CPT

## 2018-05-06 PROCEDURE — 80053 COMPREHEN METABOLIC PANEL: CPT | Performed by: EMERGENCY MEDICINE

## 2018-05-06 PROCEDURE — 99285 EMERGENCY DEPT VISIT HI MDM: CPT

## 2018-05-06 NOTE — IP AVS SNAPSHOT
[address] [place] [address] 
[phone] Patient: Shelly Craig MRN: [id number] :1940 About your hospitalization You were admitted on:  May 6, 2018 You last received care in the:  [facility] 3 NEUROSCIENCE TELEMETRY You were discharged on:  May 11, 2018 Why you were hospitalized Your primary diagnosis was:  Syncope Your diagnoses also included:  Atherosclerosis Of Native Artery Of Extremity With Intermittent Claudication (Hcc), Cad (Coronary Artery Disease), Cerebrovascular Accident (Hcc), H/O: Cva (Cerebrovascular Accident), Dysarthria As Late Effect Of Cerebrovascular Disease, Left Bundle Branch Block (Lbbb) On Electrocardiogram, Acute Cerebral Infarction (Hcc), Bilateral Carotid Artery Stenosis Follow-up Information Follow up With Details Comments Contact [facility] On 2018 this is you rehab provider [address] [facility] 
[phone] Harshal Mcclure DO  Please call to schedule your follow up appointment at your earliest [address] [place] [address] [phone] Aparna Ferraro MD Go on 2018 Please follow up on 2018 at 2:45 arriving 15 minutes early to register for your appointment [address] [place] [address] 
[phone] Liliana Crigler, NP Go on 2018 Please follow up on May 24, 2018 at 3:30 arriving 15 minutes early to register for your appointment for implanted loop monitor follow-upbring your monitoring device provided at the hospital [facility] 
[phone] Your Scheduled Appointments Thursday May 24, 2018  2:45 PM EDT  
ESTABLISHED PATIENT with Liliana Crigler, NP [facility] [address] [place] [address]) [address] [facility]  
[phone] Thursday May 24, 2018  2:45 PM EDT PROCEDURE with PACEMAKER, MEMORIAL [facility] [address]) [address] [facility]  
[phone] Monday June 11, 2018  2:45 PM EDT  
ESTABLISHED PATIENT with Laura San MD  
[facility] [address] [facility]) [address] [facility]  
[phone] Discharge Orders None A check williams indicates which time of day the medication should be taken. My Medications START taking these medications Instructions Each Dose to Equal  
 Morning Noon Evening Bedtime  
 metoprolol succinate 25 mg XL tablet Commonly known as:  TOPROL-XL Your last dose was: Your next dose is: Take 1 Tab by mouth every evening. 25 mg CHANGE how you take these medications Instructions Each Dose to Equal  
 Morning Noon Evening Bedtime  
 atorvastatin 80 mg tablet Commonly known as:  LIPITOR What changed:   
- medication strength 
- how much to take 
- how to take this - when to take this Your last dose was: Your next dose is: Take 1 Tab by mouth nightly. TAKE ONE TABLET BY MOUTH EVERY DAY 80 mg CONTINUE taking these medications Instructions Each Dose to Equal  
 Morning Noon Evening Bedtime  
 albuterol 90 mcg/actuation inhaler Commonly known as:  PROVENTIL HFA, VENTOLIN HFA, PROAIR HFA Your last dose was: Your next dose is: Take 2 Puffs by inhalation every four (4) hours as needed for Wheezing. 2 Puff  
    
   
   
   
  
 aspirin delayed-release 81 mg tablet Your last dose was: Your next dose is: Take 1 Tab by mouth daily. 81 mg  
    
   
   
   
  
 clopidogrel 75 mg Tab Commonly known as:  PLAVIX Your last dose was: Your next dose is: Take 1 Tab by mouth daily. 75 mg  
    
   
   
   
  
 fexofenadine 180 mg tablet Commonly known as:  Simon Haley Your last dose was: Your next dose is: Take 180 mg by mouth daily as needed for Allergies. 180 mg  
    
   
   
   
  
 loratadine 10 mg tablet Commonly known as:  Sarah Salcedo Your last dose was: Your next dose is: Take 1 Tab by mouth daily. 10 mg OXYGEN-AIR DELIVERY SYSTEMS Your last dose was: Your next dose is:    
   
   
 2 L by IntraNASal route daily as needed (shortness of breath). 2 L  
    
   
   
   
  
 predniSONE 10 mg tablet Commonly known as:  Skye Skaggs Your last dose was: Your next dose is: Take 2 Tabs by mouth daily (with breakfast). 2 tabs for 3 days 1 tabs for 3 days  Then stop. 20 mg  
    
   
   
   
  
  
STOP taking these medications   
 amLODIPine 2.5 mg tablet Commonly known as:  NORVASC  
   
  
 carvedilol 12.5 mg tablet Commonly known as:  COREG  
   
  
 furosemide 40 mg tablet Commonly known as:  LASIX  
   
  
 isosorbide mononitrate ER 30 mg tablet Commonly known as:  IMDUR  
   
  
 lisinopril 2.5 mg tablet Commonly known as:  PRINIVIL, ZESTRIL  
   
  
 potassium chloride 20 mEq tablet Commonly known as:  K-DUR, KLOR-CON Where to Get Your Medications Information on where to get these meds will be given to you by the nurse or doctor. ! Ask your nurse or doctor about these medications  
  atorvastatin 80 mg tablet  
 metoprolol succinate 25 mg XL tablet Discharge Instructions HOSPITALIST DISCHARGE INSTRUCTIONS 
 
NAME: Librado Donnelly :  1940 MRN:  [id number] Date/Time:  2018 10:56 AM 
 
ADMIT DATE: 2018 DISCHARGE DATE: 2018 DISCHARGE DIAGNOSIS: 
Syncope POA -past out at dinner table POA Due to Acute R post Parietal cerebral infarction POA- on MRI, S/p ILR implantation by Dr Cayla Murcia- OP follow up in 2 weeks for Device check & Dr Maxi Woods in 4 weeks for Follow up as recommended Hypotension POA BP 70s in ED- now resolved s/p IVF, Adjusted the BP meds as per Cardiology recommendations Stroke, chronic - dysarthria at baseline Chronic systolic heart failure, ef 40% NYHA Class I Stage 3 chronic kidney disease POA- 
  
 
Principal Problem: 
  Syncope (5/6/2018) Active Problems: 
  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 
As per medication reconciliation  list 
· It is important that you take the medication exactly as they are prescribed. · Keep your medication in the bottles provided by the pharmacist and keep a list of the medication names, dosages, and times to be taken in your wallet. · Do not take other medications without consulting your doctor. Pain Management: per above medications What to do at [facility] Recommended diet:  Cardiac Diet and Low fat, Low cholesterol Recommended activity: Activity as tolerated If you have questions regarding the hospital related prescriptions or hospital related issues please call Corinne Bruno at . If you experience any of the following symptoms then please call your primary care physician or return to the emergency room if you cannot get hold of your doctor: Fever, chills, nausea, vomiting, diarrhea, change in mentation, falling, bleeding, shortness of breath, Follow Up: 
Dr. Marta Kasper, DO  you are to call and set up an appointment to see them in 7-10 days. Dr Jules Yang ([facility] cardiology) in 2 weeks for Device check Dr Gunjan Gale ([facility] [address] [place] [address] cardiology) in 4 weeks for follow up Dr Chilango Duran (Neurology) in 4 weeks for post stroke follow up Information obtained by : 
I understand that if any problems occur once I am at home I am to contact my physician. I understand and acknowledge receipt of the instructions indicated above. Physician's or R.N.'s Signature                                                                  Date/Time Patient or Representative Signature                                                          Date/Time [employer] Announcement We are excited to announce that we are making your provider's discharge notes available to you in [employer]. You will see these notes when they are completed and signed by the physician that discharged you from your recent hospital stay. If you have any questions or concerns about any information you see in [employer], please call the Health Information Department where you were seen or reach out to your Primary Care Provider for more information about your plan of care. Introducing [facility]! [facility] introduces [employer] patient portal. Now you can access parts of your medical record, email your doctor's office, and request medication refills online. 1. In your internet browser, go to [email or web address] 2. Click on the First Time User? Click Here link in the Sign In box.  You will see the New Member Sign Up page. 3. Enter your [employer] Access Code exactly as it appears below. You will not need to use this code after youve completed the sign-up process. If you do not sign up before the expiration date, you must request a new code. · [employer] Access Code: [email or web address] Expires: 8/6/2018  9:22 PM 
 
4. Enter the last four digits of your Social Security Number (xxxx) and Date of Birth (mm/dd/yyyy) as indicated and click Submit. You will be taken to the next sign-up page. 5. Create a [employer] ID. This will be your [employer] login ID and cannot be changed, so think of one that is secure and easy to remember. 6. Create a [employer] password. You can change your password at any time. 7. Enter your Password Reset Question and Answer. This can be used at a later time if you forget your password. 8. Enter your e-mail address. You will receive e-mail notification when new information is available in [facility] [address] [facility] [address]. 9. Click Sign Up. You can now view and download portions of your medical record. 10. Click the Download Summary menu link to download a portable copy of your medical information. If you have questions, please visit the Frequently Asked Questions section of the [employer] website. Remember, [employer] is NOT to be used for urgent needs. For medical emergencies, dial 911. Now available from your iPhone and Android! Introducing Allen Lombardi As a [facility] patient, I wanted to make you aware of our electronic visit tool called Allen Lombardi. [facility] 24/7 allows you to connect within minutes with a medical provider 24 hours a day, seven days a week via a mobile device or tablet or logging into a secure website from your computer. You can access Allen Lombardi from anywhere in the [place].  
 
A virtual visit might be right for you when you have a simple condition and feel like you just dont want to get out of bed, or cant get away from work for an appointment, when your regular [facility] provider is not available (evenings, weekends or holidays), or when youre out of town and need minor care. Electronic visits cost only $49 and if the [facility] 24/7 provider determines a prescription is needed to treat your condition, one can be electronically transmitted to a nearby pharmacy*. Please take a moment to enroll today if you have not already done so. The enrollment process is free and takes just a few minutes. To enroll, please download the [facility] 24/7 judd to your tablet or phone, or visit [email or web address] to enroll on your computer. And, as an [address] [place] [address] patient with a [employer] account, the results of your visits will be scanned into your electronic medical record and your primary care provider will be able to view the scanned results. We urge you to continue to see your regular [facility] provider for your ongoing medical care. And while your primary care provider may not be the one available when you seek a [employer] virtual visit, the peace of mind you get from getting a real diagnosis real time can be priceless. For more information on [employer], view our Frequently Asked Questions (FAQs) at [email or web address]. Sincerely, 
 
Chantelle Flores MD 
Chief Medical Officer [address] *:  certain medications cannot be prescribed via [employer] Providers Seen During Your Hospitalization Provider Specialty Primary office phone Luciana uGnn MD Emergency Medicine [phone] Arpita Martínez MD Hospitalist [phone] Will Osborne MD Internal Medicine [phone] Your Primary Care Physician (PCP) Primary Care Physician Office Phone Office Fax Tiffany Camarillo [phone] You are allergic to the following Allergen Reactions Decadron (Dexamethasone Sodium Phosphate) Hives Pcn (Penicillins) Hives Recent Documentation Height Weight Breastfeeding? BMI Smoking Status 1.499 m 59.5 kg No 26.49 kg/m2 Former Smoker Emergency Contacts Name Discharge Info Relation Home Work Mobile Willis Valdez DISCHARGE CAREGIVER [3] Child [2] [phone] Chivo Valdez DISCHARGE CAREGIVER [3] Brother [24]   [phone] Zach العلي  Son [22] [phone] Patient Belongings The following personal items are in your possession at time of discharge: 
  Dental Appliances: Lowers, Uppers  Visual Aid: Glasses      Home Medications: None   Jewelry: None  Clothing: Footwear, Pants, Shirt, Undergarments    Other Valuables: Eyeglasses Please provide this summary of care documentation to your next provider. Signatures-by signing, you are acknowledging that this After Visit Summary has been reviewed with you and you have received a copy. Patient Signature:  ____________________________________________________________ Date:  ____________________________________________________________  
  
[facility] Provider Signature:  ____________________________________________________________ Date:  ____________________________________________________________

## 2018-05-07 LAB
ANION GAP SERPL CALC-SCNC: 8 MMOL/L (ref 5–15)
ATRIAL RATE: 74 BPM
BASOPHILS # BLD: 0 K/UL (ref 0–0.1)
BASOPHILS NFR BLD: 0 % (ref 0–1)
BUN SERPL-MCNC: 28 MG/DL (ref 6–20)
BUN/CREAT SERPL: 19 (ref 12–20)
CALCIUM SERPL-MCNC: 7.6 MG/DL (ref 8.5–10.1)
CALCULATED P AXIS, ECG09: 55 DEGREES
CALCULATED R AXIS, ECG10: -22 DEGREES
CALCULATED T AXIS, ECG11: 109 DEGREES
CHLORIDE SERPL-SCNC: 115 MMOL/L (ref 97–108)
CO2 SERPL-SCNC: 23 MMOL/L (ref 21–32)
CREAT SERPL-MCNC: 1.5 MG/DL (ref 0.55–1.02)
DIAGNOSIS, 93000: NORMAL
DIFFERENTIAL METHOD BLD: ABNORMAL
EOSINOPHIL # BLD: 0.1 K/UL (ref 0–0.4)
EOSINOPHIL NFR BLD: 1 % (ref 0–7)
ERYTHROCYTE [DISTWIDTH] IN BLOOD BY AUTOMATED COUNT: 13.9 % (ref 11.5–14.5)
GLUCOSE SERPL-MCNC: 109 MG/DL (ref 65–100)
HCT VFR BLD AUTO: 30.7 % (ref 35–47)
HGB BLD-MCNC: 10.1 G/DL (ref 11.5–16)
IMM GRANULOCYTES # BLD: 0 K/UL (ref 0–0.04)
IMM GRANULOCYTES NFR BLD AUTO: 0 % (ref 0–0.5)
LYMPHOCYTES # BLD: 1.8 K/UL (ref 0.8–3.5)
LYMPHOCYTES NFR BLD: 27 % (ref 12–49)
MAGNESIUM SERPL-MCNC: 2 MG/DL (ref 1.6–2.4)
MCH RBC QN AUTO: 26.7 PG (ref 26–34)
MCHC RBC AUTO-ENTMCNC: 32.9 G/DL (ref 30–36.5)
MCV RBC AUTO: 81.2 FL (ref 80–99)
MONOCYTES # BLD: 0.5 K/UL (ref 0–1)
MONOCYTES NFR BLD: 8 % (ref 5–13)
NEUTS SEG # BLD: 4.4 K/UL (ref 1.8–8)
NEUTS SEG NFR BLD: 64 % (ref 32–75)
NRBC # BLD: 0 K/UL (ref 0–0.01)
NRBC BLD-RTO: 0 PER 100 WBC
P-R INTERVAL, ECG05: 120 MS
PHOSPHATE SERPL-MCNC: 4.1 MG/DL (ref 2.6–4.7)
PLATELET # BLD AUTO: 171 K/UL (ref 150–400)
PMV BLD AUTO: 10.8 FL (ref 8.9–12.9)
POTASSIUM SERPL-SCNC: 4.5 MMOL/L (ref 3.5–5.1)
Q-T INTERVAL, ECG07: 468 MS
QRS DURATION, ECG06: 122 MS
QTC CALCULATION (BEZET), ECG08: 519 MS
RBC # BLD AUTO: 3.78 M/UL (ref 3.8–5.2)
SODIUM SERPL-SCNC: 146 MMOL/L (ref 136–145)
TSH SERPL DL<=0.05 MIU/L-ACNC: 0.45 UIU/ML (ref 0.36–3.74)
VENTRICULAR RATE, ECG03: 74 BPM
WBC # BLD AUTO: 6.8 K/UL (ref 3.6–11)

## 2018-05-07 PROCEDURE — 83735 ASSAY OF MAGNESIUM: CPT | Performed by: INTERNAL MEDICINE

## 2018-05-07 PROCEDURE — 74011250637 HC RX REV CODE- 250/637: Performed by: INTERNAL MEDICINE

## 2018-05-07 PROCEDURE — 99218 HC RM OBSERVATION: CPT

## 2018-05-07 PROCEDURE — 36415 COLL VENOUS BLD VENIPUNCTURE: CPT | Performed by: INTERNAL MEDICINE

## 2018-05-07 PROCEDURE — 74011250636 HC RX REV CODE- 250/636: Performed by: INTERNAL MEDICINE

## 2018-05-07 PROCEDURE — 3331090001 HH PPS REVENUE CREDIT

## 2018-05-07 PROCEDURE — 93306 TTE W/DOPPLER COMPLETE: CPT

## 2018-05-07 PROCEDURE — 84100 ASSAY OF PHOSPHORUS: CPT | Performed by: INTERNAL MEDICINE

## 2018-05-07 PROCEDURE — 3331090002 HH PPS REVENUE DEBIT

## 2018-05-07 PROCEDURE — 84443 ASSAY THYROID STIM HORMONE: CPT | Performed by: INTERNAL MEDICINE

## 2018-05-07 PROCEDURE — 80048 BASIC METABOLIC PNL TOTAL CA: CPT | Performed by: INTERNAL MEDICINE

## 2018-05-07 PROCEDURE — 85025 COMPLETE CBC W/AUTO DIFF WBC: CPT | Performed by: INTERNAL MEDICINE

## 2018-05-07 RX ORDER — IPRATROPIUM BROMIDE AND ALBUTEROL SULFATE 2.5; .5 MG/3ML; MG/3ML
3 SOLUTION RESPIRATORY (INHALATION)
Status: DISCONTINUED | OUTPATIENT
Start: 2018-05-07 | End: 2018-05-11 | Stop reason: HOSPADM

## 2018-05-07 RX ORDER — ATORVASTATIN CALCIUM 40 MG/1
40 TABLET, FILM COATED ORAL DAILY
Status: DISCONTINUED | OUTPATIENT
Start: 2018-05-07 | End: 2018-05-09

## 2018-05-07 RX ORDER — ONDANSETRON 2 MG/ML
4 INJECTION INTRAMUSCULAR; INTRAVENOUS
Status: DISCONTINUED | OUTPATIENT
Start: 2018-05-07 | End: 2018-05-11 | Stop reason: HOSPADM

## 2018-05-07 RX ORDER — SODIUM CHLORIDE 0.9 % (FLUSH) 0.9 %
5-10 SYRINGE (ML) INJECTION EVERY 8 HOURS
Status: DISCONTINUED | OUTPATIENT
Start: 2018-05-07 | End: 2018-05-11 | Stop reason: HOSPADM

## 2018-05-07 RX ORDER — HEPARIN SODIUM 5000 [USP'U]/ML
5000 INJECTION, SOLUTION INTRAVENOUS; SUBCUTANEOUS EVERY 12 HOURS
Status: DISCONTINUED | OUTPATIENT
Start: 2018-05-07 | End: 2018-05-11 | Stop reason: HOSPADM

## 2018-05-07 RX ORDER — SODIUM CHLORIDE 0.9 % (FLUSH) 0.9 %
5-10 SYRINGE (ML) INJECTION AS NEEDED
Status: DISCONTINUED | OUTPATIENT
Start: 2018-05-07 | End: 2018-05-11 | Stop reason: HOSPADM

## 2018-05-07 RX ORDER — HYDRALAZINE HYDROCHLORIDE 20 MG/ML
10 INJECTION INTRAMUSCULAR; INTRAVENOUS
Status: DISCONTINUED | OUTPATIENT
Start: 2018-05-07 | End: 2018-05-11 | Stop reason: HOSPADM

## 2018-05-07 RX ORDER — CLOPIDOGREL BISULFATE 75 MG/1
75 TABLET ORAL DAILY
Status: DISCONTINUED | OUTPATIENT
Start: 2018-05-07 | End: 2018-05-11 | Stop reason: HOSPADM

## 2018-05-07 RX ORDER — ASPIRIN 81 MG/1
81 TABLET ORAL DAILY
Status: DISCONTINUED | OUTPATIENT
Start: 2018-05-07 | End: 2018-05-11 | Stop reason: HOSPADM

## 2018-05-07 RX ORDER — SODIUM CHLORIDE 9 MG/ML
75 INJECTION, SOLUTION INTRAVENOUS CONTINUOUS
Status: DISCONTINUED | OUTPATIENT
Start: 2018-05-07 | End: 2018-05-08

## 2018-05-07 RX ORDER — POLYETHYLENE GLYCOL 3350 17 G/17G
17 POWDER, FOR SOLUTION ORAL DAILY
Status: DISCONTINUED | OUTPATIENT
Start: 2018-05-07 | End: 2018-05-11 | Stop reason: HOSPADM

## 2018-05-07 RX ADMIN — Medication 10 ML: at 05:42

## 2018-05-07 RX ADMIN — ATORVASTATIN CALCIUM 40 MG: 40 TABLET, FILM COATED ORAL at 09:48

## 2018-05-07 RX ADMIN — POLYETHYLENE GLYCOL 3350 17 G: 17 POWDER, FOR SOLUTION ORAL at 03:13

## 2018-05-07 RX ADMIN — SODIUM CHLORIDE 500 ML: 900 INJECTION, SOLUTION INTRAVENOUS at 03:19

## 2018-05-07 RX ADMIN — SODIUM CHLORIDE 75 ML/HR: 900 INJECTION, SOLUTION INTRAVENOUS at 11:42

## 2018-05-07 RX ADMIN — ASPIRIN 81 MG: 81 TABLET, COATED ORAL at 09:48

## 2018-05-07 RX ADMIN — CLOPIDOGREL BISULFATE 75 MG: 75 TABLET ORAL at 09:48

## 2018-05-07 RX ADMIN — Medication 10 ML: at 21:27

## 2018-05-07 RX ADMIN — HEPARIN SODIUM 5000 UNITS: 5000 INJECTION, SOLUTION INTRAVENOUS; SUBCUTANEOUS at 03:13

## 2018-05-07 RX ADMIN — SODIUM CHLORIDE 75 ML/HR: 900 INJECTION, SOLUTION INTRAVENOUS at 03:19

## 2018-05-07 RX ADMIN — Medication 10 ML: at 14:35

## 2018-05-07 RX ADMIN — HEPARIN SODIUM 5000 UNITS: 5000 INJECTION, SOLUTION INTRAVENOUS; SUBCUTANEOUS at 14:35

## 2018-05-07 NOTE — PROGRESS NOTES
PT note:    Orders received and acknowledged. Chart reviewed and spoke with nursing. Patient off the floor for ECHO. Will follow up for PT evaluation.      Elizabeth Estes, PT, DPT

## 2018-05-07 NOTE — PROGRESS NOTES
During dual skin assessment primary nurse Mello noticed a RLQ distension in the patient's abdomen. I called down to admitting in the ER and spoke with Dr. Jet Dasilva and informed him of this. He asked whether the patient expressed pain on palpation and I stated that she did not. Dr. Jet Dasilva said for nursing to continue to monitor. I relayed my conversation to the primary nurse regarding this.

## 2018-05-07 NOTE — ED PROVIDER NOTES
EMERGENCY DEPARTMENT HISTORY AND PHYSICAL EXAM      Date: 5/6/2018  Patient Name: Maverick Brunner    History of Presenting Illness     Chief Complaint   Patient presents with    Syncope     pt had syncopal episode around 6:30m this evening while sitting in chair, has had episodes of dizziness off and on for the past couple weeks       History Provided By: Patient and family    HPI: Maverick Brunner, 76 y.o. female with PMHx significant for stroke, CAD and acute kidney failure, presents ambulatory to the ED for evaluation after witnessed syncopal episode while sitting at the dinner table at 6:30 PM. Pt reports associated lightheadedness prior to episode (resolved) after which she slumped over onto the table per family member's report. Pt notes worsening episodes of lightheadedness x the past few weeks. Pt denies hx of syncope. Pt denies any modifying factors. Family notes pt was admitted 2/5/18 - 2/10/18 with discharge diagnosis of \"Worsening left upper ext weakness and dysarthria. Suspect TIA. \" Family states pt was in rehab afterwards and has been having [facility] as well. Pt denies any appetite changes, NVD, CP, ABD pain, HA or numbness. There are no other complaints, changes, or physical findings at this time. PCP: Aryan Espino, DO    Current Outpatient Prescriptions   Medication Sig Dispense Refill    fexofenadine (ALLEGRA) 180 mg tablet Take 180 mg by mouth daily as needed for Allergies.  OXYGEN-AIR DELIVERY SYSTEMS 2 L by IntraNASal route daily as needed (shortness of breath).  predniSONE (DELTASONE) 10 mg tablet Take 2 Tabs by mouth daily (with breakfast). 2 tabs for 3 days  1 tabs for 3 days  Then stop. (Patient not taking: Reported on 4/2/2018) 9 Tab 0    loratadine (CLARITIN) 10 mg tablet Take 1 Tab by mouth daily. 20 Tab 0    clopidogrel (PLAVIX) 75 mg tab Take 1 Tab by mouth daily. 30 Tab 0    potassium chloride (K-DUR, KLOR-CON) 20 mEq tablet Take 20 mEq by mouth daily.  amLODIPine (NORVASC) 2.5 mg tablet Take 1 Tab by mouth daily. 30 Tab 12    furosemide (LASIX) 40 mg tablet Take 1 Tab by mouth daily. One pill daily 30 Tab 12    atorvastatin (LIPITOR) 40 mg tablet TAKE ONE TABLET BY MOUTH EVERY DAY 30 Tab 12    aspirin delayed-release 81 mg tablet Take 1 Tab by mouth daily. 100 Tab 6    lisinopril (PRINIVIL, ZESTRIL) 2.5 mg tablet Take 1 Tab by mouth daily. 30 Tab 12    carvedilol (COREG) 12.5 mg tablet Take 1 Tab by mouth two (2) times a day. 60 Tab 12    isosorbide mononitrate ER (IMDUR) 30 mg tablet Take 1 Tab by mouth every morning. 30 Tab 12    albuterol (PROVENTIL HFA, VENTOLIN HFA, PROAIR HFA) 90 mcg/actuation inhaler Take 2 Puffs by inhalation every four (4) hours as needed for Wheezing. 1 Inhaler 1       Past History     Past Medical History:  Past Medical History:   Diagnosis Date    Acute kidney failure ([facility] 7/54/2250    Acute systolic heart failure ([facility] [facility] 7/14/2016    CAD (coronary artery disease)     Heart failure ([facility]     [facility]    Stroke    Stroke [facility])        Past Surgical History:  History reviewed. No pertinent surgical history. Family History:  Family History   Problem Relation Age of Onset    Coronary Artery Disease Other      grandmother age 76       Social History:  Social History   Substance Use Topics    Smoking status: Former Smoker     Quit date: 9/21/2009    Smokeless tobacco: Never Used    Alcohol use 0.0 oz/week     0 Standard drinks or equivalent per week      Comment: Very little       Allergies: Allergies   Allergen Reactions    Decadron [Dexamethasone Sodium Phosphate] Hives    Pcn [Penicillins] Hives         Review of Systems   Review of Systems   Constitutional: Negative for appetite change, chills, fatigue and fever. HENT: Negative for congestion, rhinorrhea and sore throat. Eyes: Negative for pain, discharge and visual disturbance.    Respiratory: Negative for cough, chest tightness, shortness of breath and wheezing. Cardiovascular: Negative for chest pain, palpitations and leg swelling. Gastrointestinal: Negative for abdominal pain, constipation, diarrhea, nausea and vomiting. Genitourinary: Negative for dysuria, frequency and hematuria. Musculoskeletal: Negative for arthralgias, back pain and myalgias. Skin: Negative for rash. Neurological: Positive for syncope and light-headedness. Negative for dizziness, weakness, numbness and headaches. Psychiatric/Behavioral: Negative. All other systems reviewed and are negative. Physical Exam   Physical Exam   Constitutional: She is oriented to person, place, and time. She appears well-developed and well-nourished. No distress. HENT:   Head: Normocephalic and atraumatic. Eyes: EOM are normal. Right eye exhibits no discharge. Left eye exhibits no discharge. No scleral icterus. Neck: Normal range of motion. Neck supple. No tracheal deviation present. Cardiovascular: Normal rate, regular rhythm, normal heart sounds and intact distal pulses. Exam reveals no gallop and no friction rub. No murmur heard. Pulmonary/Chest: Effort normal and breath sounds normal. No respiratory distress. She has no wheezes. She has no rales. Abdominal: Soft. She exhibits no distension. There is no tenderness. Musculoskeletal: Normal range of motion. She exhibits no edema. Lymphadenopathy:     She has no cervical adenopathy. Neurological: She is alert and oriented to person, place, and time. L face and LUE weakness chronic secondary to previous CVA. Skin: Skin is warm and dry. No rash noted. Psychiatric: She has a normal mood and affect. Nursing note and vitals reviewed.     Diagnostic Study Results     Labs -     Recent Results (from the past 12 hour(s))   EKG, 12 LEAD, INITIAL    Collection Time: 05/06/18  7:48 PM   Result Value Ref Range    Ventricular Rate 74 BPM    Atrial Rate 74 BPM    P-R Interval 120 ms    QRS Duration 122 ms    Q-T Interval 468 ms    QTC Calculation (Bezet) 519 ms    Calculated P Axis 55 degrees    Calculated R Axis -22 degrees    Calculated T Axis 109 degrees    Diagnosis       Normal sinus rhythm  Possible Left atrial enlargement  Left ventricular hypertrophy with QRS widening and repolarization abnormality  When compared with ECG of 05-FEB-2018 16:57,  Left bundle branch block is no longer present     CBC WITH AUTOMATED DIFF    Collection Time: 05/06/18  9:00 PM   Result Value Ref Range    WBC 8.6 3.6 - 11.0 K/uL    RBC 4.00 3.80 - 5.20 M/uL    HGB 10.5 (L) 11.5 - 16.0 g/dL    HCT 32.4 (L) 35.0 - 47.0 %    MCV 81.0 80.0 - 99.0 FL    MCH 26.3 26.0 - 34.0 PG    MCHC 32.4 30.0 - 36.5 g/dL    RDW 13.9 11.5 - 14.5 %    PLATELET 218 738 - 007 K/uL    MPV 10.7 8.9 - 12.9 FL    NRBC 0.0 0  WBC    ABSOLUTE NRBC 0.00 0.00 - 0.01 K/uL    NEUTROPHILS 75 32 - 75 %    LYMPHOCYTES 16 12 - 49 %    MONOCYTES 7 5 - 13 %    EOSINOPHILS 1 0 - 7 %    BASOPHILS 0 0 - 1 %    IMMATURE GRANULOCYTES 1 (H) 0.0 - 0.5 %    ABS. NEUTROPHILS 6.4 1.8 - 8.0 K/UL    ABS. LYMPHOCYTES 1.4 0.8 - 3.5 K/UL    ABS. MONOCYTES 0.6 0.0 - 1.0 K/UL    ABS. EOSINOPHILS 0.1 0.0 - 0.4 K/UL    ABS. BASOPHILS 0.0 0.0 - 0.1 K/UL    ABS. IMM. GRANS. 0.0 0.00 - 0.04 K/UL    DF AUTOMATED     METABOLIC PANEL, COMPREHENSIVE    Collection Time: 05/06/18  9:00 PM   Result Value Ref Range    Sodium 142 136 - 145 mmol/L    Potassium 5.1 3.5 - 5.1 mmol/L    Chloride 108 97 - 108 mmol/L    CO2 30 21 - 32 mmol/L    Anion gap 4 (L) 5 - 15 mmol/L    Glucose 141 (H) 65 - 100 mg/dL    BUN 28 (H) 6 - 20 MG/DL    Creatinine 1.72 (H) 0.55 - 1.02 MG/DL    BUN/Creatinine ratio 16 12 - 20      GFR est AA 35 (L) >60 ml/min/1.73m2    GFR est non-AA 29 (L) >60 ml/min/1.73m2    Calcium 8.9 8.5 - 10.1 MG/DL    Bilirubin, total 0.3 0.2 - 1.0 MG/DL    ALT (SGPT) 13 12 - 78 U/L    AST (SGOT) 13 (L) 15 - 37 U/L    Alk.  phosphatase 107 45 - 117 U/L    Protein, total 7.0 6.4 - 8.2 g/dL    Albumin 3.6 3.5 - 5.0 g/dL    Globulin 3.4 2.0 - 4.0 g/dL    A-G Ratio 1.1 1.1 - 2.2     TROPONIN I    Collection Time: 05/06/18  9:00 PM   Result Value Ref Range    Troponin-I, Qt. 0.04 <0.05 ng/mL   CK W/ REFLX CKMB    Collection Time: 05/06/18  9:00 PM   Result Value Ref Range    CK 96 26 - 192 U/L   PROTHROMBIN TIME + INR    Collection Time: 05/06/18  9:00 PM   Result Value Ref Range    INR 1.0 0.9 - 1.1      Prothrombin time 10.4 9.0 - 11.1 sec   PTT    Collection Time: 05/06/18  9:00 PM   Result Value Ref Range    aPTT 24.3 22.1 - 32.0 sec    aPTT, therapeutic range     58.0 - 77.0 SECS       Radiologic Studies -   XR CHEST PORT   Final Result        CT Results  (Last 48 hours)    None        CXR Results  (Last 48 hours)               05/06/18 2121  XR CHEST PORT Final result    Impression:  IMPRESSION:       No acute process on portable chest. No change. Narrative:  EXAM:  XR CHEST PORT       INDICATION:  Syncope at 1830 hours this evening       COMPARISON: Portable chest on 2/8/2018       TECHNIQUE: Upright portable chest AP view       FINDINGS: Cardiac monitoring wires overlie the thorax. The cardiomediastinal and   hilar contours are within normal limits. The pulmonary vasculature is within   normal limits. The lungs and pleural spaces are clear. Bones are osteopenic. Medical Decision Making   I am the first provider for this patient. I reviewed the vital signs, available nursing notes, past medical history, past surgical history, family history and social history. Vital Signs-Reviewed the patient's vital signs.   Patient Vitals for the past 12 hrs:   Temp Pulse Resp BP SpO2   05/06/18 2200 - 70 18 94/57 100 %   05/06/18 2130 - 71 19 103/58 100 %   05/06/18 2104 - 72 24 119/57 100 %   05/06/18 2030 - 70 18 112/64 100 %   05/06/18 2000 - 70 22 108/55 100 %   05/06/18 1936 - 71 20 - 98 %   05/06/18 1935 98.1 °F (36.7 °C) 70 19 95/62 100 %       Pulse Oximetry Analysis - 100% on 2L    Cardiac Monitor:   Rate: 74 bpm  Rhythm: Normal Sinus Rhythm      EKG interpretation: (Preliminary) 19:48  Rhythm: normal sinus rhythm; and regular . Rate (approx.): 74; Axis: normal; AK interval: normal; QRS interval: prolonged; ST/T wave: T wave abnormality in lateral leads; Other findings: QTc prolonged. Written by Jessenia Noyola ED Scribe, as dictated by Kilo Cox MD.    Records Reviewed: Nursing Notes, Old Medical Records, Previous electrocardiograms, Previous Radiology Studies and Previous Laboratory Studies    Provider Notes (Medical Decision Making):   DDx: Arrhythmia, ACS, orthostatic hypotension, vasovagal syncope, dehydration, electrolyte abnormality, HOLLY, UTI, CVA    ED Course:   Initial assessment performed. The patients presenting problems have been discussed, and they are in agreement with the care plan formulated and outlined with them. I have encouraged them to ask questions as they arise throughout their visit. CONSULT NOTE:   10:37 PM  Kilo Cox MD spoke with Dr. Diony Dorado,   Specialty: Hospitalist  Discussed pt's hx, disposition, and available diagnostic and imaging results. Reviewed care plans. Consultant will evaluate pt for admission. Written by Jessenia Noyola ED Scribe, as dictated by Kilo Cox MD.    Critical Care Time:   0    Disposition:  11:21 PM  Patient is being admitted to the hospital. The results of their tests and reasons for their admission have been discussed with them and/or available family. They convey agreement and understanding for the need to be admitted and for their admission diagnosis. Consultation has been made with the inpatient physician specialist for hospitalization. PLAN:  1. Admit to hospitalist.       Return to ED if worse     Diagnosis     Clinical Impression:   1. Syncope and collapse    2. HOLLY (acute kidney injury) ([facility]        Attestations:   This note is prepared by Jessenia Noyola, acting as Scribe for RADHATHA Dutton Ashley Murphy MD: The scribe's documentation has been prepared under my direction and personally reviewed by me in its entirety. I confirm that the note above accurately reflects all work, treatment, procedures, and medical decision making performed by me.

## 2018-05-07 NOTE — ED NOTES
TRANSFER - OUT REPORT:    Verbal report given to gave report to [facility] nurse(name) on [facility]  being transferred to [facility] OP1213(unit) for routine progression of care       Report consisted of patients Situation, Background, Assessment and   Recommendations(SBAR). Information from the following report(s) SBAR, Kardex, ED Summary, Procedure Summary, Recent Results, Med Rec Status and Cardiac Rhythm SR was reviewed with the receiving nurse. Lines:   Peripheral IV 05/06/18 Right Antecubital (Active)   Site Assessment Clean, dry, & intact 5/6/2018  9:02 PM   Phlebitis Assessment 0 5/6/2018  9:02 PM   Infiltration Assessment 0 5/6/2018  9:02 PM   Dressing Status Clean, dry, & intact 5/6/2018  9:02 PM   Dressing Type 4 X 4 5/6/2018  9:02 PM   Hub Color/Line Status Pink 5/6/2018  9:02 PM   Alcohol Cap Used No 5/6/2018  9:02 PM        Opportunity for questions and clarification was provided.       Patient transported with:   [employer]

## 2018-05-07 NOTE — CONSULTS
CARDIOLOGY CONSULT       Date of  Admission: 5/6/2018  7:22 PM     Admission type:Emergency    Consult for:  syncope  Consulted by:  Dr. Adi Rowan     Subjective:     Shira Arreola is a 76 y.o. female with PMHx significant for stroke, CAD and acute kidney failure, presents ambulatory to the ED for evaluation after witnessed syncopal episode while sitting at the dinner table at 6:30 PM. Pt reports associated lightheadedness prior to episode (resolved) and notes worsening episodes of lightheadedness x the past few weeks. Pt denies hx of syncope. Pt denies any modifying factors. Family notes pt was admitted 2/5/18 - 2/10/18 with discharge diagnosis of \"Worsening left upper ext weakness and dysarthria. Suspect TIA. \" Family states pt was in rehab afterwards and has been having [facility] come as well.      Patient Active Problem List    Diagnosis Date Noted    Syncope 05/06/2018    CAD (coronary artery disease) 02/05/2018    Stroke ([address] 02/05/2018    Hypertensive urgency 08/01/2017    CHF (congestive heart failure) ([address] 08/01/2017    COPD exacerbation ([address] 03/07/2017    CAD (coronary artery disease), native coronary artery 07/15/2016    H/O: CVA (cerebrovascular accident) 07/15/2016    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 ([address] 07/15/2016    Severe sepsis ([address] 07/14/2016    Pneumonia 07/14/2016    Acute respiratory failure ([address] 07/14/2016    Elevated troponin 07/14/2016    Left bundle branch block (LBBB) on electrocardiogram 46/52/7535    Acute systolic heart failure ([address] 07/14/2016    Acute kidney failure ([address] 07/14/2016    Mixed hyperlipidemia 09/21/2012    Essential hypertension, benign 09/21/2012    Other left bundle branch block 09/21/2012    Coronary atherosclerosis of native coronary artery 09/21/2012    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 09/21/2012    Atherosclerosis of renal artery ([address] 09/21/2012    Atherosclerosis of native artery of extremity [facility] 09/21/2012    Tobacco use disorder 09/21/2012    Cerebrovascular accident ([facility] 09/21/2012    Mitral valve disorders(424.0) 09/21/2012    Tricuspid valve disorder 09/21/2012      Pa HenriquezDO  Past Medical History:   Diagnosis Date    Acute kidney failure ([facility] 6/12/6949    Acute systolic heart failure ([facility] 7/14/2016    CAD (coronary artery disease)     Heart failure ([facility]     Stroke ([facility] 2009    Stroke    Stroke [facility])        Social History     Social History    Marital status:      Spouse name: N/A    Number of children: N/A    Years of education: N/A     Social History Main Topics    Smoking status: Former Smoker     Quit date: 9/21/2009    Smokeless tobacco: Never Used    Alcohol use 0.0 oz/week     0 Standard drinks or equivalent per week      Comment: Very little    Drug use: No    Sexual activity: Not Asked     Other Topics Concern    None     Social History Narrative    ** Merged History Encounter **          Allergies   Allergen Reactions    Decadron [Dexamethasone Sodium Phosphate] Hives    Pcn [Penicillins] Hives      Family History   Problem Relation Age of Onset    Coronary Artery Disease Other      grandmother age 76      Current Facility-Administered Medications   Medication Dose Route Frequency    aspirin delayed-release tablet 81 mg  81 mg Oral DAILY    atorvastatin (LIPITOR) tablet 40 mg  40 mg Oral DAILY    clopidogrel (PLAVIX) tablet 75 mg  75 mg Oral DAILY    sodium chloride (NS) flush 5-10 mL  5-10 mL IntraVENous Q8H    sodium chloride (NS) flush 5-10 mL  5-10 mL IntraVENous PRN    ondansetron (ZOFRAN) injection 4 mg  4 mg IntraVENous Q4H PRN    hydrALAZINE (APRESOLINE) 20 mg/mL injection 10 mg  10 mg IntraVENous Q2H PRN    polyethylene glycol (MIRALAX) packet 17 g  17 g Oral DAILY    heparin (porcine) injection 5,000 Units  5,000 Units SubCUTAneous Q12H    albuterol-ipratropium (DUO-NEB) 2.5 MG-0.5 MG/3 ML  3 mL Nebulization Q2H PRN    0.9% sodium chloride infusion  75 mL/hr IntraVENous CONTINUOUS         Review of Symptoms:  11 systems reviewed, negative other than as stated in HPI     Subjective:        Visit Vitals    /60 (BP 1 Location: Left arm, BP Patient Position: At rest)    Pulse 85    Temp 98.2 °F (36.8 °C)    Resp 16    Ht 4' 11\" (1.499 m)    Wt 135 lb (61.2 kg)    SpO2 100%    Breastfeeding No    BMI 27.27 kg/m2       Physical:  Gen:  NAD  Heart: regular rhythm, no murmur, gallop or rub  Lungs: clear to auscultation bilaterally  Neck: supple, symmetrical, trachea midline, no adenopathy, thyroid: not enlarged, symmetric, no tenderness/mass/nodules, no carotid bruit and no JVD  Abdomen: soft, non-tender.  Bowel sounds normal. No masses,  no organomegaly  Extremities: extremities normal, atraumatic, no cyanosis or edema, positive femorals without bruits, normal dp pulses  Neurologic: CN, motor and speech grossly normal    Data Review:   Recent Labs      05/07/18   0329 05/06/18   2100   WBC  6.8  8.6   HGB  10.1*  10.5*   HCT  30.7*  32.4*   PLT  171  188     Recent Labs      05/07/18   0329  05/06/18   2100   NA  146*  142   K  4.5  5.1   CL  115*  108   CO2  23  30   GLU  109*  141*   BUN  28*  28*   CREA  1.50*  1.72*   CA  7.6*  8.9   MG  2.0   --    PHOS  4.1   --    ALB   --   3.6   TBILI   --   0.3   SGOT   --   13*   ALT   --   13   INR   --   1.0       Recent Labs      05/06/18   2100   TROIQ  0.04       Lab Results   Component Value Date/Time    Cholesterol, total 171 02/06/2018 03:10 AM    HDL Cholesterol 51 02/06/2018 03:10 AM    LDL, calculated 102 (H) 02/06/2018 03:10 AM    VLDL, calculated 18 02/06/2018 03:10 AM    Triglyceride 90 02/06/2018 03:10 AM    CHOL/HDL Ratio 3.4 02/06/2018 03:10 AM         No intake or output data in the 24 hours ending 05/07/18 1325     Cardiographics    Telemetry: normal sinus rhythm    ECG:   Normal sinus rhythm   Possible Left atrial enlargement   Left ventricular hypertrophy with QRS widening and repolarization abnormality     Echocardiogram (2/16/2018)    Cardiac cath 2009:  CONCLUSIONS:  1.  Very mild, nonobstructive coronary artery disease. 2.  Normal left ventricular systolic function. 3.  Severe bilateral peripheral vascular disease as detailed above. 4.  Moderate to severe left renal artery stenosis. 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 (9/21/2012)      Overview: Severe bilateral by angio 2009      Cerebrovascular accident ([facility] [facility] (9/21/2012)      Left bundle branch block (LBBB) on electrocardiogram (7/14/2016)      H/O: CVA (cerebrovascular accident) (7/15/2016)      CAD (coronary artery disease) (2/5/2018)         Plan:     Syncope:  · Considering recently normal LVEF, no arrhythmias on telemetry, I agree the most likely etiology is dehydration plus or minus low blood pressure  · The patient used to have malignant hypertension  · Repeat echo is pending  · I agree with holding blood pressure medications and diuretics at this time, gently hydrating  · Check orthostatics now and if positive, check daily until resolved  · PT OT to follow  · There is no neurologic evaluation currently planned, but if they find no other cause, they may consider repeat MRI of the brain    Thanks for the consult. I appreciate the opportunity to participate in this patient's care and will follow with you.

## 2018-05-07 NOTE — H&P
Hospitalist Admission Note    NAME: Rosanna Perez   :  1940   MRN:  [id number]     Date/Time:  2018 11:10 PM    Patient PCP: Marilee Roberts DO  ________________________________________________________________________    Given the patient's current clinical presentation, I have a high level of concern for decompensation if discharged from the emergency department. Complex decision making was performed, which includes reviewing the patient's available past medical records, laboratory results, and x-ray films. My assessment of this patient's clinical condition and my plan of care is as follows. Assessment / Plan:  Syncope  Acute renal failure on chronic kidney disease appearing dehydration in etiology  Stroke, chronic  Chronic systolic heart failure, ef 40% NYHA Class I  -will observe on telemetry for arrhythmia  -cardiology to see and evaluate  -TTE ordered to eval for worsened syst CHF  -remain on asa, plavix, lipitor. Need to consider that given her age and comorbid conditions that the antihypertensives may be causing her overall symptoms. Will hold antihypertensives for now and discuss with Dr Magen Heredia if we should decrease dosing or not  -would suspect the coreg to be the drug causing symptoms if had to choose. -TSH pending  -PT/OT CM eval  -up and moving with assist - measure orthostatic  -doubt chf playing a role but will monitor  -no new cva symptoms however if we find no etiology for her dizziness then need to consider repeat MRI to look for changes compared to prior MRI showing CVA. I have personally reviewed the radiographs, laboratory data in Epic and decisions and statements above are based partially on this personal interpretation. Code Status: Full Code  DVT Prophylaxis: Hep SQ  GI Prophylaxis: not indicated        Subjective:   CHIEF COMPLAINT: \"i am not sure what happened\"    HISTORY OF PRESENT ILLNESS:     Felicita Summers is a 76 y.o.    female with known history as listed below presents to ED with complaint noted above. Available records were reviewed at the time of H&P. Patient with complex pmh to include NYHA class I chf, stroke whom has chronic changes from cva to include speech and left side UE weakness presents to ED following family witnessing syncopal episode while at dinner table around 1830. Patient reports getting LH. It did resolve after she had syncope. No loss of bowel/bladder. No shaking/twitching. Not sick of recent. No recent medication changes she could recall. No HA/change in vision. No new weakness she was aware of. Speech without chagne per family. In ED CXR clear. Noted on labs to have a bump in creat and mild dehydration. We were asked to observe for work up and evaluation of the above problems. Past Medical History:   Diagnosis Date    Acute kidney failure ([address] 3/97/2759    Acute systolic heart failure ([address] 7/14/2016    CAD (coronary artery disease)     Heart failure ([address]     Stroke [facility]) 2009    Stroke    Stroke [facility])       History reviewed. No pertinent surgical history. Social History   Substance Use Topics    Smoking status: Former Smoker     Quit date: 9/21/2009    Smokeless tobacco: Never Used    Alcohol use 0.0 oz/week     0 Standard drinks or equivalent per week      Comment: Very little      Family History   Problem Relation Age of Onset    Coronary Artery Disease Other      grandmother age 76        Allergies   Allergen Reactions    Decadron [Dexamethasone Sodium Phosphate] Hives    Pcn [Penicillins] Hives        Prior to Admission medications    Medication Sig Start Date End Date Taking? Authorizing Provider   fexofenadine (ALLEGRA) 180 mg tablet Take 180 mg by mouth daily as needed for Allergies. Historical Provider   OXYGEN-AIR DELIVERY SYSTEMS 2 L by IntraNASal route daily as needed (shortness of breath).  3/31/18   Historical Provider   predniSONE (DELTASONE) 10 mg tablet Take 2 Tabs by mouth daily (with breakfast). 2 tabs for 3 days  1 tabs for 3 days  Then stop. Patient not taking: Reported on 4/2/2018 2/10/18   Greta Grey MD   loratadine (CLARITIN) 10 mg tablet Take 1 Tab by mouth daily. 2/10/18   Greta Grey MD   clopidogrel (PLAVIX) 75 mg tab Take 1 Tab by mouth daily. 2/10/18   Eva Lehman MD   potassium chloride (K-DUR, KLOR-CON) 20 mEq tablet Take 20 mEq by mouth daily. Gildardo Wood MD   amLODIPine (NORVASC) 2.5 mg tablet Take 1 Tab by mouth daily. 10/18/17   ROME Biswas   furosemide (LASIX) 40 mg tablet Take 1 Tab by mouth daily. One pill daily 10/18/17   ROME Biswas   atorvastatin (LIPITOR) 40 mg tablet TAKE ONE TABLET BY MOUTH EVERY DAY 10/18/17   ROME Biswas   aspirin delayed-release 81 mg tablet Take 1 Tab by mouth daily. 10/18/17   ROME Biswas   lisinopril (PRINIVIL, ZESTRIL) 2.5 mg tablet Take 1 Tab by mouth daily. 10/18/17   ROME Biswas   carvedilol (COREG) 12.5 mg tablet Take 1 Tab by mouth two (2) times a day. 10/18/17   ROME Biswas   isosorbide mononitrate ER (IMDUR) 30 mg tablet Take 1 Tab by mouth every morning.  10/18/17   ROME Biswas   albuterol (PROVENTIL HFA, VENTOLIN HFA, PROAIR HFA) 90 mcg/actuation inhaler Take 2 Puffs by inhalation every four (4) hours as needed for Wheezing. 7/21/16   KESHIA Baron     REVIEW OF SYSTEMS:  See HPI for details  General: negative for fever, chills, sweats, +weakness, noweight loss  Eyes: negative for blurred vision, eye pain, loss of vision, diplopia  Ear Nose and Throat: negative for rhinorrhea, pharyngitis, otalgia, tinnitus, speech or swallowing difficulties  Respiratory:  negative for pleuritic pain, cough, sputum production, wheezing, SOB, CHRISTINE  Cardiology:  negative for chest pain, palpitations, orthopnea, PND, edema, syncope   Gastrointestinal: negative for abdominal pain, N/V, dysphagia, change in bowel habits, bleeding  Genitourinary: negative for frequency, urgency, dysuria, hematuria, incontinence  Muskuloskeletal : negative for arthralgia, myalgia  Hematology: negative for easy bruising, bleeding, lymphadenopathy  Dermatological: negative for rash, ulceration, mole change, new lesion  Endocrine: negative for hot flashes or polydipsia  Neurological: + prior cva with changes chronically, acutely negative for headache, dizziness, confusion, focal weakness, paresthesia, memory loss, gait disturbance  Psychological: negative for anxiety, depression, agitation    Objective:   VITALS:    Visit Vitals    BP 94/57    Pulse 70    Temp 98.1 °F (36.7 °C)    Resp 18    Ht 4' 11\" (1.499 m)    Wt 61.2 kg (135 lb)    SpO2 100%    BMI 27.27 kg/m2     PHYSICAL EXAM:     GENERAL:    WD y   WN y   Cachectic    Thin    Obese y   Disheveled y   Ill Appearing Critically    Ill Appearing Chronically y   Acute Distress n   Other      HEENT:    NC/AT/EOMI y   PERRLA y   Conjunctivae Pink    Conjunctivae Pale y   Moist Mucosa    Dry Mucosa y   Hearing intact to voice y   Other      NECK:    Supple y   Masses n   Thyroid Tender n   Other                   RESPIRATORY:    CTA bilaterally WITHOUT wheezing/rhonchi/rales or crackles    Wheezing y   Rhonchi n   Crackles n   Use of accessory muscles n   Other      CARDIAC:    regular rate and rhythm No murmurs/rubs/gallops    Murmur 2/6   Rubs n   Gallops n   Rate Regular/Irregular reg   Carotid Bruit Left/Right n   Lower Extremity Edema 1+   JVP  n   Other Normal capillary refill     ABDOMEN:    Soft non distended non tender +bowel sounds no HSM y   Rigid    Tenderness    Hepatomegaly    Splenomegaly    Distended n   Increased girth due to habitus n   Normal/Hyper/Hypo Active Bowel Sounds nml   Other      SKIN / MUSCULOSKELETAL:    Rashes n   Ecchymosis n   Ulcers    Tight to palpitation    Turgor Good/Poor poor   Cyanosis/Clubbing n   Amputation(s) n   Other      NEUROLOGY:    cranial nerves II-XII grossly intact y   Cranial Nerve Deficit Facial Droop    Slurred Speech y baseline from prior cva   Aphasia    Strength Normal    Weakness y global   Meningismus/Kernig's Sign/ Brudzinsky n   Follows Commands y   Other      PSYCHIATRIC:    AAOx3 in no acute distress y   Insight Poor    Insight Good y   Alert and Oriented to Person     Alert and Oriented to Place    Alert and Oriented toTime    Depressed    Anxious n   Agitated n   Lethargic n   Stuporous n   Sedated    Other    _______________________________________________________________________  Care Plan discussed with:    Comments   Patient x Discussed with patient in room. POC outlined and Questions answered (25   Family   None in room   RN x    Care Manager                    Consultant:  zackery DAVALOS MD 5   _______________________________________________________________________  Recommended Disposition:   Home with Family y   HH/PT/OT/RN y   SNF/LTC y   [de-identified]    ________________________________________________________________________  TOTAL TIME:  48 Minutes    Critical Care Provided     Minutes non procedure based      Comments   >50% of visit spent in counseling and coordination of care x Chart review  Discussion with patient and/or family and questions answered     ________________________________________________________________________  Signed: Daina Lieberman MD    This note will not be viewable in [address] Procedures: see electronic medical records for all procedures/Xrays and details which were not copied into this note but were reviewed prior to creation of Plan.     LAB DATA REVIEWED:    Recent Results (from the past 24 hour(s))   EKG, 12 LEAD, INITIAL    Collection Time: 05/06/18  7:48 PM   Result Value Ref Range    Ventricular Rate 74 BPM    Atrial Rate 74 BPM    P-R Interval 120 ms    QRS Duration 122 ms    Q-T Interval 468 ms    QTC Calculation (Bezet) 519 ms    Calculated P Axis 55 degrees    Calculated R Axis -22 degrees    Calculated T Axis 109 degrees    Diagnosis       Normal sinus rhythm  Possible Left atrial enlargement  Left ventricular hypertrophy with QRS widening and repolarization abnormality  When compared with ECG of 05-FEB-2018 16:57,  Left bundle branch block is no longer present     CBC WITH AUTOMATED DIFF    Collection Time: 05/06/18  9:00 PM   Result Value Ref Range    WBC 8.6 3.6 - 11.0 K/uL    RBC 4.00 3.80 - 5.20 M/uL    HGB 10.5 (L) 11.5 - 16.0 g/dL    HCT 32.4 (L) 35.0 - 47.0 %    MCV 81.0 80.0 - 99.0 FL    MCH 26.3 26.0 - 34.0 PG    MCHC 32.4 30.0 - 36.5 g/dL    RDW 13.9 11.5 - 14.5 %    PLATELET 357 315 - 054 K/uL    MPV 10.7 8.9 - 12.9 FL    NRBC 0.0 0  WBC    ABSOLUTE NRBC 0.00 0.00 - 0.01 K/uL    NEUTROPHILS 75 32 - 75 %    LYMPHOCYTES 16 12 - 49 %    MONOCYTES 7 5 - 13 %    EOSINOPHILS 1 0 - 7 %    BASOPHILS 0 0 - 1 %    IMMATURE GRANULOCYTES 1 (H) 0.0 - 0.5 %    ABS. NEUTROPHILS 6.4 1.8 - 8.0 K/UL    ABS. LYMPHOCYTES 1.4 0.8 - 3.5 K/UL    ABS. MONOCYTES 0.6 0.0 - 1.0 K/UL    ABS. EOSINOPHILS 0.1 0.0 - 0.4 K/UL    ABS. BASOPHILS 0.0 0.0 - 0.1 K/UL    ABS. IMM. GRANS. 0.0 0.00 - 0.04 K/UL    DF AUTOMATED     METABOLIC PANEL, COMPREHENSIVE    Collection Time: 05/06/18  9:00 PM   Result Value Ref Range    Sodium 142 136 - 145 mmol/L    Potassium 5.1 3.5 - 5.1 mmol/L    Chloride 108 97 - 108 mmol/L    CO2 30 21 - 32 mmol/L    Anion gap 4 (L) 5 - 15 mmol/L    Glucose 141 (H) 65 - 100 mg/dL    BUN 28 (H) 6 - 20 MG/DL    Creatinine 1.72 (H) 0.55 - 1.02 MG/DL    BUN/Creatinine ratio 16 12 - 20      GFR est AA 35 (L) >60 ml/min/1.73m2    GFR est non-AA 29 (L) >60 ml/min/1.73m2    Calcium 8.9 8.5 - 10.1 MG/DL    Bilirubin, total 0.3 0.2 - 1.0 MG/DL    ALT (SGPT) 13 12 - 78 U/L    AST (SGOT) 13 (L) 15 - 37 U/L    Alk.  phosphatase 107 45 - 117 U/L    Protein, total 7.0 6.4 - 8.2 g/dL    Albumin 3.6 3.5 - 5.0 g/dL    Globulin 3.4 2.0 - 4.0 g/dL    A-G Ratio 1.1 1.1 - 2.2     TROPONIN I    Collection Time: 05/06/18  9:00 PM   Result Value Ref Range    Troponin-I, Qt. 0.04 <0.05 ng/mL   CK W/ REFLX CKMB    Collection Time: 05/06/18  9:00 PM   Result Value Ref Range    CK 96 26 - 192 U/L   PROTHROMBIN TIME + INR    Collection Time: 05/06/18  9:00 PM   Result Value Ref Range    INR 1.0 0.9 - 1.1      Prothrombin time 10.4 9.0 - 11.1 sec   PTT    Collection Time: 05/06/18  9:00 PM   Result Value Ref Range    aPTT 24.3 22.1 - 32.0 sec    aPTT, therapeutic range     58.0 - 77.0 SECS

## 2018-05-07 NOTE — PROGRESS NOTES
Occupational Therapy     Acknowledge OT orders and completed chart review. Discussed patient with nursing, patient off floor for ECHO. Will follow up for OT eval tomorrow.       Thank you,    Keila Lewis OTR/L

## 2018-05-07 NOTE — PROGRESS NOTES
Pt is a 76 y.o  [place] female admitted with Syncope. Pt was resting in bed and this CM called pt's son. Demographic information verified and all is correct. Pt lives with her son and grandson in a 1 story home with 5 steps to the entrance. Prior to admission, pt receives assistance with her ADL's and IADL's from her son. Pt doesn't drive and her son transports pt. Pt went to [facility] and discharged 3/27/18 and went home with [facility]. CM called Rowena Travis and verified that [facility] was still seeing pt for home health. Pt's therapy was d/c'd and pt was being followed by nursing. CM asked pt's son if he would like [facility]  at discharge and he was in agreement. FOC form completed Pt has a walker, cane and oxygen at home. Preferred pharmacy is [facility] in [address] [place] [address]. Pt's son can transport pt home by car at discharge. CM will continue to follow pt for discharge planning needs. Reason for Admission:   Syncope               RRAT Score:     25             Resources/supports as identified by patient/family:   JOSELIN COLLINS                Top Challenges facing patient (as identified by patient/family and CM): Not at this time                      Finances/Medication cost?      No               Transportation? Pt's son will transport by car              Support system or lack thereof? Good support system from family                     Living arrangements? Lives with son and grandson           Self-care/ADLs/Cognition? Needs assistance with her ADL's          Current Advanced Directive/Advance Care Plan:  Full code                          Plan for utilizing home health:    Yes - if indicated by therapy                      Likelihood of readmission:  Low                  Transition of Care Plan:    Home with [facility] if therapy indicating [facility]    Care Management Interventions  PCP Verified by CM: Yes (Dr. Lars Suresh)  Mode of Transport at Discharge:  Other (see comment) (pt's son can transport by car)  Transition of Care Consult (CM Consult): Discharge Planning  Discharge Durable Medical Equipment: No (cane, walker, oxygen )  Physical Therapy Consult: Yes  Occupational Therapy Consult: Yes  Speech Therapy Consult: No  Current Support Network:  Other, Own Home (lvies with her son and grandson in a 1 story home with 5 steps to the entrance)  Confirm Follow Up Transport: Family  Discharge Location  Discharge Placement: [facility] [address], [address] [facility]

## 2018-05-07 NOTE — ROUTINE PROCESS
Bedside and Verbal shift change report given to Judy (oncoming nurse) by Bibi Castañeda (offgoing nurse). Report included the following information SBAR, Kardex, Intake/Output and MAR.     Zone Phone:   [phone]      Significant changes during shift:  New admit        Patient Information    Ruel Michelle  76 y.o.  5/6/2018  7:22 PM by Arsalan Whiting MD. Ruel Michelle was admitted from Home    Problem List    Patient Active Problem List    Diagnosis Date Noted    Syncope 05/06/2018    CAD (coronary artery disease) 02/05/2018    Stroke ([address] 02/05/2018    Hypertensive urgency 08/01/2017    CHF (congestive heart failure) ([address] 08/01/2017    COPD exacerbation ([address] 03/07/2017    CAD (coronary artery disease), native coronary artery 07/15/2016    H/O: CVA (cerebrovascular accident) 07/15/2016    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 ([address] 07/15/2016    Severe sepsis ([address] 07/14/2016    Pneumonia 07/14/2016    Acute respiratory failure ([address] 07/14/2016    Elevated troponin 07/14/2016    Left bundle branch block (LBBB) on electrocardiogram 08/39/1961    Acute systolic heart failure ([address] 07/14/2016    Acute kidney failure ([address] 07/14/2016    Mixed hyperlipidemia 09/21/2012    Essential hypertension, benign 09/21/2012    Other left bundle branch block 09/21/2012    Coronary atherosclerosis of native coronary artery 09/21/2012    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 09/21/2012    Atherosclerosis of renal artery ([address] 09/21/2012    Atherosclerosis of native artery of extremity with intermittent claudication ([address] 09/21/2012    Tobacco use disorder 09/21/2012    Cerebrovascular accident ([address] 09/21/2012    Mitral valve disorders(424.0) 09/21/2012    Tricuspid valve disorder 09/21/2012     Past Medical History:   Diagnosis Date    Acute kidney failure ([address] 8/74/8906    Acute systolic heart failure ([address] 7/14/2016    CAD (coronary artery disease)     Heart failure ([facility]     Stroke [facility]) 2009    Stroke    Stroke [facility])          Core Measures:    CVA: No No  CHF:No No  PNA:No No    Activity Status:    OOB to Chair No  Ambulated this shift Yes   Bed Rest No    Supplemental O2: (If Applicable)    NC Yes  On 2 Liters/min      DVT prophylaxis:    DVT prophylaxis Med- Yes  DVT prophylaxis SCD or MALA- No     Wounds: (If Applicable)    Wounds- No    Patient Safety:    Falls Score Total Score: 4  Safety Level_______  Bed Alarm On? Yes  Sitter?  No    Plan for upcoming shift: safety        Discharge Plan: No     Active Consults:  IP CONSULT TO CARDIOLOGY

## 2018-05-07 NOTE — PROGRESS NOTES
Bedside and Verbal shift change report given to Shannan Ledezma RN (oncoming nurse) by Michelle Toscano RN (offgoing nurse). Report included the following information SBAR, Kardex, Intake/Output, MAR and Cardiac Rhythm nsr.

## 2018-05-08 ENCOUNTER — APPOINTMENT (OUTPATIENT)
Dept: MRI IMAGING | Age: 78
DRG: 041 | End: 2018-05-08
Attending: INTERNAL MEDICINE
Payer: MEDICARE

## 2018-05-08 LAB
ALBUMIN SERPL-MCNC: 3.1 G/DL (ref 3.5–5)
ALBUMIN/GLOB SERPL: 1 {RATIO} (ref 1.1–2.2)
ALP SERPL-CCNC: 89 U/L (ref 45–117)
ALT SERPL-CCNC: 10 U/L (ref 12–78)
ANION GAP SERPL CALC-SCNC: 6 MMOL/L (ref 5–15)
AST SERPL-CCNC: 9 U/L (ref 15–37)
BASOPHILS # BLD: 0 K/UL (ref 0–0.1)
BASOPHILS NFR BLD: 1 % (ref 0–1)
BILIRUB SERPL-MCNC: 0.4 MG/DL (ref 0.2–1)
BUN SERPL-MCNC: 19 MG/DL (ref 6–20)
BUN/CREAT SERPL: 16 (ref 12–20)
CALCIUM SERPL-MCNC: 8.9 MG/DL (ref 8.5–10.1)
CHLORIDE SERPL-SCNC: 111 MMOL/L (ref 97–108)
CO2 SERPL-SCNC: 26 MMOL/L (ref 21–32)
CREAT SERPL-MCNC: 1.16 MG/DL (ref 0.55–1.02)
DIFFERENTIAL METHOD BLD: ABNORMAL
EOSINOPHIL # BLD: 0.1 K/UL (ref 0–0.4)
EOSINOPHIL NFR BLD: 2 % (ref 0–7)
ERYTHROCYTE [DISTWIDTH] IN BLOOD BY AUTOMATED COUNT: 13.6 % (ref 11.5–14.5)
GLOBULIN SER CALC-MCNC: 3.1 G/DL (ref 2–4)
GLUCOSE SERPL-MCNC: 84 MG/DL (ref 65–100)
HCT VFR BLD AUTO: 29.5 % (ref 35–47)
HGB BLD-MCNC: 9.7 G/DL (ref 11.5–16)
IMM GRANULOCYTES # BLD: 0 K/UL (ref 0–0.04)
IMM GRANULOCYTES NFR BLD AUTO: 0 % (ref 0–0.5)
LYMPHOCYTES # BLD: 2 K/UL (ref 0.8–3.5)
LYMPHOCYTES NFR BLD: 35 % (ref 12–49)
MCH RBC QN AUTO: 26.6 PG (ref 26–34)
MCHC RBC AUTO-ENTMCNC: 32.9 G/DL (ref 30–36.5)
MCV RBC AUTO: 80.8 FL (ref 80–99)
MONOCYTES # BLD: 0.5 K/UL (ref 0–1)
MONOCYTES NFR BLD: 9 % (ref 5–13)
NEUTS SEG # BLD: 3 K/UL (ref 1.8–8)
NEUTS SEG NFR BLD: 53 % (ref 32–75)
NRBC # BLD: 0 K/UL (ref 0–0.01)
NRBC BLD-RTO: 0 PER 100 WBC
PLATELET # BLD AUTO: 150 K/UL (ref 150–400)
PMV BLD AUTO: 10.9 FL (ref 8.9–12.9)
POTASSIUM SERPL-SCNC: 4 MMOL/L (ref 3.5–5.1)
PROT SERPL-MCNC: 6.2 G/DL (ref 6.4–8.2)
RBC # BLD AUTO: 3.65 M/UL (ref 3.8–5.2)
SODIUM SERPL-SCNC: 143 MMOL/L (ref 136–145)
TROPONIN I SERPL-MCNC: 0.09 NG/ML
WBC # BLD AUTO: 5.6 K/UL (ref 3.6–11)

## 2018-05-08 PROCEDURE — 95816 EEG AWAKE AND DROWSY: CPT | Performed by: PSYCHIATRY & NEUROLOGY

## 2018-05-08 PROCEDURE — G8979 MOBILITY GOAL STATUS: HCPCS

## 2018-05-08 PROCEDURE — 77010033678 HC OXYGEN DAILY

## 2018-05-08 PROCEDURE — 97162 PT EVAL MOD COMPLEX 30 MIN: CPT

## 2018-05-08 PROCEDURE — 85025 COMPLETE CBC W/AUTO DIFF WBC: CPT | Performed by: INTERNAL MEDICINE

## 2018-05-08 PROCEDURE — 74011250636 HC RX REV CODE- 250/636: Performed by: INTERNAL MEDICINE

## 2018-05-08 PROCEDURE — 97530 THERAPEUTIC ACTIVITIES: CPT | Performed by: OCCUPATIONAL THERAPIST

## 2018-05-08 PROCEDURE — 36415 COLL VENOUS BLD VENIPUNCTURE: CPT | Performed by: INTERNAL MEDICINE

## 2018-05-08 PROCEDURE — A9575 INJ GADOTERATE MEGLUMI 0.1ML: HCPCS | Performed by: INTERNAL MEDICINE

## 2018-05-08 PROCEDURE — 3331090002 HH PPS REVENUE DEBIT

## 2018-05-08 PROCEDURE — G8988 SELF CARE GOAL STATUS: HCPCS | Performed by: OCCUPATIONAL THERAPIST

## 2018-05-08 PROCEDURE — 80053 COMPREHEN METABOLIC PANEL: CPT | Performed by: INTERNAL MEDICINE

## 2018-05-08 PROCEDURE — 70553 MRI BRAIN STEM W/O & W/DYE: CPT

## 2018-05-08 PROCEDURE — G8978 MOBILITY CURRENT STATUS: HCPCS

## 2018-05-08 PROCEDURE — 84484 ASSAY OF TROPONIN QUANT: CPT | Performed by: INTERNAL MEDICINE

## 2018-05-08 PROCEDURE — 97535 SELF CARE MNGMENT TRAINING: CPT | Performed by: OCCUPATIONAL THERAPIST

## 2018-05-08 PROCEDURE — 65660000000 HC RM CCU STEPDOWN

## 2018-05-08 PROCEDURE — 99218 HC RM OBSERVATION: CPT

## 2018-05-08 PROCEDURE — G8987 SELF CARE CURRENT STATUS: HCPCS | Performed by: OCCUPATIONAL THERAPIST

## 2018-05-08 PROCEDURE — 97165 OT EVAL LOW COMPLEX 30 MIN: CPT | Performed by: OCCUPATIONAL THERAPIST

## 2018-05-08 PROCEDURE — 74011250637 HC RX REV CODE- 250/637: Performed by: INTERNAL MEDICINE

## 2018-05-08 PROCEDURE — 3331090001 HH PPS REVENUE CREDIT

## 2018-05-08 RX ORDER — GADOTERATE MEGLUMINE 376.9 MG/ML
15 INJECTION INTRAVENOUS
Status: COMPLETED | OUTPATIENT
Start: 2018-05-08 | End: 2018-05-08

## 2018-05-08 RX ADMIN — POLYETHYLENE GLYCOL 3350 17 G: 17 POWDER, FOR SOLUTION ORAL at 09:32

## 2018-05-08 RX ADMIN — CLOPIDOGREL BISULFATE 75 MG: 75 TABLET ORAL at 09:32

## 2018-05-08 RX ADMIN — Medication 10 ML: at 23:31

## 2018-05-08 RX ADMIN — HEPARIN SODIUM 5000 UNITS: 5000 INJECTION, SOLUTION INTRAVENOUS; SUBCUTANEOUS at 03:14

## 2018-05-08 RX ADMIN — SODIUM CHLORIDE 75 ML/HR: 900 INJECTION, SOLUTION INTRAVENOUS at 03:12

## 2018-05-08 RX ADMIN — ASPIRIN 81 MG: 81 TABLET, COATED ORAL at 09:32

## 2018-05-08 RX ADMIN — ATORVASTATIN CALCIUM 40 MG: 40 TABLET, FILM COATED ORAL at 09:32

## 2018-05-08 RX ADMIN — HEPARIN SODIUM 5000 UNITS: 5000 INJECTION, SOLUTION INTRAVENOUS; SUBCUTANEOUS at 15:27

## 2018-05-08 RX ADMIN — Medication 10 ML: at 03:17

## 2018-05-08 RX ADMIN — GADOTERATE MEGLUMINE 12 ML: 376.9 INJECTION INTRAVENOUS at 20:48

## 2018-05-08 RX ADMIN — Medication 10 ML: at 13:53

## 2018-05-08 NOTE — PROGRESS NOTES
Problem: Mobility Impaired (Adult and Pediatric)  Goal: *Acute Goals and Plan of Care (Insert Text)  Physical Therapy Goals  Initiated 5/8/2018  1. Patient will move from supine to sit and sit to supine , scoot up and down and roll side to side in bed with independence within 7 day(s). 2.  Patient will transfer from bed to chair and chair to bed with modified independence using the least restrictive device within 7 day(s). 3.  Patient will perform sit to stand with modified independence within 7 day(s). 4.  Patient will ambulate with modified independence for 150 feet with the least restrictive device within 7 day(s). 5.  Patient will ascend/descend 3 stairs with 1 handrail(s) with supervision/set-up within 7 day(s). physical Therapy EVALUATION  Patient: Esther Loomis (7 y.o. female)  Date: 5/8/2018  Primary Diagnosis: Syncope  Syncope        Precautions:  Fall    ASSESSMENT :  Based on the objective data described below, the patient presents with impaired functional mobility secondary to impaired standing balance, generalized weakness, limited endurance, poor activity tolerance, impaired gait mechanics, and decreased AROM following admission for syncope. Pt received sitting in bedside chair and agreeable to PT evaluation. Pt cleared by nursing for mobility. Pt with moderate CHRISTINE at rest and with conversation with SaO2 >92% on RA prior to mobility. Sit<>stand transfers performed with min A and pt with difficulty achieving full standing without external support. She was only able to tolerate gait training x 25 ft in room with CGA/min A using RW, however demonstrates very shuffled and slow gait. No overt LOB noted during activity, however pt with significant fatigue and CHRISTINE during and post-activity. SaO2 remained >92% on RA post-activity. She exhibited poor eccentric control during stand>sit transfer, noting uncontrolled descent into sitting.  She was left sitting in bedside chair with all needs met and RN aware following therapy session. Recommend patient discharge to SNF rehab to improve functional mobility and independence as pt is not safe to return home at this time. PT will continue to follow to address mobility impairments as noted above. Patient will benefit from skilled intervention to address the above impairments. Patients rehabilitation potential is considered to be Fair  Factors which may influence rehabilitation potential include:   []         None noted  []         Mental ability/status  [x]         Medical condition  [x]         Home/family situation and support systems  [x]         Safety awareness  []         Pain tolerance/management  []         Other:      PLAN :  Recommendations and Planned Interventions:  [x]           Bed Mobility Training             []    Neuromuscular Re-Education  [x]           Transfer Training                   []    Orthotic/Prosthetic Training  [x]           Gait Training                         []    Modalities  [x]           Therapeutic Exercises           []    Edema Management/Control  [x]           Therapeutic Activities            [x]    Patient and Family Training/Education  []           Other (comment):    Frequency/Duration: Patient will be followed by physical therapy  4 times a week to address goals. Discharge Recommendations: [facility]  Further Equipment Recommendations for Discharge: TBD     SUBJECTIVE:   Patient stated I'm tired.     OBJECTIVE DATA SUMMARY:   HISTORY:    Past Medical History:   Diagnosis Date    Acute kidney failure ([facility] [address] 7/18/1926    Acute systolic heart failure ([facility] [address] 7/14/2016    CAD (coronary artery disease)     Heart failure ([facility] [address]     Stroke [facility]) 2009    Stroke    Stroke [facility])    History reviewed. No pertinent surgical history. Prior Level of Function/Home Situation: Pt is mod I with RW at baseline. Does not drive or work. Denies fall history. Lives with son, however her son works during the day.   Personal factors and/or comorbidities impacting plan of care: HF; h/o CVA    Home Situation  Home Environment: Private residence  # Steps to Enter: 3  Rails to Enter: Yes  One/Two Story Residence: One story  Living Alone: No  Support Systems: Child(nicci), Family member(s)  Patient Expects to be Discharged to[de-identified] Private residence  Current DME Used/Available at Home: Shower chair, Walker    EXAMINATION/PRESENTATION/DECISION MAKING:   Critical Behavior:  Neurologic State: Alert  Orientation Level: Disoriented to time, Disoriented to situation, Oriented to person, Oriented to place  Cognition: Decreased attention/concentration, Follows commands, Memory loss, Impaired decision making, Poor safety awareness  Safety/Judgement: Insight into deficits  Hearing: Auditory  Auditory Impairment: None  Skin:  Intact  Edema: None  Range Of Motion:  AROM: Generally decreased, functional                       Strength:    Strength: Generally decreased, functional                    Tone & Sensation:   Tone: Abnormal              Sensation: Intact               Coordination:  Coordination: Generally decreased, functional  Vision:   Acuity: Within Defined Limits  Corrective Lenses: Glasses  Functional Mobility:  Bed Mobility:  Rolling: Supervision; Additional time  Supine to Sit: Supervision; Additional time     Scooting: Minimum assistance  Transfers:  Sit to Stand: Minimum assistance  Stand to Sit: Minimum assistance        Bed to Chair: Minimum assistance (ambulating with a RW)              Balance:   Sitting: Intact  Standing: Impaired  Standing - Static: Good  Standing - Dynamic : Fair  Ambulation/Gait Training:  Distance (ft): 25 Feet (ft)  Assistive Device: Gait belt;Walker, rolling  Ambulation - Level of Assistance: Contact guard assistance;Assist x1;Additional time; Adaptive equipment     Gait Description (WDL): Exceptions to WDL  Gait Abnormalities: Decreased step clearance;Shuffling gait (increased trunk flexion)        Base of Support: Widened     Speed/Brianda: Pace decreased (<100 feet/min); Shuffled  Step Length: Left shortened;Right shortened      Functional Measure:  Barthel Index:    Bathin  Bladder: 0  Bowels: 10  Groomin  Dressin  Feedin  Mobility: 0  Stairs: 0  Toilet Use: 0  Transfer (Bed to Chair and Back): 10  Total: 25       Barthel and G-code impairment scale:  Percentage of impairment CH  0% CI  1-19% CJ  20-39% CK  40-59% CL  60-79% CM  80-99% CN  100%   Barthel Score 0-100 100 99-80 79-60 59-40 20-39 1-19   0   Barthel Score 0-20 20 17-19 13-16 9-12 5-8 1-4 0      The Barthel ADL Index: Guidelines  1. The index should be used as a record of what a patient does, not as a record of what a patient could do. 2. The main aim is to establish degree of independence from any help, physical or verbal, however minor and for whatever reason. 3. The need for supervision renders the patient not independent. 4. A patient's performance should be established using the best available evidence. Asking the patient, friends/relatives and nurses are the usual sources, but direct observation and common sense are also important. However direct testing is not needed. 5. Usually the patient's performance over the preceding 24-48 hours is important, but occasionally longer periods will be relevant. 6. Middle categories imply that the patient supplies over 50 per cent of the effort. 7. Use of aids to be independent is allowed. Aileen Badillo., Barthel, D.W. ([id number]). Functional evaluation: the Barthel Index. [facility] (14)2. Ramon Harper romina ALCIRA Palomares, Marga Garcia., Lexis Schwab., [place], [address] Measuring the change indisability after inpatient rehabilitation; comparison of the responsiveness of the Barthel Index and Functional [place] Measure. Journal of Neurology, Neurosurgery, and Psychiatry, 66(4), 293-549.   Kayley Roa, N.J.A, MERCEDES Mayen.RIVKA, & Luisana Cody MCyrusA. (2004.) Assessment of post-stroke quality of life in cost-effectiveness studies: The usefulness of the Barthel Index and the EuroQoL-5D. Quality of Life Research, 13, 491-34       G codes: In compliance with CMSs Claims Based Outcome Reporting, the following G-code set was chosen for this patient based on their primary functional limitation being treated: The outcome measure chosen to determine the severity of the functional limitation was the Barthel Index with a score of 25/100 which was correlated with the impairment scale. ? Mobility - Walking and Moving Around:     - CURRENT STATUS: CL - 60%-79% impaired, limited or restricted    - GOAL STATUS: CJ - 20%-39% impaired, limited or restricted    - D/C STATUS:  ---------------To be determined---------------      Physical Therapy Evaluation Charge Determination   History Examination Presentation Decision-Making   MEDIUM  Complexity : 1-2 comorbidities / personal factors will impact the outcome/ POC  HIGH Complexity : 4+ Standardized tests and measures addressing body structure, function, activity limitation and / or participation in recreation  MEDIUM Complexity : Evolving with changing characteristics  Other outcome measures Barthel Index  HIGH       Based on the above components, the patient evaluation is determined to be of the following complexity level: MEDIUM    Pain:   No pain complaints                 Activity Tolerance:   Fair - limited endurance; SaO2 >92% on RA during activity, however pt with CHRISTINE/SOB with all activity and conversations; no pain complaints  Please refer to the flowsheet for vital signs taken during this treatment.   After treatment:   [x]         Patient left in no apparent distress sitting up in chair  []         Patient left in no apparent distress in bed  [x]         Call bell left within reach  [x]         Nursing notified  []         Caregiver present  []         Bed alarm activated    COMMUNICATION/EDUCATION:   The patients plan of care was discussed with: Physical Therapist, Occupational Therapist and Registered Nurse. [x]         Fall prevention education was provided and the patient/caregiver indicated understanding. [x]         Patient/family have participated as able in goal setting and plan of care. [x]         Patient/family agree to work toward stated goals and plan of care. []         Patient understands intent and goals of therapy, but is neutral about his/her participation. []         Patient is unable to participate in goal setting and plan of care.     Thank you for this referral.  Sascha Dick, PT, DPT   Time Calculation: 15 mins

## 2018-05-08 NOTE — PROGRESS NOTES
Occupational Therapy Goals  Initiated 5/8/2018  1. Patient will perform grooming standing at sink with supervision/set-up within 7 day(s). 2.  Patient will perform upper body dressing with minimal assistance/contact guard assist within 7 day(s). 3.  Patient will perform lower body dressing with minimal assistance/contact guard assist within 7 day(s). 4.  Patient will perform toilet transfers with supervision/set-up within 7 day(s). 5.  Patient will perform all aspects of toileting with minimal assistance/contact guard assist within 7 day(s). 6.  Patient will perform sponge bathing with minimal assistance/contact guard assist within 7 day(s). Occupational Therapy EVALUATION  Patient: Mercedes Valderrama (3 y.o. female)  Date: 5/8/2018  Primary Diagnosis: Syncope  Syncope        Precautions:   Fall    ASSESSMENT :  Based on the objective data described below, the patient presents with GW, baseline LUE hemiparesis worsened since CVA in February,  decreased activity tolerance, decreased safety awareness and decreased balance which is impairing her functional independence. She is functioning below her independent to mod I baseline, now performing ADLs at a supervision to mod A level and is supervision to min A for functional mobility. Patient will benefit from skilled intervention to address the above impairments and will need SNF rehab at discharge.   Patients rehabilitation potential is considered to be Good  Factors which may influence rehabilitation potential include:   []             None noted  []             Mental ability/status  []             Medical condition  [x]             Home/family situation and support systems  [x]             Safety awareness  []             Pain tolerance/management  []             Other:      PLAN :  Recommendations and Planned Interventions:  [x]               Self Care Training                  []        Therapeutic Activities  [x]               Functional Mobility Training []        Cognitive Retraining  [x]               Therapeutic Exercises           [x]        Endurance Activities  [x]               Balance Training                   [x]        Neuromuscular Re-Education  []               Visual/Perceptual Training     [x]   Home Safety Training  [x]               Patient Education                 [x]        Family Training/Education  []               Other (comment):    Frequency/Duration: Patient will be followed by occupational therapy 4 times a week to address goals. Discharge Recommendations: Skilled Nursing Facility  Further Equipment Recommendations for Discharge: TBD     SUBJECTIVE:   Patient stated I was doing all this without help.     OBJECTIVE DATA SUMMARY:     Past Medical History:   Diagnosis Date    Acute kidney failure ([facility] [address] 2/02/4777    Acute systolic heart failure ([facility] [address] 7/14/2016    CAD (coronary artery disease)     [facility]     Stroke [facility]) 2009    Stroke    Stroke [facility])    History reviewed. No pertinent surgical history. Prior Level of Function/Home Situation: Per patient report she was living at home with her son who works during the day. She was receiving HHOT and PT services, but reports that she was ambulatory with a RW, and performing her ADLs with little to no assistance. Expanded or extensive additional review of patient history:     Home Situation  Home Environment: Private residence  # Steps to Enter: 3  Rails to Enter: Yes  One/Two Story Residence: One story  Living Alone: No  Support Systems: Child(nicci), Family member(s)  Patient Expects to be Discharged to[de-identified] Private residence  Current DME Used/Available at Home: Shower chair, Walker  []  Right hand dominant   []  Left hand dominant  Cognitive/Behavioral Status:  Neurologic State: Alert  Orientation Level: Disoriented to time;Disoriented to situation;Oriented to person;Oriented to place  Cognition: Decreased attention/concentration; Follows commands;Memory loss; Impaired decision making;Poor safety awareness        Safety/Judgement: Insight into deficits    Vision/Perceptual:    Acuity: Within Defined Limits    Corrective Lenses: Glasses  Range of Motion:  AROM: Generally decreased, functional     Strength:  Strength: Generally decreased, functional  Coordination:  Coordination: Generally decreased, functional, with the exception of the LUE being nonfunctional.   Fine Motor Skills-Upper: Left Impaired;Right Intact    Gross Motor Skills-Upper: Left Impaired;Right Intact  Tone & Sensation:  Tone: Abnormal  Balance:  Sitting: Intact  Standing: Impaired  Standing - Static: Good  Standing - Dynamic : Fair  Functional Mobility and Transfers for ADLs:  Bed Mobility:  Rolling: Supervision; Additional time  Supine to Sit: Supervision; Additional time     Scooting: Minimum assistance  Transfers:  Sit to Stand: Contact guard assistance     Bed to Chair: Minimum assistance (ambulating with a RW)          Toilet Transfer : Contact guard assistance              ADL Assessment:  Feeding: Supervision;Setup    Oral Facial Hygiene/Grooming: Contact guard assistance    Bathing: Moderate assistance    Upper Body Dressing: Moderate assistance    Lower Body Dressing: Moderate assistance    Toileting: Moderate assistance                Functional Measure:  Barthel Index:    Bathin  Bladder: 0  Bowels: 10  Groomin  Dressin  Feedin  Mobility: 0  Stairs: 0  Toilet Use: 0  Transfer (Bed to Chair and Back): 10  Total: 25       Barthel and G-code impairment scale:  Percentage of impairment CH  0% CI  1-19% CJ  20-39% CK  40-59% CL  60-79% CM  80-99% CN  100%   Barthel Score 0-100 100 99-80 79-60 59-40 20-39 1-19   0   Barthel Score 0-20 20 17-19 13-16 9-12 5-8 1-4 0      The Barthel ADL Index: Guidelines  1. The index should be used as a record of what a patient does, not as a record of what a patient could do.   2. The main aim is to establish degree of independence from any help, physical or verbal, however minor and for whatever reason. 3. The need for supervision renders the patient not independent. 4. A patient's performance should be established using the best available evidence. Asking the patient, friends/relatives and nurses are the usual sources, but direct observation and common sense are also important. However direct testing is not needed. 5. Usually the patient's performance over the preceding 24-48 hours is important, but occasionally longer periods will be relevant. 6. Middle categories imply that the patient supplies over 50 per cent of the effort. 7. Use of aids to be independent is allowed. Samina Myers., Barthel, DCyrusW. ([id number]). Functional evaluation: the Barthel Index. [facility] (14)2. Susana Reza romina ALCIRA Palomares, Giovanna Ramirez., Marielle Jaime., [place], [address] [facility] (1999). Measuring the change indisability after inpatient rehabilitation; comparison of the responsiveness of the Barthel Index and Functional [place] Measure. Journal of Neurology, Neurosurgery, and Psychiatry, 66(4), 287-258. Kimberlee Villalta, N.J.A, COREY Mayen, & Ruby Serrano, MCyrusA. (2004.) Assessment of post-stroke quality of life in cost-effectiveness studies: The usefulness of the Barthel Index and the EuroQoL-5D. Quality of Life Research, 13, 427-45       In compliance with CMSs Claims Based Outcome Reporting, the following G-code set was chosen for this patient based on their primary functional limitation being treated: The outcome measure chosen to determine the severity of the functional limitation was the Barthel Index with a score of 25/100 which was correlated with the impairment scale. ?  Self Care:     - CURRENT STATUS: CL - 60%-79% impaired, limited or restricted    - GOAL STATUS:  CK - 40%-59% impaired, limited or restricted    - D/C STATUS:  ---------------To be determined---------------         ADL Intervention and task modifications:  Initiated bed mobility, dressing ADL, toileting, transfer, bathroom mobility, standing grooming and safety training. Pain:   c/o of mild low back pain     Activity Tolerance:   VSS on RA  Please refer to the flowsheet for vital signs taken during this treatment. After treatment:   [x] Patient left in no apparent distress sitting up in chair  [] Patient left in no apparent distress in bed  [x] Call bell left within reach  [x] Nursing notified  [] Caregiver present  [] Bed alarm activated    COMMUNICATION/EDUCATION:   The patients plan of care was discussed with: Physical Therapist and . [x] Home safety education was provided and the patient/caregiver indicated understanding. [x] Patient/family have participated as able in goal setting and plan of care. [x] Patient/family agree to work toward stated goals and plan of care. [] Patient understands intent and goals of therapy, but is neutral about his/her participation. [] Patient is unable to participate in goal setting and plan of care. This patients plan of care is appropriate for delegation to [facility].     Thank you for this referral.  Joesph Harrison, OTR/L  Time Calculation: 47 mins

## 2018-05-08 NOTE — CONSULTS
IP CONSULT TO NEUROLOGY  Consult performed by: Thee Avilez  Consult ordered by: Lucy CAMILO            NEUROLOGY CONSULT    NAME Corbin Ordoñez AGE 76 y.o. MRN [id number]  1940     REQUESTING PHYSICIAN: Pita Couch MD      CHIEF COMPLAINT:  syncope     This is a 76 y.o. female with history of chronic renal disease is admitted after having witnessed syncope while at the dinner table with family. No mention of lateralizing weakness or convulsions. ASSESSMENT AND PLAN     1. Syncope and collapse  MRI EEG  Consider respiratory or cardiac origin    2. History of stroke  continue plavix. 3. COPD  Continue albuterol  Former smoker    4. CAD    5.  Hyperlipidemia  Continue atorvastatin        ALLERGIES:  Decadron [dexamethasone sodium phosphate] and Pcn [penicillins]     Current Facility-Administered Medications   Medication Dose Route Frequency    aspirin delayed-release tablet 81 mg  81 mg Oral DAILY    atorvastatin (LIPITOR) tablet 40 mg  40 mg Oral DAILY    clopidogrel (PLAVIX) tablet 75 mg  75 mg Oral DAILY    sodium chloride (NS) flush 5-10 mL  5-10 mL IntraVENous Q8H    sodium chloride (NS) flush 5-10 mL  5-10 mL IntraVENous PRN    ondansetron (ZOFRAN) injection 4 mg  4 mg IntraVENous Q4H PRN    hydrALAZINE (APRESOLINE) 20 mg/mL injection 10 mg  10 mg IntraVENous Q2H PRN    polyethylene glycol (MIRALAX) packet 17 g  17 g Oral DAILY    heparin (porcine) injection 5,000 Units  5,000 Units SubCUTAneous Q12H    albuterol-ipratropium (DUO-NEB) 2.5 MG-0.5 MG/3 ML  3 mL Nebulization Q2H PRN    0.9% sodium chloride infusion  75 mL/hr IntraVENous CONTINUOUS       Past Medical History:   Diagnosis Date    Acute kidney failure ([address] 3/18/3596    Acute systolic heart failure ([address] 2016    CAD (coronary artery disease)     Heart failure ([address]     [facility]     Stroke    Stroke [facility])        Social History   Substance Use Topics    Smoking status: Former Smoker Quit date: 9/21/2009    Smokeless tobacco: Never Used    Alcohol use 0.0 oz/week     0 Standard drinks or equivalent per week      Comment: Very little       Family History   Problem Relation Age of Onset    Coronary Artery Disease Other      grandmother age 76     Review of Systems   Constitutional: Positive for malaise/fatigue. Negative for chills and fever. HENT: Negative for ear pain. Eyes: Negative for pain and discharge. Respiratory: Positive for shortness of breath. Negative for cough, hemoptysis and wheezing. Cardiovascular: Negative for chest pain and claudication. Gastrointestinal: Negative for constipation and diarrhea. Genitourinary: Negative for flank pain and hematuria. Musculoskeletal: Negative for back pain and myalgias. Skin: Negative for itching and rash. Neurological: Positive for dizziness. Negative for headaches. Endo/Heme/Allergies: Negative for environmental allergies. Does not bruise/bleed easily. Psychiatric/Behavioral: Negative for depression and hallucinations. The patient is nervous/anxious and has insomnia. Visit Vitals    /70 (BP 1 Location: Left arm, BP Patient Position: Supine)    Pulse 77    Temp 97.7 °F (36.5 °C)    Resp 20    Ht 4' 11\" (1.499 m)    Wt 133 lb 13.1 oz (60.7 kg)    SpO2 98%    Breastfeeding No    BMI 27.03 kg/m2      General: Well developed, frail. Head: Normocephalic, atraumatic, anicteric sclera   Neck Normal ROM, No thyromegally   Lungs:  Distant sounds   Cardiac: Regular rate and rhythm with mitral murmurs. Abd: Bowel sounds were audible. No tenderness on palpation   Ext: No pedal edema   Skin: Supple no rash     NeurologicExam:  Mental Status: Alert and oriented to person and place    Speech: Laborious, dysarthric and out of breath but fluent no aphasia     Cranial Nerves:  II - XII Intact   Motor:  4/5 LLE weakness otherwise symmetric and full strength.     Reflexes:   +1/4 over the proximal tendons of the upper and lower extremities. +0/4 over distal tendons. Sensory:   Symmetric distal reduction in sensory perception affecting all modalities   Gait:  deferred   Tremor:   No tremor noted. Cerebellar:  Coordination intact. Neurovascular: No carotid bruits. No JVD       REVIEWED IMAGING:    MRI :    Results from Hospital Encounter encounter on 02/05/18   MRI BRAIN W WO CONT   Narrative INDICATION:  Stroke     COMPARISON:  February 26, 2010 MRI and CT February 5, 2018    TECHNIQUE:  MR imaging of the brain was performed with sagittal T1, axial T1,  T2, FLAIR, GRE, DWI/ADC; pre and post contrast multiplanar T1 utilizing 13 mL  gadolinium. FINDINGS:      Ventricles:  Diffuse atrophy. No midline shift or hydrocephalus. Brain Parenchyma/Brainstem:  Extensive chronic white matter disease with areas  of prior infarction in the right parietal lobe, right frontal lobe, left basal  ganglia, bilateral cerebellar hemispheres. Moderate sized acute infarction  posterior right frontal lobe, in the right middle cerebral artery territory. Intracranial Hemorrhage:  None. Basal Cisterns:  Normal.   Flow Voids:  Normal.  Post Contrast:  No abnormal parenchymal or meningeal enhancement. Additional Comments:  Several sequences degraded by motion artifact. Impression IMPRESSION:  Extensive chronic ischemic changes, with a moderate-sized area of acute right  MCA territory infarction in the posterior right frontal lobe as above. CT:    Results from Hospital Encounter encounter on 02/05/18   CT CODE NEURO HEAD WO CONTRAST   Narrative EXAM:  CT CODE NEURO HEAD WO CONTRAST    INDICATION:   Stroke; aphasic    COMPARISON: CT 2/26/2010. CONTRAST:  None. TECHNIQUE: Unenhanced CT of the head was performed using 5 mm images. Brain and  bone windows were generated.   CT dose reduction was achieved through use of a  standardized protocol tailored for this examination and automatic exposure  control for dose modulation. FINDINGS:  There is chronic encephalomalacia within the right parietal lobe. There is  extensive periventricular and subcortical white matter disease. There are  bilateral cerebellar and basal ganglia infarctions, and a left thalamic  infarction. There is no intracranial hemorrhage or evidence of acute infarction. The basilar cisterns are open. There is no hydrocephalus. The bone windows  demonstrate no abnormalities. There is a mucous retention cyst in the left  maxillary sinus, partially visualized. Impression IMPRESSION:   1. No acute intracranial abnormality. 2. Chronic encephalomalacia in the right parietal lobe. 3. Chronic bilateral cerebellar infarcts, bilateral basal ganglia infarcts, and  left thalamic infarct. 4. Moderate to severe periventricular and subcortical white matter disease.             REVIEWED LABS:  Lab Results   Component Value Date/Time    WBC 5.6 05/08/2018 03:18 AM    HCT 29.5 (L) 05/08/2018 03:18 AM    HGB 9.7 (L) 05/08/2018 03:18 AM    PLATELET 138 40/61/2229 03:18 AM     Lab Results   Component Value Date/Time    Sodium 143 05/08/2018 03:18 AM    Potassium 4.0 05/08/2018 03:18 AM    Chloride 111 (H) 05/08/2018 03:18 AM    CO2 26 05/08/2018 03:18 AM    Glucose 84 05/08/2018 03:18 AM    BUN 19 05/08/2018 03:18 AM    Creatinine 1.16 (H) 05/08/2018 03:18 AM    Calcium 8.9 05/08/2018 03:18 AM       Lab Results   Component Value Date/Time    LDL, calculated 102 (H) 02/06/2018 03:10 AM     Lab Results   Component Value Date/Time    Hemoglobin A1c 6.0 02/06/2018 03:10 AM

## 2018-05-08 NOTE — PROGRESS NOTES
Hospitalist Progress Note    NAME: Jolanta Owens   :  1940   MRN:  [id number]     Admit date: 2018    Today's date: 18    PCP: DO Hernán Rivas M.D. Cell [phone]      Assessment / Plan:  Syncope POA past out at dinner table  Hypotension POA BP 70s in ED  Recent episodes of lightheadedness(not vertigo)  Sitting at dinner table, passed out(witnessed by family)  No recollection of events  IVF overnight  Cardiology saw  BP meds held, takes multiple BP meds  Tele negatve  Echo pending  PT/OT  Neuro exam is non focal  Recent stroke several months ago  Ask neuro to check in AM    Stage 3 chronic kidney disease POA  Creatinine seems close to baseline  IVF  Serial labs    Stroke, chronic  Chronic systolic heart failure, ef 40% NYHA Class I  ASA  BP meds on hold     I have personally reviewed the radiographs, laboratory data in Epic and decisions and statements above are based partially on this personal interpretation.     Code Status: Full Code  DVT Prophylaxis: Hep SQ  GI Prophylaxis: not indicated  :      Subjective:     Chief Complaint / Reason for Physician Visit  \"i do not remember what happened\". Discussed with RN events overnight. Passed out at dinner table no further syncope  Recently getting lightheaded intermitently, not clearly related to standing  No new focal motor or sensory changes    Review of Systems:  Symptom Y/N Comments  Symptom Y/N Comments   Fever/Chills n   Chest Pain n    Poor Appetite    Edema     Cough n   Abdominal Pain n    Sputum    Joint Pain     SOB/CHRISTINE n   Headache     Nausea/vomit n   Tolerating PT/OT     Diarrhea n   Tolerating Diet y    Constipation    Other       Could NOT obtain due to:      Objective:     VITALS:   Last 24hrs VS reviewed since prior progress note.  Most recent are:  Patient Vitals for the past 24 hrs:   Temp Pulse Resp BP SpO2   18 2257 98 °F (36.7 °C) 65 18 159/68 99 %   18 1925 98.2 °F (36.8 °C) 67 18 139/69 99 %   05/07/18 1458 - 89 18 146/80 100 %   05/07/18 1457 - 69 18 167/62 100 %   05/07/18 1456 98.4 °F (36.9 °C) 63 18 145/69 100 %   05/07/18 1055 98.2 °F (36.8 °C) 85 16 126/60 100 %   05/07/18 0743 97.4 °F (36.3 °C) 71 - 134/67 100 %   05/07/18 0313 - 62 - 95/44 -   05/07/18 0300 97.5 °F (36.4 °C) 61 14 (!) 73/36 100 %   05/07/18 0200 - 65 12 105/48 100 %   05/07/18 0100 - 69 15 109/55 100 %   05/07/18 0030 - 79 17 105/65 99 %   05/07/18 0001 - 60 13 102/52 100 %   05/06/18 2330 - 74 20 110/51 100 %     No intake or output data in the 24 hours ending 05/07/18 2306     Wt Readings from Last 12 Encounters:   05/07/18 60.7 kg (133 lb 13.1 oz)   04/19/18 59.9 kg (132 lb)   04/17/18 60.3 kg (133 lb)   04/12/18 59 kg (130 lb)   04/10/18 60.3 kg (133 lb)   04/04/18 61.2 kg (135 lb)   04/02/18 61.2 kg (135 lb)   02/09/18 66.4 kg (146 lb 4.8 oz)   10/18/17 60.9 kg (134 lb 4.8 oz)   09/20/17 60.8 kg (134 lb)   09/12/17 61.2 kg (135 lb)   09/05/17 61.2 kg (135 lb)       PHYSICAL EXAM:  General: WD, WN. Alert, cooperative, no acute distress    EENT:  PERRL. Anicteric sclerae. MMM, mild left facial droop  Neck:  No meningismus, no thyromegaly  Resp:  CTA bilaterally, no wheezing or rales. No accessory muscle use  CV:  Regular  rhythm,  No edema  GI:  Soft, Non distended, Non tender.  +Bowel sounds, no rebound  LN:  No cervical or inguinal PRINCESS  Neurologic:  Alert, speech with some word finding difficulties and slurred speech    No pronator drift    non focal motor exam  Psych:   Not anxious nor agitated  Skin:  No rashes.   No jaundice    Reviewed most current lab test results and cultures  YES  Reviewed most current radiology test results   YES  Review and summation of old records today    NO  Reviewed patient's current orders and MAR    YES  PMH/SH reviewed - no change compared to H&P  ________________________________________________________________________  Care Plan discussed with:    Comments Patient x    Family      RN x    Care Manager     Consultant                        Multidiciplinary team rounds were held today with , nursing, pharmacist and clinical coordinator. Patient's plan of care was discussed; medications were reviewed and discharge planning was addressed. ________________________________________________________________________  Total NON critical care TIME:  25  Minutes    Total CRITICAL CARE TIME Spent:   Minutes non procedure based      Comments   >50% of visit spent in counseling and coordination of care     ________________________________________________________________________  Kathy Robles MD     Procedures: see electronic medical records for all procedures/Xrays and details which were not copied into this note but were reviewed prior to creation of Plan. LABS:  I reviewed today's most current labs and imaging studies.   Pertinent labs include:  Recent Labs      05/07/18   0329 05/06/18   2100   WBC  6.8  8.6   HGB  10.1*  10.5*   HCT  30.7*  32.4*   PLT  171  188     Recent Labs      05/07/18   0329 05/06/18   2100   NA  146*  142   K  4.5  5.1   CL  115*  108   CO2  23  30   GLU  109*  141*   BUN  28*  28*   CREA  1.50*  1.72*   CA  7.6*  8.9   MG  2.0   --    PHOS  4.1   --    ALB   --   3.6   TBILI   --   0.3   SGOT   --   13*   ALT   --   13   INR   --   1.0

## 2018-05-08 NOTE — HOME CARE
Please note that this patient was open to [place] [id number] and [facility] services at the time of hospital admission. If services will be needed at discharge, please order to resume them. Thank you.    Fadia

## 2018-05-08 NOTE — ROUTINE PROCESS

## 2018-05-08 NOTE — ROUTINE PROCESS
Bedside and Verbal shift change report given to [address] [place] [address] (oncoming nurse) by Anuel Luciano RN (offgoing nurse).  Report included the following information SBAR, Kardex, Intake/Output and MAR.     Zone Phone:   [phone]        Significant changes during shift:  none           Patient Information     Kannan Carter  76 y.o.  5/6/2018  7:22 PM by Lu Myrick MD. Kannan Carter was admitted from Home     Problem List          Patient Active Problem List     Diagnosis Date Noted    Syncope 05/06/2018    CAD (coronary artery disease) 02/05/2018    Stroke ([address] 02/05/2018    Hypertensive urgency 08/01/2017    CHF (congestive heart failure) ([address] 08/01/2017    COPD exacerbation ([address] 03/07/2017    CAD (coronary artery disease), native coronary artery 07/15/2016    H/O: CVA (cerebrovascular accident) 07/15/2016    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 ([address] 07/15/2016    Severe sepsis ([address] 07/14/2016    Pneumonia 07/14/2016    Acute respiratory failure ([address] 07/14/2016    Elevated troponin 07/14/2016    Left bundle branch block (LBBB) on electrocardiogram 71/93/6990    Acute systolic heart failure ([address] 07/14/2016    Acute kidney failure ([address] 07/14/2016    Mixed hyperlipidemia 09/21/2012    Essential hypertension, benign 09/21/2012    Other left bundle branch block 09/21/2012    Coronary atherosclerosis of native coronary artery 09/21/2012    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 09/21/2012    Atherosclerosis of renal artery ([address] 09/21/2012    Atherosclerosis of native artery of extremity with intermittent claudication ([address] 09/21/2012    Tobacco use disorder 09/21/2012    Cerebrovascular accident ([address] 09/21/2012    Mitral valve disorders(424.0) 09/21/2012    Tricuspid valve disorder 09/21/2012           Past Medical History:   Diagnosis Date    Acute kidney failure ([address] 4/96/9688    Acute systolic heart failure ([address] 7/14/2016    CAD (coronary artery disease)      [facility]      Stroke ([facility] 2009     Stroke    Stroke [facility])              Core Measures:     CVA: No No  CHF:No No  PNA:No No     Activity Status:     OOB to Chair No  Ambulated this shift Yes   Bed Rest No     Supplemental O2: (If Applicable)     NC Yes  On 2 Liters/min        DVT prophylaxis:     DVT prophylaxis Med- Yes  DVT prophylaxis SCD or MALA- No      Wounds: (If Applicable)     Wounds- No     Patient Safety:     Falls Score Total Score: 4  Safety Level_______  Bed Alarm On? Yes  Sitter?  No     Plan for upcoming shift: safety           Discharge Plan: No      Active Consults:  IP CONSULT TO CARDIOLOGY

## 2018-05-08 NOTE — PROGRESS NOTES
CM called pt's son and discussed therapy's recommendation for pt to go to a SNF at discharge. Pt's son was in agreement. Discussed the list of SNF's and pt's son didn't want pt to go to [facility]. Pt's son selected [facility] since it is the closest to their home in [place]. [facility] form completed with a verbal consent from pt's son. CM sent referral to [facility] via cc.     2:30pm -[facility] accepted pt for rehab stay. Pt will need a 3 midnight inpt stay.      Jovita Melara, [id number]

## 2018-05-08 NOTE — PROGRESS NOTES
Hospitalist Progress Note    NAME: Lurene Burkitt   :  1940   MRN:  [id number]       Assessment / Plan:  Syncope POA -past out at dinner table POA  Hypotension POA BP 70s in ED- now resolved s/p IVF, holding BP meds  Recent episodes of lightheadedness(not vertigo)  Sitting at dinner table, passed out(witnessed by family)  No recollection of events    S/p IVF overnight  Cardiology seen pt  BP meds held, takes multiple BP meds  Tele negatve  Echo noted- Systolic function was mildly reduced. Ejection fraction  was estimated in the range of 45 % to 50 %. There were no regional wall  motion abnormalities. There was mild diffuse hypokinesis. There was mild  concentric hypertrophy  PT/OT eval noted- SNF recommended  Neuro exam is non focal  Recent stroke several months ago  IP neurology consult noted- recommends MRI & EEG but feels non neurogenic syncope     Stage 3 chronic kidney disease POA- Cr 1.1 today  Creatinine seems close to baseline  DC IVF today  Serial labs     Stroke, chronic  Chronic systolic heart failure, ef 40% NYHA Class I  ASA  BP meds on hold  Check MRI as above              Code Status: Full Code  DVT Prophylaxis: Hep SQ  GI Prophylaxis: not indicated      Body mass index is 27.03 kg/(m^2). Recommended Disposition: SNF/LTC in 24 hrs once cleared by Neurology  CM consulted for DC planning- needs 3 MN stay, pt changed to IP status today. Subjective:     Chief Complaint / Reason for Physician Visit: F/U Syncope, orthostatic hypotension,  \"I feel much better today\". Discussed with RN events overnight.      Review of Systems:  Symptom Y/N Comments  Symptom Y/N Comments   Fever/Chills n   Chest Pain n    Poor Appetite n   Edema n    Cough n   Abdominal Pain n    Sputum n   Joint Pain     SOB/CHRISTINE n   Pruritis/Rash     Nausea/vomit n   Tolerating PT/OT y    Diarrhea n   Tolerating Diet y    Constipation    Other       Could NOT obtain due to:      Objective:     VITALS:   Last 24hrs VS reviewed since prior progress note. Most recent are:  Patient Vitals for the past 24 hrs:   Temp Pulse Resp BP SpO2   05/08/18 1516 98.2 °F (36.8 °C) 67 20 155/82 98 %   05/08/18 1117 98.2 °F (36.8 °C) 72 18 153/89 96 %   05/08/18 0722 97.7 °F (36.5 °C) 77 20 141/70 98 %   05/08/18 0252 97.8 °F (36.6 °C) 68 18 150/64 98 %   05/07/18 2257 98 °F (36.7 °C) 65 18 159/68 99 %   05/07/18 1925 98.2 °F (36.8 °C) 67 18 139/69 99 %     No intake or output data in the 24 hours ending 05/08/18 1538     PHYSICAL EXAM:  General: WD, WN. Alert, cooperative, no acute distress    EENT:  EOMI. Anicteric sclerae. MMM  Resp:  CTA bilaterally, no wheezing or rales. No accessory muscle use  CV:  Regular  rhythm,  No edema  GI:  Soft, Non distended, Non tender.  +Bowel sounds  Neurologic:  Alert and oriented X 3, normal speech,   Psych:   Good insight. Not anxious nor agitated  Skin:  No rashes. No jaundice    Reviewed most current lab test results and cultures  YES  Reviewed most current radiology test results   YES  Review and summation of old records today    NO  Reviewed patient's current orders and MAR    YES  PMH/SH reviewed - no change compared to H&P  ________________________________________________________________________  Care Plan discussed with:    Comments   Patient x    Family      RN x    Care Manager x Germaine Engel   Consultant  x Neuro Dr Haider Odonnell team rounds were held today with , nursing, pharmacist and clinical coordinator. Patient's plan of care was discussed; medications were reviewed and discharge planning was addressed.      ________________________________________________________________________  Total NON critical care TIME:  36   Minutes    Total CRITICAL CARE TIME Spent:   Minutes non procedure based      Comments   >50% of visit spent in counseling and coordination of care     ________________________________________________________________________  Rob Beaver Jeancarlos Holt MD     Procedures: see electronic medical records for all procedures/Xrays and details which were not copied into this note but were reviewed prior to creation of Plan. LABS:  I reviewed today's most current labs and imaging studies.   Pertinent labs include:  Recent Labs      05/08/18 0318 05/07/18 0329 05/06/18   2100   WBC  5.6  6.8  8.6   HGB  9.7*  10.1*  10.5*   HCT  29.5*  30.7*  32.4*   PLT  150  171  188     Recent Labs      05/08/18 0318 05/07/18 0329 05/06/18   2100   NA  143  146*  142   K  4.0  4.5  5.1   CL  111*  115*  108   CO2  26  23  30   GLU  84  109*  141*   BUN  19  28*  28*   CREA  1.16*  1.50*  1.72*   CA  8.9  7.6*  8.9   MG   --   2.0   --    PHOS   --   4.1   --    ALB  3.1*   --   3.6   TBILI  0.4   --   0.3   SGOT  9*   --   13*   ALT  10*   --   13   INR   --    --   1.0       Signed: Tomasita Lennox, MD

## 2018-05-08 NOTE — PHYSICIAN ADVISORY
Letter of Status Determination:   Recommend hospitalization status upgraded from   OBSERVATION  to INPATIENT  Status     Pt Name:  Clayton Hall   MR#   [facility] # [id number] /  [id number]   [facility]#  [id number]   [address] [place] [address]  3120/01  @ [facility]   Hospitalization date  5/6/2018  7:22 PM   Current Attending Physician  Uche Carter MD   Principal diagnosis  Syncope      Clinicals  76 y.o. y.o  female hospitalized with above diagnosis   This pt is now noted to have developed episodes of hypotension, leading to HOLLY, Syncope and other issues. She is receiving appropriate supportive and comprehensive care includiing evaluation from Cardiologist, PT , OT etc.     Due to appropriate and necessary medical care, this pt's hospitalization has now exceeded two midnights . [place] ([facility]) criteria   Does  NOT apply    STATUS DETERMINATION  This patient is at above high risk of deterioration based on documented presenting clinical data, comorbid conditions, high risk of adverse events and current acute care course. Ms. Clayton Hall now meets Inpatient Admission status criteria in accordance with CMS regulation Section 43 .3. Specifically, due to medical necessity the patient's stay now exceeds Two Midnights. It is our recommendation that this patient's hospitalization status should be upgraded from  OBSERVATION to INPATIENT status.      The final decision of the patient's hospitalization status depends on the attending physician's judgment            Additional comments     Payor: Mare Peterson / Plan: [address] / Product Type: Medicare /         Shira Ruiz MD MPH [facility]    [address] [facility]   President Medical Staff, [facility]  [phone]        [phone]    .

## 2018-05-08 NOTE — PROGRESS NOTES
5/8/2018 6:37 PM    Admit Date: 5/6/2018    Admit Diagnosis:   Syncope;Syncope    Subjective:     Robe Jackson denies chest pain or shortness of breath. No pre-syncope or syncope. Current Facility-Administered Medications   Medication Dose Route Frequency    aspirin delayed-release tablet 81 mg  81 mg Oral DAILY    atorvastatin (LIPITOR) tablet 40 mg  40 mg Oral DAILY    clopidogrel (PLAVIX) tablet 75 mg  75 mg Oral DAILY    sodium chloride (NS) flush 5-10 mL  5-10 mL IntraVENous Q8H    sodium chloride (NS) flush 5-10 mL  5-10 mL IntraVENous PRN    ondansetron (ZOFRAN) injection 4 mg  4 mg IntraVENous Q4H PRN    hydrALAZINE (APRESOLINE) 20 mg/mL injection 10 mg  10 mg IntraVENous Q2H PRN    polyethylene glycol (MIRALAX) packet 17 g  17 g Oral DAILY    heparin (porcine) injection 5,000 Units  5,000 Units SubCUTAneous Q12H    albuterol-ipratropium (DUO-NEB) 2.5 MG-0.5 MG/3 ML  3 mL Nebulization Q2H PRN         Objective:      Physical Exam:    Visit Vitals    /82 (BP 1 Location: Left arm, BP Patient Position: At rest)    Pulse 67    Temp 98.2 °F (36.8 °C)    Resp 20    Ht 4' 11\" (1.499 m)    Wt 133 lb 13.1 oz (60.7 kg)    SpO2 98%    Breastfeeding No    BMI 27.03 kg/m2     Gen:  NAD  Mental Status - Alert. General Appearance - Not in acute distress. Chest and Lung Exam   Inspection: Accessory muscles - No use of accessory muscles in breathing. Auscultation:   Breath sounds: - Normal.   Cardiovascular   Inspection: Jugular vein - Bilateral - Inspection Normal.   Palpation/Percussion:   Apical Impulse: - Normal.   Auscultation: Rhythm - Regular. Heart Sounds - S1 WNL and S2 WNL. No S3 or S4. Murmurs & Other Heart Sounds: Auscultation of the heart reveals - No Murmurs. Peripheral Vascular   Upper Extremity: Inspection - Bilateral - No Cyanotic nailbeds or Digital clubbing. Lower Extremity:   Palpation: Edema - Bilateral - No edema.   Abdomen:   Soft, non-tender, bowel sounds are active.   Neuro: A&O times 3, CN and motor grossly WNL    Data Review:   Recent Labs      05/08/18 0318 05/07/18   0329 05/06/18   2100   WBC  5.6  6.8  8.6   HGB  9.7*  10.1*  10.5*   HCT  29.5*  30.7*  32.4*   PLT  150  171  188     Recent Labs      05/08/18 0318 05/07/18   0329 05/06/18   2100   NA  143  146*  142   K  4.0  4.5  5.1   CL  111*  115*  108   CO2  26  23  30   GLU  84  109*  141*   BUN  19  28*  28*   CREA  1.16*  1.50*  1.72*   CA  8.9  7.6*  8.9   MG   --   2.0   --    PHOS   --   4.1   --    ALB  3.1*   --   3.6   TBILI  0.4   --   0.3   SGOT  9*   --   13*   ALT  10*   --   13   INR   --    --   1.0       Recent Labs      05/08/18 0318 05/06/18   2100   TROIQ  0.09*  0.04       No intake or output data in the 24 hours ending 05/08/18 1837     Telemetry: normal sinus rhythm    Assessment:     Principal Problem:    Syncope (5/6/2018)    Active Problems:    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 (9/21/2012)      Atherosclerosis of native artery of extremity with intermittent claudication (HCC) (9/21/2012)      Overview: Severe bilateral by angio 2009      Cerebrovascular accident ([facility] [address] (9/21/2012)      Left bundle branch block (LBBB) on electrocardiogram (7/14/2016)      H/O: CVA (cerebrovascular accident) (7/15/2016)      CAD (coronary artery disease) (2/5/2018)        Plan:     Syncope:  · Considering recently mildly reduced to low normal LVEF, no arrhythmias on telemetry, I agree the most likely etiology is dehydration plus or minus low blood pressure  · The patient used to have malignant hypertension  · I agree with holding blood pressure medications and diuretics at this time, gently hydrating  · Nonorthostatic 5/7/18 with 's lying/ standing, orthostatic 5/8/18 141-->113, but asymptomatic- allow standing BP's to be 110 or greater  · PT OT to follow  · Neuro eval noted with plan for EEG and MRI

## 2018-05-08 NOTE — PROGRESS NOTES

## 2018-05-09 PROBLEM — I63.9 ACUTE CEREBRAL INFARCTION (HCC): Status: ACTIVE | Noted: 2018-05-09

## 2018-05-09 PROCEDURE — 3331090002 HH PPS REVENUE DEBIT

## 2018-05-09 PROCEDURE — 93880 EXTRACRANIAL BILAT STUDY: CPT

## 2018-05-09 PROCEDURE — 3331090001 HH PPS REVENUE CREDIT

## 2018-05-09 PROCEDURE — 74011250637 HC RX REV CODE- 250/637: Performed by: INTERNAL MEDICINE

## 2018-05-09 PROCEDURE — 97110 THERAPEUTIC EXERCISES: CPT

## 2018-05-09 PROCEDURE — 65660000000 HC RM CCU STEPDOWN

## 2018-05-09 PROCEDURE — 97530 THERAPEUTIC ACTIVITIES: CPT

## 2018-05-09 PROCEDURE — 74011250636 HC RX REV CODE- 250/636: Performed by: INTERNAL MEDICINE

## 2018-05-09 PROCEDURE — 77010033678 HC OXYGEN DAILY

## 2018-05-09 RX ORDER — ATORVASTATIN CALCIUM 40 MG/1
80 TABLET, FILM COATED ORAL DAILY
Status: DISCONTINUED | OUTPATIENT
Start: 2018-05-09 | End: 2018-05-11 | Stop reason: HOSPADM

## 2018-05-09 RX ADMIN — Medication 10 ML: at 06:00

## 2018-05-09 RX ADMIN — CLOPIDOGREL BISULFATE 75 MG: 75 TABLET ORAL at 08:58

## 2018-05-09 RX ADMIN — HEPARIN SODIUM 5000 UNITS: 5000 INJECTION, SOLUTION INTRAVENOUS; SUBCUTANEOUS at 14:10

## 2018-05-09 RX ADMIN — POLYETHYLENE GLYCOL 3350 17 G: 17 POWDER, FOR SOLUTION ORAL at 08:59

## 2018-05-09 RX ADMIN — ASPIRIN 81 MG: 81 TABLET, COATED ORAL at 08:58

## 2018-05-09 RX ADMIN — Medication 10 ML: at 23:04

## 2018-05-09 RX ADMIN — ATORVASTATIN CALCIUM 80 MG: 40 TABLET, FILM COATED ORAL at 08:58

## 2018-05-09 RX ADMIN — Medication 10 ML: at 14:10

## 2018-05-09 RX ADMIN — HEPARIN SODIUM 5000 UNITS: 5000 INJECTION, SOLUTION INTRAVENOUS; SUBCUTANEOUS at 03:00

## 2018-05-09 NOTE — CDMP QUERY
Dr. Pete Ramsey :  Please clarify if this patient is (was) being treated/managed for:     => Cerebral infarction, POA unspecified  => Other explanation of clinical findings  => Clinically Undetermined (no explanation for clinical findings)    The medical record reflects the following clinical findings, treatment, and risk factors. Risk Factors:  75 yo female admitted w/ Syncope h/o CAD/HTN  Clinical Indicators:  MRI BRAIN: Small acute right posterior parietal infarct. Lisseth Bailon Prior MCA territory infarction right side  Treatment: Neurology Consult/ ASA    Please clarify and document your clinical opinion in the progress notes and discharge summary including the definitive and/or presumptive diagnosis, (suspected or probable), related to the above clinical findings. Please include clinical findings supporting your diagnosis.     Thank Sol Stewart  [phone]

## 2018-05-09 NOTE — PROGRESS NOTES
Hospitalist Progress Note    NAME: Rosanna Perez   :  1940   MRN:  [id number]       Assessment / Plan:  Syncope POA -past out at dinner table POA  Due to Acute R post Parietal cerebral infarction POA- on MRI  Hypotension POA BP 70s in ED- now resolved s/p IVF, holding BP meds  Recent episodes of lightheadedness(not vertigo)  Sitting at dinner table, passed out(witnessed by family)  No recollection of events    S/p IVF, now off it  Cardiology seen pt  BP meds held, takes multiple BP meds  Tele negatve  Echo noted- Systolic function was mildly reduced. Ejection fraction  was estimated in the range of 45 % to 50 %. There were no regional wall  motion abnormalities. There was mild diffuse hypokinesis. There was mild  concentric hypertrophy  PT/OT eval noted- SNF recommended- CM working on it  Recent stroke several months ago  IP neurology consult noted-   EEG unremarkable except L sided slwong due to old CVA  MRI noted for Acute R post Parietal cerebral infarction  Check A1c, Lipid panel, carotid Doppler  Awaiting follow up today for further recommendations     Stage 3 chronic kidney disease POA- Cr 1.1   Creatinine seems close to baseline  off IVF now  Serial labs     Stroke, chronic  Chronic systolic heart failure, ef 40% NYHA Class I  ASA  BP meds on hold  MRI shows NEW Acute cerebral lacunar infarction  Plan for Implantable Loop recorder as per cardiology- EP consulted          Code Status: Full Code  DVT Prophylaxis: Hep SQ  GI Prophylaxis: not indicated      Body mass index is 26.32 kg/(m^2). Recommended Disposition: SNF/LTC in 24-48 hrs- needs 3 MN stay, pt changed to IP status      Subjective:     Chief Complaint / Reason for Physician Visit: F/U Syncope, orthostatic hypotension,  \"I feel much better today\". Discussed with RN events overnight.      Review of Systems:  Symptom Y/N Comments  Symptom Y/N Comments   Fever/Chills n   Chest Pain n    Poor Appetite n   Edema n    Cough n   Abdominal Pain n    Sputum n   Joint Pain     SOB/CHRISTINE n   Pruritis/Rash     Nausea/vomit n   Tolerating PT/OT y    Diarrhea n   Tolerating Diet y    Constipation    Other       Could NOT obtain due to:      Objective:     VITALS:   Last 24hrs VS reviewed since prior progress note. Most recent are:  Patient Vitals for the past 24 hrs:   Temp Pulse Resp BP SpO2   05/09/18 1106 98.4 °F (36.9 °C) 72 20 172/68 100 %   05/09/18 0732 98.3 °F (36.8 °C) 66 20 168/84 100 %   05/09/18 0345 98.2 °F (36.8 °C) 70 18 171/74 100 %   05/08/18 2332 98.2 °F (36.8 °C) 73 20 167/83 100 %   05/08/18 2145 98.5 °F (36.9 °C) 80 20 166/74 100 %   05/08/18 1516 98.2 °F (36.8 °C) 67 20 155/82 98 %     No intake or output data in the 24 hours ending 05/09/18 1306     PHYSICAL EXAM:  General: WD, WN. Alert, cooperative, no acute distress    EENT:  EOMI. Anicteric sclerae. MMM  Resp:  CTA bilaterally, no wheezing or rales. No accessory muscle use  CV:  Regular  rhythm,  No edema  GI:  Soft, Non distended, Non tender.  +Bowel sounds  Neurologic:  Alert and oriented X 3, normal speech,   Psych:   Good insight. Not anxious nor agitated  Skin:  No rashes. No jaundice    Reviewed most current lab test results and cultures  YES  Reviewed most current radiology test results   YES  Review and summation of old records today    NO  Reviewed patient's current orders and MAR    YES  PMH/SH reviewed - no change compared to H&P  ________________________________________________________________________  Care Plan discussed with:    Comments   Patient x    Family      RN x    Care Manager x Malinda   Consultant                        Multidiciplinary team rounds were held today with , nursing, pharmacist and clinical coordinator. Patient's plan of care was discussed; medications were reviewed and discharge planning was addressed.      ________________________________________________________________________  Total NON critical care TIME:  16 Minutes    Total CRITICAL CARE TIME Spent:   Minutes non procedure based      Comments   >50% of visit spent in counseling and coordination of care     ________________________________________________________________________  Veronica Hayes MD     Procedures: see electronic medical records for all procedures/Xrays and details which were not copied into this note but were reviewed prior to creation of Plan. LABS:  I reviewed today's most current labs and imaging studies.   Pertinent labs include:  Recent Labs      05/08/18 0318 05/07/18 0329 05/06/18   2100   WBC  5.6  6.8  8.6   HGB  9.7*  10.1*  10.5*   HCT  29.5*  30.7*  32.4*   PLT  150  171  188     Recent Labs      05/08/18 0318 05/07/18 0329 05/06/18   2100   NA  143  146*  142   K  4.0  4.5  5.1   CL  111*  115*  108   CO2  26  23  30   GLU  84  109*  141*   BUN  19  28*  28*   CREA  1.16*  1.50*  1.72*   CA  8.9  7.6*  8.9   MG   --   2.0   --    PHOS   --   4.1   --    ALB  3.1*   --   3.6   TBILI  0.4   --   0.3   SGOT  9*   --   13*   ALT  10*   --   13   INR   --    --   1.0       Signed: Veronica Hayes MD

## 2018-05-09 NOTE — PROGRESS NOTES
[facility]  Preliminary Report:  Carotid Duplex Scan    Right:  Minimal plaque noted in the right carotid system. Right ICA velocities suggest less than 50% diameter reduction. Right vertebral artery flow is antegrade. Left:  Minimal plaque noted in the left carotid system. Left ICA velocities suggest less than 50% diameter reduction. Left vertebral artery flow is antegrade. Final report to follow.

## 2018-05-09 NOTE — PROCEDURES
[address]   [address]   EEG           Procedure Date: 05/08/2018    Procedure ID: [id number]    Patient Name: Minal Sheffield    Medical Record No: [id number]    INDICATION: Syncope    DESCRIPTION OF PROCEDURE: Electrodes were applied in accordance with the international 10-20 system of electrode placement. In addition to EEG, limited EKG was also recorded. EEG was reviewed in both bipolar and referential montages    DESCRIPTION OF FINDINGS: Background consists of 9hz activity. This activity attenuates with eye opening. Focal slowing can be see in the right parieto- temporal fields which may indicate an underlying structural anomaly. With drowsiness there is a dropout in the background activity. Sleep spindles were seen with the second stage of sleep. With photic stimulation a symmetric occipital driving response is noted. No seizures  were noted. IMPRESSION:  This EEG is abnormal for right parietal slowing. This may be consistent with the patient's known history of stroke. No seizures were noted on study. Absence of seizures on this study does not exclude epilepsy. Clinical correlation is advised.

## 2018-05-09 NOTE — PROGRESS NOTES
* No surgery found *  * No surgery found *  Bedside shift change report given to Samuel Toscano RN (oncoming nurse) by Ruslan Gross RN (offgoing nurse). Report included the following information SBAR, Kardex, Intake/Output, MAR, Accordion and Recent Results.     Zone Phone:   [phone]      Significant changes during shift:  EEG and Duplex ordered, up with PT/OT        Patient Information    Ruel Michelle  76 y.o.  5/6/2018  7:22 PM by Titi Morocho MD. Ruel Michelle was admitted from Home    Problem List    Patient Active Problem List    Diagnosis Date Noted    Acute cerebral infarction [facility]) 05/09/2018    Syncope 05/06/2018    CAD (coronary artery disease) 02/05/2018    Stroke ([address] 02/05/2018    Hypertensive urgency 08/01/2017    CHF (congestive heart failure) ([address] 08/01/2017    COPD exacerbation ([address] 03/07/2017    CAD (coronary artery disease), native coronary artery 07/15/2016    H/O: CVA (cerebrovascular accident) 07/15/2016    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 ([address] 07/15/2016    Severe sepsis ([address] 07/14/2016    Pneumonia 07/14/2016    Acute respiratory failure ([address] 07/14/2016    Elevated troponin 07/14/2016    Left bundle branch block (LBBB) on electrocardiogram 59/05/1842    Acute systolic heart failure ([address] 07/14/2016    Acute kidney failure ([address] 07/14/2016    Mixed hyperlipidemia 09/21/2012    Essential hypertension, benign 09/21/2012    Other left bundle branch block 09/21/2012    Coronary atherosclerosis of native coronary artery 09/21/2012    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 09/21/2012    Atherosclerosis of renal artery ([address] 09/21/2012    Atherosclerosis of native artery of extremity with intermittent claudication ([address] 09/21/2012    Tobacco use disorder 09/21/2012    Cerebrovascular accident ([address] 09/21/2012    Mitral valve disorders(424.0) 09/21/2012    Tricuspid valve disorder 09/21/2012     Past Medical History: Diagnosis Date    Acute kidney failure ([facility] 1/33/2674    Acute systolic heart failure ([facility] 7/14/2016    CAD (coronary artery disease)     Heart failure ([facility]     Stroke ([facility] 2009    Stroke    Stroke [facility])          Core Measures:    CVA: Yes Yes  CHF:No No  PNA:No No    Post Op Surgical (If Applicable):     Number times ambulated in hallway past shift:  0  Number of times OOB to chair past shift:   1  NG Tube: No  Incentive Spirometer: No  Drains: No    Dressing Present:  No  Flatus:  No    Activity Status:    OOB to Chair Yes  Ambulated this shift Yes   Bed Rest No    Supplemental O2: (If Applicable)    NC Yes  NRB No  Venti-mask No  On 3 Liters/min        DVT prophylaxis:    DVT prophylaxis Med- Yes  DVT prophylaxis SCD or MALA- No     Wounds: (If Applicable)    Wounds- No      Patient Safety:    Falls Score Total Score: 4  Safety Level_______  Bed Alarm On? Yes  Sitter?  No    Plan for upcoming shift: Monitor, EEG, Duplex        Discharge Plan: Yes, Friday [facility]    Active Consults:  IP CONSULT TO CARDIOLOGY  IP CONSULT TO NEUROLOGY  IP CONSULT TO ELECTROPHYSIOLOGY]

## 2018-05-09 NOTE — PROGRESS NOTES
Problem: Mobility Impaired (Adult and Pediatric)  Goal: *Acute Goals and Plan of Care (Insert Text)  Physical Therapy Goals  Initiated 5/8/2018  1. Patient will move from supine to sit and sit to supine , scoot up and down and roll side to side in bed with independence within 7 day(s). 2.  Patient will transfer from bed to chair and chair to bed with modified independence using the least restrictive device within 7 day(s). 3.  Patient will perform sit to stand with modified independence within 7 day(s). 4.  Patient will ambulate with modified independence for 150 feet with the least restrictive device within 7 day(s). 5.  Patient will ascend/descend 3 stairs with 1 handrail(s) with supervision/set-up within 7 day(s). physical Therapy TREATMENT  Patient: Gilles Lemus (35 y.o. female)  Date: 5/9/2018  Diagnosis: Syncope  Syncope Syncope       Precautions: Fall  Chart, physical therapy assessment, plan of care and goals were reviewed. ASSESSMENT:  Pt received supine in bed and agreeable to PT intervention. Pt cleared by nursing for mobility. Pt continues to demonstrate limited endurance and increased fatigue with activity this date. She participated in supine exercises as noted below, requiring instruction and verbal and tactile cueing for proper performance. She needed one rest break during activity due to increased fatigue with exercises. Bed mobility performed with supervision, requiring increased time, cueing, and with HOB elevated to achieve seated position at EOB. She stood from EOB with min A x 1, needing VCs for safe hand placement. She ambulated around the bed to chair with CGA x 1 using RW, but continues to demonstrate slow, shuffled gait. She did appear to have improved posture and mild improvements in speed as compared to prior therapy session. Noted she continues to demonstrate CHRISTINE with all activity.  She was assisted into bedside chair and left with all needs met, VSS on 2 L/min O2, and RN aware following therapy session. Continue to recommend patient discharge to SNF rehab to improve functional mobility and independence. Progression toward goals:  []    Improving appropriately and progressing toward goals  [x]    Improving slowly and progressing toward goals  []    Not making progress toward goals and plan of care will be adjusted     PLAN:  Patient continues to benefit from skilled intervention to address the above impairments. Continue treatment per established plan of care. Discharge Recommendations:  [facility]  Further Equipment Recommendations for Discharge:  TBD by rehab     SUBJECTIVE:   Patient stated My legs are tired.     OBJECTIVE DATA SUMMARY:   Critical Behavior:  Neurologic State: Alert  Orientation Level: Oriented to person, Oriented to place, Oriented to situation, Disoriented to time  Cognition: Decreased attention/concentration, Follows commands  Safety/Judgement: Insight into deficits  Functional Mobility Training:  Bed Mobility:  Rolling: Supervision; Additional time  Supine to Sit: Supervision; Additional time (HOB elevated ~ 50 degrees)     Scooting: Supervision; Additional time        Transfers:  Sit to Stand: Minimum assistance;Assist x1;Additional time  Stand to Sit: Contact guard assistance;Assist x1;Additional time                             Balance:  Sitting: Intact  Standing: Impaired; With support  Standing - Static: Good  Standing - Dynamic : Good  Ambulation/Gait Training:  Distance (ft): 15 Feet (ft)  Assistive Device: Gait belt;Walker, rolling  Ambulation - Level of Assistance: Contact guard assistance;Assist x1;Adaptive equipment; Additional time        Gait Abnormalities: Decreased step clearance;Shuffling gait (increased trunk flexion)        Base of Support: Widened     Speed/Brianda: Pace decreased (<100 feet/min); Shuffled  Step Length: Left shortened;Right shortened    Therapeutic Exercises:   Supine heel slides 10x BLE  Supine glut sets 5x FARRAH  Supine quad sets 10x BLE  Supine ankle pumps 10x BLE  Supine hip abd/add 10x BLE  Pain:  Pain Scale 1: Numeric (0 - 10)  Pain Intensity 1: 0              Activity Tolerance:   Fair - limited endurance; significant fatigue with activity; VSS at rest and post-activity on 2 L/min O2  Please refer to the flowsheet for vital signs taken during this treatment.   After treatment:   [x]    Patient left in no apparent distress sitting up in chair  []    Patient left in no apparent distress in bed  [x]    Call bell left within reach  [x]    Nursing notified  []    Caregiver present  []    Bed alarm activated    COMMUNICATION/COLLABORATION:   The patients plan of care was discussed with: Physical Therapist and Registered Nurse    Nyasia Acevedo PT, DPT   Time Calculation: 23 mins

## 2018-05-09 NOTE — PROGRESS NOTES
Chief Complaint: stroke  No events over night. Discussed lab results. EEG results discussed. Assesment and Plan    1. Syncope and collapse  MRI and EEG recviewed  Consider respiratory or cardiac origin     2. Stroke  continue plavix. MRI: Acute right parietal infarct  Vascular studies: Pending  A1C : 6 (2/6/18)  TSH: 0.45  LDL: 102 (2/6/18)  Echocardiogram: 40-55% ejection fraction and no regional wall abnormalities  Continue Plavix       3. COPD  Continue albuterol  Former smoker     4. CAD     5. Hyperlipidemia  Atorvastatin increased to 80      Allergies  Decadron [dexamethasone sodium phosphate] and Pcn [penicillins]     Medications  Current Facility-Administered Medications   Medication Dose Route Frequency    atorvastatin (LIPITOR) tablet 80 mg  80 mg Oral DAILY    aspirin delayed-release tablet 81 mg  81 mg Oral DAILY    clopidogrel (PLAVIX) tablet 75 mg  75 mg Oral DAILY    sodium chloride (NS) flush 5-10 mL  5-10 mL IntraVENous Q8H    sodium chloride (NS) flush 5-10 mL  5-10 mL IntraVENous PRN    ondansetron (ZOFRAN) injection 4 mg  4 mg IntraVENous Q4H PRN    hydrALAZINE (APRESOLINE) 20 mg/mL injection 10 mg  10 mg IntraVENous Q2H PRN    polyethylene glycol (MIRALAX) packet 17 g  17 g Oral DAILY    heparin (porcine) injection 5,000 Units  5,000 Units SubCUTAneous Q12H    albuterol-ipratropium (DUO-NEB) 2.5 MG-0.5 MG/3 ML  3 mL Nebulization Q2H PRN        Medical History  Past Medical History:   Diagnosis Date    Acute kidney failure ([facility] 5/55/6701    Acute systolic heart failure ([facility] 7/14/2016    CAD (coronary artery disease)     Heart failure ([facility]     [facility] 2009    Stroke    Stroke [facility])      Review of Systems   Constitutional: Positive for malaise/fatigue. Negative for chills and fever. HENT: Negative for ear pain. Eyes: Negative for pain and discharge. Respiratory: Negative for cough and hemoptysis. Cardiovascular: Negative for chest pain and claudication. Gastrointestinal: Negative for constipation and diarrhea. Genitourinary: Negative for flank pain and hematuria. Musculoskeletal: Positive for joint pain and myalgias. Negative for back pain. Skin: Negative for itching and rash. Neurological: Positive for speech change, loss of consciousness and weakness. Negative for headaches. Endo/Heme/Allergies: Negative for environmental allergies. Does not bruise/bleed easily. Psychiatric/Behavioral: Negative for depression and hallucinations. Exam:    Visit Vitals    /68 (BP 1 Location: Left arm, BP Patient Position: Supine)    Pulse 72    Temp 98.4 °F (36.9 °C)    Resp 20    Ht 4' 11\" (1.499 m)    Wt 130 lb 4.7 oz (59.1 kg)    SpO2 100%    Breastfeeding No    BMI 26.32 kg/m2      General: Well developed, well nourished. Head: Normocephalic, atraumatic, anicteric sclera   Neck Normal ROM, No thyromegally   Lungs:  Distant sounds   Cardiac: Regular rate and rhythm with mitral murmurs   Abd: Bowel sounds were audible. No tenderness on palpation   Ext: No pedal edema   Skin: Supple no rash      NeurologicExam:  Mental Status: Alert and oriented to person and place    Speech: Laborious dysarthric and out of breath but fluent no aphasia  significant    Cranial Nerves:  II - XII Intact with preexisting left facial weakness. Motor:  4/5 LLE weakness otherwise symmetric and full strength. Reflexes:   +1/4 over the proximal tendons of the upper and lower extremities. +0/4 over distal tendons. Sensory:   Symmetric distal reduction in sensory perception affecting all modalities   Gait:  deferred   Tremor:   No tremor noted. Cerebellar:  Coordination intact. Neurovascular: No carotid bruits.  No JVD             Imaging    CT Results (most recent):    Results from Hospital Encounter encounter on 02/05/18   CT CODE NEURO HEAD WO CONTRAST   Narrative EXAM:  CT CODE NEURO HEAD WO CONTRAST    INDICATION:   Stroke; aphasic    COMPARISON: CT 2/26/2010. CONTRAST:  None. TECHNIQUE: Unenhanced CT of the head was performed using 5 mm images. Brain and  bone windows were generated. CT dose reduction was achieved through use of a  standardized protocol tailored for this examination and automatic exposure  control for dose modulation. FINDINGS:  There is chronic encephalomalacia within the right parietal lobe. There is  extensive periventricular and subcortical white matter disease. There are  bilateral cerebellar and basal ganglia infarctions, and a left thalamic  infarction. There is no intracranial hemorrhage or evidence of acute infarction. The basilar cisterns are open. There is no hydrocephalus. The bone windows  demonstrate no abnormalities. There is a mucous retention cyst in the left  maxillary sinus, partially visualized. Impression IMPRESSION:   1. No acute intracranial abnormality. 2. Chronic encephalomalacia in the right parietal lobe. 3. Chronic bilateral cerebellar infarcts, bilateral basal ganglia infarcts, and  left thalamic infarct. 4. Moderate to severe periventricular and subcortical white matter disease. MRI Results (most recent):    Results from [place] encounter on 05/06/18   MRI BRAIN W WO CONT   Narrative *PRELIMINARY REPORT*    1. Small lacunar infarct along the right lateral ventricle posterior horn. 2. Small focus of edge artifact in the posterior right vertex again seen. 3. Extensive white matter disease likely related to chronic small vessel  ischemic disease. 4. Moderate-sized area of chronic encephalomalacia in the right parietal lobe. Preliminary report was provided by Dr. Wing Lopez, the on-call radiologist, at 9:42 PM  on 5/8/2018    Final report to follow. *END PRELIMINARY REPORT*    Clinical indication: CVA.     Technical factors: Diffusion imaging, sagittal T1-weighted, sagittal T1-weighted  post 12 cc IV the thyroid and axial T1-weighted pre and postcontrast T2 weighted  FLAIR gradient echo coronal T2-weighted coronal T1-weighted postcontrast.  Comparison examination February 7, 2018. Diffusion imaging show a small acute ischemic infarct due to the posterior horn  of the right lateral ventricle. No Significant mass effect or hemorrhage. There  is evidence of prior chronic ischemic changes in the right parietal lobe  posteriorly. Extensive white matter disease nonspecific but likely relate to a  small vessel ischemia. There is no extra-axial fluid collection hemorrhage or  shift of the midline. Major vessels at the base of the brain show flow void. Remote cerebellar infarcts bilaterally. Small remote lacunar is bilaterally. There is some motion artifact. There is no enhancing lesion or masses her. Impression IMPRESSION: Prominent atrophy and white matter disease. Small acute right  posterior parietal infarct. No masses or enhancing lesion. Prior MCA territory  infarction right side                .   Lab Review    Lab Results   Component Value Date/Time    WBC 5.6 05/08/2018 03:18 AM    HCT 29.5 (L) 05/08/2018 03:18 AM    HGB 9.7 (L) 05/08/2018 03:18 AM    PLATELET 410 90/70/7278 03:18 AM       Lab Results   Component Value Date/Time    Sodium 143 05/08/2018 03:18 AM    Potassium 4.0 05/08/2018 03:18 AM    Chloride 111 (H) 05/08/2018 03:18 AM    CO2 26 05/08/2018 03:18 AM    Glucose 84 05/08/2018 03:18 AM    BUN 19 05/08/2018 03:18 AM    Creatinine 1.16 (H) 05/08/2018 03:18 AM    Calcium 8.9 05/08/2018 03:18 AM       Lab Results   Component Value Date/Time    Hemoglobin A1c 6.0 02/06/2018 03:10 AM          Lab Results   Component Value Date/Time    Cholesterol, total 171 02/06/2018 03:10 AM    HDL Cholesterol 51 02/06/2018 03:10 AM    LDL, calculated 102 (H) 02/06/2018 03:10 AM    VLDL, calculated 18 02/06/2018 03:10 AM    Triglyceride 90 02/06/2018 03:10 AM    CHOL/HDL Ratio 3.4 02/06/2018 03:10 AM

## 2018-05-09 NOTE — ROUTINE PROCESS
Bedside and Verbal shift change report given to Sheila/Ruthie RN (oncoming nurse) by Hilary/Siobhan RN (offgoing nurse).  Report included the following information SBAR, Kardex, Intake/Output and [facility]      Zone Phone:   [phone]          Significant changes during shift:  none              Patient Information  Yogesh Zambranou  76 y.o.  5/6/2018  7:22 PM by Paresh Hutson MD. Jan Navarrete admitted from Home      Problem List              Patient Active Problem List      Diagnosis Date Noted    Syncope 05/06/2018    CAD (coronary artery disease) 02/05/2018    Stroke ([address] 02/05/2018    Hypertensive urgency 08/01/2017    CHF (congestive heart failure) ([address] 08/01/2017    COPD exacerbation ([address] 03/07/2017    CAD (coronary artery disease), native coronary artery 07/15/2016    H/O: CVA (cerebrovascular accident) 07/15/2016    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 ([address] 07/15/2016    Severe sepsis ([address] 07/14/2016    Pneumonia 07/14/2016    Acute respiratory failure ([address] 07/14/2016    Elevated troponin 07/14/2016    Left bundle branch block (LBBB) on electrocardiogram 75/08/2570    Acute systolic heart failure ([address] 07/14/2016    Acute kidney failure ([address] 07/14/2016    Mixed hyperlipidemia 09/21/2012    Essential hypertension, benign 09/21/2012    Other left bundle branch block 09/21/2012    Coronary atherosclerosis of native coronary artery 09/21/2012    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 09/21/2012    Atherosclerosis of renal artery ([address] 09/21/2012    Atherosclerosis of native artery of extremity with intermittent claudication ([address] 09/21/2012    Tobacco use disorder 09/21/2012    Cerebrovascular accident ([address] 09/21/2012    Mitral valve disorders(424.0) 09/21/2012    Tricuspid valve disorder 09/21/2012               Past Medical History:   Diagnosis Date    Acute kidney failure ([address] 2/59/1324    Acute systolic heart failure ([address] 7/14/2016    CAD (coronary artery disease)       Heart failure ([facility]       [facility]      Stroke    Stroke [facility])                  Core Measures:      CVA: Yes, Yes  CHF:No No  PNA:No No      Activity Status:      OOB to Chair Yes  Ambulated this shift Yes   Bed Rest No          DVT prophylaxis:      DVT prophylaxis Med- Yes  DVT prophylaxis SCD or MALA- No       Wounds: (If Applicable)      Wounds- No      Patient Safety:      Falls Score Total Score: 4  Safety Level_______  Bed Alarm On? Yes  Sitter?  No      Plan for upcoming shift: safety, MRI              Discharge Plan: SNF when ready      Active Consults:  IP CONSULT TO CARDIOLOGY

## 2018-05-10 PROBLEM — I65.23 BILATERAL CAROTID ARTERY STENOSIS: Status: ACTIVE | Noted: 2018-05-10

## 2018-05-10 LAB
CHOLEST SERPL-MCNC: 148 MG/DL
EST. AVERAGE GLUCOSE BLD GHB EST-MCNC: 134 MG/DL
HBA1C MFR BLD: 6.3 % (ref 4.2–6.3)
HDLC SERPL-MCNC: 47 MG/DL
HDLC SERPL: 3.1 {RATIO} (ref 0–5)
LDLC SERPL CALC-MCNC: 84 MG/DL (ref 0–100)
LIPID PROFILE,FLP: NORMAL
TRIGL SERPL-MCNC: 85 MG/DL (ref ?–150)
VLDLC SERPL CALC-MCNC: 17 MG/DL

## 2018-05-10 PROCEDURE — 74011250637 HC RX REV CODE- 250/637: Performed by: INTERNAL MEDICINE

## 2018-05-10 PROCEDURE — 80061 LIPID PANEL: CPT | Performed by: INTERNAL MEDICINE

## 2018-05-10 PROCEDURE — 36415 COLL VENOUS BLD VENIPUNCTURE: CPT | Performed by: INTERNAL MEDICINE

## 2018-05-10 PROCEDURE — 74011250636 HC RX REV CODE- 250/636

## 2018-05-10 PROCEDURE — 74011000250 HC RX REV CODE- 250

## 2018-05-10 PROCEDURE — C1764 EVENT RECORDER, CARDIAC: HCPCS

## 2018-05-10 PROCEDURE — 97116 GAIT TRAINING THERAPY: CPT

## 2018-05-10 PROCEDURE — 0JH632Z INSERTION OF MONITORING DEVICE INTO CHEST SUBCUTANEOUS TISSUE AND FASCIA, PERCUTANEOUS APPROACH: ICD-10-PCS | Performed by: INTERNAL MEDICINE

## 2018-05-10 PROCEDURE — 65660000000 HC RM CCU STEPDOWN

## 2018-05-10 PROCEDURE — 74011250636 HC RX REV CODE- 250/636: Performed by: INTERNAL MEDICINE

## 2018-05-10 PROCEDURE — 77010033678 HC OXYGEN DAILY

## 2018-05-10 PROCEDURE — 3331090001 HH PPS REVENUE CREDIT

## 2018-05-10 PROCEDURE — 33282 HC IMP PT CARD EVENT RECORD: CPT

## 2018-05-10 PROCEDURE — 3331090002 HH PPS REVENUE DEBIT

## 2018-05-10 PROCEDURE — 97112 NEUROMUSCULAR REEDUCATION: CPT

## 2018-05-10 PROCEDURE — 83036 HEMOGLOBIN GLYCOSYLATED A1C: CPT | Performed by: INTERNAL MEDICINE

## 2018-05-10 RX ORDER — LIDOCAINE HYDROCHLORIDE AND EPINEPHRINE 10; 10 MG/ML; UG/ML
1-20 INJECTION, SOLUTION INFILTRATION; PERINEURAL
Status: DISCONTINUED | OUTPATIENT
Start: 2018-05-10 | End: 2018-05-11 | Stop reason: HOSPADM

## 2018-05-10 RX ORDER — LIDOCAINE HYDROCHLORIDE AND EPINEPHRINE 10; 10 MG/ML; UG/ML
INJECTION, SOLUTION INFILTRATION; PERINEURAL
Status: COMPLETED
Start: 2018-05-10 | End: 2018-05-10

## 2018-05-10 RX ORDER — MIDAZOLAM HYDROCHLORIDE 1 MG/ML
.5-2 INJECTION, SOLUTION INTRAMUSCULAR; INTRAVENOUS
Status: DISCONTINUED | OUTPATIENT
Start: 2018-05-10 | End: 2018-05-10 | Stop reason: HOSPADM

## 2018-05-10 RX ORDER — METOPROLOL SUCCINATE 25 MG/1
25 TABLET, EXTENDED RELEASE ORAL EVERY EVENING
Status: DISCONTINUED | OUTPATIENT
Start: 2018-05-10 | End: 2018-05-11 | Stop reason: HOSPADM

## 2018-05-10 RX ORDER — MIDAZOLAM HYDROCHLORIDE 1 MG/ML
INJECTION, SOLUTION INTRAMUSCULAR; INTRAVENOUS
Status: COMPLETED
Start: 2018-05-10 | End: 2018-05-10

## 2018-05-10 RX ADMIN — LIDOCAINE HYDROCHLORIDE,EPINEPHRINE BITARTRATE 10 MG: 10; .01 INJECTION, SOLUTION INFILTRATION; PERINEURAL at 12:17

## 2018-05-10 RX ADMIN — METOPROLOL SUCCINATE 25 MG: 25 TABLET, EXTENDED RELEASE ORAL at 18:35

## 2018-05-10 RX ADMIN — Medication 10 ML: at 15:45

## 2018-05-10 RX ADMIN — LIDOCAINE HYDROCHLORIDE AND EPINEPHRINE 10 MG: 10; 10 INJECTION, SOLUTION INFILTRATION; PERINEURAL at 12:17

## 2018-05-10 RX ADMIN — ASPIRIN 81 MG: 81 TABLET, COATED ORAL at 08:53

## 2018-05-10 RX ADMIN — CLOPIDOGREL BISULFATE 75 MG: 75 TABLET ORAL at 08:53

## 2018-05-10 RX ADMIN — Medication 10 ML: at 03:57

## 2018-05-10 RX ADMIN — MIDAZOLAM HYDROCHLORIDE 1 MG: 1 INJECTION, SOLUTION INTRAMUSCULAR; INTRAVENOUS at 12:17

## 2018-05-10 RX ADMIN — Medication 10 ML: at 21:00

## 2018-05-10 RX ADMIN — HEPARIN SODIUM 5000 UNITS: 5000 INJECTION, SOLUTION INTRAVENOUS; SUBCUTANEOUS at 03:57

## 2018-05-10 RX ADMIN — MIDAZOLAM 1 MG: 1 INJECTION INTRAMUSCULAR; INTRAVENOUS at 12:17

## 2018-05-10 RX ADMIN — HEPARIN SODIUM 5000 UNITS: 5000 INJECTION, SOLUTION INTRAVENOUS; SUBCUTANEOUS at 15:45

## 2018-05-10 RX ADMIN — ATORVASTATIN CALCIUM 80 MG: 40 TABLET, FILM COATED ORAL at 08:53

## 2018-05-10 NOTE — PROGRESS NOTES
TRANSFER - OUT REPORT:    Verbal report given to [address] [place] [address] RN(name) on Manisha Hopkins  being transferred to [facility](unit) for routine progression of care       Report consisted of patients Situation, Background, Assessment and   Recommendations(SBAR). Information from the following report(s) Procedure Summary was reviewed with the receiving nurse. Lines:   Peripheral IV 05/06/18 Right Antecubital (Active)   Site Assessment Clean, dry, & intact 5/10/2018  7:38 AM   Phlebitis Assessment 0 5/10/2018  7:38 AM   Infiltration Assessment 0 5/10/2018  7:38 AM   Dressing Status Clean, dry, & intact 5/10/2018  7:38 AM   Dressing Type Transparent 5/10/2018  7:38 AM   Hub Color/Line Status Pink 5/10/2018  7:38 AM   Alcohol Cap Used No 5/6/2018  9:02 PM        Opportunity for questions and clarification was provided.       Patient transported with:   Registered Nurse

## 2018-05-10 NOTE — PROGRESS NOTES
CM notified that Pt will hopefully be stable for DC tomorrow, 5/11/18. MD requested transportation through [facility] with scheduled  time of 11 AM.  CM sent referral to [facility] through [employer] and [facility] packet on bedside chart. CM spoke to son, Jorge Mcfarlane and he is in agreement with DC tomorrow and [facility] transport. CM will continue to monitor discharge plan.        Adi, [address]   Ext [phone]

## 2018-05-10 NOTE — PROGRESS NOTES
Hospitalist Progress Note    NAME: Rosanna Preez   :  1940   MRN:  [id number]       Assessment / Plan:  Syncope POA -past out at dinner table POA  Due to Acute R post Parietal cerebral infarction POA- on MRI  Hypotension POA BP 70s in ED- now resolved s/p IVF, holding BP meds  Recent episodes of lightheadedness(not vertigo)  Sitting at dinner table, passed out(witnessed by family)  No recollection of events  Carotid doppler - mild disease bilaterally  A1c= 6.3  LDL= 84    S/p IVF, now off it  Cardiology seen pt  BP meds held, takes multiple BP meds  Tele negatve  Echo noted- Systolic function was mildly reduced. Ejection fraction  was estimated in the range of 45 % to 50 %. There were no regional wall  motion abnormalities. There was mild diffuse hypokinesis. There was mild  concentric hypertrophy  PT/OT eval noted- SNF recommended- CM working on it- DC in 24 hrs  Recent stroke several months ago  IP neurology consult noted-   EEG unremarkable except L sided slowing due to old CVA  MRI noted for Acute R post Parietal cerebral infarction  Started on high dose statin     Stage 3 chronic kidney disease POA- Cr 1.1   Creatinine seems close to baseline  off IVF now  Serial labs     Stroke, chronic  Chronic systolic heart failure, ef 40% NYHA Class I  ASA  BP meds on hold  MRI shows NEW Acute cerebral lacunar infarction  Plan for Implantable Loop recorder as per cardiology- EP consult noted- planned today          Code Status: Full Code  DVT Prophylaxis: Hep SQ  GI Prophylaxis: not indicated      Body mass index is 26.49 kg/(m^2). Recommended Disposition: SNF/LTC in 24-hrs- needs 3 MN stay, pt changed to IP status , DC in AM if no complications from ILR placement by Dr Amber Yanes today     Subjective:     Chief Complaint / Reason for Physician Visit: F/U Syncope, orthostatic hypotension,  \"I feel ok\". Discussed with RN events overnight.      Review of Systems:  Symptom Y/N Comments  Symptom Y/N Comments Fever/Chills n   Chest Pain n    Poor Appetite n   Edema n    Cough n   Abdominal Pain n    Sputum n   Joint Pain     SOB/CHRISTINE n   Pruritis/Rash     Nausea/vomit n   Tolerating PT/OT y    Diarrhea n   Tolerating Diet y    Constipation    Other       Could NOT obtain due to:      Objective:     VITALS:   Last 24hrs VS reviewed since prior progress note. Most recent are:  Patient Vitals for the past 24 hrs:   Temp Pulse Resp BP SpO2   05/10/18 1147 97.4 °F (36.3 °C) 72 20 150/86 100 %   05/10/18 0900 - - - - 99 %   05/10/18 0753 97.8 °F (36.6 °C) 62 16 155/72 100 %   05/10/18 0413 98 °F (36.7 °C) 74 20 154/72 99 %   05/09/18 2252 98.8 °F (37.1 °C) 72 18 152/67 100 %   05/09/18 2008 98.2 °F (36.8 °C) 85 18 160/80 98 %   05/09/18 1457 98.1 °F (36.7 °C) 80 20 142/71 100 %     No intake or output data in the 24 hours ending 05/10/18 1336     PHYSICAL EXAM:  General: WD, WN. Alert, cooperative, no acute distress    EENT:  EOMI. Anicteric sclerae. MMM  Resp:  CTA bilaterally, no wheezing or rales. No accessory muscle use  CV:  Regular  rhythm,  No edema  GI:  Soft, Non distended, Non tender.  +Bowel sounds  Neurologic:  Alert and oriented X 3, Dysarthric speech ,   Psych:   Fair insight. Not anxious nor agitated  Skin:  No rashes. No jaundice    Reviewed most current lab test results and cultures  YES  Reviewed most current radiology test results   YES  Review and summation of old records today    NO  Reviewed patient's current orders and MAR    YES  PMH/SH reviewed - no change compared to H&P  ________________________________________________________________________  Care Plan discussed with:    Comments   Patient x    Family      RN x    Care Manager x [place]   Consultant  x Dr Andrew Sykes (cardio/EP)                     Multidiciplinary team rounds were held today with , nursing, pharmacist and clinical coordinator.   Patient's plan of care was discussed; medications were reviewed and discharge planning was addressed. ________________________________________________________________________  Total NON critical care TIME:  16   Minutes    Total CRITICAL CARE TIME Spent:   Minutes non procedure based      Comments   >50% of visit spent in counseling and coordination of care     ________________________________________________________________________  Millicent River MD     Procedures: see electronic medical records for all procedures/Xrays and details which were not copied into this note but were reviewed prior to creation of Plan. LABS:  I reviewed today's most current labs and imaging studies.   Pertinent labs include:  Recent Labs      05/08/18 0318   WBC  5.6   HGB  9.7*   HCT  29.5*   PLT  150     Recent Labs      05/08/18 0318   NA  143   K  4.0   CL  111*   CO2  26   GLU  84   BUN  19   CREA  1.16*   CA  8.9   ALB  3.1*   TBILI  0.4   SGOT  9*   ALT  10*       Signed: Millicent River MD

## 2018-05-10 NOTE — PROGRESS NOTES
5/10/2018 6:57 PM    Admit Date: 5/6/2018    Admit Diagnosis:   Syncope;Syncope    Subjective:     Adra Lot denies chest pain or shortness of breath. Current Facility-Administered Medications   Medication Dose Route Frequency    lidocaine-EPINEPHrine (XYLOCAINE) 1 %-1:100,000 injection  mg  1-20 mL SubCUTAneous Multiple    metoprolol succinate (TOPROL-XL) XL tablet 25 mg  25 mg Oral QPM    atorvastatin (LIPITOR) tablet 80 mg  80 mg Oral DAILY    aspirin delayed-release tablet 81 mg  81 mg Oral DAILY    clopidogrel (PLAVIX) tablet 75 mg  75 mg Oral DAILY    sodium chloride (NS) flush 5-10 mL  5-10 mL IntraVENous Q8H    sodium chloride (NS) flush 5-10 mL  5-10 mL IntraVENous PRN    ondansetron (ZOFRAN) injection 4 mg  4 mg IntraVENous Q4H PRN    hydrALAZINE (APRESOLINE) 20 mg/mL injection 10 mg  10 mg IntraVENous Q2H PRN    polyethylene glycol (MIRALAX) packet 17 g  17 g Oral DAILY    heparin (porcine) injection 5,000 Units  5,000 Units SubCUTAneous Q12H    albuterol-ipratropium (DUO-NEB) 2.5 MG-0.5 MG/3 ML  3 mL Nebulization Q2H PRN         Objective:      Physical Exam:    Visit Vitals    BP (!) 150/86    Pulse 85    Temp 97.7 °F (36.5 °C)    Resp 18    Ht 4' 11\" (1.499 m)    Wt 131 lb 2.8 oz (59.5 kg)    SpO2 98%    Breastfeeding No    BMI 26.49 kg/m2     Gen:  NAD  Mental Status - Alert. General Appearance - Not in acute distress. Chest and Lung Exam   Inspection: Accessory muscles - No use of accessory muscles in breathing. Auscultation:   Breath sounds: - Normal.   Cardiovascular   Inspection: Jugular vein - Bilateral - Inspection Normal.   Palpation/Percussion:   Apical Impulse: - Normal.   Auscultation: Rhythm - Regular. Heart Sounds - S1 WNL and S2 WNL. No S3 or S4. Murmurs & Other Heart Sounds: Auscultation of the heart reveals - No Murmurs. Peripheral Vascular   Upper Extremity: Inspection - Bilateral - No Cyanotic nailbeds or Digital clubbing.    Lower Extremity:   Palpation: Edema - Bilateral - No edema. Abdomen:   Soft, non-tender, bowel sounds are active. 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 (9/21/2012)      Overview: Severe bilateral by angio 2009      Cerebrovascular accident ([address] (9/21/2012)      Left bundle branch block (LBBB) on electrocardiogram (7/14/2016)      H/O: CVA (cerebrovascular accident) (7/15/2016)      CAD (coronary artery disease) (2/5/2018)      Acute cerebral infarction ([facility] (5/9/2018)      Bilateral carotid artery stenosis (5/10/2018)        Plan:     Syncope:  · Considering recently mildly reduced to low normal LVEF, no arrhythmias on telemetry, I agree the most likely etiology is dehydration plus or minus low blood pressure     Recurrent CVA's:  · New acute right post parietal infarct noted on MRI- doubt relation to syncope  · 2/2018 had a moderate-sized area of acute rightMCA territory infarction in the posterior right frontal lobe  · Concerned that there may be underlying AF that may be responsible for these CVAs.   · S/p ILR on 5/10/18 by Dr. Fide Gold  · I believe an implanted loop monitor would be of benefit to the patient as implanted loop increased detection of PAF from 2 to 12% over standard medical monitoring in cryptogenic CVA at 1 year (NEJM 2014).     HTN:  BP elevated, OK with neuro to have a bit higher  Intermittent Mild orthostatic hypotension- hold BP meds with goal standing [id number]  PTA meds on hold, received IV fluids  PT/OT following     Thanks for the consultI will sign off for now.   OK for DC from a cardiac standpoint after ILR to f/u with device clinic 2 weeks after DC, me in 1 month.

## 2018-05-10 NOTE — PROGRESS NOTES
SPEECH THERAPY SCREENING:  SERVICES MAY BE INDICATED     An [facility] screening referral was triggered for speech therapy based on results obtained during the nursing admission assessment. The patients chart was reviewed and the patient may appropriate for a skilled therapy evaluation if any concerns regarding swallowing, speech, language, or cognition as she has had an acute CVA. Please order a consult for speech therapy if you are in agreement and would like an evaluation to be completed. Thank you.     Melissa Heller, SLP

## 2018-05-10 NOTE — PROGRESS NOTES
TIMEOUT DONE    Procedure verified        Successful Linq insertion on patient DAKOTAH MATIAS by Dr Fide Gold in (site location)LEFT ANTERIOR CHEST. Hand held pressure to site for 2 minutes. Applied steri strip and telfa pad dressing to site. No bleeding hematoma or drainage from site. Patient (level of consciousness) A&Ox3. VSS, See doc flowsheet for vital signs. PROCEDURE SITE CHECK:    Procedure Site check: No bleeding, no hematoma, no pain/discomfort reported. No change in patient status. Continue to monitor patient and status. Staff: Zuleima Tran RN Circulator  Luz Cartwright Monitor    See chart for device info.

## 2018-05-10 NOTE — PROCEDURES
[facility]  *** FINAL REPORT ***    Name: Jose Dale  MRN: [id number]    Inpatient  : 02 Oct 1940  HIS Order #: [id number]  TRAKnet Visit #: [id number]  Date: 09 May 2018    TYPE OF TEST: Cerebrovascular Duplex    REASON FOR TEST  Stroke    Right Carotid:-             Proximal               Mid                 Distal  cm/s  Systolic  Diastolic  Systolic  Diastolic  Systolic  Diastolic  CCA:     50.9      11.0                            60.0      10.0  Bulb:  ECA:     22.0       0.0  ICA:     31.0      11.0       36.0      11.0       33.0      12.0  ICA/CCA:  0.5       1.1    ICA Stenosis: <50%    Right Vertebral:-  Finding: Antegrade  Sys:       35.0  Antonette:       13.0    Right Subclavian: Normal    Left Carotid:-            Proximal                Mid                 Distal  cm/s  Systolic  Diastolic  Systolic  Diastolic  Systolic  Diastolic  CCA:     89.5      11.0                            51.0      13.0  Bulb:  ECA:     55.0      15.0  ICA:     36.0      15.0       53.0      15.0       39.0      14.0  ICA/CCA:  0.7       1.2    ICA Stenosis: <50%    Left Vertebral:-  Finding: Antegrade  Sys:       62.0  Antonette:       21.0    Left Subclavian: Normal    INTERPRETATION/FINDINGS  PROCEDURE:  Carotid Duplex Examination using B-mode, color and  spectral Doppler of the extracranial cerebrovascular arteries. 1. Bilateral <50% stenosis of the internal carotid arteries (Bilateral   low end of range). 2. No significant stenosis in the external carotid arteries  bilaterally. 3. Antegrade flow in both vertebral arteries. 4. Normal flow in both subclavian arteries. Plaque Morphology:  1. Heterogeneous plaque in the right ICA. 2. Heterogeneous plaque in the left ICA. ADDITIONAL COMMENTS    I have personally reviewed the data relevant to the interpretation of  this  study. TECHNOLOGIST: Merlin Coil. Perrino, RVT, [place]  Signed: 05/10/2018 09:10 AM    PHYSICIAN: Alejandro Smith MD  Signed: 05/10/2018 03:09 PM

## 2018-05-10 NOTE — PROGRESS NOTES
Problem: Mobility Impaired (Adult and Pediatric)  Goal: *Acute Goals and Plan of Care (Insert Text)  Physical Therapy Goals  Initiated 5/8/2018  1. Patient will move from supine to sit and sit to supine , scoot up and down and roll side to side in bed with independence within 7 day(s). 2.  Patient will transfer from bed to chair and chair to bed with modified independence using the least restrictive device within 7 day(s). 3.  Patient will perform sit to stand with modified independence within 7 day(s). 4.  Patient will ambulate with modified independence for 150 feet with the least restrictive device within 7 day(s). 5.  Patient will ascend/descend 3 stairs with 1 handrail(s) with supervision/set-up within 7 day(s). physical Therapy TREATMENT  Patient: Robe Jackson (55 y.o. female)  Date: 5/10/2018  Diagnosis: Syncope  Syncope Syncope       Precautions: Fall  Chart, physical therapy assessment, plan of care and goals were reviewed. ASSESSMENT:  Pt received supine in bed and agreeable to PT intervention. Pt cleared by nursing for mobility. Pt with slow progress towards therapy goals this date as pt with c/o dizziness and needing increased assistance from therapist. She performed bed mobility with min A x 1-2, including rolling and supine<>sit transfers, and use of bed railings. She c/o dizziness in sitting, which improved with increased time in sitting and VSS on 2 L/min O2. Sit<>stand transfers performed with min A x 1-2 without AD from EOB and commode. She participated in GUPTA balance test and scored 7/56 indicating high fall risk. She needed seated rest break prior to gait training x 12 ft to/from the bathroom. She needed min A for mobility and toileting hygiene in the bathroom. She was assisted back into bed following therapy session as cardiac team present to take patient for procedure. Pt was left supine in bed with all needs met, cardiac team present, and RN aware.  Continue to recommend patient discharge to SNF rehab to improve functional mobility and independence prior to returning home. Progression toward goals:  []    Improving appropriately and progressing toward goals  [x]    Improving slowly and progressing toward goals  []    Not making progress toward goals and plan of care will be adjusted     PLAN:  Patient continues to benefit from skilled intervention to address the above impairments. Continue treatment per established plan of care. Discharge Recommendations:  [facility]  Further Equipment Recommendations for Discharge:  TBD by rehab     SUBJECTIVE:   Patient stated My head feels dizzy.     OBJECTIVE DATA SUMMARY:   Critical Behavior:  Neurologic State: Sleeping  Orientation Level: Oriented X4  Cognition: Follows commands  Safety/Judgement: Insight into deficits  Functional Mobility Training:  Bed Mobility:  Rolling: Minimum assistance  Supine to Sit: Minimum assistance;Assist x2  Sit to Supine: Minimum assistance;Assist x2  Scooting: Contact guard assistance; Additional time        Transfers:  Sit to Stand: Minimum assistance;Assist x2; Additional time  Stand to Sit: Minimum assistance                             Balance:  Sitting: Impaired; Without support  Sitting - Static: Fair (occasional); Good (unsupported)  Sitting - Dynamic: Fair (occasional) (to good)  Standing: Impaired; With support  Standing - Static: Good;Constant support  Standing - Dynamic : Fair (to good with RW support)  Ambulation/Gait Training:  Distance (ft): 12 Feet (ft)  Assistive Device: Gait belt;Walker, rolling  Ambulation - Level of Assistance: Contact guard assistance;Minimal assistance;Assist x1;Additional time; Adaptive equipment (min-mod A for RW management)        Gait Abnormalities: Decreased step clearance;Shuffling gait (increased trunk flexion)        Base of Support: Widened     Speed/Brianda: Pace decreased (<100 feet/min); Shuffled  Step Length: Left shortened;Right shortened  Jacob Torres Balance Test:    Sitting to Standin  Standing Unsupported: 1  Sitting with Back Unsupported: 3  Standing to Sittin  Transfers: 1  Standing Unsupported with Eyes Closed: 1  Standing Unsupported with Feet Together: 0  Reach Forward with Outstretched Arm: 0   Object: 0  Turn to Look Over Shoulders: 0  Turn 360 Degrees: 0  Alternate Foot on Step/Stool: 0  Standing Unsupported One Foot in Front: 0  Stand on One Le  Total: 7         56=Maximum possible score;   0-20=High fall risk  21-40=Moderate fall risk   41-56=Low fall risk     Wilder Balance Test and G-code impairment scale:  Percentage of Impairment CH    0%   CI    1-19% CJ    20-39% CK    40-59% CL    60-79% CM    80-99% CN     100%   Wilder   Score 0-56 56 45-55 34-44 23-33 12-22 1-11 0       Pain:  Pain Scale 1: Numeric (0 - 10)  Pain Intensity 1: 0              Activity Tolerance:   Fair - c/o dizziness in sitting and standing with VSS on 2 L/min O2 throughout; needed two seated rest breaks due to fatigue/dizziness  Please refer to the flowsheet for vital signs taken during this treatment.   After treatment:   []    Patient left in no apparent distress sitting up in chair  [x]    Patient left in no apparent distress in bed  [x]    Call bell left within reach  [x]    Nursing notified  [x]    Caregiver present  []    Bed alarm activated    COMMUNICATION/COLLABORATION:   The patients plan of care was discussed with: Physical Therapist and Registered Nurse    Ariel Reyes PT, DPT   Time Calculation: 26 mins

## 2018-05-10 NOTE — CONSULTS
EP/ ARRHYTHMIA CONSULT requested by Dr. Lauren Garay secondary to cryptogenic stroke    Patient ID:  Patient: Clayton Hall  MRN: [id number]  Age: 76 y.o.  : 1940    Date of  Admission: 2018  7:22 PM   PCP:  Paulie Smith DO    Assessment: 1. Cryptogenic (acute) stroke in right MCA territory. Dysarthria and L upper extremity weakness. 2. Lightheadedness x weeks. Had syncope PTA. 3. Left bundle branch block, chronic. 4. Nonobstructive CAD on cath in , then and recently in 2016, inferior defect noted on nuclear scan. 5. Nonischemic cardiomyopathy recent EF 45-50%, improved from as low as 20% years ago. 6. Hypertensive heart disease and heart failure and CKD. 7. HOLLY atop CKD stage 3.  8. Dual antiplatelet therapy. 9. Anemia NOS. Plan:     1. ILR discussed with the patient who while dysarthric has a clear understanding of what the procedure entails. I think this would be useful to screen longterm for Afib as well as monitor for an arrhythmogenic source of syncope. NPO after BF tomorrow AM.  Will try to do tomorrow afternoon after obtaining formal consent. []       High complexity decision making was performed in this patient at high risk for decompensation with multiple organ involvement. Clayton Hall is a 76 y.o. female with a history of the above, here for management of her acute stroke. She is dysarthric, but clearly understands and communicates to me what she can. I reviewed the chart for details as well. She apparently had syncope at the dinner table PTA. Chronic LBBB on ECG's and tele. She is not on anticoagulation, but dual antiplatelet therapy. Currently, denies chest pain, palpitations, dizziness, PND, worsening dyspnea.       Past Medical History:   Diagnosis Date    Acute kidney failure ([address]     Acute systolic heart failure ([address] 2016    CAD (coronary artery disease)     [facility]     Stroke [facility])  Stroke    Stroke [facility])         History reviewed. No pertinent surgical history. Social History   Substance Use Topics    Smoking status: Former Smoker     Quit date: 9/21/2009    Smokeless tobacco: Never Used    Alcohol use 0.0 oz/week     0 Standard drinks or equivalent per week      Comment: Very little        Family History   Problem Relation Age of Onset    Coronary Artery Disease Other      grandmother age 76        Allergies   Allergen Reactions    Decadron [Dexamethasone Sodium Phosphate] Hives    Pcn [Penicillins] Hives          Current Facility-Administered Medications   Medication Dose Route Frequency    atorvastatin (LIPITOR) tablet 80 mg  80 mg Oral DAILY    aspirin delayed-release tablet 81 mg  81 mg Oral DAILY    clopidogrel (PLAVIX) tablet 75 mg  75 mg Oral DAILY    sodium chloride (NS) flush 5-10 mL  5-10 mL IntraVENous Q8H    sodium chloride (NS) flush 5-10 mL  5-10 mL IntraVENous PRN    ondansetron (ZOFRAN) injection 4 mg  4 mg IntraVENous Q4H PRN    hydrALAZINE (APRESOLINE) 20 mg/mL injection 10 mg  10 mg IntraVENous Q2H PRN    polyethylene glycol (MIRALAX) packet 17 g  17 g Oral DAILY    heparin (porcine) injection 5,000 Units  5,000 Units SubCUTAneous Q12H    albuterol-ipratropium (DUO-NEB) 2.5 MG-0.5 MG/3 ML  3 mL Nebulization Q2H PRN       Review of Symptoms:  Patient cannot communicate in full.     REVIEW OF SYSTEMS:     [x]         Unable to obtain  ROS due to AMS   []         Total of 12 systems reviewed as follows:     Constitutional: negative fever, negative chills, negative weight loss  Eyes:                                   negative visual changes  ENT:                                    negative sore throat, tongue or lip swelling  Respiratory:               negative cough, negative dyspnea  Cards:            negative for chest pain, palpitations, lower extremity edema  GI:                                       negative for nausea, vomiting, diarrhea, and abdominal pain  Genitourinary:           negative for frequency, dysuria  Integument:               negative for rash   Hematologic:             negative for easy bruising and gum/nose bleeding  Musculoskel:             negative for myalgias,  back pain  Neurological:             negative for headaches, dizziness, vertigo  Behavl/Psych: negative for feelings of anxiety, depression      Pertinent Positives include:  Unilateral weakness, dysarthria       Objective:      Physical Exam:  Temp (24hrs), Av.3 °F (36.8 °C), Min:98.1 °F (36.7 °C), Max:98.5 °F (36.9 °C)    Patient Vitals for the past 8 hrs:   Pulse   18 85   18 1457 80    Patient Vitals for the past 8 hrs:   Resp   18 18   18 1457 20    Patient Vitals for the past 8 hrs:   BP   18 160/80   18 1457 142/71      No intake or output data in the 24 hours ending 18    Nondiaphoretic, not in acute distress. Supple, no palpable thyromegaly. No scleral icterus, mucous membranes moist, conjuctivae pink, no xanthelasma. Unlabored, clear to auscultation bilaterally anteriorly, symmetric air movement. Regular rate and rhythm, no pericardial rub, knock, or gallop. No JVD or peripheral edema. No carotid bruit. Palpable radial pulses. Abdomen, soft, nontender, nondistended. Extremities without cyanosis or clubbing. Muscle tone and bulk normal.  Skin warm and dry. No rashes or ulcers. +Dysarthria and LUE weakness. No tremor. Awake and appropriate. CARDIOGRAPHICS and STUDIES, I reviewed:    Telemetry:  SR 80's. ECG:   Reviewed. Echo :  LEFT VENTRICLE: Size was normal. Systolic function was mildly reduced. Ejection fraction was estimated in the range of 45 % to 50 %. There were  no regional wall motion abnormalities. There was mild diffuse hypokinesis. There was mild concentric hypertrophy.     RIGHT VENTRICLE: The size was normal. Systolic function was normal. Wall  thickness was normal.    LEFT ATRIUM: The atrium was mildly dilated. RIGHT ATRIUM: Size was at the upper limits of normal.    MITRAL VALVE: There was mild thickening of the anterior leaflet. There was  normal leaflet separation. DOPPLER: The transmitral velocity was within  the normal range. There was no evidence for stenosis. There was mild  regurgitation. AORTIC VALVE: The valve was probably trileaflet. Leaflets exhibited mildly  increased thickness and mildly reduced cuspal separation. DOPPLER: There  was mild stenosis. There was trivial regurgitation. TRICUSPID VALVE: Normal valve structure. There was normal leaflet  separation. DOPPLER: The transtricuspid velocity was within the normal  range. There was no evidence for tricuspid stenosis. There was mild  regurgitation. PULMONIC VALVE: Leaflets exhibited normal thickness, no calcification, and  normal cuspal separation. DOPPLER: The transpulmonic velocity was within  the normal range. There was no regurgitation. AORTA: The root exhibited normal size. SYSTEMIC VEINS: IVC: The inferior vena cava was normal in size. PERICARDIUM: There was no pericardial effusion.          Labs:  Recent Labs      05/08/18   0318  05/06/18   2100   TROIQ  0.09*  0.04     Lab Results   Component Value Date/Time    Cholesterol, total 171 02/06/2018 03:10 AM    HDL Cholesterol 51 02/06/2018 03:10 AM    LDL, calculated 102 (H) 02/06/2018 03:10 AM    Triglyceride 90 02/06/2018 03:10 AM    CHOL/HDL Ratio 3.4 02/06/2018 03:10 AM     Recent Labs      05/06/18   2100   INR  1.0   PTP  10.4   APTT  24.3      Recent Labs      05/08/18   0318  05/07/18   0329  05/06/18   2100   NA  143  146*  142   K  4.0  4.5  5.1   CL  111*  115*  108   CO2  26  23  30   BUN  19  28*  28*   CREA  1.16*  1.50*  1.72*   GLU  84  109*  141*   PHOS   --   4.1   --    CA  8.9  7.6*  8.9   ALB  3.1*   --   3.6   WBC  5.6  6.8  8.6   HGB  9.7*  10.1*  10.5*   HCT  29.5*  30.7*  32.4*   PLT  150  171  188 Recent Labs      05/08/18 0318  05/06/18   2100   SGOT  9*  13*   AP  89  107   TP  6.2*  7.0   ALB  3.1*  3.6   GLOB  3.1  3.4     No components found for: GLPOC  No results for input(s): PH, PCO2, PO2 in the last 72 hours.         Linette Waterman MD  5/9/2018

## 2018-05-10 NOTE — PROGRESS NOTES
I again discussed the potential benefits (in setting of cryptogenic stroke, syncope and LBBB), risks (infection, bleeding, etc), and alternatives to implantable loop recorder and she agrees to proceed. Nurse, Aggie Gómez, in the room to witness. She is alert and understands everything. She has not had food or drink this AM, so we'll move ahead earlier than expected and do the ILR this AM.  Discussed with nursing.     Signed:  Deisi Piña MD

## 2018-05-10 NOTE — PROGRESS NOTES
Cardiac Cath Lab Recovery Arrival Note:      Sana Weeks arrived to Cardiac Cath Lab, Recovery Area. Staff introduced to patient. Patient identifiers verified with NAME and DATE OF BIRTH. Procedure verified with patient. Consent forms reviewed and signed by patient or authorized representative and verified. Allergies verified. Patient and family oriented to department. Patient and family informed of procedure and plan of care. Questions answered with review. Patient prepped for procedure, per orders from physician, prior to arrival.    Patient on cardiac monitor, non-invasive blood pressure, SPO2 monitor. On O2 at 2lpm,nc. Patient is A&Ox 3. Patient reports no complaints. Patient in stretcher, in low position, with side rails up, call bell within reach, patient instructed to call if assistance as needed. Patient prep in: [address], [place] [address].    Prep by: EP LAB staff

## 2018-05-10 NOTE — PROGRESS NOTES
Attempted to see patient for OT treatment, but patient is off floor to EP lab for implantable loop recorder placement. Will likely follow back tomorrow for OT intervention.

## 2018-05-10 NOTE — PROGRESS NOTES
Patient transport to room with EP Lab staff. No change in status reported    Product book,ID card and remote monitor sent with patient. PROCEDURE SITE CHECK:    Procedure Site check: No bleeding, no hematoma, no pain/discomfort reported. No change in patient status. Continue to monitor patient and status. ice pack to site. EP/ End of Procedure/ TRANSFER - OUT REPORT:    Verbal report given to staff at bedside, room 2120 on [facility] for routine progression of care       Report consisted of patients Situation, Background, Assessment and   Recommendations(SBAR). Information from the following report(s) SBAR, Kardex and Procedure Summary was reviewed with the receiving nurse. Opportunity for questions and clarification was provided.

## 2018-05-10 NOTE — ROUTINE PROCESS
Bedside and Verbal shift change report given to Sheila/Ruthie RN (oncoming nurse) by Hilary/Siobhan RN (offgoing nurse).  Report included the following information SBAR, Kardex, Intake/Output and [facility].      Zone Phone:   [phone]          Significant changes during shift:  none              Patient Information  Faustino Ortega  76 y.o.  5/6/2018  7:22 PM by Tran Madden MD. Oriana Trevizo admitted from Home      Problem List              Patient Active Problem List      Diagnosis Date Noted    Syncope 05/06/2018    CAD (coronary artery disease) 02/05/2018    Stroke ([address] 02/05/2018    Hypertensive urgency 08/01/2017    CHF (congestive heart failure) ([address] 08/01/2017    COPD exacerbation ([address] 03/07/2017    CAD (coronary artery disease), native coronary artery 07/15/2016    H/O: CVA (cerebrovascular accident) 07/15/2016    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 ([address] 07/15/2016    Severe sepsis ([address] 07/14/2016    Pneumonia 07/14/2016    Acute respiratory failure ([address] 07/14/2016    Elevated troponin 07/14/2016    Left bundle branch block (LBBB) on electrocardiogram 66/59/8750    Acute systolic heart failure ([address] 07/14/2016    Acute kidney failure ([address] 07/14/2016    Mixed hyperlipidemia 09/21/2012    Essential hypertension, benign 09/21/2012    Other left bundle branch block 09/21/2012    Coronary atherosclerosis of native coronary artery 09/21/2012    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 09/21/2012    Atherosclerosis of renal artery ([address] 09/21/2012    Atherosclerosis of native artery of extremity with intermittent claudication ([address] 09/21/2012    Tobacco use disorder 09/21/2012    Cerebrovascular accident ([address] 09/21/2012    Mitral valve disorders(424.0) 09/21/2012    Tricuspid valve disorder 09/21/2012               Past Medical History:   Diagnosis Date    Acute kidney failure ([address] 9/59/0635    Acute systolic heart failure ([address] 7/14/2016    CAD (coronary artery disease)       Heart failure ([facility]       Stroke ([facility] 2009      Stroke    Stroke [facility])                  Core Measures:      CVA: Yes, Yes  CHF:No No  PNA:No No      Activity Status:      OOB to Chair Yes  Ambulated this shift Yes   Bed Rest No          DVT prophylaxis:      DVT prophylaxis Med- Yes  DVT prophylaxis SCD or MALA- No       Wounds: (If Applicable)      Wounds- No      Patient Safety:      Falls Score Total Score: 4  Safety Level_______  Bed Alarm On? Yes  Sitter?  No      Plan for upcoming shift: safety, MRI              Discharge Plan: SNF when ready      Active Consults:  IP CONSULT TO CARDIOLOGY

## 2018-05-10 NOTE — PROCEDURES
[address]  [phone]    Patient ID:  Patient: Esther Loomis  MRN: [id number]  Age: 76 y.o.  : 1940  Gender: female  Study Date: 5/10/2018    History: This is a female with cryptogenic stroke as well as syncope that is unexplained and left bundle branch block. She is here for implantable loop recorder implant. Procedure: Cardiac Implantable Loop Recorder  ([id number])  The patient was brought to the lab in a postabsorptive state after informed consent had been previously obtained. Continuous electrocardiographic and hemodynamic monitoring was performed. Sedation was performed by the nurse who was in constant attendance and my supervision throughout the procedure using Versed and fentanyl, sedation time 15 minutes. Using 1% lidocaine with epinephrine, the left chest site was anesthetized. The pocket was formed in the usual fashion with a skin slit by the bladed tool. The loop recorder was injected into the pocket. The pocket was closed after holding manual pressure and then applying steristrips. Preoperative Diagnosis: As above. Postoperative Diagnosis: As above. Procedure:  As above. Surgeon(s) and Role:  Precious Dong MD - Primary   Anesthesia:   MAC. Estimated Blood Loss:  <5 cc. Specimens: * No specimens in log *   Findings:  As below. Complications:  None. SETTINGS:  [employer] [facility] [id number], [id number]  See scanned report for settings     Recommendations: After successful loop recorder implant to the left chest, routine follow-up is recommended.     Signed:  Precious Dong MD

## 2018-05-10 NOTE — ROUTINE PROCESS
Went to get patient to sign consent form for Implantable Loop Recorder. Patient was unaware that she was having any procedure done. Consent form was not signed due to patientt not having any knowledge of the procedure.

## 2018-05-10 NOTE — PROGRESS NOTES
* No surgery found *  * No surgery found *  Bedside shift change report given to Katina Serra RN (oncoming nurse) by Megan Hudson RN (offgoing nurse).  Report included the following information SBAR, Kardex, Intake/Output, MAR, Accordion and Recent Results.     Zone Phone:   [phone]        Significant changes during shift:  Loop Recorder implanted           Patient Information     Hazel Cardona  76 y.o.  5/6/2018  7:22 PM by Will Osborne MD. Hazel Cardona was admitted from Home     Problem List          Patient Active Problem List     Diagnosis Date Noted    Acute cerebral infarction ([address] 05/09/2018    Syncope 05/06/2018    CAD (coronary artery disease) 02/05/2018    Stroke ([address] 02/05/2018    Hypertensive urgency 08/01/2017    CHF (congestive heart failure) ([address] 08/01/2017    COPD exacerbation ([address] 03/07/2017    CAD (coronary artery disease), native coronary artery 07/15/2016    H/O: CVA (cerebrovascular accident) 07/15/2016    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 ([address] 07/15/2016    Severe sepsis ([address] 07/14/2016    Pneumonia 07/14/2016    Acute respiratory failure ([address] 07/14/2016    Elevated troponin 07/14/2016    Left bundle branch block (LBBB) on electrocardiogram 49/18/2898    Acute systolic heart failure ([address] 07/14/2016    Acute kidney failure ([address] 07/14/2016    Mixed hyperlipidemia 09/21/2012    Essential hypertension, benign 09/21/2012    Other left bundle branch block 09/21/2012    Coronary atherosclerosis of native coronary artery 09/21/2012    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 09/21/2012    Atherosclerosis of renal artery ([address] 09/21/2012    Atherosclerosis of native artery of extremity with intermittent claudication ([address] 09/21/2012    Tobacco use disorder 09/21/2012    Cerebrovascular accident ([address] 09/21/2012    Mitral valve disorders(424.0) 09/21/2012    Tricuspid valve disorder 09/21/2012           Past Medical History: Diagnosis Date    Acute kidney failure ([facility] 4/70/5714    Acute systolic heart failure ([facility] 7/14/2016    CAD (coronary artery disease)      Heart failure ([facility]      Stroke ([facility] 2009     Stroke    Stroke [facility])              Core Measures:     CVA: Yes Yes  CHF:No No  PNA:No No     Post Op Surgical (If Applicable):      Number times ambulated in hallway past shift:  0  Number of times OOB to chair past shift:   1  NG Tube: No  Incentive Spirometer: No  Drains: No    Dressing Present:  No  Flatus:  No     Activity Status:     OOB to Chair Yes  Ambulated this shift Yes   Bed Rest No     Supplemental O2: (If Applicable)     NC Yes  NRB No  Venti-mask No  On 3 Liters/min           DVT prophylaxis:     DVT prophylaxis Med- Yes  DVT prophylaxis SCD or MALA- No      Wounds: (If Applicable)     Wounds- No        Patient Safety:     Falls Score Total Score: 4  Safety Level_______  Bed Alarm On? Yes  Sitter?  No     Plan for upcoming shift: Monitor           Discharge Plan: Yes, Friday [facility]     Active Consults:  IP CONSULT TO CARDIOLOGY  IP CONSULT TO NEUROLOGY  IP CONSULT TO ELECTROPHYSIOLOGY]

## 2018-05-11 VITALS
HEIGHT: 59 IN | TEMPERATURE: 98.4 F | HEART RATE: 74 BPM | RESPIRATION RATE: 18 BRPM | DIASTOLIC BLOOD PRESSURE: 63 MMHG | OXYGEN SATURATION: 99 % | WEIGHT: 131.17 LBS | BODY MASS INDEX: 26.44 KG/M2 | SYSTOLIC BLOOD PRESSURE: 149 MMHG

## 2018-05-11 PROCEDURE — 3331090002 HH PPS REVENUE DEBIT

## 2018-05-11 PROCEDURE — 74011250637 HC RX REV CODE- 250/637: Performed by: INTERNAL MEDICINE

## 2018-05-11 PROCEDURE — 3331090001 HH PPS REVENUE CREDIT

## 2018-05-11 PROCEDURE — 74011250636 HC RX REV CODE- 250/636: Performed by: INTERNAL MEDICINE

## 2018-05-11 RX ORDER — ATORVASTATIN CALCIUM 80 MG/1
80 TABLET, FILM COATED ORAL
Qty: 30 TAB | Refills: 0 | Status: SHIPPED
Start: 2018-05-11 | End: 2019-08-13

## 2018-05-11 RX ORDER — METOPROLOL SUCCINATE 25 MG/1
25 TABLET, EXTENDED RELEASE ORAL EVERY EVENING
Qty: 30 TAB | Refills: 0 | Status: ON HOLD
Start: 2018-05-11 | End: 2018-06-01

## 2018-05-11 RX ADMIN — ASPIRIN 81 MG: 81 TABLET, COATED ORAL at 09:40

## 2018-05-11 RX ADMIN — ATORVASTATIN CALCIUM 80 MG: 40 TABLET, FILM COATED ORAL at 09:40

## 2018-05-11 RX ADMIN — CLOPIDOGREL BISULFATE 75 MG: 75 TABLET ORAL at 09:40

## 2018-05-11 RX ADMIN — HEPARIN SODIUM 5000 UNITS: 5000 INJECTION, SOLUTION INTRAVENOUS; SUBCUTANEOUS at 03:25

## 2018-05-11 NOTE — DISCHARGE INSTRUCTIONS
HOSPITALIST DISCHARGE INSTRUCTIONS    NAME: Briseyda Hughes   :  1940   MRN:  [id number]     Date/Time:  2018 10:56 AM    ADMIT DATE: 2018     DISCHARGE DATE: 2018     DISCHARGE DIAGNOSIS:  Syncope POA -past out at dinner table POA  Due to Acute R post Parietal cerebral infarction POA- on MRI, S/p ILR implantation by Dr Rosie [place]- OP follow up in 2 weeks for Device check & Dr Nohelia Patel in 4 weeks for Follow up as recommended  Hypotension POA BP 70s in ED- now resolved s/p IVF, Adjusted the BP meds as per Cardiology recommendations  Stroke, chronic - dysarthria at baseline  Chronic systolic heart failure, ef 40% NYHA Class I  Stage 3 chronic kidney disease POA-       Principal Problem:    Syncope (2018)    Active Problems:    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 (2012)      Atherosclerosis of native artery of extremity with intermittent claudication ([address] (2012)      Overview: Severe bilateral by angio 2009      Cerebrovascular accident ([address] (2012)      Left bundle branch block (LBBB) on electrocardiogram (2016)      H/O: CVA (cerebrovascular accident) (7/15/2016)      CAD (coronary artery disease) (2018)      Acute cerebral infarction ([address] (2018)      Bilateral carotid artery stenosis (5/10/2018)         MEDICATIONS:  As per medication reconciliation  list  · It is important that you take the medication exactly as they are prescribed. · Keep your medication in the bottles provided by the pharmacist and keep a list of the medication names, dosages, and times to be taken in your wallet. · Do not take other medications without consulting your doctor.      Pain Management: per above medications    What to do at Home    Recommended diet:  Cardiac Diet and Low fat, Low cholesterol    Recommended activity: Activity as tolerated    If you have questions regarding the hospital related prescriptions or hospital related issues please call [facility] at . If you experience any of the following symptoms then please call your primary care physician or return to the emergency room if you cannot get hold of your doctor:  Fever, chills, nausea, vomiting, diarrhea, change in mentation, falling, bleeding, shortness of breath,     Follow Up:  Dr. Brittney Betts, DO  you are to call and set up an appointment to see them in 7-10 days. Dr Ashley Mike ([facility] cardiology) in 2 weeks for Device check  Dr Devante Ware ([facility] [address] [place] [address] cardiology) in 4 weeks for follow up  Dr Pam Brown (Neurology) in 4 weeks for post stroke follow up    Information obtained by :  I understand that if any problems occur once I am at home I am to contact my physician. I understand and acknowledge receipt of the instructions indicated above.                                                                                                                                            Physician's or R.N.'s Signature                                                                  Date/Time                                                                                                                                              Patient or Representative Signature                                                          Date/Time

## 2018-05-11 NOTE — PROGRESS NOTES
Spiritual Care Partner Volunteer visited patient in orthopedics on 5/11/2018. Documented by:   Rev. Zak Barclay D.Min, MA, [facility]    Lead  Profession Development & Advancement

## 2018-05-11 NOTE — DISCHARGE SUMMARY
Hospitalist Discharge Summary     Patient ID:  Manisha Hopkins  [id number]  61 y.o.  1940    PCP on record: Ash Bejarano     Admit date: 5/6/2018  Discharge date and time: 5/11/2018      DISCHARGE DIAGNOSIS:    Syncope POA -past out at dinner table POA  Due to Acute R post Parietal cerebral infarction POA- on MRI, S/p ILR implantation by Dr Leisa Freedman- OP follow up in 2 weeks for Device check & Dr Martín John in 4 weeks for Follow up as recommended  Hypotension POA BP 70s in ED- now resolved s/p IVF, Adjusted the BP meds as per Cardiology recommendations  Stroke, chronic - dysarthria at baseline  Chronic systolic heart failure, ef 40% NYHA Class I  Stage 3 chronic kidney disease POA-      CONSULTATIONS:  IP CONSULT TO CARDIOLOGY  IP CONSULT TO NEUROLOGY  IP CONSULT TO ELECTROPHYSIOLOGY    Excerpted HPI from H&P of Kathryn Garay MD:  [age] y.o.  female with known history as listed below presents to ED with complaint noted above. Available records were reviewed at the time of H&P. Patient with complex pmh to include NYHA class I chf, stroke whom has chronic changes from cva to include speech and left side UE weakness presents to ED following family witnessing syncopal episode while at dinner table around 1830. Patient reports getting LH. It did resolve after she had syncope. No loss of bowel/bladder. No shaking/twitching. Not sick of recent. No recent medication changes she could recall. No HA/change in vision. No new weakness she was aware of. Speech without chagne per family. In ED CXR clear. Noted on labs to have a bump in creat and mild dehydration. We were asked to observe for work up and evaluation of the above problems. \"    ______________________________________________________________________  DISCHARGE SUMMARY/HOSPITAL COURSE:  for full details see H&P, daily progress notes, labs, consult notes.      Syncope POA -past out at dinner table POA  Due to Acute R post Parietal cerebral infarction POA- on MRI  Hypotension POA BP 70s in ED- now resolved s/p IVF, holding BP meds  Recent episodes of lightheadedness(not vertigo)  Sitting at dinner table, passed out(witnessed by family)  No recollection of events  Carotid doppler - mild disease bilaterally  A1c= 6.3  LDL= 84     S/p IVF, now off it  Cardiology seen pt  BP meds held, takes multiple BP meds  Tele negatve  Echo noted- Systolic function was mildly reduced. Ejection fraction  was estimated in the range of 45 % to 50 %. There were no regional wall  motion abnormalities. There was mild diffuse hypokinesis. There was mild  concentric hypertrophy  PT/OT eval noted- SNF recommended- CM working on it- DC in 24 hrs  Recent stroke several months ago  IP neurology consult noted-   EEG unremarkable except L sided slowing due to old CVA  MRI noted for Acute R post Parietal cerebral infarction  Started on high dose statin      Stage 3 chronic kidney disease POA- Cr 1.1   Creatinine seems close to baseline  off IVF now  Serial labs      Stroke, chronic  Chronic systolic heart failure, ef 40% NYHA Class I  ASA  BP meds on hold  MRI shows NEW Acute cerebral lacunar infarction  Plan for Implantable Loop recorder as per cardiology- EP consult noted- planned today            Code Status: Full Code  DVT Prophylaxis: Hep SQ  GI Prophylaxis: not indicated           _______________________________________________________________________  Patient seen and examined by me on discharge day. Pertinent Findings:  Gen:    Not in distress  Chest: Clear lungs  CVS:   Regular rhythm. No edema  Abd:  Soft, not distended, not tender  Neuro:  Alert, oriented x 3, dysarthric speech noted  _______________________________________________________________________  DISCHARGE MEDICATIONS:   Current Discharge Medication List      START taking these medications    Details   metoprolol succinate (TOPROL-XL) 25 mg XL tablet Take 1 Tab by mouth every evening.   Qty: 30 Tab, Refills: 0         CONTINUE these medications which have CHANGED    Details   atorvastatin (LIPITOR) 80 mg tablet Take 1 Tab by mouth nightly. TAKE ONE TABLET BY MOUTH EVERY DAY  Qty: 30 Tab, Refills: 0         CONTINUE these medications which have NOT CHANGED    Details   fexofenadine (ALLEGRA) 180 mg tablet Take 180 mg by mouth daily as needed for Allergies. OXYGEN-AIR DELIVERY SYSTEMS 2 L by IntraNASal route daily as needed (shortness of breath). predniSONE (DELTASONE) 10 mg tablet Take 2 Tabs by mouth daily (with breakfast). 2 tabs for 3 days  1 tabs for 3 days  Then stop. Qty: 9 Tab, Refills: 0      loratadine (CLARITIN) 10 mg tablet Take 1 Tab by mouth daily. Qty: 20 Tab, Refills: 0      clopidogrel (PLAVIX) 75 mg tab Take 1 Tab by mouth daily. Qty: 30 Tab, Refills: 0      aspirin delayed-release 81 mg tablet Take 1 Tab by mouth daily. Qty: 100 Tab, Refills: 6      albuterol (PROVENTIL HFA, VENTOLIN HFA, PROAIR HFA) 90 mcg/actuation inhaler Take 2 Puffs by inhalation every four (4) hours as needed for Wheezing. Qty: 1 Inhaler, Refills: 1         STOP taking these medications       potassium chloride (K-DUR, KLOR-CON) 20 mEq tablet Comments:   Reason for Stopping:         amLODIPine (NORVASC) 2.5 mg tablet Comments:   Reason for Stopping:         furosemide (LASIX) 40 mg tablet Comments:   Reason for Stopping:         lisinopril (PRINIVIL, ZESTRIL) 2.5 mg tablet Comments:   Reason for Stopping:         carvedilol (COREG) 12.5 mg tablet Comments:   Reason for Stopping:         isosorbide mononitrate ER (IMDUR) 30 mg tablet Comments:   Reason for Stopping:               My Recommended Diet, Activity, Wound Care, and follow-up labs are listed in the patient's Discharge Insturctions which I have personally completed and reviewed.     ______________________________________________________________________    Risk of deterioration: Moderate    Condition at Discharge: Stable  ______________________________________________________________________    Disposition  SNF/LTC  ______________________________________________________________________    Care Plan discussed with:   Patient, Family, RN, Care Manager, Consultant    ______________________________________________________________________    Code Status: Full Code  ______________________________________________________________________      Follow up with:   PCP : Heriberto Richardson DO  Follow-up Information     Follow up With Details Comments [address] [place] [address] On 5/11/2018 this is you rehab provider [address] [place], [place] [address]jaime Orozco [address]  [facility],   Please call to schedule your follow up appointment at your earliest convenience [address]      Kenia Moscoso MD Go on 6/11/2018 Please follow up on June 11, 2018 at 2:45 arriving 15 minutes early to register for your appointment [address] [place] [address]  [address] AKBAR Dodd Go on 5/24/2018 Please follow up on May 24, 2018 at 3:30 arriving 15 minutes early to register for your appointment for implanted loop monitor follow-upbring your monitoring device provided at the hospital [address] [place] [address] [facility]  [phone]                Total time in minutes spent coordinating this discharge (includes going over instructions, follow-up, prescriptions, and preparing report for sign off to her PCP) :  36 minutes    Signed:  Jocelyn Gray MD

## 2018-05-11 NOTE — PROGRESS NOTES
CM notified of DC order. CM spoke to Edison Herrera at [facility]. Pt will [facility], room 204A. Son is aware of discharge and transport time. CM issued [facility] letter to Pt. Copy on bedside chart. RN has already called report to [facility]. RN will need to fax AVS and scripts to [facility]: [phone]. [facility] should be here at 11 AM.  PCS packet on bedside chart. No further CM needs. Care Management Interventions  PCP Verified by CM:  Yes  Palliative Care Criteria Met (RRAT>21 & CHF Dx)?: No  Mode of Transport at Discharge: [facility] Transport Time of Discharge: 1100  Transition of Care Consult (CM Consult): SNF  Partner SNF: Yes  MyChart Signup: No  Discharge Durable Medical Equipment: No  Physical Therapy Consult: Yes  Occupational Therapy Consult: Yes  Speech Therapy Consult: No  Current Support Network: Own Home, Other  Confirm Follow Up Transport: Family  Plan discussed with Pt/Family/Caregiver: Yes  Freedom of Choice Offered: Yes  Discharge Location  Discharge Placement: Skilled nursing facility    [facility], [address] [place] [address] Rd   Ext [phone]

## 2018-05-12 PROCEDURE — 3331090002 HH PPS REVENUE DEBIT

## 2018-05-12 PROCEDURE — 3331090001 HH PPS REVENUE CREDIT

## 2018-05-13 PROCEDURE — 3331090002 HH PPS REVENUE DEBIT

## 2018-05-13 PROCEDURE — 3331090001 HH PPS REVENUE CREDIT

## 2018-05-14 PROCEDURE — 3331090002 HH PPS REVENUE DEBIT

## 2018-05-14 PROCEDURE — 3331090001 HH PPS REVENUE CREDIT

## 2018-05-15 PROCEDURE — 3331090002 HH PPS REVENUE DEBIT

## 2018-05-15 PROCEDURE — 3331090001 HH PPS REVENUE CREDIT

## 2018-05-17 ENCOUNTER — PATIENT OUTREACH (OUTPATIENT)
Dept: CASE MANAGEMENT | Age: 78
End: 2018-05-17

## 2018-05-18 NOTE — PROGRESS NOTES
Community Care Team Documentation for Patient in [facility]  Initial Follow Up       Patient was admitted to [facility] from 5/8/18 to 5/11/18. Patient was discharged to [facility], on 5/11/18 (date). Community Care Team followed up to [address] [place] [address] during this transition. Hospital Discharge diagnosis:  syncope. RRAT score: 25    Advance Medical Directive on file in EMR? no    Total Hospitalizations/ED visits last 6 months? IP - 2; ED - 0    PCP : Toño Iyer,     SNF Attending:  Brandon Lake MD  Per Loyda Flannery at [facility], Reviewed [place] Back questions to ensure patient arrived at SNF safely. Your path meeting to be held on 5/17. PT/OT are providing skilled therapy for patient. Currently ambulating 6 ft with walker, min assist for transfers, setpu for upper body ADLs, mod assist for lower body ADLS. Patient is NWB on RLE. Anticipate 4 week LOS. Patient has ortho follow up with Dr. Molina on 5/18, and cardiology follow up with Dr. Jossue Brito on 5/21 at 11:40 AM.   to reschedule cardiology follow up. Daily weights and 1500 fluid restriction for CHF management. Plan for discharge to home with  and [facility] (chosen in hospital). Community Care Team will follow up weekly with Edison Sidhu. Medications were not reconciled and general patient assessment was not completed during this skilled nursing facility outreach.      Latonya Guallpa, MSN, RN, ACNS-BC, [facility]  Nurse Navigator, [employer] [phone]

## 2018-05-22 VITALS
TEMPERATURE: 98.7 F | SYSTOLIC BLOOD PRESSURE: 110 MMHG | RESPIRATION RATE: 20 BRPM | OXYGEN SATURATION: 97 % | DIASTOLIC BLOOD PRESSURE: 72 MMHG | HEART RATE: 88 BPM

## 2018-05-24 ENCOUNTER — PATIENT OUTREACH (OUTPATIENT)
Dept: CASE MANAGEMENT | Age: 78
End: 2018-05-24

## 2018-05-24 NOTE — PROGRESS NOTES
Community Care Team Documentation for Patient in [facility]  Subsequent Follow up     Patient remains at [facility] ([facility] See previous [facility] Team notes. PCP : Harshal Mcclure, DO    Per [place] Seats at [facility], PT/OT continue. Currently ambulating 100 ft x 2 with walker at supervision and navigating 4 steps. Min to mod assist for upper body ADLs, mod assist for lower body ADLs. Son refused home visit. Plan for discharge 5/30 to home with son and [facility]. Son aware of follow up appointments per SNF. Medications were not reconciled and general patient assessment was not completed during this skilled nursing facility outreach.      Trinda Sever, MSN, RN, ACNS-BC, [facility]  Nurse Navigator, [place] [phone]

## 2018-05-31 ENCOUNTER — PATIENT OUTREACH (OUTPATIENT)
Dept: CASE MANAGEMENT | Age: 78
End: 2018-05-31

## 2018-05-31 ENCOUNTER — HOSPITAL ENCOUNTER (INPATIENT)
Age: 78
LOS: 4 days | Discharge: NURSING, REHAB OR LONG-TERM CARE | DRG: 354 | End: 2018-06-05
Attending: EMERGENCY MEDICINE | Admitting: SURGERY
Payer: MEDICARE

## 2018-05-31 DIAGNOSIS — K43.6 VENTRAL HERNIA WITH OBSTRUCTION: ICD-10-CM

## 2018-05-31 DIAGNOSIS — I10 ESSENTIAL HYPERTENSION: ICD-10-CM

## 2018-05-31 DIAGNOSIS — R77.8 ELEVATED TROPONIN: Primary | ICD-10-CM

## 2018-05-31 DIAGNOSIS — Z99.81 OXYGEN DEPENDENT: ICD-10-CM

## 2018-05-31 PROCEDURE — 99285 EMERGENCY DEPT VISIT HI MDM: CPT

## 2018-05-31 PROCEDURE — 51701 INSERT BLADDER CATHETER: CPT

## 2018-05-31 PROCEDURE — 96374 THER/PROPH/DIAG INJ IV PUSH: CPT

## 2018-05-31 NOTE — PROGRESS NOTES
Community Care Team Documentation for Patient in [facility]  Subsequent Follow up     Patient remains at [facility] ([facility] See previous [facility] Team notes. PCP : Edwin Sierra DO    Per Hayes at [facility], Pt did not discharge 5/30 as expected due to pt reporting SOB. Chest x-ray negative. Weight stable. PT/OT continue. Pt ambulating 125ft with supervision. Transferring with contact guard assist. Ascending and descending 4 stairs with supervision. Min to mod assist with upper body ADLs and Mod assist with lower body ADLs. Plan to DC next week. Date: TBD. Plan for discharge home with son and [facility]. Son aware of follow up appointments per SNF. Medications were not reconciled and general patient assessment was not completed during this skilled nursing facility outreach.      MILAGROS Romero

## 2018-05-31 NOTE — IP AVS SNAPSHOT
[address] [facility] 
[phone] Patient: Chikis Chris MRN: [id number] :1940 About your hospitalization You were admitted on:  2018 You last received care in the:  \[facility]\"" 2 GENERAL SURGERY You were discharged on:  2018 Why you were hospitalized Your primary diagnosis was:  Ventral Hernia With Obstruction Your diagnoses also included:  Elevated Troponin Follow-up Information Follow up With Details Comments Contact Info Hema Erickson DO   [address] [phone] 310 [facility]   400 [facility] 
[phone] Your Scheduled Appointments 2018  2:45 PM EDT  
ESTABLISHED PATIENT with Jovan Estrada MD  
[facility] [address] [facility]  
[phone] Discharge Orders None A check williams indicates which time of day the medication should be taken. My Medications START taking these medications Instructions Each Dose to Equal  
 Morning Noon Evening Bedtime  
 ciprofloxacin HCl 500 mg tablet Commonly known as:  CIPRO Your last dose was: Your next dose is: Take 1 Tab by mouth every twelve (12) hours for 3 days. 500 mg HYDROcodone-acetaminophen 5-325 mg per tablet Commonly known as:  Lois Fore Your last dose was: Your next dose is: Take 1 Tab by mouth every four (4) hours as needed. Max Daily Amount: 6 Tabs. 1 Tab CONTINUE taking these medications Instructions Each Dose to Equal  
 Morning Noon Evening Bedtime  
 acetaminophen 325 mg tablet Commonly known as:  TYLENOL Your last dose was: Your next dose is: Take 650 mg by mouth every eight (8) hours as needed for Fever. 650 mg  
    
   
   
   
  
 albuterol 90 mcg/actuation inhaler Commonly known as:  PROVENTIL HFA, VENTOLIN HFA, PROAIR HFA Your last dose was: Your next dose is: Take 2 Puffs by inhalation every four (4) hours as needed for Wheezing. 2 Puff  
    
   
   
   
  
 albuterol-ipratropium 2.5 mg-0.5 mg/3 ml Nebu Commonly known as:  Shalonda Frame Your last dose was: Your next dose is:    
   
   
 3 mL by Nebulization route three (3) times daily. 3 mL  
    
   
   
   
  
 aspirin delayed-release 81 mg tablet Your last dose was: Your next dose is: Take 1 Tab by mouth daily. 81 mg  
    
   
   
   
  
 atorvastatin 80 mg tablet Commonly known as:  LIPITOR Your last dose was: Your next dose is: Take 1 Tab by mouth nightly. TAKE ONE TABLET BY MOUTH EVERY DAY 80 mg  
    
   
   
   
  
 bisacodyl 10 mg suppository Commonly known as:  DULCOLAX Your last dose was: Your next dose is: Insert 10 mg into rectum as needed. 10 mg  
    
   
   
   
  
 clopidogrel 75 mg Tab Commonly known as:  PLAVIX Your last dose was: Your next dose is: Take 1 Tab by mouth daily. 75 mg  
    
   
   
   
  
 fexofenadine 180 mg tablet Commonly known as:  Kait Lapidus Your last dose was: Your next dose is: Take 180 mg by mouth daily as needed for Allergies. 180 mg  
    
   
   
   
  
 LASIX 20 mg tablet Generic drug:  furosemide Your last dose was: Your next dose is: Take 20 mg by mouth Every Mon, Wed and Sat. 20 mg  
    
   
   
   
  
 metoprolol succinate 25 mg XL tablet Commonly known as:  TOPROL-XL Your last dose was: Your next dose is: Take 25 mg by mouth two (2) times a day.   
 25 mg  
    
   
   
   
  
 nitroglycerin 0.4 mg SL tablet Commonly known as:  NITROSTAT Your last dose was: Your next dose is: 0.4 mg by SubLINGual route every five (5) minutes as needed for Chest Pain. Up to 3 doses. 0.4 mg OXYGEN-AIR DELIVERY SYSTEMS Your last dose was: Your next dose is:    
   
   
 2 L by IntraNASal route daily as needed (shortness of breath). 2 L Where to Get Your Medications These medications were sent to [address] [facility], [address]  [address] [facility], [address] [place] [address] Phone:  [phone]  
  ciprofloxacin HCl 500 mg tablet Information on where to get these meds will be given to you by the nurse or doctor. ! Ask your nurse or doctor about these medications HYDROcodone-acetaminophen 5-325 mg per tablet Opioid Education Prescription Opioids: What You Need to Know: 
 
Prescription opioids can be used to help relieve moderate-to-severe pain and are often prescribed following a surgery or injury, or for certain health conditions. These medications can be an important part of treatment but also come with serious risks. Opioids are strong pain medicines. Examples include hydrocodone, oxycodone, fentanyl, and morphine. Heroin is an example of an illegal opioid. It is important to work with your health care provider to make sure you are getting the safest, most effective care. WHAT ARE THE RISKS AND SIDE EFFECTS OF OPIOID USE? Prescription opioids carry serious risks of addiction and overdose, especially with prolonged use. An opioid overdose, often marked by slow breathing, can cause sudden death. The use of prescription opioids can have a number of side effects as well, even when taken as directed. · Tolerance-meaning you might need to take more of a medication for the same pain relief · Physical dependence-meaning you have symptoms of withdrawal when the medication is stopped. Withdrawal symptoms can include nausea, sweating, chills, diarrhea, stomach cramps, and muscle aches. Withdrawal can last up to several weeks, depending on which drug you took and how long you took it. · Increased sensitivity to pain · Constipation · Nausea, vomiting, and dry mouth · Sleepiness and dizziness · Confusion · Depression · Low levels of testosterone that can result in lower sex drive, energy, and strength · Itching and sweating RISKS ARE GREATER WITH:      
· History of drug misuse, substance use disorder, or overdose · Mental health conditions (such as depression or anxiety) · Sleep apnea · Older age (72 years or older) · Pregnancy Avoid alcohol while taking prescription opioids. Also, unless specifically advised by your health care provider, medications to avoid include: · Benzodiazepines (such as Xanax or Valium) · Muscle relaxants (such as Soma or Flexeril) · Hypnotics (such as Ambien or Lunesta) · Other prescription opioids KNOW YOUR OPTIONS Talk to your health care provider about ways to manage your pain that don't involve prescription opioids. Some of these options may actually work better and have fewer risks and side effects. Options may include: 
· Pain relievers such as acetaminophen, ibuprofen, and naproxen · Some medications that are also used for depression or seizures · Physical therapy and exercise · Counseling to help patients learn how to cope better with triggers of pain and stress. · Application of heat or cold compress · Massage therapy · Relaxation techniques Be Informed Make sure you know the name of your medication, how much and how often to take it, and its potential risks & side effects. IF YOU ARE PRESCRIBED OPIOIDS FOR PAIN: 
· Never take opioids in greater amounts or more often than prescribed. Remember the goal is not to be pain-free but to manage your pain at a tolerable level. · Follow up with your primary care provider to: · Work together to create a plan on how to manage your pain. · Talk about ways to help manage your pain that don't involve prescription opioids. · Talk about any and all concerns and side effects. · Help prevent misuse and abuse. · Never sell or share prescription opioids · Help prevent misuse and abuse. · Store prescription opioids in a secure place and out of reach of others (this may include visitors, children, friends, and family). · Safely dispose of unused/unwanted prescription opioids: Find your community drug take-back program or your pharmacy mail-back program, or flush them down the toilet, following guidance from the Food and Drug Administration (www.fda.gov/Drugs/ResourcesForYou). · Visit www.cdc.gov/drugoverdose to learn about the risks of opioid abuse and overdose. · If you believe you may be struggling with addiction, tell your health care provider and ask for guidance or call [address] [place] [address] at 1-150-733-AZPU. Discharge Instructions None Money Forward Announcement We are excited to announce that we are making your provider's discharge notes available to you in Money Forward. You will see these notes when they are completed and signed by the physician that discharged you from your recent hospital stay. If you have any questions or concerns about any information you see in Money Forward, please call the Health Information Department where you were seen or reach out to your Primary Care Provider for more information about your plan of care. Introducing [facility]! Chana Hartman introduces Money Forward patient portal. Now you can access parts of your medical record, email your doctor's office, and request medication refills online.    
 
1. In your internet browser, go to [email or web address] 2. Click on the First Time User? Click Here link in the Sign In box. You will see the New Member Sign Up page. 3. Enter your Lukkin Access Code exactly as it appears below. You will not need to use this code after youve completed the sign-up process. If you do not sign up before the expiration date, you must request a new code. · Lukkin Access Code: [email or web address] Expires: 8/6/2018  9:22 PM 
 
4. Enter the last four digits of your Social Security Number (xxxx) and Date of Birth (mm/dd/yyyy) as indicated and click Submit. You will be taken to the next sign-up page. 5. Create a Virtual Iron Softwaret ID. This will be your Lukkin login ID and cannot be changed, so think of one that is secure and easy to remember. 6. Create a Lukkin password. You can change your password at any time. 7. Enter your Password Reset Question and Answer. This can be used at a later time if you forget your password. 8. Enter your e-mail address. You will receive e-mail notification when new information is available in [address] 9. Click Sign Up. You can now view and download portions of your medical record. 10. Click the Download Summary menu link to download a portable copy of your medical information. If you have questions, please visit the Frequently Asked Questions section of the Lukkin website. Remember, Lukkin is NOT to be used for urgent needs. For medical emergencies, dial 911. Now available from your iPhone and Android! Introducing Allen Lombardi As a [facility] patient, I wanted to make you aware of our electronic visit tool called Allen Lombardi. [facility] 24/7 allows you to connect within minutes with a medical provider 24 hours a day, seven days a week via a mobile device or tablet or logging into a secure website from your computer. You can access Allen Lombardi from anywhere in the [place]. A virtual visit might be right for you when you have a simple condition and feel like you just dont want to get out of bed, or cant get away from work for an appointment, when your regular [address] [place] [address] provider is not available (evenings, weekends or holidays), or when youre out of town and need minor care. Electronic visits cost only $49 and if the [address] [place] [address] 24/7 provider determines a prescription is needed to treat your condition, one can be electronically transmitted to a nearby pharmacy*. Please take a moment to enroll today if you have not already done so. The enrollment process is free and takes just a few minutes. To enroll, please download the [facility] 24[facility] judd to your tablet or phone, or visit [email or web address] to enroll on your computer. And, as an [address] [place] [address] patient with a [employer] account, the results of your visits will be scanned into your electronic medical record and your primary care provider will be able to view the scanned results. We urge you to continue to see your regular [address] [place] [address] provider for your ongoing medical care. And while your primary care provider may not be the one available when you seek a Allen Lombardi virtual visit, the peace of mind you get from getting a real diagnosis real time can be priceless. For more information on Allen Lombardi, view our Frequently Asked Questions (FAQs) at [email or web address]. Sincerely, 
 
Giovanna Herrera MD 
Chief Medical Officer [employer] *:  certain medications cannot be prescribed via Allen Lombardi Providers Seen During Your Hospitalization Provider Specialty Primary office phone Anamaria Nelson MD Emergency Medicine [phone] Kathleen Bazan MD General Surgery [phone] Your Primary Care Physician (PCP) Primary Care Physician Office Phone Office Fax Falguni Scales [phone] You are allergic to the following Allergen Reactions Decadron (Dexamethasone Sodium Phosphate) Hives Pcn (Penicillins) Hives Recent Documentation Height Weight BMI Smoking Status 1.524 m 61.2 kg 26.35 kg/m2 Former Smoker Emergency Contacts Name Discharge Info Relation Home Work Mobile Willis Valdez DISCHARGE CAREGIVER [3] Child [2] [phone] Chivo Valdez DISCHARGE CAREGIVER [3] Brother [24] [phone] Sally Dutta  Son [22] [phone] Patient Belongings The following personal items are in your possession at time of discharge: 
  Dental Appliances: None         Home Medications: None   Jewelry: None       Other Valuables: Other (comment) (tele box sent to pacu) Please provide this summary of care documentation to your next provider. Signatures-by signing, you are acknowledging that this After Visit Summary has been reviewed with you and you have received a copy. Patient Signature:  ____________________________________________________________ Date:  ____________________________________________________________  
  
Tyshawn Jovel Provider Signature:  ____________________________________________________________ Date:  ____________________________________________________________

## 2018-06-01 ENCOUNTER — APPOINTMENT (OUTPATIENT)
Dept: CT IMAGING | Age: 78
DRG: 354 | End: 2018-06-01
Attending: EMERGENCY MEDICINE
Payer: MEDICARE

## 2018-06-01 ENCOUNTER — ANESTHESIA (OUTPATIENT)
Dept: SURGERY | Age: 78
DRG: 354 | End: 2018-06-01
Payer: MEDICARE

## 2018-06-01 ENCOUNTER — ANESTHESIA EVENT (OUTPATIENT)
Dept: SURGERY | Age: 78
DRG: 354 | End: 2018-06-01
Payer: MEDICARE

## 2018-06-01 ENCOUNTER — APPOINTMENT (OUTPATIENT)
Dept: GENERAL RADIOLOGY | Age: 78
DRG: 354 | End: 2018-06-01
Attending: EMERGENCY MEDICINE
Payer: MEDICARE

## 2018-06-01 PROBLEM — K43.6 VENTRAL HERNIA WITH OBSTRUCTION: Status: ACTIVE | Noted: 2018-06-01

## 2018-06-01 LAB
ALBUMIN SERPL-MCNC: 3.6 G/DL (ref 3.5–5)
ALBUMIN/GLOB SERPL: 0.9 {RATIO} (ref 1.1–2.2)
ALP SERPL-CCNC: 121 U/L (ref 45–117)
ALT SERPL-CCNC: 13 U/L (ref 12–78)
ANION GAP SERPL CALC-SCNC: 10 MMOL/L (ref 5–15)
APPEARANCE UR: ABNORMAL
AST SERPL-CCNC: 13 U/L (ref 15–37)
ATRIAL RATE: 85 BPM
BACTERIA URNS QL MICRO: ABNORMAL /HPF
BASOPHILS # BLD: 0 K/UL (ref 0–0.1)
BASOPHILS NFR BLD: 0 % (ref 0–1)
BILIRUB SERPL-MCNC: 0.3 MG/DL (ref 0.2–1)
BILIRUB UR QL: NEGATIVE
BUN SERPL-MCNC: 13 MG/DL (ref 6–20)
BUN/CREAT SERPL: 13 (ref 12–20)
CALCIUM SERPL-MCNC: 9.2 MG/DL (ref 8.5–10.1)
CALCULATED P AXIS, ECG09: 77 DEGREES
CALCULATED R AXIS, ECG10: 20 DEGREES
CALCULATED T AXIS, ECG11: 77 DEGREES
CHLORIDE SERPL-SCNC: 109 MMOL/L (ref 97–108)
CK SERPL-CCNC: 98 U/L (ref 26–192)
CO2 SERPL-SCNC: 25 MMOL/L (ref 21–32)
COLOR UR: ABNORMAL
CREAT SERPL-MCNC: 1.03 MG/DL (ref 0.55–1.02)
DIAGNOSIS, 93000: NORMAL
DIFFERENTIAL METHOD BLD: ABNORMAL
EOSINOPHIL # BLD: 0 K/UL (ref 0–0.4)
EOSINOPHIL NFR BLD: 0 % (ref 0–7)
EPITH CASTS URNS QL MICRO: ABNORMAL /LPF
ERYTHROCYTE [DISTWIDTH] IN BLOOD BY AUTOMATED COUNT: 13.6 % (ref 11.5–14.5)
GLOBULIN SER CALC-MCNC: 3.8 G/DL (ref 2–4)
GLUCOSE SERPL-MCNC: 172 MG/DL (ref 65–100)
GLUCOSE UR STRIP.AUTO-MCNC: 100 MG/DL
HCT VFR BLD AUTO: 36.3 % (ref 35–47)
HGB BLD-MCNC: 12 G/DL (ref 11.5–16)
HGB UR QL STRIP: ABNORMAL
IMM GRANULOCYTES # BLD: 0 K/UL (ref 0–0.04)
IMM GRANULOCYTES NFR BLD AUTO: 0 % (ref 0–0.5)
KETONES UR QL STRIP.AUTO: 15 MG/DL
LACTATE SERPL-SCNC: 0.7 MMOL/L (ref 0.4–2)
LEUKOCYTE ESTERASE UR QL STRIP.AUTO: ABNORMAL
LYMPHOCYTES # BLD: 0.9 K/UL (ref 0.8–3.5)
LYMPHOCYTES NFR BLD: 9 % (ref 12–49)
MCH RBC QN AUTO: 26.7 PG (ref 26–34)
MCHC RBC AUTO-ENTMCNC: 33.1 G/DL (ref 30–36.5)
MCV RBC AUTO: 80.7 FL (ref 80–99)
MONOCYTES # BLD: 0.4 K/UL (ref 0–1)
MONOCYTES NFR BLD: 4 % (ref 5–13)
NEUTS SEG # BLD: 9 K/UL (ref 1.8–8)
NEUTS SEG NFR BLD: 87 % (ref 32–75)
NITRITE UR QL STRIP.AUTO: NEGATIVE
NRBC # BLD: 0 K/UL (ref 0–0.01)
NRBC BLD-RTO: 0 PER 100 WBC
P-R INTERVAL, ECG05: 146 MS
PH UR STRIP: 7.5 [PH] (ref 5–8)
PLATELET # BLD AUTO: 205 K/UL (ref 150–400)
PMV BLD AUTO: 11.4 FL (ref 8.9–12.9)
POTASSIUM SERPL-SCNC: 3.7 MMOL/L (ref 3.5–5.1)
PROT SERPL-MCNC: 7.4 G/DL (ref 6.4–8.2)
PROT UR STRIP-MCNC: 30 MG/DL
Q-T INTERVAL, ECG07: 428 MS
QRS DURATION, ECG06: 132 MS
QTC CALCULATION (BEZET), ECG08: 509 MS
RBC # BLD AUTO: 4.5 M/UL (ref 3.8–5.2)
RBC #/AREA URNS HPF: ABNORMAL /HPF (ref 0–5)
SODIUM SERPL-SCNC: 144 MMOL/L (ref 136–145)
SP GR UR REFRACTOMETRY: 1.01 (ref 1–1.03)
TROPONIN I SERPL-MCNC: 0.14 NG/ML
TROPONIN I SERPL-MCNC: 0.27 NG/ML
UA: UC IF INDICATED,UAUC: ABNORMAL
UROBILINOGEN UR QL STRIP.AUTO: 0.2 EU/DL (ref 0.2–1)
VENTRICULAR RATE, ECG03: 85 BPM
WBC # BLD AUTO: 10.4 K/UL (ref 3.6–11)
WBC URNS QL MICRO: ABNORMAL /HPF (ref 0–4)

## 2018-06-01 PROCEDURE — 77030020061 HC IV BLD WRMR ADMIN SET 3M -B: Performed by: ANESTHESIOLOGY

## 2018-06-01 PROCEDURE — 77030009834 HC MSK O2 TRACH VYRM -A

## 2018-06-01 PROCEDURE — 74011250636 HC RX REV CODE- 250/636

## 2018-06-01 PROCEDURE — 65660000000 HC RM CCU STEPDOWN

## 2018-06-01 PROCEDURE — 93005 ELECTROCARDIOGRAM TRACING: CPT

## 2018-06-01 PROCEDURE — 74176 CT ABD & PELVIS W/O CONTRAST: CPT

## 2018-06-01 PROCEDURE — 36415 COLL VENOUS BLD VENIPUNCTURE: CPT | Performed by: EMERGENCY MEDICINE

## 2018-06-01 PROCEDURE — 87086 URINE CULTURE/COLONY COUNT: CPT | Performed by: EMERGENCY MEDICINE

## 2018-06-01 PROCEDURE — 87077 CULTURE AEROBIC IDENTIFY: CPT | Performed by: EMERGENCY MEDICINE

## 2018-06-01 PROCEDURE — 77030011640 HC PAD GRND REM COVD -A: Performed by: SURGERY

## 2018-06-01 PROCEDURE — 85025 COMPLETE CBC W/AUTO DIFF WBC: CPT | Performed by: EMERGENCY MEDICINE

## 2018-06-01 PROCEDURE — 76060000033 HC ANESTHESIA 1 TO 1.5 HR: Performed by: SURGERY

## 2018-06-01 PROCEDURE — 74011000250 HC RX REV CODE- 250: Performed by: SURGERY

## 2018-06-01 PROCEDURE — 74011250637 HC RX REV CODE- 250/637: Performed by: SURGERY

## 2018-06-01 PROCEDURE — 0WUF0JZ SUPPLEMENT ABDOMINAL WALL WITH SYNTHETIC SUBSTITUTE, OPEN APPROACH: ICD-10-PCS | Performed by: SURGERY

## 2018-06-01 PROCEDURE — 77030039266 HC ADH SKN EXOFIN S2SG -A: Performed by: SURGERY

## 2018-06-01 PROCEDURE — 74011000250 HC RX REV CODE- 250

## 2018-06-01 PROCEDURE — 77030018836 HC SOL IRR NACL ICUM -A: Performed by: SURGERY

## 2018-06-01 PROCEDURE — 77030031139 HC SUT VCRL2 J&J -A: Performed by: SURGERY

## 2018-06-01 PROCEDURE — 77030008684 HC TU ET CUF COVD -B: Performed by: ANESTHESIOLOGY

## 2018-06-01 PROCEDURE — 77030011267 HC ELECTRD BLD COVD -A: Performed by: SURGERY

## 2018-06-01 PROCEDURE — 77030019908 HC STETH ESOPH SIMS -A: Performed by: ANESTHESIOLOGY

## 2018-06-01 PROCEDURE — 77030021668 HC NEB PREFIL KT VYRM -A

## 2018-06-01 PROCEDURE — 77030020782 HC GWN BAIR PAWS FLX 3M -B

## 2018-06-01 PROCEDURE — 74011000250 HC RX REV CODE- 250: Performed by: ANESTHESIOLOGY

## 2018-06-01 PROCEDURE — 74011250636 HC RX REV CODE- 250/636: Performed by: SURGERY

## 2018-06-01 PROCEDURE — 80053 COMPREHEN METABOLIC PANEL: CPT | Performed by: EMERGENCY MEDICINE

## 2018-06-01 PROCEDURE — 81001 URINALYSIS AUTO W/SCOPE: CPT | Performed by: EMERGENCY MEDICINE

## 2018-06-01 PROCEDURE — 77030026438 HC STYL ET INTUB CARD -A: Performed by: ANESTHESIOLOGY

## 2018-06-01 PROCEDURE — 77030018547 HC SUT ETHBND1 J&J -B: Performed by: SURGERY

## 2018-06-01 PROCEDURE — 76210000016 HC OR PH I REC 1 TO 1.5 HR: Performed by: SURGERY

## 2018-06-01 PROCEDURE — 84484 ASSAY OF TROPONIN QUANT: CPT | Performed by: EMERGENCY MEDICINE

## 2018-06-01 PROCEDURE — 87186 SC STD MICRODIL/AGAR DIL: CPT | Performed by: EMERGENCY MEDICINE

## 2018-06-01 PROCEDURE — 77030032490 HC SLV COMPR SCD KNE COVD -B: Performed by: SURGERY

## 2018-06-01 PROCEDURE — 82550 ASSAY OF CK (CPK): CPT | Performed by: EMERGENCY MEDICINE

## 2018-06-01 PROCEDURE — 74011250636 HC RX REV CODE- 250/636: Performed by: ANESTHESIOLOGY

## 2018-06-01 PROCEDURE — 77030002933 HC SUT MCRYL J&J -A: Performed by: SURGERY

## 2018-06-01 PROCEDURE — 77030013140 HC MSK NEB VYRM -A

## 2018-06-01 PROCEDURE — 74011250636 HC RX REV CODE- 250/636: Performed by: EMERGENCY MEDICINE

## 2018-06-01 PROCEDURE — 77030011266 HC ELECTRD BLD INSL COVD -A: Performed by: SURGERY

## 2018-06-01 PROCEDURE — 76010000149 HC OR TIME 1 TO 1.5 HR: Performed by: SURGERY

## 2018-06-01 PROCEDURE — 74018 RADEX ABDOMEN 1 VIEW: CPT

## 2018-06-01 PROCEDURE — 77030011943

## 2018-06-01 PROCEDURE — 83605 ASSAY OF LACTIC ACID: CPT | Performed by: EMERGENCY MEDICINE

## 2018-06-01 PROCEDURE — C1781 MESH (IMPLANTABLE): HCPCS | Performed by: SURGERY

## 2018-06-01 DEVICE — MESH SURG W3.5XL6IN POLY SELF FIXATING RECT W/ RESRB PLA: Type: IMPLANTABLE DEVICE | Site: ABDOMEN | Status: FUNCTIONAL

## 2018-06-01 RX ORDER — DIPHENHYDRAMINE HYDROCHLORIDE 50 MG/ML
12.5 INJECTION, SOLUTION INTRAMUSCULAR; INTRAVENOUS AS NEEDED
Status: DISCONTINUED | OUTPATIENT
Start: 2018-06-01 | End: 2018-06-01 | Stop reason: HOSPADM

## 2018-06-01 RX ORDER — IPRATROPIUM BROMIDE AND ALBUTEROL SULFATE 2.5; .5 MG/3ML; MG/3ML
3 SOLUTION RESPIRATORY (INHALATION) 3 TIMES DAILY
COMMUNITY
End: 2018-09-11

## 2018-06-01 RX ORDER — CLINDAMYCIN PHOSPHATE 900 MG/50ML
900 INJECTION INTRAVENOUS ONCE
Status: COMPLETED | OUTPATIENT
Start: 2018-06-01 | End: 2018-06-01

## 2018-06-01 RX ORDER — ACETAMINOPHEN 325 MG/1
650 TABLET ORAL
COMMUNITY
End: 2018-09-11

## 2018-06-01 RX ORDER — HYDROMORPHONE HYDROCHLORIDE 2 MG/ML
0.5 INJECTION, SOLUTION INTRAMUSCULAR; INTRAVENOUS; SUBCUTANEOUS
Status: DISCONTINUED | OUTPATIENT
Start: 2018-06-01 | End: 2018-06-05 | Stop reason: HOSPADM

## 2018-06-01 RX ORDER — PROPOFOL 10 MG/ML
INJECTION, EMULSION INTRAVENOUS AS NEEDED
Status: DISCONTINUED | OUTPATIENT
Start: 2018-06-01 | End: 2018-06-01 | Stop reason: HOSPADM

## 2018-06-01 RX ORDER — IPRATROPIUM BROMIDE AND ALBUTEROL SULFATE 2.5; .5 MG/3ML; MG/3ML
SOLUTION RESPIRATORY (INHALATION)
Status: DISPENSED
Start: 2018-06-01 | End: 2018-06-02

## 2018-06-01 RX ORDER — MIDAZOLAM HYDROCHLORIDE 1 MG/ML
1 INJECTION, SOLUTION INTRAMUSCULAR; INTRAVENOUS AS NEEDED
Status: DISCONTINUED | OUTPATIENT
Start: 2018-06-01 | End: 2018-06-01 | Stop reason: HOSPADM

## 2018-06-01 RX ORDER — ONDANSETRON 2 MG/ML
4 INJECTION INTRAMUSCULAR; INTRAVENOUS
Status: COMPLETED | OUTPATIENT
Start: 2018-06-01 | End: 2018-06-01

## 2018-06-01 RX ORDER — ONDANSETRON 2 MG/ML
INJECTION INTRAMUSCULAR; INTRAVENOUS AS NEEDED
Status: DISCONTINUED | OUTPATIENT
Start: 2018-06-01 | End: 2018-06-01 | Stop reason: HOSPADM

## 2018-06-01 RX ORDER — ACETAMINOPHEN 10 MG/ML
INJECTION, SOLUTION INTRAVENOUS AS NEEDED
Status: DISCONTINUED | OUTPATIENT
Start: 2018-06-01 | End: 2018-06-01 | Stop reason: HOSPADM

## 2018-06-01 RX ORDER — MIDAZOLAM HYDROCHLORIDE 1 MG/ML
0.5 INJECTION, SOLUTION INTRAMUSCULAR; INTRAVENOUS
Status: DISCONTINUED | OUTPATIENT
Start: 2018-06-01 | End: 2018-06-01 | Stop reason: HOSPADM

## 2018-06-01 RX ORDER — DEXTROSE, SODIUM CHLORIDE, AND POTASSIUM CHLORIDE 5; .45; .15 G/100ML; G/100ML; G/100ML
75 INJECTION INTRAVENOUS CONTINUOUS
Status: DISCONTINUED | OUTPATIENT
Start: 2018-06-01 | End: 2018-06-05 | Stop reason: HOSPADM

## 2018-06-01 RX ORDER — SODIUM CHLORIDE 0.9 % (FLUSH) 0.9 %
5-10 SYRINGE (ML) INJECTION EVERY 8 HOURS
Status: DISCONTINUED | OUTPATIENT
Start: 2018-06-01 | End: 2018-06-05 | Stop reason: HOSPADM

## 2018-06-01 RX ORDER — NITROGLYCERIN 0.4 MG/1
0.4 TABLET SUBLINGUAL
COMMUNITY
End: 2021-01-01

## 2018-06-01 RX ORDER — ONDANSETRON 2 MG/ML
4 INJECTION INTRAMUSCULAR; INTRAVENOUS AS NEEDED
Status: DISCONTINUED | OUTPATIENT
Start: 2018-06-01 | End: 2018-06-01 | Stop reason: HOSPADM

## 2018-06-01 RX ORDER — ALBUTEROL SULFATE 90 UG/1
2 AEROSOL, METERED RESPIRATORY (INHALATION)
Status: DISCONTINUED | OUTPATIENT
Start: 2018-06-01 | End: 2018-06-05 | Stop reason: HOSPADM

## 2018-06-01 RX ORDER — NALOXONE HYDROCHLORIDE 0.4 MG/ML
INJECTION, SOLUTION INTRAMUSCULAR; INTRAVENOUS; SUBCUTANEOUS AS NEEDED
Status: DISCONTINUED | OUTPATIENT
Start: 2018-06-01 | End: 2018-06-01 | Stop reason: HOSPADM

## 2018-06-01 RX ORDER — IPRATROPIUM BROMIDE AND ALBUTEROL SULFATE 2.5; .5 MG/3ML; MG/3ML
3 SOLUTION RESPIRATORY (INHALATION)
Status: COMPLETED | OUTPATIENT
Start: 2018-06-01 | End: 2018-06-01

## 2018-06-01 RX ORDER — LABETALOL HCL 20 MG/4 ML
5 SYRINGE (ML) INTRAVENOUS ONCE
Status: COMPLETED | OUTPATIENT
Start: 2018-06-01 | End: 2018-06-01

## 2018-06-01 RX ORDER — SUCCINYLCHOLINE CHLORIDE 20 MG/ML
INJECTION INTRAMUSCULAR; INTRAVENOUS AS NEEDED
Status: DISCONTINUED | OUTPATIENT
Start: 2018-06-01 | End: 2018-06-01 | Stop reason: HOSPADM

## 2018-06-01 RX ORDER — FENTANYL CITRATE 50 UG/ML
50 INJECTION, SOLUTION INTRAMUSCULAR; INTRAVENOUS AS NEEDED
Status: DISCONTINUED | OUTPATIENT
Start: 2018-06-01 | End: 2018-06-01 | Stop reason: HOSPADM

## 2018-06-01 RX ORDER — PHENYLEPHRINE HCL IN 0.9% NACL 0.4MG/10ML
SYRINGE (ML) INTRAVENOUS AS NEEDED
Status: DISCONTINUED | OUTPATIENT
Start: 2018-06-01 | End: 2018-06-01 | Stop reason: HOSPADM

## 2018-06-01 RX ORDER — FENTANYL CITRATE 50 UG/ML
25 INJECTION, SOLUTION INTRAMUSCULAR; INTRAVENOUS
Status: DISCONTINUED | OUTPATIENT
Start: 2018-06-01 | End: 2018-06-01 | Stop reason: HOSPADM

## 2018-06-01 RX ORDER — NEOSTIGMINE METHYLSULFATE 1 MG/ML
INJECTION INTRAVENOUS AS NEEDED
Status: DISCONTINUED | OUTPATIENT
Start: 2018-06-01 | End: 2018-06-01 | Stop reason: HOSPADM

## 2018-06-01 RX ORDER — ONDANSETRON 2 MG/ML
INJECTION INTRAMUSCULAR; INTRAVENOUS
Status: DISCONTINUED
Start: 2018-06-01 | End: 2018-06-01

## 2018-06-01 RX ORDER — SODIUM CHLORIDE, SODIUM LACTATE, POTASSIUM CHLORIDE, CALCIUM CHLORIDE 600; 310; 30; 20 MG/100ML; MG/100ML; MG/100ML; MG/100ML
100 INJECTION, SOLUTION INTRAVENOUS CONTINUOUS
Status: DISCONTINUED | OUTPATIENT
Start: 2018-06-01 | End: 2018-06-01 | Stop reason: HOSPADM

## 2018-06-01 RX ORDER — METOPROLOL SUCCINATE 25 MG/1
25 TABLET, EXTENDED RELEASE ORAL 2 TIMES DAILY
COMMUNITY
End: 2018-10-15

## 2018-06-01 RX ORDER — FUROSEMIDE 20 MG/1
20 TABLET ORAL
COMMUNITY
End: 2021-01-01

## 2018-06-01 RX ORDER — GLYCOPYRROLATE 0.2 MG/ML
INJECTION INTRAMUSCULAR; INTRAVENOUS AS NEEDED
Status: DISCONTINUED | OUTPATIENT
Start: 2018-06-01 | End: 2018-06-01 | Stop reason: HOSPADM

## 2018-06-01 RX ORDER — SODIUM CHLORIDE 0.9 % (FLUSH) 0.9 %
5-10 SYRINGE (ML) INJECTION AS NEEDED
Status: DISCONTINUED | OUTPATIENT
Start: 2018-06-01 | End: 2018-06-01

## 2018-06-01 RX ORDER — FENTANYL CITRATE 50 UG/ML
INJECTION, SOLUTION INTRAMUSCULAR; INTRAVENOUS AS NEEDED
Status: DISCONTINUED | OUTPATIENT
Start: 2018-06-01 | End: 2018-06-01 | Stop reason: HOSPADM

## 2018-06-01 RX ORDER — HYDROCODONE BITARTRATE AND ACETAMINOPHEN 5; 325 MG/1; MG/1
1 TABLET ORAL
Status: DISCONTINUED | OUTPATIENT
Start: 2018-06-01 | End: 2018-06-05 | Stop reason: HOSPADM

## 2018-06-01 RX ORDER — ONDANSETRON 2 MG/ML
4 INJECTION INTRAMUSCULAR; INTRAVENOUS
Status: DISCONTINUED | OUTPATIENT
Start: 2018-06-01 | End: 2018-06-05 | Stop reason: HOSPADM

## 2018-06-01 RX ORDER — HYDRALAZINE HYDROCHLORIDE 20 MG/ML
20 INJECTION INTRAMUSCULAR; INTRAVENOUS
Status: COMPLETED | OUTPATIENT
Start: 2018-06-01 | End: 2018-06-01

## 2018-06-01 RX ORDER — SODIUM CHLORIDE 0.9 % (FLUSH) 0.9 %
5-10 SYRINGE (ML) INJECTION AS NEEDED
Status: DISCONTINUED | OUTPATIENT
Start: 2018-06-01 | End: 2018-06-05 | Stop reason: HOSPADM

## 2018-06-01 RX ORDER — ONDANSETRON 2 MG/ML
4 INJECTION INTRAMUSCULAR; INTRAVENOUS
Status: DISCONTINUED | OUTPATIENT
Start: 2018-06-01 | End: 2018-06-01

## 2018-06-01 RX ORDER — LABETALOL HYDROCHLORIDE 5 MG/ML
INJECTION, SOLUTION INTRAVENOUS
Status: COMPLETED
Start: 2018-06-01 | End: 2018-06-01

## 2018-06-01 RX ORDER — FACIAL-BODY WIPES
10 EACH TOPICAL
COMMUNITY
End: 2018-09-11

## 2018-06-01 RX ORDER — LIDOCAINE HYDROCHLORIDE 20 MG/ML
INJECTION, SOLUTION EPIDURAL; INFILTRATION; INTRACAUDAL; PERINEURAL AS NEEDED
Status: DISCONTINUED | OUTPATIENT
Start: 2018-06-01 | End: 2018-06-01 | Stop reason: HOSPADM

## 2018-06-01 RX ORDER — METOPROLOL TARTRATE 5 MG/5ML
5 INJECTION INTRAVENOUS EVERY 6 HOURS
Status: DISCONTINUED | OUTPATIENT
Start: 2018-06-01 | End: 2018-06-02

## 2018-06-01 RX ORDER — ROCURONIUM BROMIDE 10 MG/ML
INJECTION, SOLUTION INTRAVENOUS AS NEEDED
Status: DISCONTINUED | OUTPATIENT
Start: 2018-06-01 | End: 2018-06-01 | Stop reason: HOSPADM

## 2018-06-01 RX ORDER — SODIUM CHLORIDE, SODIUM LACTATE, POTASSIUM CHLORIDE, CALCIUM CHLORIDE 600; 310; 30; 20 MG/100ML; MG/100ML; MG/100ML; MG/100ML
100 INJECTION, SOLUTION INTRAVENOUS CONTINUOUS
Status: DISCONTINUED | OUTPATIENT
Start: 2018-06-01 | End: 2018-06-01

## 2018-06-01 RX ORDER — SODIUM CHLORIDE 0.9 % (FLUSH) 0.9 %
5-10 SYRINGE (ML) INJECTION EVERY 8 HOURS
Status: DISCONTINUED | OUTPATIENT
Start: 2018-06-01 | End: 2018-06-01

## 2018-06-01 RX ORDER — HYDROMORPHONE HYDROCHLORIDE 1 MG/ML
0.5 INJECTION, SOLUTION INTRAMUSCULAR; INTRAVENOUS; SUBCUTANEOUS
Status: DISCONTINUED | OUTPATIENT
Start: 2018-06-01 | End: 2018-06-01 | Stop reason: HOSPADM

## 2018-06-01 RX ORDER — LABETALOL HYDROCHLORIDE 5 MG/ML
INJECTION, SOLUTION INTRAVENOUS AS NEEDED
Status: DISCONTINUED | OUTPATIENT
Start: 2018-06-01 | End: 2018-06-01 | Stop reason: HOSPADM

## 2018-06-01 RX ORDER — DEXTROSE, SODIUM CHLORIDE, AND POTASSIUM CHLORIDE 5; .45; .15 G/100ML; G/100ML; G/100ML
INJECTION INTRAVENOUS
Status: COMPLETED
Start: 2018-06-01 | End: 2018-06-01

## 2018-06-01 RX ORDER — LIDOCAINE HYDROCHLORIDE 10 MG/ML
0.1 INJECTION, SOLUTION EPIDURAL; INFILTRATION; INTRACAUDAL; PERINEURAL AS NEEDED
Status: DISCONTINUED | OUTPATIENT
Start: 2018-06-01 | End: 2018-06-01 | Stop reason: HOSPADM

## 2018-06-01 RX ORDER — HYDROCODONE BITARTRATE AND ACETAMINOPHEN 5; 325 MG/1; MG/1
1 TABLET ORAL AS NEEDED
Status: DISCONTINUED | OUTPATIENT
Start: 2018-06-01 | End: 2018-06-01 | Stop reason: HOSPADM

## 2018-06-01 RX ADMIN — LABETALOL HYDROCHLORIDE 10 MG: 5 INJECTION, SOLUTION INTRAVENOUS at 14:43

## 2018-06-01 RX ADMIN — ALBUTEROL SULFATE 2 PUFF: 90 AEROSOL, METERED RESPIRATORY (INHALATION) at 17:52

## 2018-06-01 RX ADMIN — HYDROMORPHONE HYDROCHLORIDE 0.5 MG: 2 INJECTION INTRAMUSCULAR; INTRAVENOUS; SUBCUTANEOUS at 20:07

## 2018-06-01 RX ADMIN — DEXTROSE MONOHYDRATE, SODIUM CHLORIDE, AND POTASSIUM CHLORIDE 75 ML/HR: 50; 4.5; 1.49 INJECTION, SOLUTION INTRAVENOUS at 15:20

## 2018-06-01 RX ADMIN — ONDANSETRON 4 MG: 2 INJECTION INTRAMUSCULAR; INTRAVENOUS at 00:32

## 2018-06-01 RX ADMIN — NALOXONE HYDROCHLORIDE 0.04 MG: 0.4 INJECTION, SOLUTION INTRAMUSCULAR; INTRAVENOUS; SUBCUTANEOUS at 14:59

## 2018-06-01 RX ADMIN — Medication 5 MG: at 15:29

## 2018-06-01 RX ADMIN — Medication 10 ML: at 08:56

## 2018-06-01 RX ADMIN — LABETALOL HYDROCHLORIDE 5 MG: 5 INJECTION INTRAVENOUS at 15:29

## 2018-06-01 RX ADMIN — GLYCOPYRROLATE 0.4 MG: 0.2 INJECTION INTRAMUSCULAR; INTRAVENOUS at 14:30

## 2018-06-01 RX ADMIN — METOROPROLOL TARTRATE 5 MG: 5 INJECTION, SOLUTION INTRAVENOUS at 17:55

## 2018-06-01 RX ADMIN — LIDOCAINE HYDROCHLORIDE 60 MG: 20 INJECTION, SOLUTION EPIDURAL; INFILTRATION; INTRACAUDAL; PERINEURAL at 13:38

## 2018-06-01 RX ADMIN — CLINDAMYCIN PHOSPHATE 900 MG: 18 INJECTION, SOLUTION INTRAVENOUS at 13:34

## 2018-06-01 RX ADMIN — Medication 10 ML: at 21:50

## 2018-06-01 RX ADMIN — ACETAMINOPHEN 500 MG: 10 INJECTION, SOLUTION INTRAVENOUS at 14:15

## 2018-06-01 RX ADMIN — ROCURONIUM BROMIDE 20 MG: 10 INJECTION, SOLUTION INTRAVENOUS at 13:55

## 2018-06-01 RX ADMIN — NEOSTIGMINE METHYLSULFATE 3 MG: 1 INJECTION INTRAVENOUS at 14:30

## 2018-06-01 RX ADMIN — Medication 80 MCG: at 13:45

## 2018-06-01 RX ADMIN — FENTANYL CITRATE 25 MCG: 50 INJECTION, SOLUTION INTRAMUSCULAR; INTRAVENOUS at 16:00

## 2018-06-01 RX ADMIN — PROPOFOL 100 MG: 10 INJECTION, EMULSION INTRAVENOUS at 13:38

## 2018-06-01 RX ADMIN — SUCCINYLCHOLINE CHLORIDE 100 MG: 20 INJECTION INTRAMUSCULAR; INTRAVENOUS at 13:38

## 2018-06-01 RX ADMIN — Medication 10 ML: at 17:56

## 2018-06-01 RX ADMIN — HYDRALAZINE HYDROCHLORIDE 20 MG: 20 INJECTION INTRAMUSCULAR; INTRAVENOUS at 03:31

## 2018-06-01 RX ADMIN — FENTANYL CITRATE 50 MCG: 50 INJECTION, SOLUTION INTRAMUSCULAR; INTRAVENOUS at 13:38

## 2018-06-01 RX ADMIN — ONDANSETRON 4 MG: 2 INJECTION INTRAMUSCULAR; INTRAVENOUS at 14:19

## 2018-06-01 RX ADMIN — FENTANYL CITRATE 25 MCG: 50 INJECTION, SOLUTION INTRAMUSCULAR; INTRAVENOUS at 16:10

## 2018-06-01 RX ADMIN — IPRATROPIUM BROMIDE AND ALBUTEROL SULFATE 3 ML: .5; 3 SOLUTION RESPIRATORY (INHALATION) at 15:22

## 2018-06-01 RX ADMIN — SODIUM CHLORIDE, SODIUM LACTATE, POTASSIUM CHLORIDE, AND CALCIUM CHLORIDE 100 ML/HR: 600; 310; 30; 20 INJECTION, SOLUTION INTRAVENOUS at 08:56

## 2018-06-01 NOTE — ED TRIAGE NOTES
Pt to ed via ems with c/o abd pain and vomiting. Per ems, pt has had abd pain since this afternoon and has been vomiting tonight. EMS states Pt is from [facility].

## 2018-06-01 NOTE — ANESTHESIA PREPROCEDURE EVALUATION
Anesthetic History   No history of anesthetic complications            Review of Systems / Medical History  Patient summary reviewed, nursing notes reviewed and pertinent labs reviewed    Pulmonary  Within defined limits  COPD      Smoker         Neuro/Psych   Within defined limits    CVA  TIA     Cardiovascular  Within defined limits  Hypertension        Dysrhythmias   CAD    Exercise tolerance: <4 METS  Comments: LBBB  Evaluated and cleared by Cardiology   GI/Hepatic/Renal  Within defined limits              Endo/Other  Within defined limits           Other Findings            Physical Exam    Airway  Mallampati: II  TM Distance: 4 - 6 cm  Neck ROM: normal range of motion   Mouth opening: Normal     Cardiovascular  Regular rate and rhythm,  S1 and S2 normal,  no murmur, click, rub, or gallop             Dental    Dentition: Edentulous     Pulmonary  Breath sounds clear to auscultation               Abdominal  GI exam deferred       Other Findings            Anesthetic Plan    ASA: 4  Anesthesia type: general    Monitoring Plan: BIS      Induction: Intravenous  Anesthetic plan and risks discussed with: Patient

## 2018-06-01 NOTE — BRIEF OP NOTE
BRIEF OPERATIVE NOTE    Date of Procedure: 6/1/2018   Preoperative Diagnosis: incarcerated ventral hernia with small bowel obstruction  Postoperative Diagnosis: incarcerated ventral hernia with small bowel obstruction    Procedure(s): HERNIA VENTRAL REPAIR WITH MESH  Surgeon(s) and Role:     * Trang Goel MD - Primary         Surgical Assistant: none    Surgical Staff:  Circ-1: Maryanne Ayers  Circ-Relief: Tyra Robert RN  Scrub Tech-1: Michelle Duque  Scrub RN-1: Mello Martinez RN  Event Time In   Incision Start 1357   Incision Close      Anesthesia: General   Estimated Blood Loss: < 25 cc  Specimens: * No specimens in log *   Findings: deep aspect anterior abdominal wall fascial defect with incarcerated hernia sac, no intestinal compromise   Complications: none  Implants:   Implant Name Type Inv.  Item Serial No.  Lot No. LRB No. Used Action   MESH POLYSTR SLF- 15X9 CM -- PROGRIP - SN/A   MESH POLYSTR SLF- 15X9 CM -- PROGRIP N/A COVIDIEN  SURGICAL [id number] N/A 1 Implanted

## 2018-06-01 NOTE — CDMP QUERY
Dr. Rian Vuong, [address] :    Please clarify if this patient is (was) being treated/managed for:     => Chronic respiratory failure POA in setting of pt O2 dependent at home with hx CAD, CHF requiring continuation of O2 at 2lpm  => Other explanation of clinical findings  => Clinically Undetermined (no explanation for clinical findings)    The medical record reflects the following clinical findings, treatment, and risk factors. Risk Factors:  77 BF w/hx: stroke, CAD, CHF, HTN  Clinical Indicators:  Pt admitted with SBO with O2 dependence at home with hx CAD, CHF  Treatment: Continuation of O2 at 2lpm    Please clarify and document your clinical opinion in the progress notes and discharge summary including the definitive and/or presumptive diagnosis, (suspected or probable), related to the above clinical findings. Please include clinical findings supporting your diagnosis. Thank [email or web address]  [phone]

## 2018-06-01 NOTE — PROGRESS NOTES
Pharmacy Medication Reconciliation     The patient was interviewed regarding current PTA medication list, use and drug allergies; The present in room and obtained permission from patient to discuss drug regimen. . The patient was questioned regarding use of any other inhalers, topical products, over the counter medications, herbal medications, vitamin products or ophthalmic/nasal/otic medication use. Allergy Update: Decadron [Dexamethasone Sodium Phosphate], Pcn [Penicillins]    Recommendations/Findings: The following amendments were made to the patient's active medication list on file at [facility]:   1) Additions: Acetaminophen 325 mg, bisacodyl 10 mg, albuterol-ipratropium (DUO-NEB), furosemide 20 mg, nitroglycerin sublingual 0.4 mg     2) Deletions: Loratadine 10 mg, prednisone 10 mg     3) Changes: Metoprolol succinate 25 mg (Changed from 25 mg by mouth every evening to 25 mg by mouth two times a day)    -Attempted to clarify PTA med list with patient but she had a recent stroke and was unable to communicate the medication list. Medication list was obtained from transfer papers from [facility] ([phone]). Patient had other medications filled early in May at [facility] but [facility] facility clarified that this is patients up to date medication list. PTA medication list was corrected to the following:     Prior to Admission Medications   Prescriptions Last Dose Informant Patient Reported? Taking? OXYGEN-AIR DELIVERY SYSTEMS  Transfer Papers Yes No   Si L by IntraNASal route daily as needed (shortness of breath). acetaminophen (TYLENOL) 325 mg tablet Unknown at Unknown time Transfer Papers Yes No   Sig: Take 650 mg by mouth every eight (8) hours as needed for Fever. albuterol (PROVENTIL HFA, VENTOLIN HFA, PROAIR HFA) 90 mcg/actuation inhaler Unknown at Unknown Transfer Papers No Yes   Sig: Take 2 Puffs by inhalation every four (4) hours as needed for Wheezing. albuterol-ipratropium (DUO-NEB) 2.5 mg-0.5 mg/3 ml nebu Unknown at Unknown time Transfer Papers Yes No   Sig: 3 mL by Nebulization route three (3) times daily. aspirin delayed-release 81 mg tablet Unknown at Unknown Transfer Papers No Yes   Sig: Take 1 Tab by mouth daily. atorvastatin (LIPITOR) 80 mg tablet Unknown at Unknown Transfer Papers No Yes   Sig: Take 1 Tab by mouth nightly. TAKE ONE TABLET BY MOUTH EVERY DAY   bisacodyl (DULCOLAX) 10 mg suppository Unknown at Unknown time Transfer Papers Yes No   Sig: Insert 10 mg into rectum as needed. clopidogrel (PLAVIX) 75 mg tab Unknown at Unknown Transfer Papers No Yes   Sig: Take 1 Tab by mouth daily. fexofenadine (ALLEGRA) 180 mg tablet Unknown at Unknown time Transfer Papers Yes No   Sig: Take 180 mg by mouth daily as needed for Allergies. furosemide (LASIX) 20 mg tablet Unknown at Unknown Transfer Papers Yes Yes   Sig: Take 20 mg by mouth Every Mon, Wed and Sat.   metoprolol succinate (TOPROL-XL) 25 mg XL tablet Unknown at Unknown Transfer Papers Yes Yes   Sig: Take 25 mg by mouth two (2) times a day. nitroglycerin (NITROSTAT) 0.4 mg SL tablet Unknown at Unknown time Transfer Papers Yes No   Si.4 mg by SubLINGual route every five (5) minutes as needed for Chest Pain. Up to 3 doses.       Facility-Administered Medications: None          Thank you,  Ramos Franklin

## 2018-06-01 NOTE — PERIOP NOTES
Handoff Report from Operating Room to PACU    Report received from W. Gillermo Meigs RN and Alexandro Escobedo CRNA regarding Briseyda Ellen. Surgeon(s):  Azeem Alvarez MD  And Procedure(s) (LRB):  HERNIA VENTRAL REPAIR WITH MESH (N/A)  confirmed   with allergies and dressings discussed. Anesthesia type, drugs, patient history, complications, estimated blood loss, vital signs, intake and output, and last pain medication, lines, reversal medications and temperature were reviewed.

## 2018-06-01 NOTE — PERIOP NOTES
TRANSFER - IN REPORT:    Verbal report received from [address], RN on [place]  being received from 2178 for ordered procedure      Report consisted of patients Situation, Background, Assessment and   Recommendations(SBAR). Information from the following report(s) SBAR, Kardex, Intake/Output, MAR and Recent Results was reviewed with the receiving nurse. Opportunity for questions and clarification was provided. 1212: Waiting to get patient per Rodrick Claude, RN in [place].   The room is delayed

## 2018-06-01 NOTE — ED PROVIDER NOTES
EMERGENCY DEPARTMENT HISTORY AND PHYSICAL EXAM      Date: 5/31/2018  Patient Name: Hazel Cardona    History of Presenting Illness     Chief Complaint   Patient presents with    Abdominal Pain       History Provided By: EMS    HPI: Hazel Cardona, 76 y.o. female with PMHx significant for ACS, LBBB, dysarthria, CVA, acute systolic heart failure , presents via EMS from [facility] to the ED with cc of new onset abd pain with associated nausea and vomiting x 1 episode x today. Per nursing at [facility], pt was complaining of R sided abd pain all day. They appreciated some mild distention and upon an episode of vomiting, they called EMS so that pt could be brought to the ED for evaluation. They report that pt is at her mental baseline. Due to dysarthria due to CVA, difficult to get additional history from patient. HPI is limited secondary to pt's hx of CVA    PCP: Edwin Sierra,     Current Facility-Administered Medications   Medication Dose Route Frequency Provider Last Rate Last Dose    ondansetron (ZOFRAN) 4 mg/2 mL injection        Stopped at 06/01/18 0130       Past History     Past Medical History:  Past Medical History:   Diagnosis Date    Acute kidney failure ([facility] 7/64/3598    Acute systolic heart failure ([facility] 7/14/2016    CAD (coronary artery disease)     Heart failure ([facility]     Stroke [facility]) 2009    Stroke    Stroke [facility])        Past Surgical History:  No past surgical history on file. Family History:  Family History   Problem Relation Age of Onset    Coronary Artery Disease Other      grandmother age 76       Social History:  Social History   Substance Use Topics    Smoking status: Former Smoker     Quit date: 9/21/2009    Smokeless tobacco: Never Used    Alcohol use 0.0 oz/week     0 Standard drinks or equivalent per week      Comment: Very little       Allergies:   Allergies   Allergen Reactions    Decadron [Dexamethasone Sodium Phosphate] Hives    Pcn [Penicillins] Hives Review of Systems   Review of Systems   Reason unable to perform ROS: hx of CVA. Physical Exam   Physical Exam   Constitutional: She is oriented to person, place, and time. She appears well-developed and well-nourished. dysarthric s/p known CVA at baseline   HENT:   Head: Atraumatic. Eyes: EOM are normal.   Cardiovascular: Regular rhythm, normal heart sounds and intact distal pulses. Exam reveals no gallop and no friction rub. No murmur heard. hypertensive 220s/110s  tachycardia   Pulmonary/Chest: Effort normal and breath sounds normal. No respiratory distress. She has no wheezes. She has no rales. She exhibits no tenderness. 2L NC baseline   Abdominal: Soft. Bowel sounds are normal. She exhibits no distension and no mass. There is no rebound and no guarding. mild diffuse lower abd tenderness mostly assessed by expression given pt's difficulty verbalizing, mild distention, non-peritoneal   Musculoskeletal: Normal range of motion. She exhibits no edema or tenderness. Neurological: She is oriented to person, place, and time. Skin: Skin is warm. Psychiatric: She has a normal mood and affect. Nursing note and vitals reviewed. Diagnostic Study Results     Labs -     Recent Results (from the past 12 hour(s))   CBC WITH AUTOMATED DIFF    Collection Time: 06/01/18 12:15 AM   Result Value Ref Range    WBC 10.4 3.6 - 11.0 K/uL    RBC 4.50 3.80 - 5.20 M/uL    HGB 12.0 11.5 - 16.0 g/dL    HCT 36.3 35.0 - 47.0 %    MCV 80.7 80.0 - 99.0 FL    MCH 26.7 26.0 - 34.0 PG    MCHC 33.1 30.0 - 36.5 g/dL    RDW 13.6 11.5 - 14.5 %    PLATELET 109 031 - 937 K/uL    MPV 11.4 8.9 - 12.9 FL    NRBC 0.0 0  WBC    ABSOLUTE NRBC 0.00 0.00 - 0.01 K/uL    NEUTROPHILS 87 (H) 32 - 75 %    LYMPHOCYTES 9 (L) 12 - 49 %    MONOCYTES 4 (L) 5 - 13 %    EOSINOPHILS 0 0 - 7 %    BASOPHILS 0 0 - 1 %    IMMATURE GRANULOCYTES 0 0.0 - 0.5 %    ABS. NEUTROPHILS 9.0 (H) 1.8 - 8.0 K/UL    ABS.  LYMPHOCYTES 0.9 0.8 - 3.5 K/UL    ABS. MONOCYTES 0.4 0.0 - 1.0 K/UL    ABS. EOSINOPHILS 0.0 0.0 - 0.4 K/UL    ABS. BASOPHILS 0.0 0.0 - 0.1 K/UL    ABS. IMM. GRANS. 0.0 0.00 - 0.04 K/UL    DF AUTOMATED     METABOLIC PANEL, COMPREHENSIVE    Collection Time: 06/01/18 12:15 AM   Result Value Ref Range    Sodium 144 136 - 145 mmol/L    Potassium 3.7 3.5 - 5.1 mmol/L    Chloride 109 (H) 97 - 108 mmol/L    CO2 25 21 - 32 mmol/L    Anion gap 10 5 - 15 mmol/L    Glucose 172 (H) 65 - 100 mg/dL    BUN 13 6 - 20 MG/DL    Creatinine 1.03 (H) 0.55 - 1.02 MG/DL    BUN/Creatinine ratio 13 12 - 20      GFR est AA >60 >60 ml/min/1.73m2    GFR est non-AA 52 (L) >60 ml/min/1.73m2    Calcium 9.2 8.5 - 10.1 MG/DL    Bilirubin, total 0.3 0.2 - 1.0 MG/DL    ALT (SGPT) 13 12 - 78 U/L    AST (SGOT) 13 (L) 15 - 37 U/L    Alk.  phosphatase 121 (H) 45 - 117 U/L    Protein, total 7.4 6.4 - 8.2 g/dL    Albumin 3.6 3.5 - 5.0 g/dL    Globulin 3.8 2.0 - 4.0 g/dL    A-G Ratio 0.9 (L) 1.1 - 2.2     TROPONIN I    Collection Time: 06/01/18 12:15 AM   Result Value Ref Range    Troponin-I, Qt. 0.14 (H) <0.05 ng/mL   CK W/ REFLX CKMB    Collection Time: 06/01/18 12:15 AM   Result Value Ref Range    CK 98 26 - 192 U/L   LACTIC ACID    Collection Time: 06/01/18 12:15 AM   Result Value Ref Range    Lactic acid 0.7 0.4 - 2.0 MMOL/L   URINALYSIS W/ REFLEX CULTURE    Collection Time: 06/01/18 12:17 AM   Result Value Ref Range    Color YELLOW/STRAW      Appearance CLOUDY (A) CLEAR      Specific gravity 1.012 1.003 - 1.030      pH (UA) 7.5 5.0 - 8.0      Protein 30 (A) NEG mg/dL    Glucose 100 (A) NEG mg/dL    Ketone 15 (A) NEG mg/dL    Bilirubin NEGATIVE  NEG      Blood TRACE (A) NEG      Urobilinogen 0.2 0.2 - 1.0 EU/dL    Nitrites NEGATIVE  NEG      Leukocyte Esterase TRACE (A) NEG      WBC 5-10 0 - 4 /hpf    RBC 0-5 0 - 5 /hpf    Epithelial cells FEW FEW /lpf    Bacteria 2+ (A) NEG /hpf    UA:UC IF INDICATED URINE CULTURE ORDERED (A) CNI     EKG, 12 LEAD, INITIAL    Collection Time: 06/01/18 12:18 AM   Result Value Ref Range    Ventricular Rate 85 BPM    Atrial Rate 85 BPM    P-R Interval 146 ms    QRS Duration 132 ms    Q-T Interval 428 ms    QTC Calculation (Bezet) 509 ms    Calculated P Axis 77 degrees    Calculated R Axis 20 degrees    Calculated T Axis 77 degrees    Diagnosis       Normal sinus rhythm  Biatrial enlargement  Left bundle branch block  Abnormal ECG  When compared with ECG of 06-MAY-2018 19:48,  Left bundle branch block is now present     TROPONIN I    Collection Time: 06/01/18  4:25 AM   Result Value Ref Range    Troponin-I, Qt. 0.27 (H) <0.05 ng/mL       Radiologic Studies -   XR ABD (KUB)   Final Result      CT ABD PELV WO CONT   Final Result        CT Results  (Last 48 hours)               06/01/18 0045  CT ABD PELV WO CONT Final result    Impression:  IMPRESSION:       1. Right pelvic wall hernia causes early mild small bowel obstruction. Mural   thickening of bowel loops within the hernia suggests the possibility of   incarceration. 2. Normal appendix. Narrative:  EXAM:  CT ABD PELV WO CONT       INDICATION: Abdominal pain and vomiting starting today. COMPARISON: None. CONTRAST:  None. TECHNIQUE:    Thin axial images were obtained through the abdomen and pelvis. Coronal and   sagittal reconstructions were generated. Oral contrast was not administered. CT   dose reduction was achieved through use of a standardized protocol tailored for   this examination and automatic exposure control for dose modulation. The absence of intravenous contrast material reduces the sensitivity for   evaluation of the solid parenchymal organs of the abdomen. FINDINGS:    LUNG BASES: Clear. INCIDENTALLY IMAGED HEART AND MEDIASTINUM: Cardiomegaly. Coronary artery   calcific atherosclerosis is partially imaged. Small sliding-type hiatal hernia. LIVER: Subcentimeter hypoattenuating lesions may represent cysts but cannot be   fully characterized. GALLBLADDER: Unremarkable. SPLEEN: Normal size. PANCREAS: No inflammation. ADRENALS: Bilateral hypertrophy. KIDNEYS/URETERS: Bilateral renal calcifications may be vascular. No   hydronephrosis. STOMACH: Partial distention. SMALL BOWEL: Mild dilatation of the proximal ileum to 2.8 cm. Transition at   right pelvic wall hernia. Mural thickening of bowel loops within the hernia. COLON: No dilatation or wall thickening. APPENDIX: Unremarkable. PERITONEUM: No ascites or pneumoperitoneum. RETROPERITONEUM: Aorta is atherosclerotic. 2.8 cm infrarenal abdominal aortic   ectasia. No lymphadenopathy. REPRODUCTIVE ORGANS: Uterine fibroids are present. No adnexal mass. URINARY BLADDER: No mass or calculus. BONES: Subtle sclerosis in the subchondral right femoral head most likely   represents nonacute osteonecrosis. No aggressive bone lesion. ADDITIONAL COMMENTS: Right pelvic wall hernia is between the rectus abdominis   and right oblique musculature. The hernia contains dilated, obstructed small   bowel and fluid. CXR Results  (Last 48 hours)    None            Medical Decision Making   I am the first provider for this patient. I reviewed the vital signs, available nursing notes, past medical history, past surgical history, family history and social history. Vital Signs-Reviewed the patient's vital signs.   Patient Vitals for the past 12 hrs:   Temp Pulse Resp BP SpO2   06/01/18 0511 98.1 °F (36.7 °C) (!) 120 20 104/70 100 %   06/01/18 0350 - (!) 119 24 143/71 98 %   06/01/18 0300 - 88 17 (!) 211/104 97 %   06/01/18 0230 - 100 20 (!) 217/108 97 %   06/01/18 0200 - (!) 101 20 (!) 205/105 98 %   06/01/18 0145 - 86 22 (!) 217/89 99 %   06/01/18 0130 - 85 20 (!) 202/91 97 %   06/01/18 0115 - 86 22 (!) 207/105 99 %   05/31/18 2346 98.4 °F (36.9 °C) 81 18 (!) 218/90 99 %       Pulse Oximetry Analysis - 99% on RA    Cardiac Monitor:   Rate: 81 bpm  Rhythm: Normal Sinus Rhythm      EKG interpretation: (Preliminary) 018  Rhythm: normal sinus rhythm and LBBB; and regular . Rate (approx.): 85 bpm; Axis: normal; AR interval: normal; QRS interval: normal ; ST/T wave: normal. Has had LBBB since 2/2018. Written by SUSANNAH Duobis, as dictated by Melanie Rinne, MD.    Records Reviewed: Nursing Notes and Old Medical Records    Provider Notes (Medical Decision Making):   Pt brought for what appears to be abd pain. Initial report was epigastric/RUQ in nature with emesis, however hand gestures by pt indicate lower abd pain, c/w exam. However given card hx, will work up from cardiac and abd stand point. EKG shows recurrent of LBBB (2/2018). Will obtain cardiac enzymes, labs, UA and CT abd/pelvis. ED Course:   Initial assessment performed. The patients presenting problems have been discussed, and they are in agreement with the care plan formulated and outlined with them. I have encouraged them to ask questions as they arise throughout their visit. CONSULT NOTE:   1:16 AM  Melanie Rinne, MD spoke with Dr. Cirilo Skinner,   Specialty: Cardiology  Discussed pt's hx, disposition, and available diagnostic and imaging results. Reviewed care plans. Consultant would like to trend pt's troponin, but he does not think it is cardiac in nature. Written by SUSANNAH Dubois, as dictated by Melanie Rinne, MD.    CONSULT NOTE:   1:20 AM  Melanie Rinne, MD spoke with Dr. The Timken Chu,   Specialty: General Surgery  Discussed pt's hx, disposition, and available diagnostic and imaging results. Reviewed care plans. Consultant informed of CT and informed that she had a normal lipase and lactate. Cardiology will trend troponin and they will come evaluate her in the morning. Dr Jocelyn Horner will l come evaluate the pt and admit. Agreed with placement of NGT. Written by SUSANNAH Dubois, as dictated by Melanie Rinne, MD.ad the     4:21 AM  BP improved 140/70 with IV hydralazine.   Written by [facility]by ED Scribe as dictated by Tammy Lopez MD    Disposition:  PLAN: Admit to General Surgery    1:40 AM  Patient is being admitted to the hospital by Dr. Stuart Espitia. The results of their tests and reasons for their admission have been discussed with them and/or available family. They convey agreement and understanding for the need to be admitted and for their admission diagnosis. Written by Ramon Vinson ED Scribe, as dictated by Tammy Lopez MD.    Diagnosis     Clinical Impression:   1. Elevated troponin    2. Ventral hernia with obstruction    3. Essential hypertension    4. Oxygen dependent        Attestations: This note is prepared by Rebecca Whyte acting as scribe for MD Tammy Cardoso MD : The scribe's documentation has been prepared under my direction and personally reviewed by me in its entirety. I confirm that the note above accurately reflects all work, treatment, procedures, and medical decision making performed by me.

## 2018-06-01 NOTE — PERIOP NOTES
5 - son Conception Fiddler called for phone consent for surgery. Myself and Emily Andujar RN spoke with son. Dr. Shahid Kerns called son and spoke with him regarding surgery.

## 2018-06-01 NOTE — CONSULTS
[address] [place] [address] [facility]     Date of  Admission: 5/31/2018 11:38 PM     Admission type:Emergency    Consult for:  Consult by: Subjective:     Gilles Lemus is a 76 y.o. female admitted for Ventral hernia with obstruction;incarcerated vintral hernia*. Patient complains of R sided lower abd pain. No CP or SOB. Last echo showed normal LV function. Insig CAD by cath 2009.     Patient Active Problem List    Diagnosis Date Noted    Ventral hernia with obstruction 06/01/2018    Bilateral carotid artery stenosis 05/10/2018    Acute cerebral infarction ([address] 05/09/2018    Syncope 05/06/2018    CAD (coronary artery disease) 02/05/2018    Stroke ([address] 02/05/2018    Hypertensive urgency 08/01/2017    CHF (congestive heart failure) ([address] 08/01/2017    COPD exacerbation ([address] 03/07/2017    CAD (coronary artery disease), native coronary artery 07/15/2016    H/O: CVA (cerebrovascular accident) 07/15/2016    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 ([address] 07/15/2016    Severe sepsis ([address] 07/14/2016    Pneumonia 07/14/2016    Acute respiratory failure ([address] 07/14/2016    Elevated troponin 07/14/2016    Left bundle branch block (LBBB) on electrocardiogram 29/88/1210    Acute systolic heart failure ([address] 07/14/2016    Acute kidney failure ([address] 07/14/2016    Mixed hyperlipidemia 09/21/2012    Essential hypertension, benign 09/21/2012    Other left bundle branch block 09/21/2012    Coronary atherosclerosis of native coronary artery 09/21/2012    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 09/21/2012    Atherosclerosis of renal artery ([address] 09/21/2012    Atherosclerosis of native artery of extremity with intermittent claudication ([address] 09/21/2012    Tobacco use disorder 09/21/2012    Cerebrovascular accident ([address] 09/21/2012    Mitral valve disorders(424.0) 09/21/2012  Tricuspid valve disorder 09/21/2012      Marilee Roberts DO  Past Medical History:   Diagnosis Date    Acute kidney failure ([facility] 3/75/3545    Acute systolic heart failure ([facility] 7/14/2016    CAD (coronary artery disease)     Heart failure ([facility]     Stroke ([facility] 2009    Stroke      Social History     Social History    Marital status:      Spouse name: N/A    Number of children: N/A    Years of education: N/A     Social History Main Topics    Smoking status: Former Smoker     Quit date: 9/21/2009    Smokeless tobacco: Never Used    Alcohol use 0.0 oz/week     0 Standard drinks or equivalent per week      Comment: Very little    Drug use: No    Sexual activity: Not on file     Other Topics Concern    Not on file     Social History Narrative    ** Merged History Encounter **          Allergies   Allergen Reactions    Decadron [Dexamethasone Sodium Phosphate] Hives    Pcn [Penicillins] Hives      Family History   Problem Relation Age of Onset    Coronary Artery Disease Other      grandmother age 76      Current Facility-Administered Medications   Medication Dose Route Frequency    albuterol (PROVENTIL HFA, VENTOLIN HFA, PROAIR HFA) inhaler 2 Puff  2 Puff Inhalation Q4H PRN    sodium chloride (NS) flush 5-10 mL  5-10 mL IntraVENous Q8H    sodium chloride (NS) flush 5-10 mL  5-10 mL IntraVENous PRN    HYDROmorphone (DILAUDID) injection 0.5 mg  0.5 mg IntraVENous Q2H PRN    ondansetron (ZOFRAN) injection 4 mg  4 mg IntraVENous Q4H PRN    lactated Ringers infusion  100 mL/hr IntraVENous CONTINUOUS    metoprolol (LOPRESSOR) injection 5 mg  5 mg IntraVENous Q6H        Review of Symptoms:   Constitutional: negative  Eyes: negative   Ears, nose, mouth, throat, and face: negative  Respiratory: negative   Cardiovascular: negative   Gastrointestinal: see HPI  Genitourinary:negative   Musculoskeletal:negative   Neurological: negative   Endocrine: negative          Objective:      Visit Vitals  BP 90/65 (BP 1 Location: Right arm, BP Patient Position: At rest)    Pulse (!) 116    Temp 98 °F (36.7 °C)    Resp 18    Ht 5' (1.524 m)    Wt 120 lb (54.4 kg)    SpO2 97%    BMI 23.44 kg/m2       Physical:   General:   Heart: RRR, no m/S3/JVD, no carotid bruits   Lungs: clear   Abdomen: distended  Extremities: LE иван +DP/PT, no edema   Neurologic: Grossly normal    Data Review:   Recent Labs      06/01/18   0015   WBC  10.4   HGB  12.0   HCT  36.3   PLT  205     Recent Labs      06/01/18   0015   NA  144   K  3.7   CL  109*   CO2  25   GLU  172*   BUN  13   CREA  1.03*   CA  9.2   ALB  3.6   TBILI  0.3   SGOT  13*   ALT  13       Recent Labs      06/01/18   0425  06/01/18   0015   TROIQ  0.27*  0.14*       No intake or output data in the 24 hours ending 06/01/18 1124     Cardiographics    Telemetry:   ECG: NSR, LBBB  Echocardiogram:   CXRAY:       Assessment:       Principal Problem:    Ventral hernia with obstruction (6/1/2018)    Active Problems:    Elevated troponin (7/14/2016)         Plan:     Elevated troponin likely a bystander effect of her primary illness. No CP, and she has had mild troponin elevations in the past.  I see no contraindication to proceeding with surgery if needed for her SBO. Will follow with you.     Adriana Dumont MD

## 2018-06-01 NOTE — PROGRESS NOTES
5 - Received Patient into room 2178 from the ER.  9108 - Call out to Dr. Kendrick Lazo, Cardiology, regarding Troponin of 0.27. Spoke with [employer].

## 2018-06-01 NOTE — PERIOP NOTES
1610 No family available in waiting room at this time. 1615 TRANSFER - OUT REPORT:    Verbal report given to Bria RN(name) on Rufino Monson  being transferred to [facility](unit) for routine post - op       Report consisted of patients Situation, Background, Assessment and   Recommendations(SBAR). Information from the following report(s) SBAR, Kardex, ED Summary, OR Summary, Procedure Summary, Intake/Output, MAR, Accordion, Recent Results, Med Rec Status, Cardiac Rhythm ST/NSR and Alarm Parameters  was reviewed with the receiving nurse. Opportunity for questions and clarification was provided.       Patient transported with:   Monitor  O2 @ 4 liters  Registered Nurse  [facility]

## 2018-06-01 NOTE — PROGRESS NOTES
Received call back from Dr. Macario Trimble, Cardiology, regarding elevated Troponin of 0.27. No new orders received.

## 2018-06-01 NOTE — ED NOTES
TRANSFER - OUT REPORT:    Verbal report given to ADELE Yu(name) on Elyce Bolds  being transferred to [facility](unit) for routine progression of care       Report consisted of patients Situation, Background, Assessment and   Recommendations(SBAR). Information from the following report(s) ED Summary was reviewed with the receiving nurse. Opportunity for questions and clarification was provided.       Patient transported with:   Monitor  O2 @ 2 liters  Registered Nurse  [facility]

## 2018-06-01 NOTE — OP NOTES
[facility]  OPERATIVE REPORT    Shiloh Giordano  MR#: [id number]  : 1940  ACCOUNT #: [de-identified]   DATE OF SERVICE: 2018    PREOPERATIVE DIAGNOSIS:  Incarcerated ventral hernia, with small-bowel obstruction. POSTOPERATIVE DIAGNOSIS:  Incarcerated ventral hernia, with small-bowel obstruction. PROCEDURE PERFORMED:  Open ventral incarcerated hernia repair with mesh implantation. SURGEON:  Jose Rasheed MD     ASSISTANT SURGEON:  None. ANESTHESIA:  General endotracheal anesthesia. ESTIMATED BLOOD LOSS:  Less than 25 mL. SPECIMENS REMOVED:  None. COMPLICATIONS:  No obvious complications. FINDINGS:  An incarcerated primary ventral abdominal wall hernia through the deep fascia of the anterior abdominal wall lateral to the rectus in the right lower quadrant. IMPLANTS:  A 15 x 9 cm polyester ProGrip Covidien surgical mesh. DESCRIPTION OF OPERATION IN DETAIL:  After proper consent was obtained, the patient was brought to the operating room, made comfortable in the supine position and administered general endotracheal anesthesia. The patient was prepped and draped in standard fashion. A transverse incision was made in the right lower quadrant over the hernia mass. This was extended down through subcutaneous tissue. The external oblique fibers were divided in the direction of their fibers over the mass, and the external oblique aponeurosis was then elevated superiorly and inferiorly, and a large hernia sac was identified lateral to the lateral border of the rectus. This was swept away from the surrounding tissue, and the patient was found to have about a 2 x 2 cm fascial defect through the deep fascia. The hernia contents were then easily reduced through the fascial defect without opening the peritoneum. There was no evidence of compromised bowel.   The peritoneum and hernia contents were held in the abdominal space with a sponge stick, and the fascia was approximated with interrupted 0 Ethibond figure-of-eight sutures. When this was complete, a 15 x 9-inch piece of Covidien ProGrip mesh was trimmed to the appropriate size, laid over the posterior fascia, and tacked in place in 4 quadrants surrounding the fascial repair. The external oblique aponeurosis was then draped over this, and imbricated with a running 2-0 Vicryl stitch, thus sandwiching the mesh between internal and external oblique layers. The wound was inspected. Hemostasis was confirmed. At this time, Sarah fascia was approximated with 3-0 Vicryl running stitch. Deep dermis was approximated with 4-0 Vicryl deep dermal stitch, and the skin incision was closed with a 4-0 Monocryl subcuticular stitch. The wound was clean and dried and dressed with Dermabond. The patient was awakened, extubated, and transferred to the recovery room in stable condition.       MD DION Guillory / KATHY  D: 06/01/2018 15:20     T: 06/01/2018 15:59  JOB #: [id number]  CC: Ingrid Vang MD

## 2018-06-01 NOTE — ANESTHESIA POSTPROCEDURE EVALUATION
Post-Anesthesia Evaluation and Assessment    Patient: Manisha Hopkins MRN: [id number]  SSN: [id number]    YOB: 1940  Age: 76 y.o. Sex: female       Cardiovascular Function/Vital Signs  Visit Vitals    /79 (BP 1 Location: Right arm, BP Patient Position: At rest)    Pulse 91    Temp 36.8 °C (98.2 °F)    Resp 11    Ht 5' (1.524 m)    Wt 54.4 kg (120 lb)    SpO2 96%    BMI 23.44 kg/m2       Patient is status post general anesthesia for Procedure(s): HERNIA VENTRAL REPAIR WITH MESH. Nausea/Vomiting: None    Postoperative hydration reviewed and adequate. Pain:  Pain Scale 1: Numeric (0 - 10) (06/01/18 1610)  Pain Intensity 1: 6 (06/01/18 1610)   Managed    Neurological Status:   Neuro (WDL): Exceptions to WDL (06/01/18 1509)  Neuro  Neurologic State: Responds to noxious stimuli only (06/01/18 1509)  Orientation Level: Other (Comment) (withdrawal to pain - does turn head to voice) (06/01/18 1509)  Speech: Other (comment) (withdrawal to pain - does turn head to voice) (06/01/18 1509)   At baseline    Mental Status and Level of Consciousness: Arousable    Pulmonary Status:   O2 Device: Nasal cannula (06/01/18 1610)   Adequate oxygenation and airway patent    Complications related to anesthesia: None    Post-anesthesia assessment completed.  No concerns    Signed By: Lamonte Escalante MD     June 1, 2018

## 2018-06-01 NOTE — PROGRESS NOTES
Reason for Readmission:     Hernia repair         RRAT Score and Risk Level:     25   Level of Readmission:    high      Care Conference scheduled:   Not at this time     Resources/supports as identified by patient/family:   Patient was currently residing at [facility] but was living with son prior. Son is primary caregiver when not at SNF. Son says they are well supported at this time. Top Challenges facing patient (as identified by patient/family and CM): Finances/Medication cost?     Insurance and SS cover most costs. No concerns at present. Transportation      Medical transport to facility but son transports when at home with him by car for needs and follow up appt. Support system or lack thereof? Patient has son as caregiver  Living arrangements? 1 story home with 5 steps into entrance. Self-care/ADLs/Cognition? Patient has a care aide who assists with ADL's and son handles all her IADL's. Patient has memory issues. Patient was somewhat active prior to first admission but does not drive. Current Advanced Directive/Advance Care Plan:  No adv. Directive at this time and family is not interested in discussing this admission           Plan for utilizing home health:   No HH at this time as son is requesting transfer back to [facility] when ready for discharge. Pt has had [facility] in past and has stayed at [address] in addition to [facility]. Likelihood of additional readmission:   high             Transition of Care Plan:    Based on readmission, the patient's previous Plan of Care   has been evaluated and/or modified. The current Transition of Care Plan is:           Patient is a 76year old female. Patient in OR at time of assessment. Bedside nurse said patient is not reliable for information. CM reached out and spoke to son, Stacy Yost. Assessment completed with son over the phone by CM. CM introduced self and role.  Demographic information verified. Preferred pharmacy is [facility] in [place]. CM phoned [address] [facility] and will place referral. They do not see a problem taking her back but her bed was not held and she will have to be placed in different room. CM will place referral.    Care Management Interventions  PCP Verified by CM:  Yes (sees Dr. Becky Neil)  Mode of Transport at Discharge: BLS (may require medical transport back to [address] [place] [address],[address])  Transition of Care Consult (CM Consult): SNF (son requesting return to [address] [place] [address],[address] at discharge)  Partner SNF: Yes  Discharge Durable Medical Equipment: No (at home pt has a walker and shower chair)  Physical Therapy Consult: No  Occupational Therapy Consult: No  Speech Therapy Consult: No  Current Support Network: [address] [place] [address] (was currently residing at [address] [place] [address],[address] prior to admission)  Confirm Follow Up Transport: Family (son transports to follow up appts)  Plan discussed with Pt/Family/Caregiver: Yes  Discharge Location  Discharge Placement: Skilled nursing facility ([facility],[address])    Jaron Stevens, RN  [phone]

## 2018-06-01 NOTE — H&P
Surgery History and Physcial    Subjective:      Maverick Brunner is a 76 y.o. female who presents for evaluation of abdominal pain, nausea and vomiting. The pain is located in the RLQ without radiation in the site of a longstanding ventral hernia. This does not correspond to any prior incisional scar. Pain is described as sharp and measures 6/10 in intensity. She has had several episodes of emesis. History is difficult to obtain secondary to her dysarthria s/p stroke, managed with aspirin and plavix.   She also has CAD, CHF, O2 dependent at home with HTN    Patient Active Problem List    Diagnosis Date Noted    Ventral hernia with obstruction 06/01/2018    Bilateral carotid artery stenosis 05/10/2018    Acute cerebral infarction ([address] 05/09/2018    Syncope 05/06/2018    CAD (coronary artery disease) 02/05/2018    Stroke ([address] 02/05/2018    Hypertensive urgency 08/01/2017    CHF (congestive heart failure) ([address] 08/01/2017    COPD exacerbation ([address] 03/07/2017    CAD (coronary artery disease), native coronary artery 07/15/2016    H/O: CVA (cerebrovascular accident) 07/15/2016    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 ([address] 07/15/2016    Severe sepsis ([address] 07/14/2016    Pneumonia 07/14/2016    Acute respiratory failure ([address] 07/14/2016    Elevated troponin 07/14/2016    Left bundle branch block (LBBB) on electrocardiogram 95/57/3617    Acute systolic heart failure ([address] 07/14/2016    Acute kidney failure ([address] 07/14/2016    Mixed hyperlipidemia 09/21/2012    Essential hypertension, benign 09/21/2012    Other left bundle branch block 09/21/2012    Coronary atherosclerosis of native coronary artery 09/21/2012    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 09/21/2012    Atherosclerosis of renal artery ([address] 09/21/2012    Atherosclerosis of native artery of extremity with intermittent claudication ([address] 09/21/2012    Tobacco use disorder 09/21/2012    Cerebrovascular accident [facility]) 09/21/2012    Mitral valve disorders(424.0) 09/21/2012    Tricuspid valve disorder 09/21/2012     Past Medical History:   Diagnosis Date    Acute kidney failure ([facility] 6/06/0486    Acute systolic heart failure ([facility] 7/14/2016    CAD (coronary artery disease)     Heart failure ([facility]     Stroke [facility]) 2009    Stroke    Stroke [facility])       No past surgical history on file. Social History   Substance Use Topics    Smoking status: Former Smoker     Quit date: 9/21/2009    Smokeless tobacco: Never Used    Alcohol use 0.0 oz/week     0 Standard drinks or equivalent per week      Comment: Very little      Family History   Problem Relation Age of Onset    Coronary Artery Disease Other      grandmother age 76      Prior to Admission medications    Medication Sig Start Date End Date Taking? Authorizing Provider   metoprolol succinate (TOPROL-XL) 25 mg XL tablet Take 1 Tab by mouth every evening. 5/11/18   Tammi Jordan MD   atorvastatin (LIPITOR) 80 mg tablet Take 1 Tab by mouth nightly. TAKE ONE TABLET BY MOUTH EVERY DAY 5/11/18   Tammi Jordan MD   fexofenadine (ALLEGRA) 180 mg tablet Take 180 mg by mouth daily as needed for Allergies. Historical Provider   OXYGEN-AIR DELIVERY SYSTEMS 2 L by IntraNASal route daily as needed (shortness of breath). 3/31/18   Historical Provider   predniSONE (DELTASONE) 10 mg tablet Take 2 Tabs by mouth daily (with breakfast). 2 tabs for 3 days  1 tabs for 3 days  Then stop. Patient not taking: Reported on 4/2/2018 2/10/18   Raysa Mercado MD   loratadine (CLARITIN) 10 mg tablet Take 1 Tab by mouth daily. 2/10/18   Raysa Mercado MD   clopidogrel (PLAVIX) 75 mg tab Take 1 Tab by mouth daily. 2/10/18   Raysa Mercado MD   aspirin delayed-release 81 mg tablet Take 1 Tab by mouth daily.  10/18/17   ROME Biswas   albuterol (PROVENTIL HFA, VENTOLIN HFA, PROAIR HFA) 90 mcg/actuation inhaler Take 2 Puffs by inhalation every four (4) hours as needed for Wheezing. 7/21/16   KESHIA Corado     Allergies   Allergen Reactions    Decadron [Dexamethasone Sodium Phosphate] Hives    Pcn [Penicillins] Hives         Review of Systems   Constitutional: Positive for appetite change. Negative for chills, diaphoresis and fever. Respiratory: Negative for shortness of breath and wheezing. Cardiovascular: Negative for chest pain and palpitations. Gastrointestinal: Positive for abdominal distention, abdominal pain, constipation, nausea and vomiting. Negative for diarrhea. Musculoskeletal: Negative for myalgias. Hematological: Does not bruise/bleed easily. Objective:     Visit Vitals    BP (!) 218/90 (BP 1 Location: Right arm, BP Patient Position: Lying left side)    Pulse 81    Temp 98.4 °F (36.9 °C)    Resp 18    Ht 5' (1.524 m)    Wt 120 lb (54.4 kg)    SpO2 99%    BMI 23.44 kg/m2       Physical Exam   Constitutional: She appears well-developed and well-nourished. No distress. HENT:   Head: Normocephalic and atraumatic. Cardiovascular: Normal rate, regular rhythm, normal heart sounds and intact distal pulses. Pulmonary/Chest: Breath sounds normal. She has no wheezes. She has no rales. Abdominal: Soft. Bowel sounds are normal. She exhibits no distension and no mass. There is no hepatosplenomegaly. There is tenderness in the right lower quadrant. There is no rigidity, no rebound and no guarding. A hernia is present. Hernia confirmed positive in the ventral area. Musculoskeletal: Normal range of motion. Lymphadenopathy:     She has no cervical adenopathy.        Imaging:  images and reports reviewed    Lab Review:    Recent Results (from the past 24 hour(s))   CBC WITH AUTOMATED DIFF    Collection Time: 06/01/18 12:15 AM   Result Value Ref Range    WBC 10.4 3.6 - 11.0 K/uL    RBC 4.50 3.80 - 5.20 M/uL    HGB 12.0 11.5 - 16.0 g/dL    HCT 36.3 35.0 - 47.0 %    MCV 80.7 80.0 - 99.0 FL    MCH 26.7 26.0 - 34.0 PG    MCHC 33.1 30.0 - 36.5 g/dL    RDW 13.6 11.5 - 14.5 %    PLATELET 331 757 - 009 K/uL    MPV 11.4 8.9 - 12.9 FL    NRBC 0.0 0  WBC    ABSOLUTE NRBC 0.00 0.00 - 0.01 K/uL    NEUTROPHILS 87 (H) 32 - 75 %    LYMPHOCYTES 9 (L) 12 - 49 %    MONOCYTES 4 (L) 5 - 13 %    EOSINOPHILS 0 0 - 7 %    BASOPHILS 0 0 - 1 %    IMMATURE GRANULOCYTES 0 0.0 - 0.5 %    ABS. NEUTROPHILS 9.0 (H) 1.8 - 8.0 K/UL    ABS. LYMPHOCYTES 0.9 0.8 - 3.5 K/UL    ABS. MONOCYTES 0.4 0.0 - 1.0 K/UL    ABS. EOSINOPHILS 0.0 0.0 - 0.4 K/UL    ABS. BASOPHILS 0.0 0.0 - 0.1 K/UL    ABS. IMM. GRANS. 0.0 0.00 - 0.04 K/UL    DF AUTOMATED     METABOLIC PANEL, COMPREHENSIVE    Collection Time: 06/01/18 12:15 AM   Result Value Ref Range    Sodium 144 136 - 145 mmol/L    Potassium 3.7 3.5 - 5.1 mmol/L    Chloride 109 (H) 97 - 108 mmol/L    CO2 25 21 - 32 mmol/L    Anion gap 10 5 - 15 mmol/L    Glucose 172 (H) 65 - 100 mg/dL    BUN 13 6 - 20 MG/DL    Creatinine 1.03 (H) 0.55 - 1.02 MG/DL    BUN/Creatinine ratio 13 12 - 20      GFR est AA >60 >60 ml/min/1.73m2    GFR est non-AA 52 (L) >60 ml/min/1.73m2    Calcium 9.2 8.5 - 10.1 MG/DL    Bilirubin, total 0.3 0.2 - 1.0 MG/DL    ALT (SGPT) 13 12 - 78 U/L    AST (SGOT) 13 (L) 15 - 37 U/L    Alk.  phosphatase 121 (H) 45 - 117 U/L    Protein, total 7.4 6.4 - 8.2 g/dL    Albumin 3.6 3.5 - 5.0 g/dL    Globulin 3.8 2.0 - 4.0 g/dL    A-G Ratio 0.9 (L) 1.1 - 2.2     TROPONIN I    Collection Time: 06/01/18 12:15 AM   Result Value Ref Range    Troponin-I, Qt. 0.14 (H) <0.05 ng/mL   CK W/ REFLX CKMB    Collection Time: 06/01/18 12:15 AM   Result Value Ref Range    CK 98 26 - 192 U/L   LACTIC ACID    Collection Time: 06/01/18 12:15 AM   Result Value Ref Range    Lactic acid 0.7 0.4 - 2.0 MMOL/L   URINALYSIS W/ REFLEX CULTURE    Collection Time: 06/01/18 12:17 AM   Result Value Ref Range    Color YELLOW/STRAW      Appearance CLOUDY (A) CLEAR      Specific gravity 1.012 1.003 - 1.030      pH (UA) 7.5 5.0 - 8.0      Protein 30 (A) NEG mg/dL    Glucose 100 (A) NEG mg/dL    Ketone 15 (A) NEG mg/dL    Bilirubin NEGATIVE  NEG      Blood TRACE (A) NEG      Urobilinogen 0.2 0.2 - 1.0 EU/dL    Nitrites NEGATIVE  NEG      Leukocyte Esterase TRACE (A) NEG      WBC 5-10 0 - 4 /hpf    RBC 0-5 0 - 5 /hpf    Epithelial cells FEW FEW /lpf    Bacteria 2+ (A) NEG /hpf    UA:UC IF INDICATED URINE CULTURE ORDERED (A) CNI     EKG, 12 LEAD, INITIAL    Collection Time: 06/01/18 12:18 AM   Result Value Ref Range    Ventricular Rate 85 BPM    Atrial Rate 85 BPM    P-R Interval 146 ms    QRS Duration 132 ms    Q-T Interval 428 ms    QTC Calculation (Bezet) 509 ms    Calculated P Axis 77 degrees    Calculated R Axis 20 degrees    Calculated T Axis 77 degrees    Diagnosis       Normal sinus rhythm  Biatrial enlargement  Left bundle branch block  Abnormal ECG  When compared with ECG of 06-MAY-2018 19:48,  Left bundle branch block is now present           Assessment:     Abdominal pain, suspect mechanical small bowel obstruction secondary to ventral wall hernia RLQ without prior incisional scar. Plan:     1. I recommend proceeding with NGT decompression and ventral hernia repair with mesh. 2. Discussed aspects of surgical intervention, methods, risks including by not limited to infection, bleeding, hematoma, and perforation of the intestines or solid organs, possible need for bowel resection, and the risks of general anesthetic. The patient understands the risks; any and all questions were answered to the patient's satisfaction.     Signed By: Polo Nelson MD, [facility]    June 1, 2018

## 2018-06-02 LAB
ANION GAP SERPL CALC-SCNC: 9 MMOL/L (ref 5–15)
BUN SERPL-MCNC: 28 MG/DL (ref 6–20)
BUN/CREAT SERPL: 11 (ref 12–20)
CALCIUM SERPL-MCNC: 8.7 MG/DL (ref 8.5–10.1)
CHLORIDE SERPL-SCNC: 105 MMOL/L (ref 97–108)
CO2 SERPL-SCNC: 27 MMOL/L (ref 21–32)
CREAT SERPL-MCNC: 2.49 MG/DL (ref 0.55–1.02)
GLUCOSE SERPL-MCNC: 193 MG/DL (ref 65–100)
POTASSIUM SERPL-SCNC: 4.6 MMOL/L (ref 3.5–5.1)
SODIUM SERPL-SCNC: 141 MMOL/L (ref 136–145)

## 2018-06-02 PROCEDURE — 80048 BASIC METABOLIC PNL TOTAL CA: CPT | Performed by: SURGERY

## 2018-06-02 PROCEDURE — 51798 US URINE CAPACITY MEASURE: CPT

## 2018-06-02 PROCEDURE — 36415 COLL VENOUS BLD VENIPUNCTURE: CPT | Performed by: SURGERY

## 2018-06-02 PROCEDURE — 65660000000 HC RM CCU STEPDOWN

## 2018-06-02 PROCEDURE — 74011250637 HC RX REV CODE- 250/637: Performed by: INTERNAL MEDICINE

## 2018-06-02 PROCEDURE — 74011250637 HC RX REV CODE- 250/637: Performed by: SURGERY

## 2018-06-02 PROCEDURE — 74011250636 HC RX REV CODE- 250/636: Performed by: SURGERY

## 2018-06-02 PROCEDURE — 77030038269 HC DRN EXT URIN PURWCK BARD -A

## 2018-06-02 PROCEDURE — 74011000250 HC RX REV CODE- 250: Performed by: SURGERY

## 2018-06-02 RX ORDER — METOPROLOL SUCCINATE 25 MG/1
12.5 TABLET, EXTENDED RELEASE ORAL DAILY
Status: DISCONTINUED | OUTPATIENT
Start: 2018-06-02 | End: 2018-06-05 | Stop reason: HOSPADM

## 2018-06-02 RX ADMIN — Medication 10 ML: at 11:39

## 2018-06-02 RX ADMIN — HYDROMORPHONE HYDROCHLORIDE 0.5 MG: 2 INJECTION INTRAMUSCULAR; INTRAVENOUS; SUBCUTANEOUS at 05:03

## 2018-06-02 RX ADMIN — DEXTROSE MONOHYDRATE, SODIUM CHLORIDE, AND POTASSIUM CHLORIDE 75 ML/HR: 50; 4.5; 1.49 INJECTION, SOLUTION INTRAVENOUS at 04:52

## 2018-06-02 RX ADMIN — Medication 10 ML: at 13:54

## 2018-06-02 RX ADMIN — Medication 10 ML: at 04:53

## 2018-06-02 RX ADMIN — ALBUTEROL SULFATE 2 PUFF: 90 AEROSOL, METERED RESPIRATORY (INHALATION) at 05:00

## 2018-06-02 RX ADMIN — DEXTROSE MONOHYDRATE, SODIUM CHLORIDE, AND POTASSIUM CHLORIDE 75 ML/HR: 50; 4.5; 1.49 INJECTION, SOLUTION INTRAVENOUS at 21:23

## 2018-06-02 RX ADMIN — METOPROLOL SUCCINATE 12.5 MG: 25 TABLET, EXTENDED RELEASE ORAL at 10:10

## 2018-06-02 RX ADMIN — SODIUM CHLORIDE 1000 ML: 900 INJECTION, SOLUTION INTRAVENOUS at 10:30

## 2018-06-02 RX ADMIN — HYDROMORPHONE HYDROCHLORIDE 0.5 MG: 2 INJECTION INTRAMUSCULAR; INTRAVENOUS; SUBCUTANEOUS at 11:40

## 2018-06-02 RX ADMIN — Medication 10 ML: at 21:24

## 2018-06-02 RX ADMIN — METOROPROLOL TARTRATE 5 MG: 5 INJECTION, SOLUTION INTRAVENOUS at 00:08

## 2018-06-02 RX ADMIN — METOROPROLOL TARTRATE 5 MG: 5 INJECTION, SOLUTION INTRAVENOUS at 13:53

## 2018-06-02 RX ADMIN — ALBUTEROL SULFATE 2 PUFF: 90 AEROSOL, METERED RESPIRATORY (INHALATION) at 11:41

## 2018-06-02 RX ADMIN — HYDROCODONE BITARTRATE AND ACETAMINOPHEN 1 TABLET: 5; 325 TABLET ORAL at 22:36

## 2018-06-02 RX ADMIN — METOROPROLOL TARTRATE 5 MG: 5 INJECTION, SOLUTION INTRAVENOUS at 08:08

## 2018-06-02 NOTE — PROGRESS NOTES
Cardiology Progress Note            [address] [place] [address], [address] [facility]  [phone]    6/2/2018 8:40 AM    Admit Date: 5/31/2018    Admit Diagnosis: Ventral hernia with obstruction;incarcerated vintral hernia*    Subjective:     Librado Donnelly   denies chest pain.     Visit Vitals    /79    Pulse 80    Temp 98.3 °F (36.8 °C)    Resp 20    Ht 5' (1.524 m)    Wt 142 lb 8 oz (64.6 kg)    SpO2 100%    BMI 27.83 kg/m2     Current Facility-Administered Medications   Medication Dose Route Frequency    albuterol (PROVENTIL HFA, VENTOLIN HFA, PROAIR HFA) inhaler 2 Puff  2 Puff Inhalation Q4H PRN    HYDROmorphone (DILAUDID) injection 0.5 mg  0.5 mg IntraVENous Q2H PRN    metoprolol (LOPRESSOR) injection 5 mg  5 mg IntraVENous Q6H    dextrose 5% - 0.45% NaCl with KCl 20 mEq/L infusion  75 mL/hr IntraVENous CONTINUOUS    sodium chloride (NS) flush 5-10 mL  5-10 mL IntraVENous Q8H    sodium chloride (NS) flush 5-10 mL  5-10 mL IntraVENous PRN    HYDROcodone-acetaminophen (NORCO) 5-325 mg per tablet 1 Tab  1 Tab Oral Q4H PRN    ondansetron (ZOFRAN) injection 4 mg  4 mg IntraVENous Q4H PRN         Objective:      Visit Vitals    /79    Pulse 80    Temp 98.3 °F (36.8 °C)    Resp 20    Ht 5' (1.524 m)    Wt 142 lb 8 oz (64.6 kg)    SpO2 100%    BMI 27.83 kg/m2       Physical Exam:  Abdomen: soft, non-tender  Extremities: extremities normal  Heart: regular rate and rhythm  Lungs: clear to auscultation bilaterally  Pulses: 2+ and symmetric    Data Review:   Labs:    Recent Labs      06/01/18   0015   WBC  10.4   HGB  12.0   HCT  36.3   PLT  205     Recent Labs      06/02/18   0512  06/01/18   0015   NA  141  144   K  4.6  3.7   CL  105  109*   CO2  27  25   GLU  193*  172*   BUN  28*  13   CREA  2.49*  1.03*   CA  8.7  9.2   ALB   --   3.6   TBILI   --   0.3   SGOT   --   13*   ALT   --   13       Recent Labs      06/01/18   0425  06/01/18   0015   TROIQ  0.27*  0.14* Intake/Output Summary (Last 24 hours) at 06/02/18 0840  Last data filed at 06/01/18 1615   Gross per 24 hour   Intake           868.75 ml   Output                0 ml   Net           868.75 ml        Telemetry: nsr    Assessment:     Principal Problem:    Ventral hernia with obstruction (6/1/2018)    Active Problems:    Elevated troponin (7/14/2016)        Plan:     Dora Corral is sp ventral hernia repair. She tolerated surgery well and denies any chest pain. Taking po. Will start low dose bb (ef 45-50 on recent echo). Will check cr as outpatient - once returns to normal, will start low dose ace-inh.  She is ok for dc from cardiology standpoint and can f/u with Dr Krunal Wang as outpatient    Brianne Álvarez MD, [facility]    6/2/2018

## 2018-06-02 NOTE — PROGRESS NOTES
Gilma Gonzalez [id number]  MRN [id number]    Metoprolol XL 12.5mg ordered today; has been getting Metoprolol 5mg IV q6h around to clock.     If appropriate, please consider discontinuing the IV Metoprolol    Thanks,  Choco Shetty   or [phone]

## 2018-06-02 NOTE — PROGRESS NOTES
Bedside shift change report given to Deb Ford Dr (oncoming nurse) by Rossana Glover RN (offgoing nurse). Report included the following information SBAR, Kardex, MAR and Recent Results.

## 2018-06-02 NOTE — PROGRESS NOTES
Admit Date: 2018    POD 1 Day Post-Op    Procedure:  Procedure(s): HERNIA VENTRAL REPAIR WITH MESH    Subjective:     Patient has no new complaints. Objective:     Blood pressure 146/79, pulse 80, temperature 98.3 °F (36.8 °C), resp. rate 20, height 5' (1.524 m), weight 142 lb 8 oz (64.6 kg), SpO2 100 %. Temp (24hrs), Av.9 °F (36.6 °C), Min:97 °F (36.1 °C), Max:99.3 °F (37.4 °C)      Physical Exam:  GENERAL: alert, cooperative, no distress, appears stated age, LUNG: clear to auscultation bilaterally, HEART: regular rate and rhythm, ABDOMEN: soft, non-tender. Bowel sounds normal. No masses,  no organomegaly, wound c/d/i, EXTREMITIES:  extremities normal, atraumatic, no cyanosis or edema    Labs:   Recent Results (from the past 24 hour(s))   METABOLIC PANEL, BASIC    Collection Time: 18  5:12 AM   Result Value Ref Range    Sodium 141 136 - 145 mmol/L    Potassium 4.6 3.5 - 5.1 mmol/L    Chloride 105 97 - 108 mmol/L    CO2 27 21 - 32 mmol/L    Anion gap 9 5 - 15 mmol/L    Glucose 193 (H) 65 - 100 mg/dL    BUN 28 (H) 6 - 20 MG/DL    Creatinine 2.49 (H) 0.55 - 1.02 MG/DL    BUN/Creatinine ratio 11 (L) 12 - 20      GFR est AA 23 (L) >60 ml/min/1.73m2    GFR est non-AA 19 (L) >60 ml/min/1.73m2    Calcium 8.7 8.5 - 10.1 MG/DL       Data Review images and reports reviewed    Assessment:     Principal Problem:    Ventral hernia with obstruction (2018)    Active Problems:    Elevated troponin (2016)    Acute Kidney Injury    Plan/Recommendations/Medical Decision Making:     Continue present treatment   Cr up significantly today, suspect volume related. Will give saline bolus and repeat BMP in am prior to d/c home  GI lite diet. Flavia Romano.  Feliz Prince MD, [facility]

## 2018-06-02 NOTE — PROGRESS NOTES
1930: patient crying out that she is in pain. Repositioned patient in bed.    1945: patient fed jello and some beef broth, patient did not have much appetite. Patient crying in pain in her abdomen/    2007: dilaudid given to patient for discomfort. 2130: patient drowsy but arousable. 2200: patient sleeping but responds to voice, states she is not in pain anymore. 0030: patient bed pad changed and pure wick placed on patient. 0100: report given to era ANGULO, SBAR, medications, orders, plan of care, and assessment discussed.

## 2018-06-03 LAB
ANION GAP SERPL CALC-SCNC: 6 MMOL/L (ref 5–15)
BACTERIA SPEC CULT: ABNORMAL
BACTERIA SPEC CULT: ABNORMAL
BUN SERPL-MCNC: 25 MG/DL (ref 6–20)
BUN/CREAT SERPL: 16 (ref 12–20)
CALCIUM SERPL-MCNC: 8.1 MG/DL (ref 8.5–10.1)
CC UR VC: ABNORMAL
CHLORIDE SERPL-SCNC: 109 MMOL/L (ref 97–108)
CO2 SERPL-SCNC: 25 MMOL/L (ref 21–32)
CREAT SERPL-MCNC: 1.58 MG/DL (ref 0.55–1.02)
GLUCOSE SERPL-MCNC: 126 MG/DL (ref 65–100)
POTASSIUM SERPL-SCNC: 4.4 MMOL/L (ref 3.5–5.1)
SERVICE CMNT-IMP: ABNORMAL
SODIUM SERPL-SCNC: 140 MMOL/L (ref 136–145)

## 2018-06-03 PROCEDURE — 77010033678 HC OXYGEN DAILY

## 2018-06-03 PROCEDURE — 80048 BASIC METABOLIC PNL TOTAL CA: CPT | Performed by: SURGERY

## 2018-06-03 PROCEDURE — 74011250636 HC RX REV CODE- 250/636: Performed by: SURGERY

## 2018-06-03 PROCEDURE — 74011250637 HC RX REV CODE- 250/637: Performed by: INTERNAL MEDICINE

## 2018-06-03 PROCEDURE — 65660000000 HC RM CCU STEPDOWN

## 2018-06-03 PROCEDURE — 36415 COLL VENOUS BLD VENIPUNCTURE: CPT | Performed by: SURGERY

## 2018-06-03 RX ORDER — FUROSEMIDE 10 MG/ML
40 INJECTION INTRAMUSCULAR; INTRAVENOUS ONCE
Status: COMPLETED | OUTPATIENT
Start: 2018-06-03 | End: 2018-06-03

## 2018-06-03 RX ADMIN — HYDROMORPHONE HYDROCHLORIDE 0.5 MG: 2 INJECTION INTRAMUSCULAR; INTRAVENOUS; SUBCUTANEOUS at 08:31

## 2018-06-03 RX ADMIN — FUROSEMIDE 40 MG: 10 INJECTION, SOLUTION INTRAMUSCULAR; INTRAVENOUS at 09:53

## 2018-06-03 RX ADMIN — METOPROLOL SUCCINATE 12.5 MG: 25 TABLET, EXTENDED RELEASE ORAL at 08:31

## 2018-06-03 RX ADMIN — ALBUTEROL SULFATE 2 PUFF: 90 AEROSOL, METERED RESPIRATORY (INHALATION) at 08:17

## 2018-06-03 RX ADMIN — Medication 10 ML: at 05:28

## 2018-06-03 RX ADMIN — HYDROMORPHONE HYDROCHLORIDE 0.5 MG: 2 INJECTION INTRAMUSCULAR; INTRAVENOUS; SUBCUTANEOUS at 12:07

## 2018-06-03 NOTE — ROUTINE PROCESS
periwick removed. Pt repeatedly pulling out and waving in the air. Found again on the floor. Pt incontanent and placed pads a brief.

## 2018-06-03 NOTE — PROGRESS NOTES
Lacy Rosario, MAY notified of patient's readmission to [facility]/ Merritt Mcnamara of Care Management

## 2018-06-03 NOTE — PROGRESS NOTES
Admit Date: 2018    POD 2 Day Post-Op    Procedure:  Procedure(s): HERNIA VENTRAL REPAIR WITH MESH    Subjective:     Patient has no new complaints. No BM yet. Objective:     Blood pressure 164/69, pulse (!) 105, temperature 99.8 °F (37.7 °C), resp. rate 20, height 5' (1.524 m), weight 142 lb 8 oz (64.6 kg), SpO2 100 %. Temp (24hrs), Av.1 °F (37.3 °C), Min:98.7 °F (37.1 °C), Max:99.8 °F (37.7 °C)      Physical Exam:  GENERAL: alert, cooperative, no distress, appears stated age, LUNG: some rales this am, HEART: regular rate and rhythm, ABDOMEN: soft, non-tender. Bowel sounds normal. No masses,  no organomegaly, wound c/d/i with some brawny discoloration, no erythema, EXTREMITIES:  extremities normal, atraumatic, no cyanosis or edema    Labs:   Recent Results (from the past 24 hour(s))   METABOLIC PANEL, BASIC    Collection Time: 18  5:20 AM   Result Value Ref Range    Sodium 140 136 - 145 mmol/L    Potassium 4.4 3.5 - 5.1 mmol/L    Chloride 109 (H) 97 - 108 mmol/L    CO2 25 21 - 32 mmol/L    Anion gap 6 5 - 15 mmol/L    Glucose 126 (H) 65 - 100 mg/dL    BUN 25 (H) 6 - 20 MG/DL    Creatinine 1.58 (H) 0.55 - 1.02 MG/DL    BUN/Creatinine ratio 16 12 - 20      GFR est AA 38 (L) >60 ml/min/1.73m2    GFR est non-AA 32 (L) >60 ml/min/1.73m2    Calcium 8.1 (L) 8.5 - 10.1 MG/DL       Data Review images and reports reviewed    Assessment:     Principal Problem:    Ventral hernia with obstruction (2018)    Active Problems:    Elevated troponin (2016)    Acute Kidney Injury    Plan/Recommendations/Medical Decision Making:     Continue present treatment   Cr improved following bolus, now somewhat fluid overloaded  Will heplock iv, 1 dose lasix  GI lite diet. Marti Collet.  Denise Sandhu MD, [facility]

## 2018-06-03 NOTE — ROUTINE PROCESS
General Surgery End of Shift Nursing Note    Bedside shift change report given to [address] Keily Okeefe (oncoming nurse) by Ashley Cadena RN (offgoing nurse). Report included the following information SBAR. Shift worked:   7a-7p   Summary of shift:    periwick removed. Pt kept pulling out and laying on the floor. Wheezing and wet lungs. Took pt off fluids   Issues for physician to address:        Number times ambulated in hallway past shift:     Number of times OOB to chair past shift:     Pain Management:  Current medication: dilaudid  Patient states pain is manageable on current pain medication:     GI:    Current diet:  DIET GI LITE (POST SURGICAL)    Tolerating current diet: YES  Passing flatus: YES  Last Bowel Movement:    Appearance:     Respiratory:    Incentive Spirometer at bedside: YES  Patient instructed on use: YES    Patient Safety:    Falls Score: 2  Bed Alarm On? No  Sitter?  No    Nataliya Payne, ADELE

## 2018-06-04 PROCEDURE — 97530 THERAPEUTIC ACTIVITIES: CPT

## 2018-06-04 PROCEDURE — 51798 US URINE CAPACITY MEASURE: CPT

## 2018-06-04 PROCEDURE — 74011250637 HC RX REV CODE- 250/637: Performed by: FAMILY MEDICINE

## 2018-06-04 PROCEDURE — 74011250637 HC RX REV CODE- 250/637: Performed by: SURGERY

## 2018-06-04 PROCEDURE — 92610 EVALUATE SWALLOWING FUNCTION: CPT

## 2018-06-04 PROCEDURE — 77030011943

## 2018-06-04 PROCEDURE — 97165 OT EVAL LOW COMPLEX 30 MIN: CPT

## 2018-06-04 PROCEDURE — 97162 PT EVAL MOD COMPLEX 30 MIN: CPT

## 2018-06-04 PROCEDURE — 74011250637 HC RX REV CODE- 250/637: Performed by: INTERNAL MEDICINE

## 2018-06-04 PROCEDURE — G8978 MOBILITY CURRENT STATUS: HCPCS

## 2018-06-04 PROCEDURE — 65660000000 HC RM CCU STEPDOWN

## 2018-06-04 PROCEDURE — 77030038269 HC DRN EXT URIN PURWCK BARD -A

## 2018-06-04 PROCEDURE — G8979 MOBILITY GOAL STATUS: HCPCS

## 2018-06-04 RX ORDER — CIPROFLOXACIN 500 MG/1
500 TABLET ORAL EVERY 12 HOURS
Status: DISCONTINUED | OUTPATIENT
Start: 2018-06-04 | End: 2018-06-05 | Stop reason: HOSPADM

## 2018-06-04 RX ADMIN — HYDROCODONE BITARTRATE AND ACETAMINOPHEN 1 TABLET: 5; 325 TABLET ORAL at 10:05

## 2018-06-04 RX ADMIN — METOPROLOL SUCCINATE 12.5 MG: 25 TABLET, EXTENDED RELEASE ORAL at 09:00

## 2018-06-04 RX ADMIN — HYDROCODONE BITARTRATE AND ACETAMINOPHEN 1 TABLET: 5; 325 TABLET ORAL at 17:39

## 2018-06-04 RX ADMIN — CIPROFLOXACIN HYDROCHLORIDE 500 MG: 500 TABLET, FILM COATED ORAL at 17:36

## 2018-06-04 RX ADMIN — CIPROFLOXACIN HYDROCHLORIDE 500 MG: 500 TABLET, FILM COATED ORAL at 21:54

## 2018-06-04 NOTE — PROGRESS NOTES
Problem: Mobility Impaired (Adult and Pediatric)  Goal: *Acute Goals and Plan of Care (Insert Text)  Physical Therapy Goals  Initiated 6/4/2018  1. Patient will move from supine to sit and sit to supine  in bed with moderate assistance  within 7 day(s). 2.  Patient will transfer from bed to chair and chair to bed with minimal assistance/contact guard assist using the least restrictive device within 7 day(s). 3.  Patient will perform sit to stand with minimal assistance/contact guard assist within 7 day(s). 4.  Patient will ambulate with minimal assistance/contact guard assist for 75 feet with the least restrictive device within 7 day(s). physical Therapy EVALUATION  Patient: Skinny Freire (14 y.o. female)  Date: 6/4/2018  Primary Diagnosis: Ventral hernia with obstruction  incarcerated vintral hernia with small bowel obstruction  Procedure(s) (LRB):  HERNIA VENTRAL REPAIR WITH MESH (N/A) 3 Days Post-Op   Precautions:   Fall    ASSESSMENT :  Based on the objective data described below, the patient presents with generalized weakness, decreased activity tolerance, impaired balance and altered gait. Pt was received in supine on 2L and cleared by nursing to mobilize. Prior to admission pt was at [address] [place] [address],[address] for rehab, but at baseline lives with son and she reports using a RW. VSS during session. Pt reported discomfort in RLQ where incision is, educated on log rolling and required max A x 1-2. Poor sitting balance initially, but progressed to good. Sit<>stand was performed with min A x 2 and HHA to get to the chair. Noted significant forward flexion due to pain, she was able to come to improved upright posture with manual cues but still somewhat flexed. She was left sitting up in the chair at the end of the session. Recommending return to rehab to continue progression. Patient will benefit from skilled intervention to address the above impairments.   Patients rehabilitation potential is considered to be Good  Factors which may influence rehabilitation potential include:   [x]         None noted  []         Mental ability/status  []         Medical condition  []         Home/family situation and support systems  []         Safety awareness  []         Pain tolerance/management  []         Other:      PLAN :  Recommendations and Planned Interventions:  [x]           Bed Mobility Training             []    Neuromuscular Re-Education  [x]           Transfer Training                   []    Orthotic/Prosthetic Training  [x]           Gait Training                         []    Modalities  [x]           Therapeutic Exercises           []    Edema Management/Control  [x]           Therapeutic Activities            [x]    Patient and Family Training/Education  []           Other (comment):    Frequency/Duration: Patient will be followed by physical therapy  4 times a week to address goals. Discharge Recommendations: Skilled Nursing Facility  Further Equipment Recommendations for Discharge: TBD     SUBJECTIVE:   Patient stated what happened to me.     OBJECTIVE DATA SUMMARY:   HISTORY:    Past Medical History:   Diagnosis Date    Acute kidney failure ([facility] [address] 6/25/2250    Acute systolic heart failure ([facility] [facility] 7/14/2016    CAD (coronary artery disease)     Heart failure ([facility] [address] [facility]     Stroke [facility]) 2009    Stroke   History reviewed. No pertinent surgical history. Prior Level of Function/Home Situation: pt came from [facility] where she was getting skilled rehab. At baseline she lives with son. Pt reports no oxygen at baseline, but chart stated 2L.   Personal factors and/or comorbidities impacting plan of care: CVA, dysarthria     Home Situation  Home Environment: Private residence  One/Two Story Residence: One story  Living Alone: No  Support Systems: Family member(s)  Patient Expects to be Discharged to[de-identified] Private residence  Current DME Used/Available at Home: None    EXAMINATION/PRESENTATION/DECISION MAKING:   Critical Behavior:  Neurologic State: Confused  Orientation Level: Oriented to person, Disoriented to place, Disoriented to situation, Disoriented to time  Cognition: Follows commands     Hearing: Auditory  Auditory Impairment: None  Skin:  Intact, some redness around incision of RLQ  Edema: abdoment  Range Of Motion:  AROM: Generally decreased, functional           PROM: Generally decreased, functional           Strength:    Strength: Generally decreased, functional                      Functional Mobility:  Bed Mobility:  Rolling: Maximum assistance  Supine to Sit: Maximum assistance;Assist x2     Scooting: Minimum assistance  Transfers:  Sit to Stand: Contact guard assistance;Minimum assistance;Assist x2  Stand to Sit: Contact guard assistance;Assist x2                       Balance:   Sitting: Impaired  Sitting - Static: Good (unsupported)  Sitting - Dynamic: Fair (occasional)  Standing: Impaired  Standing - Static: Fair  Standing - Dynamic : Fair  Ambulation/Gait Training:  Distance (ft): 5 Feet (ft)  Assistive Device: Gait belt (HHA x 2)  Ambulation - Level of Assistance: Contact guard assistance;Minimal assistance;Assist x2 (HHA x 2)        Gait Abnormalities: Decreased step clearance;Shuffling gait        Base of Support: Narrowed     Speed/Brianda: Pace decreased (<100 feet/min)  Step Length: Left shortened;Right shortened               Functional Measure:  Barthel Index:    Bathin  Bladder: 0  Bowels: 10  Groomin  Dressin  Feedin  Mobility: 0  Stairs: 0  Toilet Use: 5  Transfer (Bed to Chair and Back): 10  Total: 35       Barthel and G-code impairment scale:  Percentage of impairment CH  0% CI  1-19% CJ  20-39% CK  40-59% CL  60-79% CM  80-99% CN  100%   Barthel Score 0-100 100 99-80 79-60 59-40 20-39 1-19   0   Barthel Score 0-20 20 17-19 13-16 9-12 5-8 1-4 0      The Barthel ADL Index: Guidelines  1.  The index should be used as a record of what a patient does, not as a record of what a patient could do. 2. The main aim is to establish degree of independence from any help, physical or verbal, however minor and for whatever reason. 3. The need for supervision renders the patient not independent. 4. A patient's performance should be established using the best available evidence. Asking the patient, friends/relatives and nurses are the usual sources, but direct observation and common sense are also important. However direct testing is not needed. 5. Usually the patient's performance over the preceding 24-48 hours is important, but occasionally longer periods will be relevant. 6. Middle categories imply that the patient supplies over 50 per cent of the effort. 7. Use of aids to be independent is allowed. Kayla Aviles., Barthel, D.W. ([id number]). Functional evaluation: the Barthel Index. [address] [facility] (14)2. ALCIRA Peters, Silas Valencia., [place]., [place], [address] [place] [address] (1999). Measuring the change indisability after inpatient rehabilitation; comparison of the responsiveness of the Barthel Index and Functional [place] Measure. Journal of Neurology, Neurosurgery, and Psychiatry, 66(4), 557-042. Peg Neris, N.J.A, COREY Mayen, & Aarti Steve, M.A. (2004.) Assessment of post-stroke quality of life in cost-effectiveness studies: The usefulness of the Barthel Index and the EuroQoL-5D. Quality of Life Research, 13, 629-34           G codes: In compliance with CMSs Claims Based Outcome Reporting, the following G-code set was chosen for this patient based on their primary functional limitation being treated: The outcome measure chosen to determine the severity of the functional limitation was the barthel with a score of 35/100 which was correlated with the impairment scale.     ? Mobility - Walking and Moving Around:     - CURRENT STATUS: CL - 60%-79% impaired, limited or restricted    - GOAL STATUS: CK - 40%-59% impaired, limited or restricted    - D/C STATUS:  ---------------To be determined---------------      Physical Therapy Evaluation Charge Determination   History Examination Presentation Decision-Making   MEDIUM  Complexity : 1-2 comorbidities / personal factors will impact the outcome/ POC  MEDIUM Complexity : 3 Standardized tests and measures addressing body structure, function, activity limitation and / or participation in recreation  MEDIUM Complexity : Evolving with changing characteristics  Other outcome measures barthel  HIGH       Based on the above components, the patient evaluation is determined to be of the following complexity level: MEDIUM    Pain:  Pain Scale 1: Numeric (0 - 10)  Pain Intensity 1: 0              Activity Tolerance:   VSS on 2L  Please refer to the flowsheet for vital signs taken during this treatment. After treatment:   [x]         Patient left in no apparent distress sitting up in chair  []         Patient left in no apparent distress in bed  [x]         Call bell left within reach  [x]         Nursing notified  []         Caregiver present  []         Bed alarm activated    COMMUNICATION/EDUCATION:   The patients plan of care was discussed with: Occupational Therapist and Registered Nurse. [x]         Fall prevention education was provided and the patient/caregiver indicated understanding. []         Patient/family have participated as able in goal setting and plan of care. [x]         Patient/family agree to work toward stated goals and plan of care. []         Patient understands intent and goals of therapy, but is neutral about his/her participation. []         Patient is unable to participate in goal setting and plan of care.     Thank you for this referral.  Rozann Apgar, PT, DPT   Time Calculation: 17 mins

## 2018-06-04 NOTE — INTERDISCIPLINARY ROUNDS
Interdisciplinary team rounds were held 6/4/2018 with the following team members:Care Management, Nursing and Pharmacy and the patient. Plan of care discussed. See clinical pathway and/or care plan for interventions and desired outcomes.     Plan for the day: speech consult, safety, pain control   Plan for the stay: continue current [address]: carolin care when ready

## 2018-06-04 NOTE — PROGRESS NOTES
Problem: Self Care Deficits Care Plan (Adult)  Goal: *Acute Goals and Plan of Care (Insert Text)  Occupational Therapy Goals  Initiated 6/4/2018  1. Patient will perform self-feeding with modified independence within 7 day(s). 2.  Patient will perform upper body dressing and lower body dressing with moderate assistance  within 7 day(s). 3.  Patient will perform bathing with moderate assistance  within 7 day(s). 4.  Patient will perform toilet transfers to [facility] with minimal assistance and RW within 7 day(s). 5.  Patient will perform all aspects of toileting with moderate assistance  within 7 day(s). Occupational Therapy EVALUATION  Patient: Rosanna Perez (65 y.o. female)  Date: 6/4/2018  Primary Diagnosis: Ventral hernia with obstruction  incarcerated vintral hernia with small bowel obstruction  Procedure(s) (LRB):  HERNIA VENTRAL REPAIR WITH MESH (N/A) 3 Days Post-Op   Precautions:   Fall    ASSESSMENT :  Based on the objective data described below, the patient presents with baseline dementia, decreased strength LUE/LE from past CVA, impaired bed mobility, decreased standing balance, decline in functional status, and R abdominal pain from POD 3 hernia ventral repair. Pt required max A x 2 for rolling and supine to sit. Initially poor static sitting with B UE support progressing to fair. Pt was min A x 2 for sit to stand with increased difficulty achieving upright posture due to abdominal and incisional pain. Pt was min A x 2 via HHA to transfer to chair. SpO2 stable on 2 L, pt with increased chest congestion and required constant re-orientation to situation. Pt is needing min A for feeding to total A for toileting. Recommend SNF rehab. Patient will benefit from skilled intervention to address the above impairments.   Patients rehabilitation potential is considered to be Fair  Factors which may influence rehabilitation potential include:   []             None noted  [x]             Mental ability/status  [x]             Medical condition  []             Home/family situation and support systems  []             Safety awareness  []             Pain tolerance/management  []             Other:      PLAN :  Recommendations and Planned Interventions:  [x]               Self Care Training                  [x]        Therapeutic Activities  [x]               Functional Mobility Training    []        Cognitive Retraining  [x]               Therapeutic Exercises           [x]        Endurance Activities  [x]               Balance Training                   []        Neuromuscular Re-Education  []               Visual/Perceptual Training     [x]   Home Safety Training  [x]               Patient Education                 [x]        Family Training/Education  []               Other (comment):    Frequency/Duration: Patient will be followed by occupational therapy 4 times a week to address goals. Discharge Recommendations: [facility]  Further Equipment Recommendations for Discharge: TBD     SUBJECTIVE:   Patient stated why am I here? What happened?     OBJECTIVE DATA SUMMARY:   HISTORY:   Past Medical History:   Diagnosis Date    Acute kidney failure ([facility] [address] 0/35/7113    Acute systolic heart failure ([facility] [address] 7/14/2016    CAD (coronary artery disease)     Heart failure ([facility] [address] [facility]     Stroke [facility]) 2009    Stroke   History reviewed. No pertinent surgical history. Prior Level of Function/Environment/Context: at baseline, lives with son, ambulates with walker and reports mod I for ADLs.  Pt recently admitted to [facility] beginning of May, discharged to SNF   Occupations in which the patient is/was successful, what are the barriers preventing that success:   Performance Patterns (routines, roles, habits, and rituals):   Personal Interests and/or values:   Expanded or extensive additional review of patient history:     Home Situation  Home Environment: Private residence  One/Two Story Residence: One story  Living Alone: No  Support Systems: Family member(s)  Patient Expects to be Discharged to[de-identified] Private residence  Current DME Used/Available at Home: None    Hand dominance: Right    EXAMINATION OF PERFORMANCE DEFICITS:  Cognitive/Behavioral Status:           Perception: Appears intact  Perseveration: No perseveration noted  Safety/Judgement: Fall prevention;Decreased awareness of need for safety;Decreased awareness of need for assistance    Skin: R abdominal incision intact    Edema: none noted    Hearing: Auditory  Auditory Impairment: None    Vision/Perceptual:    Tracking: Able to track stimulus in all quadrants w/o difficulty                                Range of Motion:    AROM: Generally decreased, functional  PROM: Generally decreased, functional                      Strength:    Strength: Generally decreased, functional                Coordination:     Fine Motor Skills-Upper: Left Intact; Right Intact    Gross Motor Skills-Upper: Left Intact; Right Intact    Tone & Sensation:                              Balance:  Sitting: Impaired  Sitting - Static: Good (unsupported)  Sitting - Dynamic: Fair (occasional)  Standing: Impaired  Standing - Static: Fair  Standing - Dynamic : Fair    Functional Mobility and Transfers for ADLs:  Bed Mobility:  Rolling: Maximum assistance  Supine to Sit: Maximum assistance;Assist x2  Scooting: Minimum assistance    Transfers:  Sit to Stand: Contact guard assistance;Minimum assistance;Assist x2  Stand to Sit: Contact guard assistance;Assist x2    ADL Assessment:  Feeding: Setup;Minimum assistance    Oral Facial Hygiene/Grooming: Minimum assistance (seated)    Bathing: Maximum assistance    Upper Body Dressing: Maximum assistance    Lower Body Dressing: Maximum assistance    Toileting:  Total assistance;Maximum assistance                ADL Intervention and task modifications:     Pt educated on role of OT and required verbal cues for safety and proper hand placement during ADLs/functional transfers. Cognitive Retraining  Safety/Judgement: Fall prevention;Decreased awareness of need for safety;Decreased awareness of need for assistance      Functional Measure:  Barthel Index:    Bathin  Bladder: 0  Bowels: 10  Groomin  Dressin  Feedin  Mobility: 0  Stairs: 0  Toilet Use: 5  Transfer (Bed to Chair and Back): 10  Total: 35       Barthel and G-code impairment scale:  Percentage of impairment CH  0% CI  1-19% CJ  20-39% CK  40-59% CL  60-79% CM  80-99% CN  100%   Barthel Score 0-100 100 99-80 79-60 59-40 20-39 1-19   0   Barthel Score 0-20 20 17-19 13-16 9-12 5-8 1-4 0      The Barthel ADL Index: Guidelines  1. The index should be used as a record of what a patient does, not as a record of what a patient could do. 2. The main aim is to establish degree of independence from any help, physical or verbal, however minor and for whatever reason. 3. The need for supervision renders the patient not independent. 4. A patient's performance should be established using the best available evidence. Asking the patient, friends/relatives and nurses are the usual sources, but direct observation and common sense are also important. However direct testing is not needed. 5. Usually the patient's performance over the preceding 24-48 hours is important, but occasionally longer periods will be relevant. 6. Middle categories imply that the patient supplies over 50 per cent of the effort. 7. Use of aids to be independent is allowed. Samina Myers., Barthel, D.W. ([id number]). Functional evaluation: the Barthel Index. [address] [facility] (14)2. ALCIRA Renteria, Giovanna Rmairez., Marielle Jaime., [place], [address] [place] [address] (). Measuring the change indisability after inpatient rehabilitation; comparison of the responsiveness of the Barthel Index and Functional [place] Measure. Journal of Neurology, Neurosurgery, and Psychiatry, 66(4), 716-362.   Kimberlee Villalta, N.[place].SANDRA, Marycarmen op Farida Calixto M.A. (2004.) Assessment of post-stroke quality of life in cost-effectiveness studies: The usefulness of the Barthel Index and the EuroQoL-5D. Quality of Life Research, 13, 020-03         G codes: In compliance with CMSs Claims Based Outcome Reporting, the following G-code set was chosen for this patient based on their primary functional limitation being treated: The outcome measure chosen to determine the severity of the functional limitation was the barthel index with a score of 35/100 which was correlated with the impairment scale. ? Self Care:     - CURRENT STATUS: CL - 60%-79% impaired, limited or restricted    - GOAL STATUS: CK - 40%-59% impaired, limited or restricted    - D/C STATUS:  ---------------To be determined---------------     Occupational Therapy Evaluation Charge Determination   History Examination Decision-Making   LOW Complexity : Brief history review  LOW Complexity : 1-3 performance deficits relating to physical, cognitive , or psychosocial skils that result in activity limitations and / or participation restrictions  LOW Complexity : No comorbidities that affect functional and no verbal or physical assistance needed to complete eval tasks       Based on the above components, the patient evaluation is determined to be of the following complexity level: LOW   Pain:  Pain Scale 1: Numeric (0 - 10)  Pain Intensity 1: 0              Activity Tolerance:   VSS  Please refer to the flowsheet for vital signs taken during this treatment.   After treatment:   [x] Patient left in no apparent distress sitting up in chair  [] Patient left in no apparent distress in bed  [x] Call bell left within reach  [x] Nursing notified  [] Caregiver present  [] Bed alarm activated    COMMUNICATION/EDUCATION:   The patients plan of care was discussed with: Physical Therapist and Registered Nurse.  [] Home safety education was provided and the patient/caregiver indicated understanding. [x] Patient/family have participated as able in goal setting and plan of care. [x] Patient/family agree to work toward stated goals and plan of care. [] Patient understands intent and goals of therapy, but is neutral about his/her participation. [] Patient is unable to participate in goal setting and plan of care. This patients plan of care is appropriate for delegation to ANANDA.     Thank you for this referral.  Dominic Rubalcava OT  Time Calculation: 23 mins

## 2018-06-04 NOTE — PROGRESS NOTES
Problem: Dysphagia (Adult)  Goal: *Acute Goals and Plan of Care (Insert Text)  Speech pathology goals  Initiated 6/4/2018  1. Patient will tolerate puree/nectar liquid diet with no overt s/s aspiration within 7 days  2. Patient will tolerate trials of thin liquids with no overt s/s aspiration within 7 days  3. Patient will tolerate trials of soft solids with timely/complete mastication and full oral clearance within 7 days  4. Patient will participate in [employer] as clinically indicated  Speech LAnguage Pathology bedside swallow evaluation  Patient: Manisha Hopkins (89 y.o. female)  Date: 6/4/2018  Primary Diagnosis: Ventral hernia with obstruction  incarcerated vintral hernia with small bowel obstruction  Procedure(s) (LRB):  HERNIA VENTRAL REPAIR WITH MESH (N/A) 3 Days Post-Op   Precautions: swallow  Fall    ASSESSMENT :  Based on the objective data described below, the patient presents with moderate-severe oral dysphagia. Patient with prolonged, poor mastication with solid pieces left unchewed, premature spillage and delayed posterior propulsion. Oral residue of unchewed solid pieces observed, and suspect patient swallowed these whole. Patient also with moderate pharyngeal dysphagia characterized by delayed swallow initiation and decreased hyolaryngeal elevation/excursion. Patient with strong, repeated coughing with solid, suspect related to premature spillage. Patient also with strong cough, increased congestion, and increased wheezing with thin liquids. No overt s/s aspiration observed with purees or nectar liquids, and patient reported increased comfort with these consistencies. Patient will benefit from skilled intervention to address the above impairments.   Patients rehabilitation potential is considered to be Fair  Factors which may influence rehabilitation potential include:   []            None noted  [x]            Mental ability/status  [x]            Medical condition  []            Home/family situation and support systems  []            Safety awareness  []            Pain tolerance/management  []            Other:      PLAN :  Recommendations and Planned Interventions:  --Puree/nectar liquid diet  --No straws  --Meds crushed in puree  --SLP to follow for diet tolerance, liberalization, and MBS as indicated  Frequency/Duration: Patient will be followed by speech-language pathology 3 times a week to address goals. Discharge Recommendations: Skilled Nursing Facility     SUBJECTIVE:   Patient stated That's better re: puree. Patient alert, cooperative, confused. Oriented to person and place only. On regular/thin liquid diet at SNF PTA per SNF records. .    OBJECTIVE:     Past Medical History:   Diagnosis Date    Acute kidney failure ([address] 6/81/6712    Acute systolic heart failure ([address] 7/14/2016    CAD (coronary artery disease)     Heart failure ([address]     Stroke [facility]) 2009    Stroke   History reviewed. No pertinent surgical history.   Prior Level of Function/Home Situation:   Home Situation  Home Environment: Private residence  One/Two Story Residence: One story  Living Alone: No  Support Systems: Family member(s)  Patient Expects to be Discharged to[de-identified] Private residence  Current DME Used/Available at Home: None  Diet prior to admission: regular/thin  Current Diet:  GI lite   Cognitive and Communication Status:  Neurologic State: Alert, Confused  Orientation Level: Oriented to person, Oriented to place, Disoriented to time, Disoriented to situation  Cognition: Decreased command following  Perception: Appears intact  Perseveration: No perseveration noted  Safety/Judgement: Awareness of environment, Decreased awareness of need for assistance, Decreased awareness of need for safety, Decreased insight into deficits  Oral Assessment:  Oral Assessment  Labial: Right droop  Dentition: Edentulous  Oral Hygiene: moist oral mucosa  Lingual: No impairment  Velum: Unable to visualize  Mandible: No impairment  P.O. Trials:  Patient Position: sitting in chair  Vocal quality prior to P.O.: Hoarse;Strain (mod-severe dysarthria)  Consistency Presented: Thin liquid; Nectar thick liquid;Mechanical soft;Puree (lunch tray)  How Presented: SLP-fed/presented;Spoon;Self-fed/presented;Cup/sip     Bolus Acceptance: No impairment  Bolus Formation/Control: Impaired  Type of Impairment: Delayed; Incomplete;Poor;Mastication;Premature spillage  Propulsion: Delayed (# of seconds)  Oral Residue: Greater than 50% of bolus (unchewed piece of fruit, suspect patient swallowed whole)  Initiation of Swallow: Delayed (# of seconds)  Laryngeal Elevation: Decreased  Aspiration Signs/Symptoms: Strong cough (solid, thin liquid)  Pharyngeal Phase Characteristics: No impairment, issues, or problems              Oral Phase Severity: Moderate-severe  Pharyngeal Phase Severity : Moderate    NOMS:   The NOMS functional outcome measure was used to quantify this patient's level of swallowing impairment. Based on the NOMS, the patient was determined to be at level 3 for swallow function     G Codes: In compliance with CMSs Claims Based Outcome Reporting, the following G-code set was chosen for this patient based the use of the NOMS functional outcome to quantify this patient's level of swallowing impairment. Using the NOMS, the patient was determined to be at level 3 for swallow function which correlates with the CL= 60-79% level of severity. Based on the objective assessment provided within this note, the current, goal, and discharge g-codes are as follows:    Swallow  Swallowing:   Swallow Current Status CL= 60-79%   Swallow Goal Status CK= 40-59%      NOMS Swallowing Levels:  Level 1 (CN): NPO  Level 2 (CM): NPO but takes consistency in therapy  Level 3 (CL): Takes less than 50% of nutrition p.o. and continues with nonoral feedings; and/or safe with mod cues; and/or max diet restriction  Level 4 (CK):  Safe swallow but needs mod cues; and/or mod diet restriction; and/or still requires some nonoral feeding/supplements  Level 5 (CJ): Safe swallow with min diet restriction; and/or needs min cues  Level 6 (CI): Independent with p.o.; rare cues; usually self cues; may need to avoid some foods or needs extra time  Level 7 ([address] [place] [address]): Independent for all p.o.  ANAYA. (2003). National Outcomes Measurement System (NOMS): Adult Speech-Language Pathology User's Guide. Pain:  Pain Scale 1: Numeric (0 - 10)  Pain Intensity 1: 0     After treatment:   [x]            Patient left in no apparent distress sitting up in chair  []            Patient left in no apparent distress in bed  [x]            Call bell left within reach  [x]            Nursing notified  []            Caregiver present  []            Bed alarm activated    COMMUNICATION/EDUCATION:   The patients plan of care including recommendations, planned interventions, and recommended diet changes were discussed with: Registered Nurse. [x]            Posted safety precautions in patient's room. [x]            Patient/family have participated as able in goal setting and plan of care. [x]            Patient/family agree to work toward stated goals and plan of care. []            Patient understands intent and goals of therapy, but is neutral about his/her participation. []            Patient is unable to participate in goal setting and plan of care.     Thank you for this referral.  Kayla Perez, DEREK  Time Calculation: 20 mins

## 2018-06-04 NOTE — PROGRESS NOTES
Medical record reviewed. CM met with patient during IDR's. Patient may be ready to go back to [facility]. Referral placed Friday is still pending. CM phoned [facility] and requested update on status. Message left for facility to call CM back. CM will await call back and update and will continue to follow for discharge needs. Update: 12:29pm - CM spoke with [facility] and they were waiting to accept to see if there would be any major changes. CM updated on status and they will accept her back when ready for discharge.     Soledad Craig, [facility]

## 2018-06-04 NOTE — PROGRESS NOTES
Admit Date: 2018    POD 3 Days Post-Op    Procedure:  Procedure(s): HERNIA VENTRAL REPAIR WITH MESH    Subjective:     Patient has no new complaints. Objective:     Blood pressure 178/66, pulse 89, temperature 98.4 °F (36.9 °C), resp. rate 18, height 5' (1.524 m), weight 142 lb 3.2 oz (64.5 kg), SpO2 98 %. Temp (24hrs), Av.9 °F (37.2 °C), Min:98 °F (36.7 °C), Max:100.6 °F (38.1 °C)      Physical Exam:  GENERAL: alert, cooperative, no distress, LUNG: nl effort, HEART: regular rate and rhythm, ABDOMEN: soft, non-tender. Bowel sounds normal. No masses,  no organomegaly, EXTREMITIES:  extremities normal, atraumatic, no cyanosis or edema    Labs: No results found for this or any previous visit (from the past 24 hour(s)). Data Review reviewed  I & O    Assessment:     Principal Problem:    Ventral hernia with obstruction (2018)    Active Problems:    Elevated troponin (2016)        Plan/Recommendations/Medical Decision Making:     Continue present treatment  labs in AM   ? Return to SNF tomorrow    Sallie Benítez.  Anu Araujo MD, [facility]

## 2018-06-04 NOTE — ROUTINE PROCESS
General Surgery End of Shift Nursing Note    Bedside shift change report given to Ivana Shah RN (oncoming nurse) by Brianda Guzmán RN (offgoing nurse). Report included the following information SBAR. Shift worked:   [id number]   Summary of shift:   Wheezing and wet lungs. Patient confused this shift, pulled out her IV x 2, Called to notify Dr Clay Smith,  on call physician for Dr. Johnathan Degroot he has not return call. Patient has no IV access at this time. Issues for physician to address:        Number times ambulated in hallway past shift:     Number of times OOB to chair past shift:     Pain Management:  Current medication: dilaudid  Patient states pain is manageable on current pain medication:     GI:    Current diet:  DIET GI LITE (POST SURGICAL)    Tolerating current diet: YES  Passing flatus: YES  Last Bowel Movement:    Appearance:     Respiratory:    Incentive Spirometer at bedside: YES  Patient instructed on use: YES    Patient Safety:    Falls Score: 2  Bed Alarm On? No  Sitter?  No    Latonia Rao

## 2018-06-04 NOTE — PROGRESS NOTES
Surgery      UTI  Sol Perez sens  Has PCN allergy    Cipro      Loli Haider MD, [facility]  [id number] [facility]

## 2018-06-05 VITALS
BODY MASS INDEX: 26.49 KG/M2 | OXYGEN SATURATION: 100 % | HEIGHT: 60 IN | SYSTOLIC BLOOD PRESSURE: 148 MMHG | WEIGHT: 134.92 LBS | RESPIRATION RATE: 18 BRPM | DIASTOLIC BLOOD PRESSURE: 57 MMHG | TEMPERATURE: 98.7 F | HEART RATE: 76 BPM

## 2018-06-05 LAB
ANION GAP SERPL CALC-SCNC: 5 MMOL/L (ref 5–15)
BUN SERPL-MCNC: 19 MG/DL (ref 6–20)
BUN/CREAT SERPL: 17 (ref 12–20)
CALCIUM SERPL-MCNC: 8.5 MG/DL (ref 8.5–10.1)
CHLORIDE SERPL-SCNC: 106 MMOL/L (ref 97–108)
CO2 SERPL-SCNC: 30 MMOL/L (ref 21–32)
CREAT SERPL-MCNC: 1.1 MG/DL (ref 0.55–1.02)
ERYTHROCYTE [DISTWIDTH] IN BLOOD BY AUTOMATED COUNT: 13.7 % (ref 11.5–14.5)
GLUCOSE SERPL-MCNC: 128 MG/DL (ref 65–100)
HCT VFR BLD AUTO: 25.5 % (ref 35–47)
HGB BLD-MCNC: 8.3 G/DL (ref 11.5–16)
MCH RBC QN AUTO: 26.8 PG (ref 26–34)
MCHC RBC AUTO-ENTMCNC: 32.5 G/DL (ref 30–36.5)
MCV RBC AUTO: 82.3 FL (ref 80–99)
NRBC # BLD: 0 K/UL (ref 0–0.01)
NRBC BLD-RTO: 0 PER 100 WBC
PLATELET # BLD AUTO: 177 K/UL (ref 150–400)
PMV BLD AUTO: 11.5 FL (ref 8.9–12.9)
POTASSIUM SERPL-SCNC: 4.2 MMOL/L (ref 3.5–5.1)
RBC # BLD AUTO: 3.1 M/UL (ref 3.8–5.2)
SODIUM SERPL-SCNC: 141 MMOL/L (ref 136–145)
WBC # BLD AUTO: 5.9 K/UL (ref 3.6–11)

## 2018-06-05 PROCEDURE — 36415 COLL VENOUS BLD VENIPUNCTURE: CPT | Performed by: FAMILY MEDICINE

## 2018-06-05 PROCEDURE — 97116 GAIT TRAINING THERAPY: CPT

## 2018-06-05 PROCEDURE — 80048 BASIC METABOLIC PNL TOTAL CA: CPT | Performed by: FAMILY MEDICINE

## 2018-06-05 PROCEDURE — 92526 ORAL FUNCTION THERAPY: CPT

## 2018-06-05 PROCEDURE — 74011250637 HC RX REV CODE- 250/637: Performed by: INTERNAL MEDICINE

## 2018-06-05 PROCEDURE — 97530 THERAPEUTIC ACTIVITIES: CPT

## 2018-06-05 PROCEDURE — 74011250637 HC RX REV CODE- 250/637: Performed by: FAMILY MEDICINE

## 2018-06-05 PROCEDURE — 85027 COMPLETE CBC AUTOMATED: CPT | Performed by: FAMILY MEDICINE

## 2018-06-05 PROCEDURE — 74011250636 HC RX REV CODE- 250/636: Performed by: SURGERY

## 2018-06-05 PROCEDURE — 74011250637 HC RX REV CODE- 250/637: Performed by: SURGERY

## 2018-06-05 RX ORDER — CIPROFLOXACIN 500 MG/1
500 TABLET ORAL EVERY 12 HOURS
Qty: 6 TAB | Refills: 0 | Status: SHIPPED | OUTPATIENT
Start: 2018-06-05 | End: 2018-06-08

## 2018-06-05 RX ORDER — HYDROCODONE BITARTRATE AND ACETAMINOPHEN 5; 325 MG/1; MG/1
1 TABLET ORAL
Qty: 20 TAB | Refills: 0 | Status: SHIPPED | OUTPATIENT
Start: 2018-06-05 | End: 2018-09-11

## 2018-06-05 RX ADMIN — HYDROCODONE BITARTRATE AND ACETAMINOPHEN 1 TABLET: 5; 325 TABLET ORAL at 14:38

## 2018-06-05 RX ADMIN — CIPROFLOXACIN HYDROCHLORIDE 500 MG: 500 TABLET, FILM COATED ORAL at 10:28

## 2018-06-05 RX ADMIN — ONDANSETRON 4 MG: 2 INJECTION INTRAMUSCULAR; INTRAVENOUS at 03:40

## 2018-06-05 RX ADMIN — Medication 10 ML: at 05:28

## 2018-06-05 RX ADMIN — HYDROCODONE BITARTRATE AND ACETAMINOPHEN 1 TABLET: 5; 325 TABLET ORAL at 03:40

## 2018-06-05 RX ADMIN — METOPROLOL SUCCINATE 12.5 MG: 25 TABLET, EXTENDED RELEASE ORAL at 10:27

## 2018-06-05 RX ADMIN — DEXTROSE MONOHYDRATE, SODIUM CHLORIDE, AND POTASSIUM CHLORIDE 75 ML/HR: 50; 4.5; 1.49 INJECTION, SOLUTION INTRAVENOUS at 00:23

## 2018-06-05 NOTE — PROGRESS NOTES
CM acknowledges discharge order. CM phoned [facility],[address]. Can accomodate transfer back to facility today to her original room of 204. [facility] referral placed and can accept transport back to facility at 1630 today. AVS updated. Report form for bedside nurse to call report to facility given and notified of transport time. CM phoned sonWillis with update and transfer information. Care Management Interventions  PCP Verified by CM:  Yes (sees Dr. Ken Garvin)  Mode of Transport at Discharge: BLS (may require medical transport back to [address] [place] [address],[address])  Transition of Care Consult (CM Consult): SNF (son requesting return to [address] [place] [address],[address] at discharge)  Partner SNF: Yes  Discharge Durable Medical Equipment: No (at home pt has a walker and shower chair)  Physical Therapy Consult: No  Occupational Therapy Consult: No  Speech Therapy Consult: No  Current Support Network: [address] [place] [address] Ave (was currently residing at [address] [place] [address],[address] prior to admission)  Confirm Follow Up Transport: Family (son transports to follow up appts)  Plan discussed with Pt/Family/Caregiver: Yes  Discharge Location  Discharge Placement: Skilled nursing facility ([facility],[address])    Delores Benito, RN  [phone]

## 2018-06-05 NOTE — PROGRESS NOTES
Surgery      WBC wnl    Bun/Cr correcting    Pt stable    Return to SNF later today hopefully. Myrna Solorio.  Carole Childs MD, [facility]

## 2018-06-05 NOTE — ROUTINE PROCESS
Bedside, Verbal and Written shift change report given to Rg Bob RN  (oncoming nurse) by Pratima Villafuerte RN  (offgoing nurse). Report included the following information SBAR, Kardex, MAR and Recent Results.

## 2018-06-05 NOTE — PROGRESS NOTES
Problem: Dysphagia (Adult)  Goal: *Acute Goals and Plan of Care (Insert Text)  Speech pathology goals  Initiated 6/4/2018  1. Patient will tolerate puree/nectar liquid diet with no overt s/s aspiration within 7 days  2. Patient will tolerate trials of thin liquids with no overt s/s aspiration within 7 days. Goal not met 6/5.  3. Patient will tolerate trials of soft solids with timely/complete mastication and full oral clearance within 7 days. Goal discontinued 6/5.   4. Patient will participate in MBS as clinically indicated. Goal discontinued 6/5. Speech language pathology dysphagia treatment  Patient: Sana Weeks (6 y.o. female)  Date: 6/5/2018  Diagnosis: Ventral hernia with obstruction  incarcerated vintral hernia with small bowel obstruction Ventral hernia with obstruction  Procedure(s) (LRB):  HERNIA VENTRAL REPAIR WITH MESH (N/A) 4 Days Post-Op  Precautions:  Fall    ASSESSMENT:  Pt seen today to consider advancing her diet. She is on purees and nectar thick liquids. Today after any po she wheezed after the swallow. Oral phase is mildly slow with liquids and purees. Two swallows noted per bolus. She winced a little with the swallow. She had some coughing but it was not after any particular texture. Progression toward goals:  []         Improving appropriately and progressing toward goals  []         Improving slowly and progressing toward goals  [x]         Not making progress toward goals and plan of care will be adjusted     PLAN:  Recommendations and Planned Interventions:  Continue with current diet. Purees/nectar thick liquids  Patient continues to benefit from skilled intervention to address the above impairments. Continue treatment per established plan of care. Discharge Recommendations:  None     SUBJECTIVE:   Patient was very poorly intelligible due to dysarthria. OBJECTIVE:   Cognitive and Communication Status:  Neurologic State: Alert  Orientation Level:  Other (Comment) (very dysarthric)  Cognition: Decreased command following  Perception: Appears intact  Perseveration: No perseveration noted  Safety/Judgement: Awareness of environment, Decreased awareness of need for assistance, Decreased awareness of need for safety, Decreased insight into deficits  Dysphagia Treatment:  Oral Assessment:     P.O. Trials:  Patient Position: sitting in chair  Vocal quality prior to P.O.:    Consistency Presented: Thin liquid; Nectar thick liquid;Puree  How Presented: Self-fed/presented;Straw     Bolus Acceptance: No impairment  Bolus Formation/Control: Impaired  Type of Impairment: Delayed;Mastication; Incomplete  Propulsion: Delayed (# of seconds)  Oral Residue: None  Initiation of Swallow: Delayed (# of seconds)  Laryngeal Elevation: Decreased  Aspiration Signs/Symptoms:  (wheezing)                Oral Phase Severity: Moderate  Pharyngeal Phase Severity : Mild-moderate         Pain:  Pain Scale 1: FLACC  Pain Intensity 1: 0     After treatment:   []              Patient left in no apparent distress sitting up in chair  [x]              Patient left in no apparent distress in bed  [x]              Call bell left within reach  [x]              Nursing notified  [x]              Caregiver present  []              Bed alarm activated    COMMUNICATION/EDUCATION:     The patients plan of care including recommendations, planned interventions, and recommended diet changes were discussed with: Registered Nurse. []              Posted safety precautions in patient's room.     DEREK Hunter  Time Calculation: 10 mins

## 2018-06-05 NOTE — PROGRESS NOTES
Problem: Mobility Impaired (Adult and Pediatric)  Goal: *Acute Goals and Plan of Care (Insert Text)  Physical Therapy Goals  Initiated 6/4/2018  1. Patient will move from supine to sit and sit to supine  in bed with moderate assistance  within 7 day(s). 2.  Patient will transfer from bed to chair and chair to bed with minimal assistance/contact guard assist using the least restrictive device within 7 day(s). 3.  Patient will perform sit to stand with minimal assistance/contact guard assist within 7 day(s). 4.  Patient will ambulate with minimal assistance/contact guard assist for 75 feet with the least restrictive device within 7 day(s). physical Therapy TREATMENT  Patient: Jerad Klein (82 y.o. female)  Date: 6/5/2018  Diagnosis: Ventral hernia with obstruction  incarcerated vintral hernia with small bowel obstruction Ventral hernia with obstruction  Procedure(s) (LRB):  HERNIA VENTRAL REPAIR WITH MESH (N/A) 4 Days Post-Op  Precautions: Fall  Chart, physical therapy assessment, plan of care and goals were reviewed. ASSESSMENT:  Pt cleared by nurse to mobilize. Pt received up in chair. Pt agreeable to therapy with encouragement. Pt able to slide to edge of chair with additional time at SBA. Pt performed sit to stand transfer at min A with additional time with cueing for hand placement. Pt ambulated 10ft with RW at min A/CGA for weight shifting. Pt requiring cueing for sequencing and to stand upright. Pt unable to come upright. Pt with improved activity tolerance today. Pt will benefit from returning back to SNF rehab to improve strength and endurance. Progression toward goals:  []    Improving appropriately and progressing toward goals  [x]    Improving slowly and progressing toward goals  []    Not making progress toward goals and plan of care will be adjusted     PLAN:  Patient continues to benefit from skilled intervention to address the above impairments.   Continue treatment per established plan of care. Discharge Recommendations:  [facility]  Further Equipment Recommendations for Discharge:  TBD by rehab     SUBJECTIVE:   Patient stated It hurts to laugh.     OBJECTIVE DATA SUMMARY:   Critical Behavior:  Neurologic State: Alert  Orientation Level: Disoriented to place, Disoriented to situation, Disoriented to time, Oriented to person  Cognition: Follows commands, Impaired decision making  Safety/Judgement: Awareness of environment, Decreased awareness of need for assistance, Decreased awareness of need for safety, Decreased insight into deficits  Functional Mobility Training:  Transfers:  Sit to Stand: Minimum assistance;Contact guard assistance  Stand to Sit: Contact guard assistance                             Balance:  Sitting: Intact  Sitting - Static: Good (unsupported)  Sitting - Dynamic: Fair (occasional)  Standing: Impaired; With support  Standing - Static: Fair;Constant support  Standing - Dynamic : Fair  Ambulation/Gait Training:  Distance (ft): 10 Feet (ft)  Assistive Device: Gait belt;Walker, rolling  Ambulation - Level of Assistance: Contact guard assistance        Gait Abnormalities: Decreased step clearance;Shuffling gait (forward flexion)        Base of Support: Widened     Speed/Brianda: Pace decreased (<100 feet/min)  Step Length: Right shortened;Left shortened         Pain:  Pain Scale 1: FLACC  Pain Intensity 1: 0              Activity Tolerance:   Pt with improved ambulation distance today although easily fatigued.      After treatment:   [x]    Patient left in no apparent distress sitting up in chair  []    Patient left in no apparent distress in bed  [x]    Call bell left within reach  [x]    Nursing notified  []    Caregiver present  []    Bed alarm activated    COMMUNICATION/COLLABORATION:   The patients plan of care was discussed with: Santino Mendez Pretty   Time Calculation: 25 mins

## 2018-06-05 NOTE — ROUTINE PROCESS
Patient did not void until 1800 - output was 100cc. Bladder scan showed 413cc residual. Telephone orders received from Dr. Anu Araujo to straight cath the patient. Only 75cc of urine came out. Straight cath attempted a second time with no results. Pure wik placed. Dr. Anu Araujo notified. No additional orders received.

## 2018-06-05 NOTE — DISCHARGE SUMMARY
Physician Discharge Summary     Patient ID:  Rosanna Perez  [id number]  89 y.o.  1940    Admit Date: 5/31/2018    Discharge Date: 6/5/2018    Admission Diagnoses: Ventral hernia with obstruction;incarcerated vintral hernia*    Discharge Diagnoses:  Principal Problem:    Ventral hernia with obstruction (6/1/2018)    Active Problems:    Elevated troponin (7/14/2016)         Admission Condition: Fair    Discharge Condition: Good    Last Procedure: Procedure(s): HERNIA VENTRAL REPAIR WITH MESH      Hospital Course:   Normal hospital course for this procedure. Consults: None    Disposition: [facility]    Patient Instructions:   Current Discharge Medication List      START taking these medications    Details   ciprofloxacin HCl (CIPRO) 500 mg tablet Take 1 Tab by mouth every twelve (12) hours for 3 days. Qty: 6 Tab, Refills: 0      HYDROcodone-acetaminophen (NORCO) 5-325 mg per tablet Take 1 Tab by mouth every four (4) hours as needed. Max Daily Amount: 6 Tabs. Qty: 20 Tab, Refills: 0    Associated Diagnoses: Ventral hernia with obstruction         CONTINUE these medications which have NOT CHANGED    Details   metoprolol succinate (TOPROL-XL) 25 mg XL tablet Take 25 mg by mouth two (2) times a day. furosemide (LASIX) 20 mg tablet Take 20 mg by mouth Every Mon, Wed and Sat. atorvastatin (LIPITOR) 80 mg tablet Take 1 Tab by mouth nightly. TAKE ONE TABLET BY MOUTH EVERY DAY  Qty: 30 Tab, Refills: 0      clopidogrel (PLAVIX) 75 mg tab Take 1 Tab by mouth daily. Qty: 30 Tab, Refills: 0      aspirin delayed-release 81 mg tablet Take 1 Tab by mouth daily. Qty: 100 Tab, Refills: 6      albuterol (PROVENTIL HFA, VENTOLIN HFA, PROAIR HFA) 90 mcg/actuation inhaler Take 2 Puffs by inhalation every four (4) hours as needed for Wheezing. Qty: 1 Inhaler, Refills: 1      nitroglycerin (NITROSTAT) 0.4 mg SL tablet 0.4 mg by SubLINGual route every five (5) minutes as needed for Chest Pain. Up to 3 doses. acetaminophen (TYLENOL) 325 mg tablet Take 650 mg by mouth every eight (8) hours as needed for Fever. albuterol-ipratropium (DUO-NEB) 2.5 mg-0.5 mg/3 ml nebu 3 mL by Nebulization route three (3) times daily. bisacodyl (DULCOLAX) 10 mg suppository Insert 10 mg into rectum as needed. fexofenadine (ALLEGRA) 180 mg tablet Take 180 mg by mouth daily as needed for Allergies. OXYGEN-AIR DELIVERY SYSTEMS 2 L by IntraNASal route daily as needed (shortness of breath). Activity: No lifting, Driving, or Strenuous exercise for 6 weeks  Diet: Resume previous diet  Wound Care: As directed    Follow-up with Dr Marylou David in 10 days.       Signed:  Rony Grajeda MD  [facility]  6/5/2018  11:14 AM

## 2018-06-05 NOTE — PROGRESS NOTES
Received patient , she has no IV site. No IV is running. Reported that bladder scan showed 400 ml content. Awaited Pur Wick output or incontinence, found none. Previous shift bladder was emptied of small content in spite of volume reading. My bladder scan showed very little content, thus I had the charge nurse verify my findings. No content would register with some scattered drops showing with much time spent to be sure of scan. Charge nurse started the #20 IV to rfa, IV fluid instituted at MN. At 2 a.m. I straight cathed patient and obtained 100 ml, no further content would come. At present there is some misting in the [employer] that has been properly placed, IV running at 75 ml/hr as prescribed.

## 2018-06-05 NOTE — ROUTINE PROCESS
TRANSFER - OUT REPORT:    Verbal report given to Oswaldo Sarmiento LPN (name) on Elijah Roland  being transferred to [facility] (unit) for routine progression of care       Report consisted of patients Situation, Background, Assessment and   Recommendations(SBAR). Information from the following report(s) SBAR, Kardex, [facility] and Recent Results was reviewed with the receiving nurse. Lines:       Opportunity for questions and clarification was provided.       Patient transported with:   O2 @ 2 liters

## 2018-06-07 ENCOUNTER — PATIENT OUTREACH (OUTPATIENT)
Dept: CASE MANAGEMENT | Age: 78
End: 2018-06-07

## 2018-06-07 NOTE — PROGRESS NOTES
Community Care Team Documentation for Patient in [facility]  Subsequent Follow up     Patient remains at [facility] ([facility]). See previous [facility] Team notes. PCP : Daron Bauer,     Per Gulshan Holliday and team at [facility], Ensured patient arrived to SNF with admission packet in order. PT/OT/SLP providing skilled therapy in SNF. SLP for cognition, swallow, patient on puree with honey thick liquids. Currently ambulating 5 ft with RW at [facility], CGA for transfers, min to mod assist for upper body ADLS, mod assist for lower body bathing, total assist for dressing. Son arranging transport for cardiology follow up on 6/11. Plan for home with son and [facility]. Date TBD. Medications were not reconciled and general patient assessment was not completed during this skilled nursing facility outreach.      Cleo Rodriguez, MSN, RN, ACNS-BC, [facility]  Nurse Navigator, [employer] [phone]

## 2018-06-14 ENCOUNTER — PATIENT OUTREACH (OUTPATIENT)
Dept: CASE MANAGEMENT | Age: 78
End: 2018-06-14

## 2018-06-14 NOTE — PROGRESS NOTES
Community Care Team Documentation for Patient in [facility]  Subsequent Follow up     Patient remains at [address] [facility] ([facility]). See previous [facility] Team notes. PCP : Lolly Grant DO    Per Kassandra Goddard at [facility], PT/OT continue. Currently ambulating 50ft X2 with RW and stand by assist. Transitioning with stand by assist. Min assist with upper body ADLs Mod assist with lower body ADLs. SLP for cognition and swallow. On O2. 2 weeks LOS anticipated. Cardiology FU not attended 6/11 - Son has not been responding to SNF calls regarding rescheduling. Plan to DC home with son and [facility]    Medications were not reconciled and general patient assessment was not completed during this skilled nursing facility outreach.      JOSELIN MartinW

## 2018-06-20 ENCOUNTER — PATIENT OUTREACH (OUTPATIENT)
Dept: CASE MANAGEMENT | Age: 78
End: 2018-06-20

## 2018-06-20 NOTE — PROGRESS NOTES
Community Care Team Documentation for Patient in [facility]  Subsequent Follow up     Patient remains at [facility] ([facility]). See previous [facility] Team notes. PCP : Bethel Estrada DO    Per Nasim Ross and team at [facility], Plan for discharge 6/26 to home with son and [facility]. Son refused home visit. Medications were not reconciled and general patient assessment was not completed during this skilled nursing facility outreach.      Elenita Bueno, MSN, RN, ACNS-BC, [facility]  Nurse Navigator, [employer] [phone]

## 2018-06-26 ENCOUNTER — HOME HEALTH ADMISSION (OUTPATIENT)
Dept: HOME HEALTH SERVICES | Facility: HOME HEALTH | Age: 78
End: 2018-06-26
Payer: MEDICARE

## 2018-06-28 ENCOUNTER — HOME CARE VISIT (OUTPATIENT)
Dept: SCHEDULING | Facility: HOME HEALTH | Age: 78
End: 2018-06-28
Payer: MEDICARE

## 2018-06-28 ENCOUNTER — PATIENT OUTREACH (OUTPATIENT)
Dept: CASE MANAGEMENT | Age: 78
End: 2018-06-28

## 2018-06-28 VITALS
HEART RATE: 106 BPM | HEIGHT: 60 IN | BODY MASS INDEX: 24.94 KG/M2 | TEMPERATURE: 97.7 F | WEIGHT: 127 LBS | SYSTOLIC BLOOD PRESSURE: 120 MMHG | RESPIRATION RATE: 20 BRPM | DIASTOLIC BLOOD PRESSURE: 78 MMHG | OXYGEN SATURATION: 96 %

## 2018-06-28 PROCEDURE — G0299 HHS/HOSPICE OF RN EA 15 MIN: HCPCS

## 2018-06-28 PROCEDURE — 3331090001 HH PPS REVENUE CREDIT

## 2018-06-28 PROCEDURE — 3331090002 HH PPS REVENUE DEBIT

## 2018-06-28 PROCEDURE — 400013 HH SOC

## 2018-06-29 ENCOUNTER — HOME CARE VISIT (OUTPATIENT)
Dept: SCHEDULING | Facility: HOME HEALTH | Age: 78
End: 2018-06-29
Payer: MEDICARE

## 2018-06-29 PROCEDURE — 3331090002 HH PPS REVENUE DEBIT

## 2018-06-29 PROCEDURE — G0151 HHCP-SERV OF PT,EA 15 MIN: HCPCS

## 2018-06-29 PROCEDURE — 3331090001 HH PPS REVENUE CREDIT

## 2018-06-30 VITALS
HEART RATE: 102 BPM | DIASTOLIC BLOOD PRESSURE: 78 MMHG | OXYGEN SATURATION: 93 % | RESPIRATION RATE: 18 BRPM | SYSTOLIC BLOOD PRESSURE: 130 MMHG | TEMPERATURE: 98.3 F

## 2018-06-30 PROCEDURE — 3331090002 HH PPS REVENUE DEBIT

## 2018-06-30 PROCEDURE — 3331090001 HH PPS REVENUE CREDIT

## 2018-07-01 PROCEDURE — 3331090002 HH PPS REVENUE DEBIT

## 2018-07-01 PROCEDURE — 3331090001 HH PPS REVENUE CREDIT

## 2018-07-02 ENCOUNTER — HOME CARE VISIT (OUTPATIENT)
Dept: SCHEDULING | Facility: HOME HEALTH | Age: 78
End: 2018-07-02
Payer: MEDICARE

## 2018-07-02 VITALS
SYSTOLIC BLOOD PRESSURE: 130 MMHG | OXYGEN SATURATION: 98 % | HEART RATE: 90 BPM | TEMPERATURE: 98 F | DIASTOLIC BLOOD PRESSURE: 70 MMHG

## 2018-07-02 VITALS
DIASTOLIC BLOOD PRESSURE: 70 MMHG | OXYGEN SATURATION: 97 % | BODY MASS INDEX: 23.05 KG/M2 | WEIGHT: 118 LBS | RESPIRATION RATE: 17 BRPM | TEMPERATURE: 97.4 F | HEART RATE: 73 BPM | SYSTOLIC BLOOD PRESSURE: 120 MMHG

## 2018-07-02 PROCEDURE — 3331090002 HH PPS REVENUE DEBIT

## 2018-07-02 PROCEDURE — 3331090001 HH PPS REVENUE CREDIT

## 2018-07-02 PROCEDURE — G0300 HHS/HOSPICE OF LPN EA 15 MIN: HCPCS

## 2018-07-02 PROCEDURE — G0152 HHCP-SERV OF OT,EA 15 MIN: HCPCS

## 2018-07-03 PROCEDURE — 3331090001 HH PPS REVENUE CREDIT

## 2018-07-03 PROCEDURE — 3331090002 HH PPS REVENUE DEBIT

## 2018-07-04 ENCOUNTER — HOME CARE VISIT (OUTPATIENT)
Dept: SCHEDULING | Facility: HOME HEALTH | Age: 78
End: 2018-07-04
Payer: MEDICARE

## 2018-07-04 PROCEDURE — 3331090002 HH PPS REVENUE DEBIT

## 2018-07-04 PROCEDURE — 3331090001 HH PPS REVENUE CREDIT

## 2018-07-04 PROCEDURE — G0157 HHC PT ASSISTANT EA 15: HCPCS

## 2018-07-05 ENCOUNTER — HOME CARE VISIT (OUTPATIENT)
Dept: SCHEDULING | Facility: HOME HEALTH | Age: 78
End: 2018-07-05
Payer: MEDICARE

## 2018-07-05 VITALS
DIASTOLIC BLOOD PRESSURE: 85 MMHG | OXYGEN SATURATION: 98 % | SYSTOLIC BLOOD PRESSURE: 142 MMHG | TEMPERATURE: 97.6 F | RESPIRATION RATE: 20 BRPM | HEART RATE: 98 BPM

## 2018-07-05 VITALS
TEMPERATURE: 97.8 F | HEART RATE: 114 BPM | OXYGEN SATURATION: 98 % | RESPIRATION RATE: 16 BRPM | SYSTOLIC BLOOD PRESSURE: 132 MMHG | DIASTOLIC BLOOD PRESSURE: 74 MMHG

## 2018-07-05 PROCEDURE — G0152 HHCP-SERV OF OT,EA 15 MIN: HCPCS

## 2018-07-05 PROCEDURE — 3331090002 HH PPS REVENUE DEBIT

## 2018-07-05 PROCEDURE — 3331090001 HH PPS REVENUE CREDIT

## 2018-07-05 PROCEDURE — G0300 HHS/HOSPICE OF LPN EA 15 MIN: HCPCS

## 2018-07-05 PROCEDURE — G0153 HHCP-SVS OF S/L PATH,EA 15MN: HCPCS

## 2018-07-06 ENCOUNTER — HOME CARE VISIT (OUTPATIENT)
Dept: HOME HEALTH SERVICES | Facility: HOME HEALTH | Age: 78
End: 2018-07-06
Payer: MEDICARE

## 2018-07-06 ENCOUNTER — HOME CARE VISIT (OUTPATIENT)
Dept: SCHEDULING | Facility: HOME HEALTH | Age: 78
End: 2018-07-06
Payer: MEDICARE

## 2018-07-06 VITALS
TEMPERATURE: 98 F | HEART RATE: 98 BPM | DIASTOLIC BLOOD PRESSURE: 85 MMHG | SYSTOLIC BLOOD PRESSURE: 142 MMHG | OXYGEN SATURATION: 98 %

## 2018-07-06 VITALS
RESPIRATION RATE: 17 BRPM | WEIGHT: 118 LBS | HEART RATE: 84 BPM | BODY MASS INDEX: 23.05 KG/M2 | DIASTOLIC BLOOD PRESSURE: 70 MMHG | TEMPERATURE: 97.4 F | SYSTOLIC BLOOD PRESSURE: 124 MMHG | OXYGEN SATURATION: 97 %

## 2018-07-06 VITALS
RESPIRATION RATE: 17 BRPM | OXYGEN SATURATION: 98 % | DIASTOLIC BLOOD PRESSURE: 74 MMHG | HEART RATE: 94 BPM | SYSTOLIC BLOOD PRESSURE: 128 MMHG | TEMPERATURE: 97.8 F

## 2018-07-06 PROCEDURE — G0156 HHCP-SVS OF AIDE,EA 15 MIN: HCPCS

## 2018-07-06 PROCEDURE — 3331090001 HH PPS REVENUE CREDIT

## 2018-07-06 PROCEDURE — G0157 HHC PT ASSISTANT EA 15: HCPCS

## 2018-07-06 PROCEDURE — 3331090002 HH PPS REVENUE DEBIT

## 2018-07-07 PROCEDURE — 3331090001 HH PPS REVENUE CREDIT

## 2018-07-07 PROCEDURE — 3331090002 HH PPS REVENUE DEBIT

## 2018-07-08 PROCEDURE — 3331090002 HH PPS REVENUE DEBIT

## 2018-07-08 PROCEDURE — 3331090001 HH PPS REVENUE CREDIT

## 2018-07-09 ENCOUNTER — HOME CARE VISIT (OUTPATIENT)
Dept: SCHEDULING | Facility: HOME HEALTH | Age: 78
End: 2018-07-09
Payer: MEDICARE

## 2018-07-09 PROCEDURE — 3331090002 HH PPS REVENUE DEBIT

## 2018-07-09 PROCEDURE — 3331090001 HH PPS REVENUE CREDIT

## 2018-07-09 PROCEDURE — G0300 HHS/HOSPICE OF LPN EA 15 MIN: HCPCS

## 2018-07-09 PROCEDURE — G0155 HHCP-SVS OF CSW,EA 15 MIN: HCPCS

## 2018-07-10 ENCOUNTER — HOME CARE VISIT (OUTPATIENT)
Dept: SCHEDULING | Facility: HOME HEALTH | Age: 78
End: 2018-07-10
Payer: MEDICARE

## 2018-07-10 ENCOUNTER — HOME CARE VISIT (OUTPATIENT)
Dept: HOME HEALTH SERVICES | Facility: HOME HEALTH | Age: 78
End: 2018-07-10
Payer: MEDICARE

## 2018-07-10 VITALS
DIASTOLIC BLOOD PRESSURE: 78 MMHG | OXYGEN SATURATION: 98 % | HEART RATE: 105 BPM | TEMPERATURE: 98 F | SYSTOLIC BLOOD PRESSURE: 140 MMHG

## 2018-07-10 PROCEDURE — G0152 HHCP-SERV OF OT,EA 15 MIN: HCPCS

## 2018-07-10 PROCEDURE — 3331090002 HH PPS REVENUE DEBIT

## 2018-07-10 PROCEDURE — 3331090001 HH PPS REVENUE CREDIT

## 2018-07-11 ENCOUNTER — HOME CARE VISIT (OUTPATIENT)
Dept: SCHEDULING | Facility: HOME HEALTH | Age: 78
End: 2018-07-11
Payer: MEDICARE

## 2018-07-11 ENCOUNTER — HOME CARE VISIT (OUTPATIENT)
Dept: HOME HEALTH SERVICES | Facility: HOME HEALTH | Age: 78
End: 2018-07-11
Payer: MEDICARE

## 2018-07-11 VITALS
WEIGHT: 118 LBS | SYSTOLIC BLOOD PRESSURE: 122 MMHG | HEART RATE: 66 BPM | BODY MASS INDEX: 23.05 KG/M2 | DIASTOLIC BLOOD PRESSURE: 78 MMHG | OXYGEN SATURATION: 97 % | TEMPERATURE: 98.4 F | RESPIRATION RATE: 17 BRPM

## 2018-07-11 PROCEDURE — 3331090001 HH PPS REVENUE CREDIT

## 2018-07-11 PROCEDURE — G0299 HHS/HOSPICE OF RN EA 15 MIN: HCPCS

## 2018-07-11 PROCEDURE — 3331090002 HH PPS REVENUE DEBIT

## 2018-07-11 PROCEDURE — G0157 HHC PT ASSISTANT EA 15: HCPCS

## 2018-07-12 ENCOUNTER — HOME CARE VISIT (OUTPATIENT)
Dept: SCHEDULING | Facility: HOME HEALTH | Age: 78
End: 2018-07-12
Payer: MEDICARE

## 2018-07-12 VITALS
OXYGEN SATURATION: 97 % | RESPIRATION RATE: 18 BRPM | HEART RATE: 108 BPM | SYSTOLIC BLOOD PRESSURE: 130 MMHG | DIASTOLIC BLOOD PRESSURE: 70 MMHG | TEMPERATURE: 97.9 F

## 2018-07-12 VITALS
TEMPERATURE: 98.6 F | DIASTOLIC BLOOD PRESSURE: 74 MMHG | RESPIRATION RATE: 17 BRPM | OXYGEN SATURATION: 96 % | SYSTOLIC BLOOD PRESSURE: 130 MMHG | HEART RATE: 86 BPM

## 2018-07-12 VITALS
OXYGEN SATURATION: 96 % | HEART RATE: 80 BPM | DIASTOLIC BLOOD PRESSURE: 77 MMHG | TEMPERATURE: 98 F | SYSTOLIC BLOOD PRESSURE: 130 MMHG

## 2018-07-12 PROCEDURE — 3331090001 HH PPS REVENUE CREDIT

## 2018-07-12 PROCEDURE — G0152 HHCP-SERV OF OT,EA 15 MIN: HCPCS

## 2018-07-12 PROCEDURE — 3331090002 HH PPS REVENUE DEBIT

## 2018-07-13 ENCOUNTER — HOME CARE VISIT (OUTPATIENT)
Dept: SCHEDULING | Facility: HOME HEALTH | Age: 78
End: 2018-07-13
Payer: MEDICARE

## 2018-07-13 PROCEDURE — 3331090001 HH PPS REVENUE CREDIT

## 2018-07-13 PROCEDURE — 3331090002 HH PPS REVENUE DEBIT

## 2018-07-13 PROCEDURE — G0156 HHCP-SVS OF AIDE,EA 15 MIN: HCPCS

## 2018-07-13 PROCEDURE — G0157 HHC PT ASSISTANT EA 15: HCPCS

## 2018-07-14 PROCEDURE — 3331090001 HH PPS REVENUE CREDIT

## 2018-07-14 PROCEDURE — 3331090002 HH PPS REVENUE DEBIT

## 2018-07-15 VITALS
OXYGEN SATURATION: 96 % | SYSTOLIC BLOOD PRESSURE: 122 MMHG | TEMPERATURE: 98.4 F | DIASTOLIC BLOOD PRESSURE: 76 MMHG | HEART RATE: 87 BPM

## 2018-07-15 PROCEDURE — 3331090001 HH PPS REVENUE CREDIT

## 2018-07-15 PROCEDURE — 3331090002 HH PPS REVENUE DEBIT

## 2018-07-16 ENCOUNTER — HOME CARE VISIT (OUTPATIENT)
Dept: SCHEDULING | Facility: HOME HEALTH | Age: 78
End: 2018-07-16
Payer: MEDICARE

## 2018-07-16 ENCOUNTER — HOME CARE VISIT (OUTPATIENT)
Dept: HOME HEALTH SERVICES | Facility: HOME HEALTH | Age: 78
End: 2018-07-16
Payer: MEDICARE

## 2018-07-16 VITALS
OXYGEN SATURATION: 97 % | HEART RATE: 74 BPM | RESPIRATION RATE: 17 BRPM | TEMPERATURE: 97.9 F | SYSTOLIC BLOOD PRESSURE: 118 MMHG | DIASTOLIC BLOOD PRESSURE: 78 MMHG

## 2018-07-16 PROCEDURE — 3331090002 HH PPS REVENUE DEBIT

## 2018-07-16 PROCEDURE — G0300 HHS/HOSPICE OF LPN EA 15 MIN: HCPCS

## 2018-07-16 PROCEDURE — G0156 HHCP-SVS OF AIDE,EA 15 MIN: HCPCS

## 2018-07-16 PROCEDURE — 3331090001 HH PPS REVENUE CREDIT

## 2018-07-17 PROCEDURE — 3331090002 HH PPS REVENUE DEBIT

## 2018-07-17 PROCEDURE — 3331090001 HH PPS REVENUE CREDIT

## 2018-07-18 ENCOUNTER — HOME CARE VISIT (OUTPATIENT)
Dept: SCHEDULING | Facility: HOME HEALTH | Age: 78
End: 2018-07-18
Payer: MEDICARE

## 2018-07-18 PROCEDURE — 3331090002 HH PPS REVENUE DEBIT

## 2018-07-18 PROCEDURE — 3331090001 HH PPS REVENUE CREDIT

## 2018-07-18 PROCEDURE — G0157 HHC PT ASSISTANT EA 15: HCPCS

## 2018-07-19 ENCOUNTER — HOME CARE VISIT (OUTPATIENT)
Dept: SCHEDULING | Facility: HOME HEALTH | Age: 78
End: 2018-07-19
Payer: MEDICARE

## 2018-07-19 ENCOUNTER — HOME CARE VISIT (OUTPATIENT)
Dept: HOME HEALTH SERVICES | Facility: HOME HEALTH | Age: 78
End: 2018-07-19
Payer: MEDICARE

## 2018-07-19 VITALS
HEART RATE: 106 BPM | SYSTOLIC BLOOD PRESSURE: 130 MMHG | DIASTOLIC BLOOD PRESSURE: 78 MMHG | OXYGEN SATURATION: 98 % | TEMPERATURE: 98.3 F

## 2018-07-19 PROCEDURE — G0156 HHCP-SVS OF AIDE,EA 15 MIN: HCPCS

## 2018-07-19 PROCEDURE — 3331090002 HH PPS REVENUE DEBIT

## 2018-07-19 PROCEDURE — G0300 HHS/HOSPICE OF LPN EA 15 MIN: HCPCS

## 2018-07-19 PROCEDURE — 3331090001 HH PPS REVENUE CREDIT

## 2018-07-20 ENCOUNTER — HOME CARE VISIT (OUTPATIENT)
Dept: HOME HEALTH SERVICES | Facility: HOME HEALTH | Age: 78
End: 2018-07-20
Payer: MEDICARE

## 2018-07-20 ENCOUNTER — HOME CARE VISIT (OUTPATIENT)
Dept: SCHEDULING | Facility: HOME HEALTH | Age: 78
End: 2018-07-20
Payer: MEDICARE

## 2018-07-20 VITALS
TEMPERATURE: 98 F | HEART RATE: 74 BPM | SYSTOLIC BLOOD PRESSURE: 126 MMHG | OXYGEN SATURATION: 97 % | RESPIRATION RATE: 17 BRPM | DIASTOLIC BLOOD PRESSURE: 64 MMHG

## 2018-07-20 PROCEDURE — G0151 HHCP-SERV OF PT,EA 15 MIN: HCPCS

## 2018-07-20 PROCEDURE — 3331090001 HH PPS REVENUE CREDIT

## 2018-07-20 PROCEDURE — 3331090002 HH PPS REVENUE DEBIT

## 2018-07-21 VITALS
SYSTOLIC BLOOD PRESSURE: 130 MMHG | DIASTOLIC BLOOD PRESSURE: 78 MMHG | OXYGEN SATURATION: 98 % | HEART RATE: 72 BPM | TEMPERATURE: 98 F | RESPIRATION RATE: 16 BRPM

## 2018-07-21 PROCEDURE — 3331090002 HH PPS REVENUE DEBIT

## 2018-07-21 PROCEDURE — 3331090001 HH PPS REVENUE CREDIT

## 2018-07-22 PROCEDURE — 3331090002 HH PPS REVENUE DEBIT

## 2018-07-22 PROCEDURE — 3331090001 HH PPS REVENUE CREDIT

## 2018-07-23 ENCOUNTER — HOME CARE VISIT (OUTPATIENT)
Dept: HOME HEALTH SERVICES | Facility: HOME HEALTH | Age: 78
End: 2018-07-23
Payer: MEDICARE

## 2018-07-23 ENCOUNTER — HOME CARE VISIT (OUTPATIENT)
Dept: SCHEDULING | Facility: HOME HEALTH | Age: 78
End: 2018-07-23
Payer: MEDICARE

## 2018-07-23 VITALS
HEART RATE: 87 BPM | TEMPERATURE: 98.1 F | SYSTOLIC BLOOD PRESSURE: 125 MMHG | DIASTOLIC BLOOD PRESSURE: 78 MMHG | OXYGEN SATURATION: 97 % | RESPIRATION RATE: 22 BRPM

## 2018-07-23 PROCEDURE — 3331090001 HH PPS REVENUE CREDIT

## 2018-07-23 PROCEDURE — G0299 HHS/HOSPICE OF RN EA 15 MIN: HCPCS

## 2018-07-23 PROCEDURE — G0156 HHCP-SVS OF AIDE,EA 15 MIN: HCPCS

## 2018-07-23 PROCEDURE — 3331090002 HH PPS REVENUE DEBIT

## 2018-07-24 PROCEDURE — 3331090001 HH PPS REVENUE CREDIT

## 2018-07-24 PROCEDURE — 3331090002 HH PPS REVENUE DEBIT

## 2018-07-25 PROCEDURE — 3331090001 HH PPS REVENUE CREDIT

## 2018-07-25 PROCEDURE — 3331090002 HH PPS REVENUE DEBIT

## 2018-07-26 PROCEDURE — 3331090001 HH PPS REVENUE CREDIT

## 2018-07-26 PROCEDURE — 3331090002 HH PPS REVENUE DEBIT

## 2018-07-27 ENCOUNTER — HOME CARE VISIT (OUTPATIENT)
Dept: HOME HEALTH SERVICES | Facility: HOME HEALTH | Age: 78
End: 2018-07-27
Payer: MEDICARE

## 2018-07-27 PROCEDURE — 3331090002 HH PPS REVENUE DEBIT

## 2018-07-27 PROCEDURE — 3331090001 HH PPS REVENUE CREDIT

## 2018-07-27 PROCEDURE — G0156 HHCP-SVS OF AIDE,EA 15 MIN: HCPCS

## 2018-07-28 PROCEDURE — 3331090001 HH PPS REVENUE CREDIT

## 2018-07-28 PROCEDURE — 3331090002 HH PPS REVENUE DEBIT

## 2018-07-29 PROCEDURE — 3331090002 HH PPS REVENUE DEBIT

## 2018-07-29 PROCEDURE — 3331090001 HH PPS REVENUE CREDIT

## 2018-07-30 PROCEDURE — 3331090002 HH PPS REVENUE DEBIT

## 2018-07-30 PROCEDURE — 3331090001 HH PPS REVENUE CREDIT

## 2018-07-31 PROCEDURE — 3331090002 HH PPS REVENUE DEBIT

## 2018-07-31 PROCEDURE — 3331090001 HH PPS REVENUE CREDIT

## 2018-08-01 PROCEDURE — 3331090001 HH PPS REVENUE CREDIT

## 2018-08-01 PROCEDURE — 3331090002 HH PPS REVENUE DEBIT

## 2018-08-02 ENCOUNTER — OFFICE VISIT (OUTPATIENT)
Dept: CARDIOLOGY CLINIC | Age: 78
End: 2018-08-02

## 2018-08-02 VITALS
SYSTOLIC BLOOD PRESSURE: 118 MMHG | RESPIRATION RATE: 18 BRPM | BODY MASS INDEX: 25.03 KG/M2 | DIASTOLIC BLOOD PRESSURE: 60 MMHG | HEART RATE: 73 BPM | HEIGHT: 60 IN | WEIGHT: 127.5 LBS | OXYGEN SATURATION: 96 %

## 2018-08-02 DIAGNOSIS — I63.9 ACUTE CEREBRAL INFARCTION (HCC): ICD-10-CM

## 2018-08-02 DIAGNOSIS — I16.0 HYPERTENSIVE URGENCY: ICD-10-CM

## 2018-08-02 DIAGNOSIS — I69.922 DYSARTHRIA AS LATE EFFECT OF CEREBROVASCULAR DISEASE: ICD-10-CM

## 2018-08-02 DIAGNOSIS — Z86.73 H/O: CVA (CEREBROVASCULAR ACCIDENT): ICD-10-CM

## 2018-08-02 DIAGNOSIS — E78.2 MIXED HYPERLIPIDEMIA: ICD-10-CM

## 2018-08-02 DIAGNOSIS — I25.118 CORONARY ARTERY DISEASE OF NATIVE ARTERY OF NATIVE HEART WITH STABLE ANGINA PECTORIS (HCC): Primary | ICD-10-CM

## 2018-08-02 DIAGNOSIS — I44.7 LEFT BUNDLE BRANCH BLOCK (LBBB) ON ELECTROCARDIOGRAM: ICD-10-CM

## 2018-08-02 DIAGNOSIS — I25.10 CORONARY ARTERY DISEASE INVOLVING NATIVE CORONARY ARTERY OF NATIVE HEART WITHOUT ANGINA PECTORIS: ICD-10-CM

## 2018-08-02 DIAGNOSIS — R77.8 ELEVATED TROPONIN: ICD-10-CM

## 2018-08-02 PROCEDURE — 3331090001 HH PPS REVENUE CREDIT

## 2018-08-02 PROCEDURE — 3331090002 HH PPS REVENUE DEBIT

## 2018-08-02 RX ORDER — POTASSIUM CHLORIDE 20 MEQ/1
20 TABLET, EXTENDED RELEASE ORAL DAILY
COMMUNITY
Start: 2018-05-04 | End: 2021-01-01

## 2018-08-02 RX ORDER — UMECLIDINIUM BROMIDE AND VILANTEROL TRIFENATATE 62.5; 25 UG/1; UG/1
1 POWDER RESPIRATORY (INHALATION) AS NEEDED
COMMUNITY
Start: 2018-07-25 | End: 2018-09-11

## 2018-08-02 NOTE — PROGRESS NOTES
[address] [place] [address]  [phone] Subjective:  
  
Verona Last is a 76 y.o. female is here for routine f/u. The patient denies chest pain/ shortness of breath, orthopnea, PND, LE edema, palpitations, syncope, or presyncope. Patient Active Problem List  
 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 ([address] 07/15/2016  Severe sepsis ([address] 07/14/2016  Pneumonia 07/14/2016  Acute respiratory failure ([address] 07/14/2016  Elevated troponin 07/14/2016  Left bundle branch block (LBBB) on electrocardiogram 07/14/2016  Acute systolic heart failure ([address] 07/14/2016  Acute kidney failure ([address] 07/14/2016  Mixed hyperlipidemia 09/21/2012  Essential hypertension, benign 09/21/2012  Other left bundle branch block 09/21/2012  Coronary atherosclerosis of native coronary artery 09/21/2012  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  
Diagnosis Date  Acute kidney failure ([address] 7/14/2016  Acute systolic heart failure ([address] 7/14/2016  CAD (coronary artery disease)  Heart failure ([facility]  Stroke [facility] 2009 Stroke No past surgical history on file. Allergies Allergen Reactions  Decadron [Dexamethasone Sodium Phosphate] Hives  Pcn [Penicillins] Hives Family History Problem Relation Age of Onset  Coronary Artery Disease Other   
  grandmother age 76 Social History Social History  Marital status:  Spouse name: N/A  
 Number of children: N/A  
 Years of education: N/A Occupational History  Not on file. Social History Main Topics  Smoking status: Former Smoker Quit date: 9/21/2009  Smokeless tobacco: Never Used  Alcohol use 0.0 oz/week  
  0 Standard drinks or equivalent per week Comment: Very little  Drug use: No  
 Sexual activity: Not on file Other Topics Concern  Not on file Social History Narrative ** Merged History Encounter **  
    
  
Current Outpatient Prescriptions Medication Sig  potassium chloride (K-DUR, KLOR-CON) 20 mEq tablet Take 20 mEq by mouth daily.  ANORO ELLIPTA 62.5-25 mcg/actuation inhaler Take 1 Puff by inhalation as needed.  fluticasone (FLOVENT HFA) 220 mcg/actuation inhaler Take 1 Puff by inhalation two (2) times a day.  famotidine (PEPCID) 20 mg tablet Take 20 mg by mouth two (2) times a day.  HYDROcodone-acetaminophen (NORCO) 5-325 mg per tablet Take 1 Tab by mouth every four (4) hours as needed. Max Daily Amount: 6 Tabs.  metoprolol succinate (TOPROL-XL) 25 mg XL tablet Take 25 mg by mouth two (2) times a day.  furosemide (LASIX) 20 mg tablet Take 20 mg by mouth Every Mon, Wed and Sat.  nitroglycerin (NITROSTAT) 0.4 mg SL tablet 0.4 mg by SubLINGual route every five (5) minutes as needed for Chest Pain. Up to 3 doses.  acetaminophen (TYLENOL) 325 mg tablet Take 650 mg by mouth every eight (8) hours as needed for Fever.   
 albuterol-ipratropium (DUO-NEB) 2.5 mg-0.5 mg/3 ml nebu 3 mL by Nebulization route three (3) times daily.  atorvastatin (LIPITOR) 80 mg tablet Take 1 Tab by mouth nightly. TAKE ONE TABLET BY MOUTH EVERY DAY  fexofenadine (ALLEGRA) 180 mg tablet Take 180 mg by mouth daily as needed for Allergies.  OXYGEN-AIR DELIVERY SYSTEMS 2 L by IntraNASal route daily as needed (shortness of breath).  clopidogrel (PLAVIX) 75 mg tab Take 1 Tab by mouth daily.  aspirin delayed-release 81 mg tablet Take 1 Tab by mouth daily.  albuterol (PROVENTIL HFA, VENTOLIN HFA, PROAIR HFA) 90 mcg/actuation inhaler Take 2 Puffs by inhalation every four (4) hours as needed for Wheezing.  senna-docusate (SENNA WITH DOCUSATE SODIUM) 8.6-50 mg per tablet Take 2 Tabs by mouth two (2) times a day.  bisacodyl (DULCOLAX) 10 mg suppository Insert 10 mg into rectum daily as needed (constipation). No current facility-administered medications for this visit. Review of Symptoms: 
11 systems reviewed, negative other than as stated in the HPI Physical ExamPhysical Exam:   
Vitals:  
 08/02/18 1104 08/02/18 1125 BP: 120/60 118/60 Pulse: 73 Resp: 18 SpO2: 96% Weight: 127 lb 8 oz (57.8 kg) Height: 5' (1.524 m) Body mass index is 24.9 kg/(m^2). General PE Gen:  NAD Mental Status - Alert. General Appearance - Not in acute distress. Chest and Lung Exam  
Inspection: Accessory muscles - No use of accessory muscles in breathing. Auscultation:  
Breath sounds: - Normal.  
Cardiovascular Inspection: Jugular vein - Bilateral - Inspection Normal.  
Palpation/Percussion:  
Apical Impulse: - Normal.  
Auscultation: Rhythm - Regular. Heart Sounds - S1 WNL and S2 WNL. No S3 or S4. Murmurs & Other Heart Sounds: Auscultation of the heart reveals - No Murmurs. Peripheral Vascular Upper Extremity: Inspection - Bilateral - No Cyanotic nailbeds or Digital clubbing. Lower Extremity:  
Palpation: Edema - Bilateral - No edema.  
Abdomen:   Soft, non-tender, bowel sounds are active. Neuro: A&O times 3, CN and motor grossly WNL Labs:  
Lab Results Component Value Date/Time Cholesterol, total 148 05/10/2018 04:03 AM  
 Cholesterol, total 171 02/06/2018 03:10 AM  
 Cholesterol, total 193 08/02/2017 03:21 AM  
 Cholesterol, total 239 (H) 07/15/2016 03:39 AM  
 Cholesterol, total 203 (H) 02/26/2010 03:50 PM  
 HDL Cholesterol 47 05/10/2018 04:03 AM  
 HDL Cholesterol 51 02/06/2018 03:10 AM  
 HDL Cholesterol 56 08/02/2017 03:21 AM  
 HDL Cholesterol 60 07/15/2016 03:39 AM  
 HDL Cholesterol 60 02/26/2010 03:50 PM  
 LDL, calculated 84 05/10/2018 04:03 AM  
 LDL, calculated 102 (H) 02/06/2018 03:10 AM  
 LDL, calculated 116.2 (H) 08/02/2017 03:21 AM  
 LDL, calculated 157.2 (H) 07/15/2016 03:39 AM  
 LDL, calculated 125.4 (H) 02/26/2010 03:50 PM  
 Triglyceride 85 05/10/2018 04:03 AM  
 Triglyceride 90 02/06/2018 03:10 AM  
 Triglyceride 104 08/02/2017 03:21 AM  
 Triglyceride 109 07/15/2016 03:39 AM  
 Triglyceride 88 02/26/2010 03:50 PM  
 CHOL/HDL Ratio 3.1 05/10/2018 04:03 AM  
 CHOL/HDL Ratio 3.4 02/06/2018 03:10 AM  
 CHOL/HDL Ratio 3.4 08/02/2017 03:21 AM  
 CHOL/HDL Ratio 4.0 07/15/2016 03:39 AM  
 CHOL/HDL Ratio 3.4 02/26/2010 03:50 PM  
 
Lab Results Component Value Date/Time  08/02/2017 08:31 AM  
 
Lab Results Component Value Date/Time Sodium 141 06/05/2018 04:16 AM  
 Potassium 4.2 06/05/2018 04:16 AM  
 Chloride 106 06/05/2018 04:16 AM  
 CO2 30 06/05/2018 04:16 AM  
 Anion gap 5 06/05/2018 04:16 AM  
 Glucose 128 (H) 06/05/2018 04:16 AM  
 BUN 19 06/05/2018 04:16 AM  
 Creatinine 1.10 (H) 06/05/2018 04:16 AM  
 BUN/Creatinine ratio 17 06/05/2018 04:16 AM  
 GFR est AA 58 (L) 06/05/2018 04:16 AM  
 GFR est non-AA 48 (L) 06/05/2018 04:16 AM  
 Calcium 8.5 06/05/2018 04:16 AM  
 Bilirubin, total 0.3 06/01/2018 12:15 AM  
 AST (SGOT) 13 (L) 06/01/2018 12:15 AM  
 Alk.  phosphatase 121 (H) 06/01/2018 12:15 AM  
 Protein, total 7.4 06/01/2018 12:15 AM  
 Albumin 3.6 06/01/2018 12:15 AM  
 Globulin 3.8 06/01/2018 12:15 AM  
 A-G Ratio 0.9 (L) 06/01/2018 12:15 AM  
 ALT (SGPT) 13 06/01/2018 12:15 AM  
 
 
EKG: 
NSR, LBBB Assessment: 1. Coronary artery disease of native artery of native heart with stable angina pectoris ([facility] 2. Acute cerebral infarction ([facility] 3. Hypertensive urgency 4. Coronary artery disease involving native coronary artery of native heart without angina pectoris 5. Mixed hyperlipidemia 6. H/O: CVA (cerebrovascular accident) 7. Elevated troponin 8. Left bundle branch block (LBBB) on electrocardiogram   
9. 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  ANORO ELLIPTA 62.5-25 mcg/actuation inhaler Sig: Take 1 Puff by inhalation as needed. Plan:  
 
Doing well status post admission for small bowel obstruction requiring surgery. Surgery went fine after minimal troponin elevation. No chest pain. Continue medical management. Syncope 5/2018: 
· Considering recently mildly reduced to low normal LVEF, no arrhythmias on telemetry, the most likely etiology is dehydration plus or minus low blood pressure 
   
Recurrent CVA's: 
· New acute right post parietal infarct noted on MRI- doubt relation to syncope · 2/2018 had a moderate-sized area of acute rightMCA territory infarction in the posterior right frontal lobe · Concerned that there may be underlying AF that may be responsible for these CVAs. · S/p ILR on 5/10/18 by Dr. Anni Franco · Schedule for loop monitor check for history of multiple cryptogenic CVAs.    
HTN: 
Controlled CAD, mild 2009: 
Stable, tolerated SBO surgery 7/2018. Continue with medical management.   Stress test negative for ischemia 4/0291. 
   
H/o systolic heart failure: LVEF 40% in the past, 45-50% in May 2018. Compensated. Continue aspirin, and a blocker, statin, Plavix. Hold off on ACE inhibitor at this time due to near normal LVEF and history of low blood pressure at times. Follow-up in 6 months, sooner as needed.    
 
Ramana Mayorga MD

## 2018-08-02 NOTE — PROGRESS NOTES
1. Have you been to the ER, urgent care clinic since your last visit? Hospitalized since your last visit? [facility] 
 
2. Have you seen or consulted any other health care providers outside of the [facility] since your last visit? Include any pap smears or colon screening. [facility].

## 2018-08-02 NOTE — MR AVS SNAPSHOT
[address] [facility] 
[phone] Patient: Rita Garvin MRN: [id number] :1940 Visit Information Date & Time Provider Department Dept. Phone Encounter #  
 2018 11:00 AM Channing Ag, [facility] [id number] Your Appointments 2018  9:30 AM  
New Patient with Caren Tam MD  
[facility] [address]) Appt Note: p/Dr VILLALPANDO, cas w/Dr WEI for loop monitor:  
 [address]  
[phone] [address]  
  
    
 2019 11:00 AM  
ESTABLISHED PATIENT with Channing Ag MD  
[facility] [address]) Appt Note: p/Dr VILLALPANDO fu in 6months bg  
 [address]  
[phone] [address] Upcoming Health Maintenance Date Due DTaP/Tdap/Td series (1 - Tdap) 10/2/1961 ZOSTER VACCINE AGE 60> 1998 GLAUCOMA SCREENING Q2Y 10/2/2003 Bone Densitometry (Dexa) Screening 10/2/2003 Pneumococcal 65+ High/Highest Risk (1 of 2 - PCV13) 10/2/2003 MEDICARE YEARLY EXAM 3/14/2018 Influenza Age 5 to Adult 2018 Allergies as of 2018  Review Complete On: 2018 By: Steffanie Frazier LPN Severity Noted Reaction Type Reactions Decadron [Dexamethasone Sodium Phosphate]  2017    Hives Pcn [Penicillins]  2016    Hives Current Immunizations  Never Reviewed Name Date Influenza Vaccine 10/1/2017, 10/1/2016 Not reviewed this visit You Were Diagnosed With   
  
 Codes Comments Coronary artery disease of native artery of native heart with stable angina pectoris ([facility]    -  Primary ICD-10-CM: I25.118 
ICD-9-CM: 414.01, 413.9 Vitals BP Pulse Resp Height(growth percentile) Weight(growth percentile) SpO2  
 118/60 (BP 1 Location: Right arm, BP Patient Position: Sitting) 73 18 5' (1.524 m) 127 lb 8 oz (57.8 kg) 96% BMI Smoking Status 24.9 kg/m2 Former Smoker Vitals History BMI and BSA Data Body Mass Index Body Surface Area 24.9 kg/m 2 1.56 m 2 Preferred Pharmacy Pharmacy Name Phone [facility] [address] [place] [phone] Your Updated Medication List  
  
   
This list is accurate as of 8/2/18 12:06 PM.  Always use your most recent med list.  
  
  
  
  
 acetaminophen 325 mg tablet Commonly known as:  TYLENOL Take 650 mg by mouth every eight (8) hours as needed for Fever. albuterol 90 mcg/actuation inhaler Commonly known as:  PROVENTIL HFA, VENTOLIN HFA, PROAIR HFA Take 2 Puffs by inhalation every four (4) hours as needed for Wheezing. albuterol-ipratropium 2.5 mg-0.5 mg/3 ml Nebu Commonly known as:  DUO-NEB  
3 mL by Nebulization route three (3) times daily. ANORO ELLIPTA 62.5-25 mcg/actuation inhaler Generic drug:  umeclidinium-vilanterol Take 1 Puff by inhalation as needed. aspirin delayed-release 81 mg tablet Take 1 Tab by mouth daily. atorvastatin 80 mg tablet Commonly known as:  LIPITOR Take 1 Tab by mouth nightly. TAKE ONE TABLET BY MOUTH EVERY DAY  
  
 bisacodyl 10 mg suppository Commonly known as:  DULCOLAX Insert 10 mg into rectum daily as needed (constipation). clopidogrel 75 mg Tab Commonly known as:  PLAVIX Take 1 Tab by mouth daily. famotidine 20 mg tablet Commonly known as:  PEPCID Take 20 mg by mouth two (2) times a day. fexofenadine 180 mg tablet Commonly known as:  Sandi Cyphers Take 180 mg by mouth daily as needed for Allergies. FLOVENT  mcg/actuation inhaler Generic drug:  fluticasone Take 1 Puff by inhalation two (2) times a day. HYDROcodone-acetaminophen 5-325 mg per tablet Commonly known as:  Davida Mura Take 1 Tab by mouth every four (4) hours as needed. Max Daily Amount: 6 Tabs. LASIX 20 mg tablet Generic drug:  furosemide Take 20 mg by mouth Every Mon, Wed and Sat.  
  
 metoprolol succinate 25 mg XL tablet Commonly known as:  TOPROL-XL Take 25 mg by mouth two (2) times a day. nitroglycerin 0.4 mg SL tablet Commonly known as:  NITROSTAT  
0.4 mg by SubLINGual route every five (5) minutes as needed for Chest Pain. Up to 3 doses. OXYGEN-AIR DELIVERY SYSTEMS  
2 L by IntraNASal route daily as needed (shortness of breath). potassium chloride 20 mEq tablet Commonly known as:  K-DUR, KLOR-CON Take 20 mEq by mouth daily. SENNA WITH DOCUSATE SODIUM 8.6-50 mg per tablet Generic drug:  senna-docusate Take 2 Tabs by mouth two (2) times a day. We Performed the Following AMB POC EKG ROUTINE W/ 12 LEADS, INTER & REP [83585 CPT(R)] To-Do List   
 08/03/2018 To Be Determined Appointment with Karolyn Phoenix RN at [address] [facility]! [employer] introduces [employer] patient portal. Now you can access parts of your medical record, email your doctor's office, and request medication refills online. 1. In your internet browser, go to [email or web address] 2. Click on the First Time User? Click Here link in the Sign In box. You will see the New Member Sign Up page. 3. Enter your [employer] Access Code exactly as it appears below. You will not need to use this code after youve completed the sign-up process. If you do not sign up before the expiration date, you must request a new code. · [employer] Access Code: [email or web address] Expires: 8/6/2018  9:22 PM 
 
4. Enter the last four digits of your Social Security Number (xxxx) and Date of Birth (mm/dd/yyyy) as indicated and click Submit.  You will be taken to the next sign-up page. 5. Create a [employer] ID. This will be your [employer] login ID and cannot be changed, so think of one that is secure and easy to remember. 6. Create a [employer] password. You can change your password at any time. 7. Enter your Password Reset Question and Answer. This can be used at a later time if you forget your password. 8. Enter your e-mail address. You will receive e-mail notification when new information is available in [address] 9. Click Sign Up. You can now view and download portions of your medical record. 10. Click the Download Summary menu link to download a portable copy of your medical information. If you have questions, please visit the Frequently Asked Questions section of the [employer] website. Remember, [employer] is NOT to be used for urgent needs. For medical emergencies, dial 911. Now available from your iPhone and Android! Please provide this summary of care documentation to your next provider. Your primary care clinician is listed as Rob Oliva. If you have any questions after today's visit, please call [phone].

## 2018-08-03 ENCOUNTER — HOME CARE VISIT (OUTPATIENT)
Dept: SCHEDULING | Facility: HOME HEALTH | Age: 78
End: 2018-08-03
Payer: MEDICARE

## 2018-08-03 PROCEDURE — 3331090003 HH PPS REVENUE ADJ

## 2018-08-03 PROCEDURE — 3331090001 HH PPS REVENUE CREDIT

## 2018-08-03 PROCEDURE — 3331090002 HH PPS REVENUE DEBIT

## 2018-08-04 PROCEDURE — 3331090002 HH PPS REVENUE DEBIT

## 2018-08-04 PROCEDURE — 3331090001 HH PPS REVENUE CREDIT

## 2018-08-05 PROCEDURE — 3331090002 HH PPS REVENUE DEBIT

## 2018-08-05 PROCEDURE — 3331090001 HH PPS REVENUE CREDIT

## 2018-08-06 PROCEDURE — 3331090001 HH PPS REVENUE CREDIT

## 2018-08-06 PROCEDURE — 3331090002 HH PPS REVENUE DEBIT

## 2018-08-07 PROCEDURE — 3331090001 HH PPS REVENUE CREDIT

## 2018-08-07 PROCEDURE — 3331090002 HH PPS REVENUE DEBIT

## 2018-08-08 PROCEDURE — 3331090001 HH PPS REVENUE CREDIT

## 2018-08-08 PROCEDURE — 3331090002 HH PPS REVENUE DEBIT

## 2018-08-09 PROCEDURE — 3331090002 HH PPS REVENUE DEBIT

## 2018-08-09 PROCEDURE — 3331090001 HH PPS REVENUE CREDIT

## 2018-08-10 PROCEDURE — 3331090001 HH PPS REVENUE CREDIT

## 2018-08-10 PROCEDURE — 3331090002 HH PPS REVENUE DEBIT

## 2018-08-11 PROCEDURE — 3331090002 HH PPS REVENUE DEBIT

## 2018-08-11 PROCEDURE — 3331090001 HH PPS REVENUE CREDIT

## 2018-08-12 PROCEDURE — 3331090002 HH PPS REVENUE DEBIT

## 2018-08-12 PROCEDURE — 3331090001 HH PPS REVENUE CREDIT

## 2018-09-11 ENCOUNTER — OFFICE VISIT (OUTPATIENT)
Dept: CARDIOLOGY CLINIC | Age: 78
End: 2018-09-11

## 2018-09-11 ENCOUNTER — CLINICAL SUPPORT (OUTPATIENT)
Dept: CARDIOLOGY CLINIC | Age: 78
End: 2018-09-11

## 2018-09-11 VITALS
DIASTOLIC BLOOD PRESSURE: 80 MMHG | WEIGHT: 125.2 LBS | RESPIRATION RATE: 18 BRPM | HEIGHT: 60 IN | HEART RATE: 81 BPM | OXYGEN SATURATION: 99 % | BODY MASS INDEX: 24.58 KG/M2 | SYSTOLIC BLOOD PRESSURE: 150 MMHG

## 2018-09-11 DIAGNOSIS — I25.118 CORONARY ARTERY DISEASE OF NATIVE ARTERY OF NATIVE HEART WITH STABLE ANGINA PECTORIS (HCC): Primary | ICD-10-CM

## 2018-09-11 DIAGNOSIS — I48.0 PAROXYSMAL ATRIAL FIBRILLATION (HCC): ICD-10-CM

## 2018-09-11 DIAGNOSIS — I10 ESSENTIAL HYPERTENSION, BENIGN: ICD-10-CM

## 2018-09-11 DIAGNOSIS — R55 SYNCOPE, UNSPECIFIED SYNCOPE TYPE: ICD-10-CM

## 2018-09-11 DIAGNOSIS — I63.9 ACUTE CEREBRAL INFARCTION (HCC): Primary | ICD-10-CM

## 2018-09-11 DIAGNOSIS — E78.2 MIXED HYPERLIPIDEMIA: ICD-10-CM

## 2018-09-11 DIAGNOSIS — Z45.09 ENCOUNTER FOR LOOP RECORDER CHECK: ICD-10-CM

## 2018-09-11 DIAGNOSIS — I63.9 CEREBROVASCULAR ACCIDENT (CVA), UNSPECIFIED MECHANISM (HCC): ICD-10-CM

## 2018-09-11 NOTE — PROGRESS NOTES
Subjective:  
  
Allan Lopez is a 76 y.o. female is here for EP consult. The patient denies chest pain/ shortness of breath, orthopnea, PND, LE edema, palpitations, syncope, presyncope or fatigue. Patient Active Problem List  
 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 ([address] 07/15/2016  Severe sepsis ([address] 07/14/2016  Pneumonia 07/14/2016  Acute respiratory failure ([address] 07/14/2016  Elevated troponin 07/14/2016  Left bundle branch block (LBBB) on electrocardiogram 07/14/2016  Acute systolic heart failure ([address] 07/14/2016  Acute kidney failure ([address] 07/14/2016  Mixed hyperlipidemia 09/21/2012  Essential hypertension, benign 09/21/2012  Other left bundle branch block 09/21/2012  Coronary atherosclerosis of native coronary artery 09/21/2012  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  
Diagnosis Date  Acute kidney failure ([address] 7/14/2016  Acute systolic heart failure ([address] 7/14/2016  CAD (coronary artery disease)  Heart failure ([address]  Stroke [facility]) 2009 Stroke No past surgical history on file. Allergies Allergen Reactions  Decadron [Dexamethasone Sodium Phosphate] Hives  Pcn [Penicillins] Hives Family History Problem Relation Age of Onset  Coronary Artery Disease Other   
  grandmother age 76  
 negative for cardiac disease Social History Social History  Marital status:  Spouse name: N/A  
 Number of children: N/A  
 Years of education: N/A Social History Main Topics  Smoking status: Former Smoker Quit date: 9/21/2009  Smokeless tobacco: Never Used  Alcohol use 0.0 oz/week  
  0 Standard drinks or equivalent per week Comment: Very little  Drug use: No  
 Sexual activity: Not Asked Other Topics Concern  None Social History Narrative ** Merged History Encounter **  
    
 
Current Outpatient Prescriptions Medication Sig  
 apixaban (ELIQUIS) 2.5 mg tablet Take 1 Tab by mouth two (2) times a day.  potassium chloride (K-DUR, KLOR-CON) 20 mEq tablet Take 20 mEq by mouth daily.  fluticasone (FLOVENT HFA) 220 mcg/actuation inhaler Take 1 Puff by inhalation two (2) times a day.  famotidine (PEPCID) 20 mg tablet Take 20 mg by mouth two (2) times a day.  metoprolol succinate (TOPROL-XL) 25 mg XL tablet Take 25 mg by mouth two (2) times a day.  furosemide (LASIX) 20 mg tablet Take 20 mg by mouth Every Mon, Wed and Sat.  nitroglycerin (NITROSTAT) 0.4 mg SL tablet 0.4 mg by SubLINGual route every five (5) minutes as needed for Chest Pain. Up to 3 doses.  atorvastatin (LIPITOR) 80 mg tablet Take 1 Tab by mouth nightly. TAKE ONE TABLET BY MOUTH EVERY DAY  
 OXYGEN-AIR DELIVERY SYSTEMS 2 L by IntraNASal route daily as needed (shortness of breath).  aspirin delayed-release 81 mg tablet Take 1 Tab by mouth daily.   
 albuterol (PROVENTIL HFA, VENTOLIN HFA, PROAIR HFA) 90 mcg/actuation inhaler Take 2 Puffs by inhalation every four (4) hours as needed for Wheezing.  fexofenadine (ALLEGRA) 180 mg tablet Take 180 mg by mouth daily as needed for Allergies. No current facility-administered medications for this visit. Vitals:  
 09/11/18 1025 BP: 150/80 Pulse: 81 Resp: 18 SpO2: 99% Weight: 125 lb 3.2 oz (56.8 kg) Height: 5' (1.524 m) I have reviewed the nurses notes, vitals, problem list, allergy list, medical history, family, social history and medications. Review of Symptoms: 
 
General: Pt denies excessive weight gain or loss. Pt is able to conduct ADL's HEENT: Denies blurred vision, headaches, epistaxis and difficulty swallowing. Respiratory: Denies shortness of breath, CHRISTINE, wheezing or stridor. Cardiovascular: Denies precordial pain, palpitations, edema or PND Gastrointestinal: Denies poor appetite, indigestion, abdominal pain or blood in stool Urinary: Denies dysuria, pyuria Musculoskeletal: Denies pain or swelling from muscles or joints Neurologic: Denies tremor, paresthesias, or sensory motor disturbance Skin: Denies rash, itching or texture change. Psych: Denies depression Physical Exam:   
 
General: Well developed, in no acute distress. HEENT: Eyes - PERRL, no jvd Heart:  Normal S1/S2 negative S3 or S4. Regular, no murmur, gallop or rub.  
Respiratory: Clear bilaterally x 4, no wheezing or rales Extremities:  No edema, normal cap refill, no cyanosis. Musculoskeletal: No clubbing Neuro: A&Ox3, speech clear, gait stable. Skin: Skin color is normal. No rashes or lesions. Non diaphoretic Vascular: 2+ pulses symmetric in all extremities Cardiographics Ekg :  Sinus  Rhythm  -Frequent pvcs -ventricular trigeminy -Left bundle branch block.  
 -Left atrial enlargement. Results for orders placed or performed during the hospital encounter of 05/31/18 EKG, 12 LEAD, INITIAL Result Value Ref Range  Ventricular Rate 85 BPM  
 Atrial Rate 85 BPM  
 P-R Interval 146 ms  
 QRS Duration 132 ms Q-T Interval 428 ms QTC Calculation (Bezet) 509 ms Calculated P Axis 77 degrees Calculated R Axis 20 degrees Calculated T Axis 77 degrees Diagnosis Normal sinus rhythm Biatrial enlargement Left bundle branch block Abnormal ECG When compared with ECG of 06-MAY-2018 19:48, Left bundle branch block is now present Confirmed by Nacho Grimaldo ([id number]) on 6/1/2018 9:03:27 AM 
  
 
 
 
Lab Results Component Value Date/Time WBC 5.9 06/05/2018 04:16 AM  
 HGB 8.3 (L) 06/05/2018 04:16 AM  
 HCT 25.5 (L) 06/05/2018 04:16 AM  
 PLATELET 741 59/98/3490 04:16 AM  
 MCV 82.3 06/05/2018 04:16 AM  
  
Lab Results Component Value Date/Time Sodium 141 06/05/2018 04:16 AM  
 Potassium 4.2 06/05/2018 04:16 AM  
 Chloride 106 06/05/2018 04:16 AM  
 CO2 30 06/05/2018 04:16 AM  
 Anion gap 5 06/05/2018 04:16 AM  
 Glucose 128 (H) 06/05/2018 04:16 AM  
 BUN 19 06/05/2018 04:16 AM  
 Creatinine 1.10 (H) 06/05/2018 04:16 AM  
 BUN/Creatinine ratio 17 06/05/2018 04:16 AM  
 GFR est AA 58 (L) 06/05/2018 04:16 AM  
 GFR est non-AA 48 (L) 06/05/2018 04:16 AM  
 Calcium 8.5 06/05/2018 04:16 AM  
 Bilirubin, total 0.3 06/01/2018 12:15 AM  
 AST (SGOT) 13 (L) 06/01/2018 12:15 AM  
 Alk. phosphatase 121 (H) 06/01/2018 12:15 AM  
 Protein, total 7.4 06/01/2018 12:15 AM  
 Albumin 3.6 06/01/2018 12:15 AM  
 Globulin 3.8 06/01/2018 12:15 AM  
 A-G Ratio 0.9 (L) 06/01/2018 12:15 AM  
 ALT (SGPT) 13 06/01/2018 12:15 AM  
  
Lab Results Component Value Date/Time TSH 0.45 05/07/2018 03:29 AM  
 
 
 Assessment: ICD-10-CM ICD-9-CM 1. Coronary artery disease of native artery of native heart with stable angina pectoris ([facility] I25.118 414.01 AMB POC EKG ROUTINE W/ 12 LEADS, INTER & REP  
  413.9 Orders Placed This Encounter  AMB POC EKG ROUTINE W/ 12 LEADS, INTER & REP Order Specific Question:   Reason for Exam: Answer:   ROUTINE  DISCONTD: apixaban (ELIQUIS) 2.5 mg tablet Sig: Take 1 Tab by mouth two (2) times a day. Dispense:  60 Tab Refill:  12  
 apixaban (ELIQUIS) 2.5 mg tablet Sig: Take 1 Tab by mouth two (2) times a day. Dispense:  60 Tab Refill:  12 Plan:  
 
Justyna Cat presents for consult with hx of cryptogenic stroke in February with 36 episodes on ILR. Will d/c her plavix and start eliquis 2.5 mg BID. Discussed AF ablation with pt and her son. Pt is a candidate for an afib ablation. I discussed the risks/benefits/alternatives of the procedure with the patient. Risks include (but are not limited to) bleeding, heart block, infection, cva/mi/tamponade/esophageal perforation/pv stenosis/death. The patient understands that there is a 2-6% major complication rate and agrees to proceed. Thank you for this interesting consultation. Continue medical management for HTN, hyperlipidemia and AF. Thank you for allowing me to participate in Allan Lopez 's care. Reshma Crowe NP Patient seen and examined. All pertinent data reviewed. I have reviewed detailed note as outlined by Reshma Crowe NP. Case discussed with Nursing/medical assistant staff and Reshma Crowe NP. Plans as outlined. Pt with hx of cva and AF on ilr. Will switch her over to oac from plavix. Candidate for AF ablation. Will proceed.  
 
Danyell Smith MD, Eugenie Tejeda

## 2018-09-11 NOTE — PROGRESS NOTES
1. Have you been to the ER, urgent care clinic since your last visit? Hospitalized since your last visit? NO 
 
2. Have you seen or consulted any other health care providers outside of the [address] [place] [address] since your last visit? Include any pap smears or colon screening. NO 
 
NEW PATIENT. C/O SOB.

## 2018-09-11 NOTE — MR AVS SNAPSHOT
[address] [facility] 
[phone] Patient: Joesph Story MRN: [id number] :1940 Visit Information Date & Time Provider Department Dept. Phone Encounter #  
 2018 10:30 AM Ottoniel Leena Dose, [address] [facility] [phone] [id number] Follow-up Instructions Return in about 4 weeks (around 10/9/2018). Follow-up and Disposition History Your Appointments 10/15/2018  1:30 PM  
ESTABLISHED PATIENT with Ina Mccarty NP [facility] [address] [place] [address]) Appt Note: 1/2 week f/u PUI, has ILR, device check 18 kb  
 [address] [facility]  
[phone] [address] [facility]  
  
    
 10/15/2018  1:45 PM  
PROCEDURE with PACEMAKER, [facility] [address] [place] [address]) Appt Note: 1/2 week PUI f/u has ILR, device check 18 kb  
 [address] [facility]  
[phone] [address] [facility]  
  
    
 2019 11:00 AM  
ESTABLISHED PATIENT with Flory Castano MD  
[facility] [address]) Appt Note: p/Dr KWASI phelps in 6months bg  
 [address] [facility]  
[phone] [address] [facility] Upcoming Health Maintenance Date Due DTaP/Tdap/Td series (1 - Tdap) 10/2/1959 ZOSTER VACCINE AGE 60> 1998 GLAUCOMA SCREENING Q2Y 10/2/2003 Bone Densitometry (Dexa) Screening 10/2/2003 Pneumococcal 65+ High/Highest Risk (1 of 2 - PCV13) 10/2/2003 MEDICARE YEARLY EXAM 3/14/2018 Influenza Age 5 to Adult 2018 Allergies as of 2018  Review Complete On: 2018 By: Brigitte Cramer MD  
  
 Severity Noted Reaction Type Reactions Decadron [Dexamethasone Sodium Phosphate]  2017    Hives Pcn [Penicillins]  07/14/2016    Hives Current Immunizations  Never Reviewed Name Date Influenza Vaccine 10/1/2017, 10/1/2016 Not reviewed this visit You Were Diagnosed With   
  
 Codes Comments Coronary artery disease of native artery of native heart with stable angina pectoris ([facility]    -  Primary ICD-10-CM: I25.118 
ICD-9-CM: 414.01, 413.9 Paroxysmal atrial fibrillation (HCC)     ICD-10-CM: I48.0 ICD-9-CM: 427.31 Cerebrovascular accident (CVA), unspecified mechanism ([facility]     ICD-10-CM: I63.9 ICD-9-CM: 434.91 Essential hypertension, benign     ICD-10-CM: I10 
ICD-9-CM: 401.1 Mixed hyperlipidemia     ICD-10-CM: E78.2 ICD-9-CM: 272.2 Vitals BP Pulse Resp Height(growth percentile) Weight(growth percentile) SpO2  
 150/80 (BP 1 Location: Right arm, BP Patient Position: Sitting) 81 18 5' (1.524 m) 125 lb 3.2 oz (56.8 kg) 99% BMI Smoking Status 24.45 kg/m2 Former Smoker Vitals History BMI and BSA Data Body Mass Index Body Surface Area  
 24.45 kg/m 2 1.55 m 2 Preferred Pharmacy Pharmacy Name Phone [facility] [address], [address] [place] [address] Avenue [phone] Your Updated Medication List  
  
   
This list is accurate as of 9/11/18 11:55 AM.  Always use your most recent med list.  
  
  
  
  
 albuterol 90 mcg/actuation inhaler Commonly known as:  PROVENTIL HFA, VENTOLIN HFA, PROAIR HFA Take 2 Puffs by inhalation every four (4) hours as needed for Wheezing. apixaban 2.5 mg tablet Commonly known as:  Wonda Deem Take 1 Tab by mouth two (2) times a day. aspirin delayed-release 81 mg tablet Take 1 Tab by mouth daily. atorvastatin 80 mg tablet Commonly known as:  LIPITOR Take 1 Tab by mouth nightly. TAKE ONE TABLET BY MOUTH EVERY DAY  
  
 famotidine 20 mg tablet Commonly known as:  PEPCID Take 20 mg by mouth two (2) times a day. fexofenadine 180 mg tablet Commonly known as:  Rella Soulier Take 180 mg by mouth daily as needed for Allergies. FLOVENT  mcg/actuation inhaler Generic drug:  fluticasone Take 1 Puff by inhalation two (2) times a day. LASIX 20 mg tablet Generic drug:  furosemide Take 20 mg by mouth Every Mon, Wed and Sat.  
  
 metoprolol succinate 25 mg XL tablet Commonly known as:  TOPROL-XL Take 25 mg by mouth two (2) times a day. nitroglycerin 0.4 mg SL tablet Commonly known as:  NITROSTAT  
0.4 mg by SubLINGual route every five (5) minutes as needed for Chest Pain. Up to 3 doses. OXYGEN-AIR DELIVERY SYSTEMS  
2 L by IntraNASal route daily as needed (shortness of breath). potassium chloride 20 mEq tablet Commonly known as:  K-DUR, KLOR-CON Take 20 mEq by mouth daily. We Performed the Following AMB POC EKG ROUTINE W/ 12 LEADS, INTER & REP [46338 CPT(R)] CBC W/O DIFF [51791 CPT(R)] MAGNESIUM [id number] CPT(R)] METABOLIC PANEL, COMPREHENSIVE [55564 CPT(R)] PROTHROMBIN TIME + INR [49358 CPT(R)] Follow-up Instructions Return in about 4 weeks (around 10/9/2018). To-Do List   
 Around 09/11/2018 Imaging:  XR CHEST PA LAT Introducing [facility] SERVICES! [employer] introduces [employer] patient portal. Now you can access parts of your medical record, email your doctor's office, and request medication refills online. 1. In your internet browser, go to [email or web address] 2. Click on the First Time User? Click Here link in the Sign In box. You will see the New Member Sign Up page. 3. Enter your [employer] Access Code exactly as it appears below. You will not need to use this code after youve completed the sign-up process. If you do not sign up before the expiration date, you must request a new code. · [employer] Access Code: [email or web address] Expires: 11/4/2018  9:58 PM 
 
 4. Enter the last four digits of your Social Security Number (xxxx) and Date of Birth (mm/dd/yyyy) as indicated and click Submit. You will be taken to the next sign-up page. 5. Create a [employer] ID. This will be your [employer] login ID and cannot be changed, so think of one that is secure and easy to remember. 6. Create a [employer] password. You can change your password at any time. 7. Enter your Password Reset Question and Answer. This can be used at a later time if you forget your password. 8. Enter your e-mail address. You will receive e-mail notification when new information is available in [address] 9. Click Sign Up. You can now view and download portions of your medical record. 10. Click the Download Summary menu link to download a portable copy of your medical information. If you have questions, please visit the Frequently Asked Questions section of the [employer] website. Remember, [employer] is NOT to be used for urgent needs. For medical emergencies, dial 911. Now available from your iPhone and Android! Please provide this summary of care documentation to your next provider. Your primary care clinician is listed as Sharath Bob. If you have any questions after today's visit, please call [phone].

## 2018-09-26 ENCOUNTER — HOSPITAL ENCOUNTER (OUTPATIENT)
Dept: LAB | Age: 78
Discharge: HOME | End: 2018-09-26
Payer: MEDICARE

## 2018-09-26 PROCEDURE — 80053 COMPREHEN METABOLIC PANEL: CPT

## 2018-09-26 PROCEDURE — 85610 PROTHROMBIN TIME: CPT

## 2018-09-26 PROCEDURE — 83735 ASSAY OF MAGNESIUM: CPT

## 2018-09-26 PROCEDURE — 36415 COLL VENOUS BLD VENIPUNCTURE: CPT

## 2018-09-26 PROCEDURE — 85027 COMPLETE CBC AUTOMATED: CPT

## 2018-09-27 LAB
ALBUMIN SERPL-MCNC: 4.3 G/DL (ref 3.5–4.8)
ALBUMIN/GLOB SERPL: 1.9 {RATIO} (ref 1.2–2.2)
ALP SERPL-CCNC: 130 IU/L (ref 39–117)
ALT SERPL-CCNC: 6 IU/L (ref 0–32)
AST SERPL-CCNC: 10 IU/L (ref 0–40)
BILIRUB SERPL-MCNC: 0.4 MG/DL (ref 0–1.2)
BUN SERPL-MCNC: 21 MG/DL (ref 8–27)
BUN/CREAT SERPL: 17 (ref 12–28)
CALCIUM SERPL-MCNC: 9.1 MG/DL (ref 8.7–10.3)
CHLORIDE SERPL-SCNC: 108 MMOL/L (ref 96–106)
CO2 SERPL-SCNC: 25 MMOL/L (ref 20–29)
CREAT SERPL-MCNC: 1.24 MG/DL (ref 0.57–1)
ERYTHROCYTE [DISTWIDTH] IN BLOOD BY AUTOMATED COUNT: 15.1 % (ref 12.3–15.4)
GLOBULIN SER CALC-MCNC: 2.3 G/DL (ref 1.5–4.5)
GLUCOSE SERPL-MCNC: 95 MG/DL (ref 65–99)
HCT VFR BLD AUTO: 34.6 % (ref 34–46.6)
HGB BLD-MCNC: 10.8 G/DL (ref 11.1–15.9)
INR PPP: 1.1 (ref 0.8–1.2)
INTERPRETATION: NORMAL
MAGNESIUM SERPL-MCNC: 2.1 MG/DL (ref 1.6–2.3)
MCH RBC QN AUTO: 25.9 PG (ref 26.6–33)
MCHC RBC AUTO-ENTMCNC: 31.2 G/DL (ref 31.5–35.7)
MCV RBC AUTO: 83 FL (ref 79–97)
PLATELET # BLD AUTO: 197 X10E3/UL (ref 150–379)
POTASSIUM SERPL-SCNC: 4.4 MMOL/L (ref 3.5–5.2)
PROT SERPL-MCNC: 6.6 G/DL (ref 6–8.5)
PROTHROMBIN TIME: 11 SEC (ref 9.1–12)
RBC # BLD AUTO: 4.17 X10E6/UL (ref 3.77–5.28)
SODIUM SERPL-SCNC: 146 MMOL/L (ref 134–144)
WBC # BLD AUTO: 5.3 X10E3/UL (ref 3.4–10.8)

## 2018-10-05 ENCOUNTER — ANESTHESIA EVENT (OUTPATIENT)
Dept: NON INVASIVE DIAGNOSTICS | Age: 78
End: 2018-10-05
Payer: MEDICARE

## 2018-10-05 ENCOUNTER — HOSPITAL ENCOUNTER (OUTPATIENT)
Dept: NON INVASIVE DIAGNOSTICS | Age: 78
Setting detail: OBSERVATION
Discharge: HOME | End: 2018-10-06
Attending: INTERNAL MEDICINE | Admitting: INTERNAL MEDICINE
Payer: MEDICARE

## 2018-10-05 ENCOUNTER — ANESTHESIA (OUTPATIENT)
Dept: NON INVASIVE DIAGNOSTICS | Age: 78
End: 2018-10-05
Payer: MEDICARE

## 2018-10-05 PROBLEM — I48.91 A-FIB (HCC): Status: ACTIVE | Noted: 2018-10-05

## 2018-10-05 LAB
ACT BLD: 142 SECS (ref 79–138)
ACT BLD: 362 SECS (ref 79–138)

## 2018-10-05 PROCEDURE — C1894 INTRO/SHEATH, NON-LASER: HCPCS

## 2018-10-05 PROCEDURE — 99218 HC RM OBSERVATION: CPT

## 2018-10-05 PROCEDURE — 74011000250 HC RX REV CODE- 250

## 2018-10-05 PROCEDURE — 77030015398 HC CBL EP EXT STJU -C

## 2018-10-05 PROCEDURE — 74011250636 HC RX REV CODE- 250/636: Performed by: ANESTHESIOLOGY

## 2018-10-05 PROCEDURE — C1769 GUIDE WIRE: HCPCS

## 2018-10-05 PROCEDURE — 74011000250 HC RX REV CODE- 250: Performed by: ANESTHESIOLOGY

## 2018-10-05 PROCEDURE — 85347 COAGULATION TIME ACTIVATED: CPT

## 2018-10-05 PROCEDURE — C1759 CATH, INTRA ECHOCARDIOGRAPHY: HCPCS

## 2018-10-05 PROCEDURE — 74011250636 HC RX REV CODE- 250/636: Performed by: INTERNAL MEDICINE

## 2018-10-05 PROCEDURE — 76210000016 HC OR PH I REC 1 TO 1.5 HR

## 2018-10-05 PROCEDURE — 74011250636 HC RX REV CODE- 250/636

## 2018-10-05 PROCEDURE — 77030008543 HC TBNG MON PRSS MRTM -A

## 2018-10-05 PROCEDURE — 77030030806 HC PTCH ENSIT NAVX STJU -G

## 2018-10-05 PROCEDURE — 77030030261 HC CBL UMB ELEC DISP MEDT -C

## 2018-10-05 PROCEDURE — C1730 CATH, EP, 19 OR FEW ELECT: HCPCS

## 2018-10-05 PROCEDURE — 74011250637 HC RX REV CODE- 250/637: Performed by: INTERNAL MEDICINE

## 2018-10-05 PROCEDURE — C1893 INTRO/SHEATH, FIXED,NON-PEEL: HCPCS

## 2018-10-05 PROCEDURE — 77030010880 HC CBL EP SUPRME STJU -C

## 2018-10-05 PROCEDURE — 77030018729 HC ELECTRD DEFIB PAD CARD -B

## 2018-10-05 PROCEDURE — 77030020506 HC NDL TRNSPTL NRG BAYL -F

## 2018-10-05 PROCEDURE — 77030013797 HC KT TRNSDUC PRSSR EDWD -A

## 2018-10-05 PROCEDURE — 76060000034 HC ANESTHESIA 1.5 TO 2 HR

## 2018-10-05 PROCEDURE — 77030016894 HC CBL EP DX CATH3 STJU -B

## 2018-10-05 PROCEDURE — 77030008684 HC TU ET CUF COVD -B: Performed by: ANESTHESIOLOGY

## 2018-10-05 PROCEDURE — 77030029359 HC PRB ESOPH TEMP CATH ANTM -F

## 2018-10-05 PROCEDURE — 77030030278 HC COAX UMB DISP MEDT -C

## 2018-10-05 PROCEDURE — C1733 CATH, EP, OTHR THAN COOL-TIP: HCPCS

## 2018-10-05 PROCEDURE — 77030029163 HC CBL EP DX ACHV DISP MEDT -C

## 2018-10-05 PROCEDURE — 74011636320 HC RX REV CODE- 636/320

## 2018-10-05 PROCEDURE — 93662 INTRACARDIAC ECG (ICE): CPT

## 2018-10-05 PROCEDURE — 77030020061 HC IV BLD WRMR ADMIN SET 3M -B: Performed by: ANESTHESIOLOGY

## 2018-10-05 PROCEDURE — 93613 INTRACARDIAC EPHYS 3D MAPG: CPT

## 2018-10-05 PROCEDURE — 77030034850

## 2018-10-05 RX ORDER — ALBUTEROL SULFATE 90 UG/1
2 AEROSOL, METERED RESPIRATORY (INHALATION)
Status: DISCONTINUED | OUTPATIENT
Start: 2018-10-05 | End: 2018-10-06 | Stop reason: HOSPADM

## 2018-10-05 RX ORDER — HEPARIN SODIUM 200 [USP'U]/100ML
2000 INJECTION, SOLUTION INTRAVENOUS ONCE
Status: COMPLETED | OUTPATIENT
Start: 2018-10-05 | End: 2018-10-05

## 2018-10-05 RX ORDER — PROPOFOL 10 MG/ML
INJECTION, EMULSION INTRAVENOUS AS NEEDED
Status: DISCONTINUED | OUTPATIENT
Start: 2018-10-05 | End: 2018-10-05 | Stop reason: HOSPADM

## 2018-10-05 RX ORDER — ASPIRIN 81 MG/1
81 TABLET ORAL DAILY
Status: DISCONTINUED | OUTPATIENT
Start: 2018-10-06 | End: 2018-10-06 | Stop reason: HOSPADM

## 2018-10-05 RX ORDER — HYDROCODONE BITARTRATE AND ACETAMINOPHEN 5; 325 MG/1; MG/1
1 TABLET ORAL AS NEEDED
Status: DISCONTINUED | OUTPATIENT
Start: 2018-10-05 | End: 2018-10-05 | Stop reason: HOSPADM

## 2018-10-05 RX ORDER — ATORVASTATIN CALCIUM 40 MG/1
80 TABLET, FILM COATED ORAL
Status: DISCONTINUED | OUTPATIENT
Start: 2018-10-05 | End: 2018-10-06 | Stop reason: HOSPADM

## 2018-10-05 RX ORDER — DOBUTAMINE HYDROCHLORIDE 200 MG/100ML
INJECTION INTRAVENOUS
Status: DISPENSED
Start: 2018-10-05 | End: 2018-10-05

## 2018-10-05 RX ORDER — DIPHENHYDRAMINE HYDROCHLORIDE 50 MG/ML
12.5 INJECTION, SOLUTION INTRAMUSCULAR; INTRAVENOUS AS NEEDED
Status: DISCONTINUED | OUTPATIENT
Start: 2018-10-05 | End: 2018-10-05 | Stop reason: HOSPADM

## 2018-10-05 RX ORDER — MIDAZOLAM HYDROCHLORIDE 1 MG/ML
1 INJECTION, SOLUTION INTRAMUSCULAR; INTRAVENOUS AS NEEDED
Status: DISCONTINUED | OUTPATIENT
Start: 2018-10-05 | End: 2018-10-05 | Stop reason: HOSPADM

## 2018-10-05 RX ORDER — FENTANYL CITRATE 50 UG/ML
INJECTION, SOLUTION INTRAMUSCULAR; INTRAVENOUS AS NEEDED
Status: DISCONTINUED | OUTPATIENT
Start: 2018-10-05 | End: 2018-10-05 | Stop reason: HOSPADM

## 2018-10-05 RX ORDER — HEPARIN SODIUM 1000 [USP'U]/ML
INJECTION, SOLUTION INTRAVENOUS; SUBCUTANEOUS
Status: COMPLETED
Start: 2018-10-05 | End: 2018-10-05

## 2018-10-05 RX ORDER — HEPARIN SODIUM 200 [USP'U]/100ML
INJECTION, SOLUTION INTRAVENOUS
Status: DISPENSED
Start: 2018-10-05 | End: 2018-10-05

## 2018-10-05 RX ORDER — SODIUM CHLORIDE, SODIUM LACTATE, POTASSIUM CHLORIDE, CALCIUM CHLORIDE 600; 310; 30; 20 MG/100ML; MG/100ML; MG/100ML; MG/100ML
100 INJECTION, SOLUTION INTRAVENOUS CONTINUOUS
Status: DISCONTINUED | OUTPATIENT
Start: 2018-10-05 | End: 2018-10-05 | Stop reason: HOSPADM

## 2018-10-05 RX ORDER — FENTANYL CITRATE 50 UG/ML
50 INJECTION, SOLUTION INTRAMUSCULAR; INTRAVENOUS AS NEEDED
Status: DISCONTINUED | OUTPATIENT
Start: 2018-10-05 | End: 2018-10-05 | Stop reason: HOSPADM

## 2018-10-05 RX ORDER — FUROSEMIDE 20 MG/1
20 TABLET ORAL
Status: DISCONTINUED | OUTPATIENT
Start: 2018-10-06 | End: 2018-10-06 | Stop reason: HOSPADM

## 2018-10-05 RX ORDER — SODIUM CHLORIDE 9 MG/ML
INJECTION INTRAMUSCULAR; INTRAVENOUS; SUBCUTANEOUS
Status: DISPENSED
Start: 2018-10-05 | End: 2018-10-06

## 2018-10-05 RX ORDER — ONDANSETRON 2 MG/ML
INJECTION INTRAMUSCULAR; INTRAVENOUS AS NEEDED
Status: DISCONTINUED | OUTPATIENT
Start: 2018-10-05 | End: 2018-10-05 | Stop reason: HOSPADM

## 2018-10-05 RX ORDER — FENTANYL CITRATE 50 UG/ML
INJECTION, SOLUTION INTRAMUSCULAR; INTRAVENOUS
Status: COMPLETED
Start: 2018-10-05 | End: 2018-10-05

## 2018-10-05 RX ORDER — SODIUM CHLORIDE 0.9 % (FLUSH) 0.9 %
5-10 SYRINGE (ML) INJECTION EVERY 8 HOURS
Status: DISCONTINUED | OUTPATIENT
Start: 2018-10-05 | End: 2018-10-06 | Stop reason: HOSPADM

## 2018-10-05 RX ORDER — SODIUM CHLORIDE 9 MG/ML
INJECTION, SOLUTION INTRAVENOUS
Status: DISCONTINUED | OUTPATIENT
Start: 2018-10-05 | End: 2018-10-05 | Stop reason: HOSPADM

## 2018-10-05 RX ORDER — PROTAMINE SULFATE 10 MG/ML
INJECTION, SOLUTION INTRAVENOUS
Status: COMPLETED
Start: 2018-10-05 | End: 2018-10-05

## 2018-10-05 RX ORDER — PROTAMINE SULFATE 10 MG/ML
25-100 INJECTION, SOLUTION INTRAVENOUS
Status: DISCONTINUED | OUTPATIENT
Start: 2018-10-05 | End: 2018-10-06 | Stop reason: HOSPADM

## 2018-10-05 RX ORDER — FENTANYL CITRATE 50 UG/ML
12.5-5 INJECTION, SOLUTION INTRAMUSCULAR; INTRAVENOUS
Status: DISCONTINUED | OUTPATIENT
Start: 2018-10-05 | End: 2018-10-06 | Stop reason: HOSPADM

## 2018-10-05 RX ORDER — HYDRALAZINE HYDROCHLORIDE 20 MG/ML
INJECTION INTRAMUSCULAR; INTRAVENOUS AS NEEDED
Status: DISCONTINUED | OUTPATIENT
Start: 2018-10-05 | End: 2018-10-05 | Stop reason: HOSPADM

## 2018-10-05 RX ORDER — ACETAMINOPHEN 325 MG/1
650 TABLET ORAL
Status: DISCONTINUED | OUTPATIENT
Start: 2018-10-05 | End: 2018-10-06 | Stop reason: HOSPADM

## 2018-10-05 RX ORDER — ONDANSETRON 2 MG/ML
4 INJECTION INTRAMUSCULAR; INTRAVENOUS AS NEEDED
Status: DISCONTINUED | OUTPATIENT
Start: 2018-10-05 | End: 2018-10-05 | Stop reason: HOSPADM

## 2018-10-05 RX ORDER — MIDAZOLAM HYDROCHLORIDE 1 MG/ML
1-5 INJECTION, SOLUTION INTRAMUSCULAR; INTRAVENOUS
Status: DISCONTINUED | OUTPATIENT
Start: 2018-10-05 | End: 2018-10-06 | Stop reason: HOSPADM

## 2018-10-05 RX ORDER — FUROSEMIDE 10 MG/ML
40 INJECTION INTRAMUSCULAR; INTRAVENOUS ONCE
Status: COMPLETED | OUTPATIENT
Start: 2018-10-05 | End: 2018-10-05

## 2018-10-05 RX ORDER — HYDROCODONE BITARTRATE AND ACETAMINOPHEN 5; 325 MG/1; MG/1
1 TABLET ORAL
Status: DISCONTINUED | OUTPATIENT
Start: 2018-10-05 | End: 2018-10-06 | Stop reason: HOSPADM

## 2018-10-05 RX ORDER — FAMOTIDINE 20 MG/1
20 TABLET, FILM COATED ORAL DAILY
Status: DISCONTINUED | OUTPATIENT
Start: 2018-10-06 | End: 2018-10-06 | Stop reason: HOSPADM

## 2018-10-05 RX ORDER — METOPROLOL SUCCINATE 25 MG/1
25 TABLET, EXTENDED RELEASE ORAL 2 TIMES DAILY
Status: DISCONTINUED | OUTPATIENT
Start: 2018-10-05 | End: 2018-10-06 | Stop reason: HOSPADM

## 2018-10-05 RX ORDER — FENTANYL CITRATE 50 UG/ML
25 INJECTION, SOLUTION INTRAMUSCULAR; INTRAVENOUS
Status: DISCONTINUED | OUTPATIENT
Start: 2018-10-05 | End: 2018-10-05 | Stop reason: HOSPADM

## 2018-10-05 RX ORDER — LORATADINE 10 MG/1
10 TABLET ORAL DAILY
Status: DISCONTINUED | OUTPATIENT
Start: 2018-10-06 | End: 2018-10-06 | Stop reason: HOSPADM

## 2018-10-05 RX ORDER — PROCHLORPERAZINE EDISYLATE 5 MG/ML
INJECTION INTRAMUSCULAR; INTRAVENOUS
Status: DISPENSED
Start: 2018-10-05 | End: 2018-10-06

## 2018-10-05 RX ORDER — POTASSIUM CHLORIDE 20 MEQ/1
20 TABLET, EXTENDED RELEASE ORAL DAILY
Status: DISCONTINUED | OUTPATIENT
Start: 2018-10-06 | End: 2018-10-06 | Stop reason: HOSPADM

## 2018-10-05 RX ORDER — DOBUTAMINE HYDROCHLORIDE 200 MG/100ML
INJECTION INTRAVENOUS
Status: DISCONTINUED | OUTPATIENT
Start: 2018-10-05 | End: 2018-10-05 | Stop reason: HOSPADM

## 2018-10-05 RX ORDER — SODIUM CHLORIDE 0.9 % (FLUSH) 0.9 %
5-10 SYRINGE (ML) INJECTION AS NEEDED
Status: DISCONTINUED | OUTPATIENT
Start: 2018-10-05 | End: 2018-10-06 | Stop reason: HOSPADM

## 2018-10-05 RX ORDER — LABETALOL HYDROCHLORIDE 5 MG/ML
INJECTION, SOLUTION INTRAVENOUS AS NEEDED
Status: DISCONTINUED | OUTPATIENT
Start: 2018-10-05 | End: 2018-10-05 | Stop reason: HOSPADM

## 2018-10-05 RX ORDER — PHENYLEPHRINE HCL IN 0.9% NACL 0.4MG/10ML
SYRINGE (ML) INTRAVENOUS AS NEEDED
Status: DISCONTINUED | OUTPATIENT
Start: 2018-10-05 | End: 2018-10-05 | Stop reason: HOSPADM

## 2018-10-05 RX ORDER — MIDAZOLAM HYDROCHLORIDE 1 MG/ML
0.5 INJECTION, SOLUTION INTRAMUSCULAR; INTRAVENOUS
Status: DISCONTINUED | OUTPATIENT
Start: 2018-10-05 | End: 2018-10-05 | Stop reason: HOSPADM

## 2018-10-05 RX ORDER — HYDROMORPHONE HYDROCHLORIDE 1 MG/ML
0.5 INJECTION, SOLUTION INTRAMUSCULAR; INTRAVENOUS; SUBCUTANEOUS
Status: DISCONTINUED | OUTPATIENT
Start: 2018-10-05 | End: 2018-10-05 | Stop reason: HOSPADM

## 2018-10-05 RX ORDER — HEPARIN SODIUM 1000 [USP'U]/ML
30000 INJECTION, SOLUTION INTRAVENOUS; SUBCUTANEOUS ONCE
Status: COMPLETED | OUTPATIENT
Start: 2018-10-05 | End: 2018-10-05

## 2018-10-05 RX ORDER — FLUTICASONE PROPIONATE 220 UG/1
1 AEROSOL, METERED RESPIRATORY (INHALATION) 2 TIMES DAILY
Status: DISCONTINUED | OUTPATIENT
Start: 2018-10-05 | End: 2018-10-05 | Stop reason: CLARIF

## 2018-10-05 RX ORDER — HEPARIN SODIUM 200 [USP'U]/100ML
INJECTION, SOLUTION INTRAVENOUS
Status: COMPLETED
Start: 2018-10-05 | End: 2018-10-05

## 2018-10-05 RX ORDER — LIDOCAINE HYDROCHLORIDE 10 MG/ML
0.1 INJECTION, SOLUTION EPIDURAL; INFILTRATION; INTRACAUDAL; PERINEURAL AS NEEDED
Status: DISCONTINUED | OUTPATIENT
Start: 2018-10-05 | End: 2018-10-05 | Stop reason: HOSPADM

## 2018-10-05 RX ORDER — SUCCINYLCHOLINE CHLORIDE 20 MG/ML
INJECTION INTRAMUSCULAR; INTRAVENOUS AS NEEDED
Status: DISCONTINUED | OUTPATIENT
Start: 2018-10-05 | End: 2018-10-05 | Stop reason: HOSPADM

## 2018-10-05 RX ADMIN — Medication 10 ML: at 22:00

## 2018-10-05 RX ADMIN — ONDANSETRON 4 MG: 2 INJECTION INTRAMUSCULAR; INTRAVENOUS at 11:55

## 2018-10-05 RX ADMIN — ATORVASTATIN CALCIUM 80 MG: 40 TABLET, FILM COATED ORAL at 21:06

## 2018-10-05 RX ADMIN — APIXABAN 2.5 MG: 2.5 TABLET, FILM COATED ORAL at 17:23

## 2018-10-05 RX ADMIN — HEPARIN SODIUM 4000 UNITS: 200 INJECTION, SOLUTION INTRAVENOUS at 10:31

## 2018-10-05 RX ADMIN — PROTAMINE SULFATE 100 MG: 10 INJECTION, SOLUTION INTRAVENOUS at 11:53

## 2018-10-05 RX ADMIN — FUROSEMIDE 40 MG: 10 INJECTION, SOLUTION INTRAMUSCULAR; INTRAVENOUS at 18:32

## 2018-10-05 RX ADMIN — DOBUTAMINE HYDROCHLORIDE 20 MCG/KG/MIN: 200 INJECTION INTRAVENOUS at 11:51

## 2018-10-05 RX ADMIN — SODIUM CHLORIDE: 9 INJECTION, SOLUTION INTRAVENOUS at 10:39

## 2018-10-05 RX ADMIN — IOPAMIDOL 50 ML: 755 INJECTION, SOLUTION INTRAVENOUS at 10:31

## 2018-10-05 RX ADMIN — LABETALOL HYDROCHLORIDE 5 MG: 5 INJECTION, SOLUTION INTRAVENOUS at 12:12

## 2018-10-05 RX ADMIN — METOPROLOL SUCCINATE 25 MG: 25 TABLET, EXTENDED RELEASE ORAL at 17:23

## 2018-10-05 RX ADMIN — LABETALOL HYDROCHLORIDE 10 MG: 5 INJECTION, SOLUTION INTRAVENOUS at 12:18

## 2018-10-05 RX ADMIN — FENTANYL CITRATE 50 MCG: 50 INJECTION, SOLUTION INTRAMUSCULAR; INTRAVENOUS at 10:48

## 2018-10-05 RX ADMIN — SUCCINYLCHOLINE CHLORIDE 140 MG: 20 INJECTION INTRAMUSCULAR; INTRAVENOUS at 10:48

## 2018-10-05 RX ADMIN — HYDRALAZINE HYDROCHLORIDE 10 MG: 20 INJECTION INTRAMUSCULAR; INTRAVENOUS at 12:21

## 2018-10-05 RX ADMIN — ONDANSETRON 4 MG: 2 INJECTION, SOLUTION INTRAMUSCULAR; INTRAVENOUS at 12:44

## 2018-10-05 RX ADMIN — PROPOFOL 120 MG: 10 INJECTION, EMULSION INTRAVENOUS at 10:48

## 2018-10-05 RX ADMIN — FENTANYL CITRATE 50 MCG: 50 INJECTION, SOLUTION INTRAMUSCULAR; INTRAVENOUS at 10:59

## 2018-10-05 RX ADMIN — HEPARIN SODIUM 12000 UNITS: 1000 INJECTION, SOLUTION INTRAVENOUS; SUBCUTANEOUS at 11:07

## 2018-10-05 RX ADMIN — PROCHLORPERAZINE EDISYLATE 2.5 MG: 5 INJECTION INTRAMUSCULAR; INTRAVENOUS at 12:48

## 2018-10-05 RX ADMIN — ALBUTEROL SULFATE 2 PUFF: 90 AEROSOL, METERED RESPIRATORY (INHALATION) at 18:35

## 2018-10-05 RX ADMIN — Medication 80 MCG: at 11:47

## 2018-10-05 NOTE — PERIOP NOTES
1229-Handoff Report from Operating Room to PACU Report received from [facility] and Allan Fagan CRNA regarding Miya Pacheco. Surgeon(s): 
Case Anesthesia  And Procedure(s) (LRB): 
PACU/RECOVERY FROM PVI  (N/A)  confirmed  
with allergies, drains and dressings discussed. Anesthesia type, drugs, patient history, complications, estimated blood loss, vital signs, intake and output, and last pain medication, lines and temperature were reviewed. 1330-TRANSFER - OUT REPORT: 
 
Verbal report given to Udell Meckel RN(name) on Miya Pacheco  being transferred to IVCU(unit) for routine post - op Report consisted of patients Situation, Background, Assessment and  
Recommendations(SBAR). Information from the following report(s) SBAR, Kardex, OR Summary, Procedure Summary, Intake/Output, MAR and Recent Results was reviewed with the receiving nurse. Opportunity for questions and clarification was provided. Patient transported with: 
 Monitor O2 @ 3 liters Registered Nurse Tech

## 2018-10-05 NOTE — PROCEDURES
[address]  [phone] Indications and Pre-Procedure Diagnosis: 
Phil Qiu is a [de-identified] y.o. female with AF is referred for electr-physiologic evaluation and intervention. Post Procedure Diagnosis AF Atrial Fibrillation Ablation Summary Informed consent was obtained. The patient was brought to the EP lab where CATHI demonstrated no thrombus in the left atrial appendage. All vascular access sites were prepped and draped in the usual sterile fashion and the Seldinger technique was used to catherize the RFV and LFV with multi-polar electrode catheters, which were placed in the appropriate intra-cardiac sites under fluoroscopic guidance (see catheter list). Right and left atrial pacing and recording, His bundle recording, and right ventricular pacing and recording were performed. Continuous pulse oximetry and cuff BP monitoring were performed. During the procedure, the patient received Versed, Fentanyl and Propofol for sedation per anesthesia personnel. Transeptal Puncture A transeptal atrial puncture was performed using fluoroscopic and intracardiac ultrasound guidance. An SL1 sheath was dragged from the SVC along the septum until a sudden leftward displacement was observed. Engagement of the Fossa Ovalis was confirmed by the interatrial tenting seen under intracardiac ultrasound guidance. The Radhika Patti NRG needle was advanced into the left atrium and its positioned confirmed with contrast injection. The dilator and sheath were advanced carefully over the needle into the body of the left atrium and the dilator/needle removed. A Flexcath sheath was exchanged over the wire for the SL1 sheath. No pericardial effusion was appreciated on intracardiac ultrasound so a bolus injection of heparin 100 units/kg was administered, with additional boluses q 30 minutes as necessary so that the activated clotting time maintained was between 300 and 400 seconds. A 3D shell representing the left atrium and pulmonary veins was constructed with the electroanatomic mapping system. An Achieve circular mapping catheter was advanced into the left atrium through the Flexcath sheath and a map of the body of the LA and the pulmonary veins was created. Pulmonary vein venography was also performed at that time. A 28 mm Medtronic Cryo balloon was advanced into the left atrium. Cryo ablation of the left atrium was performed at the PV ostia to encircle the right and left PVs. Cryo energy was applied for a total of 16 minutes with confirmed entrance/exit block of four pulmonary veins. Autonomic manipulation with Dobutamine @ 20 mcg/min was performed during this procedure. Post ablation, rapid atrial pacing to 240 msec, on and off dobutamine, failed to induce any atrial arrhythmias. At the end of the procedure, all catheters were removed, heparin reversed, groin sheaths pulled and hemostasis achieved. The patient left the laboratory in a stable condition. Fluoroscopy and procedure times were 8 and 60 minutes respectively. Estimated blood loss: <10 ml. Sharp counts: correct. Specimen (s) collected: none. The procedure related complication occurred: none. The following problems were encountered: none. Conduction intervals (ms) A-A A-H H-V P-R QRS Q-T R-R V-V 
[phone] 39 124 133 [phone] AV felipa conduction VA Block when pacing at 480 ms Findings and Summary This study demonstrates: 1. PVI of all 4 PVs 
2. No further inducible atrial arrhythmias on dobutamine with rapid atrial pacing Recommendation: 1. [facility] Thank you for allowing me to participate in this patients care.  
 
Nunu Anthony MD, Tatum Fried

## 2018-10-05 NOTE — H&P
Subjective:  
   
Alejandro Ghosh is a 76 y.o. female is here for EP consult. She has palpitations that correspond to PAF on her ILR. Hx of cryptogenic cva. Also c/o fatigue 
  
    
Patient Active Problem List  
  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 ([address] 07/15/2016  Severe sepsis ([address] 07/14/2016  Pneumonia 07/14/2016  Acute respiratory failure ([address] 07/14/2016  Elevated troponin 07/14/2016  Left bundle branch block (LBBB) on electrocardiogram 07/14/2016  Acute systolic heart failure ([address] 07/14/2016  Acute kidney failure ([address] 07/14/2016  Mixed hyperlipidemia 09/21/2012  Essential hypertension, benign 09/21/2012  Other left bundle branch block 09/21/2012  Coronary atherosclerosis of native coronary artery 09/21/2012  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  
Diagnosis Date  Acute kidney failure ([address] 7/14/2016  Acute systolic heart failure ([address] 7/14/2016  CAD (coronary artery disease)    
 Heart failure ([address]    
 Stroke [facility]) 2009   Stroke No past surgical history on file. Allergies Allergen Reactions  Decadron [Dexamethasone Sodium Phosphate] Hives  Pcn [Penicillins] Hives Family History Problem Relation Age of Onset  Coronary Artery Disease Other    
    grandmother age 76  
 negative for cardiac disease Social History  
  
   
     
Social History  Marital status:   
    Spouse name: N/A  
 Number of children: N/A  
 Years of education: N/A  
  
       
Social History Main Topics  Smoking status: Former Smoker  
    Quit date: 9/21/2009  Smokeless tobacco: Never Used  Alcohol use 0.0 oz/week   
    0 Standard drinks or equivalent per week   
      Comment: Very little  Drug use: No  
 Sexual activity: Not Asked  
  
    
Other Topics Concern  None  
  
   
Social History Narrative  
  ** Merged History Encounter **  
      
  
  
    
Current Outpatient Prescriptions Medication Sig  
 apixaban (ELIQUIS) 2.5 mg tablet Take 1 Tab by mouth two (2) times a day.  potassium chloride (K-DUR, KLOR-CON) 20 mEq tablet Take 20 mEq by mouth daily.  fluticasone (FLOVENT HFA) 220 mcg/actuation inhaler Take 1 Puff by inhalation two (2) times a day.  famotidine (PEPCID) 20 mg tablet Take 20 mg by mouth two (2) times a day.  metoprolol succinate (TOPROL-XL) 25 mg XL tablet Take 25 mg by mouth two (2) times a day.  furosemide (LASIX) 20 mg tablet Take 20 mg by mouth Every Mon, Wed and Sat.  nitroglycerin (NITROSTAT) 0.4 mg SL tablet 0.4 mg by SubLINGual route every five (5) minutes as needed for Chest Pain. Up to 3 doses.  atorvastatin (LIPITOR) 80 mg tablet Take 1 Tab by mouth nightly. TAKE ONE TABLET BY MOUTH EVERY DAY  
 OXYGEN-AIR DELIVERY SYSTEMS 2 L by IntraNASal route daily as needed (shortness of breath).  aspirin delayed-release 81 mg tablet Take 1 Tab by mouth daily.   
 albuterol (PROVENTIL HFA, VENTOLIN HFA, PROAIR HFA) 90 mcg/actuation inhaler Take 2 Puffs by inhalation every four (4) hours as needed for Wheezing.  fexofenadine (ALLEGRA) 180 mg tablet Take 180 mg by mouth daily as needed for Allergies.  
  
No current facility-administered medications for this visit. Vitals:  
  09/11/18 1025 BP: 150/80 Pulse: 81 Resp: 18 SpO2: 99% Weight: 125 lb 3.2 oz (56.8 kg) Height: 5' (1.524 m)  
  
  
I have reviewed the nurses notes, vitals, problem list, allergy list, medical history, family, social history and medications. 
  
Review of Symptoms: 
  
General: Pt denies excessive weight gain or loss. Pt is able to conduct ADL's HEENT: Denies blurred vision, headaches, epistaxis and difficulty swallowing. Respiratory: Denies shortness of breath, CHRISTINE, wheezing or stridor. Cardiovascular: +palpitations, Denies precordial pain, edema or PND Gastrointestinal: Denies poor appetite, indigestion, abdominal pain or blood in stool Urinary: Denies dysuria, pyuria Musculoskeletal: Denies pain or swelling from muscles or joints Neurologic: Denies tremor, paresthesias, or sensory motor disturbance Skin: Denies rash, itching or texture change. Psych: Denies depression 
  
  
Physical Exam:   
  
General: Well developed, in no acute distress. HEENT: Eyes - PERRL, no jvd Heart:  Normal S1/S2 negative S3 or S4. Regular, no murmur, gallop or rub.  
Respiratory: Clear bilaterally x 4, no wheezing or rales Extremities:  No edema, normal cap refill, no cyanosis. Musculoskeletal: No clubbing Neuro: A&Ox3, speech clear, gait stable. Skin: Skin color is normal. No rashes or lesions. Non diaphoretic Vascular: 2+ pulses symmetric in all extremities 
  
Cardiographics 
  
Ekg :  Sinus  Rhythm  -Frequent pvcs -ventricular trigeminy -Left bundle branch block.  
 -Left atrial enlargement.  
  
      
Results for orders placed or performed during the hospital encounter of 05/31/18 EKG, 12 LEAD, INITIAL Result Value Ref Range   Ventricular Rate 85 BPM  
  Atrial Rate 85 BPM  
  P-R Interval 146 ms  
  QRS Duration 132 ms  
  Q-T Interval 428 ms  
  QTC Calculation (Bezet) 509 ms  
  Calculated P Axis 77 degrees  
  Calculated R Axis 20 degrees  
  Calculated T Axis 77 degrees  
  Diagnosis      
    Normal sinus rhythm Biatrial enlargement Left bundle branch block Abnormal ECG When compared with ECG of 06-MAY-2018 19:48, Left bundle branch block is now present Confirmed by Eloise Youssef ([id number]) on 6/1/2018 9:03:27 AM  
  
  
  
     
Lab Results Component Value Date/Time  
  WBC 5.9 06/05/2018 04:16 AM  
  HGB 8.3 (L) 06/05/2018 04:16 AM  
  HCT 25.5 (L) 06/05/2018 04:16 AM  
  PLATELET 262 96/46/8379 04:16 AM  
  MCV 82.3 06/05/2018 04:16 AM  
  
     
Lab Results Component Value Date/Time  
  Sodium 141 06/05/2018 04:16 AM  
  Potassium 4.2 06/05/2018 04:16 AM  
  Chloride 106 06/05/2018 04:16 AM  
  CO2 30 06/05/2018 04:16 AM  
  Anion gap 5 06/05/2018 04:16 AM  
  Glucose 128 (H) 06/05/2018 04:16 AM  
  BUN 19 06/05/2018 04:16 AM  
  Creatinine 1.10 (H) 06/05/2018 04:16 AM  
  BUN/Creatinine ratio 17 06/05/2018 04:16 AM  
  GFR est AA 58 (L) 06/05/2018 04:16 AM  
  GFR est non-AA 48 (L) 06/05/2018 04:16 AM  
  Calcium 8.5 06/05/2018 04:16 AM  
  Bilirubin, total 0.3 06/01/2018 12:15 AM  
  AST (SGOT) 13 (L) 06/01/2018 12:15 AM  
  Alk. phosphatase 121 (H) 06/01/2018 12:15 AM  
  Protein, total 7.4 06/01/2018 12:15 AM  
  Albumin 3.6 06/01/2018 12:15 AM  
  Globulin 3.8 06/01/2018 12:15 AM  
  A-G Ratio 0.9 (L) 06/01/2018 12:15 AM  
  ALT (SGPT) 13 06/01/2018 12:15 AM  
  
     
Lab Results Component Value Date/Time  
  TSH 0.45 05/07/2018 03:29 AM  
  
  
 Assessment: 
   
   
    ICD-10-CM ICD-9-CM    
1. Coronary artery disease of native artery of native heart with stable angina pectoris ([facility] [address] I25.118 414.01 AMB POC EKG ROUTINE W/ 12 LEADS, INTER & REP  
    413. 9    
  
     
Orders Placed This Encounter  AMB POC EKG ROUTINE W/ 12 LEADS, INTER & REP  
    Order Specific Question:   Reason for Exam:  
    Answer:   ROUTINE  DISCONTD: apixaban (ELIQUIS) 2.5 mg tablet  
    Sig: Take 1 Tab by mouth two (2) times a day.  
    Dispense:  60 Tab  
    Refill:  12  
 apixaban (ELIQUIS) 2.5 mg tablet  
    Sig: Take 1 Tab by mouth two (2) times a day.  
    Dispense:  60 Tab  
    Refill:  12  
  
  
 Plan:  
  
MS Comfort Gonzalez presents for consult with hx of cryptogenic stroke in February with 36 episodes on ILR. She is symptomatic with palpitations. Will d/c her plavix and start eliquis 2.5 mg BID. Discussed AF ablation with pt and her son. Pt is a candidate for an afib ablation. I discussed the risks/benefits/alternatives of the procedure with the patient. Risks include (but are not limited to) bleeding, heart block, infection, cva/mi/tamponade/esophageal perforation/pv stenosis/death. The patient understands that there is a 8-5% major complication rate and agrees to proceed. Thank you for this interesting consultation. 
  
  
Continue medical management for HTN, hyperlipidemia and AF.  
  
Thank you for allowing me to participate in David Adams 's care. 
  
 
Rowena Maradiaga NP 
  
Patient seen and examined. All pertinent data reviewed. I have reviewed detailed note as outlined by Heather Kendrick NP. Case discussed with Nursing/medical assistant staff and Heather Kendrick NP. Plans as outlined. Pt with hx of cva and symptomatic AF on ilr. Will switch her over to oac from plavix. Candidate for AF ablation.  Will proceed. 
  
Richa Acuña MD, Arleth Schwartz

## 2018-10-05 NOTE — PROGRESS NOTES
Bedrest complete. Pt up to chair. Expiratory wheezing noted and coarse breath sounds bilaterally. Called on call MD Dr Greenberg Scales received one time dose Lasix 40mg IVP.

## 2018-10-05 NOTE — PROGRESS NOTES
Attempted to make follow up appointment with Dr Que Fuentes but office said they will have to put a message into the nurse for an appointment. Nurse will call patient with appointment

## 2018-10-05 NOTE — DISCHARGE INSTRUCTIONS
[address] [address]  [phone]        ATRIAL FIBRILLATION ABLATION DISCHARGE INSTRUCTIONS    Patient ID:  Diaz Mcadams  [id number]  23 y.o.  1940    Admit Date: 10/5/2018    Discharge Date: 10/5/2018     Admitting Physician: Eduardo Latham MD     Discharge Physician: Eduardo Latham MD    Admission Diagnoses:   a fib  recovery needed in PACU    Discharge Diagnoses: Active Problems:    * No active hospital problems. *      Discharge Condition: Good    Cardiology Procedures this Admission:  Atrial fibrillation. Disposition: home    Reference discharge instructions provided by nursing for diet and activity. Follow-up with Dr Benson Cabrera or his Nurse Practitioners in 1-2 weeks. Call [phone] to make an appointment. Signed:  ROME Patel  10/5/2018  11:48 AM    S/P ATRIAL FIBRILLATION ABLATION DISCHARGE INSTRUCTIONS    It is normal to feel tired the first couple days. Take it easy and follow the physicians instructions. CHECK THE CATHETER INSERTION SITE DAILY:  You may shower 24 hours after the procedure, remove the bandage during showering. Wash with soap and water and pat dry. Gentle cleaning of the site with soap and water is sufficient, cover with a dry clean dressing or bandage. Do not apply creams or powders to the area. Do not sit in a bathtub or pool of water for 7 days or until wound has completely healed. Temporary bruising and discomfort is normal and may last a few weeks. You may have a  formation of a small lump at the site which may last up to 6 weeks. CALL THE PHYSICIAN:  If the site becomes red, swollen or feels warm to the touch  If there is bleeding or drainage or if there is unusual pain at the groin or down the leg. If there is any bleeding, lie down, apply pressure or have someone apply pressure with a clean cloth until the bleeding stops.   If the bleeding continues, call [phone] to be transported to the hospital.  DO NOT DRIVE YOURSELF, OR HAVE ANYONE ELSE DRIVE YOU - CALL [phone]. Activity:      For the first 24-48 hours or as instructed by the physician:  No lifting, pushing or pulling over 5 pounds and no straining the insertion site. Do not life grocery bags or the garbage can, do not run the vacuum  or  for 7 days. Start with short walks as in the hospital and gradually increase as tolerated each day. It is recommended to walk 30 minutes 5-7 days per week. Follow your physicians instructions on activity. Avoid walking outside in extremes of heat or cold. Walk inside when it is cold and windy or hot and humid. Things to keep in mind:  No driving for at least 5 days or as designated by your physician. Limit the number of times you go up and down the stairs  Take rests and pace yourself with activity. Be careful and do not strain with bowel movements. Medications: Take all medications as prescribed  Call your physician if you have any questions  Keep an updated list of your medications with you at all times and give a list to your physician and pharmacist    Signs and Symptoms:  Be cautious of symptoms of angina or recurrent symptoms such as chest discomfort, unusual shortness of breath or fatigue, palpitations. After Care: Follow up with your physician as instructed. Follow a heart healthy diet with proper portion control, daily stress management, daily exercise, blood pressure and cholesterol control , and smoking cessation.

## 2018-10-05 NOTE — IP AVS SNAPSHOT
[address] [place] [address] 
[phone] Patient: Earl Quinn MRN: [id number] :1940 About your hospitalization You were admitted on:  2018 You last received care in the:  [facility] 2 INTRVNTNL CARDIO You were discharged on:  2018 Why you were hospitalized Your primary diagnosis was:  Not on File Your diagnoses also included:  A-Fib (Hcc) Follow-up Information Follow up With Details Comments Contact Info Kp Aquino NP Schedule an appointment as soon as possible for a visit  [address] [phone] Juanjo Siegel NP On 10/15/2018 1:30pm  Cardiology follow-up for device check [address] [facility] 
[phone] Your Scheduled Appointments Monday October 15, 2018  1:30 PM EDT  
ESTABLISHED PATIENT with Juanjo Siegel NP [facility] [address] [place] [address]) [address] [facility]  
[phone] Monday October 15, 2018  1:45 PM EDT PROCEDURE with PACEMAKER, [facility] [address] [place] [address]) [address] [facility]  
[phone] Discharge Orders None A check williams indicates which time of day the medication should be taken. My Medications CONTINUE taking these medications Instructions Each Dose to Equal  
 Morning Noon Evening Bedtime  
 albuterol 90 mcg/actuation inhaler Commonly known as:  PROVENTIL HFA, VENTOLIN HFA, PROAIR HFA Your last dose was: Your next dose is: Take 2 Puffs by inhalation every four (4) hours as needed for Wheezing. 2 Puff  
    
   
   
   
  
 apixaban 2.5 mg tablet Commonly known as:  Gi Fruit Your last dose was: Your next dose is: Take 1 Tab by mouth two (2) times a day.   
 2.5 mg  
    
 aspirin delayed-release 81 mg tablet Your last dose was: Your next dose is: Take 1 Tab by mouth daily. 81 mg  
    
   
   
   
  
 atorvastatin 80 mg tablet Commonly known as:  LIPITOR Your last dose was: Your next dose is: Take 1 Tab by mouth nightly. TAKE ONE TABLET BY MOUTH EVERY DAY 80 mg  
    
   
   
   
  
 famotidine 20 mg tablet Commonly known as:  PEPCID Your last dose was: Your next dose is: Take 20 mg by mouth two (2) times a day. 20 mg  
    
   
   
   
  
 fexofenadine 180 mg tablet Commonly known as:  Reginia Manda Your last dose was: Your next dose is: Take 180 mg by mouth daily as needed for Allergies. 180 mg  
    
   
   
   
  
 FLOVENT  mcg/actuation inhaler Generic drug:  fluticasone Your last dose was: Your next dose is: Take 1 Puff by inhalation two (2) times a day. 1 Puff LASIX 20 mg tablet Generic drug:  furosemide Your last dose was: Your next dose is: Take 20 mg by mouth Every Mon, Wed and Sat. 20 mg  
    
   
   
   
  
 metoprolol succinate 25 mg XL tablet Commonly known as:  TOPROL-XL Your last dose was: Your next dose is: Take 25 mg by mouth two (2) times a day. 25 mg  
    
   
   
   
  
 nitroglycerin 0.4 mg SL tablet Commonly known as:  NITROSTAT Your last dose was: Your next dose is: 0.4 mg by SubLINGual route every five (5) minutes as needed for Chest Pain. Up to 3 doses. 0.4 mg OXYGEN-AIR DELIVERY SYSTEMS Your last dose was: Your next dose is:    
   
   
 2 L by IntraNASal route daily as needed (shortness of breath). 2 L  
    
   
   
   
  
 potassium chloride 20 mEq tablet Commonly known as:  K-DUR, KLOR-CON Your last dose was: Your next dose is: Take 20 mEq by mouth daily. 20 mEq Discharge Instructions [address], [place], [address] [facility]  [phone] ATRIAL FIBRILLATION ABLATION DISCHARGE INSTRUCTIONS Patient ID: 
Carolina Ramirez 
[id number] 
38 y.o. 
1940 Admit Date: 10/5/2018 Discharge Date: 10/5/2018 Admitting Physician: Yang Lucas MD  
 
Discharge Physician: Yang Lucas MD 
 
Admission Diagnoses:  
a fib 
recovery needed in PACU Discharge Diagnoses: Active Problems: * No active hospital problems. * Discharge Condition: Good Cardiology Procedures this Admission:  Atrial fibrillation. Disposition: home Reference discharge instructions provided by nursing for diet and activity. Follow-up with Dr Donnell Gaspar or his Nurse Practitioners in 1-2 weeks. Call [phone] to make an appointment. ROME An 
10/5/2018 11:48 AM 
 
S/P ATRIAL FIBRILLATION ABLATION DISCHARGE INSTRUCTIONS It is normal to feel tired the first couple days. Take it easy and follow the physicians instructions. CHECK THE CATHETER INSERTION SITE DAILY: 
You may shower 24 hours after the procedure, remove the bandage during showering. Wash with soap and water and pat dry. Gentle cleaning of the site with soap and water is sufficient, cover with a dry clean dressing or bandage. Do not apply creams or powders to the area. Do not sit in a bathtub or pool of water for 7 days or until wound has completely healed. Temporary bruising and discomfort is normal and may last a few weeks. You may have a  formation of a small lump at the site which may last up to 6 weeks. CALL THE PHYSICIAN: If the site becomes red, swollen or feels warm to the touch If there is bleeding or drainage or if there is unusual pain at the groin or down the leg.  
If there is any bleeding, lie down, apply pressure or have someone apply pressure with a clean cloth until the bleeding stops. If the bleeding continues, call 911 to be transported to the hospital. 
DO  [place] Michele 022. Activity: For the first 24-48 hours or as instructed by the physician: No lifting, pushing or pulling over 5 pounds and no straining the insertion site. Do not life grocery bags or the garbage can, do not run the vacuum  or  for 7 days. Start with short walks as in the hospital and gradually increase as tolerated each day. It is recommended to walk 30 minutes 5-7 days per week. Follow your physicians instructions on activity. Avoid walking outside in extremes of heat or cold. Walk inside when it is cold and windy or hot and humid. Things to keep in mind: 
No driving for at least 5 days or as designated by your physician. Limit the number of times you go up and down the stairs Take rests and pace yourself with activity. Be careful and do not strain with bowel movements. Medications: Take all medications as prescribed Call your physician if you have any questions Keep an updated list of your medications with you at all times and give a list to your physician and pharmacist 
 
Signs and Symptoms: 
Be cautious of symptoms of angina or recurrent symptoms such as chest discomfort, unusual shortness of breath or fatigue, palpitations. After Care: Follow up with your physician as instructed. Follow a heart healthy diet with proper portion control, daily stress management, daily exercise, blood pressure and cholesterol control , and smoking cessation. Introducing [facility]! [employer] introduces [employer] ([employer]) patient portal. Now you can access parts of your medical record, email your doctor's office, and request medication refills online. 1. In your internet browser, go to [email or web address] 2. Click on the First Time User? Click Here link in the Sign In box. You will see the New Member Sign Up page. 3. Enter your [employer] Access Code exactly as it appears below. You will not need to use this code after youve completed the sign-up process. If you do not sign up before the expiration date, you must request a new code. · [employer] Access Code: [email or web address] Expires: 11/4/2018  9:58 PM 
 
4. Enter the last four digits of your Social Security Number (xxxx) and Date of Birth (mm/dd/yyyy) as indicated and click Submit. You will be taken to the next sign-up page. 5. Create a [employer] ID. This will be your [employer] login ID and cannot be changed, so think of one that is secure and easy to remember. 6. Create a [employer] password. You can change your password at any time. 7. Enter your Password Reset Question and Answer. This can be used at a later time if you forget your password. 8. Enter your e-mail address. You will receive e-mail notification when new information is available in [address] 9. Click Sign Up. You can now view and download portions of your medical record. 10. Click the Download Summary menu link to download a portable copy of your medical information. If you have questions, please visit the Frequently Asked Questions section of the [employer] website. Remember, [employer] is NOT to be used for urgent needs. For medical emergencies, dial 911. Now available from your iPhone and Android! Introducing Allen Lombardi As a [employer] patient, I wanted to make you aware of our electronic visit tool called Allen Lombardi. [employer] 24/7 allows you to connect within minutes with a medical provider 24 hours a day, seven days a week via a mobile device or tablet or logging into a secure website from your computer. You can access Allen Lombardi from anywhere in the [place].  
 
A virtual visit might be right for you when you have a simple condition and feel like you just dont want to get out of bed, or cant get away from work for an appointment, when your regular [facility] provider is not available (evenings, weekends or holidays), or when youre out of town and need minor care. Electronic visits cost only $49 and if the [employer] 24/7 provider determines a prescription is needed to treat your condition, one can be electronically transmitted to a nearby pharmacy*. Please take a moment to enroll today if you have not already done so. The enrollment process is free and takes just a few minutes. To enroll, please download the [employer] 24/7 judd to your tablet or phone, or visit [email or web address] to enroll on your computer. And, as an [address] [place] [address] patient with a [employer] account, the results of your visits will be scanned into your electronic medical record and your primary care provider will be able to view the scanned results. We urge you to continue to see your regular [facility] provider for your ongoing medical care. And while your primary care provider may not be the one available when you seek a Allen [employer] virtual visit, the peace of mind you get from getting a real diagnosis real time can be priceless. For more information on [employer], view our Frequently Asked Questions (FAQs) at [email or web address]. Sincerely, 
 
Ana Sethi MD 
Chief Medical Officer Avinash Bautista *:  certain medications cannot be prescribed via [employer] Unresulted Labs-Please follow up with your PCP about these lab tests Order Current Status EKG, 12 LEAD, INITIAL Preliminary result Providers Seen During Your Hospitalization Provider Specialty Primary office phone Humphrey Hill MD Cardiology [phone] Your Primary Care Physician (PCP) Primary Care Physician Office Phone Office Fax Zbigniew Hamilton [phone] You are allergic to the following Allergen Reactions Decadron (Dexamethasone Sodium Phosphate) Hives Pcn (Penicillins) Hives Recent Documentation Height Weight Breastfeeding? BMI Smoking Status 1.499 m 58.1 kg No 25.85 kg/m2 Former Smoker Emergency Contacts Name Discharge Info Relation Home Work Mobile Willis Valdez DISCHARGE CAREGIVER [3] Child [2] [phone] Chivo Valdez DISCHARGE CAREGIVER [3] Brother [24] [phone] Levell Escort  Son [22] [phone] Patient Belongings The following personal items are in your possession at time of discharge: 
  Dental Appliances: Lowers, Uppers, With patient  Visual Aid: Glasses      Home Medications: Sent home   Jewelry: None  Clothing: Footwear, Pants, Shirt, Undergarments, With patient    Other Valuables: None Please provide this summary of care documentation to your next provider. Signatures-by signing, you are acknowledging that this After Visit Summary has been reviewed with you and you have received a copy. Patient Signature:  ____________________________________________________________ Date:  ____________________________________________________________  
  
Alma Corrales Provider Signature:  ____________________________________________________________ Date:  ____________________________________________________________

## 2018-10-05 NOTE — ANESTHESIA POSTPROCEDURE EVALUATION
Post-Anesthesia Evaluation and Assessment Patient: Betty Cardenas MRN: [id number]  SSN: [id number] YOB: 1940  Age: [de-identified] y.o. Sex: female Cardiovascular Function/Vital Signs Visit Vitals  /61  Pulse 79  Temp 36.1 °C (96.9 °F)  Resp 17  Ht 4' 11\" (1.499 m)  Wt 58.1 kg (128 lb)  SpO2 99%  Breastfeeding No  
 BMI 25.85 kg/m2 Patient is status post general anesthesia for * No procedures listed *. Nausea/Vomiting: None Postoperative hydration reviewed and adequate. Pain: 
Pain Scale 1: FLACC (10/05/18 1315) Pain Intensity 1: 0 (10/05/18 1315) Managed Neurological Status:  
Neuro (WDL): Exceptions to WDL (10/05/18 1230) Neuro Neurologic State: Drowsy (10/05/18 1230) Orientation Level: Oriented to person (10/05/18 1230) Cognition: Impulsive (10/05/18 1230) Speech: Other (comment) (endentulous, difficult to understand) (10/05/18 1230) LUE Motor Response: Weak (10/05/18 1230) LLE Motor Response: Weak (10/05/18 1230) RUE Motor Response: Purposeful (10/05/18 1230) RLE Motor Response: Purposeful (10/05/18 1230) At baseline Mental Status and Level of Consciousness: Arousable Pulmonary Status:  
O2 Device: Nasal cannula (10/05/18 1315) Adequate oxygenation and airway patent Complications related to anesthesia: None Post-anesthesia assessment completed. No concerns Signed By: Haley Monzon MD   
 October 5, 2018

## 2018-10-05 NOTE — ANESTHESIA PREPROCEDURE EVALUATION
Anesthetic History No history of anesthetic complications Review of Systems / Medical History Patient summary reviewed, nursing notes reviewed and pertinent labs reviewed Pulmonary COPD Smoker Neuro/Psych CVA TIA Cardiovascular Hypertension Dysrhythmias CAD Exercise tolerance: <4 METS Comments: EKG: PVC's LBBB LAE 
EF 45-50% GI/Hepatic/Renal 
Within defined limits Endo/Other Within defined limits Other Findings Physical Exam 
 
Airway Mallampati: II 
TM Distance: 4 - 6 cm Neck ROM: normal range of motion Mouth opening: Normal 
 
 Cardiovascular Regular rate and rhythm,  S1 and S2 normal,  no murmur, click, rub, or gallop Dental 
 
Dentition: Edentulous, Full upper dentures and Full lower dentures Pulmonary Breath sounds clear to auscultation Abdominal 
GI exam deferred Other Findings Anesthetic Plan ASA: 4 Anesthesia type: general 
 
Monitoring Plan: BIS Induction: Intravenous Anesthetic plan and risks discussed with: Patient

## 2018-10-05 NOTE — PROGRESS NOTES
Cardiac Cath Lab Recovery Arrival Note: 
 
 
Vasile Goodwin arrived to Cardiac Cath Lab, Recovery Area. Staff introduced to patient. Patient identifiers verified with NAME and DATE OF BIRTH. Procedure verified with patient. Consent forms reviewed and signed by patient or authorized representative and verified. Allergies verified. Patient and family oriented to department. Patient and family informed of procedure and plan of care. Questions answered with review. Patient prepped for procedure, per orders from physician, prior to arrival. 
 
Patient on cardiac monitor, non-invasive blood pressure, SPO2 monitor. On room air. Patient is A&Ox 4, forgetful to situation at times. Patient reports no complaints. Patient in stretcher, in low position, with side rails up, call bell within reach, patient instructed to call if assistance as needed. Patient prep in: [address], [place]. Family in: waiting room. Mason Lopez (son) --   [phone] Prep by: Lori Solis

## 2018-10-05 NOTE — PROGRESS NOTES
Renal Dosing/Monitoring Medication: Famotidine Current regimen:  20 every mg every 12 hours No results for input(s): CREA, BUN in the last 72 hours. Estimated CrCl:  26 mL/min Plan: Change to famotidine 20 mg daily for CrCl < 50 mL/min per renal dosing protocol. Thank you, Severo Khanna, PHARMD

## 2018-10-06 VITALS
HEIGHT: 59 IN | WEIGHT: 128 LBS | OXYGEN SATURATION: 94 % | DIASTOLIC BLOOD PRESSURE: 60 MMHG | BODY MASS INDEX: 25.8 KG/M2 | RESPIRATION RATE: 18 BRPM | HEART RATE: 96 BPM | TEMPERATURE: 98.5 F | SYSTOLIC BLOOD PRESSURE: 112 MMHG

## 2018-10-06 LAB
ANION GAP SERPL CALC-SCNC: 8 MMOL/L (ref 5–15)
ATRIAL RATE: 79 BPM
BUN SERPL-MCNC: 30 MG/DL (ref 6–20)
BUN/CREAT SERPL: 15 (ref 12–20)
CALCIUM SERPL-MCNC: 7.9 MG/DL (ref 8.5–10.1)
CALCULATED P AXIS, ECG09: 64 DEGREES
CALCULATED R AXIS, ECG10: -24 DEGREES
CALCULATED T AXIS, ECG11: 111 DEGREES
CHLORIDE SERPL-SCNC: 106 MMOL/L (ref 97–108)
CO2 SERPL-SCNC: 27 MMOL/L (ref 21–32)
CREAT SERPL-MCNC: 2.06 MG/DL (ref 0.55–1.02)
DIAGNOSIS, 93000: NORMAL
ERYTHROCYTE [DISTWIDTH] IN BLOOD BY AUTOMATED COUNT: 14.4 % (ref 11.5–14.5)
GLUCOSE SERPL-MCNC: 123 MG/DL (ref 65–100)
HCT VFR BLD AUTO: 25.5 % (ref 35–47)
HGB BLD-MCNC: 8.4 G/DL (ref 11.5–16)
MCH RBC QN AUTO: 26.1 PG (ref 26–34)
MCHC RBC AUTO-ENTMCNC: 32.9 G/DL (ref 30–36.5)
MCV RBC AUTO: 79.2 FL (ref 80–99)
NRBC # BLD: 0 K/UL (ref 0–0.01)
NRBC BLD-RTO: 0 PER 100 WBC
P-R INTERVAL, ECG05: 140 MS
PLATELET # BLD AUTO: 154 K/UL (ref 150–400)
PMV BLD AUTO: 11.8 FL (ref 8.9–12.9)
POTASSIUM SERPL-SCNC: 4.3 MMOL/L (ref 3.5–5.1)
Q-T INTERVAL, ECG07: 460 MS
QRS DURATION, ECG06: 132 MS
QTC CALCULATION (BEZET), ECG08: 527 MS
RBC # BLD AUTO: 3.22 M/UL (ref 3.8–5.2)
SODIUM SERPL-SCNC: 141 MMOL/L (ref 136–145)
VENTRICULAR RATE, ECG03: 79 BPM
WBC # BLD AUTO: 8.4 K/UL (ref 3.6–11)

## 2018-10-06 PROCEDURE — 99218 HC RM OBSERVATION: CPT

## 2018-10-06 PROCEDURE — 80048 BASIC METABOLIC PNL TOTAL CA: CPT | Performed by: INTERNAL MEDICINE

## 2018-10-06 PROCEDURE — 74011250637 HC RX REV CODE- 250/637: Performed by: INTERNAL MEDICINE

## 2018-10-06 PROCEDURE — 94760 N-INVAS EAR/PLS OXIMETRY 1: CPT

## 2018-10-06 PROCEDURE — 36415 COLL VENOUS BLD VENIPUNCTURE: CPT | Performed by: INTERNAL MEDICINE

## 2018-10-06 PROCEDURE — 85027 COMPLETE CBC AUTOMATED: CPT | Performed by: INTERNAL MEDICINE

## 2018-10-06 PROCEDURE — 77010033678 HC OXYGEN DAILY

## 2018-10-06 PROCEDURE — 93005 ELECTROCARDIOGRAM TRACING: CPT

## 2018-10-06 RX ADMIN — APIXABAN 2.5 MG: 2.5 TABLET, FILM COATED ORAL at 10:22

## 2018-10-06 RX ADMIN — FLUTICASONE FUROATE 1 PUFF: 100 POWDER RESPIRATORY (INHALATION) at 10:23

## 2018-10-06 RX ADMIN — METOPROLOL SUCCINATE 25 MG: 25 TABLET, EXTENDED RELEASE ORAL at 10:23

## 2018-10-06 RX ADMIN — ASPIRIN 81 MG: 81 TABLET, COATED ORAL at 10:23

## 2018-10-06 RX ADMIN — POTASSIUM CHLORIDE 20 MEQ: 20 TABLET, EXTENDED RELEASE ORAL at 10:23

## 2018-10-06 RX ADMIN — FAMOTIDINE 20 MG: 20 TABLET ORAL at 10:22

## 2018-10-06 RX ADMIN — LORATADINE 10 MG: 10 TABLET ORAL at 10:22

## 2018-10-06 RX ADMIN — ALBUTEROL SULFATE 2 PUFF: 90 AEROSOL, METERED RESPIRATORY (INHALATION) at 07:57

## 2018-10-06 NOTE — PROGRESS NOTES
Pt up in chair. Ambulated to bathroom to void with one person assist.  Noted pt to be very weak. Pt stated that she is not normally this weak. Explained to pt that most patients c/o feel;ing weak after their procedure. Pt verbalized understanding. CHG bath done while in bathroom. Gown changed as well. Pt ambulated back to bed using walker and assistance of nurse. Noted pt did have some shortness of breath. Pt currently assisted in to bed. O2 @ 1 L/nc. O2 sat 100%. Will continue to monitor.

## 2018-10-06 NOTE — PROGRESS NOTES
Bedside report received from Viktoriya Frazier RN Assessment, Background, Procedure summary, Intake/Output, MAR, and recent results discussed. Care assumed. Pt ambulated in room. Pt appears unsteady with the aid of a walker and x 1 assist  and has no complaints of pain,  dizziness, or light-headedness. Incision appears clean, dry, and intact with no swelling or hematoma present. Patient dyspneic on exertion. Discharge instructions reviewed with patient and family. Allowed adequate time to ask questions, all questions answered. Printed copy of AVS given to patient. All belongings gathered, IV and tele discontinued. Transported via wheelchair to main entrance and into care of family.

## 2018-10-06 NOTE — PROGRESS NOTES
Problem: Falls - Risk of 
Goal: *Absence of Falls Document [facility] Heart Fall Risk and appropriate interventions in the flowsheet. Outcome: Progressing Towards Goal 
Fall Risk Interventions: 
Mobility Interventions: Patient to call before getting OOB, Utilize walker, cane, or other assistive device Medication Interventions: Patient to call before getting OOB Elimination Interventions: Call light in reach, Patient to call for help with toileting needs

## 2018-10-06 NOTE — PROGRESS NOTES
10/6/2018 8:12 AM 
 
Admit Date: 10/5/2018 Admit Diagnosis: a fib;recovery needed in PACU; A-fib ([address] Subjective:  
 
Colon Marko   denies chest pain, chest pressure/discomfort, dyspnea, palpitations, irregular heart beats, near-syncope, syncope. Visit Vitals  /52 (BP 1 Location: Left arm, BP Patient Position: At rest)  Pulse 78  Temp 98.8 °F (37.1 °C)  Resp 20  
 Ht 4' 11\" (1.499 m)  Wt 128 lb (58.1 kg)  SpO2 100%  Breastfeeding No  
 BMI 25.85 kg/m2 Current Facility-Administered Medications Medication Dose Route Frequency  fentaNYL citrate (PF) injection 12.5-50 mcg  12.5-50 mcg IntraVENous Multiple  midazolam (VERSED) injection 1-5 mg  1-5 mg IntraVENous Multiple  protamine injection  mg   mg IntraVENous Multiple  sodium chloride (NS) flush 5-10 mL  5-10 mL IntraVENous Q8H  
 sodium chloride (NS) flush 5-10 mL  5-10 mL IntraVENous PRN  
 acetaminophen (TYLENOL) tablet 650 mg  650 mg Oral Q4H PRN  
 HYDROcodone-acetaminophen (NORCO) 5-325 mg per tablet 1 Tab  1 Tab Oral Q4H PRN  
 apixaban (ELIQUIS) tablet 2.5 mg  2.5 mg Oral BID  potassium chloride (K-DUR, KLOR-CON) SR tablet 20 mEq  20 mEq Oral DAILY  famotidine (PEPCID) tablet 20 mg  20 mg Oral DAILY  furosemide (LASIX) tablet 20 mg  20 mg Oral Q MON, WED & SAT  metoprolol succinate (TOPROL-XL) XL tablet 25 mg  25 mg Oral BID  atorvastatin (LIPITOR) tablet 80 mg  80 mg Oral QHS  loratadine (CLARITIN) tablet 10 mg  10 mg Oral DAILY  aspirin delayed-release tablet 81 mg  81 mg Oral DAILY  albuterol (PROVENTIL HFA, VENTOLIN HFA, PROAIR HFA) inhaler 2 Puff  2 Puff Inhalation Q4H PRN  
 fluticasone furoate (ARNUITY ELLIPTA) 100 mcg/puff  1 Puff Inhalation DAILY Objective:  
  
Visit Vitals  /52 (BP 1 Location: Left arm, BP Patient Position: At rest)  Pulse 78  Temp 98.8 °F (37.1 °C)  Resp 20  
 Ht 4' 11\" (1.499 m)  Wt 128 lb (58.1 kg)  SpO2 100%  Breastfeeding No  
 BMI 25.85 kg/m2 Physical Exam: Abdomen: soft, non-tender. Bowel sounds normal.  
Extremities: extremities normal, atraumatic, no cyanosis or edema Heart: regular rate and rhythm, S1, S2 normal, no murmur, click, rub or gallop Lungs: clear to auscultation bilaterally Neurologic: left sided weakness Data Review:  
Labs:   
Recent Results (from the past 24 hour(s)) POC ACTIVATED CLOTTING TIME Collection Time: 10/05/18 11:28 AM  
Result Value Ref Range Activated Clotting Time (POC) 362 (H) 79 - 138 SECS  
POC ACTIVATED CLOTTING TIME Collection Time: 10/05/18 12:03 PM  
Result Value Ref Range Activated Clotting Time (POC) 142 (H) 79 - 138 SECS  
EKG, 12 LEAD, INITIAL Collection Time: 10/06/18  3:22 AM  
Result Value Ref Range Ventricular Rate 79 BPM  
 Atrial Rate 79 BPM  
 P-R Interval 140 ms QRS Duration 132 ms Q-T Interval 460 ms QTC Calculation (Bezet) 527 ms Calculated P Axis 64 degrees Calculated R Axis -24 degrees Calculated T Axis 111 degrees Diagnosis Normal sinus rhythm Left bundle branch block When compared with ECG of 01-JUN-2018 00:18, 
T wave inversion now evident in Lateral leads METABOLIC PANEL, BASIC Collection Time: 10/06/18  3:34 AM  
Result Value Ref Range Sodium 141 136 - 145 mmol/L Potassium 4.3 3.5 - 5.1 mmol/L Chloride 106 97 - 108 mmol/L  
 CO2 27 21 - 32 mmol/L Anion gap 8 5 - 15 mmol/L Glucose 123 (H) 65 - 100 mg/dL BUN 30 (H) 6 - 20 MG/DL Creatinine 2.06 (H) 0.55 - 1.02 MG/DL  
 BUN/Creatinine ratio 15 12 - 20 GFR est AA 28 (L) >60 ml/min/1.73m2 GFR est non-AA 23 (L) >60 ml/min/1.73m2 Calcium 7.9 (L) 8.5 - 10.1 MG/DL  
CBC W/O DIFF Collection Time: 10/06/18  3:34 AM  
Result Value Ref Range WBC 8.4 3.6 - 11.0 K/uL  
 RBC 3.22 (L) 3.80 - 5.20 M/uL HGB 8.4 (L) 11.5 - 16.0 g/dL HCT 25.5 (L) 35.0 - 47.0 % MCV 79.2 (L) 80.0 - 99.0 FL  
 MCH 26.1 26.0 - 34.0 PG  
 MCHC 32.9 30.0 - 36.5 g/dL  
 RDW 14.4 11.5 - 14.5 % PLATELET 089 337 - 442 K/uL MPV 11.8 8.9 - 12.9 FL  
 NRBC 0.0 0  WBC ABSOLUTE NRBC 0.00 0.00 - 0.01 K/uL Telemetry: normal sinus rhythm Assessment:  
 
Active Problems: 
  [facility] ([facility]/2018[facility] Plan: S/p ablation. Cr is up this morning. Making urine. Lasix given yesterday. Will f/u BMP as out pt in couple of days. Ok to dc.

## 2018-10-15 ENCOUNTER — CLINICAL SUPPORT (OUTPATIENT)
Dept: CARDIOLOGY CLINIC | Age: 78
End: 2018-10-15

## 2018-10-15 ENCOUNTER — OFFICE VISIT (OUTPATIENT)
Dept: CARDIOLOGY CLINIC | Age: 78
End: 2018-10-15

## 2018-10-15 VITALS
SYSTOLIC BLOOD PRESSURE: 140 MMHG | RESPIRATION RATE: 16 BRPM | HEART RATE: 88 BPM | BODY MASS INDEX: 26 KG/M2 | DIASTOLIC BLOOD PRESSURE: 90 MMHG | OXYGEN SATURATION: 98 % | WEIGHT: 129 LBS | HEIGHT: 59 IN

## 2018-10-15 DIAGNOSIS — I25.118 CORONARY ARTERY DISEASE OF NATIVE ARTERY OF NATIVE HEART WITH STABLE ANGINA PECTORIS (HCC): ICD-10-CM

## 2018-10-15 DIAGNOSIS — I10 ESSENTIAL HYPERTENSION, BENIGN: ICD-10-CM

## 2018-10-15 DIAGNOSIS — I48.91 ATRIAL FIBRILLATION, UNSPECIFIED TYPE (HCC): Primary | ICD-10-CM

## 2018-10-15 DIAGNOSIS — Z45.09 ENCOUNTER FOR LOOP RECORDER CHECK: ICD-10-CM

## 2018-10-15 DIAGNOSIS — I48.0 PAROXYSMAL ATRIAL FIBRILLATION (HCC): Primary | ICD-10-CM

## 2018-10-15 DIAGNOSIS — Z86.73 H/O: CVA (CEREBROVASCULAR ACCIDENT): ICD-10-CM

## 2018-10-15 NOTE — PROGRESS NOTES
Subjective:      Shakira Velasquez is a [de-identified] y.o. female is here for follow up s/p PVI. She is doing well although her son feels as though she has had more confusion than usual post discharge, seems to be progressively getting better. The patient denies chest pain/ shortness of breath, orthopnea, PND, LE edema, palpitations, syncope, presyncope or fatigue.        Patient Active Problem List    Diagnosis Date Noted    A-fib [facility]) 10/05/2018    Ventral hernia with obstruction 06/01/2018    Bilateral carotid artery stenosis 05/10/2018    Acute cerebral infarction [facility]) 05/09/2018    Syncope 05/06/2018    CAD (coronary artery disease) 02/05/2018    Stroke ([address] 02/05/2018    Hypertensive urgency 08/01/2017    CHF (congestive heart failure) ([address] 08/01/2017    COPD exacerbation ([address] 03/07/2017    CAD (coronary artery disease), native coronary artery 07/15/2016    H/O: CVA (cerebrovascular accident) 07/15/2016    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 ([address] 07/15/2016    Severe sepsis ([address] 07/14/2016    Pneumonia 07/14/2016    Acute respiratory failure ([address] 07/14/2016    Elevated troponin 07/14/2016    Left bundle branch block (LBBB) on electrocardiogram 91/84/7961    Acute systolic heart failure ([address] 07/14/2016    Acute kidney failure ([address] 07/14/2016    Mixed hyperlipidemia 09/21/2012    Essential hypertension, benign 09/21/2012    Other left bundle branch block 09/21/2012    Coronary atherosclerosis of native coronary artery 09/21/2012    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 09/21/2012    Atherosclerosis of renal artery ([address] 09/21/2012    Atherosclerosis of native artery of extremity with intermittent claudication ([address] 09/21/2012    Tobacco use disorder 09/21/2012    Cerebrovascular accident ([address] 09/21/2012    Mitral valve disorders(424.0) 09/21/2012    Tricuspid valve disorder 09/21/2012      Joaquín Garay NP  Past Medical History: Diagnosis Date    Acute kidney failure ([facility] 1/44/7620    Acute systolic heart failure ([facility] 7/14/2016    Arrhythmia     CAD (coronary artery disease)     Heart failure ([facility]     Hypertension     Stroke [facility]) 2009    Stroke, left hand weakness and speech, peripheral vision affected      Past Surgical History:   Procedure Laterality Date    HX UROLOGICAL  02/2018     Allergies   Allergen Reactions    Decadron [Dexamethasone Sodium Phosphate] Hives    Pcn [Penicillins] Hives      Family History   Problem Relation Age of Onset    Coronary Artery Disease Other      grandmother age 76    Stroke Mother     Hypertension Mother     negative for cardiac disease  Social History     Social History    Marital status:      Spouse name: N/A    Number of children: N/A    Years of education: N/A     Social History Main Topics    Smoking status: Former Smoker     Quit date: 9/21/2009    Smokeless tobacco: Never Used    Alcohol use No      Comment: Very little    Drug use: No    Sexual activity: Not Asked     Other Topics Concern    None     Social History Narrative    ** Merged History Encounter **          Current Outpatient Prescriptions   Medication Sig    apixaban (ELIQUIS) 2.5 mg tablet Take 1 Tab by mouth two (2) times a day.  potassium chloride (K-DUR, KLOR-CON) 20 mEq tablet Take 20 mEq by mouth daily.  famotidine (PEPCID) 20 mg tablet Take 20 mg by mouth two (2) times a day.  furosemide (LASIX) 20 mg tablet Take 20 mg by mouth Every Mon, Wed and Sat.  nitroglycerin (NITROSTAT) 0.4 mg SL tablet 0.4 mg by SubLINGual route every five (5) minutes as needed for Chest Pain. Up to 3 doses.  atorvastatin (LIPITOR) 80 mg tablet Take 1 Tab by mouth nightly. TAKE ONE TABLET BY MOUTH EVERY DAY (Patient taking differently: Take 40 mg by mouth nightly. TAKE ONE TABLET BY MOUTH EVERY DAY)    fexofenadine (ALLEGRA) 180 mg tablet Take 180 mg by mouth daily as needed for Allergies.     OXYGEN-AIR DELIVERY SYSTEMS 2 L by IntraNASal route daily as needed (shortness of breath).  aspirin delayed-release 81 mg tablet Take 1 Tab by mouth daily.  albuterol (PROVENTIL HFA, VENTOLIN HFA, PROAIR HFA) 90 mcg/actuation inhaler Take 2 Puffs by inhalation every four (4) hours as needed for Wheezing. No current facility-administered medications for this visit. Vitals:    10/15/18 1432 10/15/18 1441   BP: (!) 140/92 140/90   Pulse: 88    Resp: 16    SpO2: 98%    Weight: 129 lb (58.5 kg)    Height: 4' 11\" (1.499 m)        I have reviewed the nurses notes, vitals, problem list, allergy list, medical history, family, social history and medications. Review of Symptoms:    General: +confusion, Pt denies excessive weight gain or loss. Pt is able to conduct ADL's  HEENT: Denies blurred vision, headaches, epistaxis and difficulty swallowing. Respiratory: Denies shortness of breath, CHRISTINE, wheezing or stridor. Cardiovascular: Denies precordial pain, palpitations, edema or PND  Gastrointestinal: Denies poor appetite, indigestion, abdominal pain or blood in stool  Urinary: Denies dysuria, pyuria  Musculoskeletal: Denies pain or swelling from muscles or joints  Neurologic: Denies tremor, paresthesias, or sensory motor disturbance  Skin: Denies rash, itching or texture change. Psych: Denies depression      Physical Exam:      General: Well developed, in no acute distress. HEENT: Eyes - PERRL, no jvd  Heart:  Normal S1/S2 negative S3 or S4. Regular, no murmur, gallop or rub.   Respiratory: Clear bilaterally x 4, no wheezing or rales  Abdomen:   Soft, non-tender, bowel sounds are active.   Extremities:  No edema, normal cap refill, no cyanosis. Musculoskeletal: No clubbing  Neuro: A&Ox3, speech clear, gait stable. Skin: Skin color is normal. No rashes or lesions.  Non diaphoretic  Vascular: 2+ pulses symmetric in all extremities    Cardiographics    Ekg: sinus rhythm     Results for orders placed or performed during the hospital encounter of 10/05/18   EKG, 12 LEAD, INITIAL   Result Value Ref Range    Ventricular Rate 79 BPM    Atrial Rate 79 BPM    P-R Interval 140 ms    QRS Duration 132 ms    Q-T Interval 460 ms    QTC Calculation (Bezet) 527 ms    Calculated P Axis 64 degrees    Calculated R Axis -24 degrees    Calculated T Axis 111 degrees    Diagnosis       Normal sinus rhythm  Left bundle branch block  When compared with ECG of 01-JUN-2018 00:18,  T wave inversion now evident in Lateral leads  Confirmed by Tiffany Piaz MD, Kala Dawkins ([id number]) on 10/6/2018 2:55:41 PM           Lab Results   Component Value Date/Time    WBC 8.4 10/06/2018 03:34 AM    HGB 8.4 (L) 10/06/2018 03:34 AM    HCT 25.5 (L) 10/06/2018 03:34 AM    PLATELET 279 73/27/5935 03:34 AM    MCV 79.2 (L) 10/06/2018 03:34 AM      Lab Results   Component Value Date/Time    Sodium 141 10/06/2018 03:34 AM    Potassium 4.3 10/06/2018 03:34 AM    Chloride 106 10/06/2018 03:34 AM    CO2 27 10/06/2018 03:34 AM    Anion gap 8 10/06/2018 03:34 AM    Glucose 123 (H) 10/06/2018 03:34 AM    BUN 30 (H) 10/06/2018 03:34 AM    Creatinine 2.06 (H) 10/06/2018 03:34 AM    BUN/Creatinine ratio 15 10/06/2018 03:34 AM    GFR est AA 28 (L) 10/06/2018 03:34 AM    GFR est non-AA 23 (L) 10/06/2018 03:34 AM    Calcium 7.9 (L) 10/06/2018 03:34 AM    Bilirubin, total 0.4 09/26/2018 10:26 AM    AST (SGOT) 10 09/26/2018 10:26 AM    Alk. phosphatase 130 (H) 09/26/2018 10:26 AM    Protein, total 6.6 09/26/2018 10:26 AM    Albumin 4.3 09/26/2018 10:26 AM    Globulin 3.8 06/01/2018 12:15 AM    A-G Ratio 1.9 09/26/2018 10:26 AM    ALT (SGPT) 6 09/26/2018 10:26 AM         Assessment:     Assessment:        ICD-10-CM ICD-9-CM    1. Atrial fibrillation, unspecified type (HCC) I48.91 427.31 AMB POC EKG ROUTINE W/ 12 LEADS, INTER & REP      PROTHROMBIN TIME + INR      METABOLIC PANEL, COMPREHENSIVE      CBC W/O DIFF   2.  Coronary artery disease of native artery of native heart with stable angina pectoris ([facility] I25.118 414.01      413.9    3. Essential hypertension, benign I10 401.1    4. H/O: CVA (cerebrovascular accident) Z86.73 V12.54      Orders Placed This Encounter    PROTHROMBIN TIME + INR    METABOLIC PANEL, COMPREHENSIVE    CBC W/O DIFF    AMB POC EKG ROUTINE W/ 12 LEADS, INTER & REP     Order Specific Question:   Reason for Exam:     Answer:   routine        Plan:   Ms. Lucia Jay is here for follow up s/p PVI on 10/5/2018. She is doing well and denies cardiac complaints. EKG shows sinus rhythm and her ILR demonstrates NSR since ablation. Her son who is with her reports that since her procedure she has had some confusion which is her baseline but he feels may be slightly increased d/t Eliquis. Her creatinine was bumped prior to discharge and Hgb decreased to 8.2. Will obtain stat labwork. She reports feeling well without cardiac complaints. Continue current medications, follow up in 3 months with Dr. Jamie Avery.     [place] for HTN, CAD, stroke. Thank you for allowing me to participate in Gracie Ritchie 's care.     Kathy Green NP

## 2018-10-15 NOTE — PROGRESS NOTES
Chief Complaint   Patient presents with   [facility] Follow Up     Son says since started Eliquis pt seems more confused and question if due to medication? 1. Have you been to the ER, urgent care clinic since your last visit? Hospitalized since your last visit? Yes When: 10/05/2018 [facility] admit    2. Have you seen or consulted any other health care providers outside of the [address] [place] [address] since your last visit? Include any pap smears or colon screening.  No

## 2019-01-07 ENCOUNTER — HOSPITAL ENCOUNTER (OUTPATIENT)
Dept: VASCULAR SURGERY | Age: 79
Discharge: HOME | End: 2019-01-07
Attending: NURSE PRACTITIONER
Payer: MEDICARE

## 2019-01-07 DIAGNOSIS — I73.9 PERIPHERAL VASCULAR DISEASE, UNSPECIFIED (HCC): ICD-10-CM

## 2019-01-07 PROCEDURE — 93922 UPR/L XTREMITY ART 2 LEVELS: CPT

## 2019-01-07 NOTE — PROCEDURES
[facility] *** FINAL REPORT *** Name: Ladi Banuelos 
MRN: [id number]    Outpatient : 02 Oct 1940 [facility] Order #: [id number] [facility] Visit #: [id number] Date: 2019 TYPE OF TEST: Peripheral Arterial Testing REASON FOR TEST 
peripheral vascular disease 
cold feet Right Leg Segmentals: Abnormal 
 
                 mmHg Brachial         191 High thigh Low thigh Calf Posterior tibial  14 Dorsalis pedis    86 Peroneal 
Metatarsal 
Toe pressure Doppler: PVR:        Abnormal 
Ankle/Brachial: 0.42 Site of occlusive disease:- 
femoral and popliteal segments Left Leg Segmentals: Abnormal 
 
                 mmHg Brachial         203 High thigh Low thigh Calf Posterior tibial  99 Dorsalis pedis    87 Peroneal 
Metatarsal 
Toe pressure      33 Doppler: PVR:        Abnormal 
Ankle/Brachial: 0.49 INTERPRETATION/FINDINGS 
PROCEDURE:  Ankle, brachial and digital arterial pressures, PVR 
waveforms and digital PPG waveforms were performed. 1. Severe peripheral arterial disease indicated at rest in the right 
leg. 2. Disease is located in the femoral and popliteal segments on the 
right side. 3. Severe peripheral arterial disease indicated at rest in the left 
leg. 4. The right ankle/brachial index is 0.42 and the left ankle/brachial 
index is 0.49. 
5. The right great toe/brachial index is 0 and the left great 
toe/brachial index is 0.16. ADDITIONAL COMMENTS I have personally reviewed the data relevant to the interpretation of 
this 
study. TECHNOLOGIST: Misty Quijano. AMARA Mcnamara, RDMS Signed: 2019 11:57 AM 
 
PHYSICIAN: Cherylene Plank.  Ozzy Escalona MD 
Signed: 2019 05:34 PM

## 2019-01-07 NOTE — PROGRESS NOTES
[facility] Vascular  Preliminary Report: Ankle Brachial Index    Pressure (mmHg) Right    Left    Brachial:  191   203  Ankle PTA:  14   99  Ankle DPA:  86   87  Great Toe:  0   33    Waveform:  Right   Left  Thigh:   normal   normal  Calf:   abnormal  abnormal  Ankle:   abnormal  abnormal  Metatarsal:  abnormal  abnormal  Great Toe:  Absent   Reduced    Right JULIANA:   0.42  Left JULIANA: 0.49  Right TBI: 0  Left TBI: 0.16    Final report to follow.     CFA = Common Femoral Artery  PTA = Posterior Tibial Artery  DPA = Dorsalis Pedis Artery  JULIANA = Ankle Brachial Index  TBI = Toe Brachial Index  NC = Non-compressible

## 2019-01-22 ENCOUNTER — OFFICE VISIT (OUTPATIENT)
Dept: CARDIOLOGY CLINIC | Age: 79
End: 2019-01-22

## 2019-01-22 ENCOUNTER — CLINICAL SUPPORT (OUTPATIENT)
Dept: CARDIOLOGY CLINIC | Age: 79
End: 2019-01-22

## 2019-01-22 VITALS
RESPIRATION RATE: 16 BRPM | BODY MASS INDEX: 24.98 KG/M2 | SYSTOLIC BLOOD PRESSURE: 110 MMHG | WEIGHT: 123.9 LBS | HEIGHT: 59 IN | DIASTOLIC BLOOD PRESSURE: 70 MMHG | OXYGEN SATURATION: 96 % | HEART RATE: 83 BPM

## 2019-01-22 DIAGNOSIS — I63.019 CEREBROVASCULAR ACCIDENT (CVA) DUE TO THROMBOSIS OF VERTEBRAL ARTERY, UNSPECIFIED BLOOD VESSEL LATERALITY (HCC): Primary | ICD-10-CM

## 2019-01-22 DIAGNOSIS — I44.7 LBBB (LEFT BUNDLE BRANCH BLOCK): ICD-10-CM

## 2019-01-22 DIAGNOSIS — I48.91 ATRIAL FIBRILLATION, UNSPECIFIED TYPE (HCC): Primary | ICD-10-CM

## 2019-01-22 DIAGNOSIS — Z45.09 ENCOUNTER FOR LOOP RECORDER CHECK: ICD-10-CM

## 2019-01-22 DIAGNOSIS — E78.2 MIXED HYPERLIPIDEMIA: ICD-10-CM

## 2019-01-22 NOTE — PROGRESS NOTES
Subjective:  
  
Robe Jackson is a [de-identified] y.o. female is here for f/u of her afib. The patient denies chest pain/ shortness of breath, orthopnea, PND, LE edema, palpitations, syncope, presyncope or fatigue. Patient Active Problem List  
 Diagnosis Date Noted  A-fib ([address] 10/05/2018  Ventral hernia with obstruction 06/01/2018  Bilateral carotid artery stenosis 05/10/2018  Acute cerebral infarction ([address] 05/09/2018  Syncope 05/06/2018  CAD (coronary artery disease) 02/05/2018  Stroke ([address] 02/05/2018  Hypertensive urgency 08/01/2017  CHF (congestive heart failure) ([address] 08/01/2017  COPD exacerbation ([address] 03/07/2017  CAD (coronary artery disease), native coronary artery 07/15/2016  H/O: CVA (cerebrovascular accident) 07/15/2016  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 ([address] 07/15/2016  Severe sepsis ([address] 07/14/2016  Pneumonia 07/14/2016  Acute respiratory failure ([address] 07/14/2016  Elevated troponin 07/14/2016  Left bundle branch block (LBBB) on electrocardiogram 07/14/2016  Acute systolic heart failure ([address] 07/14/2016  Acute kidney failure ([address] 07/14/2016  Mixed hyperlipidemia 09/21/2012  Essential hypertension, benign 09/21/2012  Other left bundle branch block 09/21/2012  Coronary atherosclerosis of native coronary artery 09/21/2012  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  
Diagnosis Date  Acute kidney failure ([address] 7/14/2016  Acute systolic heart failure ([address] 7/14/2016  Arrhythmia  CAD (coronary artery disease)  Heart failure ([facility]  Hypertension  Stroke [facility])  Stroke, left hand weakness and speech, peripheral vision affected Past Surgical History:  
Procedure Laterality Date  HX UROLOGICAL  2018 Allergies Allergen Reactions  Decadron [Dexamethasone Sodium Phosphate] Hives  Pcn [Penicillins] Hives Family History Problem Relation Age of Onset  Coronary Artery Disease Other   
     grandmother age 76  Stroke Mother  Hypertension Mother   
 negative for cardiac disease Social History Socioeconomic History  Marital status:  Spouse name: Not on file  Number of children: Not on file  Years of education: Not on file  Highest education level: Not on file Tobacco Use  Smoking status: Former Smoker Last attempt to quit: 2009 Years since quittin.3  Smokeless tobacco: Never Used Substance and Sexual Activity  Alcohol use: No  
  Alcohol/week: 0.0 oz  
  Comment: Very little  Drug use: No  
Social History Narrative ** Merged History Encounter **  
    
 
Current Outpatient Medications Medication Sig  
 apixaban (ELIQUIS) 2.5 mg tablet Take 1 Tab by mouth two (2) times a day.  potassium chloride (K-DUR, KLOR-CON) 20 mEq tablet Take 20 mEq by mouth daily. MON, WED., SAT  famotidine (PEPCID) 20 mg tablet Take 20 mg by mouth as needed.  furosemide (LASIX) 20 mg tablet Take 20 mg by mouth Every Mon, Wed and Sat.  nitroglycerin (NITROSTAT) 0.4 mg SL tablet 0.4 mg by SubLINGual route every five (5) minutes as needed for Chest Pain. Up to 3 doses.  atorvastatin (LIPITOR) 80 mg tablet Take 1 Tab by mouth nightly. TAKE ONE TABLET BY MOUTH EVERY DAY (Patient taking differently: Take 40 mg by mouth nightly. TAKE ONE TABLET BY MOUTH EVERY DAY)  fexofenadine (ALLEGRA) 180 mg tablet Take 180 mg by mouth daily as needed for Allergies.  OXYGEN-AIR DELIVERY SYSTEMS 2 L by IntraNASal route daily as needed (shortness of breath).  aspirin delayed-release 81 mg tablet Take 1 Tab by mouth daily.  albuterol (PROVENTIL HFA, VENTOLIN HFA, PROAIR HFA) 90 mcg/actuation inhaler Take 2 Puffs by inhalation every four (4) hours as needed for Wheezing. No current facility-administered medications for this visit. Vitals:  
 01/22/19 1450 BP: 110/70 Pulse: 83 Resp: 16 SpO2: 96% Weight: 123 lb 14.4 oz (56.2 kg) Height: 4' 11\" (1.499 m) I have reviewed the nurses notes, vitals, problem list, allergy list, medical history, family, social history and medications. Review of Symptoms: 
 
General: Pt denies excessive weight gain or loss. Pt is able to conduct ADL's HEENT: Denies blurred vision, headaches, epistaxis and difficulty swallowing. Respiratory: Denies shortness of breath, CHRISTINE, wheezing or stridor. Cardiovascular: Denies precordial pain, palpitations, edema or PND Gastrointestinal: Denies poor appetite, indigestion, abdominal pain or blood in stool Urinary: Denies dysuria, pyuria Musculoskeletal: Denies pain or swelling from muscles or joints Neurologic: Denies tremor, paresthesias, or sensory motor disturbance Skin: Denies rash, itching or texture change. Psych: Denies depression Physical Exam:   
 
General: Well developed, in no acute distress. HEENT: Eyes - PERRL, no jvd Heart:  Normal S1/S2 negative S3 or S4. Regular, no murmur, gallop or rub.  
Respiratory: Clear bilaterally x 4, no wheezing or rales Abdomen:   Soft, non-tender, bowel sounds are active.  
Extremities:  No edema, normal cap refill, no cyanosis. Musculoskeletal: No clubbing Neuro: A&Ox3, speech clear, gait stable. Skin: Skin color is normal. No rashes or lesions. Non diaphoretic Vascular: 2+ pulses symmetric in all extremities Cardiographics Ekg: nsr, lbbb Results for orders placed or performed during the hospital encounter of 10/05/18 EKG, 12 LEAD, INITIAL Result Value Ref Range Ventricular Rate 79 BPM  
 Atrial Rate 79 BPM  
 P-R Interval 140 ms QRS Duration 132 ms Q-T Interval 460 ms QTC Calculation (Bezet) 527 ms Calculated P Axis 64 degrees Calculated R Axis -24 degrees Calculated T Axis 111 degrees Diagnosis Normal sinus rhythm Left bundle branch block When compared with ECG of 01-JUN-2018 00:18, 
T wave inversion now evident in Lateral leads Confirmed by Isidra Badillo MD, [facility] ([id number]) on 10/6/2018 2:55:41 PM 
  
 
 
 
Lab Results Component Value Date/Time WBC 8.4 10/06/2018 03:34 AM  
 HGB 8.4 (L) 10/06/2018 03:34 AM  
 HCT 25.5 (L) 10/06/2018 03:34 AM  
 PLATELET 304 35/35/4493 03:34 AM  
 MCV 79.2 (L) 10/06/2018 03:34 AM  
  
Lab Results Component Value Date/Time Sodium 141 10/06/2018 03:34 AM  
 Potassium 4.3 10/06/2018 03:34 AM  
 Chloride 106 10/06/2018 03:34 AM  
 CO2 27 10/06/2018 03:34 AM  
 Anion gap 8 10/06/2018 03:34 AM  
 Glucose 123 (H) 10/06/2018 03:34 AM  
 BUN 30 (H) 10/06/2018 03:34 AM  
 Creatinine 2.06 (H) 10/06/2018 03:34 AM  
 BUN/Creatinine ratio 15 10/06/2018 03:34 AM  
 GFR est AA 28 (L) 10/06/2018 03:34 AM  
 GFR est non-AA 23 (L) 10/06/2018 03:34 AM  
 Calcium 7.9 (L) 10/06/2018 03:34 AM  
 Bilirubin, total 0.4 09/26/2018 10:26 AM  
 AST (SGOT) 10 09/26/2018 10:26 AM  
 Alk. phosphatase 130 (H) 09/26/2018 10:26 AM  
 Protein, total 6.6 09/26/2018 10:26 AM  
 Albumin 4.3 09/26/2018 10:26 AM  
 Globulin 3.8 06/01/2018 12:15 AM  
 A-G Ratio 1.9 09/26/2018 10:26 AM  
 ALT (SGPT) 6 09/26/2018 10:26 AM  
  
 
 Assessment: 
 
 Assessment: ICD-10-CM ICD-9-CM 1. Atrial fibrillation, unspecified type ([facility] I48.91 427.31 AMB POC EKG ROUTINE W/ 12 LEADS, INTER & REP 2. Mixed hyperlipidemia E78.2 272.2 3. LBBB (left bundle branch block) I44.7 426.3 Orders Placed This Encounter  AMB POC EKG ROUTINE W/ 12 LEADS, INTER & REP Order Specific Question:   Reason for Exam: Answer:   ROUTINE Plan: Ms Yovany Owen is in sinus and asymptomatic. She is on oac. Cont med rx for her copd and hyperlipidemia. F/u in one year. Continue medical management for copd, hyperlipidemia and AF. Thank you for allowing me to participate in Darcy Montez 's care.  
 
Tova Horan MD, Tuan Edwards

## 2019-01-22 NOTE — PROGRESS NOTES
1. Have you been to the ER, urgent care clinic since your last visit? Hospitalized since your last visit? NO 
 
2. Have you seen or consulted any other health care providers outside of the [address] [place] [address] since your last visit? Include any pap smears or colon screening. YES, PCP. NO CARDIAC C/O.

## 2019-02-08 ENCOUNTER — APPOINTMENT (OUTPATIENT)
Dept: GENERAL RADIOLOGY | Age: 79
End: 2019-02-08
Attending: EMERGENCY MEDICINE
Payer: MEDICARE

## 2019-02-08 ENCOUNTER — HOSPITAL ENCOUNTER (EMERGENCY)
Age: 79
Discharge: HOME | End: 2019-02-08
Attending: EMERGENCY MEDICINE
Payer: MEDICARE

## 2019-02-08 VITALS
HEART RATE: 89 BPM | HEIGHT: 59 IN | BODY MASS INDEX: 25.6 KG/M2 | RESPIRATION RATE: 20 BRPM | OXYGEN SATURATION: 99 % | DIASTOLIC BLOOD PRESSURE: 97 MMHG | WEIGHT: 127 LBS | TEMPERATURE: 99.2 F | SYSTOLIC BLOOD PRESSURE: 149 MMHG

## 2019-02-08 DIAGNOSIS — M10.9 ACUTE GOUT OF RIGHT FOOT, UNSPECIFIED CAUSE: Primary | ICD-10-CM

## 2019-02-08 LAB
ALBUMIN SERPL-MCNC: 3.9 G/DL (ref 3.5–5)
ALBUMIN/GLOB SERPL: 0.9 {RATIO} (ref 1.1–2.2)
ALP SERPL-CCNC: 139 U/L (ref 45–117)
ALT SERPL-CCNC: 14 U/L (ref 12–78)
ANION GAP SERPL CALC-SCNC: 6 MMOL/L (ref 5–15)
AST SERPL-CCNC: 14 U/L (ref 15–37)
BASOPHILS # BLD: 0 K/UL (ref 0–0.1)
BASOPHILS NFR BLD: 0 % (ref 0–1)
BILIRUB SERPL-MCNC: 0.4 MG/DL (ref 0.2–1)
BUN SERPL-MCNC: 28 MG/DL (ref 6–20)
BUN/CREAT SERPL: 19 (ref 12–20)
CALCIUM SERPL-MCNC: 9.4 MG/DL (ref 8.5–10.1)
CHLORIDE SERPL-SCNC: 107 MMOL/L (ref 97–108)
CO2 SERPL-SCNC: 28 MMOL/L (ref 21–32)
CREAT SERPL-MCNC: 1.49 MG/DL (ref 0.55–1.02)
DIFFERENTIAL METHOD BLD: ABNORMAL
EOSINOPHIL # BLD: 0 K/UL (ref 0–0.4)
EOSINOPHIL NFR BLD: 0 % (ref 0–7)
ERYTHROCYTE [DISTWIDTH] IN BLOOD BY AUTOMATED COUNT: 14.6 % (ref 11.5–14.5)
GLOBULIN SER CALC-MCNC: 4.3 G/DL (ref 2–4)
GLUCOSE SERPL-MCNC: 102 MG/DL (ref 65–100)
HCT VFR BLD AUTO: 35.3 % (ref 35–47)
HGB BLD-MCNC: 11.4 G/DL (ref 11.5–16)
IMM GRANULOCYTES # BLD AUTO: 0 K/UL (ref 0–0.04)
IMM GRANULOCYTES NFR BLD AUTO: 0 % (ref 0–0.5)
LYMPHOCYTES # BLD: 1.5 K/UL (ref 0.8–3.5)
LYMPHOCYTES NFR BLD: 18 % (ref 12–49)
MCH RBC QN AUTO: 25.4 PG (ref 26–34)
MCHC RBC AUTO-ENTMCNC: 32.3 G/DL (ref 30–36.5)
MCV RBC AUTO: 78.8 FL (ref 80–99)
MONOCYTES # BLD: 0.7 K/UL (ref 0–1)
MONOCYTES NFR BLD: 8 % (ref 5–13)
NEUTS SEG # BLD: 6.1 K/UL (ref 1.8–8)
NEUTS SEG NFR BLD: 73 % (ref 32–75)
NRBC # BLD: 0 K/UL (ref 0–0.01)
NRBC BLD-RTO: 0 PER 100 WBC
PLATELET # BLD AUTO: 155 K/UL (ref 150–400)
PMV BLD AUTO: 11.5 FL (ref 8.9–12.9)
POTASSIUM SERPL-SCNC: 4.4 MMOL/L (ref 3.5–5.1)
PROT SERPL-MCNC: 8.2 G/DL (ref 6.4–8.2)
RBC # BLD AUTO: 4.48 M/UL (ref 3.8–5.2)
SODIUM SERPL-SCNC: 141 MMOL/L (ref 136–145)
URATE SERPL-MCNC: 7.5 MG/DL (ref 2.6–6)
WBC # BLD AUTO: 8.4 K/UL (ref 3.6–11)

## 2019-02-08 PROCEDURE — 80053 COMPREHEN METABOLIC PANEL: CPT

## 2019-02-08 PROCEDURE — 85025 COMPLETE CBC W/AUTO DIFF WBC: CPT

## 2019-02-08 PROCEDURE — 74011250637 HC RX REV CODE- 250/637: Performed by: EMERGENCY MEDICINE

## 2019-02-08 PROCEDURE — 84550 ASSAY OF BLOOD/URIC ACID: CPT

## 2019-02-08 PROCEDURE — 74011000250 HC RX REV CODE- 250: Performed by: EMERGENCY MEDICINE

## 2019-02-08 PROCEDURE — 73630 X-RAY EXAM OF FOOT: CPT

## 2019-02-08 PROCEDURE — 99283 EMERGENCY DEPT VISIT LOW MDM: CPT

## 2019-02-08 PROCEDURE — 36415 COLL VENOUS BLD VENIPUNCTURE: CPT

## 2019-02-08 RX ORDER — LIDOCAINE 4 G/100G
1 PATCH TOPICAL
Status: DISCONTINUED | OUTPATIENT
Start: 2019-02-08 | End: 2019-02-09 | Stop reason: HOSPADM

## 2019-02-08 RX ORDER — DICLOFENAC SODIUM 30 MG/G
GEL TOPICAL 2 TIMES DAILY
Qty: 100 G | Refills: 0 | Status: SHIPPED | OUTPATIENT
Start: 2019-02-08 | End: 2019-08-13

## 2019-02-08 RX ORDER — ACETAMINOPHEN 500 MG
1000 TABLET ORAL ONCE
Status: COMPLETED | OUTPATIENT
Start: 2019-02-08 | End: 2019-02-08

## 2019-02-08 RX ORDER — OXYCODONE AND ACETAMINOPHEN 5; 325 MG/1; MG/1
1 TABLET ORAL
Status: COMPLETED | OUTPATIENT
Start: 2019-02-08 | End: 2019-02-08

## 2019-02-08 RX ADMIN — OXYCODONE AND ACETAMINOPHEN 1 TABLET: 5; 325 TABLET ORAL at 21:28

## 2019-02-08 RX ADMIN — ACETAMINOPHEN 1000 MG: 500 TABLET ORAL at 19:30

## 2019-02-09 NOTE — DISCHARGE INSTRUCTIONS
Patient Education        Gout: Care Instructions  Your Care Instructions    Gout is a form of arthritis caused by a buildup of uric acid crystals in a joint. It causes sudden attacks of pain, swelling, redness, and stiffness, usually in one joint, especially the big toe. Gout usually comes on without a cause. But it can be brought on by drinking alcohol (especially beer) or eating seafood and red meat. Taking certain medicines, such as diuretics or aspirin, also can bring on an attack of gout. Taking your medicines as prescribed and following up with your doctor regularly can help you avoid gout attacks in the future. Follow-up care is a key part of your treatment and safety. Be sure to make and go to all appointments, and call your doctor if you are having problems. It's also a good idea to know your test results and keep a list of the medicines you take. How can you care for yourself at home? · If the joint is swollen, put ice or a cold pack on the area for 10 to 20 minutes at a time. Put a thin cloth between the ice and your skin. · Prop up the sore limb on a pillow when you ice it or anytime you sit or lie down during the next 3 days. Try to keep it above the level of your heart. This will help reduce swelling. · Rest sore joints. Avoid activities that put weight or strain on the joints for a few days. Take short rest breaks from your regular activities during the day. · Take your medicines exactly as prescribed. Call your doctor if you think you are having a problem with your medicine. · Take pain medicines exactly as directed. ? If the doctor gave you a prescription medicine for pain, take it as prescribed. ? If you are not taking a prescription pain medicine, ask your doctor if you can take an over-the-counter medicine. · Eat less seafood and red meat. · Check with your doctor before drinking alcohol. · Losing weight, if you are overweight, may help reduce attacks of gout.  But do not go on a \"crash diet. \" Losing a lot of weight in a short amount of time can cause a gout attack. When should you call for help? Call your doctor now or seek immediate medical care if:    · You have a fever.     · The joint is so painful you cannot use it.     · You have sudden, unexplained swelling, redness, warmth, or severe pain in one or more joints.    Watch closely for changes in your health, and be sure to contact your doctor if:    · You have joint pain.     · Your symptoms get worse or are not improving after 2 or 3 days. Where can you learn more? Go to http://kerri-saba.info/. Enter V911 in the search box to learn more about \"Gout: Care Instructions. \"  Current as of: Tania 10, 2018  Content Version: 11.9  © 5102-2308 [employer]. Care instructions adapted under license by [employer] (which disclaims liability or warranty for this information). If you have questions about a medical condition or this instruction, always ask your healthcare professional. [place] [address] any warranty or liability for your use of this information. Foot Pain: Care Instructions  Your Care Instructions  Foot injuries that cause pain and swelling are fairly common. Almost all sports or home repair projects can cause a misstep that ends up as foot pain. Normal wear and tear, especially as you get older, also can cause foot pain. Most minor foot injuries will heal on their own, and home treatment is usually all you need to do. If you have a severe injury, you may need tests and treatment. Follow-up care is a key part of your treatment and safety. Be sure to make and go to all appointments, and call your doctor if you are having problems. It's also a good idea to know your test results and keep a list of the medicines you take. How can you care for yourself at home? · Take pain medicines exactly as directed.   ? If the doctor gave you a prescription medicine for pain, take it as prescribed. ? If you are not taking a prescription pain medicine, ask your doctor if you can take an over-the-counter medicine. · Rest and protect your foot. Take a break from any activity that may cause pain. · Put ice or a cold pack on your foot for 10 to 20 minutes at a time. Put a thin cloth between the ice and your skin. · Prop up the sore foot on a pillow when you ice it or anytime you sit or lie down during the next 3 days. Try to keep it above the level of your heart. This will help reduce swelling. · Your doctor may recommend that you wrap your foot with an elastic bandage. Keep your foot wrapped for as long as your doctor advises. · If your doctor recommends crutches, use them as directed. · Wear roomy footwear. · As soon as pain and swelling end, begin gentle exercises of your foot. Your doctor can tell you which exercises will help. When should you call for help? Call 911 anytime you think you may need emergency care. For example, call if:    · Your foot turns pale, white, blue, or cold.    Call your doctor now or seek immediate medical care if:    · You cannot move or stand on your foot.     · Your foot looks twisted or out of its normal position.     · Your foot is not stable when you step down.     · You have signs of infection, such as:  ? Increased pain, swelling, warmth, or redness. ? Red streaks leading from the sore area. ? Pus draining from a place on your foot. ? A fever.     · Your foot is numb or tingly.    Watch closely for changes in your health, and be sure to contact your doctor if:    · You do not get better as expected.     · You have bruises from an injury that last longer than 2 weeks. Where can you learn more? Go to http://kerri-saba.info/. Enter C518 in the search box to learn more about \"Foot Pain: Care Instructions. \"  Current as of: September 20, 2018  Content Version: 11.9  © 6523-6991 [employer].  Care instructions adapted under license by [employer] (which disclaims liability or warranty for this information).  If you have questions about a medical condition or this instruction, always ask your healthcare professional. [address] any warranty or liability for your use of this information.

## 2019-02-09 NOTE — PROGRESS NOTES
8:17 PM 
Patient accompanied from waiting room to ED 41 by SUSANNAH Taylor. Patient presents with complaints of right foot swelling and pain. Per family member, patient has had pain and the right foot was tender to the touch. Starting today patient with right foot swelling. Patient was seen by PCP outpatient and had an ultrasound of the foot that did not show anything. 9:10 PM 
Patient vitals obtained. Patient updated on plan of care. Physician to see patient shortly. 9:21 PM 
Dr. Trinity Arreaga at bedside to evaluate patient. 9:29 PM 
Patient administered pain patch and percocet. Patient's son stepped off the floor to go to the cafeteria, will return shortly. Dr. Trinity Arreaga to be notified when son returns to update patient on plan of care.

## 2019-02-09 NOTE — ED PROVIDER NOTES
EMERGENCY DEPARTMENT HISTORY AND PHYSICAL EXAM  
     
 
Date: 2/8/2019 Patient Name: Christiane Chandra History of Presenting Illness Chief Complaint Patient presents with  Foot Pain  
  right foot pain for a month; worse in last 24 hours and now swelling; hurts to try and walk on it or stand on  it History Provided By: Patient HPI: Christiane Chandra is a [de-identified] y.o. female, pmhx heart failure, CAD, HTN and stroke, who presents ambulatory to the ED c/o gradual onset, constant, worsening right foot pain x 1 month with no associated symptoms. Pain is described as a mild-moderate intensity aching pain that is worsened with palpation of the area and turning of the foot. Pt states her pain started 1 month ago and worsening in the past 24 hours prompted the trip to the ED for evaluation. Pt has help at home from her son. She is unsure of her last steroid course. Pt has no history of gout. Pt specifically denies abd pain, urinary symptoms, a fever or chills. PCP: Deisi Piña NP Allergies: Decadron, Pcn PMHx: Significant for heart failure, CAD, HTN and stroke PSHx: Significant for unspecified urological surgery Social Hx: former tobacco , occasional EtOH, - Illicit Drugs There are no other complaints, changes, or physical findings at this time. Current Facility-Administered Medications Medication Dose Route Frequency Provider Last Rate Last Dose  lidocaine 4 % patch 1 Patch  1 Patch TransDERmal NOW Sal Palacios MD   1 Patch at 02/08/19 8367 Current Outpatient Medications Medication Sig Dispense Refill  diclofenac (SOLARAZE) 3 % topical gel Apply  to affected area two (2) times a day. 100 g 0  
 apixaban (ELIQUIS) 2.5 mg tablet Take 1 Tab by mouth two (2) times a day. 60 Tab 12  
 potassium chloride (K-DUR, KLOR-CON) 20 mEq tablet Take 20 mEq by mouth daily. MON, WED., SAT  famotidine (PEPCID) 20 mg tablet Take 20 mg by mouth as needed.  furosemide (LASIX) 20 mg tablet Take 20 mg by mouth Every Mon, Wed and Sat.  nitroglycerin (NITROSTAT) 0.4 mg SL tablet 0.4 mg by SubLINGual route every five (5) minutes as needed for Chest Pain. Up to 3 doses.  atorvastatin (LIPITOR) 80 mg tablet Take 1 Tab by mouth nightly. TAKE ONE TABLET BY MOUTH EVERY DAY (Patient taking differently: Take 40 mg by mouth nightly. TAKE ONE TABLET BY MOUTH EVERY DAY) 30 Tab 0  
 fexofenadine (ALLEGRA) 180 mg tablet Take 180 mg by mouth daily as needed for Allergies.  OXYGEN-AIR DELIVERY SYSTEMS 2 L by IntraNASal route daily as needed (shortness of breath).  aspirin delayed-release 81 mg tablet Take 1 Tab by mouth daily. 100 Tab 6  
 albuterol (PROVENTIL HFA, VENTOLIN HFA, PROAIR HFA) 90 mcg/actuation inhaler Take 2 Puffs by inhalation every four (4) hours as needed for Wheezing. 1 Inhaler 1 Past History Past Medical History: 
Past Medical History:  
Diagnosis Date  Acute kidney failure ([facility] [address] 2016  Acute systolic heart failure ([address] 2016  Arrhythmia  CAD (coronary artery disease)  Heart failure ([facility] [address]  Hypertension  Stroke [facility])  Stroke, left hand weakness and speech, peripheral vision affected Past Surgical History: 
Past Surgical History:  
Procedure Laterality Date  HX UROLOGICAL  2018 Family History: 
Family History Problem Relation Age of Onset  Coronary Artery Disease Other   
     grandmother age 76  Stroke Mother  Hypertension Mother Social History: 
Social History Tobacco Use  Smoking status: Former Smoker Last attempt to quit: 2009 Years since quittin.3  Smokeless tobacco: Never Used Substance Use Topics  Alcohol use: No  
  Alcohol/week: 0.0 oz  
  Comment: Very little  Drug use: No  
 
 
Allergies: Allergies Allergen Reactions  Decadron [Dexamethasone Sodium Phosphate] Hives  Pcn [Penicillins] Hives Review of Systems Review of Systems Constitutional: Negative. Negative for fever. Eyes: Negative. Respiratory: Negative. Negative for shortness of breath. Cardiovascular: Negative for chest pain. Gastrointestinal: Negative for abdominal pain, nausea and vomiting. Endocrine: Negative. Genitourinary: Negative. Negative for difficulty urinating, dysuria and hematuria. Musculoskeletal: Positive for myalgias (right foot). Skin: Negative. Psychiatric/Behavioral: Negative for suicidal ideas. All other systems reviewed and are negative. Physical Exam  
Physical Exam  
Constitutional: She is oriented to person, place, and time. She appears well-developed and well-nourished. No distress. HENT:  
Head: Normocephalic and atraumatic. Nose: Nose normal.  
Eyes: Conjunctivae and EOM are normal. Pupils are equal, round, and reactive to light. No scleral icterus. Neck: Normal range of motion. No tracheal deviation present. Cardiovascular: Normal rate, regular rhythm, normal heart sounds and intact distal pulses. Exam reveals no friction rub. No murmur heard. Pulmonary/Chest: Effort normal and breath sounds normal. No stridor. No respiratory distress. She has no wheezes. She has no rales. Abdominal: Soft. Bowel sounds are normal. She exhibits no distension. There is no tenderness. There is no rebound. Musculoskeletal: Normal range of motion. Right ankle: She exhibits swelling (mild). Tenderness. Feet: 
 
Neurological: She is alert and oriented to person, place, and time. No cranial nerve deficit. Pt with previous CVA, chronically slurred speech and WC bound Skin: Skin is warm and dry. No rash noted. She is not diaphoretic. Psychiatric: She has a normal mood and affect. Her speech is normal and behavior is normal. Judgment and thought content normal. Cognition and memory are normal.  
Nursing note and vitals reviewed. Diagnostic Study Results Labs - 
  
 Recent Results (from the past 12 hour(s)) CBC WITH AUTOMATED DIFF Collection Time: 02/08/19  6:46 PM  
Result Value Ref Range WBC 8.4 3.6 - 11.0 K/uL  
 RBC 4.48 3.80 - 5.20 M/uL  
 HGB 11.4 (L) 11.5 - 16.0 g/dL HCT 35.3 35.0 - 47.0 % MCV 78.8 (L) 80.0 - 99.0 FL  
 MCH 25.4 (L) 26.0 - 34.0 PG  
 MCHC 32.3 30.0 - 36.5 g/dL  
 RDW 14.6 (H) 11.5 - 14.5 % PLATELET 265 310 - 951 K/uL MPV 11.5 8.9 - 12.9 FL  
 NRBC 0.0 0  WBC ABSOLUTE NRBC 0.00 0.00 - 0.01 K/uL NEUTROPHILS 73 32 - 75 % LYMPHOCYTES 18 12 - 49 % MONOCYTES 8 5 - 13 % EOSINOPHILS 0 0 - 7 % BASOPHILS 0 0 - 1 % IMMATURE GRANULOCYTES 0 0.0 - 0.5 % ABS. NEUTROPHILS 6.1 1.8 - 8.0 K/UL  
 ABS. LYMPHOCYTES 1.5 0.8 - 3.5 K/UL  
 ABS. MONOCYTES 0.7 0.0 - 1.0 K/UL  
 ABS. EOSINOPHILS 0.0 0.0 - 0.4 K/UL  
 ABS. BASOPHILS 0.0 0.0 - 0.1 K/UL  
 ABS. IMM. GRANS. 0.0 0.00 - 0.04 K/UL  
 DF AUTOMATED METABOLIC PANEL, COMPREHENSIVE Collection Time: 02/08/19  6:46 PM  
Result Value Ref Range Sodium 141 136 - 145 mmol/L Potassium 4.4 3.5 - 5.1 mmol/L Chloride 107 97 - 108 mmol/L  
 CO2 28 21 - 32 mmol/L Anion gap 6 5 - 15 mmol/L Glucose 102 (H) 65 - 100 mg/dL BUN 28 (H) 6 - 20 MG/DL Creatinine 1.49 (H) 0.55 - 1.02 MG/DL  
 BUN/Creatinine ratio 19 12 - 20 GFR est AA 41 (L) >60 ml/min/1.73m2 GFR est non-AA 34 (L) >60 ml/min/1.73m2 Calcium 9.4 8.5 - 10.1 MG/DL Bilirubin, total 0.4 0.2 - 1.0 MG/DL  
 ALT (SGPT) 14 12 - 78 U/L  
 AST (SGOT) 14 (L) 15 - 37 U/L Alk. phosphatase 139 (H) 45 - 117 U/L Protein, total 8.2 6.4 - 8.2 g/dL Albumin 3.9 3.5 - 5.0 g/dL Globulin 4.3 (H) 2.0 - 4.0 g/dL A-G Ratio 0.9 (L) 1.1 - 2.2 URIC ACID Collection Time: 02/08/19  6:46 PM  
Result Value Ref Range Uric acid 7.5 (H) 2.6 - 6.0 MG/DL Radiologic Studies -  
XR FOOT RT MIN 3 V Final Result IMPRESSION:   
  
Osteopenia. Otherwise, within normal limits for age. Medical Decision Making I am the first provider for this patient. I reviewed the vital signs, available nursing notes, past medical history, past surgical history, family history and social history. Vital Signs-Reviewed the patient's vital signs. Patient Vitals for the past 12 hrs: 
 Temp Pulse Resp BP SpO2  
02/08/19 2107 99.2 °F (37.3 °C) 89 20 (!) 149/97 99 % 02/08/19 1710 98.5 °F (36.9 °C) 98 22 (!) 213/108 98 % Pulse Oximetry Analysis - 98% on RA Cardiac Monitor:  
Rate: 98 bpm 
Rhythm: Normal Sinus Rhythm Records Reviewed: Nursing Notes and Old Medical Records Provider Notes (Medical Decision Making): DDX: 
Fracture, gout, contusion Plan: 
Labs, xray, analgesci Impression: 
gout ED Course:  
Initial assessment performed. The patients presenting problems have been discussed, and they are in agreement with the care plan formulated and outlined with them. I have encouraged them to ask questions as they arise throughout their visit. I reviewed our electronic medical record system for any past medical records that were available that may contribute to the patients current condition, the nursing notes and and vital signs from today's visit Nursing notes will be reviewed as they become available in realtime while the pt has been in the ED. Janella Severance, MD 
 
I personally reviewed pt's imaging. Official read by radiology noted above. Janella Severance, MD 
 
PROGRESS NOTE: 
9:55 PM 
Pt's son is at bedside and informed of the diagnosis and follow up instructions. Low Alexander. Nela Lieberman ED Scribe, as dictated by Janella Severance, MD 
 
 
 
9:59 PM 
Progress note: 
Pt noted to be feeling better, ready for discharge. Discussed lab and foot XR findings with pt and her son, specifically noting the diagnosis with gout. Pt will follow up as instructed. All questions have been answered, pt voiced understanding and agreement with plan. If narcotics were prescribed, pt's  record was reviewed and pt was advised not to drive or operate heavy machinery. If abx were prescribed, pt advised that diarrhea and rash are possible side effects of the medications. Specific return precautions provided in addition to instructions for pt to return to the ED immediately should sx worsen at any time. Kiran Estrada MD 
 
 
Critical Care Time:  
 
0 Diagnosis Clinical Impression: 1. Acute gout of right foot, unspecified cause PLAN: 
1. Discharge Current Discharge Medication List  
  
START taking these medications Details  
diclofenac (SOLARAZE) 3 % topical gel Apply  to affected area two (2) times a day. Qty: 100 g, Refills: 0  
  
  
 
2. Follow-up Information Follow up With Specialties Details Why Contact Info Claude Templeton NP Family Practice Schedule an appointment as soon as possible for a visit in 2 days  [address] [place] [address] [phone] Return to ED if worse Disposition: 
 
10:00 PM  
The patient's results have been reviewed with family and/or caregiver. They verbally convey their understanding and agreement of the patient's signs, symptoms, diagnosis, treatment and prognosis and additionally agree to follow up as recommended in the discharge instructions or to return to the Emergency Room should the patient's condition change prior to their follow-up appointment. The family and/or caregiver verbally agrees with the care-plan and all of their questions have been answered. The discharge instructions have also been provided to the them with educational information regarding the patient's diagnosis as well a list of reasons why the patient would want to return to the ER prior to their follow-up appointment should their condition change. Kiran Estrada MD 
 
 
 
 
 
Attestations: This note is prepared by Kamila Chinchilla, acting as Scribe for Kiran Estrada MD 
 
 Melissa Whitten MD : The scribe's documentation has been prepared under my direction and personally reviewed by me in its entirety. I confirm that the note above accurately reflects all work, treatment, procedures, and medical decision making performed by me. This note will not be viewable in [address]

## 2019-04-25 ENCOUNTER — CLINICAL SUPPORT (OUTPATIENT)
Dept: CARDIOLOGY CLINIC | Age: 79
End: 2019-04-25

## 2019-04-25 DIAGNOSIS — I48.91 ATRIAL FIBRILLATION, UNSPECIFIED TYPE (HCC): Primary | ICD-10-CM

## 2019-04-25 DIAGNOSIS — Z45.09 ENCOUNTER FOR LOOP RECORDER CHECK: ICD-10-CM

## 2019-05-01 ENCOUNTER — HOME HEALTH ADMISSION (OUTPATIENT)
Dept: HOME HEALTH SERVICES | Facility: HOME HEALTH | Age: 79
End: 2019-05-01
Payer: MEDICARE

## 2019-05-02 ENCOUNTER — HOME CARE VISIT (OUTPATIENT)
Dept: SCHEDULING | Facility: HOME HEALTH | Age: 79
End: 2019-05-02
Payer: MEDICARE

## 2019-05-02 PROCEDURE — 3331090001 HH PPS REVENUE CREDIT

## 2019-05-02 PROCEDURE — G0151 HHCP-SERV OF PT,EA 15 MIN: HCPCS

## 2019-05-02 PROCEDURE — 400013 HH SOC

## 2019-05-02 PROCEDURE — 3331090002 HH PPS REVENUE DEBIT

## 2019-05-03 ENCOUNTER — HOME CARE VISIT (OUTPATIENT)
Dept: SCHEDULING | Facility: HOME HEALTH | Age: 79
End: 2019-05-03
Payer: MEDICARE

## 2019-05-03 VITALS
TEMPERATURE: 97.9 F | HEART RATE: 79 BPM | RESPIRATION RATE: 19 BRPM | OXYGEN SATURATION: 98 % | DIASTOLIC BLOOD PRESSURE: 78 MMHG | SYSTOLIC BLOOD PRESSURE: 132 MMHG

## 2019-05-03 VITALS
SYSTOLIC BLOOD PRESSURE: 136 MMHG | HEART RATE: 104 BPM | DIASTOLIC BLOOD PRESSURE: 78 MMHG | OXYGEN SATURATION: 98 % | TEMPERATURE: 97.8 F | RESPIRATION RATE: 18 BRPM

## 2019-05-03 PROCEDURE — 3331090002 HH PPS REVENUE DEBIT

## 2019-05-03 PROCEDURE — G0157 HHC PT ASSISTANT EA 15: HCPCS

## 2019-05-03 PROCEDURE — 3331090001 HH PPS REVENUE CREDIT

## 2019-05-04 PROCEDURE — 3331090001 HH PPS REVENUE CREDIT

## 2019-05-04 PROCEDURE — 3331090002 HH PPS REVENUE DEBIT

## 2019-05-05 PROCEDURE — 3331090001 HH PPS REVENUE CREDIT

## 2019-05-05 PROCEDURE — 3331090002 HH PPS REVENUE DEBIT

## 2019-05-06 PROCEDURE — 3331090002 HH PPS REVENUE DEBIT

## 2019-05-06 PROCEDURE — 3331090001 HH PPS REVENUE CREDIT

## 2019-05-07 ENCOUNTER — HOME CARE VISIT (OUTPATIENT)
Dept: SCHEDULING | Facility: HOME HEALTH | Age: 79
End: 2019-05-07
Payer: MEDICARE

## 2019-05-07 PROCEDURE — G0151 HHCP-SERV OF PT,EA 15 MIN: HCPCS

## 2019-05-07 PROCEDURE — 3331090001 HH PPS REVENUE CREDIT

## 2019-05-07 PROCEDURE — 3331090002 HH PPS REVENUE DEBIT

## 2019-05-08 VITALS
TEMPERATURE: 97.8 F | DIASTOLIC BLOOD PRESSURE: 78 MMHG | HEART RATE: 82 BPM | RESPIRATION RATE: 18 BRPM | OXYGEN SATURATION: 96 % | SYSTOLIC BLOOD PRESSURE: 126 MMHG

## 2019-05-08 PROCEDURE — 3331090001 HH PPS REVENUE CREDIT

## 2019-05-08 PROCEDURE — 3331090002 HH PPS REVENUE DEBIT

## 2019-05-09 ENCOUNTER — HOME CARE VISIT (OUTPATIENT)
Dept: SCHEDULING | Facility: HOME HEALTH | Age: 79
End: 2019-05-09
Payer: MEDICARE

## 2019-05-09 PROCEDURE — 3331090002 HH PPS REVENUE DEBIT

## 2019-05-09 PROCEDURE — G0151 HHCP-SERV OF PT,EA 15 MIN: HCPCS

## 2019-05-09 PROCEDURE — 3331090001 HH PPS REVENUE CREDIT

## 2019-05-10 VITALS
RESPIRATION RATE: 18 BRPM | SYSTOLIC BLOOD PRESSURE: 132 MMHG | OXYGEN SATURATION: 97 % | TEMPERATURE: 97.6 F | DIASTOLIC BLOOD PRESSURE: 78 MMHG | HEART RATE: 82 BPM

## 2019-05-10 PROCEDURE — 3331090002 HH PPS REVENUE DEBIT

## 2019-05-10 PROCEDURE — 3331090001 HH PPS REVENUE CREDIT

## 2019-05-11 PROCEDURE — 3331090001 HH PPS REVENUE CREDIT

## 2019-05-11 PROCEDURE — 3331090002 HH PPS REVENUE DEBIT

## 2019-05-12 PROCEDURE — 3331090002 HH PPS REVENUE DEBIT

## 2019-05-12 PROCEDURE — 3331090001 HH PPS REVENUE CREDIT

## 2019-05-13 PROCEDURE — 3331090001 HH PPS REVENUE CREDIT

## 2019-05-13 PROCEDURE — 3331090002 HH PPS REVENUE DEBIT

## 2019-05-14 ENCOUNTER — HOME CARE VISIT (OUTPATIENT)
Dept: SCHEDULING | Facility: HOME HEALTH | Age: 79
End: 2019-05-14
Payer: MEDICARE

## 2019-05-14 VITALS
OXYGEN SATURATION: 94 % | DIASTOLIC BLOOD PRESSURE: 78 MMHG | HEART RATE: 88 BPM | SYSTOLIC BLOOD PRESSURE: 130 MMHG | RESPIRATION RATE: 19 BRPM | TEMPERATURE: 97.8 F

## 2019-05-14 PROCEDURE — G0157 HHC PT ASSISTANT EA 15: HCPCS

## 2019-05-14 PROCEDURE — 3331090001 HH PPS REVENUE CREDIT

## 2019-05-14 PROCEDURE — 3331090002 HH PPS REVENUE DEBIT

## 2019-05-15 PROCEDURE — 3331090001 HH PPS REVENUE CREDIT

## 2019-05-15 PROCEDURE — 3331090002 HH PPS REVENUE DEBIT

## 2019-05-16 ENCOUNTER — HOME CARE VISIT (OUTPATIENT)
Dept: SCHEDULING | Facility: HOME HEALTH | Age: 79
End: 2019-05-16
Payer: MEDICARE

## 2019-05-16 VITALS
OXYGEN SATURATION: 96 % | DIASTOLIC BLOOD PRESSURE: 80 MMHG | TEMPERATURE: 97.6 F | SYSTOLIC BLOOD PRESSURE: 120 MMHG | HEART RATE: 88 BPM

## 2019-05-16 PROCEDURE — 3331090002 HH PPS REVENUE DEBIT

## 2019-05-16 PROCEDURE — G0157 HHC PT ASSISTANT EA 15: HCPCS

## 2019-05-16 PROCEDURE — 3331090001 HH PPS REVENUE CREDIT

## 2019-05-17 PROCEDURE — 3331090001 HH PPS REVENUE CREDIT

## 2019-05-17 PROCEDURE — 3331090002 HH PPS REVENUE DEBIT

## 2019-05-18 PROCEDURE — 3331090001 HH PPS REVENUE CREDIT

## 2019-05-18 PROCEDURE — 3331090002 HH PPS REVENUE DEBIT

## 2019-05-19 PROCEDURE — 3331090001 HH PPS REVENUE CREDIT

## 2019-05-19 PROCEDURE — 3331090002 HH PPS REVENUE DEBIT

## 2019-05-20 PROCEDURE — 3331090001 HH PPS REVENUE CREDIT

## 2019-05-20 PROCEDURE — 3331090002 HH PPS REVENUE DEBIT

## 2019-05-21 PROCEDURE — 3331090001 HH PPS REVENUE CREDIT

## 2019-05-21 PROCEDURE — 3331090002 HH PPS REVENUE DEBIT

## 2019-05-22 ENCOUNTER — HOME CARE VISIT (OUTPATIENT)
Dept: SCHEDULING | Facility: HOME HEALTH | Age: 79
End: 2019-05-22
Payer: MEDICARE

## 2019-05-22 PROCEDURE — G0157 HHC PT ASSISTANT EA 15: HCPCS

## 2019-05-22 PROCEDURE — 3331090001 HH PPS REVENUE CREDIT

## 2019-05-22 PROCEDURE — 3331090002 HH PPS REVENUE DEBIT

## 2019-05-23 VITALS
HEART RATE: 79 BPM | DIASTOLIC BLOOD PRESSURE: 80 MMHG | SYSTOLIC BLOOD PRESSURE: 126 MMHG | TEMPERATURE: 98.1 F | OXYGEN SATURATION: 94 %

## 2019-05-23 PROCEDURE — 3331090002 HH PPS REVENUE DEBIT

## 2019-05-23 PROCEDURE — 3331090001 HH PPS REVENUE CREDIT

## 2019-05-24 ENCOUNTER — HOME CARE VISIT (OUTPATIENT)
Dept: SCHEDULING | Facility: HOME HEALTH | Age: 79
End: 2019-05-24
Payer: MEDICARE

## 2019-05-24 PROCEDURE — 3331090002 HH PPS REVENUE DEBIT

## 2019-05-24 PROCEDURE — G0157 HHC PT ASSISTANT EA 15: HCPCS

## 2019-05-24 PROCEDURE — 3331090001 HH PPS REVENUE CREDIT

## 2019-05-25 PROCEDURE — 3331090001 HH PPS REVENUE CREDIT

## 2019-05-25 PROCEDURE — 3331090002 HH PPS REVENUE DEBIT

## 2019-05-26 PROCEDURE — 3331090002 HH PPS REVENUE DEBIT

## 2019-05-26 PROCEDURE — 3331090001 HH PPS REVENUE CREDIT

## 2019-05-27 VITALS
OXYGEN SATURATION: 98 % | DIASTOLIC BLOOD PRESSURE: 84 MMHG | HEART RATE: 97 BPM | TEMPERATURE: 98.1 F | SYSTOLIC BLOOD PRESSURE: 130 MMHG

## 2019-05-27 PROCEDURE — 3331090001 HH PPS REVENUE CREDIT

## 2019-05-27 PROCEDURE — 3331090002 HH PPS REVENUE DEBIT

## 2019-05-28 ENCOUNTER — HOME CARE VISIT (OUTPATIENT)
Dept: SCHEDULING | Facility: HOME HEALTH | Age: 79
End: 2019-05-28
Payer: MEDICARE

## 2019-05-28 PROCEDURE — G0157 HHC PT ASSISTANT EA 15: HCPCS

## 2019-05-28 PROCEDURE — 3331090001 HH PPS REVENUE CREDIT

## 2019-05-28 PROCEDURE — 3331090002 HH PPS REVENUE DEBIT

## 2019-05-29 VITALS
DIASTOLIC BLOOD PRESSURE: 78 MMHG | SYSTOLIC BLOOD PRESSURE: 128 MMHG | OXYGEN SATURATION: 96 % | TEMPERATURE: 97.9 F | HEART RATE: 85 BPM | RESPIRATION RATE: 19 BRPM

## 2019-05-29 PROCEDURE — 3331090002 HH PPS REVENUE DEBIT

## 2019-05-29 PROCEDURE — 3331090001 HH PPS REVENUE CREDIT

## 2019-05-30 ENCOUNTER — HOME CARE VISIT (OUTPATIENT)
Dept: SCHEDULING | Facility: HOME HEALTH | Age: 79
End: 2019-05-30
Payer: MEDICARE

## 2019-05-30 PROCEDURE — 3331090001 HH PPS REVENUE CREDIT

## 2019-05-30 PROCEDURE — 3331090002 HH PPS REVENUE DEBIT

## 2019-05-30 PROCEDURE — G0157 HHC PT ASSISTANT EA 15: HCPCS

## 2019-05-31 PROCEDURE — 3331090002 HH PPS REVENUE DEBIT

## 2019-05-31 PROCEDURE — 3331090001 HH PPS REVENUE CREDIT

## 2019-06-01 PROCEDURE — 3331090002 HH PPS REVENUE DEBIT

## 2019-06-01 PROCEDURE — 3331090001 HH PPS REVENUE CREDIT

## 2019-06-02 PROCEDURE — 3331090002 HH PPS REVENUE DEBIT

## 2019-06-02 PROCEDURE — 3331090001 HH PPS REVENUE CREDIT

## 2019-06-03 VITALS
DIASTOLIC BLOOD PRESSURE: 80 MMHG | TEMPERATURE: 98.1 F | SYSTOLIC BLOOD PRESSURE: 126 MMHG | OXYGEN SATURATION: 96 % | HEART RATE: 90 BPM

## 2019-06-03 PROCEDURE — 3331090001 HH PPS REVENUE CREDIT

## 2019-06-03 PROCEDURE — 3331090002 HH PPS REVENUE DEBIT

## 2019-06-04 ENCOUNTER — HOME CARE VISIT (OUTPATIENT)
Dept: HOME HEALTH SERVICES | Facility: HOME HEALTH | Age: 79
End: 2019-06-04
Payer: MEDICARE

## 2019-06-04 ENCOUNTER — HOME CARE VISIT (OUTPATIENT)
Dept: SCHEDULING | Facility: HOME HEALTH | Age: 79
End: 2019-06-04
Payer: MEDICARE

## 2019-06-04 VITALS
RESPIRATION RATE: 18 BRPM | DIASTOLIC BLOOD PRESSURE: 66 MMHG | SYSTOLIC BLOOD PRESSURE: 124 MMHG | HEART RATE: 88 BPM | TEMPERATURE: 97.8 F | OXYGEN SATURATION: 95 %

## 2019-06-04 PROCEDURE — 3331090001 HH PPS REVENUE CREDIT

## 2019-06-04 PROCEDURE — G0151 HHCP-SERV OF PT,EA 15 MIN: HCPCS

## 2019-06-04 PROCEDURE — 3331090002 HH PPS REVENUE DEBIT

## 2019-06-05 ENCOUNTER — HOME CARE VISIT (OUTPATIENT)
Dept: SCHEDULING | Facility: HOME HEALTH | Age: 79
End: 2019-06-05
Payer: MEDICARE

## 2019-06-05 VITALS
HEART RATE: 78 BPM | DIASTOLIC BLOOD PRESSURE: 80 MMHG | OXYGEN SATURATION: 96 % | TEMPERATURE: 98.2 F | SYSTOLIC BLOOD PRESSURE: 122 MMHG | RESPIRATION RATE: 17 BRPM

## 2019-06-05 PROCEDURE — G0157 HHC PT ASSISTANT EA 15: HCPCS

## 2019-06-05 PROCEDURE — 3331090002 HH PPS REVENUE DEBIT

## 2019-06-05 PROCEDURE — 3331090001 HH PPS REVENUE CREDIT

## 2019-06-06 PROCEDURE — 3331090001 HH PPS REVENUE CREDIT

## 2019-06-06 PROCEDURE — 3331090002 HH PPS REVENUE DEBIT

## 2019-06-07 PROCEDURE — 3331090001 HH PPS REVENUE CREDIT

## 2019-06-07 PROCEDURE — 3331090002 HH PPS REVENUE DEBIT

## 2019-06-08 PROCEDURE — 3331090002 HH PPS REVENUE DEBIT

## 2019-06-08 PROCEDURE — 3331090001 HH PPS REVENUE CREDIT

## 2019-06-09 PROCEDURE — 3331090001 HH PPS REVENUE CREDIT

## 2019-06-09 PROCEDURE — 3331090002 HH PPS REVENUE DEBIT

## 2019-06-10 ENCOUNTER — HOSPITAL ENCOUNTER (EMERGENCY)
Age: 79
Discharge: HOME | End: 2019-06-10
Attending: EMERGENCY MEDICINE
Payer: MEDICARE

## 2019-06-10 ENCOUNTER — APPOINTMENT (OUTPATIENT)
Dept: CT IMAGING | Age: 79
End: 2019-06-10
Attending: EMERGENCY MEDICINE
Payer: MEDICARE

## 2019-06-10 VITALS
HEART RATE: 77 BPM | SYSTOLIC BLOOD PRESSURE: 168 MMHG | OXYGEN SATURATION: 99 % | RESPIRATION RATE: 18 BRPM | DIASTOLIC BLOOD PRESSURE: 119 MMHG | TEMPERATURE: 97.7 F

## 2019-06-10 DIAGNOSIS — H81.399 PERIPHERAL VERTIGO, UNSPECIFIED LATERALITY: Primary | ICD-10-CM

## 2019-06-10 DIAGNOSIS — H61.21 IMPACTED CERUMEN OF RIGHT EAR: ICD-10-CM

## 2019-06-10 DIAGNOSIS — R42 ORTHOSTATIC DIZZINESS: ICD-10-CM

## 2019-06-10 LAB
ALBUMIN SERPL-MCNC: 3.6 G/DL (ref 3.5–5)
ALBUMIN/GLOB SERPL: 0.9 {RATIO} (ref 1.1–2.2)
ALP SERPL-CCNC: 142 U/L (ref 45–117)
ALT SERPL-CCNC: 14 U/L (ref 12–78)
ANION GAP SERPL CALC-SCNC: 3 MMOL/L (ref 5–15)
AST SERPL-CCNC: 15 U/L (ref 15–37)
BASOPHILS # BLD: 0 K/UL (ref 0–0.1)
BASOPHILS NFR BLD: 0 % (ref 0–1)
BILIRUB SERPL-MCNC: 0.3 MG/DL (ref 0.2–1)
BUN SERPL-MCNC: 26 MG/DL (ref 6–20)
BUN/CREAT SERPL: 18 (ref 12–20)
CALCIUM SERPL-MCNC: 9.3 MG/DL (ref 8.5–10.1)
CHLORIDE SERPL-SCNC: 109 MMOL/L (ref 97–108)
CO2 SERPL-SCNC: 30 MMOL/L (ref 21–32)
CREAT SERPL-MCNC: 1.42 MG/DL (ref 0.55–1.02)
DIFFERENTIAL METHOD BLD: ABNORMAL
EOSINOPHIL # BLD: 0 K/UL (ref 0–0.4)
EOSINOPHIL NFR BLD: 0 % (ref 0–7)
ERYTHROCYTE [DISTWIDTH] IN BLOOD BY AUTOMATED COUNT: 17.2 % (ref 11.5–14.5)
GLOBULIN SER CALC-MCNC: 4 G/DL (ref 2–4)
GLUCOSE SERPL-MCNC: 105 MG/DL (ref 65–100)
HCT VFR BLD AUTO: 35.8 % (ref 35–47)
HGB BLD-MCNC: 11.9 G/DL (ref 11.5–16)
IMM GRANULOCYTES # BLD AUTO: 0 K/UL (ref 0–0.04)
IMM GRANULOCYTES NFR BLD AUTO: 0 % (ref 0–0.5)
LYMPHOCYTES # BLD: 1.3 K/UL (ref 0.8–3.5)
LYMPHOCYTES NFR BLD: 14 % (ref 12–49)
MCH RBC QN AUTO: 26.9 PG (ref 26–34)
MCHC RBC AUTO-ENTMCNC: 33.2 G/DL (ref 30–36.5)
MCV RBC AUTO: 80.8 FL (ref 80–99)
MONOCYTES # BLD: 0.5 K/UL (ref 0–1)
MONOCYTES NFR BLD: 5 % (ref 5–13)
NEUTS SEG # BLD: 7.5 K/UL (ref 1.8–8)
NEUTS SEG NFR BLD: 80 % (ref 32–75)
NRBC # BLD: 0 K/UL (ref 0–0.01)
NRBC BLD-RTO: 0 PER 100 WBC
PLATELET # BLD AUTO: 124 K/UL (ref 150–400)
PMV BLD AUTO: 11.7 FL (ref 8.9–12.9)
POTASSIUM SERPL-SCNC: 4.7 MMOL/L (ref 3.5–5.1)
PROT SERPL-MCNC: 7.6 G/DL (ref 6.4–8.2)
RBC # BLD AUTO: 4.43 M/UL (ref 3.8–5.2)
SODIUM SERPL-SCNC: 142 MMOL/L (ref 136–145)
WBC # BLD AUTO: 9.3 K/UL (ref 3.6–11)

## 2019-06-10 PROCEDURE — 80053 COMPREHEN METABOLIC PANEL: CPT

## 2019-06-10 PROCEDURE — 85025 COMPLETE CBC W/AUTO DIFF WBC: CPT

## 2019-06-10 PROCEDURE — 99285 EMERGENCY DEPT VISIT HI MDM: CPT

## 2019-06-10 PROCEDURE — 96360 HYDRATION IV INFUSION INIT: CPT

## 2019-06-10 PROCEDURE — 3331090002 HH PPS REVENUE DEBIT

## 2019-06-10 PROCEDURE — 74011250636 HC RX REV CODE- 250/636: Performed by: EMERGENCY MEDICINE

## 2019-06-10 PROCEDURE — 74011250637 HC RX REV CODE- 250/637: Performed by: EMERGENCY MEDICINE

## 2019-06-10 PROCEDURE — 70450 CT HEAD/BRAIN W/O DYE: CPT

## 2019-06-10 PROCEDURE — 36415 COLL VENOUS BLD VENIPUNCTURE: CPT

## 2019-06-10 PROCEDURE — 3331090001 HH PPS REVENUE CREDIT

## 2019-06-10 RX ORDER — MECLIZINE HYDROCHLORIDE 25 MG/1
25 TABLET ORAL
Qty: 20 TAB | Refills: 0 | Status: SHIPPED | OUTPATIENT
Start: 2019-06-10 | End: 2020-03-04

## 2019-06-10 RX ORDER — METOPROLOL TARTRATE 25 MG/1
25 TABLET, FILM COATED ORAL
Status: COMPLETED | OUTPATIENT
Start: 2019-06-10 | End: 2019-06-10

## 2019-06-10 RX ORDER — MECLIZINE HCL 12.5 MG 12.5 MG/1
25 TABLET ORAL
Status: COMPLETED | OUTPATIENT
Start: 2019-06-10 | End: 2019-06-10

## 2019-06-10 RX ADMIN — MECLIZINE 25 MG: 12.5 TABLET ORAL at 20:48

## 2019-06-10 RX ADMIN — METOPROLOL TARTRATE 25 MG: 25 TABLET ORAL at 21:04

## 2019-06-10 RX ADMIN — SODIUM CHLORIDE 1000 ML: 900 INJECTION, SOLUTION INTRAVENOUS at 20:51

## 2019-06-10 NOTE — ED TRIAGE NOTES
Patient arrives to the emergency department via EMS with a chief complaint of dizziness x1 hour. Patient has a hx of stroke and has L sided weakness, and stuttering at baseline from them. Son reports her mentality and speech are at baseline. Patient reports the dizziness has subsided some since it first began.

## 2019-06-10 NOTE — ED NOTES
Bedside and Verbal shift change report given to [address] (oncoming nurse) by [employer] (offgoing nurse). Report included the following information SBAR, ED Summary, MAR and Recent Results.

## 2019-06-10 NOTE — ED NOTES
Patient off floor to CT. Patient with irritation at site of old monitor sticker removal. Per MD ok for patient to remain off cardiac monitor. Will continue to monitor

## 2019-06-10 NOTE — ED PROVIDER NOTES
EMERGENCY DEPARTMENT HISTORY AND PHYSICAL EXAM 
 
 
Date: 6/10/2019 Patient Name: Lucie Bloch Patient Age and Sex: [de-identified] y.o. female History of Presenting Illness Chief Complaint Patient presents with  Dizziness History Provided By: Patient HPI: Lucie Bloch is an 78-year-old female with a history of a stroke with left-sided deficits presenting for dizziness. According to son prior to arrival, patient was watching television on the couch. She stated she had to use the restroom but before she got up, she had an episode of dizziness. Patient states that she felt like the room was spinning. Son states that she sometimes does get dizzy when she stands up like when she gets up too quickly, but this was different. Son states that the dizziness lasted for 1 hour and then resolved in route. Patient denies any new numbness or weakness of one side versus another. She has baseline left-sided weakness, slurred speech and facial droop. She is on Eliquis and taking it every day. Patient denies any ringing in her ears, ear fullness or drainage. She denies nausea, vomiting or fevers. Patient states that the dizziness has since resolved. She feels at baseline now. There are no other complaints, changes, or physical findings at this time. PCP: Noble Rivera NP No current facility-administered medications on file prior to encounter. Current Outpatient Medications on File Prior to Encounter Medication Sig Dispense Refill  allopurinol (ZYLOPRIM) 100 mg tablet Take 100 mg by mouth daily.  metoprolol succinate (TOPROL-XL) 25 mg XL tablet Take 25 mg by mouth daily.  diclofenac (SOLARAZE) 3 % topical gel Apply  to affected area two (2) times a day. 100 g 0  
 apixaban (ELIQUIS) 2.5 mg tablet Take 1 Tab by mouth two (2) times a day. 60 Tab 12  
 potassium chloride (K-DUR, KLOR-CON) 20 mEq tablet Take 20 mEq by mouth daily.  MON, WED., SAT    
  famotidine (PEPCID) 20 mg tablet Take 20 mg by mouth as needed.  furosemide (LASIX) 20 mg tablet Take 20 mg by mouth Every Mon, Wed and Sat.  nitroglycerin (NITROSTAT) 0.4 mg SL tablet 0.4 mg by SubLINGual route every five (5) minutes as needed for Chest Pain. Up to 3 doses.  atorvastatin (LIPITOR) 80 mg tablet Take 1 Tab by mouth nightly. TAKE ONE TABLET BY MOUTH EVERY DAY (Patient taking differently: Take 40 mg by mouth nightly. TAKE ONE TABLET BY MOUTH EVERY DAY) 30 Tab 0  
 fexofenadine (ALLEGRA) 180 mg tablet Take 180 mg by mouth daily as needed for Allergies.  OXYGEN-AIR DELIVERY SYSTEMS 2 L by IntraNASal route daily as needed (shortness of breath).  aspirin delayed-release 81 mg tablet Take 1 Tab by mouth daily. 100 Tab 6  
 albuterol (PROVENTIL HFA, VENTOLIN HFA, PROAIR HFA) 90 mcg/actuation inhaler Take 2 Puffs by inhalation every four (4) hours as needed for Wheezing. 1 Inhaler 1 Past History Past Medical History: 
Past Medical History:  
Diagnosis Date  Acute kidney failure ([facility] 2016  Acute systolic heart failure ([address] 2016  Arrhythmia  CAD (coronary artery disease)  Heart failure ([facility] [facility]  Hypertension  Stroke [facility])  Stroke, left hand weakness and speech, peripheral vision affected Past Surgical History: 
Past Surgical History:  
Procedure Laterality Date  HX UROLOGICAL  2018 Family History: 
Family History Problem Relation Age of Onset  Coronary Artery Disease Other   
     grandmother age 76  Stroke Mother  Hypertension Mother Social History: 
Social History Tobacco Use  Smoking status: Former Smoker Last attempt to quit: 2009 Years since quittin.7  Smokeless tobacco: Never Used Substance Use Topics  Alcohol use: No  
  Alcohol/week: 0.0 oz  
  Comment: Very little  Drug use: No  
 
 
Allergies: Allergies Allergen Reactions  Decadron [Dexamethasone Sodium Phosphate] Hives  Pcn [Penicillins] Hives Review of Systems Review of Systems Constitutional: Negative for chills and fever. Respiratory: Negative for cough and shortness of breath. Cardiovascular: Negative for chest pain. Gastrointestinal: Negative for constipation, diarrhea, nausea and vomiting. Neurological: Positive for dizziness. Negative for weakness, numbness and headaches. All other systems reviewed and are negative. Physical Exam  
Physical Exam  
Constitutional: She is oriented to person, place, and time. She appears well-developed and well-nourished. HENT:  
Head: Normocephalic. Left ear completely normal, right ear impacted with cerumen Eyes: Pupils are equal, round, and reactive to light. EOM are normal.  
Neck: Normal range of motion. Cardiovascular: Normal rate and regular rhythm. Pulmonary/Chest: Effort normal and breath sounds normal.  
Abdominal: Soft. She exhibits no distension. There is no tenderness. Neurological: She is alert and oriented to person, place, and time. No cranial nerve deficit. Patient has baseline slurred speech and left-sided facial droop, finger-to-nose using her right arm is completely normal.  She has 4 out of 5 strength in her left arm and left leg. Unable to do finger-to-nose with the left arm. Skin: Skin is warm and dry. Psychiatric: She has a normal mood and affect. Nursing note and vitals reviewed. Diagnostic Study Results Labs - Recent Results (from the past 12 hour(s)) CBC WITH AUTOMATED DIFF Collection Time: 06/10/19  8:24 PM  
Result Value Ref Range WBC 9.3 3.6 - 11.0 K/uL  
 RBC 4.43 3.80 - 5.20 M/uL  
 HGB 11.9 11.5 - 16.0 g/dL HCT 35.8 35.0 - 47.0 % MCV 80.8 80.0 - 99.0 FL  
 MCH 26.9 26.0 - 34.0 PG  
 MCHC 33.2 30.0 - 36.5 g/dL  
 RDW 17.2 (H) 11.5 - 14.5 % PLATELET 738 (L) 518 - 400 K/uL MPV 11.7 8.9 - 12.9 FL  
 NRBC 0.0 0  WBC ABSOLUTE NRBC 0.00 0.00 - 0.01 K/uL NEUTROPHILS 80 (H) 32 - 75 % LYMPHOCYTES 14 12 - 49 % MONOCYTES 5 5 - 13 % EOSINOPHILS 0 0 - 7 % BASOPHILS 0 0 - 1 % IMMATURE GRANULOCYTES 0 0.0 - 0.5 % ABS. NEUTROPHILS 7.5 1.8 - 8.0 K/UL  
 ABS. LYMPHOCYTES 1.3 0.8 - 3.5 K/UL  
 ABS. MONOCYTES 0.5 0.0 - 1.0 K/UL  
 ABS. EOSINOPHILS 0.0 0.0 - 0.4 K/UL  
 ABS. BASOPHILS 0.0 0.0 - 0.1 K/UL  
 ABS. IMM. GRANS. 0.0 0.00 - 0.04 K/UL  
 DF AUTOMATED METABOLIC PANEL, COMPREHENSIVE Collection Time: 06/10/19  8:24 PM  
Result Value Ref Range Sodium 142 136 - 145 mmol/L Potassium 4.7 3.5 - 5.1 mmol/L Chloride 109 (H) 97 - 108 mmol/L  
 CO2 30 21 - 32 mmol/L Anion gap 3 (L) 5 - 15 mmol/L Glucose 105 (H) 65 - 100 mg/dL BUN 26 (H) 6 - 20 MG/DL Creatinine 1.42 (H) 0.55 - 1.02 MG/DL  
 BUN/Creatinine ratio 18 12 - 20 GFR est AA 43 (L) >60 ml/min/1.73m2 GFR est non-AA 36 (L) >60 ml/min/1.73m2 Calcium 9.3 8.5 - 10.1 MG/DL Bilirubin, total 0.3 0.2 - 1.0 MG/DL  
 ALT (SGPT) 14 12 - 78 U/L  
 AST (SGOT) 15 15 - 37 U/L Alk. phosphatase 142 (H) 45 - 117 U/L Protein, total 7.6 6.4 - 8.2 g/dL Albumin 3.6 3.5 - 5.0 g/dL Globulin 4.0 2.0 - 4.0 g/dL A-G Ratio 0.9 (L) 1.1 - 2.2 Radiologic Studies -  
CT HEAD WO CONT Final Result IMPRESSION: No acute intracranial disease. Chronic ischemic findings. CT Results  (Last 48 hours) 06/10/19 2002  CT HEAD WO CONT Final result Impression:  IMPRESSION: No acute intracranial disease. Chronic ischemic findings. Narrative:  EXAM: Head CT without Contrast:   
CT dose reduction was achieved through use of a standardized protocol tailored  
for this examination and automatic exposure control for dose modulation. INDICATION:  Vertigo, episodic, peripheral  
   
COMPARISON: 2/5/2018. Unenhanced Head CT shows no acute finding or significant change. There are remote bilateral cerebral cortical infarcts. There is left basal  
ganglia lacune. There is bilateral white matter hypodensity compatible with  
chronic microangiopathy. There are remote cerebellar infarcts. There is no mass, bleed, shift, hydrocephalus or extra-axial fluid collection. No acute infarct is apparent. Bone windows are unremarkable. CXR Results  (Last 48 hours) None Medical Decision Making I am the first provider for this patient. I reviewed the vital signs, available nursing notes, past medical history, past surgical history, family history and social history. Vital Signs-Reviewed the patient's vital signs. Patient Vitals for the past 12 hrs: 
 Temp Pulse Resp BP SpO2  
06/10/19 2104  77     
06/10/19 2100    (!) 168/119 99 % 06/10/19 1942  83  (!) 173/97   
06/10/19 1854 97.7 °F (36.5 °C) 83 18 150/81  Records Reviewed: Nursing Notes and Old Medical Records Provider Notes (Medical Decision Making):  
Patient presents with isolated vertigo. Most likely peripheral vertigo rather than Central vertigo. DDx: impacted cerumen, BPPV, acute labyrinthitis, vestibular neuritis, Meniere's dx, otitis media, acoustic neuroma, medication toxicity (aminoglycosides, loop diuretics, aspirin). Unlikely central cause of vertigo such as posterior mass or stroke as there are no associated red flag symptoms including diplopia, dysmetria, dysarthria, ataxia, unilateral numbness or weakness. Cassie Valdez's  results have been reviewed with her. She has been counseled regarding her diagnosis. She verbally conveys understanding and agreement of the signs, symptoms, diagnosis, treatment with Meclizine and prognosis and additionally agrees to follow up as recommended with Vestibular Rehab or Neurology PRN. ED Course:  
Initial assessment performed.  The patients presenting problems have been discussed, and they are in agreement with the care plan formulated and outlined with them. I have encouraged them to ask questions as they arise throughout their visit. ED Course as of Eric 10 2120 Mon Eric 10, 2019  
1956 Patient was orthostatic based on heart rate. Her blood pressure did go up and her heart rate jumped up from the 70s to the 100s on standing. [JS] ED Course User Index [JS] Jr Eisenberg MD  
 
Critical Care Time:  
0 Disposition: 
Discharge Note: The patient has been re-evaluated and is ready for discharge. Reviewed available results with patient. Counseled patient on diagnosis and care plan. Patient has expressed understanding, and all questions have been answered. Patient agrees with plan and agrees to follow up as recommended, or to return to the ED if their symptoms worsen. Discharge instructions have been provided and explained to the patient, along with reasons to return to the ED. PLAN: 
Current Discharge Medication List  
  
START taking these medications Details  
meclizine (ANTIVERT) 25 mg tablet Take 1 Tab by mouth three (3) times daily as needed for Dizziness. Qty: 20 Tab, Refills: 0  
  
carbamide peroxide (DEBROX) 6.5 % otic solution Administer 5 Drops into each ear two (2) times a day. Qty: 7.5 mL, Refills: 0  
  
  
 
2. Follow-up Information Follow up With Specialties Details Why Contact Info Sam Shoemaker NP Family Practice  As needed [address] [phone] 3. Return to ED if worse Diagnosis Clinical Impression: 1. Peripheral vertigo, unspecified laterality 2. Orthostatic dizziness 3. Impacted cerumen of right ear Attestations: 
 
Meño Shabazz M.D. Please note that this dictation was completed with [employer], the [employer] voice recognition software.   Quite often unanticipated grammatical, syntax, homophones, and other interpretive errors are inadvertently transcribed by the computer software. Please disregard these errors. Please excuse any errors that have escaped final proofreading. Thank you.

## 2019-06-11 ENCOUNTER — HOME CARE VISIT (OUTPATIENT)
Dept: SCHEDULING | Facility: HOME HEALTH | Age: 79
End: 2019-06-11
Payer: MEDICARE

## 2019-06-11 PROCEDURE — G0157 HHC PT ASSISTANT EA 15: HCPCS

## 2019-06-11 PROCEDURE — 3331090001 HH PPS REVENUE CREDIT

## 2019-06-11 PROCEDURE — 3331090002 HH PPS REVENUE DEBIT

## 2019-06-12 ENCOUNTER — HOME CARE VISIT (OUTPATIENT)
Dept: SCHEDULING | Facility: HOME HEALTH | Age: 79
End: 2019-06-12
Payer: MEDICARE

## 2019-06-12 VITALS
HEART RATE: 84 BPM | OXYGEN SATURATION: 96 % | TEMPERATURE: 98.2 F | DIASTOLIC BLOOD PRESSURE: 86 MMHG | RESPIRATION RATE: 17 BRPM | SYSTOLIC BLOOD PRESSURE: 128 MMHG

## 2019-06-12 VITALS
SYSTOLIC BLOOD PRESSURE: 128 MMHG | HEART RATE: 100 BPM | OXYGEN SATURATION: 98 % | TEMPERATURE: 97.9 F | DIASTOLIC BLOOD PRESSURE: 88 MMHG

## 2019-06-12 PROCEDURE — G0157 HHC PT ASSISTANT EA 15: HCPCS

## 2019-06-12 PROCEDURE — 3331090002 HH PPS REVENUE DEBIT

## 2019-06-12 PROCEDURE — 3331090001 HH PPS REVENUE CREDIT

## 2019-06-13 PROCEDURE — 3331090002 HH PPS REVENUE DEBIT

## 2019-06-13 PROCEDURE — 3331090001 HH PPS REVENUE CREDIT

## 2019-06-14 PROCEDURE — 3331090002 HH PPS REVENUE DEBIT

## 2019-06-14 PROCEDURE — 3331090001 HH PPS REVENUE CREDIT

## 2019-06-15 PROCEDURE — 3331090001 HH PPS REVENUE CREDIT

## 2019-06-15 PROCEDURE — 3331090002 HH PPS REVENUE DEBIT

## 2019-06-16 PROCEDURE — 3331090001 HH PPS REVENUE CREDIT

## 2019-06-16 PROCEDURE — 3331090002 HH PPS REVENUE DEBIT

## 2019-06-17 PROCEDURE — 3331090001 HH PPS REVENUE CREDIT

## 2019-06-17 PROCEDURE — 3331090002 HH PPS REVENUE DEBIT

## 2019-06-18 PROCEDURE — 3331090001 HH PPS REVENUE CREDIT

## 2019-06-18 PROCEDURE — 3331090002 HH PPS REVENUE DEBIT

## 2019-06-19 ENCOUNTER — HOME CARE VISIT (OUTPATIENT)
Dept: SCHEDULING | Facility: HOME HEALTH | Age: 79
End: 2019-06-19
Payer: MEDICARE

## 2019-06-19 VITALS
SYSTOLIC BLOOD PRESSURE: 132 MMHG | TEMPERATURE: 97.8 F | OXYGEN SATURATION: 97 % | HEART RATE: 86 BPM | RESPIRATION RATE: 18 BRPM | DIASTOLIC BLOOD PRESSURE: 70 MMHG

## 2019-06-19 PROCEDURE — 3331090002 HH PPS REVENUE DEBIT

## 2019-06-19 PROCEDURE — G0157 HHC PT ASSISTANT EA 15: HCPCS

## 2019-06-19 PROCEDURE — 3331090001 HH PPS REVENUE CREDIT

## 2019-06-20 ENCOUNTER — HOME CARE VISIT (OUTPATIENT)
Dept: SCHEDULING | Facility: HOME HEALTH | Age: 79
End: 2019-06-20
Payer: MEDICARE

## 2019-06-20 ENCOUNTER — HOME CARE VISIT (OUTPATIENT)
Dept: HOME HEALTH SERVICES | Facility: HOME HEALTH | Age: 79
End: 2019-06-20
Payer: MEDICARE

## 2019-06-20 PROCEDURE — 3331090002 HH PPS REVENUE DEBIT

## 2019-06-20 PROCEDURE — 3331090001 HH PPS REVENUE CREDIT

## 2019-06-20 PROCEDURE — G0151 HHCP-SERV OF PT,EA 15 MIN: HCPCS

## 2019-06-21 VITALS
HEART RATE: 88 BPM | OXYGEN SATURATION: 98 % | RESPIRATION RATE: 18 BRPM | SYSTOLIC BLOOD PRESSURE: 130 MMHG | TEMPERATURE: 97.5 F | DIASTOLIC BLOOD PRESSURE: 70 MMHG

## 2019-08-13 ENCOUNTER — OFFICE VISIT (OUTPATIENT)
Dept: CARDIOLOGY CLINIC | Age: 79
End: 2019-08-13

## 2019-08-13 ENCOUNTER — CLINICAL SUPPORT (OUTPATIENT)
Dept: CARDIOLOGY CLINIC | Age: 79
End: 2019-08-13

## 2019-08-13 VITALS
BODY MASS INDEX: 25.6 KG/M2 | RESPIRATION RATE: 16 BRPM | OXYGEN SATURATION: 92 % | DIASTOLIC BLOOD PRESSURE: 90 MMHG | WEIGHT: 127 LBS | HEIGHT: 59 IN | HEART RATE: 75 BPM | SYSTOLIC BLOOD PRESSURE: 140 MMHG

## 2019-08-13 DIAGNOSIS — I63.019 CEREBROVASCULAR ACCIDENT (CVA) DUE TO THROMBOSIS OF VERTEBRAL ARTERY, UNSPECIFIED BLOOD VESSEL LATERALITY (HCC): Primary | ICD-10-CM

## 2019-08-13 DIAGNOSIS — Z45.09 ENCOUNTER FOR LOOP RECORDER CHECK: ICD-10-CM

## 2019-08-13 DIAGNOSIS — I25.118 CORONARY ARTERY DISEASE OF NATIVE ARTERY OF NATIVE HEART WITH STABLE ANGINA PECTORIS (HCC): ICD-10-CM

## 2019-08-13 DIAGNOSIS — I48.91 ATRIAL FIBRILLATION, UNSPECIFIED TYPE (HCC): Primary | ICD-10-CM

## 2019-08-13 DIAGNOSIS — Z86.73 H/O: CVA (CEREBROVASCULAR ACCIDENT): ICD-10-CM

## 2019-08-13 DIAGNOSIS — I44.7 LBBB (LEFT BUNDLE BRANCH BLOCK): ICD-10-CM

## 2019-08-13 DIAGNOSIS — I10 ESSENTIAL HYPERTENSION, BENIGN: ICD-10-CM

## 2019-08-13 DIAGNOSIS — E78.2 MIXED HYPERLIPIDEMIA: ICD-10-CM

## 2019-08-13 RX ORDER — METHYLPREDNISOLONE 4 MG/1
TABLET ORAL
COMMUNITY
End: 2020-03-04

## 2019-08-13 RX ORDER — ATORVASTATIN CALCIUM 40 MG/1
40 TABLET, FILM COATED ORAL DAILY
COMMUNITY
Start: 2019-07-18 | End: 2020-03-08

## 2019-08-13 NOTE — PROGRESS NOTES
Subjective:      Libertad López is a [de-identified] y.o. female is here for follow up of AF s/p ablation in 10/2018. She is feeling well. The patient denies chest pain/ shortness of breath, orthopnea, PND, LE edema, palpitations, syncope, presyncope or fatigue.        Patient Active Problem List    Diagnosis Date Noted    A-fib [facility]) 10/05/2018    Ventral hernia with obstruction 06/01/2018    Bilateral carotid artery stenosis 05/10/2018    Acute cerebral infarction [facility]) 05/09/2018    Syncope 05/06/2018    CAD (coronary artery disease) 02/05/2018    Stroke ([address] 02/05/2018    Hypertensive urgency 08/01/2017    CHF (congestive heart failure) ([address] 08/01/2017    COPD exacerbation ([address] 03/07/2017    CAD (coronary artery disease), native coronary artery 07/15/2016    H/O: CVA (cerebrovascular accident) 07/15/2016    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 ([address] 07/15/2016    Severe sepsis ([address] 07/14/2016    Pneumonia 07/14/2016    Acute respiratory failure ([address] 07/14/2016    Elevated troponin 07/14/2016    Left bundle branch block (LBBB) on electrocardiogram 83/16/3127    Acute systolic heart failure ([address] 07/14/2016    Acute kidney failure ([address] 07/14/2016    Mixed hyperlipidemia 09/21/2012    Essential hypertension, benign 09/21/2012    Other left bundle branch block 09/21/2012    Coronary atherosclerosis of native coronary artery 09/21/2012    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 09/21/2012    Atherosclerosis of renal artery ([address] 09/21/2012    Atherosclerosis of native artery of extremity with intermittent claudication ([address] 09/21/2012    Tobacco use disorder 09/21/2012    Cerebrovascular accident ([address] 09/21/2012    Mitral valve disorders(424.0) 09/21/2012    Tricuspid valve disorder 09/21/2012      Nemo Sales NP  Past Medical History:   Diagnosis Date    Acute kidney failure ([address] 7/74/6891    Acute systolic heart failure ([address] 2016    Arrhythmia     CAD (coronary artery disease)     Heart failure (HCC)     Hypertension     Stroke [facility])     Stroke, left hand weakness and speech, peripheral vision affected      Past Surgical History:   Procedure Laterality Date    HX UROLOGICAL  2018     Allergies   Allergen Reactions    Decadron [Dexamethasone Sodium Phosphate] Hives    Pcn [Penicillins] Hives      Family History   Problem Relation Age of Onset    Coronary Artery Disease Other         grandmother age 76    Stroke Mother     Hypertension Mother     negative for cardiac disease  Social History     Socioeconomic History    Marital status:      Spouse name: Not on file    Number of children: Not on file    Years of education: Not on file    Highest education level: Not on file   Tobacco Use    Smoking status: Former Smoker     Last attempt to quit: 2009     Years since quittin.8    Smokeless tobacco: Never Used   Substance and Sexual Activity    Alcohol use: No     Alcohol/week: 0.0 standard drinks     Comment: Very little    Drug use: No   Social History Narrative    ** Merged History Encounter **          Current Outpatient Medications   Medication Sig    atorvastatin (LIPITOR) 40 mg tablet Take 40 mg by mouth daily.  methylPREDNISolone (METHYLPRED DP) 4 mg tablet Take  by mouth.  iron,carbonyl-vitamin C (VITRON-C) 65 mg iron- 125 mg TbEC Take  by mouth daily.  meclizine (ANTIVERT) 25 mg tablet Take 1 Tab by mouth three (3) times daily as needed for Dizziness.  allopurinol (ZYLOPRIM) 100 mg tablet Take 100 mg by mouth as needed.  metoprolol succinate (TOPROL-XL) 25 mg XL tablet Take 25 mg by mouth daily.  apixaban (ELIQUIS) 2.5 mg tablet Take 1 Tab by mouth two (2) times a day.  potassium chloride (K-DUR, KLOR-CON) 20 mEq tablet Take 20 mEq by mouth daily. MON, WED., SAT    famotidine (PEPCID) 20 mg tablet Take 20 mg by mouth as needed.     furosemide (LASIX) 20 mg tablet Take 20 mg by mouth Every Mon, Wed and Sat.  nitroglycerin (NITROSTAT) 0.4 mg SL tablet 0.4 mg by SubLINGual route every five (5) minutes as needed for Chest Pain. Up to 3 doses.  fexofenadine (ALLEGRA) 180 mg tablet Take 180 mg by mouth daily as needed for Allergies.  OXYGEN-AIR DELIVERY SYSTEMS 2 L by IntraNASal route daily as needed (shortness of breath).  aspirin delayed-release 81 mg tablet Take 1 Tab by mouth daily.  albuterol (PROVENTIL HFA, VENTOLIN HFA, PROAIR HFA) 90 mcg/actuation inhaler Take 2 Puffs by inhalation every four (4) hours as needed for Wheezing. No current facility-administered medications for this visit. Vitals:    08/13/19 1321   BP: 140/90   Pulse: 75   Resp: 16   SpO2: 92%   Weight: 127 lb (57.6 kg)   Height: 4' 11\" (1.499 m)       I have reviewed the nurses notes, vitals, problem list, allergy list, medical history, family, social history and medications. Review of Symptoms:    General: Pt denies excessive weight gain or loss. Pt is able to conduct ADL's  HEENT: Denies blurred vision, headaches, epistaxis and difficulty swallowing. Respiratory: Denies shortness of breath, CHRISTINE, wheezing or stridor. Cardiovascular: Denies precordial pain, palpitations, edema or PND  Gastrointestinal: Denies poor appetite, indigestion, abdominal pain or blood in stool  Urinary: Denies dysuria, pyuria  Musculoskeletal: Denies pain or swelling from muscles or joints  Neurologic: Denies tremor, paresthesias, or sensory motor disturbance  Skin: Denies rash, itching or texture change. Psych: Denies depression      Physical Exam:      General: Well developed, in no acute distress. HEENT: Eyes - PERRL, no jvd  Heart:  Normal S1/S2 negative S3 or S4. Regular, no murmur, gallop or rub. Respiratory: Clear bilaterally x 4, no wheezing or rales  Abdomen:   Soft, non-tender, bowel sounds are active. Extremities:  No edema, normal cap refill, no cyanosis.   Musculoskeletal: No clubbing  Neuro: A&Ox3, speech clear, gait stable. Skin: Skin color is normal. No rashes or lesions. Non diaphoretic  Vascular: 2+ pulses symmetric in all extremities    Cardiographics    Ekg: sinus rhythm     Results for orders placed or performed during the hospital encounter of 10/05/18   EKG, 12 LEAD, INITIAL   Result Value Ref Range    Ventricular Rate 79 BPM    Atrial Rate 79 BPM    P-R Interval 140 ms    QRS Duration 132 ms    Q-T Interval 460 ms    QTC Calculation (Bezet) 527 ms    Calculated P Axis 64 degrees    Calculated R Axis -24 degrees    Calculated T Axis 111 degrees    Diagnosis       Normal sinus rhythm  Left bundle branch block  When compared with ECG of 01-JUN-2018 00:18,  T wave inversion now evident in Lateral leads  Confirmed by Misty Lamar MD, Davion Mi ([id number]) on 10/6/2018 2:55:41 PM           Lab Results   Component Value Date/Time    WBC 9.3 06/10/2019 08:24 PM    HGB 11.9 06/10/2019 08:24 PM    HCT 35.8 06/10/2019 08:24 PM    PLATELET 230 (L) 10/56/7061 08:24 PM    MCV 80.8 06/10/2019 08:24 PM      Lab Results   Component Value Date/Time    Sodium 142 06/10/2019 08:24 PM    Potassium 4.7 06/10/2019 08:24 PM    Chloride 109 (H) 06/10/2019 08:24 PM    CO2 30 06/10/2019 08:24 PM    Anion gap 3 (L) 06/10/2019 08:24 PM    Glucose 105 (H) 06/10/2019 08:24 PM    BUN 26 (H) 06/10/2019 08:24 PM    Creatinine 1.42 (H) 06/10/2019 08:24 PM    BUN/Creatinine ratio 18 06/10/2019 08:24 PM    GFR est AA 43 (L) 06/10/2019 08:24 PM    GFR est non-AA 36 (L) 06/10/2019 08:24 PM    Calcium 9.3 06/10/2019 08:24 PM    Bilirubin, total 0.3 06/10/2019 08:24 PM    AST (SGOT) 15 06/10/2019 08:24 PM    Alk. phosphatase 142 (H) 06/10/2019 08:24 PM    Protein, total 7.6 06/10/2019 08:24 PM    Albumin 3.6 06/10/2019 08:24 PM    Globulin 4.0 06/10/2019 08:24 PM    A-G Ratio 0.9 (L) 06/10/2019 08:24 PM    ALT (SGPT) 14 06/10/2019 08:24 PM         Assessment:     Assessment:        ICD-10-CM ICD-9-CM    1.  Atrial fibrillation, unspecified type ([facility] I48.91 427.31 AMB POC EKG ROUTINE W/ 12 LEADS, INTER & REP   2. Encounter for loop recorder check Z45.09 V53.39    3. LBBB (left bundle branch block) I44.7 426.3    4. Coronary artery disease of native artery of native heart with stable angina pectoris ([facility] I25.118 414.01      413.9    5. Essential hypertension, benign I10 401.1    6. H/O: CVA (cerebrovascular accident) Z80.78 V12.54    7. Mixed hyperlipidemia E78.2 272.2      Orders Placed This Encounter    AMB POC EKG ROUTINE W/ 12 LEADS, INTER & REP     Order Specific Question:   Reason for Exam:     Answer:   routine    atorvastatin (LIPITOR) 40 mg tablet     Sig: Take 40 mg by mouth daily.  methylPREDNISolone (METHYLPRED DP) 4 mg tablet     Sig: Take  by mouth.  iron,carbonyl-vitamin C (VITRON-C) 65 mg iron- 125 mg TbEC     Sig: Take  by mouth daily. Plan:   Ms. Faustino Darden is here for follow up s/p AF ablation 10/2018. She is feeling well. Her EKG shows sinus rhythm and her ILR has been negative for recurrence. Continue Eliquis for anticoagulation. Follow up in one year. Continue medical management for CAD, HTN. Thank you for allowing me to participate in Hawa Arreaga 's care. Milford Libman, MD    Patient seen and examined by me with nurse practitioner. I personally performed all components of the history, physical, and medical decision making and agree with the assessment and plan with minor modifications as noted. In sinus. Sp AF abl last year. Cont oac. Cont med rx for htn and hyperlipidemia.  F/u in one year    Milford Libman, MD, [facility]

## 2019-08-13 NOTE — PROGRESS NOTES
Chief Complaint   Patient presents with    Irregular Heart Beat     6 month follow up     1. Have you been to the ER, urgent care clinic since your last visit? Hospitalized since your last visit? yes seen in ED 2/9/2019    2. Have you seen or consulted any other health care providers outside of the [address] [place] [address] since your last visit? Include any pap smears or colon screening.  No

## 2019-08-13 NOTE — LETTER
8/13/19 Patient: Chente Allen YOB: 1940 Date of Visit: 8/13/2019 Derrick Hope NP 
[address] VIA Facsimile: [phone] Dear Derrick Hope NP, Thank you for referring Ms. Tahmina Olivares to [facility] for evaluation. My notes for this consultation are attached. If you have questions, please do not hesitate to call me. I look forward to following your patient along with you. Sincerely, Lexa Head MD

## 2020-03-04 ENCOUNTER — APPOINTMENT (OUTPATIENT)
Dept: CT IMAGING | Age: 80
DRG: 056 | End: 2020-03-04
Attending: EMERGENCY MEDICINE
Payer: MEDICARE

## 2020-03-04 ENCOUNTER — APPOINTMENT (OUTPATIENT)
Dept: MRI IMAGING | Age: 80
DRG: 056 | End: 2020-03-04
Attending: INTERNAL MEDICINE
Payer: MEDICARE

## 2020-03-04 ENCOUNTER — APPOINTMENT (OUTPATIENT)
Dept: GENERAL RADIOLOGY | Age: 80
DRG: 056 | End: 2020-03-04
Attending: EMERGENCY MEDICINE
Payer: MEDICARE

## 2020-03-04 ENCOUNTER — HOSPITAL ENCOUNTER (INPATIENT)
Age: 80
LOS: 4 days | Discharge: NURSING, REHAB OR LONG-TERM CARE | DRG: 056 | End: 2020-03-08
Attending: EMERGENCY MEDICINE | Admitting: INTERNAL MEDICINE
Payer: MEDICARE

## 2020-03-04 DIAGNOSIS — I63.9 CEREBROVASCULAR ACCIDENT (CVA), UNSPECIFIED MECHANISM (HCC): Primary | ICD-10-CM

## 2020-03-04 DIAGNOSIS — I48.91 ATRIAL FIBRILLATION, UNSPECIFIED TYPE (HCC): ICD-10-CM

## 2020-03-04 DIAGNOSIS — R53.81 DEBILITY: ICD-10-CM

## 2020-03-04 DIAGNOSIS — Z71.89 DNR (DO NOT RESUSCITATE) DISCUSSION: ICD-10-CM

## 2020-03-04 DIAGNOSIS — Z71.89 GOALS OF CARE, COUNSELING/DISCUSSION: ICD-10-CM

## 2020-03-04 DIAGNOSIS — R54 FRAILTY: ICD-10-CM

## 2020-03-04 DIAGNOSIS — R00.0 TACHYCARDIA: ICD-10-CM

## 2020-03-04 LAB
ALBUMIN SERPL-MCNC: 3.7 G/DL (ref 3.5–5)
ALBUMIN/GLOB SERPL: 0.9 {RATIO} (ref 1.1–2.2)
ALP SERPL-CCNC: 130 U/L (ref 45–117)
ALT SERPL-CCNC: 16 U/L (ref 12–78)
ANION GAP SERPL CALC-SCNC: 5 MMOL/L (ref 5–15)
APPEARANCE UR: CLEAR
AST SERPL-CCNC: 18 U/L (ref 15–37)
BASOPHILS # BLD: 0 K/UL (ref 0–0.1)
BASOPHILS NFR BLD: 0 % (ref 0–1)
BILIRUB SERPL-MCNC: 0.3 MG/DL (ref 0.2–1)
BILIRUB UR QL: NEGATIVE
BUN SERPL-MCNC: 32 MG/DL (ref 6–20)
BUN/CREAT SERPL: 21 (ref 12–20)
CALCIUM SERPL-MCNC: 9 MG/DL (ref 8.5–10.1)
CHLORIDE SERPL-SCNC: 105 MMOL/L (ref 97–108)
CO2 SERPL-SCNC: 28 MMOL/L (ref 21–32)
COLOR UR: ABNORMAL
CREAT SERPL-MCNC: 1.52 MG/DL (ref 0.55–1.02)
DIFFERENTIAL METHOD BLD: ABNORMAL
EOSINOPHIL # BLD: 0 K/UL (ref 0–0.4)
EOSINOPHIL NFR BLD: 0 % (ref 0–7)
ERYTHROCYTE [DISTWIDTH] IN BLOOD BY AUTOMATED COUNT: 13.3 % (ref 11.5–14.5)
GLOBULIN SER CALC-MCNC: 4 G/DL (ref 2–4)
GLUCOSE SERPL-MCNC: 170 MG/DL (ref 65–100)
GLUCOSE UR STRIP.AUTO-MCNC: 100 MG/DL
HCT VFR BLD AUTO: 34.1 % (ref 35–47)
HGB BLD-MCNC: 11.5 G/DL (ref 11.5–16)
HGB UR QL STRIP: NEGATIVE
IMM GRANULOCYTES # BLD AUTO: 0 K/UL (ref 0–0.04)
IMM GRANULOCYTES NFR BLD AUTO: 0 % (ref 0–0.5)
INR PPP: 1.1 (ref 0.9–1.1)
KETONES UR QL STRIP.AUTO: NEGATIVE MG/DL
LEUKOCYTE ESTERASE UR QL STRIP.AUTO: NEGATIVE
LYMPHOCYTES # BLD: 0.9 K/UL (ref 0.8–3.5)
LYMPHOCYTES NFR BLD: 12 % (ref 12–49)
MAGNESIUM SERPL-MCNC: 2.1 MG/DL (ref 1.6–2.4)
MCH RBC QN AUTO: 27.3 PG (ref 26–34)
MCHC RBC AUTO-ENTMCNC: 33.7 G/DL (ref 30–36.5)
MCV RBC AUTO: 80.8 FL (ref 80–99)
MONOCYTES # BLD: 0.4 K/UL (ref 0–1)
MONOCYTES NFR BLD: 5 % (ref 5–13)
NEUTS SEG # BLD: 6.3 K/UL (ref 1.8–8)
NEUTS SEG NFR BLD: 83 % (ref 32–75)
NITRITE UR QL STRIP.AUTO: NEGATIVE
NRBC # BLD: 0 K/UL (ref 0–0.01)
NRBC BLD-RTO: 0 PER 100 WBC
PH UR STRIP: 8 [PH] (ref 5–8)
PLATELET # BLD AUTO: 92 K/UL (ref 150–400)
PMV BLD AUTO: 11.8 FL (ref 8.9–12.9)
POTASSIUM SERPL-SCNC: 4.1 MMOL/L (ref 3.5–5.1)
PROT SERPL-MCNC: 7.7 G/DL (ref 6.4–8.2)
PROT UR STRIP-MCNC: NEGATIVE MG/DL
PROTHROMBIN TIME: 10.8 SEC (ref 9–11.1)
RBC # BLD AUTO: 4.22 M/UL (ref 3.8–5.2)
SODIUM SERPL-SCNC: 138 MMOL/L (ref 136–145)
SP GR UR REFRACTOMETRY: 1.03 (ref 1–1.03)
TROPONIN I SERPL-MCNC: 0.09 NG/ML
UROBILINOGEN UR QL STRIP.AUTO: 0.2 EU/DL (ref 0.2–1)
WBC # BLD AUTO: 7.7 K/UL (ref 3.6–11)

## 2020-03-04 PROCEDURE — 74011636320 HC RX REV CODE- 636/320: Performed by: EMERGENCY MEDICINE

## 2020-03-04 PROCEDURE — 74011000258 HC RX REV CODE- 258: Performed by: INTERNAL MEDICINE

## 2020-03-04 PROCEDURE — 93005 ELECTROCARDIOGRAM TRACING: CPT

## 2020-03-04 PROCEDURE — 99285 EMERGENCY DEPT VISIT HI MDM: CPT

## 2020-03-04 PROCEDURE — 70551 MRI BRAIN STEM W/O DYE: CPT

## 2020-03-04 PROCEDURE — 80053 COMPREHEN METABOLIC PANEL: CPT

## 2020-03-04 PROCEDURE — 65660000000 HC RM CCU STEPDOWN

## 2020-03-04 PROCEDURE — 70450 CT HEAD/BRAIN W/O DYE: CPT

## 2020-03-04 PROCEDURE — 36415 COLL VENOUS BLD VENIPUNCTURE: CPT

## 2020-03-04 PROCEDURE — 85610 PROTHROMBIN TIME: CPT

## 2020-03-04 PROCEDURE — 83735 ASSAY OF MAGNESIUM: CPT

## 2020-03-04 PROCEDURE — 70496 CT ANGIOGRAPHY HEAD: CPT

## 2020-03-04 PROCEDURE — 85025 COMPLETE CBC W/AUTO DIFF WBC: CPT

## 2020-03-04 PROCEDURE — 74011250637 HC RX REV CODE- 250/637: Performed by: INTERNAL MEDICINE

## 2020-03-04 PROCEDURE — 94762 N-INVAS EAR/PLS OXIMTRY CONT: CPT

## 2020-03-04 PROCEDURE — 74011250637 HC RX REV CODE- 250/637: Performed by: HOSPITALIST

## 2020-03-04 PROCEDURE — 84484 ASSAY OF TROPONIN QUANT: CPT

## 2020-03-04 PROCEDURE — 0042T CT CODE NEURO PERF W CBF: CPT

## 2020-03-04 PROCEDURE — 74011250636 HC RX REV CODE- 250/636: Performed by: INTERNAL MEDICINE

## 2020-03-04 PROCEDURE — 81003 URINALYSIS AUTO W/O SCOPE: CPT

## 2020-03-04 PROCEDURE — 74011250637 HC RX REV CODE- 250/637: Performed by: EMERGENCY MEDICINE

## 2020-03-04 PROCEDURE — 71045 X-RAY EXAM CHEST 1 VIEW: CPT

## 2020-03-04 PROCEDURE — 74011250636 HC RX REV CODE- 250/636: Performed by: EMERGENCY MEDICINE

## 2020-03-04 RX ORDER — AMLODIPINE BESYLATE 5 MG/1
5 TABLET ORAL DAILY
COMMUNITY
End: 2021-01-01

## 2020-03-04 RX ORDER — SODIUM CHLORIDE 0.9 % (FLUSH) 0.9 %
10 SYRINGE (ML) INJECTION
Status: COMPLETED | OUTPATIENT
Start: 2020-03-04 | End: 2020-03-04

## 2020-03-04 RX ORDER — LABETALOL HYDROCHLORIDE 5 MG/ML
5 INJECTION, SOLUTION INTRAVENOUS
Status: DISCONTINUED | OUTPATIENT
Start: 2020-03-04 | End: 2020-03-08 | Stop reason: HOSPADM

## 2020-03-04 RX ORDER — CLOPIDOGREL BISULFATE 75 MG/1
75 TABLET ORAL DAILY
Status: DISCONTINUED | OUTPATIENT
Start: 2020-03-04 | End: 2020-03-05

## 2020-03-04 RX ORDER — ASPIRIN 300 MG/1
300 SUPPOSITORY RECTAL DAILY
Status: DISCONTINUED | OUTPATIENT
Start: 2020-03-05 | End: 2020-03-05

## 2020-03-04 RX ORDER — ASPIRIN 300 MG/1
300 SUPPOSITORY RECTAL ONCE
Status: COMPLETED | OUTPATIENT
Start: 2020-03-04 | End: 2020-03-04

## 2020-03-04 RX ORDER — METOPROLOL TARTRATE 25 MG/1
25 TABLET, FILM COATED ORAL EVERY 12 HOURS
Status: DISCONTINUED | OUTPATIENT
Start: 2020-03-05 | End: 2020-03-08 | Stop reason: HOSPADM

## 2020-03-04 RX ORDER — FAMOTIDINE 10 MG/ML
20 INJECTION INTRAVENOUS EVERY 12 HOURS
Status: DISCONTINUED | OUTPATIENT
Start: 2020-03-04 | End: 2020-03-05

## 2020-03-04 RX ORDER — DEXTROSE MONOHYDRATE AND SODIUM CHLORIDE 5; .45 G/100ML; G/100ML
75 INJECTION, SOLUTION INTRAVENOUS CONTINUOUS
Status: DISCONTINUED | OUTPATIENT
Start: 2020-03-04 | End: 2020-03-06

## 2020-03-04 RX ORDER — ACETAMINOPHEN 650 MG/1
650 SUPPOSITORY RECTAL
Status: DISCONTINUED | OUTPATIENT
Start: 2020-03-04 | End: 2020-03-08 | Stop reason: HOSPADM

## 2020-03-04 RX ORDER — ATORVASTATIN CALCIUM 40 MG/1
40 TABLET, FILM COATED ORAL DAILY
Status: DISCONTINUED | OUTPATIENT
Start: 2020-03-05 | End: 2020-03-05

## 2020-03-04 RX ORDER — ACETAMINOPHEN 325 MG/1
650 TABLET ORAL
Status: DISCONTINUED | OUTPATIENT
Start: 2020-03-04 | End: 2020-03-08 | Stop reason: HOSPADM

## 2020-03-04 RX ADMIN — FAMOTIDINE 20 MG: 10 INJECTION, SOLUTION INTRAVENOUS at 22:52

## 2020-03-04 RX ADMIN — SODIUM CHLORIDE 500 ML: 900 INJECTION, SOLUTION INTRAVENOUS at 11:05

## 2020-03-04 RX ADMIN — Medication 10 ML: at 09:19

## 2020-03-04 RX ADMIN — ASPIRIN 300 MG: 300 SUPPOSITORY RECTAL at 11:09

## 2020-03-04 RX ADMIN — FAMOTIDINE 20 MG: 10 INJECTION, SOLUTION INTRAVENOUS at 15:06

## 2020-03-04 RX ADMIN — DEXTROSE MONOHYDRATE AND SODIUM CHLORIDE 75 ML/HR: 5; .45 INJECTION, SOLUTION INTRAVENOUS at 15:06

## 2020-03-04 RX ADMIN — IOPAMIDOL 100 ML: 755 INJECTION, SOLUTION INTRAVENOUS at 09:18

## 2020-03-04 RX ADMIN — METOPROLOL TARTRATE 25 MG: 25 TABLET ORAL at 23:20

## 2020-03-04 RX ADMIN — CLOPIDOGREL BISULFATE 75 MG: 75 TABLET ORAL at 15:06

## 2020-03-04 NOTE — H&P
Hospitalist Admission Note NAME: Verona Last :  1940 MRN:  [id number] Date/Time:  3/4/2020 2:41 PM 
 
Patient PCP: Ravi German NP 
______________________________________________________________________ Given the patient's current clinical presentation, I have a high level of concern for decompensation if discharged from the emergency department. Complex decision making was performed, which includes reviewing the patient's available past medical records, laboratory results, and x-ray films. My assessment of this patient's clinical condition and my plan of care is as follows. 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 Multiple bilateral supra and 
infratentorial remote infarcts. No hemorrhage or mass effect We will admit to neuro telemetry, neurochecks 4 hours,  Tele neurologist advised aspirin and Plavix and holding Eliquis Patient failed dysphagia screen, keep n.p.o., continue with rectal aspirin, continue with p.o. atorvastatin, start Plavix when able per tele-neurologist  
Allow permissive hypertension, PRN labetalol for systolic blood pressure [id number] or dbp>120 MRI brain if able ( pt has a loop recorder present on chest xray) Check 2D echo . CTA head and neck showed no major vessel occlusion. Intermittent  A. fib with RVR Upon presentation, patient was found to be in A. fib with RVR, received IV fluid, heart rate came down below 110, repeat EKG was more consistent with sinus tachycardia Patient needs permissive hypertension, will hold off on diltiazem drip Will consult cardiology, to consider amnio drip if recurrent Hold Eliquis per neuro Hypertension H/o of CVA 
CKD stage III Cad Systolic CHF Loop recorder present on chest xray Allow permissive hypertension Continue with IV fluid D5 half-normal saline Code Status: Presumed full code(no advanced directive and no family at bedside) we will consult palliative care for goals of care Surrogate Decision Maker: 
Lona Lombardo [phone] Shelia Love [phone] DVT Prophylaxis: scd GI Prophylaxis: not indicated Baseline: unknown Subjective: CHIEF COMPLAINT: facial droop and left sided weakness HISTORY OF PRESENT ILLNESS:    
Branda Hashimoto is a 78 y.o.   female with past medical history of CAD, systolic CHF, hypertension, CVA, A. fib on Eliquis, CKD stage III who sent to the ED by the family for left leg weakness, left facial droop and slurred speech. Patient last well-known was 9 PM yesterday. She was noted to be more confused than usual.  Patient has a history of stroke with left-sided deficit but family reported that her weakness is worse. Most of the HPI obtained from ED notes as HPI limited by with slurred speech and confusional state. In the ED, he was found to be initially in A. fib with RVR with a rate of 125 , subsequent EKG showed sinus tachycardia after  IV fluids. Her blood pressure is elevated. Review of her labs showed elevated troponin, with a creatinine. CT of the brain showed[de-identified] No acute changes demonstrated. Multiple bilateral supra and 
infratentorial remote infarcts. No hemorrhage or mass effect. Patient was evaluated by tele-neurologist who deemed the patient not to be a TPA candidate as she is on Eliquis and is outside the window. We were asked to admit for work up and evaluation of the above problems. Past Medical History:  
Diagnosis Date  Acute kidney failure ([address] 7/14/2016  Acute systolic heart failure ([address] 7/14/2016  Arrhythmia  CAD (coronary artery disease)  Heart failure [address] [address]  Hypertension  Stroke [facility]) 2009 Stroke, left hand weakness and speech, peripheral vision affected Past Surgical History:  
Procedure Laterality Date  HX UROLOGICAL  02/2018 Social History Tobacco Use  Smoking status: Former Smoker Last attempt to quit: 9/21/2009 Years since quitting: 10.4  Smokeless tobacco: Never Used Substance Use Topics  Alcohol use: No  
  Alcohol/week: 0.0 standard drinks Comment: Very little Family History Problem Relation Age of Onset  Coronary Artery Disease Other   
     grandmother age 76  Stroke Mother  Hypertension Mother Allergies Allergen Reactions  Decadron [Dexamethasone Sodium Phosphate] Hives  Pcn [Penicillins] Hives Prior to Admission medications Medication Sig Start Date End Date Taking? Authorizing Provider  
amLODIPine (NORVASC) 5 mg tablet Take 5 mg by mouth daily. Yes Provider, Historical  
atorvastatin (LIPITOR) 40 mg tablet Take 40 mg by mouth daily. 7/18/19  Yes Provider, Historical  
allopurinol (ZYLOPRIM) 100 mg tablet Take 100 mg by mouth daily. Yes Cookie Hutson,   
apixaban (ELIQUIS) 2.5 mg tablet Take 1 Tab by mouth two (2) times a day. 9/11/18  Yes Rowena Hogan, ROME potassium chloride (K-DUR, KLOR-CON) 20 mEq tablet Take 20 mEq by mouth daily. MON, WED., SAT 5/4/18  Yes Provider, Historical  
famotidine (PEPCID) 20 mg tablet Take 20 mg by mouth daily. Yes Provider, Historical  
furosemide (LASIX) 20 mg tablet Take 20 mg by mouth Every Mon, Wed and Sat. Yes Provider, Historical  
nitroglycerin (NITROSTAT) 0.4 mg SL tablet 0.4 mg by SubLINGual route every five (5) minutes as needed for Chest Pain. Up to 3 doses. Yes Provider, Historical  
albuterol (PROVENTIL HFA, VENTOLIN HFA, PROAIR HFA) 90 mcg/actuation inhaler Take 2 Puffs by inhalation every four (4) hours as needed for Wheezing. 7/21/16  Yes Franco Pryor PA  
 
 
REVIEW OF SYSTEMS:    
I am not able to complete the review of systems because: The patient is intubated and sedated  
x The patient has altered mental status due to his acute medical problems The patient has baseline aphasia from prior stroke(s) The patient has baseline dementia and is not reliable historian The patient is in acute medical distress and unable to provide information Objective: VITALS:   
Visit Vitals BP (!) 168/95 Pulse (!) 119 Temp 98.5 °F (36.9 °C) Resp 23 SpO2 97% PHYSICAL EXAM: 
 
General:    Alert, cooperative, no distress, appears stated age. HEENT: Atraumatic, anicteric sclerae, pink conjunctivae No oral ulcers, mucosa moist, throat clear, dentition fair Neck:  Supple, symmetrical,  thyroid: non tender Lungs:   Clear to auscultation bilaterally. No Wheezing or Rhonchi. No rales. Chest wall:  No tenderness  No Accessory muscle use. Heart:   Regular  rhythm,  No  murmur   No edema Abdomen:   Soft, non-tender. Not distended. Bowel sounds normal 
Extremities: No cyanosis. No clubbing,   
  Skin turgor normal, Capillary refill normal, Radial dial pulse 2+ Skin:     Not pale. Not Jaundiced  No rashes Psych:  Good insight. Not depressed. Not anxious or agitated. Neurologic: EOMs intact. Left side facial droop ,  aphasia or slurred speech++. LUE 2/5 LLE 3/5 , RUE/RLE 5/5, Sensation grossly intact. Alert and oriented X 1.  
 
_______________________________________________________________________ Care Plan discussed with: 
  Comments Patient x Family RN x Care Manager Consultant:     
_______________________________________________________________________ Expected  Disposition:  
Home with Family HH/PT/OT/RN   
SNF/LTC x  
SOLA   
________________________________________________________________________ TOTAL TIME:  65Minutes Critical Care Provided     Minutes non procedure based Comments  
 x Reviewed previous records  
>50% of visit spent in counseling and coordination of care x Discussion with patient and/or family and questions answered ________________________________________________________________________ Signed: Rica Banerjee MD 
 
Procedures: see electronic medical records for all procedures/Xrays and details which were not copied into this note but were reviewed prior to creation of Plan. LAB DATA REVIEWED:   
Recent Results (from the past 24 hour(s)) CBC WITH AUTOMATED DIFF Collection Time: 03/04/20  9:38 AM  
Result Value Ref Range WBC 7.7 3.6 - 11.0 K/uL  
 RBC 4.22 3.80 - 5.20 M/uL  
 HGB 11.5 11.5 - 16.0 g/dL HCT 34.1 (L) 35.0 - 47.0 % MCV 80.8 80.0 - 99.0 FL  
 MCH 27.3 26.0 - 34.0 PG  
 MCHC 33.7 30.0 - 36.5 g/dL  
 RDW 13.3 11.5 - 14.5 % PLATELET 92 (L) 534 - 400 K/uL MPV 11.8 8.9 - 12.9 FL  
 NRBC 0.0 0  WBC ABSOLUTE NRBC 0.00 0.00 - 0.01 K/uL NEUTROPHILS 83 (H) 32 - 75 % LYMPHOCYTES 12 12 - 49 % MONOCYTES 5 5 - 13 % EOSINOPHILS 0 0 - 7 % BASOPHILS 0 0 - 1 % IMMATURE GRANULOCYTES 0 0.0 - 0.5 % ABS. NEUTROPHILS 6.3 1.8 - 8.0 K/UL  
 ABS. LYMPHOCYTES 0.9 0.8 - 3.5 K/UL  
 ABS. MONOCYTES 0.4 0.0 - 1.0 K/UL  
 ABS. EOSINOPHILS 0.0 0.0 - 0.4 K/UL  
 ABS. BASOPHILS 0.0 0.0 - 0.1 K/UL  
 ABS. IMM. GRANS. 0.0 0.00 - 0.04 K/UL  
 DF AUTOMATED METABOLIC PANEL, COMPREHENSIVE Collection Time: 03/04/20  9:38 AM  
Result Value Ref Range Sodium 138 136 - 145 mmol/L Potassium 4.1 3.5 - 5.1 mmol/L Chloride 105 97 - 108 mmol/L  
 CO2 28 21 - 32 mmol/L Anion gap 5 5 - 15 mmol/L Glucose 170 (H) 65 - 100 mg/dL BUN 32 (H) 6 - 20 MG/DL Creatinine 1.52 (H) 0.55 - 1.02 MG/DL  
 BUN/Creatinine ratio 21 (H) 12 - 20 GFR est AA 40 (L) >60 ml/min/1.73m2 GFR est non-AA 33 (L) >60 ml/min/1.73m2 Calcium 9.0 8.5 - 10.1 MG/DL Bilirubin, total 0.3 0.2 - 1.0 MG/DL  
 ALT (SGPT) 16 12 - 78 U/L  
 AST (SGOT) 18 15 - 37 U/L Alk. phosphatase 130 (H) 45 - 117 U/L Protein, total 7.7 6.4 - 8.2 g/dL Albumin 3.7 3.5 - 5.0 g/dL Globulin 4.0 2.0 - 4.0 g/dL A-G Ratio 0.9 (L) 1.1 - 2.2 PROTHROMBIN TIME + INR Collection Time: 03/04/20  9:38 AM  
Result Value Ref Range INR 1.1 0.9 - 1.1 Prothrombin time 10.8 9.0 - 11.1 sec TROPONIN I Collection Time: 03/04/20  9:38 AM  
Result Value Ref Range Troponin-I, Qt. 0.09 (H) <0.05 ng/mL MAGNESIUM Collection Time: 03/04/20  9:38 AM  
Result Value Ref Range Magnesium 2.1 1.6 - 2.4 mg/dL URINALYSIS W/ RFLX MICROSCOPIC Collection Time: 03/04/20 11:20 AM  
Result Value Ref Range Color YELLOW/STRAW Appearance CLEAR CLEAR Specific gravity 1.028 1.003 - 1.030    
 pH (UA) 8.0 5.0 - 8.0 Protein NEGATIVE  NEG mg/dL Glucose 100 (A) NEG mg/dL Ketone NEGATIVE  NEG mg/dL Bilirubin NEGATIVE  NEG Blood NEGATIVE  NEG Urobilinogen 0.2 0.2 - 1.0 EU/dL Nitrites NEGATIVE  NEG  Leukocyte Esterase NEGATIVE  NEG

## 2020-03-04 NOTE — PROGRESS NOTES
Pharmacy Medication Reconciliation The patient was interviewed regarding current PTA medication list, use and drug allergies;  patient only present in room and obtained permission from patient to discuss drug regimen with visitor(s) present. The patient was questioned regarding use of any other inhalers, topical products, over the counter medications, herbal medications, vitamin products or ophthalmic/nasal/otic medication use. Allergy Update: Decadron [dexamethasone sodium phosphate] and Pcn [penicillins] Recommendations/Findings: The following amendments were made to the patient's active medication list on file at [facility]:  
1) Additions:  
+amlodipine 5mg daily 2) Deletions:  
-toprol 25mg 
-aspirin 81mg 
-vitamin c 
-meclizine prn 
 
3) Changes: None Pertinent Findings:  
- gave medication list to provider 
 
-Clarified PTA med list with patient interview, medication list, and RX query. PTA medication list was corrected to the following:  
 
Prior to Admission Medications Prescriptions Last Dose Informant Taking? albuterol (PROVENTIL HFA, VENTOLIN HFA, PROAIR HFA) 90 mcg/actuation inhaler  Other Yes Sig: Take 2 Puffs by inhalation every four (4) hours as needed for Wheezing. allopurinol (ZYLOPRIM) 100 mg tablet 3/3/2020 at Unknown time Other Yes Sig: Take 100 mg by mouth daily. amLODIPine (NORVASC) 5 mg tablet 3/4/2020 at Unknown time Other Yes Sig: Take 5 mg by mouth daily. apixaban (ELIQUIS) 2.5 mg tablet 3/4/2020 at 0830 Other Yes Sig: Take 1 Tab by mouth two (2) times a day. atorvastatin (LIPITOR) 40 mg tablet 3/4/2020 at Unknown time Other Yes Sig: Take 40 mg by mouth daily. famotidine (PEPCID) 20 mg tablet 3/3/2020 at Unknown time Other Yes Sig: Take 20 mg by mouth daily. furosemide (LASIX) 20 mg tablet 3/4/2020 at Unknown time Other Yes Sig: Take 20 mg by mouth Every Mon, Wed and Sat. nitroglycerin (NITROSTAT) 0.4 mg SL tablet  Other Yes Si.4 mg by SubLINGual route every five (5) minutes as needed for Chest Pain. Up to 3 doses. potassium chloride (K-DUR, KLOR-CON) 20 mEq tablet 3/4/2020 at Unknown time Other Yes Sig: Take 20 mEq by mouth daily. MON, WED., SAT Facility-Administered Medications: None Thank you, YAMIL Rock

## 2020-03-04 NOTE — ED PROVIDER NOTES
EMERGENCY DEPARTMENT HISTORY AND PHYSICAL EXAM 
 
 
Date: 3/4/2020 Patient Name: Marc Hendrix History of Presenting Illness Chief Complaint Patient presents with  Extremity Weakness History Provided By: Patient HPI: Marc Hendrix, 78 y.o. female with PMHx Of atrial fibrillation, on Eliquis and previous stroke with dysarthria and left arm weakness who presents the emergency department with facial droop and worsening left arm and left leg weakness. Last known well was yesterday evening at 9 PM and awoke this morning with symptoms. There is been no recent illness however history is limited secondary to the patient significant dysarthria. PCP: Shira Monson NP Current Facility-Administered Medications Medication Dose Route Frequency Provider Last Rate Last Dose  [START ON 3/5/2020] atorvastatin (LIPITOR) tablet 40 mg  40 mg Oral DAILY Taina Farrell MD      
 famotidine (PF) (PEPCID) injection 20 mg  20 mg IntraVENous Q12H Taina Farrell MD   20 mg at 03/04/20 1506  acetaminophen (TYLENOL) tablet 650 mg  650 mg Oral Q4H PRN Taina Farrell MD      
 Or  
 acetaminophen (TYLENOL) solution 650 mg  650 mg Per NG tube Q4H PRN Taina Farrell MD      
 Or  
 acetaminophen (TYLENOL) suppository 650 mg  650 mg Rectal Q4H PRN Taina Farrell MD      
 dextrose 5 % - 0.45% NaCl infusion  75 mL/hr IntraVENous CONTINUOUS Taina Farrell MD 75 mL/hr at 03/04/20 1506 75 mL/hr at 03/04/20 1506  [START ON 3/5/2020] aspirin (ASA) suppository 300 mg  300 mg Rectal DAILY Taina Farrell MD      
 clopidogreL (PLAVIX) tablet 75 mg  75 mg Oral DAILY Taina Farrell MD   75 mg at 03/04/20 1506  labetaloL (NORMODYNE;TRANDATE) injection 5 mg  5 mg IntraVENous Q10MIN PRN Taina Farrell MD      
 
Current Outpatient Medications Medication Sig Dispense Refill  amLODIPine (NORVASC) 5 mg tablet Take 5 mg by mouth daily.  atorvastatin (LIPITOR) 40 mg tablet Take 40 mg by mouth daily.  allopurinol (ZYLOPRIM) 100 mg tablet Take 100 mg by mouth daily.  apixaban (ELIQUIS) 2.5 mg tablet Take 1 Tab by mouth two (2) times a day. 60 Tab 12  
 potassium chloride (K-DUR, KLOR-CON) 20 mEq tablet Take 20 mEq by mouth daily. MON, WED., SAT  famotidine (PEPCID) 20 mg tablet Take 20 mg by mouth daily.  furosemide (LASIX) 20 mg tablet Take 20 mg by mouth Every Mon, Wed and Sat.  nitroglycerin (NITROSTAT) 0.4 mg SL tablet 0.4 mg by SubLINGual route every five (5) minutes as needed for Chest Pain. Up to 3 doses.  albuterol (PROVENTIL HFA, VENTOLIN HFA, PROAIR HFA) 90 mcg/actuation inhaler Take 2 Puffs by inhalation every four (4) hours as needed for Wheezing. 1 Inhaler 1 Past History Past Medical History: 
Past Medical History:  
Diagnosis Date  Acute kidney failure ([facility] [address] 7/14/2016  Acute systolic heart failure ([address] 7/14/2016  Arrhythmia  CAD (coronary artery disease)  Heart failure ([facility] [address]  Hypertension  Stroke [facility]) 2009 Stroke, left hand weakness and speech, peripheral vision affected Past Surgical History: 
Past Surgical History:  
Procedure Laterality Date  HX UROLOGICAL  02/2018 Family History: 
Family History Problem Relation Age of Onset  Coronary Artery Disease Other   
     grandmother age 76  Stroke Mother  Hypertension Mother Social History: 
Social History Tobacco Use  Smoking status: Former Smoker Last attempt to quit: 9/21/2009 Years since quitting: 10.4  Smokeless tobacco: Never Used Substance Use Topics  Alcohol use: No  
  Alcohol/week: 0.0 standard drinks Comment: Very little  Drug use: No  
 
 
Allergies: Allergies Allergen Reactions  Decadron [Dexamethasone Sodium Phosphate] Hives  Pcn [Penicillins] Hives Review of Systems Review of Systems Unable to perform ROS: Other Physical Exam  
Physical Exam 
 
GENERAL: alert  no acute distress EYES: PEERL, No injection, discharge or icterus. ENT: Mucous membranes pink and moist. 
NECK: Supple LUNGS: Airway patent. Non-labored respirations. Breath sounds clear with good air entry bilaterally. HEART: Regular rate and rhythm. No peripheral edema ABDOMEN: Non-distended and non-tender, without guarding or rebound. SKIN:  warm, dry EXTREMITIES: Without swelling, tenderness or deformity, symmetric with normal ROM 
NEUROLOGICAL: Alert, Dysarthria there is left lower facial weakness as well as decreased sensation on the left side of the face, left arm and left leg. Patient is unable to move the left arm however there is significant drift of the left leg noted Diagnostic Study Results Labs - Recent Results (from the past 12 hour(s)) CBC WITH AUTOMATED DIFF Collection Time: 03/04/20  9:38 AM  
Result Value Ref Range WBC 7.7 3.6 - 11.0 K/uL  
 RBC 4.22 3.80 - 5.20 M/uL  
 HGB 11.5 11.5 - 16.0 g/dL HCT 34.1 (L) 35.0 - 47.0 % MCV 80.8 80.0 - 99.0 FL  
 MCH 27.3 26.0 - 34.0 PG  
 MCHC 33.7 30.0 - 36.5 g/dL  
 RDW 13.3 11.5 - 14.5 % PLATELET 92 (L) 323 - 400 K/uL MPV 11.8 8.9 - 12.9 FL  
 NRBC 0.0 0  WBC ABSOLUTE NRBC 0.00 0.00 - 0.01 K/uL NEUTROPHILS 83 (H) 32 - 75 % LYMPHOCYTES 12 12 - 49 % MONOCYTES 5 5 - 13 % EOSINOPHILS 0 0 - 7 % BASOPHILS 0 0 - 1 % IMMATURE GRANULOCYTES 0 0.0 - 0.5 % ABS. NEUTROPHILS 6.3 1.8 - 8.0 K/UL  
 ABS. LYMPHOCYTES 0.9 0.8 - 3.5 K/UL  
 ABS. MONOCYTES 0.4 0.0 - 1.0 K/UL  
 ABS. EOSINOPHILS 0.0 0.0 - 0.4 K/UL  
 ABS. BASOPHILS 0.0 0.0 - 0.1 K/UL  
 ABS. IMM. GRANS. 0.0 0.00 - 0.04 K/UL  
 DF AUTOMATED METABOLIC PANEL, COMPREHENSIVE Collection Time: 03/04/20  9:38 AM  
Result Value Ref Range Sodium 138 136 - 145 mmol/L Potassium 4.1 3.5 - 5.1 mmol/L Chloride 105 97 - 108 mmol/L  
 CO2 28 21 - 32 mmol/L  Anion gap 5 5 - 15 mmol/L  
 Glucose 170 (H) 65 - 100 mg/dL BUN 32 (H) 6 - 20 MG/DL Creatinine 1.52 (H) 0.55 - 1.02 MG/DL  
 BUN/Creatinine ratio 21 (H) 12 - 20 GFR est AA 40 (L) >60 ml/min/1.73m2 GFR est non-AA 33 (L) >60 ml/min/1.73m2 Calcium 9.0 8.5 - 10.1 MG/DL Bilirubin, total 0.3 0.2 - 1.0 MG/DL  
 ALT (SGPT) 16 12 - 78 U/L  
 AST (SGOT) 18 15 - 37 U/L Alk. phosphatase 130 (H) 45 - 117 U/L Protein, total 7.7 6.4 - 8.2 g/dL Albumin 3.7 3.5 - 5.0 g/dL Globulin 4.0 2.0 - 4.0 g/dL A-G Ratio 0.9 (L) 1.1 - 2.2 PROTHROMBIN TIME + INR Collection Time: 03/04/20  9:38 AM  
Result Value Ref Range INR 1.1 0.9 - 1.1 Prothrombin time 10.8 9.0 - 11.1 sec TROPONIN I Collection Time: 03/04/20  9:38 AM  
Result Value Ref Range Troponin-I, Qt. 0.09 (H) <0.05 ng/mL MAGNESIUM Collection Time: 03/04/20  9:38 AM  
Result Value Ref Range Magnesium 2.1 1.6 - 2.4 mg/dL URINALYSIS W/ RFLX MICROSCOPIC Collection Time: 03/04/20 11:20 AM  
Result Value Ref Range Color YELLOW/STRAW Appearance CLEAR CLEAR Specific gravity 1.028 1.003 - 1.030    
 pH (UA) 8.0 5.0 - 8.0 Protein NEGATIVE  NEG mg/dL Glucose 100 (A) NEG mg/dL Ketone NEGATIVE  NEG mg/dL Bilirubin NEGATIVE  NEG Blood NEGATIVE  NEG Urobilinogen 0.2 0.2 - 1.0 EU/dL Nitrites NEGATIVE  NEG Leukocyte Esterase NEGATIVE  NEG Radiologic Studies -  
XR CHEST PORT Final Result IMPRESSION: Mild central congestion without overt CHF. Left basilar subsegmental  
atelectasis. CTA CODE NEURO HEAD AND NECK W CONT Final Result IMPRESSION:   
  
CTA Head: 1. No evidence of large vessel occlusion or flow-limiting stenosis. Scattered  
atherosclerotic disease as above. CTA Neck: 1. No evidence of flow-limiting stenosis. 2. Mild stenosis (less than 50% by NASCET criteria) of the proximal bilateral  
internal carotid arteries. 3. Moderate stenosis at the origin of the right vertebral artery. CT brain perfusion: 1. No acute abnormality. CT CODE NEURO PERF W CBF Final Result IMPRESSION:   
  
CTA Head: 1. No evidence of large vessel occlusion or flow-limiting stenosis. Scattered  
atherosclerotic disease as above. CTA Neck: 1. No evidence of flow-limiting stenosis. 2. Mild stenosis (less than 50% by NASCET criteria) of the proximal bilateral  
internal carotid arteries. 3. Moderate stenosis at the origin of the right vertebral artery. CT brain perfusion: 1. No acute abnormality. CT CODE NEURO HEAD WO CONTRAST Final Result IMPRESSION: No acute changes demonstrated. Multiple bilateral supra and  
infratentorial remote infarcts. No hemorrhage or mass effect. MRI BRAIN WO CONT    (Results Pending) CT Results  (Last 48 hours) 03/04/20 0923  CTA CODE NEURO HEAD AND NECK W CONT Final result Impression:  IMPRESSION:   
   
CTA Head: 1. No evidence of large vessel occlusion or flow-limiting stenosis. Scattered  
atherosclerotic disease as above. CTA Neck: 1. No evidence of flow-limiting stenosis. 2. Mild stenosis (less than 50% by NASCET criteria) of the proximal bilateral  
internal carotid arteries. 3. Moderate stenosis at the origin of the right vertebral artery. CT brain perfusion: 1. No acute abnormality. Narrative:  EXAM:  CTA CODE NEURO HEAD AND NECK W CONT, CT CODE NEURO PERF W CBF INDICATION:   Left leg weakness, facial droop. COMPARISON:  CT head 3/4/2020, MRI brain 5/8/2018. CONTRAST:  100 mL of Isovue-370. TECHNIQUE:  Unenhanced  images were obtained to localize the volume for  
acquisition. Multislice helical axial CT angiography was performed from the  
aortic arch to the top of the head during uneventful rapid bolus intravenous contrast administration. Coronal and sagittal reformations and 3D post  
processing was performed. CT dose reduction was achieved through use of a  
standardized protocol tailored for this examination and automatic exposure  
control for dose modulation. CT brain perfusion was performed with generation of hemodynamic maps of multiple  
parameters, including cerebral blood flow, cerebral blood volume, and MTT (mean  
transit time). CT dose reduction was achieved through use of a standardized  
protocol tailored for this examination and automatic exposure control for dose  
modulation. FINDINGS:  
   
CTA Head:  
There is no evidence of large vessel occlusion or flow-limiting stenosis of the  
intracranial internal carotid, anterior cerebral, and middle cerebral arteries. Calcification the bilateral carotid siphons with multifocal moderate stenoses. Multifocal mild stenoses of the left M1 segment. The anterior communicating  
artery is patent with small left A1 segment. There is no evidence of large vessel occlusion or flow-limiting stenosis of the  
intracranial vertebral arteries, basilar artery, or posterior cerebral arteries. Multifocal mild stenoses of the bilateral posterior cerebral arteries. The  
posterior communicating arteries are patent, right larger than left, with a  
prominent infundibulum at the origin of the right posterior communicating  
artery. There is no evidence of aneurysm or vascular malformation. The dural venous  
sinuses and deep cerebral venous system are patent. No evidence of abnormal  
enhancement on delayed phase images. Large chronic right MCA territory infarct,  
numerous other chronic infarcts within the right basal ganglia, left thalamus,  
left occipital lobe and bilateral cerebellum. CTA NECK:  
NASCET method was utilized for calculating stenosis. Severe calcific atherosclerosis of the aortic arch. The common carotid arteries are normal in course and caliber bilaterally. Calcific atherosclerosis of the  
right carotid bifurcation with mild (less than 50%) stenosis of the proximal  
right internal carotid artery. Calcific atherosclerosis of the left carotid  
bifurcation with mild (less than 50%) stenosis of the proximal left internal  
carotid artery. There is a codominant vertebrobasilar arterial system. Moderate stenosis at the  
origin of the right vertebral artery. Mild stenosis at the origin of the left  
vertebral artery. Multiple thyroid nodules are noted, largest in the posterior right thyroid lobe  
measuring 13 mm. Visualized lung apices are clear. No acute fracture or  
aggressive osseous lesion. Reversal of the cervical lordosis with advanced  
multilevel degenerative disc disease throughout the cervical spine. CT brain perfusion:  
There are no acute regional areas of elevated Tmax, decreased cerebral blood  
flow or blood volume. There are hypoperfusion findings of large chronic right MCA territory infarct and left occipital infarct with elevated Tmax, decreased  
blood flow and blood volume. rCBF < 30% = 0 cc. Tmax > 6 seconds = 0 cc.  
   
  
 03/04/20 0923  CT CODE NEURO PERF W CBF Final result Impression:  IMPRESSION:   
   
CTA Head: 1. No evidence of large vessel occlusion or flow-limiting stenosis. Scattered  
atherosclerotic disease as above. CTA Neck: 1. No evidence of flow-limiting stenosis. 2. Mild stenosis (less than 50% by NASCET criteria) of the proximal bilateral  
internal carotid arteries. 3. Moderate stenosis at the origin of the right vertebral artery. CT brain perfusion: 1. No acute abnormality. Narrative:  EXAM:  CTA CODE NEURO HEAD AND NECK W CONT, CT CODE NEURO PERF W CBF INDICATION:   Left leg weakness, facial droop. COMPARISON:  CT head 3/4/2020, MRI brain 5/8/2018. CONTRAST:  100 mL of Isovue-370. TECHNIQUE:  Unenhanced  images were obtained to localize the volume for  
acquisition. Multislice helical axial CT angiography was performed from the  
aortic arch to the top of the head during uneventful rapid bolus intravenous  
contrast administration. Coronal and sagittal reformations and 3D post  
processing was performed. CT dose reduction was achieved through use of a  
standardized protocol tailored for this examination and automatic exposure  
control for dose modulation. CT brain perfusion was performed with generation of hemodynamic maps of multiple  
parameters, including cerebral blood flow, cerebral blood volume, and MTT (mean  
transit time). CT dose reduction was achieved through use of a standardized  
protocol tailored for this examination and automatic exposure control for dose  
modulation. FINDINGS:  
   
CTA Head:  
There is no evidence of large vessel occlusion or flow-limiting stenosis of the  
intracranial internal carotid, anterior cerebral, and middle cerebral arteries. Calcification the bilateral carotid siphons with multifocal moderate stenoses. Multifocal mild stenoses of the left M1 segment. The anterior communicating  
artery is patent with small left A1 segment. There is no evidence of large vessel occlusion or flow-limiting stenosis of the  
intracranial vertebral arteries, basilar artery, or posterior cerebral arteries. Multifocal mild stenoses of the bilateral posterior cerebral arteries. The  
posterior communicating arteries are patent, right larger than left, with a  
prominent infundibulum at the origin of the right posterior communicating  
artery. There is no evidence of aneurysm or vascular malformation. The dural venous  
sinuses and deep cerebral venous system are patent. No evidence of abnormal  
enhancement on delayed phase images.  Large chronic right MCA territory infarct,  
 numerous other chronic infarcts within the right basal ganglia, left thalamus,  
left occipital lobe and bilateral cerebellum. CTA NECK:  
NASCET method was utilized for calculating stenosis. Severe calcific atherosclerosis of the aortic arch. The common carotid arteries  
are normal in course and caliber bilaterally. Calcific atherosclerosis of the  
right carotid bifurcation with mild (less than 50%) stenosis of the proximal  
right internal carotid artery. Calcific atherosclerosis of the left carotid  
bifurcation with mild (less than 50%) stenosis of the proximal left internal  
carotid artery. There is a codominant vertebrobasilar arterial system. Moderate stenosis at the  
origin of the right vertebral artery. Mild stenosis at the origin of the left  
vertebral artery. Multiple thyroid nodules are noted, largest in the posterior right thyroid lobe  
measuring 13 mm. Visualized lung apices are clear. No acute fracture or  
aggressive osseous lesion. Reversal of the cervical lordosis with advanced  
multilevel degenerative disc disease throughout the cervical spine. CT brain perfusion:  
There are no acute regional areas of elevated Tmax, decreased cerebral blood  
flow or blood volume. There are hypoperfusion findings of large chronic right MCA territory infarct and left occipital infarct with elevated Tmax, decreased  
blood flow and blood volume. rCBF < 30% = 0 cc. Tmax > 6 seconds = 0 cc.  
   
  
 03/04/20 0910  CT CODE NEURO HEAD WO CONTRAST Final result Impression:  IMPRESSION: No acute changes demonstrated. Multiple bilateral supra and  
infratentorial remote infarcts. No hemorrhage or mass effect. Narrative:  EXAM: CT CODE NEURO HEAD WO CONTRAST INDICATION: Code Stroke COMPARISON: Tania 10, 2019. CONTRAST: None. TECHNIQUE: Unenhanced CT of the head was performed using 5 mm images.  Brain and  
 bone windows were generated. CT dose reduction was achieved through use of a  
standardized protocol tailored for this examination and automatic exposure  
control for dose modulation. FINDINGS:  
Remote right middle cerebral artery distribution infarct. Multiple remote basal  
ganglia lacunes, multiple remote cerebellar infarct, remote left  
parieto-occipital infarct. Katherene Means Diffuse atrophy and white matter disease. No extra-axial fluid collection masses or hemorrhage. No shift of the midline. CXR Results  (Last 48 hours) 03/04/20 0947  XR CHEST PORT Final result Impression:  IMPRESSION: Mild central congestion without overt CHF. Left basilar subsegmental  
atelectasis. Narrative:  EXAM: XR CHEST PORT INDICATION: Acute mental status change COMPARISON: 5/6/2018 FINDINGS: A portable AP radiograph of the chest was obtained at 0903 hours. The  
patient is on a cardiac monitor. There is mild central congestion. L loop  
recorder is present. New linear streaky changes are present at the left base. The cardiac and mediastinal contours are stable. The bones and soft tissues are  
grossly within normal limits. Medical Decision Making I am the first provider for this patient. I reviewed the vital signs, available nursing notes, past medical history, past surgical history, family history and social history. Vital Signs-Reviewed the patient's vital signs. Patient Vitals for the past 12 hrs: 
 Temp Pulse Resp BP SpO2  
03/04/20 1505  94 16  98 % 03/04/20 1448  (!) 106 16  97 % 03/04/20 1430  93 14 142/82 96 % 03/04/20 1330  (!) 119 23 (!) 168/95 97 % 03/04/20 1300  (!) 108 29 (!) 160/94 96 % 03/04/20 1100  (!) 119 14 (!) 155/97 96 % 03/04/20 0940 98.5 °F (36.9 °C) (!) 125 23 172/78 96 % Katherene Means Records Reviewed: Nursing Notes and Old Medical Records Provider Notes (Medical Decision Making):  
 There is an 27-year-old female presenting with new left-sided facial droop and left leg weakness upon awakening this morning. Given the time course of symptoms anticoagulant use is not a TPA candidate. CT showed no acute pathology and patient's initial tachycardia responded well to IV fluids. Patient symptoms are concerning for acute stroke so will admit for further management. ED Course:  
Initial assessment performed. The patients presenting problems have been discussed, and they are in agreement with the care plan formulated and outlined with them. I have encouraged them to ask questions as they arise throughout their visit. Consult: Spoke with Dr. Svetlana Laureano, neurology, agrees that symptoms likely representative of an acute stroke and the patient is not a TPA candidate. Recommends holding Eliquis and a dual antiplatelet therapy with aspirin and Plavix when able. PROGRESS: 
The patient has been re-evaluated. . Reviewed available results with patient and have counseled them on diagnosis and care plan. They have expressed understanding, and all their questions have been answered. They agree with plan for admission. CONSULT: 
Jorge Luis Mak MD spoke with the hospitalist.  Discussed HPI and PE, available diagnostic tests and clinical findings. He is in agreement with care plans as outlined and will evaluate for admission Admit Note Patient is being admitted to the hospitalist.  The results of their tests and reasons for their admission have been discussed with them and/or available family. They convey agreement and understanding for the need to be admitted and for their admission diagnosis. Consultation has been made with the inpatient physician specialist for hospitalization. Disposition: 
admission PLAN: 
1. Admit Diagnosis Clinical Impression: 1. Cerebrovascular accident (CVA), unspecified mechanism ([address] 2. Tachycardia Please note that this dictation was completed with Dragon, computer voice recognition software. Quite often unanticipated grammatical, syntax, homophones, and other interpretive errors are inadvertently transcribed by the computer software. Please disregard these errors. Additionally, please excuse any errors that have escaped final proofreading.

## 2020-03-04 NOTE — ROUTINE PROCESS
-Please complete MRI History and Safety Screening Form for this patient using [employer] only under Orders Requiring a Screening Form: 
 

## 2020-03-04 NOTE — PROGRESS NOTES
Spiritual Care Assessment/Progress Note [address] 
 
 
NAME: Aleyda Bruno      MRN: [id number] AGE: 78 y.o. SEX: female Restoration Affiliation: [facility]  
Language: [place] 3/4/2020     Total Time (in minutes): 11 Spiritual Assessment begun in [facility] NEUROSCIENCE TELEMETRY through conversation with: 
  
    []Patient        [] Family    [] Friend(s) Reason for Consult: Palliative Care, Initial/Spiritual Assessment Spiritual beliefs: (Please include comment if needed) 
   [] Identifies with a irving tradition:     
   [] Supported by a irving community:        
   [] Claims no spiritual orientation:       
   [] Seeking spiritual identity:            
   [] Adheres to an individual form of spirituality:       
   [x] Not able to assess:                   
 
    
Identified resources for coping:  
   [] Prayer                           
   [] Music                  [] Guided Imagery 
   [] Family/friends                 [] Pet visits [] Devotional reading                         [x] Unknown 
   [] Other:                                         
 
 
Interventions offered during this visit: (See comments for more details) Plan of Care: 
 
 [] Support spiritual and/or cultural needs  
 [] Support AMD and/or advance care planning process    
 [] Support grieving process 
 [] Coordinate Rites and/or Rituals  
 [] Coordination with community clergy 
 [x] No spiritual needs identified at this time 
 [] Detailed Plan of Care below (See Comments)  [] Make referral to Music Therapy 
[] Make referral to Pet Therapy    
[] Make referral to Addiction services 
[] Make referral to [facility] 
[] Make referral to Spiritual Care Partner 
[] No future visits requested       
[x] Follow up visits as needed Comments: Patient was visited on the Neuro Tele unit to make a spiritual assessment. The patient did not have family/friends present.  The patient is unable to communicate at this time. Provided encouraging words to the patient who appeared to comprehend but could not respond clearly. Assured patient that she will be kept in prayer. Advised of  availability. Chaplains will follow as able and/or needed. Rev. Devan Hayes EdD MDiv  For [facility] Page [phone])

## 2020-03-04 NOTE — PROGRESS NOTES
Physical Therapy Received PT order. Pt just in process of transferring and settling on inpt floor.  Will follow tomorrow for eval. 
 
Betsy Holcomb, PT

## 2020-03-04 NOTE — CONSULTS
Subjective:  
  
Date of  Admission: 3/4/2020  9:02 AM  
 
Admission type:Emergency Serafin Norman is a 78 y.o. female admitted for CVA (cerebral vascular accident) ([facility] [I63.9]; A-fib ([facility] [I48.91]. Presented with left sided weakness and facial droop. Noted to be in normal rhythm now. Not a good historian and unable to provide any details. She deneis   chest pain, chest pressure/discomfort, dyspnea, palpitations, irregular heart beats, near-syncope, syncope, orthopnea, paroxysmal nocturnal dyspnea, exertional chest pressure/discomfort, claudication. Patient Active Problem List  
 Diagnosis Date Noted  CVA (cerebral vascular accident) ([facility] [facility] 03/04/2020  A-fib ([address] 10/05/2018  Ventral hernia with obstruction 06/01/2018  Bilateral carotid artery stenosis 05/10/2018  Acute cerebral infarction ([address] 05/09/2018  Syncope 05/06/2018  CAD (coronary artery disease) 02/05/2018  Stroke ([address] 02/05/2018  Hypertensive urgency 08/01/2017  CHF (congestive heart failure) ([address] 08/01/2017  COPD exacerbation ([address] 03/07/2017  CAD (coronary artery disease), native coronary artery 07/15/2016  H/O: CVA (cerebrovascular accident) 07/15/2016  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 ([address] 07/15/2016  Severe sepsis ([address] 07/14/2016  Pneumonia 07/14/2016  Acute respiratory failure ([address] 07/14/2016  Elevated troponin 07/14/2016  Left bundle branch block (LBBB) on electrocardiogram 07/14/2016  Acute systolic heart failure ([address] 07/14/2016  Acute kidney failure ([address] 07/14/2016  Mixed hyperlipidemia 09/21/2012  Essential hypertension, benign 09/21/2012  Other left bundle branch block 09/21/2012  Coronary atherosclerosis of native coronary artery 09/21/2012  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  
Diagnosis Date  Acute kidney failure ([facility] [facility] 7/14/2016  Acute systolic heart failure ([facility] [facility] 7/14/2016  Arrhythmia  CAD (coronary artery disease)  [facility]  Hypertension  Stroke [facility]) 2009 Stroke, left hand weakness and speech, peripheral vision affected Past Surgical History:  
Procedure Laterality Date  HX UROLOGICAL  02/2018 Allergies Allergen Reactions  Decadron [Dexamethasone Sodium Phosphate] Hives  Pcn [Penicillins] Hives Family History Problem Relation Age of Onset  Coronary Artery Disease Other   
     grandmother age 76  Stroke Mother  Hypertension Mother Current Facility-Administered Medications Medication Dose Route Frequency  [START ON 3/5/2020] atorvastatin (LIPITOR) tablet 40 mg  40 mg Oral DAILY  famotidine (PF) (PEPCID) injection 20 mg  20 mg IntraVENous Q12H  
 acetaminophen (TYLENOL) tablet 650 mg  650 mg Oral Q4H PRN Or  
 acetaminophen (TYLENOL) solution 650 mg  650 mg Per NG tube Q4H PRN Or  
 acetaminophen (TYLENOL) suppository 650 mg  650 mg Rectal Q4H PRN  
 dextrose 5 % - 0.45% NaCl infusion  75 mL/hr IntraVENous CONTINUOUS  
 [START ON 3/5/2020] aspirin (ASA) suppository 300 mg  300 mg Rectal DAILY  clopidogreL (PLAVIX) tablet 75 mg  75 mg Oral DAILY  labetaloL (NORMODYNE;TRANDATE) injection 5 mg  5 mg IntraVENous Q10MIN PRN Review of Symptoms: 
Constitutional: negative Eyes: negative Ears, nose, mouth, throat, and face: negative Respiratory: No exertional dyspnea, orthopnea, PND, cough, hemoptysis, URI. Cardiovascular: No CP, palpitations, sweating, lightheadedness, dizziness, syncope, presyncope, lower extremity swelling. Gastrointestinal: No nausea, vomiting, diarrhea, constipation, abdominal pain, hematemesis, melena, hematochezia Genitourinary:No urinary complaints. Musculoskeletal:negative Neurological: as above. Behvioral/Psych: negative Endocrine: negative Subjective:  
  
Visit Vitals /76 Pulse 98 Temp 98.2 °F (36.8 °C) Resp 18 Wt 126 lb 8.7 oz (57.4 kg) SpO2 98% BMI 25.56 kg/m² Physical Exam 
Abdomen: soft, non-tender. Bowel sounds normal.  
Extremities: no cyanosis or edema Heart: regular rate and rhythm, S1, S2 normal, no murmur, click, rub or gallop Lungs: clear to auscultation bilaterally Neck: supple, no carotid bruit and no JVD Neurologic: left sided facial droop and left sided weakness. Pulses: 2+ and symmetric Cardiographics Telemetry: normal sinus rhythm ECG: ST.  
 
Echocardiogram: Not done Labs:  
Recent Results (from the past 24 hour(s)) CBC WITH AUTOMATED DIFF Collection Time: 03/04/20  9:38 AM  
Result Value Ref Range WBC 7.7 3.6 - 11.0 K/uL  
 RBC 4.22 3.80 - 5.20 M/uL  
 HGB 11.5 11.5 - 16.0 g/dL HCT 34.1 (L) 35.0 - 47.0 % MCV 80.8 80.0 - 99.0 FL  
 MCH 27.3 26.0 - 34.0 PG  
 MCHC 33.7 30.0 - 36.5 g/dL  
 RDW 13.3 11.5 - 14.5 % PLATELET 92 (L) 979 - 400 K/uL MPV 11.8 8.9 - 12.9 FL  
 NRBC 0.0 0  WBC ABSOLUTE NRBC 0.00 0.00 - 0.01 K/uL NEUTROPHILS 83 (H) 32 - 75 % LYMPHOCYTES 12 12 - 49 % MONOCYTES 5 5 - 13 % EOSINOPHILS 0 0 - 7 % BASOPHILS 0 0 - 1 % IMMATURE GRANULOCYTES 0 0.0 - 0.5 % ABS. NEUTROPHILS 6.3 1.8 - 8.0 K/UL  
 ABS. LYMPHOCYTES 0.9 0.8 - 3.5 K/UL  
 ABS. MONOCYTES 0.4 0.0 - 1.0 K/UL  
 ABS. EOSINOPHILS 0.0 0.0 - 0.4 K/UL  
 ABS. BASOPHILS 0.0 0.0 - 0.1 K/UL  
 ABS. IMM. GRANS. 0.0 0.00 - 0.04 K/UL  
 DF AUTOMATED METABOLIC PANEL, COMPREHENSIVE  
 Collection Time: 03/04/20  9:38 AM  
Result Value Ref Range Sodium 138 136 - 145 mmol/L Potassium 4.1 3.5 - 5.1 mmol/L Chloride 105 97 - 108 mmol/L  
 CO2 28 21 - 32 mmol/L Anion gap 5 5 - 15 mmol/L Glucose 170 (H) 65 - 100 mg/dL BUN 32 (H) 6 - 20 MG/DL Creatinine 1.52 (H) 0.55 - 1.02 MG/DL  
 BUN/Creatinine ratio 21 (H) 12 - 20 GFR est AA 40 (L) >60 ml/min/1.73m2 GFR est non-AA 33 (L) >60 ml/min/1.73m2 Calcium 9.0 8.5 - 10.1 MG/DL Bilirubin, total 0.3 0.2 - 1.0 MG/DL  
 ALT (SGPT) 16 12 - 78 U/L  
 AST (SGOT) 18 15 - 37 U/L Alk. phosphatase 130 (H) 45 - 117 U/L Protein, total 7.7 6.4 - 8.2 g/dL Albumin 3.7 3.5 - 5.0 g/dL Globulin 4.0 2.0 - 4.0 g/dL A-G Ratio 0.9 (L) 1.1 - 2.2 PROTHROMBIN TIME + INR Collection Time: 03/04/20  9:38 AM  
Result Value Ref Range INR 1.1 0.9 - 1.1 Prothrombin time 10.8 9.0 - 11.1 sec TROPONIN I Collection Time: 03/04/20  9:38 AM  
Result Value Ref Range Troponin-I, Qt. 0.09 (H) <0.05 ng/mL MAGNESIUM Collection Time: 03/04/20  9:38 AM  
Result Value Ref Range Magnesium 2.1 1.6 - 2.4 mg/dL URINALYSIS W/ RFLX MICROSCOPIC Collection Time: 03/04/20 11:20 AM  
Result Value Ref Range Color YELLOW/STRAW Appearance CLEAR CLEAR Specific gravity 1.028 1.003 - 1.030    
 pH (UA) 8.0 5.0 - 8.0 Protein NEGATIVE  NEG mg/dL Glucose 100 (A) NEG mg/dL Ketone NEGATIVE  NEG mg/dL Bilirubin NEGATIVE  NEG Blood NEGATIVE  NEG Urobilinogen 0.2 0.2 - 1.0 EU/dL Nitrites NEGATIVE  NEG Leukocyte Esterase NEGATIVE  NEG Assessment: 
 
 Assessment:  
  
 Active Problems: 
  A-fib ([address] (10/5/2018) [facility] (3/4[facility]) Plan: 1. PAF: now back in SR. F/u Echo. cardizem on hold due to permissive high BP secondary to acute CVA. Reportedly was taking eliquis regularly. Consider coumadin since having episode on DOAC.  AC on hold for now per neuro due to recent CVA. 2. Acute CVA: per neurology and hospitalist.  
3. Monitor BP. 4. Follow renal function.

## 2020-03-04 NOTE — PROGRESS NOTES
Speech pathology Orders received, chart reviewed. RN has given report and patient in the process of transferring to the floor. SLP will follow up in the morning to evaluate.   
Thanks, Rebeca Shaw M.S. CCC-SLP

## 2020-03-04 NOTE — ED TRIAGE NOTES
Pts last kn own well was 9pm yesterday. Pts family found pt having left leg weakness, left side facial droop and slurred speech. Pt reportedly got up in the middle of the night to use the bathroom. Pt is also noted to be more confused today. Pt has obvious left side facial droop and dysrthria. Pt has hx of stroke with left side deficit. Per pts son. Pt had left arm weakness noted but today pt has no effort against gravity in left upper extremity. Pt has drift noted in left leg. Pt has hx of memory problems and usually is unable to state her age per her son. Unknown what pts previous deficits are completely. Pts son reports leg weakness, left arm, facial droop and speech deficits are new and worse but pt does have hx of left side deficit. Pt is able to follow commands appropriately. Pt is alert upon arrival to ED. Pt also has hx of incontinence.

## 2020-03-04 NOTE — ED NOTES
TRANSFER - OUT REPORT: 
 
Verbal report given to Tu(name) on Varghese Moseley  being transferred to Neuro(unit) for routine progression of care Report consisted of patients Situation, Background, Assessment and  
Recommendations(SBAR). Information from the following report(s) SBAR, Kardex, ED Summary and MAR was reviewed with the receiving nurse. Lines:  
Peripheral IV 03/04/20 Right Antecubital (Active) Site Assessment Clean, dry, & intact 3/4/2020  9:11 AM  
Phlebitis Assessment 0 3/4/2020  9:11 AM  
Infiltration Assessment 0 3/4/2020  9:11 AM  
Dressing Status Clean, dry, & intact 3/4/2020  9:11 AM  
Dressing Type Transparent 3/4/2020  9:11 AM  
Hub Color/Line Status Green;Flushed;Patent 3/4/2020  9:11 AM  
  
 
Opportunity for questions and clarification was provided. Patient transported with: 
 [employer]

## 2020-03-05 ENCOUNTER — APPOINTMENT (OUTPATIENT)
Dept: GENERAL RADIOLOGY | Age: 80
DRG: 056 | End: 2020-03-05
Attending: INTERNAL MEDICINE
Payer: MEDICARE

## 2020-03-05 ENCOUNTER — APPOINTMENT (OUTPATIENT)
Dept: NON INVASIVE DIAGNOSTICS | Age: 80
DRG: 056 | End: 2020-03-05
Attending: INTERNAL MEDICINE
Payer: MEDICARE

## 2020-03-05 LAB
ANION GAP SERPL CALC-SCNC: 6 MMOL/L (ref 5–15)
ATRIAL RATE: 122 BPM
ATRIAL RATE: 134 BPM
ATRIAL RATE: 75 BPM
AV VELOCITY RATIO: 0.7
AV VTI RATIO: 0.8
BUN SERPL-MCNC: 24 MG/DL (ref 6–20)
BUN/CREAT SERPL: 17 (ref 12–20)
CALCIUM SERPL-MCNC: 8.9 MG/DL (ref 8.5–10.1)
CALCULATED P AXIS, ECG09: 57 DEGREES
CALCULATED P AXIS, ECG09: 65 DEGREES
CALCULATED P AXIS, ECG09: 66 DEGREES
CALCULATED R AXIS, ECG10: -17 DEGREES
CALCULATED R AXIS, ECG10: -19 DEGREES
CALCULATED R AXIS, ECG10: -29 DEGREES
CALCULATED T AXIS, ECG11: 107 DEGREES
CALCULATED T AXIS, ECG11: 115 DEGREES
CALCULATED T AXIS, ECG11: 136 DEGREES
CHLORIDE SERPL-SCNC: 109 MMOL/L (ref 97–108)
CHOLEST SERPL-MCNC: 154 MG/DL
CO2 SERPL-SCNC: 25 MMOL/L (ref 21–32)
CREAT SERPL-MCNC: 1.4 MG/DL (ref 0.55–1.02)
DIAGNOSIS, 93000: NORMAL
ECHO AO ROOT DIAM: 2.51 CM
ECHO AV AREA PEAK VELOCITY: 2 CM2
ECHO AV AREA VTI: 2.4 CM2
ECHO AV MEAN GRADIENT: 4.7 MMHG
ECHO AV MEAN VELOCITY: 0.97 M/S
ECHO AV PEAK GRADIENT: 11.1 MMHG
ECHO AV PEAK VELOCITY: 166.21 CM/S
ECHO AV VTI: 28 CM
ECHO LA AREA 4C: 16.1 CM2
ECHO LA MAJOR AXIS: 2.91 CM
ECHO LA TO AORTIC ROOT RATIO: 1.16
ECHO LA VOL 4C: 38.67 ML (ref 22–52)
ECHO LA VOLUME INDEX A4C: 25.99 ML/M2 (ref 16–28)
ECHO LV EDV A4C: 24.7 ML
ECHO LV EDV INDEX A4C: 16.6 ML/M2
ECHO LV EJECTION FRACTION A4C: 55 %
ECHO LV ESV A4C: 11.3 ML
ECHO LV ESV INDEX A4C: 7.6 ML/M2
ECHO LV INTERNAL DIMENSION DIASTOLIC: 2.26 CM (ref 3.9–5.3)
ECHO LV INTERNAL DIMENSION SYSTOLIC: 2.09 CM
ECHO LV IVSD: 1.61 CM (ref 0.6–0.9)
ECHO LV MASS 2D: 138.5 G (ref 67–162)
ECHO LV MASS INDEX 2D: 93.1 G/M2 (ref 43–95)
ECHO LV POSTERIOR WALL DIASTOLIC: 1.54 CM (ref 0.6–0.9)
ECHO LVOT CARDIAC OUTPUT: 4.2 L/MIN
ECHO LVOT DIAM: 1.91 CM
ECHO LVOT PEAK GRADIENT: 5.4 MMHG
ECHO LVOT PEAK VELOCITY: 115.76 CM/S
ECHO LVOT SV: 67.2 ML
ECHO LVOT VTI: 23.44 CM
ECHO MV A VELOCITY: 121.96 CM/S
ECHO MV AREA PHT: 2.4 CM2
ECHO MV AREA VTI: 1.7 CM2
ECHO MV E DECELERATION TIME (DT): 313.8 MS
ECHO MV E VELOCITY: 91.99 CM/S
ECHO MV E/A RATIO: 0.75
ECHO MV MAX VELOCITY: 141.98 CM/S
ECHO MV MEAN GRADIENT: 2.9 MMHG
ECHO MV MEAN INFLOW VELOCITY: 0.79 M/S
ECHO MV PEAK GRADIENT: 8.1 MMHG
ECHO MV PRESSURE HALF TIME (PHT): 91.2 MS
ECHO MV REGURGITANT PEAK GRADIENT: 76.6 MMHG
ECHO MV REGURGITANT PEAK VELOCITY: 437.75 CM/S
ECHO MV VTI: 38.78 CM
ECHO PV MAX VELOCITY: 78.91 CM/S
ECHO PV MEAN GRADIENT: 1 MMHG
ECHO PV PEAK GRADIENT: 2.5 MMHG
ECHO PV VTI: 15.62 CM
ECHO TV A WAVE: 51.47 CM/S
ECHO TV E WAVE: 46.53 CM/S
ECHO TV EROA: 1.1
EST. AVERAGE GLUCOSE BLD GHB EST-MCNC: 120 MG/DL
GLUCOSE BLD STRIP.AUTO-MCNC: 155 MG/DL (ref 65–100)
GLUCOSE SERPL-MCNC: 131 MG/DL (ref 65–100)
HBA1C MFR BLD: 5.8 % (ref 4–5.6)
HDLC SERPL-MCNC: 56 MG/DL
HDLC SERPL: 2.8 {RATIO} (ref 0–5)
LDLC SERPL CALC-MCNC: 83.8 MG/DL (ref 0–100)
LIPID PROFILE,FLP: NORMAL
LVFS 2D: 7.83 %
LVOT MG: 2.96 MMHG
LVOT MV: 0.79 CM/S
MAGNESIUM SERPL-MCNC: 2 MG/DL (ref 1.6–2.4)
MV DEC SLOPE: 2.93
P-R INTERVAL, ECG05: 128 MS
P-R INTERVAL, ECG05: 146 MS
P-R INTERVAL, ECG05: 164 MS
PHOSPHATE SERPL-MCNC: 3.6 MG/DL (ref 2.6–4.7)
POTASSIUM SERPL-SCNC: 3.8 MMOL/L (ref 3.5–5.1)
Q-T INTERVAL, ECG07: 296 MS
Q-T INTERVAL, ECG07: 344 MS
Q-T INTERVAL, ECG07: 460 MS
QRS DURATION, ECG06: 128 MS
QRS DURATION, ECG06: 128 MS
QRS DURATION, ECG06: 132 MS
QTC CALCULATION (BEZET), ECG08: 442 MS
QTC CALCULATION (BEZET), ECG08: 490 MS
QTC CALCULATION (BEZET), ECG08: 513 MS
SERVICE CMNT-IMP: ABNORMAL
SODIUM SERPL-SCNC: 140 MMOL/L (ref 136–145)
T3FREE SERPL-MCNC: 1.6 PG/ML (ref 2.2–4)
T4 FREE SERPL-MCNC: 1 NG/DL (ref 0.8–1.5)
TRIGL SERPL-MCNC: 71 MG/DL (ref ?–150)
TROPONIN I SERPL-MCNC: 0.18 NG/ML
TSH SERPL DL<=0.05 MIU/L-ACNC: 0.6 UIU/ML (ref 0.36–3.74)
VENTRICULAR RATE, ECG03: 122 BPM
VENTRICULAR RATE, ECG03: 134 BPM
VENTRICULAR RATE, ECG03: 75 BPM
VLDLC SERPL CALC-MCNC: 14.2 MG/DL

## 2020-03-05 PROCEDURE — 97530 THERAPEUTIC ACTIVITIES: CPT

## 2020-03-05 PROCEDURE — 83036 HEMOGLOBIN GLYCOSYLATED A1C: CPT

## 2020-03-05 PROCEDURE — 74011000258 HC RX REV CODE- 258: Performed by: INTERNAL MEDICINE

## 2020-03-05 PROCEDURE — 97165 OT EVAL LOW COMPLEX 30 MIN: CPT | Performed by: OCCUPATIONAL THERAPIST

## 2020-03-05 PROCEDURE — 93005 ELECTROCARDIOGRAM TRACING: CPT

## 2020-03-05 PROCEDURE — 84443 ASSAY THYROID STIM HORMONE: CPT

## 2020-03-05 PROCEDURE — 65660000000 HC RM CCU STEPDOWN

## 2020-03-05 PROCEDURE — 80048 BASIC METABOLIC PNL TOTAL CA: CPT

## 2020-03-05 PROCEDURE — 74011250637 HC RX REV CODE- 250/637: Performed by: HOSPITALIST

## 2020-03-05 PROCEDURE — 84481 FREE ASSAY (FT-3): CPT

## 2020-03-05 PROCEDURE — 82962 GLUCOSE BLOOD TEST: CPT

## 2020-03-05 PROCEDURE — 80061 LIPID PANEL: CPT

## 2020-03-05 PROCEDURE — 97162 PT EVAL MOD COMPLEX 30 MIN: CPT

## 2020-03-05 PROCEDURE — 93306 TTE W/DOPPLER COMPLETE: CPT

## 2020-03-05 PROCEDURE — 92611 MOTION FLUOROSCOPY/SWALLOW: CPT | Performed by: SPEECH-LANGUAGE PATHOLOGIST

## 2020-03-05 PROCEDURE — 36415 COLL VENOUS BLD VENIPUNCTURE: CPT

## 2020-03-05 PROCEDURE — 84439 ASSAY OF FREE THYROXINE: CPT

## 2020-03-05 PROCEDURE — 92610 EVALUATE SWALLOWING FUNCTION: CPT | Performed by: SPEECH-LANGUAGE PATHOLOGIST

## 2020-03-05 PROCEDURE — 84484 ASSAY OF TROPONIN QUANT: CPT

## 2020-03-05 PROCEDURE — 74011250637 HC RX REV CODE- 250/637: Performed by: INTERNAL MEDICINE

## 2020-03-05 PROCEDURE — 84100 ASSAY OF PHOSPHORUS: CPT

## 2020-03-05 PROCEDURE — 74011250636 HC RX REV CODE- 250/636: Performed by: INTERNAL MEDICINE

## 2020-03-05 PROCEDURE — 74230 X-RAY XM SWLNG FUNCJ C+: CPT

## 2020-03-05 PROCEDURE — 97530 THERAPEUTIC ACTIVITIES: CPT | Performed by: OCCUPATIONAL THERAPIST

## 2020-03-05 PROCEDURE — 83735 ASSAY OF MAGNESIUM: CPT

## 2020-03-05 RX ORDER — FAMOTIDINE 10 MG/ML
20 INJECTION INTRAVENOUS DAILY
Status: DISCONTINUED | OUTPATIENT
Start: 2020-03-06 | End: 2020-03-08 | Stop reason: HOSPADM

## 2020-03-05 RX ORDER — ATORVASTATIN CALCIUM 40 MG/1
80 TABLET, FILM COATED ORAL DAILY
Status: DISCONTINUED | OUTPATIENT
Start: 2020-03-06 | End: 2020-03-08 | Stop reason: HOSPADM

## 2020-03-05 RX ORDER — GUAIFENESIN 100 MG/5ML
81 LIQUID (ML) ORAL DAILY
Status: DISCONTINUED | OUTPATIENT
Start: 2020-03-06 | End: 2020-03-08 | Stop reason: HOSPADM

## 2020-03-05 RX ORDER — ENOXAPARIN SODIUM 100 MG/ML
60 INJECTION SUBCUTANEOUS DAILY
Status: DISCONTINUED | OUTPATIENT
Start: 2020-03-05 | End: 2020-03-05

## 2020-03-05 RX ADMIN — METOPROLOL TARTRATE 25 MG: 25 TABLET ORAL at 22:49

## 2020-03-05 RX ADMIN — ENOXAPARIN SODIUM 60 MG: 60 INJECTION SUBCUTANEOUS at 12:31

## 2020-03-05 RX ADMIN — ASPIRIN 300 MG: 300 SUPPOSITORY RECTAL at 11:01

## 2020-03-05 RX ADMIN — ATORVASTATIN CALCIUM 40 MG: 40 TABLET, FILM COATED ORAL at 09:01

## 2020-03-05 RX ADMIN — DEXTROSE MONOHYDRATE AND SODIUM CHLORIDE 75 ML/HR: 5; .45 INJECTION, SOLUTION INTRAVENOUS at 09:58

## 2020-03-05 RX ADMIN — CLOPIDOGREL BISULFATE 75 MG: 75 TABLET ORAL at 09:02

## 2020-03-05 RX ADMIN — METOPROLOL TARTRATE 25 MG: 25 TABLET ORAL at 09:01

## 2020-03-05 NOTE — PROGRESS NOTES
Problem: Mobility Impaired (Adult and Pediatric) Goal: *Acute Goals and Plan of Care (Insert Text) Description FUNCTIONAL STATUS PRIOR TO ADMISSION: Patient was modified independent using a walker and wheelchair for functional mobility. Patient required moderate assistance for basic and instrumental ADLs. Pt was an unreliable historian and no family present for confirmation. HOME SUPPORT PRIOR TO ADMISSION: The patient lived with son who provided assistance when not at work. Pt reports neighbor will come stay with her during the day. Physical Therapy Goals Initiated 3/5/2020 1. Patient will roll side to side in bed with independence within 7 day(s). 2.  Patient will transfer from bed to chair and chair to bed with moderate assistance  using the least restrictive device within 7 day(s). 3.  Patient will perform sit to stand with moderate assistance  within 7 day(s). 4.  Patient will ambulate with moderate assistance  for 5 feet with the least restrictive device within 7 day(s). Outcome: Progressing Towards Goal 
  
PHYSICAL THERAPY EVALUATION Patient: Marc Hendrix (14 y.o. female) Date: 3/5/2020 Primary Diagnosis: CVA (cerebral vascular accident) ([facility] [I63.9] A-fib ([facility] [I48.91] Precautions:  Fall ASSESSMENT Based on the objective data described below, the patient presents with L side facial droop, slurred speech, orientation only to person and location, poor historian capabilities, increased L elbow flexion tone (which was reported to be present at baseline from a previous CVA), decreased strength, impaired balance and gait, and increased fatigue with activity s/p L frontal and parietal infarct. Pt received supine in bed with leg listing to the L and receptive to therapy. Pt required min-max A for bed mobility to sit EOB.  Pt demonstrated good seated balance at the beginning of treatment which continuously declined as pt became more fatigued. Pt demonstrated good seated LE strength with MMT testing, however pt reported pain and weakness in B knees when standing was attempted. With attempts to stand, pt maintained a posterior left lean and her feet were positioned in front of her. Pt was able to stand approximately 50% x3 trials. Pt returned to supine in bed with max A for completion of OT session due to postural fatigue. Pt was not safe to attempt to ambulate and bedpan recommended. Pt would benefit from continued therapy as pt is below her baseline per family's report to other providers. Anticipate pt to need additional rehabilitation in SNF setting upon discharge. Current Level of Function Impacting Discharge (mobility/balance): decreased strength, impaired balance and gait, increased fatigue with activity Functional Outcome Measure: The patient scored 1/28 on the Tinetti outcome measure which is indicative of [place] Risk. Other factors to consider for discharge: amount of assistance available at home, PLOF when assessed with family present Patient will benefit from skilled therapy intervention to address the above noted impairments. PLAN : 
Recommendations and Planned Interventions: bed mobility training, transfer training, gait training, therapeutic exercises, neuromuscular re-education, patient and family training/education, and therapeutic activities Frequency/Duration: Patient will be followed by physical therapy:  4 times a week to address goals. Recommendation for discharge: (in order for the patient to meet his/her long term goals) Therapy up to 5 days/week in SNF setting IF patient discharges home will need the following DME: to be determined (TBD) pending pt progress and confirmation of pt's personal equipment at home SUBJECTIVE:  
Patient stated My son has no choice but to take care of me.  OBJECTIVE DATA SUMMARY:  
HISTORY:   
Past Medical History:  
Diagnosis Date Acute kidney failure ([facility] 7/14/2016 Acute systolic heart failure ([facility] 7/14/2016 Arrhythmia CAD (coronary artery disease) [facility]pertension [facility] 2009 Stroke, left hand weakness and speech, peripheral vision affected Past Surgical History:  
Procedure Laterality Date HX UROLOGICAL  02/2018 Personal factors and/or comorbidities impacting plan of care:  
 
Home Situation Home Environment: Private residence # Steps to Enter: 3 One/Two Story Residence: One story Living Alone: No 
Support Systems: Child(nicci), Friends \ neighbors(Son works) Patient Expects to be Discharged to[de-identified] Private residence Current DME Used/Available at Home: Walker, rolling, Wheelchair EXAMINATION/PRESENTATION/DECISION MAKING:  
Critical Behavior: 
Neurologic State: Alert Orientation Level: Oriented to person Cognition: Follows commands Hearing: Auditory Auditory Impairment: None Skin:   
Edema:  
Range Of Motion: 
AROM: Generally decreased, functional(except LUE nonfunctional) PROM: Generally decreased, functional 
  
  
  
Strength:   
Strength: Grossly decreased, non-functional 
  
  
  
  
  
  
Tone & Sensation:  
Tone: Abnormal(LUE) Sensation: Intact Coordination: 
Coordination: Generally decreased, functional(with bed mobility, nonfunctional with transitions) Vision:  
  
Functional Mobility: 
Bed Mobility: 
Rolling: Minimum assistance Supine to Sit: Minimum assistance Sit to Supine: Maximum assistance Scooting: Maximum assistance Transfers: 
Sit to Stand: Maximum assistance Stand to Sit: Moderate assistance Balance:  
Sitting: Intact; Without support(pt did fatigue with prolonged sitting however) Standing: Impaired Standing - Static: Poor Standing - Dynamic : Poor Ambulation/Gait Training: 
Unable to assess due to pt inability to stand Functional Measure: 
Tinetti test: Sitting Balance: 0 Arises: 0 Attempts to Rise: 0 Immediate Standing Balance: 0 Standing Balance: 0 Nudged: 0 Eyes Closed: 0 Turn 360 Degrees - Continuous/Discontinuous: 0 Turn 360 Degrees - Steady/Unsteady: 0 Sitting Down: 1 Balance Score: 1 Balance total score Indication of Gait: 0 
R Step Length/Height: 0 
L Step Length/Height: 0 
R Foot Clearance: 0 
L Foot Clearance: 0 Step Symmetry: 0 Step Continuity: 0 Path: 0 Trunk: 0 Walking Time: 0 Gait Score: 0 Gait total score Total Score: 1/28 Overall total score Tinetti Tool Score Risk of Falls 
<19 = High Fall Risk 19-24 = Moderate Fall Risk 25-28 = Low Fall Risk Tinetti ME. Performance-Oriented Assessment of Mobility Problems in Elderly Patients. [facility] 66; [id number]. (Scoring Description: PT Bulletin Feb. 10, 1993) Older adults: Rickey Dumont et al, 2009; n = [address] [employer] elderly evaluated with ABC, DARIEL, ADL, and IADL) · Mean DARIEL score for males aged 69-68 years = 26.21(3.40) · Mean DARIEL score for females age 69-68 years = 25.16(4.30) · Mean DARIEL score for males over 80 years = 23.29(6.02) · Mean DARIEL score for females over 80 years = 17.20(8.32) Based on the above components, the patient evaluation is determined to be of the following complexity level: MEDIUM Pain Rating: 
Pt reported pain in B knees but did not quantify Activity Tolerance:  
Fair Please refer to the flowsheet for vital signs taken during this treatment. After treatment patient left in no apparent distress:  
Supine in bed, Call bell within reach, and Side rails x 3 
 
COMMUNICATION/EDUCATION:  
The patients plan of care was discussed with: Physical therapist, Occupational therapist, and Registered nurse. Fall prevention education was provided and the patient/caregiver indicated understanding., Patient/family have participated as able in goal setting and plan of care. , and Patient/family agree to work toward stated goals and plan of care. Thank you for this referral. 
Lona Kumari, SPT Time Calculation: 29 mins Regarding student involvement in patient care: A student participated in this treatment session. Per CMS Medicare statements and APTA guidelines I certify that the following was true: 1. I was present and directly observed the entire session. 2. I made all skilled judgments and clinical decisions regarding care. 3. I am the practitioner responsible for assessment, treatment, and documentation.

## 2020-03-05 NOTE — PROGRESS NOTES
[address]  [phone] Cardiology Progress Note 3/5/2020 2:52 PM 
 
Admit Date: 3/4/2020 Admit Diagnosis:  
CVA (cerebral vascular accident) ([facility] [I63.9] A-fib ([facility] [I48.91] Subjective:  
 
Eric Olmedo has no cardiac complaints this afternoon. VSS. Sitting up eating dinner. Visit Vitals /78 Pulse 66 Temp 98.7 °F (37.1 °C) Resp 20 Ht 5' 1\" (1.549 m) Wt 51.7 kg (114 lb) SpO2 99% BMI 21.54 kg/m² Current Facility-Administered Medications Medication Dose Route Frequency  [START ON 3/6/2020] famotidine (PF) (PEPCID) injection 20 mg  20 mg IntraVENous DAILY  enoxaparin (LOVENOX) injection 60 mg  60 mg SubCUTAneous DAILY  atorvastatin (LIPITOR) tablet 40 mg  40 mg Oral DAILY  acetaminophen (TYLENOL) tablet 650 mg  650 mg Oral Q4H PRN Or  
 acetaminophen (TYLENOL) solution 650 mg  650 mg Per NG tube Q4H PRN Or  
 acetaminophen (TYLENOL) suppository 650 mg  650 mg Rectal Q4H PRN  
 dextrose 5 % - 0.45% NaCl infusion  75 mL/hr IntraVENous CONTINUOUS  
 aspirin (ASA) suppository 300 mg  300 mg Rectal DAILY  clopidogreL (PLAVIX) tablet 75 mg  75 mg Oral DAILY  labetaloL (NORMODYNE;TRANDATE) injection 5 mg  5 mg IntraVENous Q10MIN PRN  
 metoprolol tartrate (LOPRESSOR) tablet 25 mg  25 mg Oral Q12H Objective:  
  
Physical Exam: 
General Appearance: well-nourished, elderly AA female, in NAD Chest: Clear throughout, RA, no cough Cardiovascular:  Regular rate and rhythm, S1S2, no S3, no carotid bruits Abdomen:   Soft, non-tender, bowel sounds are active. Extremities: no edema, DP/PT palpable Skin: Warm and dry. Neuro: dysarthria, left side weakness, left facial droop Data Review:  
Recent Labs 03/04/20 
5744 WBC 7.7 HGB 11.5 HCT 34.1*  
PLT 92* Recent Labs 03/05/20 
0217 03/04/20 
7490  138  
K 3.8 4.1 * 105 CO2 25 28 * 170* BUN 24* 32* CREA 1.40* 1.52* CA 8.9 9.0 MG 2.0 2.1 PHOS 3.6  --   
ALB  --  3.7 TBILI  --  0.3 SGOT  --  18 ALT  --  16 INR  --  1.1 Recent Labs 03/05/20 
0217 03/04/20 
9853 TROIQ 0.18* 0.09* No intake or output data in the 24 hours ending 03/05/20 1452 Telemetry: NSR  
EKG:NSR LA enlargement Cxray:Mild central congestion without overt CHF. Left basilar subsegmental 
atelectasis. Echo: results pending Assessment:  
 
Active Problems: 
  A-fib ([facility] (10/5/2018) CVA (cerebral vascular accident) ([facility] (3/4/2020) Plan:  
Brian Austin is a 78 y.o. female with a PMH significant for PAF on eliquis, CAD, HF, HTN, Stroke, admitted for CVA (cerebral vascular accident) ([facility] [facility] [I63.9]; A-fib ([facility] [I48.91]. Presented with left sided weakness and facial droop. Noted to remain in normal rhythm now. Trop. (0.09, 0.18). CXR: mild congestion. Echo pending. LDL 83. Pro-BNP ordered. Cr 1.40 PAF: now back in SR. F/u Echo. cardizem on hold due to permissive high BP secondary to acute CVA. Reportedly was taking eliquis regularly. If suspicion for atherosclerotic or hypertensive CVA, no need to change [facility]. AC on hold for now per neuro due to recent CVA. -continue ASA 300mg rectally 
-continue lovenox 
-continue to monitor tele. -echo pending Acute CVA: per neurology and hospitalist.  
 
HTN:  
-BB restarted per internal medicine CKD: Follow renal function. Cr 1.4, appears to be patients baseline. CAD: 
-continue Plavix 
-BB 
-continue ASA Kanu Emile AMAYA,MU 
MSN,RN,AG-ACNP-BC Patient seen and examined by me with the above nurse practitioner. I personally performed all components of the history, physical, and medical decision making and agree with the assessment and plan with minor modifications as noted.   If suspected to be atherosclerotic or hypertensive CVA, do not recommend changing from Eliquis as Eliquis is proven in clinical trials to be superior to Coumadin for stroke reduction as well as bleeding reduction. If high suspicion for cardioembolic thrombotic CVA, could consider changing anticoagulation regimen.

## 2020-03-05 NOTE — PROGRESS NOTES
Bedside and Verbal shift change report given to Héctor Franklin, [facility] nurse) by Seb Regan RN (offgoing nurse). Report included the following information SBAR, Kardex, ED Summary, Intake/Output, MAR, Recent Results and Cardiac Rhythm NSR. 
  
Zone Phone:   [phone] 
  
  
Significant changes during shift:  Seen by speech, PT, OT, and Neurology. Barium swallow test done. Diet progressed to mechanical soft and thin liquids. Echo shows EF of 55%-60% 
  
  
Patient Information 
  
Dayanara Poisson 78 y.o. 
3/4/2020  9:02 AM by Nevaeh Emanuel MD. Sadaf Valdez was admitted from Home 
  
Problem List 
  
       
Patient Active Problem List  
  Diagnosis Date Noted  CVA (cerebral vascular accident) ([address] 03/04/2020  A-fib ([address] 10/05/2018  Ventral hernia with obstruction 06/01/2018  Bilateral carotid artery stenosis 05/10/2018  Acute cerebral infarction ([address] 05/09/2018  Syncope 05/06/2018  CAD (coronary artery disease) 02/05/2018  Stroke ([address] 02/05/2018  Hypertensive urgency 08/01/2017  CHF (congestive heart failure) ([address] 08/01/2017  COPD exacerbation ([address] 03/07/2017  CAD (coronary artery disease), native coronary artery 07/15/2016  H/O: CVA (cerebrovascular accident) 07/15/2016  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 ([address] 07/15/2016  Severe sepsis ([address] 07/14/2016  Pneumonia 07/14/2016  Acute respiratory failure ([address] 07/14/2016  Elevated troponin 07/14/2016  Left bundle branch block (LBBB) on electrocardiogram 07/14/2016  Acute systolic heart failure ([address] 07/14/2016  Acute kidney failure ([address] 07/14/2016  Mixed hyperlipidemia 09/21/2012  Essential hypertension, benign 09/21/2012  Other left bundle branch block 09/21/2012  Coronary atherosclerosis of native coronary artery 09/21/2012  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  
  
       
Past Medical History:  
Diagnosis Date  Acute kidney failure ([facility] 7/14/2016  Acute systolic heart failure ([facility] 7/14/2016  Arrhythmia    
 CAD (coronary artery disease)    
 Heart failure ([facility]    
 Hypertension    
 Stroke ([facility] 2009  
  Stroke, left hand weakness and speech, peripheral vision affected  
  
  
  
Core Measures: 
  
CVA: Yes Yes CHF:No No 
PNA:No No 
  
  
Activity Status: 
  
OOB to Chair No 
Ambulated this shift No  
Bed Rest Yes 
  
  
  
LINES AND DRAINS: 
PIV 
  
DVT prophylaxis: 
  
DVT prophylaxis Med- yes Lovenox DVT prophylaxis SCD or MALA- no. pt refused 
  
Patient Safety: 
  
Falls Score Total Score: 3 Safety Level_______ Bed Alarm On? Yes Sitter? No 
  
Plan for upcoming shift: Palliative consult,  
  
  
  
Discharge Plan: discharge to home with family 
  
Active Consults: 
IP CONSULT TO CARDIOLOGY 
IP CONSULT TO NEUROLOGY 
IP CONSULT TO PALLIATIVE CARE - PROVIDER

## 2020-03-05 NOTE — PROGRESS NOTES
Problem: Dysphagia (Adult) Goal: *Acute Goals and Plan of Care (Insert Text) Description Initiated 3/5/2020 1. Patient will participate in MBS to determine swallow physiology within 7 days. Outcome: Progressing Towards Goal 
 SPEECH LANGUAGE PATHOLOGY BEDSIDE SWALLOW EVALUATION Patient: Varghese Moseley (31 y.o. female) Date: 3/5/2020 Primary Diagnosis: CVA (cerebral vascular accident) ([address] [I63.9] A-fib ([address] [I48.91] Precautions:    Fall ASSESSMENT : 
Based on the objective data described below, the patient presents with suspected moderate pharyngeal dysphagia. She demonstrates delayed swallow via palpation with double swallow per bolus. There is wheezing and increased work of breathing after each PO trial, as well as coughing. This did not improve clinically with increased liquid viscosity. Of note, patient was in this hospital June 2018. She was seen by SLP at that time and noted to have wheezing/coughing on thin liquids. She was placed on a puree/nectar diet. In the interim, patient reports she now eats a regular diet with thin liquids. Unclear if this is accurate, given limited communication skills of the patient. Patient will benefit from skilled intervention to address the above impairments. Patients rehabilitation potential is considered to be Fair PLAN : 
Recommendations and Planned Interventions: NPO for now Plan for MBS Frequency/Duration: Patient will be followed by speech-language pathology 3 times a week to address goals. Discharge Recommendations: To Be Determined SUBJECTIVE:  
Patient stated I feel ok speech difficult to understand at times. . 
 
OBJECTIVE:  
 
Past Medical History:  
Diagnosis Date Acute kidney failure ([facility] 7/14/2016 Acute systolic heart failure ([address] 7/14/2016 Arrhythmia CAD (coronary artery disease) Heart failure ([address] Hypertension Stroke [facility]) 2009 Stroke, left hand weakness and speech, peripheral vision affected Past Surgical History:  
Procedure Laterality Date HX UROLOGICAL  02/2018 Prior Level of Function/Home Situation:   
Home Situation Home Environment: Private residence # Steps to Enter: 3 One/Two Story Residence: One story Living Alone: No 
Support Systems: Child(nicci), Friends \ neighbors(Son works) Patient Expects to be Discharged to[de-identified] Private residence Current DME Used/Available at Home: Walker, rolling, Wheelchair Diet prior to admission: patient reports regular/thins Current Diet:  NPO Cognitive and Communication Status: 
Neurologic State: Alert Orientation Level: Oriented to person Cognition: Follows commands Perseveration: No perseveration noted Oral Assessment: 
Oral Assessment Labial: Left droop Dentition: Upper dentures; Lower dentures Oral Hygiene: moist, clean Lingual: No impairment Velum: Unable to visualize Mandible: No impairment P.O. Trials: 
Patient Position: upright in bed Vocal quality prior to P.O.: Strain Consistency Presented: Ice chips; Thin liquid;Puree;Nectar thick liquid How Presented: Self-fed/presented;SLP-fed/presented;Cup/sip;Spoon;Straw Bolus Acceptance: No impairment Bolus Formation/Control: No impairment Propulsion: No impairment Oral Residue: None Initiation of Swallow: Delayed (# of seconds) Laryngeal Elevation: Decreased Aspiration Signs/Symptoms: Increase in RR;Strong cough(wheezing after each PO trial) Pharyngeal Phase Characteristics: Double swallow Pharyngeal Phase Severity : Moderate NOMS:  
The NOMS functional outcome measure was used to quantify this patient's level of swallowing impairment. Based on the NOMS, the patient was determined to be at level 2 for swallow function NOMS Swallowing Levels: 
Level 1 (CN): NPO Level 2 (CM): NPO but takes consistency in therapy Level 3 (CL): Takes less than 50% of nutrition p.o. and continues with nonoral feedings; and/or safe with mod cues; and/or max diet restriction Level 4 (CK): Safe swallow but needs mod cues; and/or mod diet restriction; and/or still requires some nonoral feeding/supplements Level 5 (CJ): Safe swallow with min diet restriction; and/or needs min cues Level 6 (CI): Independent with p.o.; rare cues; usually self cues; may need to avoid some foods or needs extra time Level 7 (CH): Independent for all p.o. ANAYA. (2003). National Outcomes Measurement System (NOMS): Adult Speech-Language Pathology User's Guide. Pain: 
Pain Scale 1: Numeric (0 - 10) Pain Intensity 1: 0 After treatment:  
Patient left in no apparent distress in bed, Call bell within reach, and Nursing notified COMMUNICATION/EDUCATION:  
Patient was educated regarding aspiration risk and plan. She verbalized understanding. The patient's plan of care including recommendations, planned interventions, and recommended diet changes were discussed with: Registered nurse and Physician. Patient/family agree to work toward stated goals and plan of care. Thank you for this referral. 
DEREK Lazaro Time Calculation: 20 mins

## 2020-03-05 NOTE — PROGRESS NOTES
[facility] called and notified that patient has shown 4mm left frontal acute infarct and 3mm left parietal acute infarct on MRI. On call MD notified via telemed at this time. Dr Kyler Martin called back and stated that patientt is already being treated for stroke and just continue with current orders.

## 2020-03-05 NOTE — CONSULTS
Palliative Medicine Consult [place]: [phone]) Patient Name: Eric Olmedo YOB: 1940 Date of Initial Consult: 3/4/2020 Reason for Consult: Care Decisions Requesting Provider: Yvonne Hamm MD 
Primary Care Physician: Estrada Georges NP 
 
 SUMMARY:  
Eric Olmedo is a 78 y.o. with a past history of ARF, Systolic CHF, CAD, HTN, CVA, and HTN, who was admitted on 3/4/2020 from home with a diagnosis of CVA with left-sided weakness and facial droop. Current medical issues leading to Palliative Medicine involvement include: goals of care discussion in setting of frailty and recurrent stroke PALLIATIVE DIAGNOSES:  
1. Goals of care 2. Frailty 3. Debility PLAN:  
1. Brief meeting with patient, she is alert and conversational but I am unsure how decisional she is as she did not remember having the barium swallow done earlier today 2. She tells me she feels well, no complaints 3. I asked if I can call her family, and she asked me to call her son Shira Abdi 4. Will attempt to set up a meeting with him for tomorrow to talk about her goals moving forward 5. Initial consult note routed to primary continuity provider and/or primary health care team members 6. Communicated plan of care with: [facility] Team 
 
 GOALS OF CARE / TREATMENT PREFERENCES:  
 
GOALS OF CARE: 
Patient/Health Care Proxy Stated Goals: Rehabilitation TREATMENT PREFERENCES:  
Code Status: Full Code Advance Care Planning: 
[x] The [facility] Interdisciplinary Team has updated the ACP Navigator with [address] and Patient Capacity Primary Decision Maker: Jesus Hernandez - Child - [phone] Advance Care Planning 3/6/2020 Patient's Healthcare Decision Maker is: Legal Next of Kin Primary Decision Maker Name -  
Primary Decision Maker Phone Number -  
Confirm Advance Directive Yes, not on file Medical Interventions: Full interventions Other Instructions: Other: As far as possible, the palliative care team has discussed with patient / health care proxy about goals of care / treatment preferences for patient. HISTORY:  
 
History obtained from: patient, chart, attending CHIEF COMPLAINT: none HPI/SUBJECTIVE: The patient is:  
[x] Verbal and participatory [] Non-participatory due to:  
 
Some speech delays, but can communicate her needs Very sweet and happy No complaints Clinical Pain Assessment (nonverbal scale for severity on nonverbal patients):  
Clinical Pain Assessment Severity: 0 Duration: for how long has pt been experiencing pain (e.g., 2 days, 1 month, years) Frequency: how often pain is an issue (e.g., several times per day, once every few days, constant) FUNCTIONAL ASSESSMENT:  
 
Palliative Performance Scale (PPS): PPS: 30 
 
 
 PSYCHOSOCIAL/SPIRITUAL SCREENING:  
 
Palliative IDT has assessed this patient for cultural preferences / practices and a referral made as appropriate to needs (Cultural Services, Patient Advocacy, Ethics, etc.) Any spiritual / Ep[employer]alian concerns: 
[] Yes /  [x] No 
 
Caregiver Burnout: 
[] Yes /  [x] No /  [] No Caregiver Present Anticipatory grief assessment:  
[x] Normal  / [] Maladaptive ESAS Anxiety: Anxiety: 0 
 
ESAS Depression: Depression: 0 REVIEW OF SYSTEMS:  
 
Positive and pertinent negative findings in ROS are noted above in HPI. The following systems were [x] reviewed / [] unable to be reviewed as noted in HPI Other findings are noted below. Systems: constitutional, ears/nose/mouth/throat, respiratory, gastrointestinal, genitourinary, musculoskeletal, integumentary, neurologic, psychiatric, endocrine. Positive findings noted below. Modified ESAS Completed by: provider Fatigue: 0 Depression: 0 Pain: 0 Anxiety: 0 Nausea: 0 Dyspnea: 0 Stool Occurrence(s): 1 PHYSICAL EXAM:  
 
From RN flowsheet: Wt Readings from Last 3 Encounters:  
03/05/20 114 lb (51.7 kg) 03/04/20 114 lb (51.7 kg) 08/13/19 127 lb (57.6 kg) Blood pressure 142/73, pulse 65, temperature 97.7 °F (36.5 °C), resp. rate 16, height 5' 1\" (1.549 m), weight 114 lb (51.7 kg), SpO2 98 %. Pain Scale 1: Numeric (0 - 10) Pain Intensity 1: 0 Last bowel movement, if known:  
 
Constitutional: alert, somewhat confused, very pleasant Eyes: pupils equal, anicteric ENMT: no nasal discharge, moist mucous membranes Cardiovascular: regular rhythm, distal pulses intact Respiratory: breathing not labored, symmetric Gastrointestinal: soft non-tender, +bowel sounds Musculoskeletal: no deformity, no tenderness to palpation Skin: warm, dry Neurologic: following commands, moving all extremities Psychiatric: full affect, no hallucinations Other: 
 
 
 HISTORY:  
 
Active Problems: 
  A-fib ([facility] (10/5/2018) CVA (cerebral vascular accident) ([facility] (3/4/2020) Past Medical History:  
Diagnosis Date  Acute kidney failure ([facility] 7/14/2016  Acute systolic heart failure ([facility] 7/14/2016  Arrhythmia  CAD (coronary artery disease)  Heart failure ([facility]  Hypertension  Stroke [facility]) 2009 Stroke, left hand weakness and speech, peripheral vision affected Past Surgical History:  
Procedure Laterality Date  HX UROLOGICAL  02/2018 Family History Problem Relation Age of Onset  Coronary Artery Disease Other   
     grandmother age 76  Stroke Mother  Hypertension Mother History reviewed, no pertinent family history. Social History Tobacco Use  Smoking status: Former Smoker Last attempt to quit: 9/21/2009 Years since quitting: 10.4  Smokeless tobacco: Never Used Substance Use Topics  Alcohol use: No  
  Alcohol/week: 0.0 standard drinks Comment: Very little Allergies Allergen Reactions  Decadron [Dexamethasone Sodium Phosphate] Hives  Pcn [Penicillins] Hives Current Facility-Administered Medications Medication Dose Route Frequency  famotidine (PF) (PEPCID) injection 20 mg  20 mg IntraVENous DAILY  atorvastatin (LIPITOR) tablet 80 mg  80 mg Oral DAILY  aspirin chewable tablet 81 mg  81 mg Oral DAILY  apixaban (ELIQUIS) tablet 2.5 mg  2.5 mg Oral Q12H  
 acetaminophen (TYLENOL) tablet 650 mg  650 mg Oral Q4H PRN Or  
 acetaminophen (TYLENOL) solution 650 mg  650 mg Per NG tube Q4H PRN Or  
 acetaminophen (TYLENOL) suppository 650 mg  650 mg Rectal Q4H PRN  
 labetaloL (NORMODYNE;TRANDATE) injection 5 mg  5 mg IntraVENous Q10MIN PRN  
 metoprolol tartrate (LOPRESSOR) tablet 25 mg  25 mg Oral Q12H  
 
 
 
 LAB AND IMAGING FINDINGS:  
 
Lab Results Component Value Date/Time WBC 7.7 03/04/2020 09:38 AM  
 HGB 11.5 03/04/2020 09:38 AM  
 PLATELET 92 (L) 12/51/4300 09:38 AM  
 
Lab Results Component Value Date/Time Sodium 140 03/05/2020 02:17 AM  
 Potassium 3.8 03/05/2020 02:17 AM  
 Chloride 109 (H) 03/05/2020 02:17 AM  
 CO2 25 03/05/2020 02:17 AM  
 BUN 24 (H) 03/05/2020 02:17 AM  
 Creatinine 1.40 (H) 03/05/2020 02:17 AM  
 Calcium 8.9 03/05/2020 02:17 AM  
 Magnesium 2.0 03/05/2020 02:17 AM  
 Phosphorus 3.6 03/05/2020 02:17 AM  
  
Lab Results Component Value Date/Time AST (SGOT) 18 03/04/2020 09:38 AM  
 Alk. phosphatase 130 (H) 03/04/2020 09:38 AM  
 Protein, total 7.7 03/04/2020 09:38 AM  
 Albumin 3.7 03/04/2020 09:38 AM  
 Globulin 4.0 03/04/2020 09:38 AM  
 
Lab Results Component Value Date/Time INR 1.1 03/04/2020 09:38 AM  
 Prothrombin time 10.8 03/04/2020 09:38 AM  
 aPTT 24.3 05/06/2018 09:00 PM  
  
No results found for: IRON, FE, TIBC, IBCT, PSAT, FERR Lab Results Component Value Date/Time pH 7.39 03/12/2017 10:40 AM  
 PCO2 45 03/12/2017 10:40 AM  
 PO2 87 03/12/2017 10:40 AM  
 
No components found for: [place] Lab Results Component Value Date/Time  08/02/2017 08:31 AM  
 CK - MB 1.8 08/02/2017 08:31 AM  
  
 
 
   
 
Total time:  
Counseling / coordination time, spent as noted above:  
> 50% counseling / coordination?:  
 
Prolonged service was provided for  []30 min   []75 min in face to face time in the presence of the patient, spent as noted above. Time Start:  
Time End:  
Note: this can only be billed with 03745 (initial) or 47312 (follow up). If multiple start / stop times, list each separately.

## 2020-03-05 NOTE — PROGRESS NOTES
Problem: Self Care Deficits Care Plan (Adult) Goal: *Acute Goals and Plan of Care (Insert Text) Description FUNCTIONAL STATUS PRIOR TO ADMISSION: pt lives with family. She has baseline L hemiparesis and reported that her family assists her. She is very dysarthric and reports she is able to understand what is said to her, but demonstrates limited intelligibility. No family members present to provide PLOF at this time. HOME SUPPORT: pt lives with her son in his home. She has assist when he is at work. Occupational Therapy Goals Initiated 3/5/2020 1. Patient will perform grooming with supervision/set-up within 7 day(s). 2.  Patient will perform bathing with moderate assistance  within 7 day(s). 3.  Patient will perform upper body dressing with minimal assistance/contact guard assist within 7 day(s). 4.  Patient will participate in assessing toilet transfers, using most appropriate DME, within 7 day(s). 5.  Patient will participate in OOB toileting assessment  within 7 day(s). 6.  Patient will participate in upper extremity therapeutic exercise/activities with supervision/set-up for 5 minutes within 7 day(s). 7.  Patient will participate in adl mobility assessment within 7 day(s). Outcome: Not Met OCCUPATIONAL THERAPY EVALUATION Patient: Aleyda Bruno (18 y.o. female) Date: 3/5/2020 Primary Diagnosis: CVA (cerebral vascular accident) ([facility] [I63.9] A-fib ([facility] [I48.91] Precautions:   Fall ASSESSMENT Based on the objective data described below, the patient presents with baseline dysarthria and L hemiparesis due to old cva, now admitted with new cva. Pt is functioning generally at maximal assistance for self care and needs assist X2 for mobility.   Pt has L weakness/hemiparesis, RLE is generally intact,  impaired communication due to dysarthria, decreased balance, generalized weakness and decreased endurance impairing adls and mobility. No family were present to provide PLOF information during tx session. She is likely functioning below her baseline and will benefit from acute OT and may need rehab admission at discharge. . 
 
Current Level of Function Impacting Discharge (ADLs/self-care): maximal assistance Functional Outcome Measure: The patient scored Total A-D Total A-D (Motor Function): 54/66 on the Fugl-Sheets Assessment of RUE which is indicative of mild impairment in upper extremity functional status. Barthel Index was scored at  25/100 indicating a 75% decrease from independence. Pt needs total A for IADLs. Other factors to consider for discharge: dysarthria, baseline incontinence reported, non functional LUE due to tonal influences from old CVA. Per PT,  Pt reported R knee pain and mobility was deferred with PT (just prior to this OT Evaluation) Patient will benefit from skilled therapy intervention to address the above noted impairments. PLAN : 
Recommendations and Planned Interventions: self care training, functional mobility training, therapeutic exercise, balance training, visual/perceptual training, therapeutic activities, endurance activities, neuromuscular re-education, patient education, home safety training, and family training/education Frequency/Duration: Patient will be followed by occupational therapy 5 times a week to address goals. Recommendation for discharge: (in order for the patient to meet his/her long term goals) To be determined: would benefit from rehab This discharge recommendation: 
Has not yet been discussed the attending provider and/or case management IF patient discharges home will need the following DME: tbd SUBJECTIVE:  
Patient stated son.    (poor speech intelligibility due to dysarthria) OBJECTIVE DATA SUMMARY:  
HISTORY:  
Past Medical History:  
Diagnosis Date  Acute kidney failure ([facility] 7/14/2016  Acute systolic heart failure ([facility] [facility] 7/14/2016  Arrhythmia  CAD (coronary artery disease)  Heart failure ([facility]  Hypertension  Stroke [facility]) 2009 Stroke, left hand weakness and speech, peripheral vision affected Past Surgical History:  
Procedure Laterality Date  HX UROLOGICAL  02/2018 Expanded or extensive additional review of patient history:  
 
Home Situation Home Environment: Private residence # Steps to Enter: 3 One/Two Story Residence: One story Living Alone: No 
Support Systems: Child(nicci), Friends \ neighbors(Son works) Patient Expects to be Discharged to[de-identified] Private residence Current DME Used/Available at Home: Walker, rolling, Wheelchair Hand dominance: Right EXAMINATION OF PERFORMANCE DEFICITS: 
Cognitive/Behavioral Status: 
Neurologic State: Alert Orientation Level: Oriented to person Cognition: Follows commands Perception: Cues to attend left visual field;Cues to maintain midline in sitting Perseveration: No perseveration noted Skin: generally intact, appears fragile and dry Edema: none observed Hearing: Auditory Auditory Impairment: None Vision/Perceptual:   
Tracking: Requires cues, head turns, or add eye shifts to track(decreased visual attention) Saccades: Additional head turns occurred during testing(decr speed of pursuits) Convergence: (pt does not see dbl) Diplopia: No   
Acuity: (unable to accur assess , no glasses present in room, ) Corrective Lenses: (none present, pt states that she has, but ? here?) Range of Motion: BUEs:  RUE is functional 
AROM: Generally decreased, functional(except LUE nonfunctional) PROM: Generally decreased, functional (LUE impaired due to tonal influences of old cva and discomfort with movement) LUE is non functional 
Noted L elbow contracture of at least 30 degree loss of extension, impaired L supination, stiff L shoulder/scapula, poor gross grasp, decreased extension and unable to perform range of functional grasps Strength: 
RUE is decreased, functional 
Strength: LUE:  Grossly decreased, non-functional; gross grasp is poor Generalized weakness overall Coordination: 
Coordination: Generally decreased, functional(with bed mobility, nonfunctional with transitions) Fine Motor Skills-Upper: Left Impaired;Right Intact Gross Motor Skills-Upper: Left Impaired;Right Intact Tone & Sensation: 
 
Tone: Abnormal(LUE) Sensation: Intact Balance: 
Sitting: Intact; Without support(pt did fatigue with prolonged sitting however) Standing: Impaired Functional Mobility and Transfers for ADLs: 
Bed Mobility: 
Rolling: Minimum assistance Sit to Supine: Maximum assistance Scooting: Maximum assistance See PT note for details Transfers:  reportedly pt is too painful in knee to transfer/stand ADL Assessment: 
Feeding: Minimum assistance(will have mbs) Oral Facial Hygiene/Grooming: Minimum assistance Bathing: Moderate assistance Upper Body Dressing: Moderate assistance Lower Body Dressing: Maximum assistance Toileting: Maximum assistance ADL Intervention and task modifications: Pt performed bed mobility with maximal assistance. Pt was able to sit EOB with supervision. Functional Measure: 
Fugl-Sheets Assessment of Motor Recovery after Stroke: The RUE was assessed due to recent cva - L frontal acute infarct and L parietal acute infarct. Pt has baseline L hemiparesis from old cva and LUE is generally non functional  -  FM Score LUE:  11/66\ 
RUE assessment below: 
Reflex Activity Flexors/Biceps/Fingers: Can be elicited Extensors/Triceps: Can be elicited Reflex Subtotal: 4 Volitional Movement Within Synergies Shoulder Retraction: Full Shoulder Elevation: Full Shoulder Abduction (90 degrees): Partial 
 Shoulder External Rotation: Full Elbow Flexion: Full Forearm Supination: Full Shoulder Adduction/Internal Rotation: Full Elbow Extension: Full Forearm Pronation: Full Subtotal: 17 
 
Volitional Movement Mixing Synergies Hand to Lumbar Spine: Full Shoulder Flexion (0-90 degrees): Full Pronation-Supination: Full Subtotal: 6 Volitional Movement With Little or No Synergy Shoulder Abduction (0-90 degrees): Full Shoulder Flexion ( degrees): None Pronation/Supination: Partial 
Subtotal : 3 Normal Reflex Activity Biceps, Triceps, Finger Flexors: Full Subtotal : 2 Upper Extremity Total  
Upper Extremity Total: 32 Wrist 
Stability at 15 Degree Dorsiflexion: Full Repeated Dorsiflexion/ Volar Flexion: Partial 
Stability at 15 Degree Dorsiflexion: Full Repeated Dorsiflexion/ Volar Flexion: Partial 
Circumduction: Partial 
Wrist Total: 7 Hand Mass Flexion: Full Mass Extension: Full Grasp A: Full Grasp B: Full Grasp C: Full Grasp D: Full Grasp E: Full Hand Total: 14 
 
Coordination/Speed Tremor: Marked Dysmetria: Marked Time: 2-5s Coordination/Speed Total : 1 Total A-D Total A-D (Motor Function): 05/30 This is a reliable/valid measure of arm function after a neurological event. It has established value to characterize functional status and for measuring spontaneous and therapy-induced recovery; tests proximal and distal motor functions. Fugl-Sheets Assessment  UE scores recorded between five and 30 days post neurologic event can be used to predict UE recovery at six months post neurologic event. Severe = 0-21 points Moderately Severe = 22-33 points Moderate = 34-47 points Mild = 48-66 points MOISE Chaparro, OMI Vásquez, & HOLLY Falcon (1992). Measurement of motor recovery after stroke: Outcome assessment and sample size requirements.  Stroke, 23, pp. 3584-2134.  
--------------------------------------------------------------------------- --------------------------------------------------------------------------------------- MCID: 
Stroke: Elizabeth Harrell et al, 2001; n = 171; mean age 79 (6) years; assessed within 16 (12) days of stroke, Acute Stroke) FMA Motor Scores from Admission to Discharge  
? 10 point increase in FMA Upper Extremity = 1.5 change in discharge FIM ? 10 point increase in FMA Lower Extremity = 1.9 change in discharge FIM 
MDC:  
Stroke:  
Rebecca Az et al, 2008, n = 14, mean age = 59.9 (14.6) years, assessed on average 14 (6.5) months post stroke, Chronic Stroke) ? FMA = 5.2 points for the Upper Extremity portion of the assessment Barthel Index: 
 
Bathin Bladder: 5 Bowels: 5 Groomin Dressin Feedin Mobility: 0 Stairs: 0 Toilet Use: 5 Transfer (Bed to Chair and Back): 0 Total: 25/100 The Barthel ADL Index: Guidelines 1. The index should be used as a record of what a patient does, not as a record of what a patient could do. 2. The main aim is to establish degree of independence from any help, physical or verbal, however minor and for whatever reason. 3. The need for supervision renders the patient not independent. 4. A patient's performance should be established using the best available evidence. Asking the patient, friends/relatives and nurses are the usual sources, but direct observation and common sense are also important. However direct testing is not needed. 5. Usually the patient's performance over the preceding 24-48 hours is important, but occasionally longer periods will be relevant. 6. Middle categories imply that the patient supplies over 50 per cent of the effort. 7. Use of aids to be independent is allowed. Nonie Freshwater., Barthel, DCyrusWCyrus (1927). Functional evaluation: the Barthel Index. [facility] (14)2. Valerie MontanezCHRISTUS Spohn Hospital Corp[facility] Christi – Shorelinemine, J.J.M.MEET, Rosalinda Mcadams., Guille Mathew., Lupis, [address] ().  Measuring the change indisability after inpatient rehabilitation; comparison of the responsiveness of the Barthel Index and Functional Drifting Measure. Journal of Neurology, Neurosurgery, and Psychiatry, 66(4), 189-681. GIOVANNA Galindo, COREY Mayen, & Dayanara Springer M.A. (2004.) Assessment of post-stroke quality of life in cost-effectiveness studies: The usefulness of the Barthel Index and the EuroQoL-5D. [facility], 43, 570-06 Occupational Therapy Evaluation Charge Determination History Examination Decision-Making LOW Complexity : Brief history review  MEDIUM Complexity : 3-5 performance deficits relating to physical, cognitive , or psychosocial skils that result in activity limitations and / or participation restrictions MEDIUM Complexity : Patient may present with comorbidities that affect occupational performnce. Miniml to moderate modification of tasks or assistance (eg, physical or verbal ) with assesment(s) is necessary to enable patient to complete evaluation Based on the above components, the patient evaluation is determined to be of the following complexity level: LOW Pain Rating: No complaints Activity Tolerance:  
Fair After treatment patient left in no apparent distress:   
Sitting in chair position at bed level and nursing in to perform care COMMUNICATION/EDUCATION:  
The patients plan of care was discussed with: Physical therapist, Registered nurse and Certified nursing assistant/patient care technician. Pt was not educated on the BEFAST at this time. Patient is unable to participate in goal setting and plan of care. This patients plan of care is appropriate for delegation to [facility]. Thank you for this referral. 
BEBA Mcpherson/L Time Calculation: 19 mins

## 2020-03-05 NOTE — PROGRESS NOTES
Bedside and Verbal shift change report given to Kallie RN (oncoming nurse) by Cher Mosqueda RN (offgoing nurse). Report included the following information SBAR, Kardex, ED Summary, Intake/Output, MAR, Recent Results and Cardiac Rhythm NSR. 
  
Zone Phone:   [phone] 
  
  
Significant changes during shift:  Patient with 4 and 7 beats of V-tach back to back. Labs done. MRI completed with new infarcts noted.   
  
  
  
Patient Information 
  
Jonnie Alejo 78 y.o. 
3/4/2020  9:02 AM by Denny Christianson MD. Jonnie Alejo was admitted from Home 
  
Problem List 
  
    
Patient Active Problem List  
  Diagnosis Date Noted  CVA (cerebral vascular accident) ([address] 03/04/2020  A-fib ([address] 10/05/2018  Ventral hernia with obstruction 06/01/2018  Bilateral carotid artery stenosis 05/10/2018  Acute cerebral infarction ([address] 05/09/2018  Syncope 05/06/2018  CAD (coronary artery disease) 02/05/2018  Stroke ([address] 02/05/2018  Hypertensive urgency 08/01/2017  CHF (congestive heart failure) ([address] 08/01/2017  COPD exacerbation ([address] 03/07/2017  CAD (coronary artery disease), native coronary artery 07/15/2016  H/O: CVA (cerebrovascular accident) 07/15/2016  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 ([address] 07/15/2016  Severe sepsis ([address] 07/14/2016  Pneumonia 07/14/2016  Acute respiratory failure ([address] 07/14/2016  Elevated troponin 07/14/2016  Left bundle branch block (LBBB) on electrocardiogram 07/14/2016  Acute systolic heart failure ([address] 07/14/2016  Acute kidney failure ([address] 07/14/2016  Mixed hyperlipidemia 09/21/2012  Essential hypertension, benign 09/21/2012  Other left bundle branch block 09/21/2012  Coronary atherosclerosis of native coronary artery 09/21/2012  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 09/21/2012  Atherosclerosis of renal artery ([address] 09/21/2012  Atherosclerosis of native artery of extremity [facility] 09/21/2012  Tobacco use disorder 09/21/2012  Cerebrovascular accident ([facility] 09/21/2012  Mitral valve disorders(424.0) 09/21/2012  Tricuspid valve disorder 09/21/2012  
  
    
Past Medical History:  
Diagnosis Date  [facility] 7/14/2016  Acute systolic heart failure ([facility] 7/14/2016  Arrhythmia    
 CAD (coronary artery disease)    
 [facility]    
 Hypertension    
 Stroke ([facility] 2009  
  Stroke, left hand weakness and speech, peripheral vision affected  
  
  
  
Core Measures: 
  
CVA: Yes Yes CHF:No No 
PNA:No No 
  
  
Activity Status: 
  
OOB to Chair No 
Ambulated this shift No  
Bed Rest Yes 
  
  
  
LINES AND DRAINS: 
PIV 
  
DVT prophylaxis: 
  
DVT prophylaxis Med- No 
DVT prophylaxis SCD or MALA- Yes  
  
Patient Safety: 
  
Falls Score Total Score: 3 Safety Level_______ Bed Alarm On? Yes Sitter?  No 
  
Plan for upcoming shift: Palliative consult, PT 
  
  
  
Discharge Plan: No TBD 
  
Active Consults: 
IP CONSULT TO CARDIOLOGY 
IP CONSULT TO NEUROLOGY 
IP CONSULT TO PALLIATIVE CARE - PROVIDER

## 2020-03-05 NOTE — PROGRESS NOTES
Initial note: CM conducted room visit in attempt to make contact with pt, introduce role, and complete initial assessment. Pt is off floor for testing at this time; CM will f/u later in the day. CM will report updates as they become available. CM will continue to follow patient for discharge planning needs and arrange for services as deemed necessary. Nohelia Paz MSW Care Manager, [facility] 
[phone]

## 2020-03-05 NOTE — PROGRESS NOTES
Palliative Medicine [place]: [phone] ([phone] [facility]: [phone] ([phone]) Telephone call made to arrange a family meeting with son Tressa Valdez(#[phone]). He has to find transportation, has our number, will give us a call when he knows what time he can make it. Will await call back.

## 2020-03-05 NOTE — PROGRESS NOTES
Problem: Dysphagia (Adult) Goal: *Acute Goals and Plan of Care (Insert Text) Description Initiated 3/5/2020 1. Patient will participate in MBS to determine swallow physiology within 7 days. MET 2. Patient will tolerate dental soft diet with thin liquids free of s/s of aspiration within 7 days. 3/5/2020 1156 by DEREK Webber Outcome: Progressing Towards Goal 
3/5/2020 1011 by Brenda Cason, SLP Outcome: Progressing Towards Goal 
 SPEECH PATHOLOGY MODIFIED BARIUM SWALLOW STUDY Patient: Marc Hendrix (84 y.o. female) Date: 3/5/2020 Primary Diagnosis: CVA (cerebral vascular accident) ([facility] [address] [I63.9] A-fib ([address] [I48.91] Precautions:    Fall ASSESSMENT : 
Based on the objective data described below, the patient presents with mild oropharyngeal swallow. Oral swallow is characterized by prolonged mastication with delayed oral clearance. There is no oral residue after the swallow. There is delayed pharyngeal swallow initiation with the head of the bolus reaching the valleculae with all trials. There is no penetration/aspiration or pharyngeal residue. Material passes through the UES without incident. Given performance on today's study, suspect her clinical symptoms of shortness of breath/wheezing/intermittent coughing are not related to aspiration. Patient will benefit from skilled intervention to address the above impairments. Patients rehabilitation potential is considered to be Good PLAN : 
Recommendations and Planned Interventions: 
Dental soft diet, thin liquids Will follow for diet tolerance. Frequency/Duration: Patient will be followed by speech-language pathology 3 times a week to address goals. Discharge Recommendations: To Be Determined SUBJECTIVE:  
Patient stated Ok. OBJECTIVE:  
 
Past Medical History:  
Diagnosis Date Acute kidney failure ([address] 7/14/2016 Acute systolic heart failure ([address] 7/14/2016 Arrhythmia CAD (coronary artery disease) Heart failure ([facility] [address] Hypertension Stroke [facility]) 2009 Stroke, left hand weakness and speech, peripheral vision affected Past Surgical History:  
Procedure Laterality Date HX UROLOGICAL  02/2018 Prior Level of Function/Home Situation:   
Home Situation Home Environment: Private residence # Steps to Enter: 3 One/Two Story Residence: One story Living Alone: No 
Support Systems: Child(nicci), Friends \ neighbors(Son works) Patient Expects to be Discharged to[de-identified] Private residence Current DME Used/Available at Home: Walker, rolling, Wheelchair Diet prior to admission: regular with thins Current Diet:  NPO Radiologist: Dr Naomy Arango Film Views: Lateral;Fluoro Patient Position: upright in [address] [facility] Trial 1:  
Consistency Presented: Thin liquid; Solid;Pudding How Presented: Spoon;SLP-fed/presented;Straw Bolus Acceptance: No impairment Bolus Formation/Control: Impaired: Mastication;Poor(prolonged) Propulsion: Delayed (# of seconds) Initiation of Swallow: Triggered at vallecula Timing: Pooling 1-5 sec Penetration: None Aspiration/Timing: No evidence of aspiration Pharyngeal Clearance: No residue Decreased Tongue Base Retraction?: No 
Laryngeal Elevation: WFL (within functional limits) Aspiration/Penetration Score: 1 (No penetration or aspiration-Contrast does not enter the airway) Pharyngeal-Esophageal Segment: No impairment Pharyngeal Dysfunction: (delay) Oral Phase Severity: Mild Pharyngeal Phase Severity: Mild NOMS:  
The NOMS functional outcome measure was used to quantify this patient's level of swallowing impairment. Based on the NOMS, the patient was determined to be at level 6 for swallow function NOMS Swallowing Levels: 
Level 1 (CN): NPO Level 2 (CM): NPO but takes consistency in therapy Level 3 (CL): Takes less than 50% of nutrition p.o. and continues with nonoral feedings; and/or safe with mod cues; and/or max diet restriction Level 4 (CK): Safe swallow but needs mod cues; and/or mod diet restriction; and/or still requires some nonoral feeding/supplements Level 5 (CJ): Safe swallow with min diet restriction; and/or needs min cues Level 6 (CI): Independent with p.o.; rare cues; usually self cues; may need to avoid some foods or needs extra time Level 7 (CH): Independent for all p.o. ANAYA. (2003). National Outcomes Measurement System (NOMS): Adult Speech-Language Pathology User's Guide. COMMUNICATION/EDUCATION:  
Patient was educated regarding MBS results and readiness to initiate diet. She smiled and said ok. The patients plan of care including findings from [address], recommendations, planned interventions, and recommended diet changes were discussed with: Registered nurse and Physician. Patient understands intent and goals of therapy, but is neutral about his/her participation. Thank you for this referral. 
Mimi Rodrigues, SLP Time Calculation: 20 mins

## 2020-03-05 NOTE — ROUTINE PROCESS
Bedside and Verbal shift change report given to Ivon Sanchez (oncoming nurse) by Dayana Oro (offgoing nurse). Report included the following information SBAR, Kardex, ED Summary, Intake/Output, MAR, Recent Results and Cardiac Rhythm NSR. Zone Phone:   [phone] Significant changes during shift:  Admit to [facility] Patient Information Dayanara Hill 78 y.o. 
3/4/2020  9:02 AM by Nevaeh Emanuel MD. Dayanara Hill was admitted from Home 
 
Problem List 
 
Patient Active Problem List  
 Diagnosis Date Noted  CVA (cerebral vascular accident) ([address] 03/04/2020  A-fib ([address] 10/05/2018  Ventral hernia with obstruction 06/01/2018  Bilateral carotid artery stenosis 05/10/2018  Acute cerebral infarction ([address] 05/09/2018  Syncope 05/06/2018  CAD (coronary artery disease) 02/05/2018  Stroke ([address] 02/05/2018  Hypertensive urgency 08/01/2017  CHF (congestive heart failure) ([address] 08/01/2017  COPD exacerbation ([address] 03/07/2017  CAD (coronary artery disease), native coronary artery 07/15/2016  H/O: CVA (cerebrovascular accident) 07/15/2016  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 ([address] 07/15/2016  Severe sepsis ([address] 07/14/2016  Pneumonia 07/14/2016  Acute respiratory failure ([address] 07/14/2016  Elevated troponin 07/14/2016  Left bundle branch block (LBBB) on electrocardiogram 07/14/2016  Acute systolic heart failure ([address] 07/14/2016  Acute kidney failure ([address] 07/14/2016  Mixed hyperlipidemia 09/21/2012  Essential hypertension, benign 09/21/2012  Other left bundle branch block 09/21/2012  Coronary atherosclerosis of native coronary artery 09/21/2012  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  
Diagnosis Date  Acute kidney failure ([facility] 7/14/2016  Acute systolic heart failure ([facility] 7/14/2016  Arrhythmia  CAD (coronary artery disease)  Heart failure ([facility]  Hypertension  Stroke [facility]) 2009 Stroke, left hand weakness and speech, peripheral vision affected Core Measures: CVA: Yes Yes CHF:No No 
PNA:No No 
 
 
Activity Status: OOB to Chair No 
Ambulated this shift No  
Bed Rest Yes LINES AND DRAINS: 
PIV 
 
DVT prophylaxis: DVT prophylaxis Med- No 
DVT prophylaxis SCD or MALA- No  
 
Patient Safety: 
 
Falls Score Total Score: 3 Safety Level_______ Bed Alarm On? No 
Sitter? No 
 
Plan for upcoming shift: MRI, PT, OT., speech, palliative Discharge Plan: No  
 
Active Consults: 
IP CONSULT TO CARDIOLOGY 
IP CONSULT TO NEUROLOGY 
IP CONSULT TO PALLIATIVE CARE - PROVIDER

## 2020-03-05 NOTE — PROGRESS NOTES
Dr Ayanna Mandujano notified that patient has 4 beats of V-tach and 7 beats of v-tach back to back. Md called and gave an order for B/P recheck and if systolic b/p is greater than 140 to give lopressor 25mg.

## 2020-03-05 NOTE — PROGRESS NOTES
Hospitalist Progress Note NAME: Pam John :  1940 MRN:  [id number] Assessment / Plan: CVA with left-sided weakness and facial droop Ct scan showed :No acute changes demonstrated. Multiple bilateral supra and 
infratentorial remote infarcts. No hemorrhage or mass effect We will admit to neuro telemetry, neurochecks 4 hours,  Tele neurologist advised aspirin and Plavix and holding Eliquis MRI SHOWED: 
1. Two tiny acute to subacute infarcts in the left frontal lobe. 2. Unchanged generalized parenchymal volume loss and moderate chronic 
microvascular ischemic disease. Unchanged large chronic right MCA territory 
infarct, and numerous additional supratentorial and infratentorial chronic 
infarcts as above. Discussed with neurology, due to the tiny nature of acute infarcts recommended to restart [facility] today. Cardiology recommended switching to coumadin due to failure of Eliquis Will start lovenox today Coumadin when passes speech eval, Getting MBS today Echo pending 
 continue with rectal aspirin, continue with p.o. atorvastatin, start Plavix when able per tele-neurologist  
Allow permissive hypertension, PRN labetalol for systolic blood pressure [id number] or dbp>120 Check 2D echo PENDING 
CTA head and neck showed no major vessel occlusion. HbA1c 5.8 LDL 83 
  
Paroxysmal  A. fib with RVR Upon presentation, patient was found to be in A. fib with RVR, received IV fluid, heart rate came down below 110, repeat EKG was more consistent with sinus tachycardia Patient needs permissive hypertension, will hold off on diltiazem drip Currently in sinus rhythm AC as above Hold Eliquis per neuro 
  
Hypertension H/o of CVA 
CKD stage III Cad Systolic CHF Loop recorder present on chest xray Allow permissive hypertension Continue with IV fluid D5 half-normal saline 
  
  
Code Status: Presumed full code(no advanced directive and no family at bedside) we will consult palliative care for goals of care Surrogate Decision Maker: 
Davonte Durán [phone]   [phone] Jenetta Mortimer [phone]      
  
  
DVT Prophylaxis: scd GI Prophylaxis: not indicated 
  
Baseline: unknown Subjective: Chief Complaint / Reason for Physician Visit \"patient has slurred speech but awake and alert and denies any worsening weakness, reports left sided hemiparesis from previous stroke\". Discussed with RN events overnight. Review of Systems: 
Symptom Y/N Comments  Symptom Y/N Comments Fever/Chills n   Chest Pain n   
Poor Appetite n   Edema n   
Cough n   Abdominal Pain n   
Sputum n   Joint Pain n   
SOB/CHRISTINE n   Pruritis/Rash Nausea/vomit n   Tolerating PT/OT Diarrhea n   Tolerating Diet Constipation n   Other Could NOT obtain due to:   
 
Objective: VITALS:  
Last 24hrs VS reviewed since prior progress note. Most recent are: 
Patient Vitals for the past 24 hrs: 
 Temp Pulse Resp BP SpO2  
03/05/20 1017    154/85   
03/05/20 1011    148/82   
03/05/20 0724 97.9 °F (36.6 °C) 68 16 151/84 99 % 03/05/20 0406 98 °F (36.7 °C) 63 16 157/82 98 % 03/04/20 2356 98.1 °F (36.7 °C) 91 20 (!) 162/92 96 % 03/04/20 2318    (!) 158/96   
03/04/20 2155 98.2 °F (36.8 °C) 96 18 152/81 100 % 03/04/20 1605 98.2 °F (36.8 °C) 98 18 153/76 98 % 03/04/20 1530  93 18 164/87 99 % 03/04/20 1505  94 16  98 % 03/04/20 1448  (!) 106 16  97 % 03/04/20 1430  93 14 142/82 96 % 03/04/20 1330  (!) 119 23 (!) 168/95 97 % 03/04/20 1300  (!) 108 29 (!) 160/94 96 % 03/04/20 1100  (!) 119 14 (!) 155/97 96 % Intake/Output Summary (Last 24 hours) at 3/5/2020 1021 Last data filed at 3/4/2020 1355 Gross per 24 hour Intake 500 ml Output  Net 500 ml PHYSICAL EXAM: 
General: WD, WN. Alert, cooperative, no acute distress   
EENT:  EOMI. Anicteric sclerae. MMM Resp: CTA bilaterally, no wheezing or rales. No accessory muscle use CV:  Regular  rhythm,  No edema GI:  Soft, Non distended, Non tender.  +Bowel sounds Neurologic:  Alert and oriented X 3, slurred speech, left hemiparesis Psych:   Good insight. Not anxious nor agitated Skin:  No rashes. No jaundice Reviewed most current lab test results and cultures  YES Reviewed most current radiology test results   YES Review and summation of old records today    NO Reviewed patient's current orders and MAR    YES 
PMH/SH reviewed - no change compared to H&P 
________________________________________________________________________ Care Plan discussed with: 
  Comments Patient x Family RN x Care Manager Consultant  x   
                 x Multidiciplinary team rounds were held today with , nursing, pharmacist and clinical coordinator. Patient's plan of care was discussed; medications were reviewed and discharge planning was addressed. ________________________________________________________________________ Total NON critical care TIME: 32  Minutes Total CRITICAL CARE TIME Spent:   Minutes non procedure based Comments >50% of visit spent in counseling and coordination of care    
________________________________________________________________________ Kailey Paiz MD  
 
Procedures: see electronic medical records for all procedures/Xrays and details which were not copied into this note but were reviewed prior to creation of Plan. LABS: 
I reviewed today's most current labs and imaging studies. Pertinent labs include: 
Recent Labs 03/04/20 
3620 WBC 7.7 HGB 11.5 HCT 34.1*  
PLT 92* Recent Labs 03/05/20 
0217 03/04/20 
6477  138  
K 3.8 4.1 * 105 CO2 25 28 * 170* BUN 24* 32* CREA 1.40* 1.52* CA 8.9 9.0 MG 2.0 2.1 PHOS 3.6  --   
ALB  --  3.7 TBILI  --  0.3 SGOT  --  18 ALT  --  16 INR  --  1.1 Signed: Ed Hobbs MD

## 2020-03-05 NOTE — PROGRESS NOTES
Pharmacy  Enoxaparin (Lovenox®) Monitoring Indication: Afib Current Dose: Enoxaparin 60 mg subcutaneously every 12 hours Creatinine Clearance (mL/min): 25.7 Current Weight: 57.4 Kg Labs: 
Recent Labs 03/05/20 
0217 03/04/20 
2756 CREA 1.40* 1.52* HGB  --  11.5 PLT  --  92* INR  --  1.1 Wt Readings from Last 1 Encounters:  
03/04/20 57.4 kg (126 lb 8.7 oz) Ht Readings from Last 1 Encounters:  
03/04/20 154.9 cm (61\") Impression/Plan:  
Due to CrCl  < 30 mL/min, will adjust Lovenox dose to 60mg SQ q24h ThanksIzzy 
 
  [email or web address]/[place]/[facility]/Pharmacy/Clinical%20Companion/Lovenox%20Dose%20Adjustment%20protocol. pdf

## 2020-03-05 NOTE — CONSULTS
NEUROLOGY NOTE Chief Complaint Patient presents with  Extremity Weakness Reason for Consult I have been asked to see the patient in neurological consultation by Sakina Morris MD to render advice and opinion regarding possible stroke HPI Aleyda Bruno is a 78 y.o. female who presents to the hospital because of Left leg weakness, left facial droop and slurred speech. Patient does have history of atrial fibrillation and is on Eliquis. Patient was last known to be normal at 9 PM on 3/3/2020. The patient is more confused than usual and does have some baseline left-sided deficit but that has worsened. The patient does have history of stroke. During the hospitalization patient did have MRI of the brain which did show 2 tiny acute to subacute infarction in the left frontal lobe. Stroke work-up did show mildly elevated LDL. LDL is 83 and HbA1c is 5.8. Patient is taking aspirin 300 mg per rectal, Plavix 75 mg daily and Lipitor 40 mg daily. Cardiology has been consulted. Recommend Coumadin as the patient has failed Eliquis. ROS A ten system review of constitutional, cardiovascular, respiratory, musculoskeletal, endocrine, skin, SHEENT, genitourinary, psychiatric and neurologic systems was obtained and is unremarkable except as stated in HPI  
 
PMH Past Medical History:  
Diagnosis Date  Acute kidney failure ([address] 7/14/2016  Acute systolic heart failure ([address] 7/14/2016  Arrhythmia  CAD (coronary artery disease)  Heart failure ([address]  Hypertension  Stroke [facility]) 2009 Stroke, left hand weakness and speech, peripheral vision affected [facility] Family History Problem Relation Age of Onset  Coronary Artery Disease Other   
     grandmother age 76  Stroke Mother  Hypertension Mother [address] Social History Socioeconomic History  Marital status:  Spouse name: Not on file  Number of children: Not on file  Years of education: Not on file  Highest education level: Not on file Tobacco Use  Smoking status: Former Smoker Last attempt to quit: 9/21/2009 Years since quitting: 10.4  Smokeless tobacco: Never Used Substance and Sexual Activity  Alcohol use: No  
  Alcohol/week: 0.0 standard drinks Comment: Very little  Drug use: No  
Social History Narrative ** Merged History Encounter ** ALLERGIES Allergies Allergen Reactions  Decadron [Dexamethasone Sodium Phosphate] Hives  Pcn [Penicillins] Hives PHYSICAL EXAMINATION:  
Patient Vitals for the past 24 hrs: 
 Temp Pulse Resp BP SpO2  
03/05/20 1535 98.2 °F (36.8 °C) 71 18 169/87 98 % 03/05/20 1445    159/78   
03/05/20 1229 98.7 °F (37.1 °C) 66 20 159/78 99 % 03/05/20 1017    154/85   
03/05/20 1011    148/82   
03/05/20 0724 97.9 °F (36.6 °C) 68 16 151/84 99 % 03/05/20 0406 98 °F (36.7 °C) 63 16 157/82 98 % 03/04/20 2356 98.1 °F (36.7 °C) 91 20 (!) 162/92 96 % 03/04/20 2318    (!) 158/96   
03/04/20 2155 98.2 °F (36.8 °C) 96 18 152/81 100 % General:  
General appearance: Pt is in no acute distress Distal pulses are preserved Fundoscopic exam: attempted Neurological Examination:  
Mental Status:  AA but not able to speak. Pt is having expressive aphasia. Follows commands, unable to assess fund of knowledge, attention, short term recall, comprehension and insight. Cranial Nerves: Visual fields are full. PERRL, Extraocular movements are full. Facial sensation intact. Facial movement - decreased on the left. Hearing intact to conversation. Palate elevates symmetrically. Shoulder shrug symmetric. Tongue midline. Motor: Strength is 5/5 on the right. RUE and RLE is 4/5. Normal tone. No atrophy. Sensation: Light touch - Normal 
 
Reflexes: DTRs 2+ throughout. Coordination/Cerebellar: Unable to assess Gait: deferred Skin: No significant bruising or lacerations. LAB DATA REVIEWED:   
Recent Results (from the past 24 hour(s)) EKG, 12 LEAD, INITIAL Collection Time: 03/05/20  1:13 AM  
Result Value Ref Range Ventricular Rate 122 BPM  
 Atrial Rate 122 BPM  
 P-R Interval 128 ms QRS Duration 128 ms Q-T Interval 344 ms QTC Calculation (Bezet) 490 ms Calculated P Axis 65 degrees Calculated R Axis -19 degrees Calculated T Axis 115 degrees Diagnosis Sinus tachycardia with occasional premature ventricular complexes and fusion  
complexes Possible Left atrial enlargement Left bundle branch block Confirmed by Chente Bird, P.V. ([id number]) on 3/5/2020 11:58:46 AM 
  
EKG, 12 LEAD, SUBSEQUENT Collection Time: 03/05/20  1:31 AM  
Result Value Ref Range Ventricular Rate 75 BPM  
 Atrial Rate 75 BPM  
 P-R Interval 146 ms  
 QRS Duration 132 ms Q-T Interval 460 ms QTC Calculation (Bezet) 513 ms Calculated P Axis 66 degrees Calculated R Axis -29 degrees Calculated T Axis 107 degrees Diagnosis Normal sinus rhythm Possible Left atrial enlargement Left bundle branch block Abnormal ECG Confirmed by Chente Bird, P.V. ([id number]) on 3/5/2020 12:07:01 PM 
  
HEMOGLOBIN A1C WITH EAG Collection Time: 03/05/20  2:17 AM  
Result Value Ref Range Hemoglobin A1c 5.8 (H) 4.0 - 5.6 % Est. average glucose 120 mg/dL T4, FREE Collection Time: 03/05/20  2:17 AM  
Result Value Ref Range T4, Free 1.0 0.8 - 1.5 NG/DL  
METABOLIC PANEL, BASIC Collection Time: 03/05/20  2:17 AM  
Result Value Ref Range Sodium 140 136 - 145 mmol/L Potassium 3.8 3.5 - 5.1 mmol/L Chloride 109 (H) 97 - 108 mmol/L  
 CO2 25 21 - 32 mmol/L Anion gap 6 5 - 15 mmol/L Glucose 131 (H) 65 - 100 mg/dL BUN 24 (H) 6 - 20 MG/DL Creatinine 1.40 (H) 0.55 - 1.02 MG/DL  
 BUN/Creatinine ratio 17 12 - 20 GFR est AA 44 (L) >60 ml/min/1.73m2 GFR est non-AA 36 (L) >60 ml/min/1.73m2  Calcium 8.9 8.5 - 10.1 MG/DL  
PHOSPHORUS  
 Collection Time: 03/05/20  2:17 AM  
Result Value Ref Range Phosphorus 3.6 2.6 - 4.7 MG/DL  
TROPONIN I Collection Time: 03/05/20  2:17 AM  
Result Value Ref Range Troponin-I, Qt. 0.18 (H) <0.05 ng/mL TSH 3RD GENERATION Collection Time: 03/05/20  2:17 AM  
Result Value Ref Range TSH 0.60 0.36 - 3.74 uIU/mL  
LIPID PANEL Collection Time: 03/05/20  2:17 AM  
Result Value Ref Range LIPID PROFILE Cholesterol, total 154 <200 MG/DL Triglyceride 71 <150 MG/DL  
 HDL Cholesterol 56 MG/DL  
 LDL, calculated 83.8 0 - 100 MG/DL VLDL, calculated 14.2 MG/DL  
 CHOL/HDL Ratio 2.8 0.0 - 5.0    
T3, FREE Collection Time: 03/05/20  2:17 AM  
Result Value Ref Range Free Triiodothyronine (T3) 1.6 (L) 2.2 - 4.0 pg/mL MAGNESIUM Collection Time: 03/05/20  2:17 AM  
Result Value Ref Range Magnesium 2.0 1.6 - 2.4 mg/dL ECHO ADULT COMPLETE Collection Time: 03/05/20  3:31 PM  
Result Value Ref Range Ao Root D 2.51 cm Aortic Valve Systolic Peak Velocity 950.31 cm/s AoV VTI 28.00 cm Aortic Valve Area by Continuity of Peak Velocity 2.0 cm2 Aortic Valve Area by Continuity of VTI 2.4 cm2 AoV PG 11.1 mmHg LVIDd 2.26 (A) 3.9 - 5.3 cm  
 LVPWd 1.54 (A) 0.6 - 0.9 cm LVIDs 2.09 cm IVSd 1.61 (A) 0.6 - 0.9 cm  
 LV ES Vol A4C 11.3 mL  
 LVOT d 1.91 cm  
 LVOT Peak Velocity 115.76 cm/s LVOT Peak Gradient 5.4 mmHg LVOT VTI 23.44 cm  
 MVA (PHT) 2.4 cm2  
 MV A Shola 121.96 cm/s  
 MV E Shola 91.99 cm/s  
 MV E/A 0.80 MV Mean Gradient 2.9 mmHg Mitral Valve Annulus Velocity Time Integral 38.78 cm Left Atrium to Aortic Root Ratio 1.16 Pulmonic Valve Systolic Mean Gradient 1.0 mmHg Pulmonic Valve Systolic Velocity Time Integral 15.62 cm Tricuspid Valve E Wave Peak Velocity 46.53 cm/s Tricuspid Valve A Wave Peak Velocity 51.47 cm/s Aortic Valve Systolic Mean Gradient 4.7 mmHg LV Ejection Fraction MOD 4C 55 % LVOT Cardiac Output 4.2 L/min LA Vol 4C 38.67 22 - 52 mL  
 LA Area 4C 16.1 cm2 LV Mass .5 67 - 162 g  
 LV Mass AL Index 93.1 43 - 95 g/m2 TR ERO 1.1 MV Peak Gradient 8.1 mmHg LV ED Vol A4C 24.7 mL Mitral Regurgitant Peak Velocity 437.75 cm/s Mitral Valve E Wave Deceleration Time 313.8 ms Mitral Valve Pressure Half-time 91.2 ms Left Atrium Major Axis 2.91 cm Pulmonic Valve Max Velocity 78.91 cm/s Mitral Valve Max Velocity 141.98 cm/s MVA VTI 1.7 cm2 LVOT SV 67.2 ml  
 LA Vol Index 25.99 16 - 28 ml/m2 LVED Vol Index A4C 16.6 mL/m2 LVES Vol Index A4C 7.6 mL/m2 MR Peak Gradient 76.6 mmHg Left Ventricular Fractional Shortening by 2D 9.3953451473 % Left Ventricular Outflow Tract Mean Gradient 2.6139832945547 mmHg Left Ventricular Outflow Tract Mean Velocity 4.86763344086490 cm/s Mitral Valve Deceleration [place] 3.8107601619119 Mitral valve mean inflow velocity 4.42872300048216 m/s AV Velocity Ratio 0.70 AV VTI Ratio 0.8 Aortic valve mean velocity 1.17120641147528 m/s PV peak gradient 2.5 mmHg HOME MEDS Prior to Admission Medications Prescriptions Last Dose Informant Patient Reported? Taking? albuterol (PROVENTIL HFA, VENTOLIN HFA, PROAIR HFA) 90 mcg/actuation inhaler  Other No Yes Sig: Take 2 Puffs by inhalation every four (4) hours as needed for Wheezing. allopurinol (ZYLOPRIM) 100 mg tablet 3/3/2020 at Unknown time Other Yes Yes Sig: Take 100 mg by mouth daily. amLODIPine (NORVASC) 5 mg tablet 3/4/2020 at Unknown time Other Yes Yes Sig: Take 5 mg by mouth daily. apixaban (ELIQUIS) 2.5 mg tablet 3/4/2020 at 0830 Other No Yes Sig: Take 1 Tab by mouth two (2) times a day. atorvastatin (LIPITOR) 40 mg tablet 3/4/2020 at Unknown time Other Yes Yes Sig: Take 40 mg by mouth daily. famotidine (PEPCID) 20 mg tablet 3/3/2020 at Unknown time Other Yes Yes Sig: Take 20 mg by mouth daily. furosemide (LASIX) 20 mg tablet 3/4/2020 at Unknown time Other Yes Yes Sig: Take 20 mg by mouth Every Mon, Wed and Sat. nitroglycerin (NITROSTAT) 0.4 mg SL tablet  Other Yes Yes Si.4 mg by SubLINGual route every five (5) minutes as needed for Chest Pain. Up to 3 doses. potassium chloride (K-DUR, KLOR-CON) 20 mEq tablet 3/4/2020 at Unknown time Other Yes Yes Sig: Take 20 mEq by mouth daily. MON, WED., SAT Facility-Administered Medications: None CURRENT MEDS Current Facility-Administered Medications Medication Dose Route Frequency  [START ON 3/6/2020] famotidine (PF) (PEPCID) injection 20 mg  20 mg IntraVENous DAILY  enoxaparin (LOVENOX) injection 60 mg  60 mg SubCUTAneous DAILY  atorvastatin (LIPITOR) tablet 40 mg  40 mg Oral DAILY  dextrose 5 % - 0.45% NaCl infusion  75 mL/hr IntraVENous CONTINUOUS  
 aspirin (ASA) suppository 300 mg  300 mg Rectal DAILY  clopidogreL (PLAVIX) tablet 75 mg  75 mg Oral DAILY  metoprolol tartrate (LOPRESSOR) tablet 25 mg  25 mg Oral Q12H Stroke workup 3/4/2020 MRI brain without contrast 
2 tiny acute to subacute infarct in the left frontal lobe Unchanged large chronic right MCA territory infarct and numerous additional supratentorial and infratentorial chronic infarcts CTA Head: 1. No evidence of large vessel occlusion or flow-limiting stenosis. Scattered 
atherosclerotic disease as above. CTA Neck: 1. No evidence of flow-limiting stenosis. 2. Mild stenosis (less than 50% by NASCET criteria) of the proximal bilateral 
internal carotid arteries. 3. Moderate stenosis at the origin of the right vertebral artery. CT brain perfusion: 1. No acute abnormality. Transthoracic echo Ejection fraction 60 to 65% Stroke labs: 
HgBA1c Lab Results Component Value Date/Time Hemoglobin A1c 5.8 (H) 2020 02:17 AM  
 
LDL Lab Results Component Value Date/Time  LDL, calculated 83.8 2020 02:17 AM  
 
 IMPRESSION: 
Bart Hyman is a 78 y.o. female who presents with worsening of prior left-sided weakness from  her prior stroke. MRI of the brain did show 2 tiny acute infarct in the left frontal lobe. The patient is already on eliquis but held because of stroke. The stroke is very small and no risk of hemorrhagic conversion. OK to resume eliquis. Continue asprin and plavix can be discontinued. RECOMMENDATIONS: 
- Telemetry - BP goal is less than 140/90 
- Stroke labs (HbA1c, lipid panel). Goal LDL is less than 70. - Switch aspirin to 81 mg daily and d/c plavix. Will start eliquis 2.5 mg po bid. - Increase atorvastatin to 80 mg from 40 mg daily as LDL more than 70. No further neuro hi. Call with questions.   
 
 
Michelle Vazquez MD 
Neurologist

## 2020-03-05 NOTE — PROGRESS NOTES
Problem: Falls - Risk of 
Goal: *Absence of Falls Description Document Evan Badillo Fall Risk and appropriate interventions in the flowsheet. Outcome: Progressing Towards Goal 
Note: Fall Risk Interventions: 
  
 
Mentation Interventions: Adequate sleep, hydration, pain control, Bed/chair exit alarm Medication Interventions: Bed/chair exit alarm Elimination Interventions: Bed/chair exit alarm, Call light in reach Problem: Patient Education: Go to Patient Education Activity Goal: Patient/Family Education Outcome: Progressing Towards Goal 
  
Problem: Pressure Injury - Risk of 
Goal: *Prevention of pressure injury Description Document Nando Scale and appropriate interventions in the flowsheet. Outcome: Progressing Towards Goal 
Note: Pressure Injury Interventions: 
Sensory Interventions: Assess changes in LOC Moisture Interventions: Absorbent underpads, Apply protective barrier, creams and emollients Activity Interventions: Assess need for specialty bed Mobility Interventions: Assess need for specialty bed, Pressure redistribution bed/mattress (bed type), PT/OT evaluation, Turn and reposition approx. every two hours(pillow and wedges) Nutrition Interventions: Document food/fluid/supplement intake, Offer support with meals,snacks and hydration Friction and Shear Interventions: Apply protective barrier, creams and emollients, Lift sheet, Minimize layers, Transferring/repositioning devices Problem: Patient Education: Go to Patient Education Activity Goal: Patient/Family Education Outcome: Progressing Towards Goal 
  
Problem: Patient Education: Go to Patient Education Activity Goal: Patient/Family Education Outcome: Progressing Towards Goal 
  
Problem: TIA/CVA Stroke: 0-24 hours Goal: Off Pathway (Use only if patient is Off Pathway) Outcome: Progressing Towards Goal 
Goal: Activity/Safety Outcome: Progressing Towards Goal 
Goal: Consults, if ordered Outcome: Progressing Towards Goal 
Goal: Diagnostic Test/Procedures Outcome: Progressing Towards Goal 
Goal: Nutrition/Diet Outcome: Progressing Towards Goal 
Goal: Discharge Planning Outcome: Progressing Towards Goal 
Goal: Medications Outcome: Progressing Towards Goal 
Goal: Respiratory Outcome: Progressing Towards Goal 
Goal: Treatments/Interventions/Procedures Outcome: Progressing Towards Goal 
Goal: Minimize risk of bleeding post-thrombolytic infusion Outcome: Progressing Towards Goal 
Goal: Monitor for complications post-thrombolytic infusion Outcome: Progressing Towards Goal 
Goal: Psychosocial 
Outcome: Progressing Towards Goal 
Goal: *Hemodynamically stable Outcome: Progressing Towards Goal 
Goal: *Neurologically stable Description Absence of additional neurological deficits Outcome: Progressing Towards Goal 
Goal: *Verbalizes anxiety and depression are reduced or absent Outcome: Progressing Towards Goal 
Goal: *Absence of Signs of Aspiration on Current Diet Outcome: Progressing Towards Goal 
Goal: *Absence of deep venous thrombosis signs and symptoms(Stroke Metric) Outcome: Progressing Towards Goal 
Goal: *Ability to perform ADLs and demonstrates progressive mobility and function Outcome: Progressing Towards Goal 
Goal: *Stroke education started(Stroke Metric) Outcome: Progressing Towards Goal 
Goal: *Dysphagia screen performed(Stroke Metric) Outcome: Progressing Towards Goal 
Goal: *Rehab consulted(Stroke Metric) Outcome: Progressing Towards Goal

## 2020-03-06 ENCOUNTER — PATIENT OUTREACH (OUTPATIENT)
Dept: CASE MANAGEMENT | Age: 80
End: 2020-03-06

## 2020-03-06 PROCEDURE — 65660000000 HC RM CCU STEPDOWN

## 2020-03-06 PROCEDURE — 74011250637 HC RX REV CODE- 250/637: Performed by: PSYCHIATRY & NEUROLOGY

## 2020-03-06 PROCEDURE — 74011250637 HC RX REV CODE- 250/637: Performed by: HOSPITALIST

## 2020-03-06 PROCEDURE — 74011000258 HC RX REV CODE- 258: Performed by: INTERNAL MEDICINE

## 2020-03-06 PROCEDURE — 74011250636 HC RX REV CODE- 250/636: Performed by: INTERNAL MEDICINE

## 2020-03-06 PROCEDURE — 92526 ORAL FUNCTION THERAPY: CPT

## 2020-03-06 RX ADMIN — APIXABAN 2.5 MG: 2.5 TABLET, FILM COATED ORAL at 21:57

## 2020-03-06 RX ADMIN — METOPROLOL TARTRATE 25 MG: 25 TABLET ORAL at 08:19

## 2020-03-06 RX ADMIN — ATORVASTATIN CALCIUM 80 MG: 40 TABLET, FILM COATED ORAL at 08:19

## 2020-03-06 RX ADMIN — APIXABAN 2.5 MG: 2.5 TABLET, FILM COATED ORAL at 12:11

## 2020-03-06 RX ADMIN — DEXTROSE MONOHYDRATE AND SODIUM CHLORIDE 75 ML/HR: 5; .45 INJECTION, SOLUTION INTRAVENOUS at 03:22

## 2020-03-06 RX ADMIN — METOPROLOL TARTRATE 25 MG: 25 TABLET ORAL at 21:57

## 2020-03-06 RX ADMIN — ASPIRIN 81 MG 81 MG: 81 TABLET ORAL at 08:19

## 2020-03-06 RX ADMIN — FAMOTIDINE 20 MG: 10 INJECTION, SOLUTION INTRAVENOUS at 08:19

## 2020-03-06 NOTE — PROGRESS NOTES
Problem: Dysphagia (Adult) Goal: *Acute Goals and Plan of Care (Insert Text) Description Initiated 3/5/2020 1. Patient will participate in MBS to determine swallow physiology within 7 days. MET 2. Patient will tolerate dental soft diet with thin liquids free of s/s of aspiration within 7 days. Met 3/6. Outcome: Resolved/Met SPEECH LANGUAGE PATHOLOGY DYSPHAGIA TREATMENT/DISCHARGE Patient: Lucie Bloch (59 y.o. female) Date: 3/6/2020 Diagnosis: CVA (cerebral vascular accident) ([facility] [address] [I63.9] [facility] ([facility] [I48.91]  
<principal problem not specified> Precautions:  Fall ASSESSMENT: 
Pt is tolerating po well. She coughs after sips of thins but this was noted on the MBS as well. She did not aspirate on the MBS yesterday. She has been drinking thin liquids with no respiratory compromise. PLAN: 
Patient will be discharged from acute skilled speech therapy at this time. Rationale for discharge: 
Goals achieved Discharge Recommendations:  None SUBJECTIVE:  
Patient stated she would eat and drink for me. OBJECTIVE:  
Cognitive and Communication Status: 
Neurologic State: Alert Orientation Level: Disoriented to situation, Disoriented to time Cognition: Impaired decision making Perception: Cues to attend left visual field, Cues to maintain midline in sitting Perseveration: No perseveration noted Dysphagia Treatment: 
Oral Assessment: P.O. Trials: 
Patient Position: upright in bed Vocal quality prior to P.O.: No impairment Consistency Presented: Thin liquid; Solid How Presented: Self-fed/presented;Straw Bolus Acceptance: No impairment Bolus Formation/Control: No impairment Propulsion: No impairment Oral Residue: None Initiation of Swallow: Delayed (# of seconds) Laryngeal Elevation: Functional 
Aspiration Signs/Symptoms: Strong cough Pharyngeal Phase Characteristics: Double swallow Oral Phase Severity: No impairment Pharyngeal Phase Severity : Mild-moderate NOMS:  
The NOMS functional outcome measure was used to quantify this patient's level of swallowing impairment. Based on the NOMS, the patient was determined to be at level 6 for swallow function NOMS Swallowing Levels: 
Level 1 (CN): NPO Level 2 (CM): NPO but takes consistency in therapy Level 3 (CL): Takes less than 50% of nutrition p.o. and continues with nonoral feedings; and/or safe with mod cues; and/or max diet restriction Level 4 (CK): Safe swallow but needs mod cues; and/or mod diet restriction; and/or still requires some nonoral feeding/supplements Level 5 (CJ): Safe swallow with min diet restriction; and/or needs min cues Level 6 (CI): Independent with p.o.; rare cues; usually self cues; may need to avoid some foods or needs extra time Level 7 (CH): Independent for all p.o. ANAYA. (2003). National Outcomes Measurement System (NOMS): Adult Speech-Language Pathology User's Guide. Pain: 
Pain Scale 1: Numeric (0 - 10) Pain Intensity 1: 0 After treatment:  
Call bell within reach and Caregiver / family present COMMUNICATION/EDUCATION:  
Patient was educated regarding her deficit(s) of dysphagia as this relates to her diagnosis of dysphagia. She demonstrated Good understanding . The patient's plan of care including recommendations, planned interventions, and recommended diet changes were discussed with: Registered nurse. DEREK Alicia Time Calculation: 10 mins

## 2020-03-06 NOTE — PROGRESS NOTES
[facility] Plan: 
 
* SNF 
 
> Referral sent to [facility] via [facility] for review; referral pending 
> Palliative following to discuss goals of care 
> 2nd IM prior to d/c Initial note: CM reviewed pt's chart and noted updates prior to moving forward with d/c planning. CM met with pt and son Ivy Meyers: [phone]) at bedside to introduce role and discuss [facility] plan for d/c. CM discussed utilizing SNF for short-term rehab at d/c; pt's son is agreeable and believes pt will \"definitely benefit\" from service. Pt's son reported pt has active Medicaid coverage and utilizes family members as her formal caregivers in the home. CM provided list of SNF's for review; pt's son requested for referral be sent to [facility] for review. CM explained FOC and received verbal consent to send referral; copy of FOC on bedside chart. CM will continue to follow patient for discharge planning needs and arrange for services as deemed necessary. JODI Segovia Care Manager, [id number] [facility] 
[phone]

## 2020-03-06 NOTE — PROGRESS NOTES
Palliative Medicine Consult [place]: [phone] ([phone]) Patient Name: Lisa Aguilar YOB: 1940 Date of Initial Consult: 3/4/2020 Reason for Consult: Care Decisions Requesting Provider: Robinson Marcelino MD 
Primary Care Physician: Huong Chaudhary NP 
 
 SUMMARY:  
Lisa Aguilar is a 78 y.o. with a past history of ARF, Systolic CHF, CAD, HTN, CVA, and HTN, who was admitted on 3/4/2020 from home with a diagnosis of CVA with left-sided weakness and facial droop. Current medical issues leading to Palliative Medicine involvement include: goals of care discussion in setting of frailty and recurrent stroke PALLIATIVE DIAGNOSES:  
1. Goals of care 2. DNR DIscussion 3. Frailty 4. Debility PLAN:  
1. Met with patient, her son Domo Interiano and Adeline Severin, [place] 2. Domo Interiano shared with us some of their day to day living since his moms last stroke. She has lived with him for 11 years, and he describes how she has a fantastic quality of life, interacting with family, living her days well, very happy 3. She is never alone, and does require help at baseline with ADLs, and her speech has always been difficult for the general public to understand but as he talks to her daily, he knows what she is saying 4. He is comfortable with the events of this hospitalization and has no questions, as he has been this before with her 5. He tells us that they talk about her wishes often, and although she would never want to be kept alive on machines, she does want CPR attempted 6. We talked about a DDNR if at some point she/they decide that her health has deteriorated to the point that she would not want CPR, but Domo Interiano is clear that they are not there yet 7. Goals are clear for now- will sign off 
8. Initial consult note routed to primary continuity provider and/or primary health care team members 9.  Communicated plan of care with: Palliative IDTWagner 192 Team 
 
 GOALS OF CARE / TREATMENT PREFERENCES:  
 
GOALS OF CARE: 
Patient/Health Care Proxy Stated Goals: Rehabilitation TREATMENT PREFERENCES:  
Code Status: Full Code Advance Care Planning: 
[x] The [facility] Interdisciplinary Team has updated the ACP Navigator with [address] and Patient Capacity Primary Decision Maker: Rayray Veloz - [phone] Advance Care Planning 3/6/2020 Patient's Healthcare Decision Maker is: Legal Next of Kin Primary Decision Maker Name -  
Primary Decision Maker Phone Number -  
Confirm Advance Directive Yes, not on file Medical Interventions: Full interventions Other Instructions: Other: As far as possible, the palliative care team has discussed with patient / health care proxy about goals of care / treatment preferences for patient. HISTORY:  
 
History obtained from: patient, chart, attending CHIEF COMPLAINT: none HPI/SUBJECTIVE: The patient is:  
[x] Verbal and participatory [] Non-participatory due to:  
 
Some speech delays, but can communicate her needs Very sweet and happy No complaints Clinical Pain Assessment (nonverbal scale for severity on nonverbal patients):  
Clinical Pain Assessment Severity: 0 Duration: for how long has pt been experiencing pain (e.g., 2 days, 1 month, years) Frequency: how often pain is an issue (e.g., several times per day, once every few days, constant) FUNCTIONAL ASSESSMENT:  
 
Palliative Performance Scale (PPS): PPS: 30 
 
 
 PSYCHOSOCIAL/SPIRITUAL SCREENING:  
 
Palliative IDT has assessed this patient for cultural preferences / practices and a referral made as appropriate to needs (Cultural Services, Patient Advocacy, Ethics, etc.) Any spiritual / Shinto concerns: 
[] Yes /  [x] No 
 
Caregiver Burnout: 
[] Yes /  [x] No /  [] No Caregiver Present Anticipatory grief assessment:  
[x] Normal  / [] Maladaptive ESAS Anxiety: Anxiety: 0 ESAS Depression: Depression: 0 REVIEW OF SYSTEMS:  
 
Positive and pertinent negative findings in ROS are noted above in HPI. The following systems were [x] reviewed / [] unable to be reviewed as noted in HPI Other findings are noted below. Systems: constitutional, ears/nose/mouth/throat, respiratory, gastrointestinal, genitourinary, musculoskeletal, integumentary, neurologic, psychiatric, endocrine. Positive findings noted below. Modified ESAS Completed by: provider Fatigue: 0 Depression: 0 Pain: 0 Anxiety: 0 Nausea: 0 Anorexia: 0 Dyspnea: 0 Stool Occurrence(s): 1 PHYSICAL EXAM:  
 
From RN flowsheet: 
Wt Readings from Last 3 Encounters:  
03/05/20 114 lb (51.7 kg) 03/04/20 114 lb (51.7 kg) 08/13/19 127 lb (57.6 kg) Blood pressure 133/73, pulse 72, temperature 98.2 °F (36.8 °C), resp. rate 16, height 5' 1\" (1.549 m), weight 114 lb (51.7 kg), SpO2 99 %. Pain Scale 1: Numeric (0 - 10) Pain Intensity 1: 0 Last bowel movement, if known:  
 
Constitutional: alert, very pleasant Eyes: pupils equal, anicteric ENMT: no nasal discharge, moist mucous membranes Cardiovascular: regular rhythm, distal pulses intact Respiratory: breathing not labored, symmetric Gastrointestinal: soft non-tender, +bowel sounds Musculoskeletal: no deformity, no tenderness to palpation Skin: warm, dry Neurologic: following commands, moving all extremities Psychiatric: full affect, no hallucinations Other: 
 
 
 HISTORY:  
 
Active Problems: 
  A-fib ([address] (10/5/2018) CVA (cerebral vascular accident) ([address] (3/4/2020) Past Medical History:  
Diagnosis Date  Acute kidney failure ([address] 7/14/2016  Acute systolic heart failure ([address] 7/14/2016  Arrhythmia  CAD (coronary artery disease)  Heart failure ([address]  Hypertension  Stroke [facility]) 2009 Stroke, left hand weakness and speech, peripheral vision affected Past Surgical History: Procedure Laterality Date  HX UROLOGICAL  02/2018 Family History Problem Relation Age of Onset  Coronary Artery Disease Other   
     grandmother age 76  Stroke Mother  Hypertension Mother History reviewed, no pertinent family history. Social History Tobacco Use  Smoking status: Former Smoker Last attempt to quit: 9/21/2009 Years since quitting: 10.4  Smokeless tobacco: Never Used Substance Use Topics  Alcohol use: No  
  Alcohol/week: 0.0 standard drinks Comment: Very little Allergies Allergen Reactions  Decadron [Dexamethasone Sodium Phosphate] Hives  Pcn [Penicillins] Hives Current Facility-Administered Medications Medication Dose Route Frequency  famotidine (PF) (PEPCID) injection 20 mg  20 mg IntraVENous DAILY  atorvastatin (LIPITOR) tablet 80 mg  80 mg Oral DAILY  aspirin chewable tablet 81 mg  81 mg Oral DAILY  apixaban (ELIQUIS) tablet 2.5 mg  2.5 mg Oral Q12H  
 acetaminophen (TYLENOL) tablet 650 mg  650 mg Oral Q4H PRN Or  
 acetaminophen (TYLENOL) solution 650 mg  650 mg Per NG tube Q4H PRN Or  
 acetaminophen (TYLENOL) suppository 650 mg  650 mg Rectal Q4H PRN  
 labetaloL (NORMODYNE;TRANDATE) injection 5 mg  5 mg IntraVENous Q10MIN PRN  
 metoprolol tartrate (LOPRESSOR) tablet 25 mg  25 mg Oral Q12H  
 
 
 
 LAB AND IMAGING FINDINGS:  
 
Lab Results Component Value Date/Time WBC 7.7 03/04/2020 09:38 AM  
 HGB 11.5 03/04/2020 09:38 AM  
 PLATELET 92 (L) 32/24/9544 09:38 AM  
 
Lab Results Component Value Date/Time Sodium 140 03/05/2020 02:17 AM  
 Potassium 3.8 03/05/2020 02:17 AM  
 Chloride 109 (H) 03/05/2020 02:17 AM  
 CO2 25 03/05/2020 02:17 AM  
 BUN 24 (H) 03/05/2020 02:17 AM  
 Creatinine 1.40 (H) 03/05/2020 02:17 AM  
 Calcium 8.9 03/05/2020 02:17 AM  
 Magnesium 2.0 03/05/2020 02:17 AM  
 Phosphorus 3.6 03/05/2020 02:17 AM  
  
Lab Results Component Value Date/Time AST (SGOT) 18 03/04/2020 09:38 AM  
 Alk. phosphatase 130 (H) 03/04/2020 09:38 AM  
 Protein, total 7.7 03/04/2020 09:38 AM  
 Albumin 3.7 03/04/2020 09:38 AM  
 Globulin 4.0 03/04/2020 09:38 AM  
 
Lab Results Component Value Date/Time INR 1.1 03/04/2020 09:38 AM  
 Prothrombin time 10.8 03/04/2020 09:38 AM  
 aPTT 24.3 05/06/2018 09:00 PM  
  
No results found for: IRON, FE, TIBC, IBCT, PSAT, FERR Lab Results Component Value Date/Time pH 7.39 03/12/2017 10:40 AM  
 PCO2 45 03/12/2017 10:40 AM  
 PO2 87 03/12/2017 10:40 AM  
 
No components found for: [place] Lab Results Component Value Date/Time  08/02/2017 08:31 AM  
 CK - MB 1.8 08/02/2017 08:31 AM  
  
 
 
   
 
Total time:  
Counseling / coordination time, spent as noted above:  
> 50% counseling / coordination?:  
 
Prolonged service was provided for  []30 min   []75 min in face to face time in the presence of the patient, spent as noted above. Time Start:  
Time End:  
Note: this can only be billed with 04312 (initial) or 05707 (follow up). If multiple start / stop times, list each separately.

## 2020-03-06 NOTE — PROGRESS NOTES
Reason for Admission:   CVA RUR Score:     13 PCP: First and Last name:  Lizabeth Estes Name of Practice:  
 Are you a current patient: Yes/No: Yes Approximate date of last visit:  October 2019- Next appointment April 2020. Do you (patient/family) have any concerns for transition/discharge? No- 
           
Plan for utilizing home health:   Yes if needed,pt had previous [facility] services an year ago. Current Advanced Directive/Advance Care Plan:  Full code- Palliative care team scheduled a meeting tomorrow morning with  pt and son. Transition of Care Plan:   SNF Care Management Interventions PCP Verified by CM: Yes Mode of Transport at Discharge: [address] transport.) Transition of Care Consult (CM Consult): SNF Physical Therapy Consult: Yes Occupational Therapy Consult: Yes Speech Therapy Consult: Yes Current Support Network: Lives with Caregiver(Lives with son Wendy Darby in a single level home house has a ramp attached. Roberta Whiting and his sister are pt's care givers.) Confirm Follow Up Transport: Other (see comment)([facility] [place] transport.) Discharge Location Discharge Placement: Skilled nursing facility Pharmacy- [facility] [employer] [facility] [employer] and has home HO2 as PRN Pt has medicaid via [facility], pt's residing address is [address]. Abena Pelletier MSW 
ED Case Manager Ext -[phone]

## 2020-03-06 NOTE — ACP (ADVANCE CARE PLANNING)
Pt and son states have AMD at home and have been requested to bring in a copy to be scanned into the medical record

## 2020-03-06 NOTE — ACP (ADVANCE CARE PLANNING)
Primary Decision Maker: Chana Veloz - [phone] Advance Care Planning 3/6/2020 Patient's Healthcare Decision Maker is: Legal Next of Kin Primary Decision Maker Name -  
Primary Decision Maker Phone Number -  
Confirm Advance Directive Yes, not on file Palliative team of Finn Tierney NP and Marlo Tabares LCSW met with patient and hers son, Reuben Garza. Patient is able to understand and answer  questions but her speech is affected some by her CVA and she generally uses one word answers or facial gestures. Son notes AMD has been completed with patient's PCP, but he does not have a copy. Have asked son to see if he can obtain a copy as it would be important for him to have this document and for the hospital to have a copy. They are not interested in completing another document. At this time, they want to maintain Full Code status.

## 2020-03-06 NOTE — PROGRESS NOTES
Palliative Medicine [place]: [phone] ([phone]) [facility]: [phone] ([phone]) GOALS OF CARE: Patient and only son, Pamela Sarkar (another son  in ) would like Full Restorative Care and Full Code at this time. Son, Ezequiel Contreras did note that he would not want patient to \"linger for a long time\" if she is unable to recover or have quality of life. TREATMENT PREFERENCES:  
Code Status: Full Code Advance Care Planning: 
[x] The [facility] Interdisciplinary Team has updated the ACP Navigator with [address] and Patient Capacity Patient is able to make simple decisions. She and her son agree on goals of care. Patient's son notes that patient has completed an AMD, he does not have a copy of this document but he indicates that her PCP may have it. Have encouraged Ezequiel Contreras to obtain this for his records and noted that it would be good to have this document on our chart. They are not interested in completing another document at this time. Patient / Family Encounter Documentation Participants (names): Patient, son Ezequiel Contreras and Palliative team of Jerson Sheehan NP and Lorena Garcia LCSW. Narrative: Travis Aldrich is an 79 yo  AA woman who came in with a CVA. Patient had a prior stroke in , per son Ezequiel Contreras. Patient lives with Talitacinthia Contreras and she has 24 hr family caregivers with her, mostly him and a cousin and patient's brother. At this time patient is able to understand verbalizations, her speech is a bit garbled but son notes that this is her baseline, from her previous stroke. Patient is very pleasant and son describes her as \"always having a positive mood\". At home patient used a walker or wheelchair, she needed assistance for ADLs, she was independent with eating. Patient has good family support.  Ezequiel Contreras and she have talked about her end of life wishes in the past and from son's sharing, they would want full restorative care, all efforts made to help patient live, would make a decision at the time when it was needed to withdraw care if needed. Goals of Care / Plan: Full Code, notes they have an AMD, son Gerda Ray is mPOA. Family would like rehab prior to patient returning home.

## 2020-03-06 NOTE — PROGRESS NOTES
[address]  [phone] Cardiology Progress Note 3/6/2020 1041 am 
 
Admit Date: 3/4/2020 Admit Diagnosis:  
CVA (cerebral vascular accident) ([facility] [I63.9] A-fib ([facility] [I48.91] Subjective:  
 
Tami Johnson has no cardiac complaints this am.  
 
VSS. Sitting up in bed. Visit Vitals /63 (BP 1 Location: Right arm, BP Patient Position: At rest) Pulse 63 Temp 97.6 °F (36.4 °C) Resp 16 Ht 5' 1\" (1.549 m) Wt 51.7 kg (114 lb) SpO2 99% BMI 21.54 kg/m² Current Facility-Administered Medications Medication Dose Route Frequency  famotidine (PF) (PEPCID) injection 20 mg  20 mg IntraVENous DAILY  atorvastatin (LIPITOR) tablet 80 mg  80 mg Oral DAILY  aspirin chewable tablet 81 mg  81 mg Oral DAILY  apixaban (ELIQUIS) tablet 2.5 mg  2.5 mg Oral Q12H  
 acetaminophen (TYLENOL) tablet 650 mg  650 mg Oral Q4H PRN Or  
 acetaminophen (TYLENOL) solution 650 mg  650 mg Per NG tube Q4H PRN Or  
 acetaminophen (TYLENOL) suppository 650 mg  650 mg Rectal Q4H PRN  
 dextrose 5 % - 0.45% NaCl infusion  75 mL/hr IntraVENous CONTINUOUS  
 labetaloL (NORMODYNE;TRANDATE) injection 5 mg  5 mg IntraVENous Q10MIN PRN  
 metoprolol tartrate (LOPRESSOR) tablet 25 mg  25 mg Oral Q12H Objective:  
  
Physical Exam: 
General Appearance: well-nourished, elderly AA female, in NAD Chest: Clear throughout, RA, no cough Cardiovascular:  irregular, S1S2, no S3, no carotid bruits Abdomen:   Soft, non-tender, bowel sounds are active. Extremities: no edema, DP/PT palpable Skin: Warm and dry. Neuro: dysarthria, left side weakness, left facial droop Data Review:  
Recent Labs 03/04/20 
2390 WBC 7.7 HGB 11.5 HCT 34.1*  
PLT 92* Recent Labs 03/05/20 
0217 03/04/20 
6416  138  
K 3.8 4.1 * 105 CO2 25 28 * 170* BUN 24* 32* CREA 1.40* 1.52* CA 8.9 9.0 MG 2.0 2.1 PHOS 3.6  --   
ALB  --  3.7 TBILI  --  0.3 SGOT  --  18 ALT  --  16 INR  --  1.1 Recent Labs 03/05/20 
0217 03/04/20 
6817 TROIQ 0.18* 0.09* No intake or output data in the 24 hours ending 03/06/20 1041 Telemetry: rate controlled Afib EKG:NSR LA enlargement Cxray:Mild central congestion without overt CHF. Left basilar subsegmental 
atelectasis. Echo:  
· Normal cavity size and systolic function (ejection fraction normal). Hypertrophy. Estimated left ventricular ejection fraction is 55 - 60%. · Mild mitral annular calcification. Mild mitral valve regurgitation is present. · Agitated saline contrast study was performed. Assessment:  
 
Active Problems: 
  A-fib ([facility] (10/5/2018) CVA (cerebral vascular accident) ([facility] (3/4/2020) Plan:  
Bart Hyman is a 78 y.o. female with a PMH significant for PAF on eliquis, CAD, HF, HTN, Stroke, admitted for CVA (cerebral vascular accident) ([facility] [facility] [I63.9]; A-fib ([facility] [facility] [I48.91]. Presented with left sided weakness and facial droop. Noted to remain in normal rhythm now. Trop. (0.09, 0.18). CXR: mild congestion. Echo noted. LDL 83. Cr 1.40 PAF: in A-fib rate controlled. F/u Echo. cardizem on hold due to permissive high BP secondary to acute CVA. Reportedly was taking eliquis regularly. . If suspected to be atherosclerotic or hypertensive CVA, do not recommend changing from Eliquis as Eliquis is  proven in clinical trials to be superior to Coumadin for stroke reduction as well as bleeding reduction. If high suspicion for cardioembolic thrombotic CVA, could consider changing anticoagulation regimen. 
 
-continue ASA 81 mg PO daily 
-Restart eliquis 2.5 mg Po BID 
-continue to monitor tele. -echo result noted Acute CVA: per neurology and hospitalist.  
 
HTN:  
-BB restarted per internal medicine CKD: Follow renal function. Cr 1.4, appears to be patients baseline. HLD: continue statin at 80 mg. CAD: 
-BB -continue ASA Cardiology will sign-off, will follow-up as outpatient. Please call if needed over the weekend. Colin [id number],QL 
MSN,RN,AG-ACNP-BC Patient seen and examined by me with nurse practitioner. I personally performed all components of the history, physical, and medical decision making and agree with the assessment and plan as noted.  
 
Demetrio Zamudio MD

## 2020-03-06 NOTE — PROGRESS NOTES
Hospitalist Progress Note NAME: Olga Duenas :  1940 MRN:  [id number] Assessment / Plan: CVA with left-sided weakness and facial droop Ct scan showed :No acute changes demonstrated. Multiple bilateral supra and 
infratentorial remote infarcts. No hemorrhage or mass effect We will admit to neuro telemetry, neurochecks 4 hours,  Tele neurologist advised aspirin and Plavix and holding Eliquis MRI SHOWED: 
1. Two tiny acute to subacute infarcts in the left frontal lobe. 2. Unchanged generalized parenchymal volume loss and moderate chronic 
microvascular ischemic disease. Unchanged large chronic right MCA territory 
infarct, and numerous additional supratentorial and infratentorial chronic 
infarcts as above. CTA head and neck showed no major vessel occlusion. HbA1c 5.8 LDL 83 Discussed with neurology, due to the tiny nature of acute infarcts recommended to restart Eliquis- continue Cardiology recommended switching to coumadin initially but pt to cont Eliquis now as per the consensus DC lovenox now 
s/p MBS normal 
Echo EF normal 55-60% Changed to PO aspirin now Continue with p.o. atorvastatin, DCd Plavix now 
 
  
Paroxysmal  A. fib with RVR Upon presentation, patient was found to be in A. fib with RVR, received IV fluid, heart rate came down below 110, repeat EKG was more consistent with sinus tachycardia Patient needs permissive hypertension, will hold off on diltiazem drip Currently in sinus rhythm AC as above Cont Eliquis per neuro now 
  
Hypertension H/o of CVA 
CKD stage III Cad Systolic CHF Loop recorder present on chest xray Allow permissive hypertension Continue with IV fluid D5 half-normal saline 
  
  
Code Status: Presumed full code(no advanced directive and no family at bedside) we will consult palliative care for goals of care Surrogate Decision Maker: 
Hollie Cobos [phone] Chivo Valdez Brother [phone]      
  
  
 DVT Prophylaxis: scd GI Prophylaxis: not indicated 
  
Baseline: unknown Disposition: DC to [facility] after 3 [place] stays completed-- likely on Sunday Subjective: Chief Complaint / Reason for Physician Visit :F/u CVA \"patient has slurred speech but awake and alert and denies any worsening weakness, reports left sided hemiparesis from previous stroke\". Discussed with RN events overnight. Review of Systems: 
Symptom Y/N Comments  Symptom Y/N Comments Fever/Chills n   Chest Pain n   
Poor Appetite n   Edema n   
Cough n   Abdominal Pain n   
Sputum n   Joint Pain n   
SOB/CHRISTINE n   Pruritis/Rash Nausea/vomit n   Tolerating PT/OT y Diarrhea n   Tolerating Diet y Constipation n   Other Could NOT obtain due to:   
 
Objective: VITALS:  
Last 24hrs VS reviewed since prior progress note. Most recent are: 
Patient Vitals for the past 24 hrs: 
 Temp Pulse Resp BP SpO2  
03/06/20 1159 97.7 °F (36.5 °C) 65 16 142/73 98 % 03/06/20 0740 97.6 °F (36.4 °C) 63 16 142/63 99 % 03/06/20 0331 98.4 °F (36.9 °C) 65 17 166/63 98 % 03/06/20 0019 98.8 °F (37.1 °C) 67 16 148/53 98 % 03/05/20 2028 98.7 °F (37.1 °C) 86 16 (!) 184/91 98 % 03/05/20 1535 98.2 °F (36.8 °C) 71 18 169/87 98 % 03/05/20 1445    159/78   
03/05/20 1229 98.7 °F (37.1 °C) 66 20 159/78 99 % No intake or output data in the 24 hours ending 03/06/20 1227 PHYSICAL EXAM: 
General: WD, WN. Alert, cooperative, no acute distress   
EENT:  EOMI. Anicteric sclerae. MMM Resp:  CTA bilaterally, no wheezing or rales. No accessory muscle use CV:  Regular  rhythm,  No edema GI:  Soft, Non distended, Non tender.  +Bowel sounds Neurologic:  Alert and oriented X 3, slurred speech, left hemiparesis Psych:   Good insight. Not anxious nor agitated Skin:  No rashes. No jaundice Reviewed most current lab test results and cultures  YES Reviewed most current radiology test results   YES 
 Review and summation of old records today    NO Reviewed patient's current orders and MAR    YES 
PMH/SH reviewed - no change compared to H&P 
________________________________________________________________________ Care Plan discussed with: 
  Comments Patient x Family RN x Care Manager x Consultant     
                 x Multidiciplinary team rounds were held today with , nursing, pharmacist and clinical coordinator. Patient's plan of care was discussed; medications were reviewed and discharge planning was addressed. ________________________________________________________________________ Total NON critical care TIME: 26  Minutes Total CRITICAL CARE TIME Spent:   Minutes non procedure based Comments >50% of visit spent in counseling and coordination of care    
________________________________________________________________________ Genet Kinney MD  
 
Procedures: see electronic medical records for all procedures/Xrays and details which were not copied into this note but were reviewed prior to creation of Plan. LABS: 
I reviewed today's most current labs and imaging studies. Pertinent labs include: 
Recent Labs 03/04/20 
2893 WBC 7.7 HGB 11.5 HCT 34.1*  
PLT 92* Recent Labs 03/05/20 
0217 03/04/20 
8485  138  
K 3.8 4.1 * 105 CO2 25 28 * 170* BUN 24* 32* CREA 1.40* 1.52* CA 8.9 9.0 MG 2.0 2.1 PHOS 3.6  --   
ALB  --  3.7 TBILI  --  0.3 SGOT  --  18 ALT  --  16 INR  --  1.1 Signed: Genet Kinney MD

## 2020-03-06 NOTE — PROGRESS NOTES
Transitional Care Team: Initial HUG Note Date of Assessment: 03/06/20 Time of Assessment:  2:39 PM 
 
Ninoska Anglin is a 78 y.o. female inpatient at  [facility]. Assessment & Plan Pt A+O. Still with slurred speech, but seems able to eat at this time without aspiration. Moves all extremities. Planning for discharge for ongoing rehab this weekend. Primary Diagnosis: stroke Advance Directive:   See ACP note from me. Current Code Status:  full Referral to Hospice/ Palliative Care Appropriate:has met with palliative team . Awareness of Medical Conditions: (Trajectory of illness and pts expectations). Hoping to return to baseline mentation and capabilities Discharge Needs: (to include safety issues) short term rehab Barriers Identified:none Patient is willing to go to SNF/Inpatient Rehab if recommended: yes Medication Review:  Await AVS. 
 
Can patient afford medications:  yes. Patient is Compliant with Medication regimen:yes Who manages medications at home:family Best Patient Contact Number:  Cassie  [phone] HUG (Healthy Understanding of Goals) program introduced to patient/family. The Transitional Care Team bridges the gaps in care and education surrounding discharge from the acute care facility. The objective is to empower the patient and family in taking a proactive role in preventing readmission within the first thirty days after discharge. The team is also involved in the efforts to reduce readmission to the acute care setting after stabilization and discharge from the acute care environment either to skilled nursing facilities or community. A care transitions nurse (CTN) will follow up with the patient. HUG RN/will return with [facility] Calendar/ follow up appointments when the patient is ready for discharge. Future Appointments Date Time Provider Álvaro Campbell 3/18/2020  2:15 PM Channing Pardo MD [facility]  
 
 
 Non-[facility] follow up appointment(s):none yet [facility]: call information given to : lives outside service area Patient education focused on readmission zones as described as: The Red Zone: High risk for readmission, days 1-21 The Yellow Zone: Moderate  risk for readmission, days 22-29 The Green Zone: Lower risk for readmission, days 30 and after The G Team will attempt to follow the patient from a distance while inpatient as well as be available for further transition/disposition needs. The [facility] [place] team will continue to offer support during the 30- 90 day discharge from acute care setting. Will notify Ambulatory {Blank Single Select Template:[id number][de-identified] \"SYLVESTER   RN. Past Medical History:  
Diagnosis Date  Acute kidney failure ([address] 7/14/2016  Acute systolic heart failure ([address] 7/14/2016  Arrhythmia  CAD (coronary artery disease)  Heart failure ([address]  Hypertension  Stroke [facility]) 2009 Stroke, left hand weakness and speech, peripheral vision affected

## 2020-03-07 PROCEDURE — 65660000000 HC RM CCU STEPDOWN

## 2020-03-07 PROCEDURE — 74011250636 HC RX REV CODE- 250/636: Performed by: INTERNAL MEDICINE

## 2020-03-07 PROCEDURE — 74011250637 HC RX REV CODE- 250/637: Performed by: HOSPITALIST

## 2020-03-07 PROCEDURE — 74011250637 HC RX REV CODE- 250/637: Performed by: PSYCHIATRY & NEUROLOGY

## 2020-03-07 RX ADMIN — APIXABAN 2.5 MG: 2.5 TABLET, FILM COATED ORAL at 09:04

## 2020-03-07 RX ADMIN — APIXABAN 2.5 MG: 2.5 TABLET, FILM COATED ORAL at 22:19

## 2020-03-07 RX ADMIN — ASPIRIN 81 MG 81 MG: 81 TABLET ORAL at 09:04

## 2020-03-07 RX ADMIN — FAMOTIDINE 20 MG: 10 INJECTION, SOLUTION INTRAVENOUS at 09:04

## 2020-03-07 RX ADMIN — ATORVASTATIN CALCIUM 80 MG: 40 TABLET, FILM COATED ORAL at 09:04

## 2020-03-07 RX ADMIN — METOPROLOL TARTRATE 25 MG: 25 TABLET ORAL at 22:19

## 2020-03-07 RX ADMIN — METOPROLOL TARTRATE 25 MG: 25 TABLET ORAL at 09:04

## 2020-03-07 NOTE — PROGRESS NOTES
Hospitalist Progress Note NAME: Lucie Bloch :  1940 MRN:  [id number] Assessment / Plan: CVA with left-sided weakness and facial droop Ct scan showed :No acute changes demonstrated. Multiple bilateral supra and 
infratentorial remote infarcts. No hemorrhage or mass effect We will admit to neuro telemetry, neurochecks 4 hours,  Tele neurologist advised aspirin and Plavix and holding Eliquis MRI SHOWED: 
1. Two tiny acute to subacute infarcts in the left frontal lobe. 2. Unchanged generalized parenchymal volume loss and moderate chronic 
microvascular ischemic disease. Unchanged large chronic right MCA territory 
infarct, and numerous additional supratentorial and infratentorial chronic 
infarcts as above. CTA head and neck showed no major vessel occlusion. HbA1c 5.8 LDL 83 Discussed with neurology, due to the tiny nature of acute infarcts recommended to restart Eliquis- continue Cardiology recommended switching to coumadin initially but pt to cont Eliquis now as per the consensus DC lovenox now 
s/p MBS normal 
Echo EF normal 55-60% Changed to PO aspirin now Continue with p.o. atorvastatin, DCd Plavix now 
 
  
Paroxysmal  A. fib with RVR Upon presentation, patient was found to be in A. fib with RVR, received IV fluid, heart rate came down below 110, repeat EKG was more consistent with sinus tachycardia Patient needs permissive hypertension, will hold off on diltiazem drip Currently in sinus rhythm AC as above Cont Eliquis per neuro now 
  
Hypertension H/o of CVA 
CKD stage III Cad Systolic CHF Loop recorder present on chest xray Allow permissive hypertension Continue with IV fluid D5 half-normal saline 
  
  
Code Status: Presumed full code(no advanced directive and no family at bedside) we will consult palliative care for goals of care Surrogate Decision Maker: 
Barrington Lewis [phone]   [phone] Chivo Valdez Brother [phone]      
  
  
 DVT Prophylaxis: scd GI Prophylaxis: not indicated 
  
Baseline: unknown Disposition: DC to [facility] after 3 [place] stays completed-- likely on Sunday anticipated- CM working on it Subjective: Chief Complaint / Reason for Physician Visit :F/u CVA \"patient has slurred speech but awake and alert and denies any worsening weakness, reports left sided hemiparesis from previous stroke\". Discussed with RN events overnight. Review of Systems: 
Symptom Y/N Comments  Symptom Y/N Comments Fever/Chills n   Chest Pain n   
Poor Appetite n   Edema n   
Cough n   Abdominal Pain n   
Sputum n   Joint Pain n   
SOB/CHRISTINE n   Pruritis/Rash Nausea/vomit n   Tolerating PT/OT y Diarrhea n   Tolerating Diet y Constipation n   Other Could NOT obtain due to:   
 
Objective: VITALS:  
Last 24hrs VS reviewed since prior progress note. Most recent are: 
Patient Vitals for the past 24 hrs: 
 Temp Pulse Resp BP SpO2  
03/07/20 0402 97.7 °F (36.5 °C) 63 16 162/66 100 % 03/07/20 0014 98.1 °F (36.7 °C) 62 16 153/63 100 % 03/06/20 1943 97.9 °F (36.6 °C) 72 16 152/75 99 % 03/06/20 1545 98.2 °F (36.8 °C) 72 16 133/73 99 % 03/06/20 1159 97.7 °F (36.5 °C) 65 16 142/73 98 % Intake/Output Summary (Last 24 hours) at 3/7/2020 1790 Last data filed at 3/6/2020 1536 Gross per 24 hour Intake 60 ml Output  Net 60 ml PHYSICAL EXAM: 
General: WD, WN. Alert, cooperative, no acute distress   
EENT:  EOMI. Anicteric sclerae. MMM Resp:  CTA bilaterally, no wheezing or rales. No accessory muscle use CV:  Regular  rhythm,  No edema GI:  Soft, Non distended, Non tender.  +Bowel sounds Neurologic:  Alert and oriented X 3, slurred speech, left hemiparesis Psych:   Good insight. Not anxious nor agitated Skin:  No rashes. No jaundice Reviewed most current lab test results and cultures  YES Reviewed most current radiology test results   YES Review and summation of old records today    NO 
 Reviewed patient's current orders and MAR    YES 
PMH/SH reviewed - no change compared to H&P 
________________________________________________________________________ Care Plan discussed with: 
  Comments Patient x Family RN x Care Manager x yesterday Consultant Multidiciplinary team rounds were held today with , nursing, pharmacist and clinical coordinator. Patient's plan of care was discussed; medications were reviewed and discharge planning was addressed. ________________________________________________________________________ Total NON critical care TIME: 16  Minutes Total CRITICAL CARE TIME Spent:   Minutes non procedure based Comments >50% of visit spent in counseling and coordination of care    
________________________________________________________________________ Genet Kinney MD  
 
Procedures: see electronic medical records for all procedures/Xrays and details which were not copied into this note but were reviewed prior to creation of Plan. LABS: 
I reviewed today's most current labs and imaging studies. Pertinent labs include: 
Recent Labs 03/04/20 
5330 WBC 7.7 HGB 11.5 HCT 34.1*  
PLT 92* Recent Labs 03/05/20 
0217 03/04/20 
7838  138  
K 3.8 4.1 * 105 CO2 25 28 * 170* BUN 24* 32* CREA 1.40* 1.52* CA 8.9 9.0 MG 2.0 2.1 PHOS 3.6  --   
ALB  --  3.7 TBILI  --  0.3 SGOT  --  18 ALT  --  16 INR  --  1.1 Signed: Genet Kinney MD

## 2020-03-07 NOTE — ROUTINE PROCESS
Bedside and Verbal shift change report given to Alfredito Dominguez (oncoming nurse) by Monica Bateman (offgoing nurse). Report included the following information SBAR, Kardex, Intake/Output and MAR.

## 2020-03-07 NOTE — PROGRESS NOTES
Bedside and Verbal shift change report given to Sriram Wiggins RN (oncoming nurse) by Melissa Hill RN (offgoing nurse). Report included the following information SBAR, Kardex, ED Summary, Intake/Output, MAR, Recent Results and Cardiac Rhythm NSR. 
  
Zone Phone:   [phone] 
  
  
Significant changes during shift:   
 
Pt SB while sleeping; SR while awake. Speech still garbled but clearer with 1 word responses.  
  
  
Patient Information 
  
Stacey Williamson 78 y.o. 
3/4/2020  9:02 AM by Rica Banerjee MD. Stacey Williamson was admitted from Home 
  
Problem List 
  
    
Patient Active Problem List  
  Diagnosis Date Noted  CVA (cerebral vascular accident) ([address] 03/04/2020  A-fib ([address] 10/05/2018  Ventral hernia with obstruction 06/01/2018  Bilateral carotid artery stenosis 05/10/2018  Acute cerebral infarction ([address] 05/09/2018  Syncope 05/06/2018  CAD (coronary artery disease) 02/05/2018  Stroke ([address] 02/05/2018  Hypertensive urgency 08/01/2017  CHF (congestive heart failure) ([address] 08/01/2017  COPD exacerbation ([address] 03/07/2017  CAD (coronary artery disease), native coronary artery 07/15/2016  H/O: CVA (cerebrovascular accident) 07/15/2016  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 ([address] 07/15/2016  Severe sepsis ([address] 07/14/2016  Pneumonia 07/14/2016  Acute respiratory failure ([address] 07/14/2016  Elevated troponin 07/14/2016  Left bundle branch block (LBBB) on electrocardiogram 07/14/2016  Acute systolic heart failure ([address] 07/14/2016  Acute kidney failure ([address] 07/14/2016  Mixed hyperlipidemia 09/21/2012  Essential hypertension, benign 09/21/2012  Other left bundle branch block 09/21/2012  Coronary atherosclerosis of native coronary artery 09/21/2012  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  
  
    
Past Medical History:  
Diagnosis Date  Acute kidney failure ([facility] 7/14/2016  Acute systolic heart failure ([facility] 7/14/2016  Arrhythmia    
 CAD (coronary artery disease)    
 Heart failure ([facility]    
 Hypertension    
 Stroke ([facility] 2009  
  Stroke, left hand weakness and speech, peripheral vision affected  
  
  
  
Core Measures: 
  
CVA: Yes Yes CHF:No No 
PNA:No No 
  
  
Activity Status: 
  
OOB to Chair No 
Ambulated this shift No  
Bed Rest Yes 
  
  
  
LINES AND DRAINS: 
PIV 
  
DVT prophylaxis: 
  
DVT prophylaxis Med- No 
DVT prophylaxis SCD or MALA- Yes  
  
Patient Safety: 
  
Falls Score Total Score: 3 Safety Level_______ Bed Alarm On? Yes Sitter?  No 
  
Plan for upcoming shift: Palliative consult, PT 
  
  
  
Discharge Plan: No TBD 
  
Active Consults: 
IP CONSULT TO CARDIOLOGY 
IP CONSULT TO NEUROLOGY 
IP CONSULT TO PALLIATIVE CARE - PROVIDER

## 2020-03-07 NOTE — PROGRESS NOTES
CM alerted that the patient can likely d/c tomorrow, 3/8/20, once she has her 3 night LOS. CM contacted [facility] and arranged for BLS ambulance transport for the patient at 11 AM tomorrow. CM placed [facility] paperwork on the patient's chart. CM confirmed that [facility] is expecting the patient for admission tomorrow, the patient has been assigned to room 409. The phone number for report is [phone]. CM spoke with the patient's son, Pastora Jauregui (phone: [phone]), and he is aware that the patient will be discharged tomorrow and he is agreeable to d/c plan. He is aware the patient will be transported at 11 AM by [facility]. CM will continue to assist with dispo needs. Kentrell Whitney 169, [address] 56

## 2020-03-07 NOTE — PROGRESS NOTES
Problem: Falls - Risk of 
Goal: *Absence of Falls Description Document Agustin Cardenas Fall Risk and appropriate interventions in the flowsheet. Outcome: Progressing Towards Goal 
Note: Fall Risk Interventions: 
Mobility Interventions: Bed/chair exit alarm Mentation Interventions: Bed/chair exit alarm Medication Interventions: Bed/chair exit alarm Elimination Interventions: Bed/chair exit alarm Problem: Patient Education: Go to Patient Education Activity Goal: Patient/Family Education Outcome: Progressing Towards Goal 
  
Problem: Pressure Injury - Risk of 
Goal: *Prevention of pressure injury Description Document Nando Scale and appropriate interventions in the flowsheet. Outcome: Progressing Towards Goal 
Note: Pressure Injury Interventions: 
Sensory Interventions: Assess changes in LOC Moisture Interventions: Absorbent underpads Activity Interventions: Assess need for specialty bed Mobility Interventions: Assess need for specialty bed Nutrition Interventions: Document food/fluid/supplement intake, Discuss nutritional consult with provider, Offer support with meals,snacks and hydration Friction and Shear Interventions: Foam dressings/transparent film/skin sealants, HOB 30 degrees or less, Lift sheet Problem: TIA/CVA Stroke: Day 2 Until Discharge Goal: Off Pathway (Use only if patient is Off Pathway) Outcome: Progressing Towards Goal 
Goal: Activity/Safety Outcome: Progressing Towards Goal 
Goal: Diagnostic Test/Procedures Outcome: Progressing Towards Goal 
Goal: Nutrition/Diet Outcome: Progressing Towards Goal 
Goal: Discharge Planning Outcome: Progressing Towards Goal 
Goal: Medications Outcome: Progressing Towards Goal 
Goal: Respiratory Outcome: Progressing Towards Goal 
Goal: Treatments/Interventions/Procedures Outcome: Progressing Towards Goal 
Goal: Psychosocial 
Outcome: Progressing Towards Goal 
 Goal: *Verbalizes anxiety and depression are reduced or absent Outcome: Progressing Towards Goal 
Goal: *Absence of aspiration Outcome: Progressing Towards Goal 
Goal: *Absence of deep venous thrombosis signs and symptoms(Stroke Metric) Outcome: Progressing Towards Goal 
Goal: *Optimal pain control at patient's stated goal 
Outcome: Progressing Towards Goal 
Goal: *Tolerating diet Outcome: Progressing Towards Goal 
Goal: *Ability to perform ADLs and demonstrates progressive mobility and function Outcome: Progressing Towards Goal 
Goal: *Stroke education continued(Stroke Metric) Outcome: Progressing Towards Goal

## 2020-03-08 VITALS
HEART RATE: 62 BPM | SYSTOLIC BLOOD PRESSURE: 166 MMHG | OXYGEN SATURATION: 100 % | DIASTOLIC BLOOD PRESSURE: 84 MMHG | TEMPERATURE: 97.8 F | RESPIRATION RATE: 18 BRPM | BODY MASS INDEX: 22.66 KG/M2 | WEIGHT: 120 LBS | HEIGHT: 61 IN

## 2020-03-08 PROCEDURE — 74011250637 HC RX REV CODE- 250/637: Performed by: PSYCHIATRY & NEUROLOGY

## 2020-03-08 PROCEDURE — 74011250637 HC RX REV CODE- 250/637: Performed by: HOSPITALIST

## 2020-03-08 RX ORDER — GUAIFENESIN 100 MG/5ML
81 LIQUID (ML) ORAL DAILY
Qty: 30 TAB | Refills: 2 | Status: SHIPPED | OUTPATIENT
Start: 2020-03-08 | End: 2021-01-01

## 2020-03-08 RX ORDER — ATORVASTATIN CALCIUM 80 MG/1
80 TABLET, FILM COATED ORAL DAILY
Qty: 30 TAB | Refills: 2 | Status: SHIPPED | OUTPATIENT
Start: 2020-03-08 | End: 2021-01-01

## 2020-03-08 RX ORDER — METOPROLOL TARTRATE 25 MG/1
25 TABLET, FILM COATED ORAL EVERY 12 HOURS
Qty: 60 TAB | Refills: 2 | Status: SHIPPED | OUTPATIENT
Start: 2020-03-08 | End: 2021-01-01

## 2020-03-08 RX ADMIN — FAMOTIDINE 20 MG: 10 INJECTION, SOLUTION INTRAVENOUS at 09:27

## 2020-03-08 RX ADMIN — ASPIRIN 81 MG 81 MG: 81 TABLET ORAL at 09:27

## 2020-03-08 RX ADMIN — METOPROLOL TARTRATE 25 MG: 25 TABLET ORAL at 09:27

## 2020-03-08 RX ADMIN — ATORVASTATIN CALCIUM 80 MG: 40 TABLET, FILM COATED ORAL at 09:27

## 2020-03-08 RX ADMIN — APIXABAN 2.5 MG: 2.5 TABLET, FILM COATED ORAL at 09:27

## 2020-03-08 NOTE — PROGRESS NOTES
Problem: Falls - Risk of 
Goal: *Absence of Falls Description Document Zoraida Sheets Fall Risk and appropriate interventions in the flowsheet. Outcome: Progressing Towards Goal 
Note: Fall Risk Interventions: 
Mobility Interventions: Bed/chair exit alarm Mentation Interventions: Bed/chair exit alarm Medication Interventions: Bed/chair exit alarm Elimination Interventions: Bed/chair exit alarm Problem: Pressure Injury - Risk of 
Goal: *Prevention of pressure injury Description Document Nando Scale and appropriate interventions in the flowsheet. Outcome: Progressing Towards Goal 
Note: Pressure Injury Interventions: 
Sensory Interventions: Assess changes in LOC Moisture Interventions: Absorbent underpads Activity Interventions: Assess need for specialty bed Mobility Interventions: HOB 30 degrees or less Nutrition Interventions: Document food/fluid/supplement intake, Discuss nutritional consult with provider, Offer support with meals,snacks and hydration Friction and Shear Interventions: Minimize layers Problem: TIA/CVA Stroke: Day 2 Until Discharge Goal: Off Pathway (Use only if patient is Off Pathway) Outcome: Progressing Towards Goal 
Goal: Activity/Safety Outcome: Progressing Towards Goal 
Goal: Diagnostic Test/Procedures Outcome: Progressing Towards Goal 
Goal: Nutrition/Diet Outcome: Progressing Towards Goal 
Goal: Discharge Planning Outcome: Progressing Towards Goal 
Goal: Medications Outcome: Progressing Towards Goal 
Goal: Respiratory Outcome: Progressing Towards Goal 
Goal: Treatments/Interventions/Procedures Outcome: Progressing Towards Goal 
Goal: Psychosocial 
Outcome: Progressing Towards Goal 
Goal: *Verbalizes anxiety and depression are reduced or absent Outcome: Progressing Towards Goal 
Goal: *Absence of aspiration Outcome: Progressing Towards Goal 
Goal: *Absence of deep venous thrombosis signs and symptoms(Stroke Metric) Outcome: Progressing Towards Goal 
Goal: *Optimal pain control at patient's stated goal 
Outcome: Progressing Towards Goal 
Goal: *Tolerating diet Outcome: Progressing Towards Goal 
Goal: *Ability to perform ADLs and demonstrates progressive mobility and function Outcome: Progressing Towards Goal 
Goal: *Stroke education continued(Stroke Metric) Outcome: Progressing Towards Goal 
  
Problem: Ischemic Stroke: Discharge Outcomes Goal: *Verbalizes anxiety and depression are reduced or absent Outcome: Progressing Towards Goal 
Goal: *Verbalize understanding of risk factor modification(Stroke Metric) Outcome: Progressing Towards Goal 
Goal: *Hemodynamically stable Outcome: Progressing Towards Goal 
Goal: *Absence of aspiration pneumonia Outcome: Progressing Towards Goal 
Goal: *Aware of needed dietary changes Outcome: Progressing Towards Goal 
Goal: *Verbalize understanding of prescribed medications including anti-coagulants, anti-lipid, and/or anti-platelets(Stroke Metric) Outcome: Progressing Towards Goal 
Goal: *Tolerating diet Outcome: Progressing Towards Goal 
Goal: *Aware of follow-up diagnostics related to anticoagulants Outcome: Progressing Towards Goal 
Goal: *Ability to perform ADLs and demonstrates progressive mobility and function Outcome: Progressing Towards Goal 
Goal: *Absence of DVT(Stroke Metric) Outcome: Progressing Towards Goal 
Goal: *Absence of aspiration Outcome: Progressing Towards Goal 
Goal: *Optimal pain control at patient's stated goal 
Outcome: Progressing Towards Goal 
Goal: *Home safety concerns addressed Outcome: Progressing Towards Goal 
Goal: *Describes available resources and support systems Outcome: Progressing Towards Goal 
Goal: *Verbalizes understanding of activation of EMS(911) for stroke symptoms(Stroke Metric) Outcome: Progressing Towards Goal 
Goal: *Understands and describes signs and symptoms to report to providers(Stroke Metric) Outcome: Progressing Towards Goal 
Goal: *Neurolgocially stable (absence of additional neurological deficits) Outcome: Progressing Towards Goal 
Goal: *Verbalizes importance of follow-up with primary care physician(Stroke Metric) Outcome: Progressing Towards Goal 
Goal: *Smoking cessation discussed,if applicable(Stroke Metric) Outcome: Progressing Towards Goal 
Goal: *Depression screening completed(Stroke Metric) Outcome: Progressing Towards Goal

## 2020-03-08 NOTE — DISCHARGE SUMMARY
Hospitalist Discharge Summary Patient ID: 
Lucie Bloch 
[id number] 
35 y.o. 
1940 
3/4/2020 PCP on record: Noble Rivera NP Admit date: 3/4/2020 Discharge date and time: 3/8/2020 DISCHARGE DIAGNOSIS: 
 
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  
Paroxysmal  A. fib with RVR Hypertension H/o of CVA 
CKD stage III Cad  
Chronic Systolic CHF 
 
CONSULTATIONS: 
IP CONSULT TO CARDIOLOGY 
IP CONSULT TO NEUROLOGY 
IP CONSULT TO PALLIATIVE CARE - PROVIDER Excerpted HPI from H&P of Joseph Monk MD: 
 
Yazan Adams is a 78 y.o.   female with past medical history of CAD, systolic CHF, hypertension, CVA, A. fib on Eliquis, CKD stage III who sent to the ED by the family for left leg weakness, left facial droop and slurred speech. Patient last well-known was 9 PM yesterday. She was noted to be more confused than usual.  Patient has a history of stroke with left-sided deficit but family reported that her weakness is worse. Most of the HPI obtained from ED notes as HPI limited by with slurred speech and confusional state. In the ED, he was found to be initially in A. fib with RVR with a rate of 125 , subsequent EKG showed sinus tachycardia after  IV fluids. Her blood pressure is elevated. Review of her labs showed elevated troponin, with a creatinine. CT of the brain showed[de-identified] No acute changes demonstrated. Multiple bilateral supra and 
infratentorial remote infarcts. No hemorrhage or mass effect. Patient was evaluated by tele-neurologist who deemed the patient not to be a TPA candidate as she is on Eliquis and is outside the window.  We were asked to admit for work up and evaluation of the above problems.  
  
______________________________________________________________________ DISCHARGE SUMMARY/HOSPITAL COURSE:  for full details see H&P, daily progress notes, labs, consult notes. 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 Multiple bilateral supra and 
infratentorial remote infarcts. No hemorrhage or mass effect We will admit to neuro telemetry, neurochecks 4 hours,  Tele neurologist advised aspirin and Plavix and holding Eliquis MRI SHOWED: 
1. Two tiny acute to subacute infarcts in the left frontal lobe. 2. Unchanged generalized parenchymal volume loss and moderate chronic 
microvascular ischemic disease. Unchanged large chronic right MCA territory 
infarct, and numerous additional supratentorial and infratentorial chronic 
infarcts as above. CTA head and neck showed no major vessel occlusion.  
HbA1c 5.8 LDL 83 
  
Discussed with neurology, due to the tiny nature of acute infarcts recommended to restart Eliquis- continue Cardiology recommended switching to coumadin initially but pt to cont Eliquis now as per the consensus DC lovenox now 
s/p MBS normal 
Echo EF normal 55-60% Changed to PO aspirin now Continue with p.o. atorvastatin,  
DCd Plavix now 
  
  
Paroxysmal  A. fib with RVR Upon presentation, patient was found to be in A. fib with RVR, received IV fluid, heart rate came down below 110, repeat EKG was more consistent with sinus tachycardia Patient needs permissive hypertension,  
Currently in sinus rhythm AC as above Cont Eliquis per neuro now Metoprolol 25 mg BID added 
  
Hypertension H/o of CVA 
CKD stage III Cad  
Systolic CHF 
 
 
 
_______________________________________________________________________ Patient seen and examined by me on discharge day. Pertinent Findings: 
Gen:    Not in distress Chest: Clear lungs CVS:   Regular rhythm. No edema Abd:  Soft, not distended, not tender Neuro:  Alert, oriented x3, dysarthria and left upper extremity weakness noticed 
_______________________________________________________________________ DISCHARGE MEDICATIONS:  
Current Discharge Medication List  
  
START taking these medications Details aspirin 81 mg chewable tablet Take 1 Tab by mouth daily. Qty: 30 Tab, Refills: 2  
  
metoprolol tartrate (LOPRESSOR) 25 mg tablet Take 1 Tab by mouth every twelve (12) hours. Qty: 60 Tab, Refills: 2 CONTINUE these medications which have CHANGED Details  
atorvastatin (LIPITOR) 80 mg tablet Take 1 Tab by mouth daily. Qty: 30 Tab, Refills: 2 CONTINUE these medications which have NOT CHANGED Details  
amLODIPine (NORVASC) 5 mg tablet Take 5 mg by mouth daily. allopurinol (ZYLOPRIM) 100 mg tablet Take 100 mg by mouth daily. apixaban (ELIQUIS) 2.5 mg tablet Take 1 Tab by mouth two (2) times a day. Qty: 60 Tab, Refills: 12  
  
potassium chloride (K-DUR, KLOR-CON) 20 mEq tablet Take 20 mEq by mouth daily. MON, WED., SAT  
  
famotidine (PEPCID) 20 mg tablet Take 20 mg by mouth daily. furosemide (LASIX) 20 mg tablet Take 20 mg by mouth Every Mon, Wed and Sat. nitroglycerin (NITROSTAT) 0.4 mg SL tablet 0.4 mg by SubLINGual route every five (5) minutes as needed for Chest Pain. Up to 3 doses. albuterol (PROVENTIL HFA, VENTOLIN HFA, PROAIR HFA) 90 mcg/actuation inhaler Take 2 Puffs by inhalation every four (4) hours as needed for Wheezing. Qty: 1 Inhaler, Refills: 1 Patient Follow Up Instructions: Activity: Activity as tolerated Diet: Cardiac Diet Wound Care: None needed Follow-up with PCP and Neurology in 1-2 weeks Follow-up tests/labs none Follow-up Information Follow up With Specialties Details Why Contact Info Randal Castro MD Cardiology, [address] [facility] Vascular Surgery, Internal Medicine On 3/18/2020 For hospital follow up; 2:15 pm please arrive by 2pm [address] 
[phone] [address] [place] [address],[address] 
[phone] Enriqueta Ferreira NP Family Practice In 1 week  [address] [phone] Duong Reza MD Neurology In 2 weeks  [address] [address] 
[phone] 
  
  
 
________________________________________________________________ Risk of deterioration: Moderate Condition at Discharge:  Stable 
__________________________________________________________________ Disposition SNF/LTC 
 
____________________________________________________________________ Code Status: Full Code 
___________________________________________________________________ Total time in minutes spent coordinating this discharge (includes going over instructions, follow-up, prescriptions, and preparing report for sign off to her PCP) :  32 minutes Signed: 
Jennifer Hoskins MD

## 2020-03-08 NOTE — PROGRESS NOTES
Bedside and Verbal shift change report given to Michael Schilling RN (oncoming nurse) by Earnstine Burkitt, RN (offgoing nurse). Report included the following information SBAR, Kardex, ED Summary, Intake/Output, MAR, Recent Results and Cardiac Rhythm NSR. 
  
Zone Phone:   [phone] 
  
  
Significant changes during shift: CNAs continue to report foul smelling urine and stool. Pt in general seems better able to communicate although this is still limited. Pt safety maintained.  
  
Patient Information 
  
Marga Whitehead 78 y.o. 
3/4/2020  9:02 AM by Driss Diaz MD. Marga Whitehead was admitted from Home 
  
Problem List 
  
    
Patient Active Problem List  
  Diagnosis Date Noted  CVA (cerebral vascular accident) ([address] 03/04/2020  A-fib ([address] 10/05/2018  Ventral hernia with obstruction 06/01/2018  Bilateral carotid artery stenosis 05/10/2018  Acute cerebral infarction ([address] 05/09/2018  Syncope 05/06/2018  CAD (coronary artery disease) 02/05/2018  Stroke ([address] 02/05/2018  Hypertensive urgency 08/01/2017  CHF (congestive heart failure) ([address] 08/01/2017  COPD exacerbation ([address] 03/07/2017  CAD (coronary artery disease), native coronary artery 07/15/2016  H/O: CVA (cerebrovascular accident) 07/15/2016  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 ([address] 07/15/2016  Severe sepsis ([address] 07/14/2016  Pneumonia 07/14/2016  Acute respiratory failure ([address] 07/14/2016  Elevated troponin 07/14/2016  Left bundle branch block (LBBB) on electrocardiogram 07/14/2016  Acute systolic heart failure ([address] 07/14/2016  Acute kidney failure ([address] 07/14/2016  Mixed hyperlipidemia 09/21/2012  Essential hypertension, benign 09/21/2012  Other left bundle branch block 09/21/2012  Coronary atherosclerosis of native coronary artery 09/21/2012  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  
  
    
Past Medical History:  
Diagnosis Date  Acute kidney failure ([facility] 7/14/2016  Acute systolic heart failure ([facility] 7/14/2016  Arrhythmia    
 CAD (coronary artery disease)    
 Heart failure ([facility]    
 Hypertension    
 Stroke ([facility] 2009  
  Stroke, left hand weakness and speech, peripheral vision affected  
  
  
  
Core Measures: 
  
CVA: Yes Yes CHF:No No 
PNA:No No 
  
  
Activity Status: 
  
OOB to Chair No 
Ambulated this shift No  
Bed Rest Yes 
  
  
  
LINES AND DRAINS: 
PIV 
  
DVT prophylaxis: 
  
DVT prophylaxis Med- No 
DVT prophylaxis SCD or MALA- Yes  
  
Patient Safety: 
  
Falls Score Total Score: 3 Safety Level_______ Bed Alarm On? Yes Sitter?  No 
  
Plan for upcoming shift: Palliative consult, PT 
  
  
  
Discharge Plan: No TBD 
  
Active Consults: 
IP CONSULT TO CARDIOLOGY 
IP CONSULT TO NEUROLOGY 
IP CONSULT TO PALLIATIVE CARE - PROVIDER

## 2020-03-08 NOTE — DISCHARGE INSTRUCTIONS
HOSPITALIST DISCHARGE INSTRUCTIONS    NAME: Magda Carlos   :  1940   MRN:  [id number]     Date/Time:  3/8/2020 8:21 AM    ADMIT DATE: 3/4/2020     DISCHARGE DATE: 3/8/2020     DISCHARGE DIAGNOSIS:  Acute Ischemic CVA  Atrial Fibrillation with RVR    MEDICATIONS:  · It is important that you take the medication exactly as they are prescribed. · Keep your medication in the bottles provided by the pharmacist and keep a list of the medication names, dosages, and times to be taken in your wallet. · Do not take other medications without consulting your doctor. Pain Management: per above medications    What to do at Home    Recommended diet:  Cardiac Diet    Recommended activity: Activity as tolerated    If you have questions regarding the hospital related prescriptions or hospital related issues [facility] [phone]. If you experience any of the following symptoms then please call your primary care physician or return to the emergency room if you cannot get hold of your doctor:  Fever, chills, nausea, vomiting, diarrhea, change in mentation, falling, bleeding, shortness of breath,     Follow Up:  Dr. Jose Elias Griggs, NP  you are to call and set up an appointment to see them in 7-10 days. Information obtained by :  I understand that if any problems occur once I am at home I am to contact my physician. I understand and acknowledge receipt of the instructions indicated above.                                                                                                                                            Physician's or R.N.'s Signature                                                                  Date/Time                                                                                                                                              Patient or Representative Signature Date/Time

## 2020-03-08 NOTE — ROUTINE PROCESS
Bedside and Verbal shift change report given to Itz Sam (oncoming nurse) by Scott Hutson (offgoing nurse). Report included the following information SBAR, Kardex, Intake/Output and MAR.

## 2020-03-08 NOTE — ROUTINE PROCESS
Called report to [employer] (at [phone]). Used SBAR, [email or web address], flowsheet and MAR. All questions answered.

## 2020-03-09 ENCOUNTER — PATIENT OUTREACH (OUTPATIENT)
Dept: CARDIOLOGY CLINIC | Age: 80
End: 2020-03-09

## 2020-03-24 ENCOUNTER — PATIENT OUTREACH (OUTPATIENT)
Dept: CASE MANAGEMENT | Age: 80
End: 2020-03-24

## 2020-03-25 NOTE — PROGRESS NOTES
Transition of care outreach postponed for 7 days due to patient's discharge to SNF. D/c to [facility] 3/8/20 f/u 7d cc'd Vick Brooks LPN This note will not be viewable in [address]

## 2020-03-27 ENCOUNTER — HOME HEALTH ADMISSION (OUTPATIENT)
Dept: HOME HEALTH SERVICES | Facility: HOME HEALTH | Age: 80
End: 2020-03-27
Payer: MEDICARE

## 2020-03-28 ENCOUNTER — HOME CARE VISIT (OUTPATIENT)
Dept: HOME HEALTH SERVICES | Facility: HOME HEALTH | Age: 80
End: 2020-03-28

## 2020-03-29 ENCOUNTER — HOME CARE VISIT (OUTPATIENT)
Dept: HOME HEALTH SERVICES | Facility: HOME HEALTH | Age: 80
End: 2020-03-29

## 2020-03-29 ENCOUNTER — HOME CARE VISIT (OUTPATIENT)
Dept: SCHEDULING | Facility: HOME HEALTH | Age: 80
End: 2020-03-29
Payer: MEDICARE

## 2020-03-29 VITALS
TEMPERATURE: 98 F | SYSTOLIC BLOOD PRESSURE: 138 MMHG | OXYGEN SATURATION: 96 % | HEART RATE: 100 BPM | DIASTOLIC BLOOD PRESSURE: 82 MMHG | RESPIRATION RATE: 16 BRPM

## 2020-03-29 PROCEDURE — 400013 HH SOC

## 2020-03-29 PROCEDURE — G0151 HHCP-SERV OF PT,EA 15 MIN: HCPCS

## 2020-03-29 PROCEDURE — 3331090001 HH PPS REVENUE CREDIT

## 2020-03-29 PROCEDURE — 3331090002 HH PPS REVENUE DEBIT

## 2020-03-30 PROCEDURE — 3331090002 HH PPS REVENUE DEBIT

## 2020-03-30 PROCEDURE — 3331090001 HH PPS REVENUE CREDIT

## 2020-03-31 ENCOUNTER — HOME CARE VISIT (OUTPATIENT)
Dept: HOME HEALTH SERVICES | Facility: HOME HEALTH | Age: 80
End: 2020-03-31
Payer: MEDICARE

## 2020-03-31 PROCEDURE — 3331090002 HH PPS REVENUE DEBIT

## 2020-03-31 PROCEDURE — 3331090001 HH PPS REVENUE CREDIT

## 2020-04-01 ENCOUNTER — HOME CARE VISIT (OUTPATIENT)
Dept: SCHEDULING | Facility: HOME HEALTH | Age: 80
End: 2020-04-01
Payer: MEDICARE

## 2020-04-01 ENCOUNTER — HOME CARE VISIT (OUTPATIENT)
Dept: HOME HEALTH SERVICES | Facility: HOME HEALTH | Age: 80
End: 2020-04-01
Payer: MEDICARE

## 2020-04-01 VITALS
OXYGEN SATURATION: 90 % | HEART RATE: 82 BPM | SYSTOLIC BLOOD PRESSURE: 132 MMHG | TEMPERATURE: 97.4 F | DIASTOLIC BLOOD PRESSURE: 80 MMHG

## 2020-04-01 VITALS
HEART RATE: 95 BPM | TEMPERATURE: 98.2 F | SYSTOLIC BLOOD PRESSURE: 130 MMHG | DIASTOLIC BLOOD PRESSURE: 78 MMHG | OXYGEN SATURATION: 96 %

## 2020-04-01 PROCEDURE — G0157 HHC PT ASSISTANT EA 15: HCPCS

## 2020-04-01 PROCEDURE — G0152 HHCP-SERV OF OT,EA 15 MIN: HCPCS

## 2020-04-01 PROCEDURE — 3331090002 HH PPS REVENUE DEBIT

## 2020-04-01 PROCEDURE — 3331090001 HH PPS REVENUE CREDIT

## 2020-04-02 PROCEDURE — 3331090001 HH PPS REVENUE CREDIT

## 2020-04-02 PROCEDURE — 3331090002 HH PPS REVENUE DEBIT

## 2020-04-03 ENCOUNTER — HOME CARE VISIT (OUTPATIENT)
Dept: HOME HEALTH SERVICES | Facility: HOME HEALTH | Age: 80
End: 2020-04-03
Payer: MEDICARE

## 2020-04-03 ENCOUNTER — HOME CARE VISIT (OUTPATIENT)
Dept: SCHEDULING | Facility: HOME HEALTH | Age: 80
End: 2020-04-03
Payer: MEDICARE

## 2020-04-03 VITALS
TEMPERATURE: 98 F | OXYGEN SATURATION: 97 % | SYSTOLIC BLOOD PRESSURE: 124 MMHG | HEART RATE: 94 BPM | DIASTOLIC BLOOD PRESSURE: 80 MMHG

## 2020-04-03 PROCEDURE — G0157 HHC PT ASSISTANT EA 15: HCPCS

## 2020-04-03 PROCEDURE — G0153 HHCP-SVS OF S/L PATH,EA 15MN: HCPCS

## 2020-04-03 PROCEDURE — 3331090002 HH PPS REVENUE DEBIT

## 2020-04-03 PROCEDURE — 3331090001 HH PPS REVENUE CREDIT

## 2020-04-04 VITALS
SYSTOLIC BLOOD PRESSURE: 130 MMHG | OXYGEN SATURATION: 98 % | TEMPERATURE: 98.6 F | DIASTOLIC BLOOD PRESSURE: 78 MMHG | HEART RATE: 98 BPM

## 2020-04-04 PROCEDURE — 3331090002 HH PPS REVENUE DEBIT

## 2020-04-04 PROCEDURE — 3331090001 HH PPS REVENUE CREDIT

## 2020-04-05 PROCEDURE — 3331090001 HH PPS REVENUE CREDIT

## 2020-04-05 PROCEDURE — 3331090002 HH PPS REVENUE DEBIT

## 2020-04-06 ENCOUNTER — HOME CARE VISIT (OUTPATIENT)
Dept: SCHEDULING | Facility: HOME HEALTH | Age: 80
End: 2020-04-06
Payer: MEDICARE

## 2020-04-06 VITALS
TEMPERATURE: 98 F | SYSTOLIC BLOOD PRESSURE: 130 MMHG | HEART RATE: 75 BPM | DIASTOLIC BLOOD PRESSURE: 84 MMHG | OXYGEN SATURATION: 98 %

## 2020-04-06 PROCEDURE — 3331090001 HH PPS REVENUE CREDIT

## 2020-04-06 PROCEDURE — G0158 HHC OT ASSISTANT EA 15: HCPCS

## 2020-04-06 PROCEDURE — 3331090002 HH PPS REVENUE DEBIT

## 2020-04-07 ENCOUNTER — HOME CARE VISIT (OUTPATIENT)
Dept: HOME HEALTH SERVICES | Facility: HOME HEALTH | Age: 80
End: 2020-04-07
Payer: MEDICARE

## 2020-04-07 ENCOUNTER — HOME CARE VISIT (OUTPATIENT)
Dept: SCHEDULING | Facility: HOME HEALTH | Age: 80
End: 2020-04-07
Payer: MEDICARE

## 2020-04-07 PROCEDURE — 3331090002 HH PPS REVENUE DEBIT

## 2020-04-07 PROCEDURE — 3331090001 HH PPS REVENUE CREDIT

## 2020-04-07 PROCEDURE — G0157 HHC PT ASSISTANT EA 15: HCPCS

## 2020-04-08 ENCOUNTER — HOME CARE VISIT (OUTPATIENT)
Dept: SCHEDULING | Facility: HOME HEALTH | Age: 80
End: 2020-04-08
Payer: MEDICARE

## 2020-04-08 ENCOUNTER — HOME CARE VISIT (OUTPATIENT)
Dept: HOME HEALTH SERVICES | Facility: HOME HEALTH | Age: 80
End: 2020-04-08
Payer: MEDICARE

## 2020-04-08 VITALS
HEART RATE: 86 BPM | SYSTOLIC BLOOD PRESSURE: 126 MMHG | DIASTOLIC BLOOD PRESSURE: 68 MMHG | TEMPERATURE: 98.7 F | OXYGEN SATURATION: 96 %

## 2020-04-08 VITALS
SYSTOLIC BLOOD PRESSURE: 132 MMHG | DIASTOLIC BLOOD PRESSURE: 76 MMHG | HEART RATE: 78 BPM | OXYGEN SATURATION: 97 % | TEMPERATURE: 98.1 F

## 2020-04-08 VITALS
HEART RATE: 82 BPM | OXYGEN SATURATION: 97 % | DIASTOLIC BLOOD PRESSURE: 78 MMHG | SYSTOLIC BLOOD PRESSURE: 144 MMHG | TEMPERATURE: 98 F

## 2020-04-08 PROCEDURE — G0153 HHCP-SVS OF S/L PATH,EA 15MN: HCPCS

## 2020-04-08 PROCEDURE — 3331090001 HH PPS REVENUE CREDIT

## 2020-04-08 PROCEDURE — 3331090002 HH PPS REVENUE DEBIT

## 2020-04-08 PROCEDURE — G0158 HHC OT ASSISTANT EA 15: HCPCS

## 2020-04-09 ENCOUNTER — HOME CARE VISIT (OUTPATIENT)
Dept: HOME HEALTH SERVICES | Facility: HOME HEALTH | Age: 80
End: 2020-04-09
Payer: MEDICARE

## 2020-04-09 ENCOUNTER — HOME CARE VISIT (OUTPATIENT)
Dept: SCHEDULING | Facility: HOME HEALTH | Age: 80
End: 2020-04-09
Payer: MEDICARE

## 2020-04-09 PROCEDURE — 3331090002 HH PPS REVENUE DEBIT

## 2020-04-09 PROCEDURE — 3331090001 HH PPS REVENUE CREDIT

## 2020-04-09 PROCEDURE — G0151 HHCP-SERV OF PT,EA 15 MIN: HCPCS

## 2020-04-10 ENCOUNTER — HOME CARE VISIT (OUTPATIENT)
Dept: HOME HEALTH SERVICES | Facility: HOME HEALTH | Age: 80
End: 2020-04-10
Payer: MEDICARE

## 2020-04-10 ENCOUNTER — HOME CARE VISIT (OUTPATIENT)
Dept: SCHEDULING | Facility: HOME HEALTH | Age: 80
End: 2020-04-10
Payer: MEDICARE

## 2020-04-10 VITALS
RESPIRATION RATE: 18 BRPM | TEMPERATURE: 98.1 F | DIASTOLIC BLOOD PRESSURE: 78 MMHG | OXYGEN SATURATION: 97 % | HEART RATE: 78 BPM | SYSTOLIC BLOOD PRESSURE: 132 MMHG

## 2020-04-10 PROCEDURE — 3331090002 HH PPS REVENUE DEBIT

## 2020-04-10 PROCEDURE — G0153 HHCP-SVS OF S/L PATH,EA 15MN: HCPCS

## 2020-04-10 PROCEDURE — 3331090001 HH PPS REVENUE CREDIT

## 2020-04-11 VITALS
SYSTOLIC BLOOD PRESSURE: 142 MMHG | TEMPERATURE: 98.5 F | DIASTOLIC BLOOD PRESSURE: 84 MMHG | OXYGEN SATURATION: 97 % | HEART RATE: 77 BPM

## 2020-04-11 PROCEDURE — 3331090001 HH PPS REVENUE CREDIT

## 2020-04-11 PROCEDURE — 3331090002 HH PPS REVENUE DEBIT

## 2020-04-12 PROCEDURE — 3331090002 HH PPS REVENUE DEBIT

## 2020-04-12 PROCEDURE — 3331090001 HH PPS REVENUE CREDIT

## 2020-04-13 PROCEDURE — 3331090001 HH PPS REVENUE CREDIT

## 2020-04-13 PROCEDURE — 3331090002 HH PPS REVENUE DEBIT

## 2020-04-14 ENCOUNTER — HOME CARE VISIT (OUTPATIENT)
Dept: HOME HEALTH SERVICES | Facility: HOME HEALTH | Age: 80
End: 2020-04-14
Payer: MEDICARE

## 2020-04-14 ENCOUNTER — HOME CARE VISIT (OUTPATIENT)
Dept: SCHEDULING | Facility: HOME HEALTH | Age: 80
End: 2020-04-14
Payer: MEDICARE

## 2020-04-14 ENCOUNTER — PATIENT OUTREACH (OUTPATIENT)
Dept: CARDIOLOGY CLINIC | Age: 80
End: 2020-04-14

## 2020-04-14 VITALS
DIASTOLIC BLOOD PRESSURE: 88 MMHG | OXYGEN SATURATION: 98 % | HEART RATE: 68 BPM | TEMPERATURE: 97.6 F | SYSTOLIC BLOOD PRESSURE: 142 MMHG

## 2020-04-14 PROCEDURE — G0157 HHC PT ASSISTANT EA 15: HCPCS

## 2020-04-14 PROCEDURE — 3331090002 HH PPS REVENUE DEBIT

## 2020-04-14 PROCEDURE — 3331090001 HH PPS REVENUE CREDIT

## 2020-04-14 PROCEDURE — G0158 HHC OT ASSISTANT EA 15: HCPCS

## 2020-04-14 NOTE — PROGRESS NOTES
[address] [place] [address] Dr Discharge Follow-Up Date/Time:  2020 4:55 PM 
 
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  
Paroxysmal  A. fib RVR Hypertension H/o of CVA 
CKD stage III Cad  
Chronic Systolic CHF . Patient was discharge on 3/28/20 from [facility]. The discharge summary from the post acute facilty was not available at the time of outreach. Patient was contacted within 12 business days of discharge from the post acute facility. [facility] Care Coordinator contacted the family son Vikas Lazo by telephone to perform post hospital discharge follow up. Verified name and  with family as identifiers. Provided introduction to self, and explanation of the LPN Care Coordinator role. Family did not receive post acute facility discharge instructions. LPN Care Coordinator reviewed discharge instructions and red flags with family who verbalized understanding. Family given an opportunity to ask questions and does not have any further questions or concerns at this time. The family agrees to contact the PCP office for questions related to their healthcare. N Care Coordinator provided contact information for future reference. Advance Care Planning:  
Does patient have an Advance Directive:  not on file Home Health orders at discharge: PT OT Home Health company: [employer] [facility] Date of initial visit: 3/29/20 Durable Medical Equipment ordered at discharge: none [address]: n/a Durable Medical Equipment received: n/a Medication(s):  
The post acute facility medication discharge list was not available for this call. Medication review was performed with family, who verbalizes understanding of administration of home medications. There were no barriers to obtaining medications identified at this time. [facility] follow up appointment(s):  
Future Appointments Date Time Provider Álvaro Campbell 4/15/2020  4:00 PM Kalen Coronado  
4/16/2020  9:00 AM [place] [address] [facility]  
4/16/2020 To Be Determined Fleurette Beverage [facility]  
4/16/2020  1:30 PM Pilar Khan PTA [facility]  
4/20/2020 To Be Determined  [facility]  
4/21/2020 To Be Determined Fleurette Beverage [address]  
4/22/2020 To Be Determined Fleurette Beverage [facility]  
4/22/2020 To Be Determined [address]  
[facility]23[facility]2020 [facility]  
4/24/2020 To Be Determined [address]  
4/27/2020 To Be Determined [address]  
4/27/2020 [facility]  
4/28/2020 To Be Determined Fleurette Beverage [address]  
4/29/2020 To Be Determined [address]  
4/30/2020 [facility]  
4/30/2020 To Be Determined Mg Cornejo OT [address] [facility]  
5/4/2020 [facility] Be [facility]ined [facility]  
5/6/2020 To Be Determined Tatiana Henriquez PT [place] [address] [facility] follow up appointment(s): n/a [facility]:  offered and patient declined

## 2020-04-15 ENCOUNTER — HOME CARE VISIT (OUTPATIENT)
Dept: SCHEDULING | Facility: HOME HEALTH | Age: 80
End: 2020-04-15
Payer: MEDICARE

## 2020-04-15 ENCOUNTER — HOME CARE VISIT (OUTPATIENT)
Dept: HOME HEALTH SERVICES | Facility: HOME HEALTH | Age: 80
End: 2020-04-15
Payer: MEDICARE

## 2020-04-15 VITALS
SYSTOLIC BLOOD PRESSURE: 122 MMHG | HEART RATE: 87 BPM | OXYGEN SATURATION: 93 % | DIASTOLIC BLOOD PRESSURE: 70 MMHG | TEMPERATURE: 98.3 F

## 2020-04-15 VITALS
HEART RATE: 74 BPM | DIASTOLIC BLOOD PRESSURE: 80 MMHG | SYSTOLIC BLOOD PRESSURE: 110 MMHG | TEMPERATURE: 98.3 F | OXYGEN SATURATION: 96 %

## 2020-04-15 PROCEDURE — 3331090001 HH PPS REVENUE CREDIT

## 2020-04-15 PROCEDURE — G0153 HHCP-SVS OF S/L PATH,EA 15MN: HCPCS

## 2020-04-15 PROCEDURE — 3331090002 HH PPS REVENUE DEBIT

## 2020-04-16 ENCOUNTER — HOME CARE VISIT (OUTPATIENT)
Dept: HOME HEALTH SERVICES | Facility: HOME HEALTH | Age: 80
End: 2020-04-16
Payer: MEDICARE

## 2020-04-16 ENCOUNTER — HOME CARE VISIT (OUTPATIENT)
Dept: SCHEDULING | Facility: HOME HEALTH | Age: 80
End: 2020-04-16
Payer: MEDICARE

## 2020-04-16 VITALS
DIASTOLIC BLOOD PRESSURE: 80 MMHG | HEART RATE: 87 BPM | TEMPERATURE: 97.9 F | SYSTOLIC BLOOD PRESSURE: 132 MMHG | OXYGEN SATURATION: 98 %

## 2020-04-16 PROCEDURE — 3331090002 HH PPS REVENUE DEBIT

## 2020-04-16 PROCEDURE — G0158 HHC OT ASSISTANT EA 15: HCPCS

## 2020-04-16 PROCEDURE — 3331090001 HH PPS REVENUE CREDIT

## 2020-04-16 PROCEDURE — G0153 HHCP-SVS OF S/L PATH,EA 15MN: HCPCS

## 2020-04-17 ENCOUNTER — HOME CARE VISIT (OUTPATIENT)
Dept: SCHEDULING | Facility: HOME HEALTH | Age: 80
End: 2020-04-17
Payer: MEDICARE

## 2020-04-17 ENCOUNTER — HOME CARE VISIT (OUTPATIENT)
Dept: HOME HEALTH SERVICES | Facility: HOME HEALTH | Age: 80
End: 2020-04-17
Payer: MEDICARE

## 2020-04-17 VITALS
OXYGEN SATURATION: 97 % | HEART RATE: 87 BPM | SYSTOLIC BLOOD PRESSURE: 132 MMHG | DIASTOLIC BLOOD PRESSURE: 80 MMHG | TEMPERATURE: 97.9 F

## 2020-04-17 PROCEDURE — 3331090002 HH PPS REVENUE DEBIT

## 2020-04-17 PROCEDURE — G0157 HHC PT ASSISTANT EA 15: HCPCS

## 2020-04-17 PROCEDURE — 3331090001 HH PPS REVENUE CREDIT

## 2020-04-18 VITALS
TEMPERATURE: 97.8 F | HEART RATE: 71 BPM | RESPIRATION RATE: 18 BRPM | SYSTOLIC BLOOD PRESSURE: 118 MMHG | OXYGEN SATURATION: 93 % | DIASTOLIC BLOOD PRESSURE: 76 MMHG

## 2020-04-18 PROCEDURE — 3331090002 HH PPS REVENUE DEBIT

## 2020-04-18 PROCEDURE — 3331090001 HH PPS REVENUE CREDIT

## 2020-04-19 PROCEDURE — 3331090001 HH PPS REVENUE CREDIT

## 2020-04-19 PROCEDURE — 3331090002 HH PPS REVENUE DEBIT

## 2020-04-20 ENCOUNTER — HOME CARE VISIT (OUTPATIENT)
Dept: SCHEDULING | Facility: HOME HEALTH | Age: 80
End: 2020-04-20
Payer: MEDICARE

## 2020-04-20 ENCOUNTER — HOME CARE VISIT (OUTPATIENT)
Dept: HOME HEALTH SERVICES | Facility: HOME HEALTH | Age: 80
End: 2020-04-20
Payer: MEDICARE

## 2020-04-20 PROCEDURE — 3331090001 HH PPS REVENUE CREDIT

## 2020-04-20 PROCEDURE — G0153 HHCP-SVS OF S/L PATH,EA 15MN: HCPCS

## 2020-04-20 PROCEDURE — 3331090002 HH PPS REVENUE DEBIT

## 2020-04-21 ENCOUNTER — HOME CARE VISIT (OUTPATIENT)
Dept: SCHEDULING | Facility: HOME HEALTH | Age: 80
End: 2020-04-21
Payer: MEDICARE

## 2020-04-21 ENCOUNTER — HOME CARE VISIT (OUTPATIENT)
Dept: HOME HEALTH SERVICES | Facility: HOME HEALTH | Age: 80
End: 2020-04-21
Payer: MEDICARE

## 2020-04-21 VITALS
DIASTOLIC BLOOD PRESSURE: 70 MMHG | TEMPERATURE: 98.5 F | HEART RATE: 72 BPM | SYSTOLIC BLOOD PRESSURE: 142 MMHG | OXYGEN SATURATION: 90 %

## 2020-04-21 VITALS
SYSTOLIC BLOOD PRESSURE: 130 MMHG | OXYGEN SATURATION: 96 % | DIASTOLIC BLOOD PRESSURE: 90 MMHG | HEART RATE: 87 BPM | TEMPERATURE: 97 F

## 2020-04-21 VITALS
SYSTOLIC BLOOD PRESSURE: 120 MMHG | OXYGEN SATURATION: 95 % | HEART RATE: 71 BPM | DIASTOLIC BLOOD PRESSURE: 72 MMHG | TEMPERATURE: 97.9 F

## 2020-04-21 PROCEDURE — 3331090002 HH PPS REVENUE DEBIT

## 2020-04-21 PROCEDURE — G0157 HHC PT ASSISTANT EA 15: HCPCS

## 2020-04-21 PROCEDURE — 3331090001 HH PPS REVENUE CREDIT

## 2020-04-21 PROCEDURE — G0158 HHC OT ASSISTANT EA 15: HCPCS

## 2020-04-22 ENCOUNTER — HOME CARE VISIT (OUTPATIENT)
Dept: HOME HEALTH SERVICES | Facility: HOME HEALTH | Age: 80
End: 2020-04-22
Payer: MEDICARE

## 2020-04-22 ENCOUNTER — HOME CARE VISIT (OUTPATIENT)
Dept: SCHEDULING | Facility: HOME HEALTH | Age: 80
End: 2020-04-22
Payer: MEDICARE

## 2020-04-22 PROCEDURE — 3331090002 HH PPS REVENUE DEBIT

## 2020-04-22 PROCEDURE — G0153 HHCP-SVS OF S/L PATH,EA 15MN: HCPCS

## 2020-04-22 PROCEDURE — 3331090001 HH PPS REVENUE CREDIT

## 2020-04-23 ENCOUNTER — HOME CARE VISIT (OUTPATIENT)
Dept: SCHEDULING | Facility: HOME HEALTH | Age: 80
End: 2020-04-23
Payer: MEDICARE

## 2020-04-23 VITALS
DIASTOLIC BLOOD PRESSURE: 88 MMHG | OXYGEN SATURATION: 97 % | SYSTOLIC BLOOD PRESSURE: 142 MMHG | TEMPERATURE: 98.2 F | HEART RATE: 77 BPM

## 2020-04-23 VITALS
HEART RATE: 70 BPM | TEMPERATURE: 97.7 F | DIASTOLIC BLOOD PRESSURE: 80 MMHG | OXYGEN SATURATION: 97 % | SYSTOLIC BLOOD PRESSURE: 126 MMHG

## 2020-04-23 PROCEDURE — G0158 HHC OT ASSISTANT EA 15: HCPCS

## 2020-04-23 PROCEDURE — 3331090001 HH PPS REVENUE CREDIT

## 2020-04-23 PROCEDURE — 3331090002 HH PPS REVENUE DEBIT

## 2020-04-24 ENCOUNTER — HOME CARE VISIT (OUTPATIENT)
Dept: HOME HEALTH SERVICES | Facility: HOME HEALTH | Age: 80
End: 2020-04-24
Payer: MEDICARE

## 2020-04-24 ENCOUNTER — HOME CARE VISIT (OUTPATIENT)
Dept: SCHEDULING | Facility: HOME HEALTH | Age: 80
End: 2020-04-24
Payer: MEDICARE

## 2020-04-24 VITALS
SYSTOLIC BLOOD PRESSURE: 126 MMHG | TEMPERATURE: 98 F | DIASTOLIC BLOOD PRESSURE: 68 MMHG | HEART RATE: 74 BPM | RESPIRATION RATE: 18 BRPM | OXYGEN SATURATION: 95 %

## 2020-04-24 PROCEDURE — 3331090002 HH PPS REVENUE DEBIT

## 2020-04-24 PROCEDURE — G0157 HHC PT ASSISTANT EA 15: HCPCS

## 2020-04-24 PROCEDURE — 3331090001 HH PPS REVENUE CREDIT

## 2020-04-25 PROCEDURE — 3331090001 HH PPS REVENUE CREDIT

## 2020-04-25 PROCEDURE — 3331090002 HH PPS REVENUE DEBIT

## 2020-04-26 PROCEDURE — 3331090002 HH PPS REVENUE DEBIT

## 2020-04-26 PROCEDURE — 3331090001 HH PPS REVENUE CREDIT

## 2020-04-27 ENCOUNTER — HOME CARE VISIT (OUTPATIENT)
Dept: SCHEDULING | Facility: HOME HEALTH | Age: 80
End: 2020-04-27
Payer: MEDICARE

## 2020-04-27 ENCOUNTER — HOME CARE VISIT (OUTPATIENT)
Dept: HOME HEALTH SERVICES | Facility: HOME HEALTH | Age: 80
End: 2020-04-27
Payer: MEDICARE

## 2020-04-27 VITALS
SYSTOLIC BLOOD PRESSURE: 122 MMHG | DIASTOLIC BLOOD PRESSURE: 78 MMHG | TEMPERATURE: 97.6 F | OXYGEN SATURATION: 95 % | RESPIRATION RATE: 18 BRPM | HEART RATE: 71 BPM

## 2020-04-27 PROCEDURE — G0157 HHC PT ASSISTANT EA 15: HCPCS

## 2020-04-27 PROCEDURE — 3331090001 HH PPS REVENUE CREDIT

## 2020-04-27 PROCEDURE — 3331090003 HH PPS REVENUE ADJ

## 2020-04-27 PROCEDURE — 3331090002 HH PPS REVENUE DEBIT

## 2020-04-28 ENCOUNTER — HOME CARE VISIT (OUTPATIENT)
Dept: SCHEDULING | Facility: HOME HEALTH | Age: 80
End: 2020-04-28
Payer: MEDICARE

## 2020-04-28 ENCOUNTER — HOME CARE VISIT (OUTPATIENT)
Dept: HOME HEALTH SERVICES | Facility: HOME HEALTH | Age: 80
End: 2020-04-28
Payer: MEDICARE

## 2020-04-28 VITALS
TEMPERATURE: 97.9 F | DIASTOLIC BLOOD PRESSURE: 80 MMHG | SYSTOLIC BLOOD PRESSURE: 140 MMHG | HEART RATE: 82 BPM | OXYGEN SATURATION: 94 %

## 2020-04-28 VITALS
TEMPERATURE: 98.1 F | SYSTOLIC BLOOD PRESSURE: 160 MMHG | HEART RATE: 85 BPM | DIASTOLIC BLOOD PRESSURE: 98 MMHG | OXYGEN SATURATION: 96 %

## 2020-04-28 PROCEDURE — 3331090001 HH PPS REVENUE CREDIT

## 2020-04-28 PROCEDURE — 3331090002 HH PPS REVENUE DEBIT

## 2020-04-28 PROCEDURE — G0158 HHC OT ASSISTANT EA 15: HCPCS

## 2020-04-28 PROCEDURE — G0153 HHCP-SVS OF S/L PATH,EA 15MN: HCPCS

## 2020-04-28 PROCEDURE — 400013 HH SOC

## 2020-04-29 PROCEDURE — 3331090001 HH PPS REVENUE CREDIT

## 2020-04-29 PROCEDURE — 3331090002 HH PPS REVENUE DEBIT

## 2020-04-30 ENCOUNTER — HOME CARE VISIT (OUTPATIENT)
Dept: HOME HEALTH SERVICES | Facility: HOME HEALTH | Age: 80
End: 2020-04-30
Payer: MEDICARE

## 2020-04-30 ENCOUNTER — HOME CARE VISIT (OUTPATIENT)
Dept: SCHEDULING | Facility: HOME HEALTH | Age: 80
End: 2020-04-30
Payer: MEDICARE

## 2020-04-30 VITALS
TEMPERATURE: 97.4 F | DIASTOLIC BLOOD PRESSURE: 80 MMHG | SYSTOLIC BLOOD PRESSURE: 126 MMHG | HEART RATE: 91 BPM | OXYGEN SATURATION: 95 %

## 2020-04-30 PROCEDURE — G0153 HHCP-SVS OF S/L PATH,EA 15MN: HCPCS

## 2020-04-30 PROCEDURE — 3331090001 HH PPS REVENUE CREDIT

## 2020-04-30 PROCEDURE — G0157 HHC PT ASSISTANT EA 15: HCPCS

## 2020-04-30 PROCEDURE — 3331090002 HH PPS REVENUE DEBIT

## 2020-05-01 ENCOUNTER — HOME CARE VISIT (OUTPATIENT)
Dept: HOME HEALTH SERVICES | Facility: HOME HEALTH | Age: 80
End: 2020-05-01
Payer: MEDICARE

## 2020-05-01 ENCOUNTER — HOME CARE VISIT (OUTPATIENT)
Dept: SCHEDULING | Facility: HOME HEALTH | Age: 80
End: 2020-05-01
Payer: MEDICARE

## 2020-05-01 VITALS
OXYGEN SATURATION: 95 % | SYSTOLIC BLOOD PRESSURE: 140 MMHG | TEMPERATURE: 98.8 F | HEART RATE: 97 BPM | DIASTOLIC BLOOD PRESSURE: 92 MMHG

## 2020-05-01 VITALS
TEMPERATURE: 97.5 F | OXYGEN SATURATION: 95 % | SYSTOLIC BLOOD PRESSURE: 126 MMHG | DIASTOLIC BLOOD PRESSURE: 80 MMHG | HEART RATE: 91 BPM

## 2020-05-01 PROCEDURE — 3331090001 HH PPS REVENUE CREDIT

## 2020-05-01 PROCEDURE — 3331090002 HH PPS REVENUE DEBIT

## 2020-05-01 PROCEDURE — G0152 HHCP-SERV OF OT,EA 15 MIN: HCPCS

## 2020-05-02 PROCEDURE — 3331090002 HH PPS REVENUE DEBIT

## 2020-05-02 PROCEDURE — 3331090001 HH PPS REVENUE CREDIT

## 2020-05-03 PROCEDURE — 3331090001 HH PPS REVENUE CREDIT

## 2020-05-03 PROCEDURE — 3331090002 HH PPS REVENUE DEBIT

## 2020-05-04 PROCEDURE — 3331090002 HH PPS REVENUE DEBIT

## 2020-05-04 PROCEDURE — 3331090001 HH PPS REVENUE CREDIT

## 2020-05-05 ENCOUNTER — HOME CARE VISIT (OUTPATIENT)
Dept: HOME HEALTH SERVICES | Facility: HOME HEALTH | Age: 80
End: 2020-05-05
Payer: MEDICARE

## 2020-05-05 ENCOUNTER — HOME CARE VISIT (OUTPATIENT)
Dept: SCHEDULING | Facility: HOME HEALTH | Age: 80
End: 2020-05-05
Payer: MEDICARE

## 2020-05-05 PROCEDURE — 3331090001 HH PPS REVENUE CREDIT

## 2020-05-05 PROCEDURE — 3331090002 HH PPS REVENUE DEBIT

## 2020-05-05 PROCEDURE — G0157 HHC PT ASSISTANT EA 15: HCPCS

## 2020-05-06 ENCOUNTER — HOME CARE VISIT (OUTPATIENT)
Dept: SCHEDULING | Facility: HOME HEALTH | Age: 80
End: 2020-05-06
Payer: MEDICARE

## 2020-05-06 VITALS
RESPIRATION RATE: 17 BRPM | SYSTOLIC BLOOD PRESSURE: 130 MMHG | HEART RATE: 78 BPM | TEMPERATURE: 97.5 F | DIASTOLIC BLOOD PRESSURE: 78 MMHG | OXYGEN SATURATION: 95 %

## 2020-05-06 PROCEDURE — 3331090002 HH PPS REVENUE DEBIT

## 2020-05-06 PROCEDURE — 3331090001 HH PPS REVENUE CREDIT

## 2020-05-06 PROCEDURE — G0151 HHCP-SERV OF PT,EA 15 MIN: HCPCS

## 2020-05-06 PROCEDURE — 400013 HH SOC

## 2020-05-07 ENCOUNTER — HOME CARE VISIT (OUTPATIENT)
Dept: HOME HEALTH SERVICES | Facility: HOME HEALTH | Age: 80
End: 2020-05-07
Payer: MEDICARE

## 2020-05-07 ENCOUNTER — HOME CARE VISIT (OUTPATIENT)
Dept: SCHEDULING | Facility: HOME HEALTH | Age: 80
End: 2020-05-07
Payer: MEDICARE

## 2020-05-07 VITALS
DIASTOLIC BLOOD PRESSURE: 76 MMHG | TEMPERATURE: 97.5 F | OXYGEN SATURATION: 95 % | RESPIRATION RATE: 18 BRPM | HEART RATE: 74 BPM | SYSTOLIC BLOOD PRESSURE: 132 MMHG

## 2020-05-07 PROCEDURE — 3331090001 HH PPS REVENUE CREDIT

## 2020-05-07 PROCEDURE — G0153 HHCP-SVS OF S/L PATH,EA 15MN: HCPCS

## 2020-05-07 PROCEDURE — 3331090002 HH PPS REVENUE DEBIT

## 2020-05-08 ENCOUNTER — HOME CARE VISIT (OUTPATIENT)
Dept: HOME HEALTH SERVICES | Facility: HOME HEALTH | Age: 80
End: 2020-05-08
Payer: MEDICARE

## 2020-05-08 ENCOUNTER — HOME CARE VISIT (OUTPATIENT)
Dept: SCHEDULING | Facility: HOME HEALTH | Age: 80
End: 2020-05-08
Payer: MEDICARE

## 2020-05-08 VITALS
SYSTOLIC BLOOD PRESSURE: 130 MMHG | HEART RATE: 64 BPM | DIASTOLIC BLOOD PRESSURE: 70 MMHG | OXYGEN SATURATION: 96 % | TEMPERATURE: 98 F

## 2020-05-08 PROCEDURE — 3331090002 HH PPS REVENUE DEBIT

## 2020-05-08 PROCEDURE — 3331090001 HH PPS REVENUE CREDIT

## 2020-05-08 PROCEDURE — G0153 HHCP-SVS OF S/L PATH,EA 15MN: HCPCS

## 2020-05-09 PROCEDURE — 3331090001 HH PPS REVENUE CREDIT

## 2020-05-09 PROCEDURE — 3331090002 HH PPS REVENUE DEBIT

## 2020-05-10 PROCEDURE — 3331090002 HH PPS REVENUE DEBIT

## 2020-05-10 PROCEDURE — 3331090001 HH PPS REVENUE CREDIT

## 2020-05-11 ENCOUNTER — HOME CARE VISIT (OUTPATIENT)
Dept: HOME HEALTH SERVICES | Facility: HOME HEALTH | Age: 80
End: 2020-05-11
Payer: MEDICARE

## 2020-05-11 ENCOUNTER — HOME CARE VISIT (OUTPATIENT)
Dept: SCHEDULING | Facility: HOME HEALTH | Age: 80
End: 2020-05-11
Payer: MEDICARE

## 2020-05-11 VITALS
OXYGEN SATURATION: 97 % | RESPIRATION RATE: 17 BRPM | SYSTOLIC BLOOD PRESSURE: 124 MMHG | HEART RATE: 87 BPM | DIASTOLIC BLOOD PRESSURE: 84 MMHG | TEMPERATURE: 97.8 F

## 2020-05-11 VITALS
TEMPERATURE: 97.9 F | DIASTOLIC BLOOD PRESSURE: 68 MMHG | OXYGEN SATURATION: 97 % | SYSTOLIC BLOOD PRESSURE: 130 MMHG | HEART RATE: 79 BPM

## 2020-05-11 PROCEDURE — 3331090001 HH PPS REVENUE CREDIT

## 2020-05-11 PROCEDURE — G0157 HHC PT ASSISTANT EA 15: HCPCS

## 2020-05-11 PROCEDURE — 3331090002 HH PPS REVENUE DEBIT

## 2020-05-12 PROCEDURE — 3331090002 HH PPS REVENUE DEBIT

## 2020-05-12 PROCEDURE — 3331090001 HH PPS REVENUE CREDIT

## 2020-05-13 ENCOUNTER — HOME CARE VISIT (OUTPATIENT)
Dept: HOME HEALTH SERVICES | Facility: HOME HEALTH | Age: 80
End: 2020-05-13
Payer: MEDICARE

## 2020-05-13 ENCOUNTER — HOME CARE VISIT (OUTPATIENT)
Dept: SCHEDULING | Facility: HOME HEALTH | Age: 80
End: 2020-05-13
Payer: MEDICARE

## 2020-05-13 VITALS
TEMPERATURE: 98.1 F | OXYGEN SATURATION: 97 % | DIASTOLIC BLOOD PRESSURE: 70 MMHG | HEART RATE: 70 BPM | SYSTOLIC BLOOD PRESSURE: 142 MMHG

## 2020-05-13 PROCEDURE — 3331090002 HH PPS REVENUE DEBIT

## 2020-05-13 PROCEDURE — G0153 HHCP-SVS OF S/L PATH,EA 15MN: HCPCS

## 2020-05-13 PROCEDURE — 3331090001 HH PPS REVENUE CREDIT

## 2020-05-14 ENCOUNTER — HOME CARE VISIT (OUTPATIENT)
Dept: HOME HEALTH SERVICES | Facility: HOME HEALTH | Age: 80
End: 2020-05-14
Payer: MEDICARE

## 2020-05-14 ENCOUNTER — HOME CARE VISIT (OUTPATIENT)
Dept: SCHEDULING | Facility: HOME HEALTH | Age: 80
End: 2020-05-14
Payer: MEDICARE

## 2020-05-14 PROCEDURE — 3331090002 HH PPS REVENUE DEBIT

## 2020-05-14 PROCEDURE — G0151 HHCP-SERV OF PT,EA 15 MIN: HCPCS

## 2020-05-14 PROCEDURE — 3331090001 HH PPS REVENUE CREDIT

## 2020-05-15 VITALS
HEART RATE: 78 BPM | DIASTOLIC BLOOD PRESSURE: 78 MMHG | TEMPERATURE: 97.8 F | RESPIRATION RATE: 18 BRPM | OXYGEN SATURATION: 97 % | SYSTOLIC BLOOD PRESSURE: 120 MMHG

## 2020-05-15 PROCEDURE — 3331090001 HH PPS REVENUE CREDIT

## 2020-05-15 PROCEDURE — 3331090002 HH PPS REVENUE DEBIT

## 2020-05-16 PROCEDURE — 3331090002 HH PPS REVENUE DEBIT

## 2020-05-16 PROCEDURE — 3331090001 HH PPS REVENUE CREDIT

## 2020-05-17 ENCOUNTER — HOME CARE VISIT (OUTPATIENT)
Dept: HOME HEALTH SERVICES | Facility: HOME HEALTH | Age: 80
End: 2020-05-17
Payer: MEDICARE

## 2020-05-17 PROCEDURE — 3331090002 HH PPS REVENUE DEBIT

## 2020-05-17 PROCEDURE — 3331090001 HH PPS REVENUE CREDIT

## 2020-05-18 ENCOUNTER — HOME CARE VISIT (OUTPATIENT)
Dept: SCHEDULING | Facility: HOME HEALTH | Age: 80
End: 2020-05-18
Payer: MEDICARE

## 2020-05-18 PROCEDURE — 3331090002 HH PPS REVENUE DEBIT

## 2020-05-18 PROCEDURE — G0151 HHCP-SERV OF PT,EA 15 MIN: HCPCS

## 2020-05-18 PROCEDURE — 3331090001 HH PPS REVENUE CREDIT

## 2020-05-19 VITALS
HEART RATE: 75 BPM | SYSTOLIC BLOOD PRESSURE: 120 MMHG | RESPIRATION RATE: 18 BRPM | DIASTOLIC BLOOD PRESSURE: 76 MMHG | TEMPERATURE: 97.6 F | OXYGEN SATURATION: 97 %

## 2020-05-19 PROCEDURE — 3331090001 HH PPS REVENUE CREDIT

## 2020-05-19 PROCEDURE — 3331090002 HH PPS REVENUE DEBIT

## 2020-05-20 ENCOUNTER — HOME CARE VISIT (OUTPATIENT)
Dept: SCHEDULING | Facility: HOME HEALTH | Age: 80
End: 2020-05-20
Payer: MEDICARE

## 2020-05-20 ENCOUNTER — HOME CARE VISIT (OUTPATIENT)
Dept: HOME HEALTH SERVICES | Facility: HOME HEALTH | Age: 80
End: 2020-05-20
Payer: MEDICARE

## 2020-05-20 PROCEDURE — G0151 HHCP-SERV OF PT,EA 15 MIN: HCPCS

## 2020-05-20 PROCEDURE — 3331090001 HH PPS REVENUE CREDIT

## 2020-05-20 PROCEDURE — 3331090002 HH PPS REVENUE DEBIT

## 2020-05-21 VITALS
TEMPERATURE: 97.8 F | RESPIRATION RATE: 18 BRPM | SYSTOLIC BLOOD PRESSURE: 130 MMHG | HEART RATE: 82 BPM | OXYGEN SATURATION: 97 % | DIASTOLIC BLOOD PRESSURE: 78 MMHG

## 2020-05-21 PROCEDURE — 3331090001 HH PPS REVENUE CREDIT

## 2020-05-21 PROCEDURE — 3331090002 HH PPS REVENUE DEBIT

## 2020-05-22 PROCEDURE — 3331090001 HH PPS REVENUE CREDIT

## 2020-05-22 PROCEDURE — 3331090002 HH PPS REVENUE DEBIT

## 2020-05-23 PROCEDURE — 3331090002 HH PPS REVENUE DEBIT

## 2020-05-23 PROCEDURE — 3331090001 HH PPS REVENUE CREDIT

## 2020-05-24 PROCEDURE — 3331090001 HH PPS REVENUE CREDIT

## 2020-05-24 PROCEDURE — 3331090002 HH PPS REVENUE DEBIT

## 2020-05-25 ENCOUNTER — HOME CARE VISIT (OUTPATIENT)
Dept: HOME HEALTH SERVICES | Facility: HOME HEALTH | Age: 80
End: 2020-05-25
Payer: MEDICARE

## 2020-05-25 PROCEDURE — 3331090002 HH PPS REVENUE DEBIT

## 2020-05-25 PROCEDURE — 3331090001 HH PPS REVENUE CREDIT

## 2020-05-26 ENCOUNTER — HOME CARE VISIT (OUTPATIENT)
Dept: SCHEDULING | Facility: HOME HEALTH | Age: 80
End: 2020-05-26
Payer: MEDICARE

## 2020-05-26 PROCEDURE — G0151 HHCP-SERV OF PT,EA 15 MIN: HCPCS

## 2020-05-26 PROCEDURE — 3331090001 HH PPS REVENUE CREDIT

## 2020-05-26 PROCEDURE — 3331090002 HH PPS REVENUE DEBIT

## 2020-05-27 VITALS
DIASTOLIC BLOOD PRESSURE: 72 MMHG | HEART RATE: 78 BPM | TEMPERATURE: 97.8 F | SYSTOLIC BLOOD PRESSURE: 130 MMHG | OXYGEN SATURATION: 97 % | RESPIRATION RATE: 18 BRPM

## 2021-01-01 ENCOUNTER — HOME CARE VISIT (OUTPATIENT)
Dept: SCHEDULING | Facility: HOME HEALTH | Age: 81
End: 2021-01-01
Payer: MEDICARE

## 2021-01-01 ENCOUNTER — OFFICE VISIT (OUTPATIENT)
Dept: CARDIOLOGY CLINIC | Age: 81
End: 2021-01-01
Payer: MEDICAID

## 2021-01-01 ENCOUNTER — APPOINTMENT (OUTPATIENT)
Dept: CT IMAGING | Age: 81
DRG: 871 | End: 2021-01-01
Attending: EMERGENCY MEDICINE
Payer: MEDICARE

## 2021-01-01 ENCOUNTER — APPOINTMENT (OUTPATIENT)
Dept: GENERAL RADIOLOGY | Age: 81
End: 2021-01-01
Attending: EMERGENCY MEDICINE
Payer: MEDICARE

## 2021-01-01 ENCOUNTER — HOME CARE VISIT (OUTPATIENT)
Dept: HOSPICE | Facility: HOSPICE | Age: 81
End: 2021-01-01
Payer: MEDICARE

## 2021-01-01 ENCOUNTER — HOSPICE ADMISSION (OUTPATIENT)
Dept: HOSPICE | Facility: HOSPICE | Age: 81
End: 2021-01-01
Payer: MEDICARE

## 2021-01-01 ENCOUNTER — HOSPITAL ENCOUNTER (INPATIENT)
Age: 81
LOS: 19 days | Discharge: NURSING, REHAB OR LONG-TERM CARE | DRG: 871 | End: 2021-10-14
Attending: EMERGENCY MEDICINE | Admitting: STUDENT IN AN ORGANIZED HEALTH CARE EDUCATION/TRAINING PROGRAM
Payer: MEDICARE

## 2021-01-01 ENCOUNTER — TELEPHONE (OUTPATIENT)
Dept: CARDIOLOGY CLINIC | Age: 81
End: 2021-01-01

## 2021-01-01 ENCOUNTER — APPOINTMENT (OUTPATIENT)
Dept: NON INVASIVE DIAGNOSTICS | Age: 81
DRG: 871 | End: 2021-01-01
Attending: STUDENT IN AN ORGANIZED HEALTH CARE EDUCATION/TRAINING PROGRAM
Payer: MEDICARE

## 2021-01-01 ENCOUNTER — HOSPITAL ENCOUNTER (EMERGENCY)
Age: 81
Discharge: HOME | End: 2021-04-12
Attending: EMERGENCY MEDICINE
Payer: MEDICARE

## 2021-01-01 ENCOUNTER — APPOINTMENT (OUTPATIENT)
Dept: GENERAL RADIOLOGY | Age: 81
DRG: 871 | End: 2021-01-01
Attending: INTERNAL MEDICINE
Payer: MEDICARE

## 2021-01-01 ENCOUNTER — APPOINTMENT (OUTPATIENT)
Dept: GENERAL RADIOLOGY | Age: 81
DRG: 871 | End: 2021-01-01
Attending: EMERGENCY MEDICINE
Payer: MEDICARE

## 2021-01-01 ENCOUNTER — CLINICAL SUPPORT (OUTPATIENT)
Dept: CARDIOLOGY CLINIC | Age: 81
End: 2021-01-01
Payer: MEDICARE

## 2021-01-01 VITALS — HEART RATE: 78 BPM | RESPIRATION RATE: 20 BRPM

## 2021-01-01 VITALS
RESPIRATION RATE: 12 BRPM | SYSTOLIC BLOOD PRESSURE: 142 MMHG | TEMPERATURE: 97.7 F | OXYGEN SATURATION: 88 % | DIASTOLIC BLOOD PRESSURE: 60 MMHG | HEART RATE: 104 BPM

## 2021-01-01 VITALS
RESPIRATION RATE: 20 BRPM | WEIGHT: 120 LBS | HEART RATE: 72 BPM | BODY MASS INDEX: 23.56 KG/M2 | OXYGEN SATURATION: 92 % | TEMPERATURE: 97.1 F | HEIGHT: 60 IN | DIASTOLIC BLOOD PRESSURE: 78 MMHG | SYSTOLIC BLOOD PRESSURE: 157 MMHG

## 2021-01-01 VITALS
SYSTOLIC BLOOD PRESSURE: 140 MMHG | HEART RATE: 75 BPM | BODY MASS INDEX: 23.44 KG/M2 | OXYGEN SATURATION: 96 % | DIASTOLIC BLOOD PRESSURE: 88 MMHG | RESPIRATION RATE: 18 BRPM | HEIGHT: 60 IN

## 2021-01-01 VITALS
RESPIRATION RATE: 25 BRPM | OXYGEN SATURATION: 87 % | HEART RATE: 89 BPM | TEMPERATURE: 96.3 F | DIASTOLIC BLOOD PRESSURE: 93 MMHG | SYSTOLIC BLOOD PRESSURE: 160 MMHG

## 2021-01-01 VITALS
SYSTOLIC BLOOD PRESSURE: 110 MMHG | DIASTOLIC BLOOD PRESSURE: 64 MMHG | RESPIRATION RATE: 20 BRPM | OXYGEN SATURATION: 94 % | HEART RATE: 70 BPM | TEMPERATURE: 97.4 F

## 2021-01-01 VITALS
HEART RATE: 100 BPM | DIASTOLIC BLOOD PRESSURE: 78 MMHG | SYSTOLIC BLOOD PRESSURE: 150 MMHG | OXYGEN SATURATION: 100 % | TEMPERATURE: 97.8 F | RESPIRATION RATE: 18 BRPM

## 2021-01-01 VITALS
RESPIRATION RATE: 20 BRPM | SYSTOLIC BLOOD PRESSURE: 98 MMHG | OXYGEN SATURATION: 95 % | HEART RATE: 100 BPM | DIASTOLIC BLOOD PRESSURE: 41 MMHG

## 2021-01-01 VITALS — TEMPERATURE: 98 F | HEART RATE: 88 BPM | RESPIRATION RATE: 20 BRPM

## 2021-01-01 VITALS — HEART RATE: 120 BPM | TEMPERATURE: 98 F | RESPIRATION RATE: 28 BRPM

## 2021-01-01 VITALS — RESPIRATION RATE: 22 BRPM | HEART RATE: 106 BPM | TEMPERATURE: 97.5 F

## 2021-01-01 DIAGNOSIS — Z71.89 GOALS OF CARE, COUNSELING/DISCUSSION: ICD-10-CM

## 2021-01-01 DIAGNOSIS — J96.01 ACUTE RESPIRATORY FAILURE WITH HYPOXIA (HCC): ICD-10-CM

## 2021-01-01 DIAGNOSIS — E78.2 MIXED HYPERLIPIDEMIA: ICD-10-CM

## 2021-01-01 DIAGNOSIS — R06.00 DYSPNEA, UNSPECIFIED TYPE: Primary | ICD-10-CM

## 2021-01-01 DIAGNOSIS — I21.4 NSTEMI (NON-ST ELEVATED MYOCARDIAL INFARCTION) (HCC): ICD-10-CM

## 2021-01-01 DIAGNOSIS — I10 ESSENTIAL HYPERTENSION, BENIGN: ICD-10-CM

## 2021-01-01 DIAGNOSIS — R63.30 FEEDING DIFFICULTY: ICD-10-CM

## 2021-01-01 DIAGNOSIS — I63.10 CEREBROVASCULAR ACCIDENT (CVA) DUE TO EMBOLISM OF PRECEREBRAL ARTERY (HCC): ICD-10-CM

## 2021-01-01 DIAGNOSIS — I44.7 LBBB (LEFT BUNDLE BRANCH BLOCK): ICD-10-CM

## 2021-01-01 DIAGNOSIS — U07.1 COVID-19: Primary | ICD-10-CM

## 2021-01-01 DIAGNOSIS — Z45.09 ENCOUNTER FOR LOOP RECORDER CHECK: ICD-10-CM

## 2021-01-01 DIAGNOSIS — G93.41 METABOLIC ENCEPHALOPATHY: ICD-10-CM

## 2021-01-01 DIAGNOSIS — I48.91 ATRIAL FIBRILLATION, UNSPECIFIED TYPE (HCC): Primary | ICD-10-CM

## 2021-01-01 DIAGNOSIS — I44.7 LEFT BUNDLE BRANCH BLOCK (LBBB) ON ELECTROCARDIOGRAM: ICD-10-CM

## 2021-01-01 LAB
ALBUMIN SERPL-MCNC: 2.3 G/DL (ref 3.5–5)
ALBUMIN SERPL-MCNC: 2.4 G/DL (ref 3.5–5)
ALBUMIN SERPL-MCNC: 2.4 G/DL (ref 3.5–5)
ALBUMIN SERPL-MCNC: 2.5 G/DL (ref 3.5–5)
ALBUMIN SERPL-MCNC: 2.5 G/DL (ref 3.5–5)
ALBUMIN SERPL-MCNC: 2.6 G/DL (ref 3.5–5)
ALBUMIN SERPL-MCNC: 2.7 G/DL (ref 3.5–5)
ALBUMIN SERPL-MCNC: 3.2 G/DL (ref 3.5–5)
ALBUMIN SERPL-MCNC: 3.9 G/DL (ref 3.5–5)
ALBUMIN/GLOB SERPL: 0.5 {RATIO} (ref 1.1–2.2)
ALBUMIN/GLOB SERPL: 0.6 {RATIO} (ref 1.1–2.2)
ALBUMIN/GLOB SERPL: 0.6 {RATIO} (ref 1.1–2.2)
ALBUMIN/GLOB SERPL: 0.7 {RATIO} (ref 1.1–2.2)
ALBUMIN/GLOB SERPL: 0.8 {RATIO} (ref 1.1–2.2)
ALBUMIN/GLOB SERPL: 0.9 {RATIO} (ref 1.1–2.2)
ALP SERPL-CCNC: 146 U/L (ref 45–117)
ALP SERPL-CCNC: 66 U/L (ref 45–117)
ALP SERPL-CCNC: 66 U/L (ref 45–117)
ALP SERPL-CCNC: 74 U/L (ref 45–117)
ALP SERPL-CCNC: 77 U/L (ref 45–117)
ALP SERPL-CCNC: 78 U/L (ref 45–117)
ALP SERPL-CCNC: 80 U/L (ref 45–117)
ALP SERPL-CCNC: 82 U/L (ref 45–117)
ALP SERPL-CCNC: 96 U/L (ref 45–117)
ALT SERPL-CCNC: 11 U/L (ref 12–78)
ALT SERPL-CCNC: 11 U/L (ref 12–78)
ALT SERPL-CCNC: 13 U/L (ref 12–78)
ALT SERPL-CCNC: 14 U/L (ref 12–78)
ALT SERPL-CCNC: 15 U/L (ref 12–78)
ALT SERPL-CCNC: 15 U/L (ref 12–78)
ALT SERPL-CCNC: 16 U/L (ref 12–78)
ALT SERPL-CCNC: 16 U/L (ref 12–78)
ALT SERPL-CCNC: 19 U/L (ref 12–78)
AMORPH CRY URNS QL MICRO: ABNORMAL
ANION GAP SERPL CALC-SCNC: 1 MMOL/L (ref 5–15)
ANION GAP SERPL CALC-SCNC: 1 MMOL/L (ref 5–15)
ANION GAP SERPL CALC-SCNC: 10 MMOL/L (ref 5–15)
ANION GAP SERPL CALC-SCNC: 2 MMOL/L (ref 5–15)
ANION GAP SERPL CALC-SCNC: 2 MMOL/L (ref 5–15)
ANION GAP SERPL CALC-SCNC: 3 MMOL/L (ref 5–15)
ANION GAP SERPL CALC-SCNC: 4 MMOL/L (ref 5–15)
ANION GAP SERPL CALC-SCNC: 6 MMOL/L (ref 5–15)
ANION GAP SERPL CALC-SCNC: 6 MMOL/L (ref 5–15)
ANION GAP SERPL CALC-SCNC: 7 MMOL/L (ref 5–15)
ANION GAP SERPL CALC-SCNC: 8 MMOL/L (ref 5–15)
ANION GAP SERPL CALC-SCNC: 8 MMOL/L (ref 5–15)
APPEARANCE UR: ABNORMAL
AST SERPL-CCNC: 19 U/L (ref 15–37)
AST SERPL-CCNC: 25 U/L (ref 15–37)
AST SERPL-CCNC: 26 U/L (ref 15–37)
AST SERPL-CCNC: 27 U/L (ref 15–37)
AST SERPL-CCNC: 34 U/L (ref 15–37)
AST SERPL-CCNC: 36 U/L (ref 15–37)
ATRIAL RATE: 126 BPM
ATRIAL RATE: 98 BPM
BACTERIA SPEC CULT: NORMAL
BACTERIA SPEC CULT: NORMAL
BACTERIA URNS QL MICRO: ABNORMAL /HPF
BASE EXCESS BLD CALC-SCNC: 2.6 MMOL/L
BASOPHILS # BLD: 0 K/UL (ref 0–0.1)
BASOPHILS NFR BLD: 0 % (ref 0–1)
BILIRUB SERPL-MCNC: 0.3 MG/DL (ref 0.2–1)
BILIRUB SERPL-MCNC: 0.5 MG/DL (ref 0.2–1)
BILIRUB SERPL-MCNC: 0.5 MG/DL (ref 0.2–1)
BILIRUB SERPL-MCNC: 0.6 MG/DL (ref 0.2–1)
BILIRUB SERPL-MCNC: 0.7 MG/DL (ref 0.2–1)
BILIRUB SERPL-MCNC: 0.8 MG/DL (ref 0.2–1)
BILIRUB SERPL-MCNC: 1 MG/DL (ref 0.2–1)
BILIRUB UR QL CFM: NEGATIVE
BNP SERPL-MCNC: 1300 PG/ML
BUN SERPL-MCNC: 15 MG/DL (ref 6–20)
BUN SERPL-MCNC: 17 MG/DL (ref 6–20)
BUN SERPL-MCNC: 17 MG/DL (ref 6–20)
BUN SERPL-MCNC: 21 MG/DL (ref 6–20)
BUN SERPL-MCNC: 24 MG/DL (ref 6–20)
BUN SERPL-MCNC: 25 MG/DL (ref 6–20)
BUN SERPL-MCNC: 26 MG/DL (ref 6–20)
BUN SERPL-MCNC: 27 MG/DL (ref 6–20)
BUN SERPL-MCNC: 28 MG/DL (ref 6–20)
BUN SERPL-MCNC: 30 MG/DL (ref 6–20)
BUN/CREAT SERPL: 17 (ref 12–20)
BUN/CREAT SERPL: 17 (ref 12–20)
BUN/CREAT SERPL: 19 (ref 12–20)
BUN/CREAT SERPL: 19 (ref 12–20)
BUN/CREAT SERPL: 23 (ref 12–20)
BUN/CREAT SERPL: 25 (ref 12–20)
BUN/CREAT SERPL: 27 (ref 12–20)
BUN/CREAT SERPL: 28 (ref 12–20)
BUN/CREAT SERPL: 32 (ref 12–20)
BUN/CREAT SERPL: 35 (ref 12–20)
CA-I BLD-MCNC: 1.13 MMOL/L (ref 1.12–1.32)
CALCIUM SERPL-MCNC: 7.3 MG/DL (ref 8.5–10.1)
CALCIUM SERPL-MCNC: 8 MG/DL (ref 8.5–10.1)
CALCIUM SERPL-MCNC: 8.2 MG/DL (ref 8.5–10.1)
CALCIUM SERPL-MCNC: 8.2 MG/DL (ref 8.5–10.1)
CALCIUM SERPL-MCNC: 8.3 MG/DL (ref 8.5–10.1)
CALCIUM SERPL-MCNC: 8.5 MG/DL (ref 8.5–10.1)
CALCIUM SERPL-MCNC: 8.5 MG/DL (ref 8.5–10.1)
CALCIUM SERPL-MCNC: 8.6 MG/DL (ref 8.5–10.1)
CALCIUM SERPL-MCNC: 8.6 MG/DL (ref 8.5–10.1)
CALCIUM SERPL-MCNC: 9.5 MG/DL (ref 8.5–10.1)
CALCULATED P AXIS, ECG09: 28 DEGREES
CALCULATED P AXIS, ECG09: 33 DEGREES
CALCULATED R AXIS, ECG10: -14 DEGREES
CALCULATED R AXIS, ECG10: -21 DEGREES
CALCULATED T AXIS, ECG11: 133 DEGREES
CALCULATED T AXIS, ECG11: 145 DEGREES
CHLORIDE BLD-SCNC: 109 MMOL/L (ref 100–108)
CHLORIDE SERPL-SCNC: 105 MMOL/L (ref 97–108)
CHLORIDE SERPL-SCNC: 105 MMOL/L (ref 97–108)
CHLORIDE SERPL-SCNC: 106 MMOL/L (ref 97–108)
CHLORIDE SERPL-SCNC: 109 MMOL/L (ref 97–108)
CHLORIDE SERPL-SCNC: 109 MMOL/L (ref 97–108)
CHLORIDE SERPL-SCNC: 110 MMOL/L (ref 97–108)
CHLORIDE SERPL-SCNC: 111 MMOL/L (ref 97–108)
CHLORIDE SERPL-SCNC: 113 MMOL/L (ref 97–108)
CHLORIDE SERPL-SCNC: 113 MMOL/L (ref 97–108)
CHLORIDE SERPL-SCNC: 114 MMOL/L (ref 97–108)
CHLORIDE SERPL-SCNC: 115 MMOL/L (ref 97–108)
CHLORIDE SERPL-SCNC: 115 MMOL/L (ref 97–108)
CK SERPL-CCNC: 142 U/L (ref 26–192)
CO2 BLD-SCNC: 29 MMOL/L (ref 19–24)
CO2 SERPL-SCNC: 22 MMOL/L (ref 21–32)
CO2 SERPL-SCNC: 23 MMOL/L (ref 21–32)
CO2 SERPL-SCNC: 23 MMOL/L (ref 21–32)
CO2 SERPL-SCNC: 24 MMOL/L (ref 21–32)
CO2 SERPL-SCNC: 24 MMOL/L (ref 21–32)
CO2 SERPL-SCNC: 27 MMOL/L (ref 21–32)
CO2 SERPL-SCNC: 27 MMOL/L (ref 21–32)
CO2 SERPL-SCNC: 29 MMOL/L (ref 21–32)
CO2 SERPL-SCNC: 31 MMOL/L (ref 21–32)
COLOR UR: ABNORMAL
COMMENT, HOLDF: NORMAL
COMMENT, HOLDF: NORMAL
COVID-19 RAPID TEST, COVR: DETECTED
COVID-19 RAPID TEST, COVR: DETECTED
CREAT SERPL-MCNC: 0.72 MG/DL (ref 0.55–1.02)
CREAT SERPL-MCNC: 0.73 MG/DL (ref 0.55–1.02)
CREAT SERPL-MCNC: 0.79 MG/DL (ref 0.55–1.02)
CREAT SERPL-MCNC: 0.8 MG/DL (ref 0.55–1.02)
CREAT SERPL-MCNC: 0.83 MG/DL (ref 0.55–1.02)
CREAT SERPL-MCNC: 0.85 MG/DL (ref 0.55–1.02)
CREAT SERPL-MCNC: 0.9 MG/DL (ref 0.55–1.02)
CREAT SERPL-MCNC: 0.94 MG/DL (ref 0.55–1.02)
CREAT SERPL-MCNC: 0.95 MG/DL (ref 0.55–1.02)
CREAT SERPL-MCNC: 1.02 MG/DL (ref 0.55–1.02)
CREAT SERPL-MCNC: 1.26 MG/DL (ref 0.55–1.02)
CREAT SERPL-MCNC: 1.56 MG/DL (ref 0.55–1.02)
CREAT UR-MCNC: 1.2 MG/DL (ref 0.6–1.3)
CRP SERPL HS-MCNC: >9.5 MG/L
CRP SERPL-MCNC: 0.96 MG/DL (ref 0–0.6)
CRP SERPL-MCNC: 2.95 MG/DL (ref 0–0.6)
CRP SERPL-MCNC: <0.29 MG/DL (ref 0–0.6)
D DIMER PPP FEU-MCNC: 1.13 MG/L FEU (ref 0–0.65)
DIAGNOSIS, 93000: NORMAL
DIAGNOSIS, 93000: NORMAL
DIFFERENTIAL METHOD BLD: ABNORMAL
ECHO AO ROOT DIAM: 2.27 CM
ECHO AV AREA PEAK VELOCITY: 1.89 CM2
ECHO AV AREA VTI: 1.84 CM2
ECHO AV AREA/BSA PEAK VELOCITY: 1.3 CM2/M2
ECHO AV AREA/BSA VTI: 1.2 CM2/M2
ECHO AV MEAN GRADIENT: 6.95 MMHG
ECHO AV PEAK GRADIENT: 11.76 MMHG
ECHO AV PEAK VELOCITY: 171.43 CM/S
ECHO AV VTI: 31.83 CM
ECHO LA MAJOR AXIS: 3.45 CM
ECHO LA MINOR AXIS: 2.32 CM
ECHO LV INTERNAL DIMENSION DIASTOLIC: 2 CM (ref 3.9–5.3)
ECHO LV INTERNAL DIMENSION SYSTOLIC: 1.55 CM
ECHO LV IVSD: 1.76 CM (ref 0.6–0.9)
ECHO LV MASS 2D: 125.7 G (ref 67–162)
ECHO LV MASS INDEX 2D: 84.4 G/M2 (ref 43–95)
ECHO LV POSTERIOR WALL DIASTOLIC: 1.65 CM (ref 0.6–0.9)
ECHO LVOT DIAM: 1.67 CM
ECHO LVOT PEAK GRADIENT: 8.7 MMHG
ECHO LVOT PEAK VELOCITY: 147.51 CM/S
ECHO LVOT SV: 58.7 ML
ECHO LVOT VTI: 26.73 CM
ECHO MV A VELOCITY: 222.93 CM/S
ECHO MV AREA VTI: 2.21 CM2
ECHO MV E DECELERATION TIME (DT): 54.2 MS
ECHO MV E VELOCITY: 145.38 CM/S
ECHO MV E/A RATIO: 0.65
ECHO MV EROA PISA: 0.34 CM2
ECHO MV MAX VELOCITY: 222.58 CM/S
ECHO MV MEAN GRADIENT: 9.36 MMHG
ECHO MV PEAK GRADIENT: 19.82 MMHG
ECHO MV REGURGITANT RADIUS PISA: 0.92 CM
ECHO MV REGURGITANT VOLUME: 56.64 ML
ECHO MV REGURGITANT VTIA: 165.79 CM
ECHO MV VTI: 26.57 CM
ECHO PV MAX VELOCITY: 84.66 CM/S
ECHO PV PEAK INSTANTANEOUS GRADIENT SYSTOLIC: 2.87 MMHG
ECHO TV REGURGITANT MAX VELOCITY: 285.69 CM/S
ECHO TV REGURGITANT PEAK GRADIENT: 32.65 MMHG
EOSINOPHIL # BLD: 0 K/UL (ref 0–0.4)
EOSINOPHIL # BLD: 0 K/UL (ref 0–0.4)
EOSINOPHIL # BLD: 0.2 K/UL (ref 0–0.4)
EOSINOPHIL NFR BLD: 0 % (ref 0–7)
EOSINOPHIL NFR BLD: 0 % (ref 0–7)
EOSINOPHIL NFR BLD: 3 % (ref 0–7)
EPITH CASTS URNS QL MICRO: ABNORMAL /LPF
ERYTHROCYTE [DISTWIDTH] IN BLOOD BY AUTOMATED COUNT: 13.9 % (ref 11.5–14.5)
ERYTHROCYTE [DISTWIDTH] IN BLOOD BY AUTOMATED COUNT: 14.4 % (ref 11.5–14.5)
ERYTHROCYTE [DISTWIDTH] IN BLOOD BY AUTOMATED COUNT: 14.5 % (ref 11.5–14.5)
ERYTHROCYTE [DISTWIDTH] IN BLOOD BY AUTOMATED COUNT: 14.6 % (ref 11.5–14.5)
ERYTHROCYTE [DISTWIDTH] IN BLOOD BY AUTOMATED COUNT: 14.7 % (ref 11.5–14.5)
ERYTHROCYTE [DISTWIDTH] IN BLOOD BY AUTOMATED COUNT: 14.8 % (ref 11.5–14.5)
ERYTHROCYTE [DISTWIDTH] IN BLOOD BY AUTOMATED COUNT: 14.9 % (ref 11.5–14.5)
ERYTHROCYTE [DISTWIDTH] IN BLOOD BY AUTOMATED COUNT: 15.4 % (ref 11.5–14.5)
FERRITIN SERPL-MCNC: 488 NG/ML (ref 8–252)
GLOBULIN SER CALC-MCNC: 3.4 G/DL (ref 2–4)
GLOBULIN SER CALC-MCNC: 3.8 G/DL (ref 2–4)
GLOBULIN SER CALC-MCNC: 4.1 G/DL (ref 2–4)
GLOBULIN SER CALC-MCNC: 4.2 G/DL (ref 2–4)
GLOBULIN SER CALC-MCNC: 4.3 G/DL (ref 2–4)
GLOBULIN SER CALC-MCNC: 4.4 G/DL (ref 2–4)
GLOBULIN SER CALC-MCNC: 4.6 G/DL (ref 2–4)
GLUCOSE BLD STRIP.AUTO-MCNC: 102 MG/DL (ref 65–117)
GLUCOSE BLD STRIP.AUTO-MCNC: 103 MG/DL (ref 65–117)
GLUCOSE BLD STRIP.AUTO-MCNC: 104 MG/DL (ref 65–117)
GLUCOSE BLD STRIP.AUTO-MCNC: 105 MG/DL (ref 65–117)
GLUCOSE BLD STRIP.AUTO-MCNC: 108 MG/DL (ref 65–117)
GLUCOSE BLD STRIP.AUTO-MCNC: 110 MG/DL (ref 65–117)
GLUCOSE BLD STRIP.AUTO-MCNC: 115 MG/DL (ref 65–117)
GLUCOSE BLD STRIP.AUTO-MCNC: 116 MG/DL (ref 65–117)
GLUCOSE BLD STRIP.AUTO-MCNC: 120 MG/DL (ref 65–117)
GLUCOSE BLD STRIP.AUTO-MCNC: 120 MG/DL (ref 65–117)
GLUCOSE BLD STRIP.AUTO-MCNC: 120 MG/DL (ref 74–106)
GLUCOSE BLD STRIP.AUTO-MCNC: 121 MG/DL (ref 65–117)
GLUCOSE BLD STRIP.AUTO-MCNC: 122 MG/DL (ref 65–117)
GLUCOSE BLD STRIP.AUTO-MCNC: 122 MG/DL (ref 65–117)
GLUCOSE BLD STRIP.AUTO-MCNC: 124 MG/DL (ref 65–117)
GLUCOSE BLD STRIP.AUTO-MCNC: 124 MG/DL (ref 65–117)
GLUCOSE BLD STRIP.AUTO-MCNC: 127 MG/DL (ref 65–117)
GLUCOSE BLD STRIP.AUTO-MCNC: 128 MG/DL (ref 65–117)
GLUCOSE BLD STRIP.AUTO-MCNC: 131 MG/DL (ref 65–117)
GLUCOSE BLD STRIP.AUTO-MCNC: 132 MG/DL (ref 65–117)
GLUCOSE BLD STRIP.AUTO-MCNC: 133 MG/DL (ref 65–117)
GLUCOSE BLD STRIP.AUTO-MCNC: 133 MG/DL (ref 65–117)
GLUCOSE BLD STRIP.AUTO-MCNC: 136 MG/DL (ref 65–117)
GLUCOSE BLD STRIP.AUTO-MCNC: 139 MG/DL (ref 65–117)
GLUCOSE BLD STRIP.AUTO-MCNC: 140 MG/DL (ref 65–117)
GLUCOSE BLD STRIP.AUTO-MCNC: 145 MG/DL (ref 65–117)
GLUCOSE BLD STRIP.AUTO-MCNC: 149 MG/DL (ref 65–117)
GLUCOSE BLD STRIP.AUTO-MCNC: 151 MG/DL (ref 65–117)
GLUCOSE BLD STRIP.AUTO-MCNC: 155 MG/DL (ref 65–117)
GLUCOSE BLD STRIP.AUTO-MCNC: 156 MG/DL (ref 65–117)
GLUCOSE BLD STRIP.AUTO-MCNC: 158 MG/DL (ref 65–117)
GLUCOSE BLD STRIP.AUTO-MCNC: 160 MG/DL (ref 65–117)
GLUCOSE BLD STRIP.AUTO-MCNC: 163 MG/DL (ref 65–117)
GLUCOSE BLD STRIP.AUTO-MCNC: 163 MG/DL (ref 65–117)
GLUCOSE BLD STRIP.AUTO-MCNC: 164 MG/DL (ref 65–117)
GLUCOSE BLD STRIP.AUTO-MCNC: 164 MG/DL (ref 65–117)
GLUCOSE BLD STRIP.AUTO-MCNC: 165 MG/DL (ref 65–117)
GLUCOSE BLD STRIP.AUTO-MCNC: 169 MG/DL (ref 65–117)
GLUCOSE BLD STRIP.AUTO-MCNC: 169 MG/DL (ref 65–117)
GLUCOSE BLD STRIP.AUTO-MCNC: 170 MG/DL (ref 65–117)
GLUCOSE BLD STRIP.AUTO-MCNC: 172 MG/DL (ref 65–117)
GLUCOSE BLD STRIP.AUTO-MCNC: 172 MG/DL (ref 65–117)
GLUCOSE BLD STRIP.AUTO-MCNC: 173 MG/DL (ref 65–117)
GLUCOSE BLD STRIP.AUTO-MCNC: 174 MG/DL (ref 65–117)
GLUCOSE BLD STRIP.AUTO-MCNC: 174 MG/DL (ref 65–117)
GLUCOSE BLD STRIP.AUTO-MCNC: 175 MG/DL (ref 65–117)
GLUCOSE BLD STRIP.AUTO-MCNC: 175 MG/DL (ref 65–117)
GLUCOSE BLD STRIP.AUTO-MCNC: 183 MG/DL (ref 65–117)
GLUCOSE BLD STRIP.AUTO-MCNC: 183 MG/DL (ref 65–117)
GLUCOSE BLD STRIP.AUTO-MCNC: 184 MG/DL (ref 65–117)
GLUCOSE BLD STRIP.AUTO-MCNC: 188 MG/DL (ref 65–117)
GLUCOSE BLD STRIP.AUTO-MCNC: 192 MG/DL (ref 65–117)
GLUCOSE BLD STRIP.AUTO-MCNC: 196 MG/DL (ref 65–117)
GLUCOSE BLD STRIP.AUTO-MCNC: 199 MG/DL (ref 65–117)
GLUCOSE BLD STRIP.AUTO-MCNC: 202 MG/DL (ref 65–117)
GLUCOSE BLD STRIP.AUTO-MCNC: 212 MG/DL (ref 65–117)
GLUCOSE BLD STRIP.AUTO-MCNC: 232 MG/DL (ref 65–117)
GLUCOSE BLD STRIP.AUTO-MCNC: 235 MG/DL (ref 65–117)
GLUCOSE BLD STRIP.AUTO-MCNC: 242 MG/DL (ref 65–117)
GLUCOSE BLD STRIP.AUTO-MCNC: 248 MG/DL (ref 65–117)
GLUCOSE BLD STRIP.AUTO-MCNC: 251 MG/DL (ref 65–117)
GLUCOSE BLD STRIP.AUTO-MCNC: 267 MG/DL (ref 65–117)
GLUCOSE BLD STRIP.AUTO-MCNC: 269 MG/DL (ref 65–117)
GLUCOSE BLD STRIP.AUTO-MCNC: 274 MG/DL (ref 65–117)
GLUCOSE BLD STRIP.AUTO-MCNC: 276 MG/DL (ref 65–117)
GLUCOSE BLD STRIP.AUTO-MCNC: 62 MG/DL (ref 65–117)
GLUCOSE BLD STRIP.AUTO-MCNC: 69 MG/DL (ref 65–117)
GLUCOSE BLD STRIP.AUTO-MCNC: 79 MG/DL (ref 65–117)
GLUCOSE BLD STRIP.AUTO-MCNC: 88 MG/DL (ref 65–117)
GLUCOSE BLD STRIP.AUTO-MCNC: 93 MG/DL (ref 65–117)
GLUCOSE BLD STRIP.AUTO-MCNC: 95 MG/DL (ref 65–117)
GLUCOSE BLD STRIP.AUTO-MCNC: 99 MG/DL (ref 65–117)
GLUCOSE SERPL-MCNC: 120 MG/DL (ref 65–100)
GLUCOSE SERPL-MCNC: 127 MG/DL (ref 65–100)
GLUCOSE SERPL-MCNC: 136 MG/DL (ref 65–100)
GLUCOSE SERPL-MCNC: 137 MG/DL (ref 65–100)
GLUCOSE SERPL-MCNC: 146 MG/DL (ref 65–100)
GLUCOSE SERPL-MCNC: 152 MG/DL (ref 65–100)
GLUCOSE SERPL-MCNC: 160 MG/DL (ref 65–100)
GLUCOSE SERPL-MCNC: 166 MG/DL (ref 65–100)
GLUCOSE SERPL-MCNC: 168 MG/DL (ref 65–100)
GLUCOSE SERPL-MCNC: 201 MG/DL (ref 65–100)
GLUCOSE SERPL-MCNC: 231 MG/DL (ref 65–100)
GLUCOSE SERPL-MCNC: 81 MG/DL (ref 65–100)
GLUCOSE UR STRIP.AUTO-MCNC: NEGATIVE MG/DL
HCO3 BLDA-SCNC: 28 MMOL/L
HCT VFR BLD AUTO: 26.5 % (ref 35–47)
HCT VFR BLD AUTO: 30 % (ref 35–47)
HCT VFR BLD AUTO: 31.6 % (ref 35–47)
HCT VFR BLD AUTO: 32.1 % (ref 35–47)
HCT VFR BLD AUTO: 34.6 % (ref 35–47)
HCT VFR BLD AUTO: 35 % (ref 35–47)
HCT VFR BLD AUTO: 35 % (ref 35–47)
HCT VFR BLD AUTO: 35.9 % (ref 35–47)
HCT VFR BLD AUTO: 37.4 % (ref 35–47)
HCT VFR BLD AUTO: 37.7 % (ref 35–47)
HGB BLD-MCNC: 10 G/DL (ref 11.5–16)
HGB BLD-MCNC: 10.4 G/DL (ref 11.5–16)
HGB BLD-MCNC: 11 G/DL (ref 11.5–16)
HGB BLD-MCNC: 11.4 G/DL (ref 11.5–16)
HGB BLD-MCNC: 11.5 G/DL (ref 11.5–16)
HGB BLD-MCNC: 11.6 G/DL (ref 11.5–16)
HGB BLD-MCNC: 11.7 G/DL (ref 11.5–16)
HGB BLD-MCNC: 11.9 G/DL (ref 11.5–16)
HGB BLD-MCNC: 12 G/DL (ref 11.5–16)
HGB BLD-MCNC: 9.1 G/DL (ref 11.5–16)
HGB UR QL STRIP: ABNORMAL
IMM GRANULOCYTES # BLD AUTO: 0 K/UL (ref 0–0.04)
IMM GRANULOCYTES # BLD AUTO: 0 K/UL (ref 0–0.04)
IMM GRANULOCYTES # BLD AUTO: 0.2 K/UL (ref 0–0.04)
IMM GRANULOCYTES NFR BLD AUTO: 0 % (ref 0–0.5)
IMM GRANULOCYTES NFR BLD AUTO: 0 % (ref 0–0.5)
IMM GRANULOCYTES NFR BLD AUTO: 2 % (ref 0–0.5)
KETONES UR QL STRIP.AUTO: NEGATIVE MG/DL
LACTATE BLD-SCNC: 0.8 MMOL/L (ref 0.4–2)
LACTATE BLD-SCNC: 0.98 MMOL/L (ref 0.4–2)
LEUKOCYTE ESTERASE UR QL STRIP.AUTO: NEGATIVE
LVOT MG: 4.83 MMHG
LYMPHOCYTES # BLD: 0.6 K/UL (ref 0.8–3.5)
LYMPHOCYTES # BLD: 1.1 K/UL (ref 0.8–3.5)
LYMPHOCYTES # BLD: 1.4 K/UL (ref 0.8–3.5)
LYMPHOCYTES NFR BLD: 13 % (ref 12–49)
LYMPHOCYTES NFR BLD: 15 % (ref 12–49)
LYMPHOCYTES NFR BLD: 18 % (ref 12–49)
MAGNESIUM SERPL-MCNC: 1.8 MG/DL (ref 1.6–2.4)
MAGNESIUM SERPL-MCNC: 2 MG/DL (ref 1.6–2.4)
MCH RBC QN AUTO: 26.6 PG (ref 26–34)
MCH RBC QN AUTO: 26.7 PG (ref 26–34)
MCH RBC QN AUTO: 26.8 PG (ref 26–34)
MCH RBC QN AUTO: 26.8 PG (ref 26–34)
MCH RBC QN AUTO: 26.9 PG (ref 26–34)
MCH RBC QN AUTO: 27 PG (ref 26–34)
MCH RBC QN AUTO: 27.1 PG (ref 26–34)
MCH RBC QN AUTO: 27.1 PG (ref 26–34)
MCH RBC QN AUTO: 27.2 PG (ref 26–34)
MCH RBC QN AUTO: 27.4 PG (ref 26–34)
MCHC RBC AUTO-ENTMCNC: 31.6 G/DL (ref 30–36.5)
MCHC RBC AUTO-ENTMCNC: 31.8 G/DL (ref 30–36.5)
MCHC RBC AUTO-ENTMCNC: 31.8 G/DL (ref 30–36.5)
MCHC RBC AUTO-ENTMCNC: 32.6 G/DL (ref 30–36.5)
MCHC RBC AUTO-ENTMCNC: 32.9 G/DL (ref 30–36.5)
MCHC RBC AUTO-ENTMCNC: 32.9 G/DL (ref 30–36.5)
MCHC RBC AUTO-ENTMCNC: 33.1 G/DL (ref 30–36.5)
MCHC RBC AUTO-ENTMCNC: 34.3 G/DL (ref 30–36.5)
MCHC RBC AUTO-ENTMCNC: 34.3 G/DL (ref 30–36.5)
MCHC RBC AUTO-ENTMCNC: 34.7 G/DL (ref 30–36.5)
MCV RBC AUTO: 77.9 FL (ref 80–99)
MCV RBC AUTO: 79.1 FL (ref 80–99)
MCV RBC AUTO: 79.8 FL (ref 80–99)
MCV RBC AUTO: 80.7 FL (ref 80–99)
MCV RBC AUTO: 81.8 FL (ref 80–99)
MCV RBC AUTO: 82 FL (ref 80–99)
MCV RBC AUTO: 82 FL (ref 80–99)
MCV RBC AUTO: 84.2 FL (ref 80–99)
MCV RBC AUTO: 84.7 FL (ref 80–99)
MCV RBC AUTO: 85.3 FL (ref 80–99)
MONOCYTES # BLD: 0.4 K/UL (ref 0–1)
MONOCYTES # BLD: 0.4 K/UL (ref 0–1)
MONOCYTES # BLD: 0.7 K/UL (ref 0–1)
MONOCYTES NFR BLD: 5 % (ref 5–13)
MONOCYTES NFR BLD: 8 % (ref 5–13)
MONOCYTES NFR BLD: 9 % (ref 5–13)
MR PISA PV: 581.2 CM/S
NEUTS BAND NFR BLD MANUAL: 1 %
NEUTS SEG # BLD: 3.3 K/UL (ref 1.8–8)
NEUTS SEG # BLD: 5.5 K/UL (ref 1.8–8)
NEUTS SEG # BLD: 6.7 K/UL (ref 1.8–8)
NEUTS SEG NFR BLD: 74 % (ref 32–75)
NEUTS SEG NFR BLD: 75 % (ref 32–75)
NEUTS SEG NFR BLD: 77 % (ref 32–75)
NITRITE UR QL STRIP.AUTO: NEGATIVE
NRBC # BLD: 0 K/UL (ref 0–0.01)
NRBC # BLD: 0.02 K/UL (ref 0–0.01)
NRBC # BLD: 0.02 K/UL (ref 0–0.01)
NRBC # BLD: 0.03 K/UL (ref 0–0.01)
NRBC # BLD: 0.03 K/UL (ref 0–0.01)
NRBC # BLD: 0.05 K/UL (ref 0–0.01)
NRBC BLD-RTO: 0 PER 100 WBC
NRBC BLD-RTO: 0.2 PER 100 WBC
NRBC BLD-RTO: 0.2 PER 100 WBC
NRBC BLD-RTO: 0.4 PER 100 WBC
NRBC BLD-RTO: 0.4 PER 100 WBC
NRBC BLD-RTO: 0.5 PER 100 WBC
P-R INTERVAL, ECG05: 118 MS
P-R INTERVAL, ECG05: 126 MS
PCO2 BLDV: 45.8 MMHG (ref 41–51)
PH BLDV: 7.4 [PH] (ref 7.32–7.42)
PH UR STRIP: 5.5 [PH] (ref 5–8)
PLATELET # BLD AUTO: 109 K/UL (ref 150–400)
PLATELET # BLD AUTO: 121 K/UL (ref 150–400)
PLATELET # BLD AUTO: 128 K/UL (ref 150–400)
PLATELET # BLD AUTO: 143 K/UL (ref 150–400)
PLATELET # BLD AUTO: 152 K/UL (ref 150–400)
PLATELET # BLD AUTO: 166 K/UL (ref 150–400)
PLATELET # BLD AUTO: 172 K/UL (ref 150–400)
PLATELET # BLD AUTO: 69 K/UL (ref 150–400)
PLATELET # BLD AUTO: 73 K/UL (ref 150–400)
PLATELET # BLD AUTO: 96 K/UL (ref 150–400)
PLATELET COMMENTS,PCOM: ABNORMAL
PMV BLD AUTO: 11.2 FL (ref 8.9–12.9)
PMV BLD AUTO: 11.4 FL (ref 8.9–12.9)
PMV BLD AUTO: 11.9 FL (ref 8.9–12.9)
PMV BLD AUTO: 12 FL (ref 8.9–12.9)
PMV BLD AUTO: 12.1 FL (ref 8.9–12.9)
PMV BLD AUTO: 12.2 FL (ref 8.9–12.9)
PMV BLD AUTO: 12.3 FL (ref 8.9–12.9)
PMV BLD AUTO: 12.5 FL (ref 8.9–12.9)
PO2 BLDV: 17 MMHG (ref 25–40)
POTASSIUM BLD-SCNC: 4 MMOL/L (ref 3.5–5.5)
POTASSIUM SERPL-SCNC: 2.9 MMOL/L (ref 3.5–5.1)
POTASSIUM SERPL-SCNC: 3.4 MMOL/L (ref 3.5–5.1)
POTASSIUM SERPL-SCNC: 3.4 MMOL/L (ref 3.5–5.1)
POTASSIUM SERPL-SCNC: 3.5 MMOL/L (ref 3.5–5.1)
POTASSIUM SERPL-SCNC: 3.5 MMOL/L (ref 3.5–5.1)
POTASSIUM SERPL-SCNC: 3.7 MMOL/L (ref 3.5–5.1)
POTASSIUM SERPL-SCNC: 3.8 MMOL/L (ref 3.5–5.1)
POTASSIUM SERPL-SCNC: 3.9 MMOL/L (ref 3.5–5.1)
POTASSIUM SERPL-SCNC: 4 MMOL/L (ref 3.5–5.1)
POTASSIUM SERPL-SCNC: 4.1 MMOL/L (ref 3.5–5.1)
POTASSIUM SERPL-SCNC: 4.2 MMOL/L (ref 3.5–5.1)
POTASSIUM SERPL-SCNC: 5.4 MMOL/L (ref 3.5–5.1)
PROCALCITONIN SERPL-MCNC: 0.16 NG/ML
PROT SERPL-MCNC: 5.9 G/DL (ref 6.4–8.2)
PROT SERPL-MCNC: 5.9 G/DL (ref 6.4–8.2)
PROT SERPL-MCNC: 6 G/DL (ref 6.4–8.2)
PROT SERPL-MCNC: 6.2 G/DL (ref 6.4–8.2)
PROT SERPL-MCNC: 6.6 G/DL (ref 6.4–8.2)
PROT SERPL-MCNC: 6.6 G/DL (ref 6.4–8.2)
PROT SERPL-MCNC: 6.8 G/DL (ref 6.4–8.2)
PROT SERPL-MCNC: 7.8 G/DL (ref 6.4–8.2)
PROT SERPL-MCNC: 8.3 G/DL (ref 6.4–8.2)
PROT UR STRIP-MCNC: 300 MG/DL
Q-T INTERVAL, ECG07: 332 MS
Q-T INTERVAL, ECG07: 386 MS
QRS DURATION, ECG06: 116 MS
QRS DURATION, ECG06: 116 MS
QTC CALCULATION (BEZET), ECG08: 480 MS
QTC CALCULATION (BEZET), ECG08: 492 MS
RBC # BLD AUTO: 3.32 M/UL (ref 3.8–5.2)
RBC # BLD AUTO: 3.73 M/UL (ref 3.8–5.2)
RBC # BLD AUTO: 3.85 M/UL (ref 3.8–5.2)
RBC # BLD AUTO: 4.06 M/UL (ref 3.8–5.2)
RBC # BLD AUTO: 4.27 M/UL (ref 3.8–5.2)
RBC # BLD AUTO: 4.27 M/UL (ref 3.8–5.2)
RBC # BLD AUTO: 4.29 M/UL (ref 3.8–5.2)
RBC # BLD AUTO: 4.39 M/UL (ref 3.8–5.2)
RBC # BLD AUTO: 4.42 M/UL (ref 3.8–5.2)
RBC # BLD AUTO: 4.44 M/UL (ref 3.8–5.2)
RBC #/AREA URNS HPF: ABNORMAL /HPF (ref 0–5)
RBC MORPH BLD: ABNORMAL
SAMPLES BEING HELD,HOLD: NORMAL
SAMPLES BEING HELD,HOLD: NORMAL
SARS-COV-2, COV2: NORMAL
SARS-COV-2, COV2: NORMAL
SARS-COV-2, XPLCVT: DETECTED
SERVICE CMNT-IMP: ABNORMAL
SERVICE CMNT-IMP: NORMAL
SODIUM BLD-SCNC: 145 MMOL/L (ref 136–145)
SODIUM SERPL-SCNC: 135 MMOL/L (ref 136–145)
SODIUM SERPL-SCNC: 137 MMOL/L (ref 136–145)
SODIUM SERPL-SCNC: 139 MMOL/L (ref 136–145)
SODIUM SERPL-SCNC: 140 MMOL/L (ref 136–145)
SODIUM SERPL-SCNC: 141 MMOL/L (ref 136–145)
SODIUM SERPL-SCNC: 141 MMOL/L (ref 136–145)
SODIUM SERPL-SCNC: 143 MMOL/L (ref 136–145)
SODIUM SERPL-SCNC: 144 MMOL/L (ref 136–145)
SODIUM SERPL-SCNC: 144 MMOL/L (ref 136–145)
SODIUM SERPL-SCNC: 145 MMOL/L (ref 136–145)
SODIUM SERPL-SCNC: 145 MMOL/L (ref 136–145)
SODIUM SERPL-SCNC: 146 MMOL/L (ref 136–145)
SOURCE, COVRS: ABNORMAL
SP GR UR REFRACTOMETRY: 1.02 (ref 1–1.03)
SPECIMEN SITE: ABNORMAL
TROPONIN I SERPL-MCNC: 0.06 NG/ML
TROPONIN I SERPL-MCNC: 0.33 NG/ML
TROPONIN I SERPL-MCNC: 0.35 NG/ML
UA: UC IF INDICATED,UAUC: ABNORMAL
UROBILINOGEN UR QL STRIP.AUTO: 1 EU/DL (ref 0.2–1)
VENTRICULAR RATE, ECG03: 126 BPM
VENTRICULAR RATE, ECG03: 98 BPM
WBC # BLD AUTO: 11.3 K/UL (ref 3.6–11)
WBC # BLD AUTO: 3.6 K/UL (ref 3.6–11)
WBC # BLD AUTO: 3.7 K/UL (ref 3.6–11)
WBC # BLD AUTO: 4.3 K/UL (ref 3.6–11)
WBC # BLD AUTO: 6.6 K/UL (ref 3.6–11)
WBC # BLD AUTO: 7.1 K/UL (ref 3.6–11)
WBC # BLD AUTO: 7.5 K/UL (ref 3.6–11)
WBC # BLD AUTO: 7.6 K/UL (ref 3.6–11)
WBC # BLD AUTO: 8.3 K/UL (ref 3.6–11)
WBC # BLD AUTO: 8.7 K/UL (ref 3.6–11)
WBC MORPH BLD: ABNORMAL
WBC URNS QL MICRO: ABNORMAL /HPF (ref 0–4)

## 2021-01-01 PROCEDURE — 74011000250 HC RX REV CODE- 250: Performed by: NURSE PRACTITIONER

## 2021-01-01 PROCEDURE — 65660000000 HC RM CCU STEPDOWN

## 2021-01-01 PROCEDURE — 1090F PRES/ABSN URINE INCON ASSESS: CPT | Performed by: INTERNAL MEDICINE

## 2021-01-01 PROCEDURE — 80053 COMPREHEN METABOLIC PANEL: CPT

## 2021-01-01 PROCEDURE — 0651 HSPC ROUTINE HOME CARE

## 2021-01-01 PROCEDURE — 74011000250 HC RX REV CODE- 250: Performed by: INTERNAL MEDICINE

## 2021-01-01 PROCEDURE — 74011250637 HC RX REV CODE- 250/637: Performed by: STUDENT IN AN ORGANIZED HEALTH CARE EDUCATION/TRAINING PROGRAM

## 2021-01-01 PROCEDURE — 85379 FIBRIN DEGRADATION QUANT: CPT

## 2021-01-01 PROCEDURE — 94640 AIRWAY INHALATION TREATMENT: CPT

## 2021-01-01 PROCEDURE — 74011636637 HC RX REV CODE- 636/637: Performed by: STUDENT IN AN ORGANIZED HEALTH CARE EDUCATION/TRAINING PROGRAM

## 2021-01-01 PROCEDURE — 74011250637 HC RX REV CODE- 250/637: Performed by: INTERNAL MEDICINE

## 2021-01-01 PROCEDURE — 87635 SARS-COV-2 COVID-19 AMP PRB: CPT

## 2021-01-01 PROCEDURE — 82962 GLUCOSE BLOOD TEST: CPT

## 2021-01-01 PROCEDURE — HOSPICE MEDICATION HC HH HOSPICE MEDICATION

## 2021-01-01 PROCEDURE — 74011000258 HC RX REV CODE- 258: Performed by: EMERGENCY MEDICINE

## 2021-01-01 PROCEDURE — 84484 ASSAY OF TROPONIN QUANT: CPT

## 2021-01-01 PROCEDURE — 36415 COLL VENOUS BLD VENIPUNCTURE: CPT

## 2021-01-01 PROCEDURE — 74011250636 HC RX REV CODE- 250/636: Performed by: STUDENT IN AN ORGANIZED HEALTH CARE EDUCATION/TRAINING PROGRAM

## 2021-01-01 PROCEDURE — 94761 N-INVAS EAR/PLS OXIMETRY MLT: CPT

## 2021-01-01 PROCEDURE — 74011250636 HC RX REV CODE- 250/636: Performed by: NURSE PRACTITIONER

## 2021-01-01 PROCEDURE — 83880 ASSAY OF NATRIURETIC PEPTIDE: CPT

## 2021-01-01 PROCEDURE — 74011000250 HC RX REV CODE- 250: Performed by: STUDENT IN AN ORGANIZED HEALTH CARE EDUCATION/TRAINING PROGRAM

## 2021-01-01 PROCEDURE — 74011250636 HC RX REV CODE- 250/636: Performed by: INTERNAL MEDICINE

## 2021-01-01 PROCEDURE — 82728 ASSAY OF FERRITIN: CPT

## 2021-01-01 PROCEDURE — 74011000258 HC RX REV CODE- 258: Performed by: STUDENT IN AN ORGANIZED HEALTH CARE EDUCATION/TRAINING PROGRAM

## 2021-01-01 PROCEDURE — 85027 COMPLETE CBC AUTOMATED: CPT

## 2021-01-01 PROCEDURE — 94760 N-INVAS EAR/PLS OXIMETRY 1: CPT

## 2021-01-01 PROCEDURE — G0299 HHS/HOSPICE OF RN EA 15 MIN: HCPCS

## 2021-01-01 PROCEDURE — 80048 BASIC METABOLIC PNL TOTAL CA: CPT

## 2021-01-01 PROCEDURE — 74011250636 HC RX REV CODE- 250/636: Performed by: EMERGENCY MEDICINE

## 2021-01-01 PROCEDURE — G0155 HHCP-SVS OF CSW,EA 15 MIN: HCPCS

## 2021-01-01 PROCEDURE — 71045 X-RAY EXAM CHEST 1 VIEW: CPT

## 2021-01-01 PROCEDURE — 84145 PROCALCITONIN (PCT): CPT

## 2021-01-01 PROCEDURE — 92610 EVALUATE SWALLOWING FUNCTION: CPT

## 2021-01-01 PROCEDURE — 85025 COMPLETE CBC W/AUTO DIFF WBC: CPT

## 2021-01-01 PROCEDURE — 93000 ELECTROCARDIOGRAM COMPLETE: CPT | Performed by: INTERNAL MEDICINE

## 2021-01-01 PROCEDURE — 92526 ORAL FUNCTION THERAPY: CPT

## 2021-01-01 PROCEDURE — 77010033678 HC OXYGEN DAILY

## 2021-01-01 PROCEDURE — 77030038269 HC DRN EXT URIN PURWCK BARD -A

## 2021-01-01 PROCEDURE — 86140 C-REACTIVE PROTEIN: CPT

## 2021-01-01 PROCEDURE — G8536 NO DOC ELDER MAL SCRN: HCPCS | Performed by: INTERNAL MEDICINE

## 2021-01-01 PROCEDURE — 71046 X-RAY EXAM CHEST 2 VIEWS: CPT

## 2021-01-01 PROCEDURE — 93005 ELECTROCARDIOGRAM TRACING: CPT

## 2021-01-01 PROCEDURE — 74177 CT ABD & PELVIS W/CONTRAST: CPT

## 2021-01-01 PROCEDURE — 93005 ELECTROCARDIOGRAM TRACING: CPT | Performed by: INTERNAL MEDICINE

## 2021-01-01 PROCEDURE — 93306 TTE W/DOPPLER COMPLETE: CPT | Performed by: INTERNAL MEDICINE

## 2021-01-01 PROCEDURE — XW033E5 INTRODUCTION OF REMDESIVIR ANTI-INFECTIVE INTO PERIPHERAL VEIN, PERCUTANEOUS APPROACH, NEW TECHNOLOGY GROUP 5: ICD-10-PCS | Performed by: STUDENT IN AN ORGANIZED HEALTH CARE EDUCATION/TRAINING PROGRAM

## 2021-01-01 PROCEDURE — G0156 HHCP-SVS OF AIDE,EA 15 MIN: HCPCS

## 2021-01-01 PROCEDURE — 93293 PM PHONE R-STRIP DEVICE EVAL: CPT

## 2021-01-01 PROCEDURE — G8510 SCR DEP NEG, NO PLAN REQD: HCPCS | Performed by: INTERNAL MEDICINE

## 2021-01-01 PROCEDURE — G8427 DOCREV CUR MEDS BY ELIG CLIN: HCPCS | Performed by: INTERNAL MEDICINE

## 2021-01-01 PROCEDURE — 71275 CT ANGIOGRAPHY CHEST: CPT

## 2021-01-01 PROCEDURE — 3331090004 HSPC SERVICE INTENSITY ADD-ON

## 2021-01-01 PROCEDURE — 86141 C-REACTIVE PROTEIN HS: CPT

## 2021-01-01 PROCEDURE — 74011000636 HC RX REV CODE- 636: Performed by: EMERGENCY MEDICINE

## 2021-01-01 PROCEDURE — 3336500001 HSPC ELECTION

## 2021-01-01 PROCEDURE — 74011000258 HC RX REV CODE- 258: Performed by: INTERNAL MEDICINE

## 2021-01-01 PROCEDURE — G8754 DIAS BP LESS 90: HCPCS | Performed by: INTERNAL MEDICINE

## 2021-01-01 PROCEDURE — 93285 PRGRMG DEV EVAL SCRMS IP: CPT | Performed by: INTERNAL MEDICINE

## 2021-01-01 PROCEDURE — 84295 ASSAY OF SERUM SODIUM: CPT

## 2021-01-01 PROCEDURE — 83605 ASSAY OF LACTIC ACID: CPT

## 2021-01-01 PROCEDURE — 81001 URINALYSIS AUTO W/SCOPE: CPT

## 2021-01-01 PROCEDURE — 94762 N-INVAS EAR/PLS OXIMTRY CONT: CPT

## 2021-01-01 PROCEDURE — 51701 INSERT BLADDER CATHETER: CPT

## 2021-01-01 PROCEDURE — U0005 INFEC AGEN DETEC AMPLI PROBE: HCPCS

## 2021-01-01 PROCEDURE — 96367 TX/PROPH/DG ADDL SEQ IV INF: CPT

## 2021-01-01 PROCEDURE — 99285 EMERGENCY DEPT VISIT HI MDM: CPT

## 2021-01-01 PROCEDURE — 87040 BLOOD CULTURE FOR BACTERIA: CPT

## 2021-01-01 PROCEDURE — G8753 SYS BP > OR = 140: HCPCS | Performed by: INTERNAL MEDICINE

## 2021-01-01 PROCEDURE — G8400 PT W/DXA NO RESULTS DOC: HCPCS | Performed by: INTERNAL MEDICINE

## 2021-01-01 PROCEDURE — G8420 CALC BMI NORM PARAMETERS: HCPCS | Performed by: INTERNAL MEDICINE

## 2021-01-01 PROCEDURE — 82550 ASSAY OF CK (CPK): CPT

## 2021-01-01 PROCEDURE — G0463 HOSPITAL OUTPT CLINIC VISIT: HCPCS | Performed by: INTERNAL MEDICINE

## 2021-01-01 PROCEDURE — 74011250636 HC RX REV CODE- 250/636

## 2021-01-01 PROCEDURE — 70450 CT HEAD/BRAIN W/O DYE: CPT

## 2021-01-01 PROCEDURE — 99214 OFFICE O/P EST MOD 30 MIN: CPT | Performed by: INTERNAL MEDICINE

## 2021-01-01 PROCEDURE — 93306 TTE W/DOPPLER COMPLETE: CPT

## 2021-01-01 PROCEDURE — 83735 ASSAY OF MAGNESIUM: CPT

## 2021-01-01 PROCEDURE — 74011000250 HC RX REV CODE- 250: Performed by: EMERGENCY MEDICINE

## 2021-01-01 PROCEDURE — 74011250637 HC RX REV CODE- 250/637: Performed by: EMERGENCY MEDICINE

## 2021-01-01 PROCEDURE — 1101F PT FALLS ASSESS-DOCD LE1/YR: CPT | Performed by: INTERNAL MEDICINE

## 2021-01-01 PROCEDURE — 65270000029 HC RM PRIVATE

## 2021-01-01 PROCEDURE — 96365 THER/PROPH/DIAG IV INF INIT: CPT

## 2021-01-01 PROCEDURE — 99223 1ST HOSP IP/OBS HIGH 75: CPT | Performed by: INTERNAL MEDICINE

## 2021-01-01 RX ORDER — CLONIDINE 0.2 MG/24H
1 PATCH, EXTENDED RELEASE TRANSDERMAL
Status: DISCONTINUED | OUTPATIENT
Start: 2021-01-01 | End: 2021-01-01 | Stop reason: HOSPADM

## 2021-01-01 RX ORDER — MAGNESIUM SULFATE 100 %
4 CRYSTALS MISCELLANEOUS AS NEEDED
Status: DISCONTINUED | OUTPATIENT
Start: 2021-01-01 | End: 2021-01-01 | Stop reason: HOSPADM

## 2021-01-01 RX ORDER — ENOXAPARIN SODIUM 100 MG/ML
1 INJECTION SUBCUTANEOUS EVERY 12 HOURS
Status: DISCONTINUED | OUTPATIENT
Start: 2021-01-01 | End: 2021-01-01

## 2021-01-01 RX ORDER — IPRATROPIUM BROMIDE AND ALBUTEROL SULFATE 2.5; .5 MG/3ML; MG/3ML
3 SOLUTION RESPIRATORY (INHALATION) ONCE
Status: DISCONTINUED | OUTPATIENT
Start: 2021-01-01 | End: 2021-01-01 | Stop reason: CLARIF

## 2021-01-01 RX ORDER — IPRATROPIUM BROMIDE AND ALBUTEROL SULFATE 2.5; .5 MG/3ML; MG/3ML
3 SOLUTION RESPIRATORY (INHALATION)
Status: COMPLETED | OUTPATIENT
Start: 2021-01-01 | End: 2021-01-01

## 2021-01-01 RX ORDER — MORPHINE SULFATE 2 MG/ML
1 INJECTION, SOLUTION INTRAMUSCULAR; INTRAVENOUS
Status: DISCONTINUED | OUTPATIENT
Start: 2021-01-01 | End: 2021-01-01 | Stop reason: HOSPADM

## 2021-01-01 RX ORDER — CLONIDINE HYDROCHLORIDE 0.1 MG/1
0.1 TABLET ORAL
Status: COMPLETED | OUTPATIENT
Start: 2021-01-01 | End: 2021-01-01

## 2021-01-01 RX ORDER — ALBUTEROL SULFATE 90 UG/1
2 AEROSOL, METERED RESPIRATORY (INHALATION)
Status: DISCONTINUED | OUTPATIENT
Start: 2021-01-01 | End: 2021-01-01 | Stop reason: HOSPADM

## 2021-01-01 RX ORDER — METOPROLOL TARTRATE 5 MG/5ML
5 INJECTION INTRAVENOUS EVERY 6 HOURS
Status: DISCONTINUED | OUTPATIENT
Start: 2021-01-01 | End: 2021-01-01

## 2021-01-01 RX ORDER — MORPHINE SULFATE 2 MG/ML
2 INJECTION, SOLUTION INTRAMUSCULAR; INTRAVENOUS
Status: DISCONTINUED | OUTPATIENT
Start: 2021-01-01 | End: 2021-01-01

## 2021-01-01 RX ORDER — LORAZEPAM 2 MG/ML
1 INJECTION INTRAMUSCULAR
Status: DISCONTINUED | OUTPATIENT
Start: 2021-01-01 | End: 2021-01-01 | Stop reason: HOSPADM

## 2021-01-01 RX ORDER — GUAIFENESIN 100 MG/5ML
81 LIQUID (ML) ORAL DAILY
Status: DISCONTINUED | OUTPATIENT
Start: 2021-01-01 | End: 2021-01-01

## 2021-01-01 RX ORDER — SODIUM CHLORIDE 9 MG/ML
50 INJECTION, SOLUTION INTRAVENOUS CONTINUOUS
Status: DISCONTINUED | OUTPATIENT
Start: 2021-01-01 | End: 2021-01-01

## 2021-01-01 RX ORDER — LABETALOL HYDROCHLORIDE 5 MG/ML
20 INJECTION, SOLUTION INTRAVENOUS
Status: DISCONTINUED | OUTPATIENT
Start: 2021-01-01 | End: 2021-01-01 | Stop reason: HOSPADM

## 2021-01-01 RX ORDER — DIPHENHYDRAMINE HYDROCHLORIDE 50 MG/ML
50 INJECTION, SOLUTION INTRAMUSCULAR; INTRAVENOUS ONCE
Status: COMPLETED | OUTPATIENT
Start: 2021-01-01 | End: 2021-01-01

## 2021-01-01 RX ORDER — AMLODIPINE BESYLATE 5 MG/1
5 TABLET ORAL DAILY
Status: DISCONTINUED | OUTPATIENT
Start: 2021-01-01 | End: 2021-01-01

## 2021-01-01 RX ORDER — IPRATROPIUM BROMIDE AND ALBUTEROL SULFATE 2.5; .5 MG/3ML; MG/3ML
3 SOLUTION RESPIRATORY (INHALATION)
Status: DISCONTINUED | OUTPATIENT
Start: 2021-01-01 | End: 2021-01-01 | Stop reason: HOSPADM

## 2021-01-01 RX ORDER — PREDNISONE 20 MG/1
40 TABLET ORAL
Status: DISCONTINUED | OUTPATIENT
Start: 2021-01-01 | End: 2021-01-01 | Stop reason: HOSPADM

## 2021-01-01 RX ORDER — VANCOMYCIN HYDROCHLORIDE
1250
Status: DISCONTINUED | OUTPATIENT
Start: 2021-01-01 | End: 2021-01-01

## 2021-01-01 RX ORDER — ALLOPURINOL 100 MG/1
100 TABLET ORAL DAILY
Status: DISCONTINUED | OUTPATIENT
Start: 2021-01-01 | End: 2021-01-01

## 2021-01-01 RX ORDER — SODIUM CHLORIDE 0.9 % (FLUSH) 0.9 %
5-10 SYRINGE (ML) INJECTION AS NEEDED
Status: DISCONTINUED | OUTPATIENT
Start: 2021-01-01 | End: 2021-01-01 | Stop reason: HOSPADM

## 2021-01-01 RX ORDER — ATORVASTATIN CALCIUM 40 MG/1
80 TABLET, FILM COATED ORAL DAILY
Status: DISCONTINUED | OUTPATIENT
Start: 2021-01-01 | End: 2021-01-01

## 2021-01-01 RX ORDER — INSULIN LISPRO 100 [IU]/ML
INJECTION, SOLUTION INTRAVENOUS; SUBCUTANEOUS
Status: DISCONTINUED | OUTPATIENT
Start: 2021-01-01 | End: 2021-01-01 | Stop reason: HOSPADM

## 2021-01-01 RX ORDER — PREDNISONE 20 MG/1
40 TABLET ORAL
Qty: 10 TABLET | Refills: 0 | Status: SHIPPED | OUTPATIENT
Start: 2021-01-01 | End: 2021-01-01 | Stop reason: ALTCHOICE

## 2021-01-01 RX ORDER — POTASSIUM CHLORIDE 7.45 MG/ML
10 INJECTION INTRAVENOUS
Status: COMPLETED | OUTPATIENT
Start: 2021-01-01 | End: 2021-01-01

## 2021-01-01 RX ORDER — IPRATROPIUM BROMIDE AND ALBUTEROL SULFATE 2.5; .5 MG/3ML; MG/3ML
3 SOLUTION RESPIRATORY (INHALATION)
Status: DISCONTINUED | OUTPATIENT
Start: 2021-01-01 | End: 2021-01-01 | Stop reason: CLARIF

## 2021-01-01 RX ORDER — ALBUTEROL SULFATE 90 UG/1
2 AEROSOL, METERED RESPIRATORY (INHALATION)
Status: DISCONTINUED | OUTPATIENT
Start: 2021-01-01 | End: 2021-01-01

## 2021-01-01 RX ORDER — HYDRALAZINE HYDROCHLORIDE 20 MG/ML
10 INJECTION INTRAMUSCULAR; INTRAVENOUS
Status: DISCONTINUED | OUTPATIENT
Start: 2021-01-01 | End: 2021-01-01 | Stop reason: HOSPADM

## 2021-01-01 RX ORDER — DEXTROSE 50 % IN WATER (D50W) INTRAVENOUS SYRINGE
12.5-25 AS NEEDED
Status: DISCONTINUED | OUTPATIENT
Start: 2021-01-01 | End: 2021-01-01 | Stop reason: HOSPADM

## 2021-01-01 RX ORDER — CLONIDINE 0.2 MG/24H
1 PATCH, EXTENDED RELEASE TRANSDERMAL
Qty: 4 PATCH | Refills: 0 | Status: SHIPPED | OUTPATIENT
Start: 2021-01-01 | End: 2021-01-01 | Stop reason: ALTCHOICE

## 2021-01-01 RX ORDER — GUAIFENESIN 100 MG/5ML
100 SOLUTION ORAL 4 TIMES DAILY
Status: DISCONTINUED | OUTPATIENT
Start: 2021-01-01 | End: 2021-01-01

## 2021-01-01 RX ORDER — METOPROLOL TARTRATE 25 MG/1
25 TABLET, FILM COATED ORAL EVERY 12 HOURS
Status: DISCONTINUED | OUTPATIENT
Start: 2021-01-01 | End: 2021-01-01

## 2021-01-01 RX ORDER — ONDANSETRON 2 MG/ML
4 INJECTION INTRAMUSCULAR; INTRAVENOUS
Status: DISCONTINUED | OUTPATIENT
Start: 2021-01-01 | End: 2021-01-01 | Stop reason: HOSPADM

## 2021-01-01 RX ORDER — ENOXAPARIN SODIUM 100 MG/ML
40 INJECTION SUBCUTANEOUS EVERY 24 HOURS
Status: DISCONTINUED | OUTPATIENT
Start: 2021-01-01 | End: 2021-01-01

## 2021-01-01 RX ORDER — SILVER NITRATE 38.21; 12.74 MG/1; MG/1
1 STICK TOPICAL ONCE
Status: COMPLETED | OUTPATIENT
Start: 2021-01-01 | End: 2021-01-01

## 2021-01-01 RX ORDER — ACETAMINOPHEN 650 MG/1
650 SUPPOSITORY RECTAL
Status: COMPLETED | OUTPATIENT
Start: 2021-01-01 | End: 2021-01-01

## 2021-01-01 RX ORDER — FAMOTIDINE 20 MG/1
20 TABLET, FILM COATED ORAL DAILY
Status: DISCONTINUED | OUTPATIENT
Start: 2021-01-01 | End: 2021-01-01

## 2021-01-01 RX ORDER — DEXTROSE, SODIUM CHLORIDE, AND POTASSIUM CHLORIDE 5; .45; .075 G/100ML; G/100ML; G/100ML
50 INJECTION INTRAVENOUS CONTINUOUS
Status: DISCONTINUED | OUTPATIENT
Start: 2021-01-01 | End: 2021-01-01 | Stop reason: HOSPADM

## 2021-01-01 RX ORDER — METOPROLOL TARTRATE 5 MG/5ML
2.5 INJECTION INTRAVENOUS ONCE
Status: COMPLETED | OUTPATIENT
Start: 2021-01-01 | End: 2021-01-01

## 2021-01-01 RX ADMIN — REMDESIVIR 100 MG: 100 INJECTION, POWDER, LYOPHILIZED, FOR SOLUTION INTRAVENOUS at 22:14

## 2021-01-01 RX ADMIN — ENOXAPARIN SODIUM 40 MG: 40 INJECTION SUBCUTANEOUS at 11:58

## 2021-01-01 RX ADMIN — METOPROLOL TARTRATE 5 MG: 1 INJECTION, SOLUTION INTRAVENOUS at 13:27

## 2021-01-01 RX ADMIN — METOPROLOL TARTRATE 5 MG: 1 INJECTION, SOLUTION INTRAVENOUS at 11:56

## 2021-01-01 RX ADMIN — POTASSIUM CHLORIDE, DEXTROSE MONOHYDRATE AND SODIUM CHLORIDE 50 ML/HR: 75; 5; 450 INJECTION, SOLUTION INTRAVENOUS at 18:56

## 2021-01-01 RX ADMIN — INSULIN LISPRO 2 UNITS: 100 INJECTION, SOLUTION INTRAVENOUS; SUBCUTANEOUS at 08:34

## 2021-01-01 RX ADMIN — ENOXAPARIN SODIUM 50 MG: 60 INJECTION SUBCUTANEOUS at 21:15

## 2021-01-01 RX ADMIN — HYDRALAZINE HYDROCHLORIDE 10 MG: 20 INJECTION INTRAMUSCULAR; INTRAVENOUS at 16:27

## 2021-01-01 RX ADMIN — INSULIN LISPRO 3 UNITS: 100 INJECTION, SOLUTION INTRAVENOUS; SUBCUTANEOUS at 08:54

## 2021-01-01 RX ADMIN — METHYLPREDNISOLONE SODIUM SUCCINATE 40 MG: 125 INJECTION, POWDER, FOR SOLUTION INTRAMUSCULAR; INTRAVENOUS at 22:52

## 2021-01-01 RX ADMIN — METOPROLOL TARTRATE 5 MG: 1 INJECTION, SOLUTION INTRAVENOUS at 23:19

## 2021-01-01 RX ADMIN — POTASSIUM CHLORIDE, DEXTROSE MONOHYDRATE AND SODIUM CHLORIDE 50 ML/HR: 75; 5; 450 INJECTION, SOLUTION INTRAVENOUS at 00:26

## 2021-01-01 RX ADMIN — INSULIN LISPRO 2 UNITS: 100 INJECTION, SOLUTION INTRAVENOUS; SUBCUTANEOUS at 12:49

## 2021-01-01 RX ADMIN — ENOXAPARIN SODIUM 50 MG: 60 INJECTION SUBCUTANEOUS at 22:41

## 2021-01-01 RX ADMIN — Medication 10 ML: at 02:06

## 2021-01-01 RX ADMIN — ALBUTEROL SULFATE 2 PUFF: 90 AEROSOL, METERED RESPIRATORY (INHALATION) at 07:24

## 2021-01-01 RX ADMIN — INSULIN LISPRO 2 UNITS: 100 INJECTION, SOLUTION INTRAVENOUS; SUBCUTANEOUS at 17:55

## 2021-01-01 RX ADMIN — METOPROLOL TARTRATE 5 MG: 1 INJECTION, SOLUTION INTRAVENOUS at 18:07

## 2021-01-01 RX ADMIN — METOPROLOL TARTRATE 5 MG: 1 INJECTION, SOLUTION INTRAVENOUS at 01:08

## 2021-01-01 RX ADMIN — HYDRALAZINE HYDROCHLORIDE 10 MG: 20 INJECTION INTRAMUSCULAR; INTRAVENOUS at 10:01

## 2021-01-01 RX ADMIN — CEFEPIME HYDROCHLORIDE 1 G: 1 INJECTION, POWDER, FOR SOLUTION INTRAMUSCULAR; INTRAVENOUS at 17:58

## 2021-01-01 RX ADMIN — FAMOTIDINE 20 MG: 10 INJECTION INTRAVENOUS at 08:54

## 2021-01-01 RX ADMIN — HYDRALAZINE HYDROCHLORIDE 10 MG: 20 INJECTION INTRAMUSCULAR; INTRAVENOUS at 11:58

## 2021-01-01 RX ADMIN — METHYLPREDNISOLONE SODIUM SUCCINATE 60 MG: 125 INJECTION, POWDER, FOR SOLUTION INTRAMUSCULAR; INTRAVENOUS at 21:46

## 2021-01-01 RX ADMIN — ENOXAPARIN SODIUM 50 MG: 60 INJECTION SUBCUTANEOUS at 00:23

## 2021-01-01 RX ADMIN — METHYLPREDNISOLONE SODIUM SUCCINATE 40 MG: 125 INJECTION, POWDER, FOR SOLUTION INTRAMUSCULAR; INTRAVENOUS at 14:47

## 2021-01-01 RX ADMIN — GUAIFENESIN 100 MG: 200 SOLUTION ORAL at 23:17

## 2021-01-01 RX ADMIN — METHYLPREDNISOLONE SODIUM SUCCINATE 40 MG: 125 INJECTION, POWDER, FOR SOLUTION INTRAMUSCULAR; INTRAVENOUS at 14:02

## 2021-01-01 RX ADMIN — POTASSIUM CHLORIDE, DEXTROSE MONOHYDRATE AND SODIUM CHLORIDE 50 ML/HR: 75; 5; 450 INJECTION, SOLUTION INTRAVENOUS at 21:28

## 2021-01-01 RX ADMIN — SILVER NITRATE 1 APPLICATOR: 38.21; 12.74 STICK TOPICAL at 23:56

## 2021-01-01 RX ADMIN — FAMOTIDINE 20 MG: 10 INJECTION INTRAVENOUS at 08:50

## 2021-01-01 RX ADMIN — METHYLPREDNISOLONE SODIUM SUCCINATE 40 MG: 125 INJECTION, POWDER, FOR SOLUTION INTRAMUSCULAR; INTRAVENOUS at 06:44

## 2021-01-01 RX ADMIN — INSULIN LISPRO 2 UNITS: 100 INJECTION, SOLUTION INTRAVENOUS; SUBCUTANEOUS at 08:51

## 2021-01-01 RX ADMIN — ENOXAPARIN SODIUM 40 MG: 40 INJECTION SUBCUTANEOUS at 12:56

## 2021-01-01 RX ADMIN — INSULIN LISPRO 3 UNITS: 100 INJECTION, SOLUTION INTRAVENOUS; SUBCUTANEOUS at 11:52

## 2021-01-01 RX ADMIN — METOPROLOL TARTRATE 5 MG: 1 INJECTION, SOLUTION INTRAVENOUS at 06:34

## 2021-01-01 RX ADMIN — METOPROLOL TARTRATE 5 MG: 1 INJECTION, SOLUTION INTRAVENOUS at 23:09

## 2021-01-01 RX ADMIN — ENOXAPARIN SODIUM 50 MG: 60 INJECTION SUBCUTANEOUS at 09:41

## 2021-01-01 RX ADMIN — ALBUTEROL SULFATE 2 PUFF: 90 AEROSOL, METERED RESPIRATORY (INHALATION) at 11:18

## 2021-01-01 RX ADMIN — METHYLPREDNISOLONE SODIUM SUCCINATE 60 MG: 125 INJECTION, POWDER, FOR SOLUTION INTRAMUSCULAR; INTRAVENOUS at 13:43

## 2021-01-01 RX ADMIN — METOPROLOL TARTRATE 5 MG: 1 INJECTION, SOLUTION INTRAVENOUS at 22:45

## 2021-01-01 RX ADMIN — METOPROLOL TARTRATE 5 MG: 1 INJECTION, SOLUTION INTRAVENOUS at 21:15

## 2021-01-01 RX ADMIN — CLONIDINE HYDROCHLORIDE 0.1 MG: 0.1 TABLET ORAL at 14:40

## 2021-01-01 RX ADMIN — POTASSIUM CHLORIDE 10 MEQ: 10 INJECTION, SOLUTION INTRAVENOUS at 11:03

## 2021-01-01 RX ADMIN — METOPROLOL TARTRATE 5 MG: 1 INJECTION, SOLUTION INTRAVENOUS at 00:26

## 2021-01-01 RX ADMIN — METOPROLOL TARTRATE 5 MG: 1 INJECTION, SOLUTION INTRAVENOUS at 06:06

## 2021-01-01 RX ADMIN — HYDRALAZINE HYDROCHLORIDE 10 MG: 20 INJECTION INTRAMUSCULAR; INTRAVENOUS at 08:51

## 2021-01-01 RX ADMIN — IPRATROPIUM BROMIDE AND ALBUTEROL 1 PUFF: 20; 100 SPRAY, METERED RESPIRATORY (INHALATION) at 00:34

## 2021-01-01 RX ADMIN — METOPROLOL TARTRATE 5 MG: 1 INJECTION, SOLUTION INTRAVENOUS at 17:43

## 2021-01-01 RX ADMIN — ALBUTEROL SULFATE 2 PUFF: 90 AEROSOL, METERED RESPIRATORY (INHALATION) at 12:01

## 2021-01-01 RX ADMIN — INSULIN LISPRO 5 UNITS: 100 INJECTION, SOLUTION INTRAVENOUS; SUBCUTANEOUS at 12:39

## 2021-01-01 RX ADMIN — SODIUM CHLORIDE 365 ML: 9 INJECTION, SOLUTION INTRAVENOUS at 20:43

## 2021-01-01 RX ADMIN — FAMOTIDINE 20 MG: 10 INJECTION INTRAVENOUS at 10:03

## 2021-01-01 RX ADMIN — METHYLPREDNISOLONE SODIUM SUCCINATE 40 MG: 40 INJECTION, POWDER, FOR SOLUTION INTRAMUSCULAR; INTRAVENOUS at 09:54

## 2021-01-01 RX ADMIN — METOPROLOL TARTRATE 5 MG: 1 INJECTION, SOLUTION INTRAVENOUS at 13:28

## 2021-01-01 RX ADMIN — ENOXAPARIN SODIUM 50 MG: 60 INJECTION SUBCUTANEOUS at 08:48

## 2021-01-01 RX ADMIN — POTASSIUM CHLORIDE, DEXTROSE MONOHYDRATE AND SODIUM CHLORIDE 50 ML/HR: 75; 5; 450 INJECTION, SOLUTION INTRAVENOUS at 17:17

## 2021-01-01 RX ADMIN — INSULIN LISPRO 2 UNITS: 100 INJECTION, SOLUTION INTRAVENOUS; SUBCUTANEOUS at 09:24

## 2021-01-01 RX ADMIN — ALBUTEROL SULFATE 2 PUFF: 90 AEROSOL, METERED RESPIRATORY (INHALATION) at 15:20

## 2021-01-01 RX ADMIN — HYOSCYAMINE SULFATE 0.12 MG: 0.12 TABLET SUBLINGUAL at 11:45

## 2021-01-01 RX ADMIN — FAMOTIDINE 20 MG: 10 INJECTION INTRAVENOUS at 09:38

## 2021-01-01 RX ADMIN — METOPROLOL TARTRATE 5 MG: 1 INJECTION, SOLUTION INTRAVENOUS at 23:17

## 2021-01-01 RX ADMIN — INSULIN LISPRO 2 UNITS: 100 INJECTION, SOLUTION INTRAVENOUS; SUBCUTANEOUS at 09:11

## 2021-01-01 RX ADMIN — METOPROLOL TARTRATE 5 MG: 1 INJECTION, SOLUTION INTRAVENOUS at 05:29

## 2021-01-01 RX ADMIN — HYDRALAZINE HYDROCHLORIDE 10 MG: 20 INJECTION INTRAMUSCULAR; INTRAVENOUS at 15:53

## 2021-01-01 RX ADMIN — METHYLPREDNISOLONE SODIUM SUCCINATE 60 MG: 125 INJECTION, POWDER, FOR SOLUTION INTRAMUSCULAR; INTRAVENOUS at 00:39

## 2021-01-01 RX ADMIN — ENOXAPARIN SODIUM 50 MG: 60 INJECTION SUBCUTANEOUS at 12:20

## 2021-01-01 RX ADMIN — METHYLPREDNISOLONE SODIUM SUCCINATE 60 MG: 125 INJECTION, POWDER, FOR SOLUTION INTRAMUSCULAR; INTRAVENOUS at 09:43

## 2021-01-01 RX ADMIN — METHYLPREDNISOLONE SODIUM SUCCINATE 60 MG: 125 INJECTION, POWDER, FOR SOLUTION INTRAMUSCULAR; INTRAVENOUS at 00:30

## 2021-01-01 RX ADMIN — INSULIN LISPRO 3 UNITS: 100 INJECTION, SOLUTION INTRAVENOUS; SUBCUTANEOUS at 12:05

## 2021-01-01 RX ADMIN — ENOXAPARIN SODIUM 50 MG: 60 INJECTION SUBCUTANEOUS at 09:52

## 2021-01-01 RX ADMIN — Medication 10 ML: at 20:23

## 2021-01-01 RX ADMIN — METHYLPREDNISOLONE SODIUM SUCCINATE 60 MG: 125 INJECTION, POWDER, FOR SOLUTION INTRAMUSCULAR; INTRAVENOUS at 09:13

## 2021-01-01 RX ADMIN — METOPROLOL TARTRATE 5 MG: 1 INJECTION, SOLUTION INTRAVENOUS at 00:11

## 2021-01-01 RX ADMIN — FAMOTIDINE 20 MG: 10 INJECTION INTRAVENOUS at 11:08

## 2021-01-01 RX ADMIN — METHYLPREDNISOLONE SODIUM SUCCINATE 40 MG: 125 INJECTION, POWDER, FOR SOLUTION INTRAMUSCULAR; INTRAVENOUS at 23:16

## 2021-01-01 RX ADMIN — METOPROLOL TARTRATE 5 MG: 1 INJECTION, SOLUTION INTRAVENOUS at 17:15

## 2021-01-01 RX ADMIN — METOPROLOL TARTRATE 5 MG: 1 INJECTION, SOLUTION INTRAVENOUS at 23:39

## 2021-01-01 RX ADMIN — REMDESIVIR 100 MG: 100 INJECTION, POWDER, LYOPHILIZED, FOR SOLUTION INTRAVENOUS at 20:42

## 2021-01-01 RX ADMIN — METOPROLOL TARTRATE 5 MG: 1 INJECTION, SOLUTION INTRAVENOUS at 14:20

## 2021-01-01 RX ADMIN — ENOXAPARIN SODIUM 50 MG: 60 INJECTION SUBCUTANEOUS at 21:23

## 2021-01-01 RX ADMIN — POTASSIUM CHLORIDE, DEXTROSE MONOHYDRATE AND SODIUM CHLORIDE 50 ML/HR: 75; 5; 450 INJECTION, SOLUTION INTRAVENOUS at 18:14

## 2021-01-01 RX ADMIN — METOPROLOL TARTRATE 5 MG: 1 INJECTION, SOLUTION INTRAVENOUS at 18:15

## 2021-01-01 RX ADMIN — ENOXAPARIN SODIUM 50 MG: 60 INJECTION SUBCUTANEOUS at 13:42

## 2021-01-01 RX ADMIN — VANCOMYCIN HYDROCHLORIDE 1250 MG: 10 INJECTION, POWDER, LYOPHILIZED, FOR SOLUTION INTRAVENOUS at 20:37

## 2021-01-01 RX ADMIN — METHYLPREDNISOLONE SODIUM SUCCINATE 40 MG: 125 INJECTION, POWDER, FOR SOLUTION INTRAMUSCULAR; INTRAVENOUS at 07:19

## 2021-01-01 RX ADMIN — FAMOTIDINE 20 MG: 10 INJECTION INTRAVENOUS at 09:21

## 2021-01-01 RX ADMIN — METOPROLOL TARTRATE 5 MG: 1 INJECTION, SOLUTION INTRAVENOUS at 12:02

## 2021-01-01 RX ADMIN — METOPROLOL TARTRATE 5 MG: 1 INJECTION, SOLUTION INTRAVENOUS at 13:01

## 2021-01-01 RX ADMIN — METOPROLOL TARTRATE 2.5 MG: 1 INJECTION, SOLUTION INTRAVENOUS at 09:29

## 2021-01-01 RX ADMIN — ENOXAPARIN SODIUM 50 MG: 60 INJECTION SUBCUTANEOUS at 23:14

## 2021-01-01 RX ADMIN — POTASSIUM CHLORIDE 10 MEQ: 10 INJECTION, SOLUTION INTRAVENOUS at 14:00

## 2021-01-01 RX ADMIN — HYDRALAZINE HYDROCHLORIDE 10 MG: 20 INJECTION INTRAMUSCULAR; INTRAVENOUS at 15:55

## 2021-01-01 RX ADMIN — METHYLPREDNISOLONE SODIUM SUCCINATE 40 MG: 40 INJECTION, POWDER, FOR SOLUTION INTRAMUSCULAR; INTRAVENOUS at 09:52

## 2021-01-01 RX ADMIN — METOPROLOL TARTRATE 5 MG: 1 INJECTION, SOLUTION INTRAVENOUS at 11:58

## 2021-01-01 RX ADMIN — METOPROLOL TARTRATE 5 MG: 1 INJECTION, SOLUTION INTRAVENOUS at 12:28

## 2021-01-01 RX ADMIN — ALBUTEROL SULFATE 2 PUFF: 90 AEROSOL, METERED RESPIRATORY (INHALATION) at 06:47

## 2021-01-01 RX ADMIN — METOPROLOL TARTRATE 5 MG: 1 INJECTION, SOLUTION INTRAVENOUS at 12:00

## 2021-01-01 RX ADMIN — ALBUTEROL SULFATE 2 PUFF: 90 AEROSOL, METERED RESPIRATORY (INHALATION) at 07:47

## 2021-01-01 RX ADMIN — METHYLPREDNISOLONE SODIUM SUCCINATE 60 MG: 125 INJECTION, POWDER, FOR SOLUTION INTRAMUSCULAR; INTRAVENOUS at 13:29

## 2021-01-01 RX ADMIN — POTASSIUM CHLORIDE 10 MEQ: 10 INJECTION, SOLUTION INTRAVENOUS at 13:37

## 2021-01-01 RX ADMIN — METOPROLOL TARTRATE 5 MG: 1 INJECTION, SOLUTION INTRAVENOUS at 16:37

## 2021-01-01 RX ADMIN — FAMOTIDINE 20 MG: 10 INJECTION INTRAVENOUS at 12:21

## 2021-01-01 RX ADMIN — METHYLPREDNISOLONE SODIUM SUCCINATE 60 MG: 125 INJECTION, POWDER, FOR SOLUTION INTRAMUSCULAR; INTRAVENOUS at 08:54

## 2021-01-01 RX ADMIN — ENOXAPARIN SODIUM 50 MG: 60 INJECTION SUBCUTANEOUS at 09:19

## 2021-01-01 RX ADMIN — HYDRALAZINE HYDROCHLORIDE 10 MG: 20 INJECTION INTRAMUSCULAR; INTRAVENOUS at 09:20

## 2021-01-01 RX ADMIN — HYDRALAZINE HYDROCHLORIDE 10 MG: 20 INJECTION INTRAMUSCULAR; INTRAVENOUS at 12:05

## 2021-01-01 RX ADMIN — METOPROLOL TARTRATE 5 MG: 1 INJECTION, SOLUTION INTRAVENOUS at 00:21

## 2021-01-01 RX ADMIN — METOPROLOL TARTRATE 5 MG: 1 INJECTION, SOLUTION INTRAVENOUS at 00:39

## 2021-01-01 RX ADMIN — IPRATROPIUM BROMIDE AND ALBUTEROL 1 PUFF: 20; 100 SPRAY, METERED RESPIRATORY (INHALATION) at 05:26

## 2021-01-01 RX ADMIN — METOPROLOL TARTRATE 5 MG: 1 INJECTION, SOLUTION INTRAVENOUS at 14:16

## 2021-01-01 RX ADMIN — METHYLPREDNISOLONE SODIUM SUCCINATE 40 MG: 40 INJECTION, POWDER, FOR SOLUTION INTRAMUSCULAR; INTRAVENOUS at 09:33

## 2021-01-01 RX ADMIN — FAMOTIDINE 20 MG: 10 INJECTION INTRAVENOUS at 09:51

## 2021-01-01 RX ADMIN — METOPROLOL TARTRATE 5 MG: 1 INJECTION, SOLUTION INTRAVENOUS at 05:57

## 2021-01-01 RX ADMIN — GUAIFENESIN 100 MG: 200 SOLUTION ORAL at 22:15

## 2021-01-01 RX ADMIN — METHYLPREDNISOLONE SODIUM SUCCINATE 60 MG: 125 INJECTION, POWDER, FOR SOLUTION INTRAMUSCULAR; INTRAVENOUS at 01:26

## 2021-01-01 RX ADMIN — ACETAMINOPHEN 650 MG: 650 SUPPOSITORY RECTAL at 19:34

## 2021-01-01 RX ADMIN — METOPROLOL TARTRATE 5 MG: 1 INJECTION, SOLUTION INTRAVENOUS at 05:11

## 2021-01-01 RX ADMIN — POTASSIUM CHLORIDE, DEXTROSE MONOHYDRATE AND SODIUM CHLORIDE 50 ML/HR: 75; 5; 450 INJECTION, SOLUTION INTRAVENOUS at 21:24

## 2021-01-01 RX ADMIN — METOPROLOL TARTRATE 5 MG: 1 INJECTION, SOLUTION INTRAVENOUS at 17:04

## 2021-01-01 RX ADMIN — ENOXAPARIN SODIUM 40 MG: 40 INJECTION SUBCUTANEOUS at 11:53

## 2021-01-01 RX ADMIN — FAMOTIDINE 20 MG: 10 INJECTION INTRAVENOUS at 09:33

## 2021-01-01 RX ADMIN — METHYLPREDNISOLONE SODIUM SUCCINATE 40 MG: 40 INJECTION, POWDER, FOR SOLUTION INTRAMUSCULAR; INTRAVENOUS at 12:21

## 2021-01-01 RX ADMIN — IPRATROPIUM BROMIDE AND ALBUTEROL SULFATE 3 ML: .5; 3 SOLUTION RESPIRATORY (INHALATION) at 21:28

## 2021-01-01 RX ADMIN — METHYLPREDNISOLONE SODIUM SUCCINATE 125 MG: 125 INJECTION, POWDER, FOR SOLUTION INTRAMUSCULAR; INTRAVENOUS at 17:40

## 2021-01-01 RX ADMIN — IPRATROPIUM BROMIDE AND ALBUTEROL SULFATE 3 ML: .5; 3 SOLUTION RESPIRATORY (INHALATION) at 12:35

## 2021-01-01 RX ADMIN — METHYLPREDNISOLONE SODIUM SUCCINATE 40 MG: 40 INJECTION, POWDER, FOR SOLUTION INTRAMUSCULAR; INTRAVENOUS at 10:08

## 2021-01-01 RX ADMIN — FAMOTIDINE 20 MG: 10 INJECTION INTRAVENOUS at 09:53

## 2021-01-01 RX ADMIN — HYDRALAZINE HYDROCHLORIDE 10 MG: 20 INJECTION INTRAMUSCULAR; INTRAVENOUS at 05:25

## 2021-01-01 RX ADMIN — METHYLPREDNISOLONE SODIUM SUCCINATE 40 MG: 125 INJECTION, POWDER, FOR SOLUTION INTRAMUSCULAR; INTRAVENOUS at 22:16

## 2021-01-01 RX ADMIN — GUAIFENESIN 100 MG: 200 SOLUTION ORAL at 23:16

## 2021-01-01 RX ADMIN — ENOXAPARIN SODIUM 50 MG: 60 INJECTION SUBCUTANEOUS at 22:49

## 2021-01-01 RX ADMIN — METOPROLOL TARTRATE 5 MG: 1 INJECTION, SOLUTION INTRAVENOUS at 17:42

## 2021-01-01 RX ADMIN — ALBUTEROL SULFATE 2 PUFF: 90 AEROSOL, METERED RESPIRATORY (INHALATION) at 07:41

## 2021-01-01 RX ADMIN — INSULIN LISPRO 2 UNITS: 100 INJECTION, SOLUTION INTRAVENOUS; SUBCUTANEOUS at 22:16

## 2021-01-01 RX ADMIN — IPRATROPIUM BROMIDE AND ALBUTEROL 1 PUFF: 20; 100 SPRAY, METERED RESPIRATORY (INHALATION) at 12:00

## 2021-01-01 RX ADMIN — RACEPINEPHRINE HYDROCHLORIDE 0.5 ML: 11.25 SOLUTION RESPIRATORY (INHALATION) at 18:10

## 2021-01-01 RX ADMIN — SODIUM CHLORIDE 50 ML/HR: 9 INJECTION, SOLUTION INTRAVENOUS at 02:59

## 2021-01-01 RX ADMIN — ENOXAPARIN SODIUM 50 MG: 60 INJECTION SUBCUTANEOUS at 09:09

## 2021-01-01 RX ADMIN — METOPROLOL TARTRATE 5 MG: 1 INJECTION, SOLUTION INTRAVENOUS at 17:29

## 2021-01-01 RX ADMIN — REMDESIVIR 100 MG: 100 INJECTION, POWDER, LYOPHILIZED, FOR SOLUTION INTRAVENOUS at 00:36

## 2021-01-01 RX ADMIN — Medication 10 ML: at 07:18

## 2021-01-01 RX ADMIN — METOPROLOL TARTRATE 5 MG: 1 INJECTION, SOLUTION INTRAVENOUS at 05:40

## 2021-01-01 RX ADMIN — METHYLPREDNISOLONE SODIUM SUCCINATE 60 MG: 125 INJECTION, POWDER, FOR SOLUTION INTRAMUSCULAR; INTRAVENOUS at 10:17

## 2021-01-01 RX ADMIN — INSULIN LISPRO 2 UNITS: 100 INJECTION, SOLUTION INTRAVENOUS; SUBCUTANEOUS at 17:13

## 2021-01-01 RX ADMIN — METOPROLOL TARTRATE 5 MG: 1 INJECTION, SOLUTION INTRAVENOUS at 17:54

## 2021-01-01 RX ADMIN — METHYLPREDNISOLONE SODIUM SUCCINATE 40 MG: 125 INJECTION, POWDER, FOR SOLUTION INTRAMUSCULAR; INTRAVENOUS at 05:25

## 2021-01-01 RX ADMIN — METOPROLOL TARTRATE 5 MG: 1 INJECTION, SOLUTION INTRAVENOUS at 12:30

## 2021-01-01 RX ADMIN — METOPROLOL TARTRATE 5 MG: 1 INJECTION, SOLUTION INTRAVENOUS at 05:12

## 2021-01-01 RX ADMIN — ALBUTEROL SULFATE 2 PUFF: 90 AEROSOL, METERED RESPIRATORY (INHALATION) at 08:30

## 2021-01-01 RX ADMIN — Medication 10 ML: at 21:59

## 2021-01-01 RX ADMIN — Medication 10 ML: at 14:47

## 2021-01-01 RX ADMIN — ENOXAPARIN SODIUM 50 MG: 60 INJECTION SUBCUTANEOUS at 11:08

## 2021-01-01 RX ADMIN — Medication 10 ML: at 11:55

## 2021-01-01 RX ADMIN — METOPROLOL TARTRATE 5 MG: 1 INJECTION, SOLUTION INTRAVENOUS at 06:00

## 2021-01-01 RX ADMIN — POTASSIUM CHLORIDE, DEXTROSE MONOHYDRATE AND SODIUM CHLORIDE 50 ML/HR: 75; 5; 450 INJECTION, SOLUTION INTRAVENOUS at 17:29

## 2021-01-01 RX ADMIN — METOPROLOL TARTRATE 5 MG: 1 INJECTION, SOLUTION INTRAVENOUS at 20:13

## 2021-01-01 RX ADMIN — METOPROLOL TARTRATE 5 MG: 1 INJECTION, SOLUTION INTRAVENOUS at 12:56

## 2021-01-01 RX ADMIN — IOPAMIDOL 100 ML: 755 INJECTION, SOLUTION INTRAVENOUS at 20:17

## 2021-01-01 RX ADMIN — METHYLPREDNISOLONE SODIUM SUCCINATE 40 MG: 125 INJECTION, POWDER, FOR SOLUTION INTRAMUSCULAR; INTRAVENOUS at 07:09

## 2021-01-01 RX ADMIN — ENOXAPARIN SODIUM 50 MG: 60 INJECTION SUBCUTANEOUS at 10:18

## 2021-01-01 RX ADMIN — METOPROLOL TARTRATE 5 MG: 1 INJECTION, SOLUTION INTRAVENOUS at 02:06

## 2021-01-01 RX ADMIN — METHYLPREDNISOLONE SODIUM SUCCINATE 60 MG: 125 INJECTION, POWDER, FOR SOLUTION INTRAMUSCULAR; INTRAVENOUS at 17:12

## 2021-01-01 RX ADMIN — INSULIN LISPRO 2 UNITS: 100 INJECTION, SOLUTION INTRAVENOUS; SUBCUTANEOUS at 09:51

## 2021-01-01 RX ADMIN — METHYLPREDNISOLONE SODIUM SUCCINATE 60 MG: 125 INJECTION, POWDER, FOR SOLUTION INTRAMUSCULAR; INTRAVENOUS at 17:04

## 2021-01-01 RX ADMIN — METHYLPREDNISOLONE SODIUM SUCCINATE 60 MG: 125 INJECTION, POWDER, FOR SOLUTION INTRAMUSCULAR; INTRAVENOUS at 21:57

## 2021-01-01 RX ADMIN — INSULIN LISPRO 3 UNITS: 100 INJECTION, SOLUTION INTRAVENOUS; SUBCUTANEOUS at 09:51

## 2021-01-01 RX ADMIN — SODIUM CHLORIDE 50 ML/HR: 9 INJECTION, SOLUTION INTRAVENOUS at 01:50

## 2021-01-01 RX ADMIN — POTASSIUM CHLORIDE, DEXTROSE MONOHYDRATE AND SODIUM CHLORIDE 50 ML/HR: 75; 5; 450 INJECTION, SOLUTION INTRAVENOUS at 13:57

## 2021-01-01 RX ADMIN — INSULIN LISPRO 2 UNITS: 100 INJECTION, SOLUTION INTRAVENOUS; SUBCUTANEOUS at 12:55

## 2021-01-01 RX ADMIN — INSULIN LISPRO 2 UNITS: 100 INJECTION, SOLUTION INTRAVENOUS; SUBCUTANEOUS at 09:38

## 2021-01-01 RX ADMIN — HYDRALAZINE HYDROCHLORIDE 10 MG: 20 INJECTION INTRAMUSCULAR; INTRAVENOUS at 03:57

## 2021-01-01 RX ADMIN — FAMOTIDINE 20 MG: 10 INJECTION INTRAVENOUS at 08:56

## 2021-01-01 RX ADMIN — METOPROLOL TARTRATE 5 MG: 1 INJECTION, SOLUTION INTRAVENOUS at 06:51

## 2021-01-01 RX ADMIN — METOPROLOL TARTRATE 5 MG: 1 INJECTION, SOLUTION INTRAVENOUS at 00:25

## 2021-01-01 RX ADMIN — POTASSIUM CHLORIDE 10 MEQ: 10 INJECTION, SOLUTION INTRAVENOUS at 15:47

## 2021-01-01 RX ADMIN — FAMOTIDINE 20 MG: 10 INJECTION INTRAVENOUS at 09:52

## 2021-01-01 RX ADMIN — METOPROLOL TARTRATE 5 MG: 1 INJECTION, SOLUTION INTRAVENOUS at 05:53

## 2021-01-01 RX ADMIN — METOPROLOL TARTRATE 5 MG: 1 INJECTION, SOLUTION INTRAVENOUS at 05:16

## 2021-01-01 RX ADMIN — METOPROLOL TARTRATE 5 MG: 1 INJECTION, SOLUTION INTRAVENOUS at 06:33

## 2021-01-01 RX ADMIN — ENOXAPARIN SODIUM 50 MG: 60 INJECTION SUBCUTANEOUS at 23:37

## 2021-01-01 RX ADMIN — ENOXAPARIN SODIUM 50 MG: 60 INJECTION SUBCUTANEOUS at 21:37

## 2021-01-01 RX ADMIN — INSULIN LISPRO 2 UNITS: 100 INJECTION, SOLUTION INTRAVENOUS; SUBCUTANEOUS at 14:18

## 2021-01-01 RX ADMIN — LABETALOL HYDROCHLORIDE 20 MG: 5 INJECTION, SOLUTION INTRAVENOUS at 15:07

## 2021-01-01 RX ADMIN — METOPROLOL TARTRATE 5 MG: 1 INJECTION, SOLUTION INTRAVENOUS at 05:22

## 2021-01-01 RX ADMIN — INSULIN LISPRO 2 UNITS: 100 INJECTION, SOLUTION INTRAVENOUS; SUBCUTANEOUS at 11:46

## 2021-01-01 RX ADMIN — REMDESIVIR 100 MG: 100 INJECTION, POWDER, LYOPHILIZED, FOR SOLUTION INTRAVENOUS at 23:16

## 2021-01-01 RX ADMIN — METHYLPREDNISOLONE SODIUM SUCCINATE 60 MG: 125 INJECTION, POWDER, FOR SOLUTION INTRAMUSCULAR; INTRAVENOUS at 09:22

## 2021-01-01 RX ADMIN — INSULIN LISPRO 2 UNITS: 100 INJECTION, SOLUTION INTRAVENOUS; SUBCUTANEOUS at 09:33

## 2021-01-01 RX ADMIN — METHYLPREDNISOLONE SODIUM SUCCINATE 60 MG: 125 INJECTION, POWDER, FOR SOLUTION INTRAMUSCULAR; INTRAVENOUS at 01:03

## 2021-01-01 RX ADMIN — Medication 10 ML: at 05:40

## 2021-01-01 RX ADMIN — IPRATROPIUM BROMIDE AND ALBUTEROL SULFATE 3 ML: .5; 3 SOLUTION RESPIRATORY (INHALATION) at 22:46

## 2021-01-01 RX ADMIN — CEFEPIME HYDROCHLORIDE 2 G: 2 INJECTION, POWDER, FOR SOLUTION INTRAVENOUS at 19:34

## 2021-01-01 RX ADMIN — POTASSIUM CHLORIDE, DEXTROSE MONOHYDRATE AND SODIUM CHLORIDE 50 ML/HR: 75; 5; 450 INJECTION, SOLUTION INTRAVENOUS at 01:07

## 2021-01-01 RX ADMIN — METOPROLOL TARTRATE 5 MG: 1 INJECTION, SOLUTION INTRAVENOUS at 01:03

## 2021-01-01 RX ADMIN — DEXTROSE MONOHYDRATE 25 G: 25 INJECTION, SOLUTION INTRAVENOUS at 23:46

## 2021-01-01 RX ADMIN — IPRATROPIUM BROMIDE AND ALBUTEROL SULFATE 3 ML: .5; 3 SOLUTION RESPIRATORY (INHALATION) at 23:17

## 2021-01-01 RX ADMIN — HYDRALAZINE HYDROCHLORIDE 10 MG: 20 INJECTION INTRAMUSCULAR; INTRAVENOUS at 01:23

## 2021-01-01 RX ADMIN — METHYLPREDNISOLONE SODIUM SUCCINATE 40 MG: 125 INJECTION, POWDER, FOR SOLUTION INTRAMUSCULAR; INTRAVENOUS at 14:17

## 2021-01-01 RX ADMIN — SODIUM CHLORIDE 50 ML/HR: 9 INJECTION, SOLUTION INTRAVENOUS at 00:37

## 2021-01-01 RX ADMIN — IPRATROPIUM BROMIDE AND ALBUTEROL 1 PUFF: 20; 100 SPRAY, METERED RESPIRATORY (INHALATION) at 16:52

## 2021-01-01 RX ADMIN — ENOXAPARIN SODIUM 40 MG: 40 INJECTION SUBCUTANEOUS at 11:46

## 2021-01-01 RX ADMIN — HYDRALAZINE HYDROCHLORIDE 10 MG: 20 INJECTION INTRAMUSCULAR; INTRAVENOUS at 13:29

## 2021-01-01 RX ADMIN — ENOXAPARIN SODIUM 50 MG: 60 INJECTION SUBCUTANEOUS at 22:59

## 2021-01-01 RX ADMIN — HYOSCYAMINE SULFATE 0.12 MG: 0.12 TABLET SUBLINGUAL at 10:00

## 2021-01-01 RX ADMIN — INSULIN LISPRO 2 UNITS: 100 INJECTION, SOLUTION INTRAVENOUS; SUBCUTANEOUS at 12:02

## 2021-01-01 RX ADMIN — METOPROLOL TARTRATE 5 MG: 1 INJECTION, SOLUTION INTRAVENOUS at 11:52

## 2021-01-01 RX ADMIN — LABETALOL HYDROCHLORIDE 20 MG: 5 INJECTION, SOLUTION INTRAVENOUS at 17:54

## 2021-01-01 RX ADMIN — GUAIFENESIN 100 MG: 200 SOLUTION ORAL at 23:39

## 2021-01-01 RX ADMIN — INSULIN LISPRO 2 UNITS: 100 INJECTION, SOLUTION INTRAVENOUS; SUBCUTANEOUS at 18:02

## 2021-01-01 RX ADMIN — INSULIN LISPRO 2 UNITS: 100 INJECTION, SOLUTION INTRAVENOUS; SUBCUTANEOUS at 22:00

## 2021-01-01 RX ADMIN — POTASSIUM CHLORIDE, DEXTROSE MONOHYDRATE AND SODIUM CHLORIDE 50 ML/HR: 75; 5; 450 INJECTION, SOLUTION INTRAVENOUS at 17:20

## 2021-01-01 RX ADMIN — CEFEPIME HYDROCHLORIDE 1 G: 1 INJECTION, POWDER, FOR SOLUTION INTRAMUSCULAR; INTRAVENOUS at 05:09

## 2021-01-01 RX ADMIN — ENOXAPARIN SODIUM 50 MG: 60 INJECTION SUBCUTANEOUS at 09:32

## 2021-01-01 RX ADMIN — POTASSIUM CHLORIDE, DEXTROSE MONOHYDRATE AND SODIUM CHLORIDE 50 ML/HR: 75; 5; 450 INJECTION, SOLUTION INTRAVENOUS at 23:19

## 2021-01-01 RX ADMIN — DIPHENHYDRAMINE HYDROCHLORIDE 50 MG: 50 INJECTION, SOLUTION INTRAMUSCULAR; INTRAVENOUS at 17:40

## 2021-01-01 RX ADMIN — INSULIN LISPRO 2 UNITS: 100 INJECTION, SOLUTION INTRAVENOUS; SUBCUTANEOUS at 11:56

## 2021-01-01 RX ADMIN — METHYLPREDNISOLONE SODIUM SUCCINATE 60 MG: 125 INJECTION, POWDER, FOR SOLUTION INTRAMUSCULAR; INTRAVENOUS at 06:00

## 2021-01-01 RX ADMIN — INSULIN LISPRO 5 UNITS: 100 INJECTION, SOLUTION INTRAVENOUS; SUBCUTANEOUS at 12:33

## 2021-01-01 RX ADMIN — IPRATROPIUM BROMIDE AND ALBUTEROL 1 PUFF: 20; 100 SPRAY, METERED RESPIRATORY (INHALATION) at 22:58

## 2021-01-01 RX ADMIN — METHYLPREDNISOLONE SODIUM SUCCINATE 40 MG: 40 INJECTION, POWDER, FOR SOLUTION INTRAMUSCULAR; INTRAVENOUS at 11:08

## 2021-01-01 RX ADMIN — INSULIN LISPRO 2 UNITS: 100 INJECTION, SOLUTION INTRAVENOUS; SUBCUTANEOUS at 17:03

## 2021-01-01 RX ADMIN — METOPROLOL TARTRATE 5 MG: 1 INJECTION, SOLUTION INTRAVENOUS at 06:45

## 2021-01-01 RX ADMIN — INSULIN LISPRO 2 UNITS: 100 INJECTION, SOLUTION INTRAVENOUS; SUBCUTANEOUS at 11:52

## 2021-01-01 RX ADMIN — MORPHINE SULFATE 2 MG: 2 INJECTION, SOLUTION INTRAMUSCULAR; INTRAVENOUS at 13:38

## 2021-01-01 RX ADMIN — MORPHINE SULFATE 5 MG: 20 SOLUTION ORAL at 11:40

## 2021-01-01 RX ADMIN — HYDRALAZINE HYDROCHLORIDE 10 MG: 20 INJECTION INTRAMUSCULAR; INTRAVENOUS at 11:54

## 2021-01-01 RX ADMIN — FAMOTIDINE 20 MG: 10 INJECTION INTRAVENOUS at 09:10

## 2021-01-01 RX ADMIN — INSULIN LISPRO 3 UNITS: 100 INJECTION, SOLUTION INTRAVENOUS; SUBCUTANEOUS at 16:38

## 2021-01-01 RX ADMIN — ALBUTEROL SULFATE 2 PUFF: 90 AEROSOL, METERED RESPIRATORY (INHALATION) at 04:30

## 2021-01-01 RX ADMIN — METOPROLOL TARTRATE 5 MG: 1 INJECTION, SOLUTION INTRAVENOUS at 11:46

## 2021-01-01 RX ADMIN — ALBUTEROL SULFATE 2 PUFF: 90 AEROSOL, METERED RESPIRATORY (INHALATION) at 01:15

## 2021-01-01 RX ADMIN — METOPROLOL TARTRATE 5 MG: 1 INJECTION, SOLUTION INTRAVENOUS at 23:37

## 2021-01-01 RX ADMIN — METOPROLOL TARTRATE 5 MG: 1 INJECTION, SOLUTION INTRAVENOUS at 01:47

## 2021-01-01 RX ADMIN — ENOXAPARIN SODIUM 50 MG: 60 INJECTION SUBCUTANEOUS at 09:00

## 2021-01-01 RX ADMIN — POTASSIUM CHLORIDE, DEXTROSE MONOHYDRATE AND SODIUM CHLORIDE 50 ML/HR: 75; 5; 450 INJECTION, SOLUTION INTRAVENOUS at 06:33

## 2021-01-01 RX ADMIN — METHYLPREDNISOLONE SODIUM SUCCINATE 40 MG: 125 INJECTION, POWDER, FOR SOLUTION INTRAMUSCULAR; INTRAVENOUS at 00:37

## 2021-01-01 RX ADMIN — ENOXAPARIN SODIUM 50 MG: 60 INJECTION SUBCUTANEOUS at 10:08

## 2021-01-01 RX ADMIN — METOPROLOL TARTRATE 5 MG: 1 INJECTION, SOLUTION INTRAVENOUS at 18:02

## 2021-01-01 RX ADMIN — POTASSIUM CHLORIDE, DEXTROSE MONOHYDRATE AND SODIUM CHLORIDE 50 ML/HR: 75; 5; 450 INJECTION, SOLUTION INTRAVENOUS at 03:19

## 2021-01-01 RX ADMIN — METOPROLOL TARTRATE 5 MG: 1 INJECTION, SOLUTION INTRAVENOUS at 05:20

## 2021-01-01 RX ADMIN — LABETALOL HYDROCHLORIDE 20 MG: 5 INJECTION, SOLUTION INTRAVENOUS at 21:20

## 2021-01-01 RX ADMIN — INSULIN LISPRO 3 UNITS: 100 INJECTION, SOLUTION INTRAVENOUS; SUBCUTANEOUS at 23:18

## 2021-01-01 RX ADMIN — ALBUTEROL SULFATE 2 PUFF: 90 AEROSOL, METERED RESPIRATORY (INHALATION) at 14:16

## 2021-01-01 RX ADMIN — INSULIN LISPRO 5 UNITS: 100 INJECTION, SOLUTION INTRAVENOUS; SUBCUTANEOUS at 09:40

## 2021-01-01 RX ADMIN — METOPROLOL TARTRATE 5 MG: 1 INJECTION, SOLUTION INTRAVENOUS at 18:03

## 2021-01-01 RX ADMIN — REMDESIVIR 200 MG: 100 INJECTION, POWDER, LYOPHILIZED, FOR SOLUTION INTRAVENOUS at 22:16

## 2021-01-01 RX ADMIN — POTASSIUM CHLORIDE 10 MEQ: 10 INJECTION, SOLUTION INTRAVENOUS at 14:40

## 2021-01-01 RX ADMIN — POTASSIUM CHLORIDE, DEXTROSE MONOHYDRATE AND SODIUM CHLORIDE 50 ML/HR: 75; 5; 450 INJECTION, SOLUTION INTRAVENOUS at 21:59

## 2021-01-01 RX ADMIN — ENOXAPARIN SODIUM 50 MG: 60 INJECTION SUBCUTANEOUS at 21:19

## 2021-01-01 RX ADMIN — SODIUM CHLORIDE 50 ML/HR: 9 INJECTION, SOLUTION INTRAVENOUS at 15:32

## 2021-01-01 RX ADMIN — HYDRALAZINE HYDROCHLORIDE 10 MG: 20 INJECTION INTRAMUSCULAR; INTRAVENOUS at 04:36

## 2021-01-01 RX ADMIN — ALBUTEROL SULFATE 2 PUFF: 90 AEROSOL, METERED RESPIRATORY (INHALATION) at 22:52

## 2021-01-01 RX ADMIN — IPRATROPIUM BROMIDE AND ALBUTEROL 1 PUFF: 20; 100 SPRAY, METERED RESPIRATORY (INHALATION) at 11:45

## 2021-01-01 RX ADMIN — METOPROLOL TARTRATE 5 MG: 1 INJECTION, SOLUTION INTRAVENOUS at 00:29

## 2021-01-01 RX ADMIN — MORPHINE SULFATE 2 MG: 2 INJECTION, SOLUTION INTRAMUSCULAR; INTRAVENOUS at 21:25

## 2021-01-01 RX ADMIN — ENOXAPARIN SODIUM 50 MG: 60 INJECTION SUBCUTANEOUS at 21:57

## 2021-01-01 RX ADMIN — METOPROLOL TARTRATE 5 MG: 1 INJECTION, SOLUTION INTRAVENOUS at 23:40

## 2021-01-01 RX ADMIN — IPRATROPIUM BROMIDE AND ALBUTEROL 1 PUFF: 20; 100 SPRAY, METERED RESPIRATORY (INHALATION) at 07:56

## 2021-01-01 RX ADMIN — HYDRALAZINE HYDROCHLORIDE 10 MG: 20 INJECTION INTRAMUSCULAR; INTRAVENOUS at 17:04

## 2021-01-01 RX ADMIN — METHYLPREDNISOLONE SODIUM SUCCINATE 60 MG: 125 INJECTION, POWDER, FOR SOLUTION INTRAMUSCULAR; INTRAVENOUS at 09:01

## 2021-01-01 RX ADMIN — POTASSIUM CHLORIDE, DEXTROSE MONOHYDRATE AND SODIUM CHLORIDE 50 ML/HR: 75; 5; 450 INJECTION, SOLUTION INTRAVENOUS at 23:59

## 2021-01-01 RX ADMIN — Medication 10 ML: at 13:43

## 2021-01-01 RX ADMIN — SODIUM CHLORIDE 1000 ML: 9 INJECTION, SOLUTION INTRAVENOUS at 19:43

## 2021-01-01 RX ADMIN — INSULIN LISPRO 2 UNITS: 100 INJECTION, SOLUTION INTRAVENOUS; SUBCUTANEOUS at 11:58

## 2021-01-01 RX ADMIN — ALBUTEROL SULFATE 2 PUFF: 90 AEROSOL, METERED RESPIRATORY (INHALATION) at 21:05

## 2021-01-01 RX ADMIN — FAMOTIDINE 20 MG: 10 INJECTION INTRAVENOUS at 10:13

## 2021-01-01 RX ADMIN — FAMOTIDINE 20 MG: 10 INJECTION INTRAVENOUS at 09:45

## 2021-01-01 RX ADMIN — FAMOTIDINE 20 MG: 10 INJECTION INTRAVENOUS at 13:50

## 2021-01-01 NOTE — PROGRESS NOTES
Bedside shift change report given to Jazmyne Christian RN (oncoming nurse) by Doug Villalobos RN (offgoing nurse). Report included the following information SBAR, Kardex, Procedure Summary, Intake/Output, MAR, Accordion and Recent Results. EOAE (evoked otoacoustic emission)

## 2021-04-12 NOTE — ED NOTES
Patient given printed discharge instructions reviewed by the MD. Patient understands instructions/follow up recommendations. Patient discharged out of ED via wheelchair with son at side.

## 2021-04-12 NOTE — ED NOTES
2100 - Kathryn KNIGHT at bedside for comfort and care;; 
 
2210 - pt's son at bedside for comfort and care;; pt remains on cardiac monitoring at this time;; 
 
2345 - Bedside shift change report given to [address] [place] [address] (oncoming nurse) by Luciano Solis (offgoing nurse). Report included the following information SBAR, Kardex, ED Summary, Intake/Output and MAR.

## 2021-04-12 NOTE — DISCHARGE INSTRUCTIONS
Use your inhaler every 4 hours at home for the next several days. It was a pleasure taking care of you in our Emergency Department today. We know that when you come to [facility], you are entrusting us with your health, comfort, and safety. Our physicians and nurses honor that trust, and truly appreciate the opportunity to care for you and your loved ones. We also value your feedback. If you receive a survey about your Emergency Department experience today, please fill it out. We care about our patients' feedback, and we listen to what you have to say. Thank you!

## 2021-04-12 NOTE — ED PROVIDER NOTES
EMERGENCY DEPARTMENT HISTORY AND PHYSICAL EXAM 
 
 
Date: 4/11/2021 Patient Name: Gemini Iqbal History of Presenting Illness Chief Complaint Patient presents with  Shortness of Breath History Provided By: Patient and Patient's Son HPI: Gemini Iqbal, 78 y.o. female with PMHx significant for CHF, prior CVA with residual slurred speech, CAD, hypertension, hyperlipidemia, who presents with a chief complaint of shortness of breath and wheezing. This is been getting progressively worse over the last several days. Patient does have an inhaler at home and has been using it more. No productive cough, chest pain, abdominal pain, nausea, vomiting. PCP: Marvin Hollingsworth NP There are no other complaints, changes, or physical findings at this time. Current Outpatient Medications Medication Sig Dispense Refill  atorvastatin (LIPITOR) 80 mg tablet Take 1 Tab by mouth daily. 30 Tab 2  
 aspirin 81 mg chewable tablet Take 1 Tab by mouth daily. 30 Tab 2  
 metoprolol tartrate (LOPRESSOR) 25 mg tablet Take 1 Tab by mouth every twelve (12) hours. 60 Tab 2  
 amLODIPine (NORVASC) 5 mg tablet Take 5 mg by mouth daily.  allopurinol (ZYLOPRIM) 100 mg tablet Take 100 mg by mouth daily.  apixaban (ELIQUIS) 2.5 mg tablet Take 1 Tab by mouth two (2) times a day. 60 Tab 12  
 potassium chloride (K-DUR, KLOR-CON) 20 mEq tablet Take 20 mEq by mouth daily. MON, WED., SAT  famotidine (PEPCID) 20 mg tablet Take 20 mg by mouth daily.  furosemide (LASIX) 20 mg tablet Take 20 mg by mouth Every Mon, Wed and Sat.  nitroglycerin (NITROSTAT) 0.4 mg SL tablet 0.4 mg by SubLINGual route every five (5) minutes as needed for Chest Pain. Up to 3 doses.  albuterol (PROVENTIL HFA, VENTOLIN HFA, PROAIR HFA) 90 mcg/actuation inhaler Take 2 Puffs by inhalation every four (4) hours as needed for Wheezing. 1 Inhaler 1 Past History Past Medical History: 
Past Medical History: Diagnosis Date  Acute kidney failure ([facility] 2016  [facility]  Arrhythmia  CAD (coronary artery disease)  Heart failure ([facility]  Hypertension  Stroke [facility]) 2009 Stroke, left hand weakness and speech, peripheral vision affected Past Surgical History: 
Past Surgical History:  
Procedure Laterality Date  HX UROLOGICAL  2018 Family History: 
Family History Problem Relation Age of Onset  Coronary Artery Disease Other   
     grandmother age 76  Stroke Mother  Hypertension Mother Social History: 
Social History Tobacco Use  Smoking status: Former Smoker Quit date: 2009 Years since quittin.5  Smokeless tobacco: Never Used Substance Use Topics  Alcohol use: No  
  Alcohol/week: 0.0 standard drinks Comment: Very little  Drug use: No  
 
Allergies: Allergies Allergen Reactions  Decadron [Dexamethasone Sodium Phosphate] Hives  Pcn [Penicillins] Hives Review of Systems Review of Systems Constitutional: Negative for chills and fever. HENT: Negative for congestion, rhinorrhea and sore throat. Respiratory: Positive for shortness of breath and wheezing. Negative for cough. Cardiovascular: Negative for chest pain. Gastrointestinal: Negative for abdominal pain, nausea and vomiting. Genitourinary: Negative for dysuria and urgency. Skin: Negative for rash. Neurological: Negative for dizziness, light-headedness and headaches. All other systems reviewed and are negative. Physical Exam  
Physical Exam 
Vitals signs and nursing note reviewed. Constitutional:   
   General: She is not in acute distress. Appearance: She is well-developed. HENT:  
   Head: Normocephalic and atraumatic. Eyes:  
   Conjunctiva/sclera: Conjunctivae normal.  
   Pupils: Pupils are equal, round, and reactive to light. Neck: Musculoskeletal: Normal range of motion. Cardiovascular:  
   Rate and Rhythm: Normal rate and regular rhythm. Pulmonary:  
   Effort: Pulmonary effort is normal. No respiratory distress. Breath sounds: No stridor. Wheezing present. Abdominal:  
   General: There is no distension. Palpations: Abdomen is soft. Tenderness: There is no abdominal tenderness. Musculoskeletal: Normal range of motion. Skin: 
   General: Skin is warm and dry. Neurological:  
   Mental Status: She is alert and oriented to person, place, and time. Diagnostic Study Results Labs - Recent Results (from the past 12 hour(s)) EKG, 12 LEAD, INITIAL Collection Time: 04/11/21  8:50 PM  
Result Value Ref Range Ventricular Rate 98 BPM  
 Atrial Rate 98 BPM  
 P-R Interval 118 ms QRS Duration 116 ms  
 Q-T Interval 386 ms QTC Calculation (Bezet) 492 ms Calculated P Axis 33 degrees Calculated R Axis -21 degrees Calculated T Axis 133 degrees Diagnosis Normal sinus rhythm Possible Left atrial enlargement Left ventricular hypertrophy with QRS widening and repolarization abnormality Prolonged QT When compared with ECG of 05-MAR-2020 01:31, Left bundle branch block is no longer present CBC WITH AUTOMATED DIFF Collection Time: 04/11/21  9:06 PM  
Result Value Ref Range WBC 7.5 3.6 - 11.0 K/uL  
 RBC 4.44 3.80 - 5.20 M/uL  
 HGB 11.9 11.5 - 16.0 g/dL HCT 37.4 35.0 - 47.0 % MCV 84.2 80.0 - 99.0 FL  
 MCH 26.8 26.0 - 34.0 PG  
 MCHC 31.8 30.0 - 36.5 g/dL  
 RDW 13.9 11.5 - 14.5 % PLATELET 96 (L) 030 - 400 K/uL MPV 11.9 8.9 - 12.9 FL  
 NRBC 0.0 0  WBC ABSOLUTE NRBC 0.00 0.00 - 0.01 K/uL NEUTROPHILS 74 32 - 75 % LYMPHOCYTES 18 12 - 49 % MONOCYTES 5 5 - 13 % EOSINOPHILS 3 0 - 7 % BASOPHILS 0 0 - 1 % IMMATURE GRANULOCYTES 0 0.0 - 0.5 % ABS. NEUTROPHILS 5.5 1.8 - 8.0 K/UL  
 ABS. LYMPHOCYTES 1.4 0.8 - 3.5 K/UL  
 ABS. MONOCYTES 0.4 0.0 - 1.0 K/UL  
 ABS.  EOSINOPHILS 0.2 0.0 - 0.4 K/UL  
 ABS. BASOPHILS 0.0 0.0 - 0.1 K/UL  
 ABS. IMM. GRANS. 0.0 0.00 - 0.04 K/UL  
 DF SMEAR SCANNED    
 RBC COMMENTS ANISOCYTOSIS 1+ 
    
 RBC COMMENTS POLYCHROMASIA PRESENT 
    
METABOLIC PANEL, COMPREHENSIVE Collection Time: 04/11/21  9:06 PM  
Result Value Ref Range Sodium 141 136 - 145 mmol/L Potassium 5.4 (H) 3.5 - 5.1 mmol/L Chloride 110 (H) 97 - 108 mmol/L  
 CO2 29 21 - 32 mmol/L Anion gap 2 (L) 5 - 15 mmol/L Glucose 127 (H) 65 - 100 mg/dL BUN 27 (H) 6 - 20 MG/DL Creatinine 1.56 (H) 0.55 - 1.02 MG/DL  
 BUN/Creatinine ratio 17 12 - 20 GFR est AA 39 (L) >60 ml/min/1.73m2 GFR est non-AA 32 (L) >60 ml/min/1.73m2 Calcium 9.5 8.5 - 10.1 MG/DL Bilirubin, total 0.3 0.2 - 1.0 MG/DL  
 ALT (SGPT) 15 12 - 78 U/L  
 AST (SGOT) 19 15 - 37 U/L Alk. phosphatase 146 (H) 45 - 117 U/L Protein, total 8.3 (H) 6.4 - 8.2 g/dL Albumin 3.9 3.5 - 5.0 g/dL Globulin 4.4 (H) 2.0 - 4.0 g/dL A-G Ratio 0.9 (L) 1.1 - 2.2 CK W/ REFLX CKMB Collection Time: 04/11/21  9:06 PM  
Result Value Ref Range  26 - 192 U/L  
TROPONIN I Collection Time: 04/11/21  9:06 PM  
Result Value Ref Range Troponin-I, Qt. 0.06 (H) <0.05 ng/mL NT-PRO BNP Collection Time: 04/11/21  9:06 PM  
Result Value Ref Range NT pro-BNP 1,300 (H) <450 PG/ML  
SAMPLES BEING HELD Collection Time: 04/11/21  9:06 PM  
Result Value Ref Range SAMPLES BEING HELD  SST, RED, DWAINE   
 COMMENT Add-on orders for these samples will be processed based on acceptable specimen integrity and analyte stability, which may vary by analyte. Radiologic Studies -  
XR CHEST PORT Final Result No acute findings. Xr Chest [facility] Result Date: 4/11/2021 No acute findings. Medical Decision Making I am the first provider for this patient. I reviewed the vital signs, available nursing notes, past medical history, past surgical history, family history and social history.  
 
Vital Signs-Reviewed the patient's vital signs. Patient Vitals for the past 12 hrs: 
 Temp Pulse Resp BP SpO2  
04/11/21 2300    (!) 150/78 100 % 04/11/21 2200     94 % 04/11/21 2130    (!) 145/70 94 % 04/11/21 2042 97.8 °F (36.6 °C) 100 18 (!) 145/75 95 % Pulse Oximetry Analysis - 100% on ra Cardiac Monitor:  
Rate: 100 bpm 
Rhythm: Normal Sinus Rhythm Records Reviewed: Nursing Notes and Old Medical Records Provider Notes (Medical Decision Making):  
Patient presents with shortness of breath and wheezing. On my evaluation she is in no acute distress, able to speak in complete sentences without dyspnea though does have wheezing on auscultation. Will check chest x-ray, basic labs, troponin, BNP. Will give breathing treatments and reevaluate. ED Course:  
Initial assessment performed. The patients presenting problems have been discussed, and they are in agreement with the care plan formulated and outlined with them. I have encouraged them to ask questions as they arise throughout their visit. ED Course as of Apr 12 0055 Sun Apr 11, 2021  
2352 Feeling improved. Son at bedside. Pt wheelchair bound at baseline. Has cards appt on Tuesday [facility] ED Course User Index Minoo Pleitez MD  
 
 
Procedures: 
Procedures Critical Care: 
none Disposition: 
Discharge Note: The patient has been re-evaluated and is ready for discharge. Reviewed available results with patient. Counseled patient on diagnosis and care plan. Patient has expressed understanding, and all questions have been answered. Patient agrees with plan and agrees to follow up as recommended, or to return to the ED if their symptoms worsen. Discharge instructions have been provided and explained to the patient, along with reasons to return to the ED. PLAN: 
1. Discharge Medication List as of 4/11/2021 11:54 PM  
  
 
2. Follow-up Information  Follow up With Specialties Details Why Contact Info  
 Consuelo Arango NP Family Medicine Schedule an appointment as soon as possible for a visit   [address] [phone] [facility] EMERGENCY DEPT Emergency Medicine  As needed, If symptoms worsen [address] [place] [address] [address] [facility] 
[phone] Return to ED if worse Diagnosis Clinical Impression: 1. Dyspnea, unspecified type Please note that this dictation was completed with [employer], the computer voice recognition software. Quite often unanticipated grammatical, syntax, homophones, and other interpretive errors are inadvertently transcribed by the computer software. Please disregard these errors. Please excuse any errors that have escaped final proofreading

## 2021-04-13 NOTE — PROGRESS NOTES
Chief Complaint Patient presents with  Irregular Heart Beat  
  afib, LBBB, annual f/u  Shortness of Breath  
  due to allergies, pt wheezes when allergies are bad, at rest and w/ activity 1. Have you been to the ER, urgent care clinic since your last visit? Hospitalized since your last visit? Yes - Sunday to [facility] for SOB/wheezing 2. Have you seen or consulted any other health care providers outside of the [address] [place] [address] since your last visit? Include any pap smears or colon screening.  No

## 2021-04-13 NOTE — LETTER
4/13/2021 Patient: Francisco Faria YOB: 1940 Date of Visit: 4/13/2021 Priyank Chaudhary NP 
[address] Fax: [phone] Dear Priyank Chaudhary NP, Thank you for referring Ms. Jesus Connolly to [facility] for evaluation. My notes for this consultation are attached. If you have questions, please do not hesitate to call me. I look forward to following your patient along with you. Sincerely, Linda Graves MD

## 2021-04-13 NOTE — PROGRESS NOTES
Subjective:  
  
Mari Mulligan is a 78 y.o. female is here for EP consult. The patient denies chest pain/ shortness of breath, orthopnea, PND, LE edema, palpitations, syncope, presyncope or fatigue. Patient Active Problem List  
 Diagnosis Date Noted  CVA (cerebral vascular accident) ([address] 03/04/2020  A-fib ([address] 10/05/2018  Ventral hernia with obstruction 06/01/2018  Bilateral carotid artery stenosis 05/10/2018  Acute cerebral infarction ([address] 05/09/2018  Syncope 05/06/2018  CAD (coronary artery disease) 02/05/2018  Stroke ([address] 02/05/2018  Hypertensive urgency 08/01/2017  CHF (congestive heart failure) ([address] 08/01/2017  COPD exacerbation ([address] 03/07/2017  CAD (coronary artery disease), native coronary artery 07/15/2016  H/O: CVA (cerebrovascular accident) 07/15/2016  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 ([address] 07/15/2016  Severe sepsis ([address] 07/14/2016  Pneumonia 07/14/2016  Acute respiratory failure ([address] 07/14/2016  Elevated troponin 07/14/2016  Left bundle branch block (LBBB) on electrocardiogram 07/14/2016  Acute systolic heart failure ([address] 07/14/2016  Acute kidney failure ([address] 07/14/2016  Mixed hyperlipidemia 09/21/2012  Essential hypertension, benign 09/21/2012  Other left bundle branch block 09/21/2012  Coronary atherosclerosis of native coronary artery 09/21/2012  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  
Diagnosis Date  Acute kidney failure ([address] 7/14/2016  [facility]  Arrhythmia  CAD (coronary artery disease)  Heart failure ([facility]  Hypertension  Stroke [facility])  Stroke, left hand weakness and speech, peripheral vision affected Past Surgical History:  
Procedure Laterality Date  HX UROLOGICAL  2018 Allergies Allergen Reactions  Decadron [Dexamethasone Sodium Phosphate] Hives  Pcn [Penicillins] Hives Family History Problem Relation Age of Onset  Coronary Artery Disease Other   
     grandmother age 76  Stroke Mother  Hypertension Mother   
 negative for cardiac disease Social History Socioeconomic History  Marital status:  Spouse name: Not on file  Number of children: Not on file  Years of education: Not on file  Highest education level: Not on file Tobacco Use  Smoking status: Former Smoker Quit date: 2009 Years since quittin.5  Smokeless tobacco: Never Used Substance and Sexual Activity  Alcohol use: No  
  Alcohol/week: 0.0 standard drinks Comment: Very little  Drug use: No  
Social History Narrative ** Merged History Encounter **  
    
 
Current Outpatient Medications Medication Sig  
 atorvastatin (LIPITOR) 80 mg tablet Take 1 Tab by mouth daily.  aspirin 81 mg chewable tablet Take 1 Tab by mouth daily.  metoprolol tartrate (LOPRESSOR) 25 mg tablet Take 1 Tab by mouth every twelve (12) hours.  amLODIPine (NORVASC) 5 mg tablet Take 5 mg by mouth daily.  allopurinol (ZYLOPRIM) 100 mg tablet Take 100 mg by mouth daily.  apixaban (ELIQUIS) 2.5 mg tablet Take 1 Tab by mouth two (2) times a day.  potassium chloride (K-DUR, KLOR-CON) 20 mEq tablet Take 20 mEq by mouth daily. MON, WED., SAT  famotidine (PEPCID) 20 mg tablet Take 20 mg by mouth daily.  furosemide (LASIX) 20 mg tablet Take 20 mg by mouth Every Mon, Wed and Sat.   
 nitroglycerin (NITROSTAT) 0.4 mg SL tablet 0.4 mg by SubLINGual route every five (5) minutes as needed for Chest Pain. Up to 3 doses.  albuterol (PROVENTIL HFA, VENTOLIN HFA, PROAIR HFA) 90 mcg/actuation inhaler Take 2 Puffs by inhalation every four (4) hours as needed for Wheezing. No current facility-administered medications for this visit. Vitals:  
 04/13/21 1348 BP: (!) 140/88 Pulse: 75 Resp: 18 SpO2: 96% Height: 5' (1.524 m) I have reviewed the nurses notes, vitals, problem list, allergy list, medical history, family, social history and medications. Review of Symptoms: 
 
General: Pt denies excessive weight gain or loss. Pt is able to conduct ADL's HEENT: Denies blurred vision, headaches, hearing loss, epistaxis and difficulty swallowing. Respiratory: Denies cough, congestion, shortness of breath, CHRISTINE, wheezing or stridor. Cardiovascular: Denies precordial pain, palpitations, edema or PND Gastrointestinal: Denies poor appetite, indigestion, abdominal pain or blood in stool Genitourinary: Denies hematuria, dysuria, increased urinary frequency Musculoskeletal: Denies joint pain or swelling from muscles or joints Neurologic: Denies tremor, paresthesias, headache, or sensory motor disturbance Psychiatric: Denies confusion, insomnia, depression Integumentray: Denies rash, itching or ulcers. Hematologic: Denies easy bruising, bleeding Physical Exam:   
 
General: Well developed, in no acute distress. HEENT: Eyes - PERRL, no jvd Heart:  Normal S1/S2 negative S3 or S4. Regular, no murmur, gallop or rub. Respiratory: Clear bilaterally x 4, no wheezing or rales Abdomen:   Soft, non-tender, bowel sounds are active. Extremities:  No edema, normal cap refill, no cyanosis. Musculoskeletal: No clubbing Neuro: A&Ox3, speech clear, gait stable. Skin: Skin color is normal. No rashes or lesions. Non diaphoretic, no ulcers or subcutaneous nodule Vascular: 2+ pulses symmetric in all extremities Psych - judgement intact and orientation is wnl Cardiographics Ekg: nsr, lbbb 
 
ilr - wnl Results for orders placed or performed during the hospital encounter of 04/11/21 EKG, 12 LEAD, INITIAL Result Value Ref Range Ventricular Rate 98 BPM  
 Atrial Rate 98 BPM  
 P-R Interval 118 ms QRS Duration 116 ms  
 Q-T Interval 386 ms QTC Calculation (Bezet) 492 ms Calculated P Axis 33 degrees Calculated R Axis -21 degrees Calculated T Axis 133 degrees Diagnosis Baseline artifact Normal sinus rhythm Left ventricular hypertrophy with QRS widening and repolarization abnormality Left bundle branch block Confirmed by Wero Mccoy ([id number]) on 4/12/2021 10:29:24 AM 
  
 
 
 
Lab Results Component Value Date/Time WBC 7.5 04/11/2021 09:06 PM  
 HGB 11.9 04/11/2021 09:06 PM  
 HCT 37.4 04/11/2021 09:06 PM  
 PLATELET 96 (L) 35/87/2578 09:06 PM  
 MCV 84.2 04/11/2021 09:06 PM  
  
Lab Results Component Value Date/Time Sodium 141 04/11/2021 09:06 PM  
 Potassium 5.4 (H) 04/11/2021 09:06 PM  
 Chloride 110 (H) 04/11/2021 09:06 PM  
 CO2 29 04/11/2021 09:06 PM  
 Anion gap 2 (L) 04/11/2021 09:06 PM  
 Glucose 127 (H) 04/11/2021 09:06 PM  
 BUN 27 (H) 04/11/2021 09:06 PM  
 Creatinine 1.56 (H) 04/11/2021 09:06 PM  
 BUN/Creatinine ratio 17 04/11/2021 09:06 PM  
 GFR est AA 39 (L) 04/11/2021 09:06 PM  
 GFR est non-AA 32 (L) 04/11/2021 09:06 PM  
 Calcium 9.5 04/11/2021 09:06 PM  
 Bilirubin, total 0.3 04/11/2021 09:06 PM  
 Alk. phosphatase 146 (H) 04/11/2021 09:06 PM  
 Protein, total 8.3 (H) 04/11/2021 09:06 PM  
 Albumin 3.9 04/11/2021 09:06 PM  
 Globulin 4.4 (H) 04/11/2021 09:06 PM  
 A-G Ratio 0.9 (L) 04/11/2021 09:06 PM  
 ALT (SGPT) 15 04/11/2021 09:06 PM  
  
 
 Assessment: 
 
 Assessment: ICD-10-CM ICD-9-CM 1. Atrial fibrillation, unspecified type [facility]  I48.91 427.31 AMB POC EKG ROUTINE W/ 12 LEADS, INTER & REP 2. Left bundle branch block (LBBB) on electrocardiogram  I44.7 426.3 AMB POC EKG ROUTINE W/ 12 LEADS, INTER & REP 3. Cerebrovascular accident (CVA) due to embolism of precerebral artery ([address]  I63.10 434.11   
4. Essential hypertension, benign  I10 401.1 5. Mixed hyperlipidemia  E78.2 272.2 6. LBBB (left bundle branch block)  I44.7 426.3 Orders Placed This Encounter  AMB POC EKG ROUTINE W/ 12 LEADS, INTER & REP Order Specific Question:   Reason for Exam: Answer:   routine Plan:  
Joi Whyte is doing well. She is in sinus sp af abl and asymptomatic. Stable and compliant with oac. Cont med rx for htn and hyperlipidemia. F/u in one year. During this visit,  the patient and I had a comprehensive discussion of arrhythmia management using principles of shared decision making. This included a discussion of oral anticoagulation to prevent thromboembolic stroke in individuals with a history of AF and according to an elevated chadsvasc score. We also reviewed the requisite monitoring required of some of these medications. In addition, we reviewed ablative therapy, including the potential benefits and risks of catheter ablation. These risks include death, myocardial infarction, stroke, cardiac perforation, vascular injury, and other less severe complications. The patient demonstrated a clear understanding of these issues during out discussion. Our plans, determined together after thorough consideration, are outlined else where in this note. Continue medical management for af, htn and hyperlipidemia. Thank you for allowing me to participate in Joi Whyte 's care. Chemo Garcia MD, Faiza Cooney On this date 04/13/2021 I have spent 42 minutes reviewing previous notes, test results and face to face with the patient discussing the diagnosis and importance of compliance with the treatment plan as well as documenting on the day of the visit.

## 2021-08-03 PROBLEM — I63.9 CVA (CEREBRAL VASCULAR ACCIDENT) (HCC): Status: RESOLVED | Noted: 2020-03-04 | Resolved: 2021-01-01

## 2021-08-03 PROBLEM — I63.9 STROKE (HCC): Status: RESOLVED | Noted: 2018-02-05 | Resolved: 2021-01-01

## 2021-09-25 PROBLEM — A41.9 SEPSIS (HCC): Status: ACTIVE | Noted: 2021-01-01

## 2021-09-25 PROBLEM — N39.0 UTI (URINARY TRACT INFECTION): Status: ACTIVE | Noted: 2021-01-01

## 2021-09-25 PROBLEM — R09.02 HYPOXEMIA: Status: ACTIVE | Noted: 2021-01-01

## 2021-09-25 PROBLEM — J12.82 PNEUMONIA DUE TO COVID-19 VIRUS: Status: ACTIVE | Noted: 2021-01-01

## 2021-09-25 PROBLEM — U07.1 PNEUMONIA DUE TO COVID-19 VIRUS: Status: ACTIVE | Noted: 2021-01-01

## 2021-09-25 NOTE — ED PROVIDER NOTES
EMERGENCY DEPARTMENT HISTORY AND PHYSICAL EXAM      Date: 9/25/2021  Patient Name: Melissa Cedeño    History of Presenting Illness     Chief Complaint   Patient presents with    Fatigue     Into triage via wheelchair accompanied by her son, with c/o decreased PO intake, fatigue x 3 days; diarrhea yesterday.  Decreased Appetite       History Provided By: Patient and Patient's Son    HPI: Melissa Cedeño, 78 y.o. female with history of coronary artery disease, CHF with preserved EF, prior cardiac stenting, hypertension, prior CVA presents to the ED with cc of fatigue, decreased appetite, lethargy, weakness. Symptoms have been progressively worsening over the past 2 days. Son brought patient in because she has not been tolerating any p.o. intake and seemed generally weak and tired. He endorses confusion as well as diarrhea that started yesterday. No fever at home and son denies any reports of chest pain, shortness of breath, cough, abdominal pain. No exposure to sick contacts. She is not vaccinated for COVID-19 but son denies any recent exposure to sick contacts. Remainder of history and ROS limited given patient's altered mental status    There are no other complaints, changes, or physical findings at this time. PCP: Aung Villalobos, DO    No current facility-administered medications on file prior to encounter. Current Outpatient Medications on File Prior to Encounter   Medication Sig Dispense Refill    atorvastatin (LIPITOR) 80 mg tablet Take 1 Tab by mouth daily. 30 Tab 2    aspirin 81 mg chewable tablet Take 1 Tab by mouth daily. 30 Tab 2    metoprolol tartrate (LOPRESSOR) 25 mg tablet Take 1 Tab by mouth every twelve (12) hours. 60 Tab 2    amLODIPine (NORVASC) 5 mg tablet Take 5 mg by mouth daily.  allopurinol (ZYLOPRIM) 100 mg tablet Take 100 mg by mouth daily.  apixaban (ELIQUIS) 2.5 mg tablet Take 1 Tab by mouth two (2) times a day.  60 Tab 12    potassium chloride (K-DUR, KLOR-CON) 20 mEq tablet Take 20 mEq by mouth daily. MON, WED., SAT      famotidine (PEPCID) 20 mg tablet Take 20 mg by mouth daily.  furosemide (LASIX) 20 mg tablet Take 20 mg by mouth Every Mon, Wed and Sat.  nitroglycerin (NITROSTAT) 0.4 mg SL tablet 0.4 mg by SubLINGual route every five (5) minutes as needed for Chest Pain. Up to 3 doses.  albuterol (PROVENTIL HFA, VENTOLIN HFA, PROAIR HFA) 90 mcg/actuation inhaler Take 2 Puffs by inhalation every four (4) hours as needed for Wheezing. 1 Inhaler 1       Past History     Past Medical History:  Past Medical History:   Diagnosis Date    Acute kidney failure ([facility] [address]     Acute systolic heart failure ([facility] [address] 2016    Arrhythmia     CAD (coronary artery disease)     Heart failure (HCC)     Hypertension     Stroke ([facility] [address]     Stroke, left hand weakness and speech, peripheral vision affected       Past Surgical History:  Past Surgical History:   Procedure Laterality Date    HX UROLOGICAL  2018       Family History:  Family History   Problem Relation Age of Onset    Coronary Artery Disease Other         grandmother age 76    Stroke Mother     Hypertension Mother        Social History:  Social History     Tobacco Use    Smoking status: Former Smoker     Quit date: 2009     Years since quittin.0    Smokeless tobacco: Never Used   Vaping Use    Vaping Use: Never used   Substance Use Topics    Alcohol use: No     Alcohol/week: 0.0 standard drinks     Comment: Very little    Drug use: No       Allergies: Allergies   Allergen Reactions    Decadron [Dexamethasone Sodium Phosphate] Hives    Pcn [Penicillins] Hives         Review of Systems   Review of Systems   Unable to perform ROS: Mental status change       Physical Exam   Physical Exam  Vitals and nursing note reviewed. Constitutional:       General: She is not in acute distress. Appearance: Normal appearance. She is not ill-appearing or toxic-appearing. Comments: Appears thin, frail, lying on left decubitus side. HENT:      Head: Normocephalic and atraumatic. Nose: Nose normal.      Mouth/Throat:      Mouth: Mucous membranes are dry. Eyes:      Extraocular Movements: Extraocular movements intact. Pupils: Pupils are equal, round, and reactive to light. Cardiovascular:      Rate and Rhythm: Regular rhythm. Tachycardia present. Heart sounds: No murmur heard. Pulmonary:      Effort: Pulmonary effort is normal. No respiratory distress. Breath sounds: Normal breath sounds. No wheezing. Abdominal:      General: There is no distension. Palpations: Abdomen is soft. Tenderness: There is abdominal tenderness ( Minimal reproducible TTP diffusely). There is no guarding or rebound. Musculoskeletal:         General: No swelling or tenderness. Normal range of motion. Cervical back: Normal range of motion and neck supple. Right lower leg: No edema. Left lower leg: No edema. Skin:     General: Skin is warm and dry. Coloration: Skin is not pale. Findings: No erythema. Neurological:      General: No focal deficit present. Mental Status: She is alert and oriented to person, place, and time. Comments: Awake and alert, responds appropriately to verbal stimulus. Follows all commands.          Diagnostic Study Results     Labs -     Recent Results (from the past 12 hour(s))   CBC WITH AUTOMATED DIFF    Collection Time: 09/25/21  5:44 PM   Result Value Ref Range    WBC 4.3 3.6 - 11.0 K/uL    RBC 4.42 3.80 - 5.20 M/uL    HGB 12.0 11.5 - 16.0 g/dL    HCT 37.7 35.0 - 47.0 %    MCV 85.3 80.0 - 99.0 FL    MCH 27.1 26.0 - 34.0 PG    MCHC 31.8 30.0 - 36.5 g/dL    RDW 14.9 (H) 11.5 - 14.5 %    PLATELET 244 (L) 096 - 400 K/uL    MPV 12.1 8.9 - 12.9 FL    NRBC 0.0 0  WBC    ABSOLUTE NRBC 0.00 0.00 - 0.01 K/uL    NEUTROPHILS 75 32 - 75 %    BAND NEUTROPHILS 1 %    LYMPHOCYTES 15 12 - 49 %    MONOCYTES 9 5 - 13 %    EOSINOPHILS 0 0 - 7 %    BASOPHILS 0 0 - 1 %    IMMATURE GRANULOCYTES 0 0.0 - 0.5 %    ABS. NEUTROPHILS 3.3 1.8 - 8.0 K/UL    ABS. LYMPHOCYTES 0.6 (L) 0.8 - 3.5 K/UL    ABS. MONOCYTES 0.4 0.0 - 1.0 K/UL    ABS. EOSINOPHILS 0.0 0.0 - 0.4 K/UL    ABS. BASOPHILS 0.0 0.0 - 0.1 K/UL    ABS. IMM. GRANS. 0.0 0.00 - 0.04 K/UL    DF MANUAL      PLATELET COMMENTS CLUMPED PLATELETS      RBC COMMENTS STOMATOCYTES  PRESENT        WBC COMMENTS ATYPICAL LYMPHOCYTES PRESENT     METABOLIC PANEL, COMPREHENSIVE    Collection Time: 09/25/21  5:44 PM   Result Value Ref Range    Sodium 141 136 - 145 mmol/L    Potassium 3.8 3.5 - 5.1 mmol/L    Chloride 109 (H) 97 - 108 mmol/L    CO2 29 21 - 32 mmol/L    Anion gap 3 (L) 5 - 15 mmol/L    Glucose 120 (H) 65 - 100 mg/dL    BUN 21 (H) 6 - 20 MG/DL    Creatinine 1.26 (H) 0.55 - 1.02 MG/DL    BUN/Creatinine ratio 17 12 - 20      GFR est AA 49 (L) >60 ml/min/1.73m2    GFR est non-AA 41 (L) >60 ml/min/1.73m2    Calcium 8.5 8.5 - 10.1 MG/DL    Bilirubin, total 0.6 0.2 - 1.0 MG/DL    ALT (SGPT) 14 12 - 78 U/L    AST (SGOT) 34 15 - 37 U/L    Alk.  phosphatase 96 45 - 117 U/L    Protein, total 7.8 6.4 - 8.2 g/dL    Albumin 3.2 (L) 3.5 - 5.0 g/dL    Globulin 4.6 (H) 2.0 - 4.0 g/dL    A-G Ratio 0.7 (L) 1.1 - 2.2     TROPONIN I    Collection Time: 09/25/21  5:44 PM   Result Value Ref Range    Troponin-I, Qt. 0.35 (H) <0.05 ng/mL   URINALYSIS W/ REFLEX CULTURE    Collection Time: 09/25/21  6:22 PM    Specimen: Miscellaneous sample; Urine    Urine specimen   Result Value Ref Range    Color YELLOW/STRAW      Appearance TURBID (A) CLEAR      Specific gravity 1.024 1.003 - 1.030      pH (UA) 5.5 5.0 - 8.0      Protein 300 (A) NEG mg/dL    Glucose Negative NEG mg/dL    Ketone Negative NEG mg/dL    Blood SMALL (A) NEG      Urobilinogen 1.0 0.2 - 1.0 EU/dL    Nitrites Negative NEG      Leukocyte Esterase Negative NEG      WBC 0-4 0 - 4 /hpf    RBC 0-5 0 - 5 /hpf    Epithelial cells MODERATE (A) FEW /lpf Bacteria 2+ (A) NEG /hpf    UA:UC IF INDICATED CULTURE NOT INDICATED BY UA RESULT CNI      Amorphous Crystals FEW (A) NEG     SAMPLES BEING HELD    Collection Time: 09/25/21  6:22 PM   Result Value Ref Range    SAMPLES BEING HELD NSR     COMMENT        Add-on orders for these samples will be processed based on acceptable specimen integrity and analyte stability, which may vary by analyte. BILIRUBIN, CONFIRM    Collection Time: 09/25/21  6:22 PM   Result Value Ref Range    Bilirubin UA, confirm Negative NEG     COVID-19 RAPID TEST    Collection Time: 09/25/21  7:32 PM   Result Value Ref Range    Specimen source Nasopharyngeal      COVID-19 rapid test Detected (AA) NOTD     BLOOD GAS,CHEM8,LACTIC ACID POC    Collection Time: 09/25/21  7:32 PM   Result Value Ref Range    Calcium, ionized (POC) 1.13 1.12 - 1.32 mmol/L    BICARBONATE 28 mmol/L    Base excess (POC) 2.6 mmol/L    Sample source VENOUS BLOOD      CO2, POC 29 (H) 19 - 24 MMOL/L    Sodium,  136 - 145 MMOL/L    Potassium, POC 4.0 3.5 - 5.5 MMOL/L    Chloride,  (H) 100 - 108 MMOL/L    Glucose,  (H) 74 - 106 MG/DL    Creatinine, POC 1.2 0.6 - 1.3 MG/DL    Lactic Acid (POC) 0.98 0.40 - 2.00 mmol/L    pH, venous (POC) 7.40 7.32 - 7.42      pCO2, venous (POC) 45.8 41 - 51 MMHG    pO2, venous (POC) 17 (L) 25 - 40 mmHg       Radiologic Studies -   CTA CHEST W OR W WO CONT   Final Result   No evidence of pulmonary embolism. No aortic aneurysm or dissection. No acute process is identified in the chest, abdomen or pelvis. Before meals above first extensive list an incidental findings. CT ABD PELV W CONT   Final Result   No evidence of pulmonary embolism. No aortic aneurysm or dissection. No acute process is identified in the chest, abdomen or pelvis. Before meals above first extensive list an incidental findings. CT HEAD WO CONT   Final Result   No acute intracranial process.             XR CHEST PA LAT   Final Result   No acute intrathoracic disease. CT Results  (Last 48 hours)               09/25/21 2018  CTA CHEST W OR W WO CONT Final result    Impression:  No evidence of pulmonary embolism. No aortic aneurysm or dissection. No acute process is identified in the chest, abdomen or pelvis. Before meals above first extensive list an incidental findings. Narrative:      CLINICAL HISTORY: Sepsis       INDICATION:  Sepsis   COMPARISON:  3/8/2017       TECHNIQUE:    Following the uneventful intravenous administration of 100 cc Isovue-370, thin   axial images were obtained through the chest, abdomen and pelvis. Coronal and   sagittal reconstructions were generated. Oral contrast was not administered. CT dose reduction was achieved through use of a standardized protocol tailored   for this examination and automatic exposure control for dose modulation. Adaptive statistical iterative reconstruction (ASIR) was utilized. For the chest portion of the examination only;    3-D MIP reconstructed imaging was obtained. FINDINGS:    VASCULATURE: Aortic atherosclerotic change. Cardiac megaly. Coronary artery   disease. No aortic aneurysm or dissection. No proximal pulmonary embolism is identified. MEDIASTINUM/HEART: No mass or lymphadenopathy. No hilar or mediastinal   lymphadenopathy. No esophageal mass. No endotracheal or endobronchial mass. PLEURA/LUNGS: No effusion or pneumothorax. Left basilar atelectasis. There is no   pleural or pericardial effusion. SOFT TISSUE/ AXILLA: No axillary adenopathy. No chest wall mass. LIVER/GALLBLADDER: Few scattered hypodensities. Likely cysts. There is no   intrahepatic duct dilatation. The CBD is not dilated. There is no hepatic   parenchymal mass. Hepatic enhancement pattern is within normal limits. The   portal vein is patent. SPLEEN/PANCREAS: No mass. . There is no pancreatic mass. There is no pancreatic   duct dilatation. There is no evidence of splenomegaly. ADRENALS/KIDNEYS: Renal cortical thinning on the right and on the left. . There   is no adrenal mass. There is no hydroureter or hydronephrosis. There is no   perinephric mass. No ureteral or bladder calculus. STOMACH, COLON AND SMALL BOWEL: Colonic diverticulosis. There is no obstruction   or free intraperitoneal air. There is no evidence of incarceration or   obstruction. No mesenteric adenopathy. APPENDIX: Normal   PERITONEUM/RETROPERITONEUM: Aortic atherosclerotic change. There is to severe   stenosis of the common iliac artery on the right. Mild to mm fusiform aneurysm   of the abdominal aorta. URINARY BLADDER: There is nondistended. There are uterine fibroids present. PELVIS: There is no pelvic adenopathy. There is no pelvic sidewall mass. There   is no inguinal adenopathy. BONES: Elevation of left hemidiaphragm. Minimal scoliosis. . No lytic or blastic   lesions. No evidence of fracture or dislocation. 09/25/21 2018  CT ABD PELV W CONT Final result    Impression:  No evidence of pulmonary embolism. No aortic aneurysm or dissection. No acute process is identified in the chest, abdomen or pelvis. Before meals above first extensive list an incidental findings. Narrative:      CLINICAL HISTORY: Sepsis       INDICATION:  Sepsis   COMPARISON:  3/8/2017       TECHNIQUE:    Following the uneventful intravenous administration of 100 cc Isovue-370, thin   axial images were obtained through the chest, abdomen and pelvis. Coronal and   sagittal reconstructions were generated. Oral contrast was not administered. CT dose reduction was achieved through use of a standardized protocol tailored   for this examination and automatic exposure control for dose modulation. Adaptive statistical iterative reconstruction (ASIR) was utilized. For the chest portion of the examination only;    3-D MIP reconstructed imaging was obtained.    FINDINGS:    VASCULATURE: Aortic atherosclerotic change. Cardiac megaly. Coronary artery   disease. No aortic aneurysm or dissection. No proximal pulmonary embolism is identified. MEDIASTINUM/HEART: No mass or lymphadenopathy. No hilar or mediastinal   lymphadenopathy. No esophageal mass. No endotracheal or endobronchial mass. PLEURA/LUNGS: No effusion or pneumothorax. Left basilar atelectasis. There is no   pleural or pericardial effusion. SOFT TISSUE/ AXILLA: No axillary adenopathy. No chest wall mass. LIVER/GALLBLADDER: Few scattered hypodensities. Likely cysts. There is no   intrahepatic duct dilatation. The CBD is not dilated. There is no hepatic   parenchymal mass. Hepatic enhancement pattern is within normal limits. The   portal vein is patent. SPLEEN/PANCREAS: No mass. . There is no pancreatic mass. There is no pancreatic   duct dilatation. There is no evidence of splenomegaly. ADRENALS/KIDNEYS: Renal cortical thinning on the right and on the left. . There   is no adrenal mass. There is no hydroureter or hydronephrosis. There is no   perinephric mass. No ureteral or bladder calculus. STOMACH, COLON AND SMALL BOWEL: Colonic diverticulosis. There is no obstruction   or free intraperitoneal air. There is no evidence of incarceration or   obstruction. No mesenteric adenopathy. APPENDIX: Normal   PERITONEUM/RETROPERITONEUM: Aortic atherosclerotic change. There is to severe   stenosis of the common iliac artery on the right. Mild to mm fusiform aneurysm   of the abdominal aorta. URINARY BLADDER: There is nondistended. There are uterine fibroids present. PELVIS: There is no pelvic adenopathy. There is no pelvic sidewall mass. There   is no inguinal adenopathy. BONES: Elevation of left hemidiaphragm. Minimal scoliosis. . No lytic or blastic   lesions. No evidence of fracture or dislocation. 09/25/21 2018  CT HEAD WO CONT Final result    Impression:  No acute intracranial process.                Narrative:  CLINICAL HISTORY: Altered mental status   INDICATION: Altered mental status   COMPARISON: 3/4/2020. CT dose reduction was achieved through use of a standardized protocol tailored   for this examination and automatic exposure control for dose modulation. TECHNIQUE: Serial axial images with a collimation of 5 mm were obtained from the   skull base through the vertex     FINDINGS:    There is sulcal and ventricular prominence. Confluent periventricular and   scattered foci of hypodensity in the cerebral white matter. There is no evidence   of an acute infarction, hemorrhage, or mass-effect. There is no evidence of   midline shift or hydrocephalus. Posterior fossa structures are unremarkable. No   extra-axial collections are seen. Mastoid air cells are well pneumatized and clear. Encephalomalacia in the left MCA territory consistent with large right MCA   territory infarction. Moderate to large left occipital infarction as well. Severe chronic microvascular change. Scattered small to moderate cerebellar   infarctions as well. CXR Results  (Last 48 hours)               09/25/21 1711  XR CHEST PA LAT Final result    Impression:  No acute intrathoracic disease. Narrative:  Clinical history: Fever, fatigue   INDICATION:   Fever, fatigue   COMPARISON: 4/11/2021       FINDINGS:    PA and lateral views of the chest are obtained. The cardiopericardial silhouette is stable in appearance. Aortic atherosclerotic   change. . There is no pleural effusion, pneumothorax or focal consolidation   present. Medical Decision Making   I am the first provider for this patient. I reviewed the vital signs, available nursing notes, past medical history, past surgical history, family history and social history. Vital Signs-Reviewed the patient's vital signs.   Patient Vitals for the past 12 hrs:   Temp Pulse Resp BP SpO2   09/25/21 1915 (!) 102.8 °F (39.3 °C) (!) 116 18 139/80 93 %   09/25/21 1830  (!) 118  (!) 142/86 94 %   09/25/21 1734  (!) 110  (!) 147/94 98 %   09/25/21 1645 100 °F (37.8 °C) (!) 126 18 (!) 162/98 94 %       Records Reviewed: Nursing Notes    Provider Notes (Medical Decision Making):   49-year-old female presenting with decreased p.o. intake, fatigue, generalized weakness, confusion. Upon my evaluation she is noted to be febrile 102 °F with new hypoxia from vitals obtained at triage. She now meets sepsis criteria and I will initiate treatment with IV fluids 30 cc/kg and empiric antibiotics. Will test for COVID-19 infection. Noted to have no significant leukocytosis but she does have 1% bandemia. Troponin elevated 0.35 but creatinine only 1.26, likely type II NSTEMI given her sustained tachycardia from fever and infection. EKG without acute ST elevation. Currently awaiting urinalysis. Given her confusion we will also obtain CT head as well as CTA chest to rule out pneumonia versus PE and CT abdomen pelvis to rule out acute intra-abdominal process to explain her infection. ED [facility] ED SEPSIS NOTE:     7:18 PM The patient now meets criteria for: Severe Sepsis    1. Fluid resuscitation with: 30 mL/kg crystalloid bolus  2. Due to concern for rapidly advancing infection and deterioration of patient's condition, antibiotics are started STAT and cultures ordered. I've performed a sepsis reassessment of the patient's clinical volume status and tissue perfusion at time 8:43 PM        ED Course:   Initial assessment performed. The patients presenting problems have been discussed, and they are in agreement with the care plan formulated and outlined with them. I have encouraged them to ask questions as they arise throughout their visit. ED Course as of Sep 25 2048   Sat Sep 25, 2021   1901 EKG per my interpretation sinus tachycardia, rate 126 bpm, leftward axis, LVH present, nonspecific ST-T wave abnormality, no ST elevation, no interval changes.     [AK]      ED Course User Index  [AK] Idania May MD         Cardiac Monitoring: The cardiac monitor revealed the following rhythm as interpreted by me: Sinus Tachycardia rate 118 bpm  The cardiac monitor was ordered secondary to the patient's reported complaint of confusion, weakness and to monitor the patient for dysrhythmia. Hermann Angeles MD      Admission Note:  Patient is being admitted to the hospital by Dr. Warnell Goldberg, Service: Hospitalist.  The results of their tests and reasons for their admission have been discussed with them and available family. They convey agreement and understanding for the need to be admitted and for their admission diagnosis. Critical Care Documentation  I have spent 46 minutes of critical care time in evaluating and treating this patient. This includes time spent at bedside, time with family and decision makers, documentation, review of labs and imaging, and/or consultation with specialists. It does not include time spent on separately billed procedures. This patient presents with a critical illness or injury that acutely impairs one or more vital organ systems such that there is a high probability of imminent or life threatening deterioration in the patient's condition. This case involved decision making of high complexity to assess, manipulate, and support vital organ system failure and/or to prevent further life threatening deterioration of the patient's condition. Failure to initiate these interventions on an urgent basis would likely result in sudden, clinically significant or life threatening deterioration in the patient's condition. This case required my direct attention, intervention, and personal management for the time documented above. This time does not include separately billed interventions/procedures which are separately documented.      Abnormal findings supporting critical care: Sepsis, COVID-19 infection, type II NSTEMI  Interventions to support critical care: Administration of 30 cc/kg IV fluid bolus resuscitation, empiric IV antibiotics, frequent reassessment, titration of nasal cannula O2 supplementation  Failure to intervene may result in: Worsening hypoxia, worsening infection and sepsis, progression into septic shock, respiratory failure, loss of airway, arrhythmia, cardiac ischemia, death  Kailyn Campuzano MD      Disposition:  Admit      Diagnosis     Clinical Impression:   1. COVID-19    2. NSTEMI (non-ST elevated myocardial infarction) ([facility] [address]    3. Acute respiratory failure with hypoxia (HCC)        Attestations:  I am the first and primary provider of record for this patient's ED encounter. I personally performed the services described above in this documentation. Kailyn Campuzano MD    Please note that this dictation was completed with [employer], the computer voice recognition software. Quite often unanticipated grammatical, syntax, homophones, and other interpretive errors are inadvertently transcribed by the computer software. Please disregard these errors. Please excuse any errors that have escaped final proofreading. Thank you.

## 2021-09-26 NOTE — PROGRESS NOTES
Transition of Care Plan:    RUR: 17%  Disposition: TBD  Follow up appointments: TBD  DME needed: Has wheelchair at home  Transportation at Discharge: [place] or means to access home:        IM Medicare Letter: Son has keys   Is patient a BCPI-A Bundle: If yes, was Bundle Letter given?:     Caregiver Contact: Son Elenore Curling [phone] (home) [phone] (cell phone)  Discharge Caregiver contacted prior to discharge? Reason for Admission:   COVID-19 pneumonia  Hypoxia  Sepsis  Metabolic encephalopathy               Plan for utilizing home health:    Established       PCP: First and Last name:  Sriram Douglas,      Name of Practice:    Are you a current patient: Yes/No:    Approximate date of last visit:    Can you participate in a virtual visit with your PCP:                     Current Advanced Directive/Advance Care Plan: DNR      Healthcare Decision Maker:   Click here to complete [place] including selection of the Healthcare Decision Maker Relationship (ie \"Primary\")             Primary Decision Maker: Jose Conte [facility] - [phone]                  Transition of Care Plan:     CM contacted patient's son via telephone to complete initial assessment due to contact isolation precaution. CM introduced role, verified demographics, and discussed discharge planning. Patient lives with her son at [address] Home is a one level with a ramp to enter. Patient wheelchair and receives care in the home by family and [facility]. Previous SNF stay in 2019 at [facility] per son. Last PCP visit in April 2021. Patient uses [facility] for prescriptions. Primary CM will continue to follow patient for discharge planning needs and arrange for services as deemed necessary. Care Management Interventions  PCP Verified by CM:  Yes  Mode of Transport at Discharge:  (Son)  Transition of Care Consult (CM Consult):  (TBD)  Discharge Durable Medical Equipment: No  Physical Therapy Consult: No  Occupational Therapy Consult: No  Speech Therapy Consult: Yes  Support Systems: Child(nicci)  Confirm Follow Up Transport: Family     Mayte Lopez, MSN, RN  Care Manager

## 2021-09-26 NOTE — ACP (ADVANCE CARE PLANNING)
Advance Care Planning Note      NAME: Daryn Irwin   :  1940   MRN:  [id number]     Date/Time:  2021 9:22 PM    Active Diagnoses:  Hospital Problems  Date Reviewed: 2021        Codes Class Noted POA    Hypoxemia ICD-10-CM: R09.02  ICD-9-CM: 799.02  2021 Unknown        UTI (urinary tract infection) ICD-10-CM: N39.0  ICD-9-CM: 599.0  2021 Unknown        Sepsis ([address] ICD-10-CM: A41.9  ICD-9-CM: 038.9, 995.91  2021 Unknown        Pneumonia due to COVID-19 virus ICD-10-CM: U07.1, J12.82  ICD-9-CM: 480.8, 079.89  2021 Unknown              These active diagnoses are of sufficient risk that focused discussion on advance care planning is indicated in order to allow the patient to thoughtfully consider personal goals of care, and if situations arise that prevent the ability to personally give input, to ensure appropriate representation of their personal desires for different levels and aggressiveness of care. Discussion:   Code status addressed and wants to be a DNR / DNI. Patient wants central line and vasopressors if needed. Patient  would like to assign Allie Weeks as the surrogate decision maker. Persons present and participating in discussion: Nick Rdz MD, Allie Weeks      Time Spent:   Total time spent face-to-face in education and discussion:   16  minutes.          Rama Gold MD   Hospitalist

## 2021-09-26 NOTE — PROGRESS NOTES
Problem: Falls - Risk of  Goal: *Absence of Falls  Description: Document Jenni Ordoñez Fall Risk and appropriate interventions in the flowsheet. Outcome: Progressing Towards Goal  Note: Fall Risk Interventions:  Mobility Interventions: Bed/chair exit alarm, Communicate number of staff needed for ambulation/transfer, Patient to call before getting OOB, PT Consult for mobility concerns    Mentation Interventions: Adequate sleep, hydration, pain control, Bed/chair exit alarm, More frequent rounding, Room close to nurse's station    Medication Interventions: Bed/chair exit alarm, Patient to call before getting OOB, Teach patient to arise slowly    Elimination Interventions: Bed/chair exit alarm, Call light in reach, Patient to call for help with toileting needs, Toileting schedule/hourly rounds              Problem:  Body Temperature -  Risk of, Imbalanced  Goal: Ability to maintain a body temperature within defined limits  Outcome: Progressing Towards Goal  Goal: Will regain or maintain usual level of consciousness  Outcome: Progressing Towards Goal  Goal: Complications related to the disease process, condition or treatment will be avoided or minimized  Outcome: Progressing Towards Goal

## 2021-09-26 NOTE — PROGRESS NOTES
End of Shift Note    Bedside shift change report given to [facility], RNs (oncoming nurses) by Norma Bull RN (offgoing nurse). Report included the following information SBAR, Kardex, ED Summary, Intake/Output, MAR, Recent Results and Cardiac Rhythm Sinus Arrhythmia    Shift worked:  0912-4613     Shift summary and any significant changes:    Patient rested quietly overnight and was a transfer from the ED around 0130. Patient O2 sats are good on 2 L nasal cannula (%) overnight. Patient's BP was high overnight with most recent being 147/96. **Notified day shift nurses to monitor patient's BP**     Concerns for physician to address: Keep an eye on patient's BP - trending upward. Maybe prescribe a PRN BP med IV since patient is NPO    Try weaning patient off of oxygen when feasible. Needs cough medication     Zone phone for oncoming shift:          Activity:  Activity Level: Up with Assistance (per orders)  Number times ambulated in hallways past shift: 0  Number of times OOB to chair past shift: 0    Cardiac:   Cardiac Monitoring: Yes      Cardiac Rhythm: Sinus Arrhythmia    Access:   Current line(s): PIV     Genitourinary:   Urinary status: incontinent and external catheter    Respiratory:   O2 Device: Nasal cannula  Chronic home O2 use?: NO  Incentive spirometer at bedside: NO     GI:  Last Bowel Movement Date:  (unknown - patient confused)  Current diet:  DIET NPO  Passing flatus: YES  Tolerating current diet: YES       Pain Management:   Patient states pain is manageable on current regimen: N/A (no complaints of pain overnight)    Skin:  Nando Score: 13  Interventions: float heels, increase time out of bed, PT/OT consult and internal/external urinary devices    Patient Safety:  Fall Score:  Total Score: 4  Interventions: bed/chair alarm, gripper socks, pt to call before getting OOB and stay with me (per policy)  High Fall Risk: Yes    Length of Stay:  Expected LOS: - - -  Actual LOS: Pr-997  H .1 C/Jordan Hatfield Final

## 2021-09-26 NOTE — PROGRESS NOTES
Hospitalist Progress Note    NAME: Deonte Ortiz   :  1940   MRN:  [id number]     Interim Hospital Summary: 78 y.o. female whom presented on 2021 with      Assessment / Plan:  COVID-19 pneumonia with hypoxemia and sepsis  Metabolic encephalopathy  CTA chestno evidence of PE, no acute process. CT abdomen/pelvis with contrastno acute finding. CT head no acute findings  Sepsis criteria metfever, tachycardia, COVID-19 pneumonia  Continue remdesivir  Continue Solu-Medrol (allergic to dexamethasone)  Procalcitonin normal.  UA with no pyuria. Still with fever continue cefepime for now  Currently on 2 L nasal cannula. Try wean  Not safe for oral intake. Gentle hydration     Elevated troponin  Troponin 0.35 outpatient no chest pain. Likely type II MI.  Trending troponin. Echo pending     History of A. fib  Hyperlipidemia  GERD  Hypertension  Continue the home medications.     Functional paraplegia. Wheelchair-bound.       18.5 - 24.9 Normal weight / Body mass index is 23.05 kg/m². Estimated discharge date:   Barriers:    Code status: DNR  Prophylaxis: Eliquis  Recommended Disposition: SNF/LTC       Subjective:     Chief Complaint / Reason for Physician Visit  Follow up of Covid, pneumonia, sepsis  Seems confused    Review of Systems:  Symptom Y/N Comments  Symptom Y/N Comments   Fever/Chills    Chest Pain     Poor Appetite    Edema     Cough    Abdominal Pain     Sputum    Joint Pain     SOB/CHRISTINE    Pruritis/Rash     Nausea/vomit    Tolerating PT/OT     Diarrhea    Tolerating Diet     Constipation    Other       Could NOT obtain due to: confused     PO intake: No data found. Objective:     VITALS:   Last 24hrs VS reviewed since prior progress note.  Most recent are:  Patient Vitals for the past 24 hrs:   Temp Pulse Resp BP SpO2   21 1213 98.4 °F (36.9 °C) 95 18 (!) 176/104 97 %   21 1118     98 %   21 0814 98.2 °F (36.8 °C) 100 18 (!) 182/99 94 %   09/26/21 0336 98.1 °F (36.7 °C) 87 16 (!) 147/96 99 %   09/26/21 0136 98.7 °F (37.1 °C) 87 20 128/71 100 %   09/25/21 2347 99.5 °F (37.5 °C) 88 18 117/75 97 %   09/25/21 2252  (!) 103  131/78    09/25/21 2200 99.7 °F (37.6 °C) (!) 110 17 109/76 96 %   09/25/21 2130  (!) 117 29 133/77    09/25/21 2115  (!) 114 28     09/25/21 2100 (!) 101 °F (38.3 °C) (!) 116 30 135/80 94 %   09/25/21 1915 (!) 102.8 °F (39.3 °C) (!) 116 18 139/80 93 %   09/25/21 1830  (!) 118  (!) 142/86 94 %   09/25/21 1734  (!) 110  (!) 147/94 98 %   09/25/21 1645 100 °F (37.8 °C) (!) 126 18 (!) 162/98 94 %       Intake/Output Summary (Last 24 hours) at 9/26/2021 1458  Last data filed at 9/25/2021 2246  Gross per 24 hour   Intake 1965 ml   Output    Net 1965 ml        I had a face to face encounter, and independently examined this patient on 9/26/2021, as outlined below:  PHYSICAL EXAM:  General: WD, WN. Alert, cooperative, no acute distress    EENT:  EOMI. Anicteric sclerae. MMM  Resp:  CTA bilaterally, no wheezing or rales. No accessory muscle use  CV:  Regular  rhythm,  No edema  GI:  Soft, Non distended, Non tender. +Bowel sounds  Neurologic:  Alert and not oriented, not answering questions and not following commands  Psych:   Poor insight. Not anxious nor agitated  Skin:  No rashes. No jaundice    Reviewed most current lab test results and cultures  YES  Reviewed most current radiology test results   YES  Review and summation of old records today    NO  Reviewed patient's current orders and MAR    YES  PMH/SH reviewed - no change compared to H&P  ________________________________________________________________________  Care Plan discussed with:    Comments   Patient      Family      RN     Care Manager     Consultant                        Multidiciplinary team rounds were held today with , nursing, pharmacist and clinical coordinator.   Patient's plan of care was discussed; medications were reviewed and discharge planning was addressed. ________________________________________________________________________  Total NON critical care TIME:  25   Minutes    Total CRITICAL CARE TIME Spent:   Minutes non procedure based      Comments   >50% of visit spent in counseling and coordination of care x     This includes time during multidisciplinary rounds if indicated above   ________________________________________________________________________  Jessi Jordan MD     Procedures: see electronic medical records for all procedures/Xrays and details which were not copied into this note but were reviewed prior to creation of Plan. LABS:  I reviewed today's most current labs and imaging studies.   Pertinent labs include:  Recent Labs     09/26/21 0100 09/25/21  1744   WBC 3.7 4.3   HGB 10.0* 12.0   HCT 31.6* 37.7   * 128*     Recent Labs     09/26/21 0100 09/25/21  1744    141   K 3.7 3.8   * 109*   CO2 27 29   * 120*   BUN 17 21*   CREA 0.90 1.26*   CA 7.3* 8.5   ALB 2.4* 3.2*   TBILI 0.5 0.6   ALT 11* 14

## 2021-09-26 NOTE — PROGRESS NOTES
TRANSFER - IN REPORT:    Verbal report received from Rebeca Madrid RN (name) on Magali Rocha  being received from Emergency Department (unit) for routine progression of care      Report consisted of patients Situation, Background, Assessment and   Recommendations(SBAR). Information from the following report(s) SBAR, ED Summary, Intake/Output, MAR, Recent Results and Cardiac Rhythm NSR with occasional PVCs was reviewed with the receiving nurse. Opportunity for questions and clarification was provided. Assessment completed upon patients arrival to unit and care assumed. **Patient not on unit as of yet - just received report. **Patient arrived on unit at approximately 0130.

## 2021-09-26 NOTE — DISCHARGE INSTRUCTIONS
Patient Discharge Instructions    Saadia Myers / [id number] : 1940    Admitted 2021 Discharged: 10/14/2021         DISCHARGE DIAGNOSIS:   COVID-19 pneumonia with hypoxemia and sepsis  Metabolic encephalopathy  Dementia  History of CVA  Elevated troponin  Hypokalemia  History of A. fib  Hyperlipidemia  GERD  Hypertension  Functional paraplegia. Take Home Medications     {Medication reconciliation information is now added to the patient's AVS automatically when it is printed. There is no need to use this SmartLink in discharge instructions. Highlight this text and delete it to clear this message}      General drug facts      If you have a very bad allergy, wear an allergy ID at all times.  It is important that you take the medication exactly as they are prescribed.  Keep your medication in the bottles provided by the pharmacist.  Misael Kyleist a list of all your drugs (prescription, natural products, vitamins, OTC) with you. Give this list to your doctor.  Do not take other medications without consulting your doctor.   Do not share your drugs with others and do not take anyone else's drugs.  Keep all drugs out of the reach of children and pets.   Most drugs may be thrown away in household trash after mixing with coffee grounds or henry litter and sealing in a plastic bag.   Keep a list Call your doctor for help with any side effects. If in the [place] you may also call the FDA at 5-943-FDA-3183     Talk with the doctor before starting any new drug, including OTC, natural products, or vitamins. What to do at Home    1. Recommended diet: can eat for comfort    2. Recommended activity: Bedrest    3. If you experience any of the following symptoms then please call your primary care physician or return to the emergency room if you cannot get hold of your doctor:    4. Wound Care: none    5. Lab work: none     6. Bring these papers with you to your follow up appointments.  The papers will help your doctors be sure to continue the care plan from the hospital.      If you have questions regarding the hospital related prescriptions or hospital related issues please call [facility] at [phone]. You can always direct your questions to your primary care doctor if you are unable to reach your hospital physician; your PCP works as an extension of your hospital doctor just like your hospital doctor is an extension of your PCP for your time at the hospital [facility], [facility])      Follow-up with:   PCP: Modesta Craig DO  Follow-up Information     Follow up With Specialties Details Why Contact Info    Modesta Craig DO Family Medicine   [address] [facility]   [place]ie  [address] [facility]  [phone]           Please call for your own appointment        Information obtained by :  I understand that if any problems occur once I am at home I am to contact my physician. I understand and acknowledge receipt of the instructions indicated above.                                                                                                                                            Physician's or R.N.'s Signature                                                                  Date/Time                                                                                                                                              Patient or Representative Signature                                                          Date/Time

## 2021-09-26 NOTE — PROGRESS NOTES
Received message from patient's nurse in regards to patient's having dysphagia and unable to swallow and therefore will need to be n.p.o. Discussion / orders:    · Change diet to n.p.o.  · Start fluid replacement normal saline at 50 mL an hour  · SLP consult for evaluation and treatment         Please note that this note was dictated using Dragon computer voice recognition software. Quite often unanticipated grammatical, syntax, homophones, and other interpretive errors are inadvertently transcribed by the computer software. Please disregard these errors. Please excuse any errors that have escaped final proofreading.

## 2021-09-26 NOTE — H&P
Hospitalist Admission Note    NAME: Ava Duke   :  1940   MRN:  [id number]     Date/Time:  2021 9:05 PM    Patient PCP: Varghese Jenkins, DO  ______________________________________________________________________  Given the patient's current clinical presentation, I have a high level of concern for decompensation if discharged from the emergency department. Complex decision making was performed, which includes reviewing the patient's available past medical records, laboratory results, and x-ray films. My assessment of this patient's clinical condition and my plan of care is as follows. Assessment / Plan:  COVID-19 pneumonia  Hypoxia  Sepsis  Metabolic encephalopathy  CTA chestno evidence of PE, no acute process. CT abdomen/pelvis with contrastno acute finding. CT head no acute findings  Sepsis indicatedfever, tachycardia, COVID-19 pneumonia  Pharmacy consulted for remdesivir dosing. Start on Solu-Medrol because patient is allergic to dexamethasone. Procalcitonin, CRP pending. Albuterol 4 times daily, incentive spirometry, Mucinex twice daily  Incentive spirometry. Keeping on the cefepime because the UA looks suspicious for the UTI. Order urine culture. Currently on 2 L nasal cannula. Speech therapy consulted for the difficulty in swallowing. Currently n.p.o. Elevated troponin  Troponin 0.35 outpatient no chest pain. Likely type II MI.  Trending troponin. Echo pending    History of A. fib  Hyperlipidemia  GERD  Hypertension  Continue the home medications. Functional paraplegia. Wheelchair-bound. Code Status: DNR/DNI  Surrogate Decision Maker: Son    DVT Prophylaxis: Eliquis  GI Prophylaxis: not indicated    Baseline: Wheelchair-bound, eats by herself. Subjective:   CHIEF COMPLAINT: Altered mental status, fatigue, decreased appetite    HISTORY OF PRESENT ILLNESS:     Dixie Newton is a 78 y.o.    female who presents with mental status, fatigue and decreased appetite for 3 days. Patient past medical history of A. fib, hypertension, hyperlipidemia, wheelchair-bound. Patient is a poor historian and history is obtained from the son as well. According to the son patient was not feeling good, tired and not eating for the last 3 days. In the ER patient has a fever along with tachycardia. Patient tested positive for COVID-19 today. Patient has some shortness of breath with expiratory wheezing but no chest pain, no nausea or vomiting. Patient does report a diarrhea yesterday but no abdominal pain, no headache, no dizziness, no passing out episodes. We were asked to admit for work up and evaluation of the above problems. Past Medical History:   Diagnosis Date    [facility]     Acute systolic heart failure ([facility] [facility] 2016    Arrhythmia     CAD (coronary artery disease)     Heart failure ([facility] [facility]     Hypertension     Stroke [facility])     Stroke, left hand weakness and speech, peripheral vision affected        Past Surgical History:   Procedure Laterality Date    HX UROLOGICAL  2018       Social History     Tobacco Use    Smoking status: Former Smoker     Quit date: 2009     Years since quittin.0    Smokeless tobacco: Never Used   Substance Use Topics    Alcohol use: No     Alcohol/week: 0.0 standard drinks     Comment: Very little        Family History   Problem Relation Age of Onset    Coronary Artery Disease Other         grandmother age 76    Stroke Mother     Hypertension Mother      Allergies   Allergen Reactions    Decadron [Dexamethasone Sodium Phosphate] Hives    Pcn [Penicillins] Hives        Prior to Admission medications    Medication Sig Start Date End Date Taking? Authorizing Provider   atorvastatin (LIPITOR) 80 mg tablet Take 1 Tab by mouth daily. 3/8/20   Yazan Kelsey MD   aspirin 81 mg chewable tablet Take 1 Tab by mouth daily.  3/8/20   Deejay Flores MD SANDRA   metoprolol tartrate (LOPRESSOR) 25 mg tablet Take 1 Tab by mouth every twelve (12) hours. 3/8/20   Princess White MD   amLODIPine (NORVASC) 5 mg tablet Take 5 mg by mouth daily. Provider, Historical   allopurinol (ZYLOPRIM) 100 mg tablet Take 100 mg by mouth daily. Mallorie Jasso DO   apixaban (ELIQUIS) 2.5 mg tablet Take 1 Tab by mouth two (2) times a day. 9/11/18   Rowena Hogan ANP   potassium chloride (K-DUR, KLOR-CON) 20 mEq tablet Take 20 mEq by mouth daily. MON, WED., SAT 5/4/18   Provider, Historical   famotidine (PEPCID) 20 mg tablet Take 20 mg by mouth daily. Provider, Historical   furosemide (LASIX) 20 mg tablet Take 20 mg by mouth Every Mon, Wed and Sat. Provider, Historical   nitroglycerin (NITROSTAT) 0.4 mg SL tablet 0.4 mg by SubLINGual route every five (5) minutes as needed for Chest Pain. Up to 3 doses. Provider, Historical   albuterol (PROVENTIL HFA, VENTOLIN HFA, PROAIR HFA) 90 mcg/actuation inhaler Take 2 Puffs by inhalation every four (4) hours as needed for Wheezing. 7/21/16   Darron Pryor PA       REVIEW OF SYSTEMS:     I am not able to complete the review of systems because:    The patient is intubated and sedated    The patient has altered mental status due to his acute medical problems    The patient has baseline aphasia from prior stroke(s)    The patient has baseline dementia and is not reliable historian    The patient is in acute medical distress and unable to provide information           Total of 12 systems reviewed as follows:       POSITIVE= underlined text  Negative = text not underlined  General:  fever, chills, sweats, generalized weakness, weight loss/gain,      loss of appetite   Eyes:    blurred vision, eye pain, loss of vision, double vision  ENT:    rhinorrhea, pharyngitis   Respiratory:   cough, sputum production, SOB, CHRISTINE, wheezing, pleuritic pain   Cardiology:   chest pain, palpitations, orthopnea, PND, edema, syncope   Gastrointestinal: abdominal pain , N/V, diarrhea, dysphagia, constipation, bleeding   Genitourinary:  frequency, urgency, dysuria, hematuria, incontinence   Muskuloskeletal :  arthralgia, myalgia, back pain  Hematology:  easy bruising, nose or gum bleeding, lymphadenopathy   Dermatological: rash, ulceration, pruritis, color change / jaundice  Endocrine:   hot flashes or polydipsia   Neurological:  headache, dizziness, confusion, focal weakness, paresthesia,     Speech difficulties, memory loss, gait difficulty  Psychological: Feelings of anxiety, depression, agitation    Objective:   VITALS:    Visit Vitals  /80 (BP 1 Location: Right arm, BP Patient Position: At rest)   Pulse (!) 116   Temp (!) 102.8 °F (39.3 °C)   Resp 18   Ht 5' (1.524 m)   Wt 53.5 kg (118 lb)   SpO2 93%   BMI 23.05 kg/m²       PHYSICAL EXAM:    General:    Alert, cooperative, no distress, appears stated age. HEENT: Atraumatic, anicteric sclerae, pink conjunctivae     No oral ulcers, mucosa moist, throat clear, dentition fair  Neck:  Supple, symmetrical,  thyroid: non tender  Lungs:   Decreased breath sound bilaterally, expiratory wheezing chest wall:  No tenderness  No Accessory muscle use. Heart:   Regular  rhythm,  No  murmur   No edema  Abdomen:   Soft, non-tender. Not distended. Bowel sounds normal  Extremities: No cyanosis. No clubbing,      Skin turgor normal, Capillary refill normal, Radial dial pulse 2+  Skin:     Not pale. Not Jaundiced  No rashes   Psych:  Good insight. Not depressed. Not anxious or agitated. Neurologic: EOMs intact. No facial asymmetry. No aphasia or slurred speech. Symmetrical strength, Sensation grossly intact.  Alert and oriented X 4.     _______________________________________________________________________  Care Plan discussed with:    Comments   Patient x    Family  x    RN x    Care Manager                    Consultant:  x    _______________________________________________________________________  Expected Disposition:   Home with Family    HH/PT/OT/RN x   SNF/LTC    SOLA    ________________________________________________________________________  TOTAL TIME:  60 Minutes    Critical Care Provided     Minutes non procedure based      Comments     Reviewed previous records   >50% of visit spent in counseling and coordination of care  Discussion with patient and/or family and questions answered       ________________________________________________________________________  Signed: Yvon January, MD    Procedures: see electronic medical records for all procedures/Xrays and details which were not copied into this note but were reviewed prior to creation of Plan. LAB DATA REVIEWED:    Recent Results (from the past 24 hour(s))   CBC WITH AUTOMATED DIFF    Collection Time: 09/25/21  5:44 PM   Result Value Ref Range    WBC 4.3 3.6 - 11.0 K/uL    RBC 4.42 3.80 - 5.20 M/uL    HGB 12.0 11.5 - 16.0 g/dL    HCT 37.7 35.0 - 47.0 %    MCV 85.3 80.0 - 99.0 FL    MCH 27.1 26.0 - 34.0 PG    MCHC 31.8 30.0 - 36.5 g/dL    RDW 14.9 (H) 11.5 - 14.5 %    PLATELET 270 (L) 139 - 400 K/uL    MPV 12.1 8.9 - 12.9 FL    NRBC 0.0 0  WBC    ABSOLUTE NRBC 0.00 0.00 - 0.01 K/uL    NEUTROPHILS 75 32 - 75 %    BAND NEUTROPHILS 1 %    LYMPHOCYTES 15 12 - 49 %    MONOCYTES 9 5 - 13 %    EOSINOPHILS 0 0 - 7 %    BASOPHILS 0 0 - 1 %    IMMATURE GRANULOCYTES 0 0.0 - 0.5 %    ABS. NEUTROPHILS 3.3 1.8 - 8.0 K/UL    ABS. LYMPHOCYTES 0.6 (L) 0.8 - 3.5 K/UL    ABS. MONOCYTES 0.4 0.0 - 1.0 K/UL    ABS. EOSINOPHILS 0.0 0.0 - 0.4 K/UL    ABS. BASOPHILS 0.0 0.0 - 0.1 K/UL    ABS. IMM.  GRANS. 0.0 0.00 - 0.04 K/UL    DF MANUAL      PLATELET COMMENTS CLUMPED PLATELETS      RBC COMMENTS STOMATOCYTES  PRESENT        WBC COMMENTS ATYPICAL LYMPHOCYTES PRESENT     METABOLIC PANEL, COMPREHENSIVE    Collection Time: 09/25/21  5:44 PM   Result Value Ref Range    Sodium 141 136 - 145 mmol/L    Potassium 3.8 3.5 - 5.1 mmol/L    Chloride 109 (H) 97 - 108 mmol/L    CO2 29 21 - 32 mmol/L    Anion gap 3 (L) 5 - 15 mmol/L    Glucose 120 (H) 65 - 100 mg/dL    BUN 21 (H) 6 - 20 MG/DL    Creatinine 1.26 (H) 0.55 - 1.02 MG/DL    BUN/Creatinine ratio 17 12 - 20      GFR est AA 49 (L) >60 ml/min/1.73m2    GFR est non-AA 41 (L) >60 ml/min/1.73m2    Calcium 8.5 8.5 - 10.1 MG/DL    Bilirubin, total 0.6 0.2 - 1.0 MG/DL    ALT (SGPT) 14 12 - 78 U/L    AST (SGOT) 34 15 - 37 U/L    Alk. phosphatase 96 45 - 117 U/L    Protein, total 7.8 6.4 - 8.2 g/dL    Albumin 3.2 (L) 3.5 - 5.0 g/dL    Globulin 4.6 (H) 2.0 - 4.0 g/dL    A-G Ratio 0.7 (L) 1.1 - 2.2     TROPONIN I    Collection Time: 09/25/21  5:44 PM   Result Value Ref Range    Troponin-I, Qt. 0.35 (H) <0.05 ng/mL   URINALYSIS W/ REFLEX CULTURE    Collection Time: 09/25/21  6:22 PM    Specimen: Miscellaneous sample; Urine    Urine specimen   Result Value Ref Range    Color YELLOW/STRAW      Appearance TURBID (A) CLEAR      Specific gravity 1.024 1.003 - 1.030      pH (UA) 5.5 5.0 - 8.0      Protein 300 (A) NEG mg/dL    Glucose Negative NEG mg/dL    Ketone Negative NEG mg/dL    Blood SMALL (A) NEG      Urobilinogen 1.0 0.2 - 1.0 EU/dL    Nitrites Negative NEG      Leukocyte Esterase Negative NEG      WBC 0-4 0 - 4 /hpf    RBC 0-5 0 - 5 /hpf    Epithelial cells MODERATE (A) FEW /lpf    Bacteria 2+ (A) NEG /hpf    UA:UC IF INDICATED CULTURE NOT INDICATED BY UA RESULT CNI      Amorphous Crystals FEW (A) NEG     SAMPLES BEING HELD    Collection Time: 09/25/21  6:22 PM   Result Value Ref Range    SAMPLES BEING HELD NSR     COMMENT        Add-on orders for these samples will be processed based on acceptable specimen integrity and analyte stability, which may vary by analyte.    BILIRUBIN, CONFIRM    Collection Time: 09/25/21  6:22 PM   Result Value Ref Range    Bilirubin UA, confirm Negative NEG     COVID-19 RAPID TEST    Collection Time: 09/25/21  7:32 PM   Result Value Ref Range    Specimen source Nasopharyngeal      COVID-19 rapid test Detected (AA) CORINNA     BLOOD GAS,CHEM8,LACTIC ACID POC    Collection Time: 09/25/21  7:32 PM   Result Value Ref Range    Calcium, ionized (POC) 1.13 1.12 - 1.32 mmol/L    BICARBONATE 28 mmol/L    Base excess (POC) 2.6 mmol/L    Sample source VENOUS BLOOD      CO2, POC 29 (H) 19 - 24 MMOL/L    Sodium,  136 - 145 MMOL/L    Potassium, POC 4.0 3.5 - 5.5 MMOL/L    Chloride,  (H) 100 - 108 MMOL/L    Glucose,  (H) 74 - 106 MG/DL    Creatinine, POC 1.2 0.6 - 1.3 MG/DL    Lactic Acid (POC) 0.98 0.40 - 2.00 mmol/L    pH, venous (POC) 7.40 7.32 - 7.42      pCO2, venous (POC) 45.8 41 - 51 MMHG    pO2, venous (POC) 17 (L) 25 - 40 mmHg

## 2021-09-27 NOTE — PROGRESS NOTES
End of Shift Note    Bedside shift change report given to ADELE Nance (oncoming nurse) by Lawanda Ratliff RN (offgoing nurse). Report included the following information SBAR, Kardex, Intake/Output and MAR    Shift worked:  [id number]     Shift summary and any significant changes:     Uneventful shift. Patient remains NPO.       Concerns for physician to address:  none     Zone phone for oncoming shift:              Lawanda Ratliff RN

## 2021-09-27 NOTE — PROGRESS NOTES
Hospitalist Progress Note    NAME: Roselia Russell   :  1940   MRN:  [id number]     Interim Hospital Summary: 78 y.o. female whom presented on 2021 with      Assessment / Plan:  COVID-19 pneumonia with hypoxemia and sepsis  Metabolic encephalopathy  CTA chestno evidence of PE, no acute process. CT abdomen/pelvis with contrastno acute finding. CT head no acute findings  Sepsis criteria metfever, tachycardia, COVID-19 pneumonia  Continue remdesivir  Continue Solu-Medrol (allergic to dexamethasone)  Procalcitonin normal.  UA with no pyuria. DC abx  down to 1L, try wean off  continue gentle hydration. Still NPO. Speech eval pending.     Elevated troponin  Troponin 0.35 outpatient no chest pain. Likely type II MI.  Trending troponin. Echo pending     History of A. fib  Hyperlipidemia  GERD  Hypertension  oral meds on hold. Will schedule IV BP meds     Functional paraplegia. Wheelchair-bound.       18.5 - 24.9 Normal weight / Body mass index is 23.05 kg/m². Estimated discharge date:   Barriers:    Code status: DNR  Prophylaxis: Eliquis  Recommended Disposition: SNF/LTC       Subjective:     Chief Complaint / Reason for Physician Visit  Follow up of Covid, pneumonia, sepsis  Seems confused    Review of Systems:  Symptom Y/N Comments  Symptom Y/N Comments   Fever/Chills    Chest Pain     Poor Appetite    Edema     Cough    Abdominal Pain     Sputum    Joint Pain     SOB/CHRISTINE    Pruritis/Rash     Nausea/vomit    Tolerating PT/OT     Diarrhea    Tolerating Diet     Constipation    Other       Could NOT obtain due to: confused     PO intake: No data found. Objective:     VITALS:   Last 24hrs VS reviewed since prior progress note.  Most recent are:  Patient Vitals for the past 24 hrs:   Temp Pulse Resp BP SpO2   21 1132 98 °F (36.7 °C) (!) 111 20 (!) 163/89 96 %   21 0937    (!) 160/92    21 0825     98 %   09/27/21 0750 97.7 °F (36.5 °C) (!) 113 20 (!) 195/87 97 %   09/27/21 0508 97.5 °F (36.4 °C) 94 18 (!) 158/110 98 %   09/27/21 0040 97.4 °F (36.3 °C) (!) 108 18 (!) 156/91 97 %   09/26/21 2141 98.8 °F (37.1 °C) (!) 108 18 (!) 160/100 100 %   09/26/21 1531 97.8 °F (36.6 °C) (!) 103  (!) 195/108 96 %   09/26/21 1521     98 %   09/26/21 1213 98.4 °F (36.9 °C) 95 18 (!) 176/104 97 %     No intake or output data in the 24 hours ending 09/27/21 1145     I had a face to face encounter, and independently examined this patient on 9/27/2021, as outlined below:  PHYSICAL EXAM:  General: WD, WN. Alert, cooperative, no acute distress    EENT:  EOMI. Anicteric sclerae. MMM  Resp:  CTA bilaterally, no wheezing or rales. No accessory muscle use  CV:  Regular  rhythm,  No edema  GI:  Soft, Non distended, Non tender. +Bowel sounds  Neurologic:  Alert and not oriented, not answering questions and not following commands  Psych:   Poor insight. Not anxious nor agitated  Skin:  No rashes. No jaundice    Reviewed most current lab test results and cultures  YES  Reviewed most current radiology test results   YES  Review and summation of old records today    NO  Reviewed patient's current orders and MAR    YES  PMH/SH reviewed - no change compared to H&P  ________________________________________________________________________  Care Plan discussed with:    Comments   Patient      Family      RN     Care Manager     Consultant                        Multidiciplinary team rounds were held today with , nursing, pharmacist and clinical coordinator. Patient's plan of care was discussed; medications were reviewed and discharge planning was addressed.      ________________________________________________________________________  Total NON critical care TIME:  25   Minutes    Total CRITICAL CARE TIME Spent:   Minutes non procedure based      Comments   >50% of visit spent in counseling and coordination of care x     This includes time during multidisciplinary rounds if indicated above   ________________________________________________________________________  Maco Dennison MD     Procedures: see electronic medical records for all procedures/Xrays and details which were not copied into this note but were reviewed prior to creation of Plan. LABS:  I reviewed today's most current labs and imaging studies.   Pertinent labs include:  Recent Labs     09/27/21  0535 09/26/21  0100 09/25/21  1744   WBC 3.6 3.7 4.3   HGB 11.6 10.0* 12.0   HCT 35.0 31.6* 37.7   * 109* 128*     Recent Labs     09/27/21  0535 09/26/21  0100 09/25/21  1744    143 141   K 3.9 3.7 3.8   * 114* 109*   CO2 23 27 29   * 136* 120*   BUN 21* 17 21*   CREA 0.83 0.90 1.26*   CA 8.2* 7.3* 8.5   MG 2.0  --   --    ALB 2.3* 2.4* 3.2*   TBILI 0.6 0.5 0.6   ALT 15 11* 14

## 2021-09-27 NOTE — PROGRESS NOTES
Problem: Dysphagia (Adult)  Goal: *Acute Goals and Plan of Care (Insert Text)  Description: Speech pathology goals  Initiated 9/27/2021  1. Patient will participate in swallowing re-evaluation within 7 days  Outcome: Not Met     SPEECH [address] EVALUATION  Patient: Chadd Peng (37 y.o. female)  Date: 9/27/2021  Primary Diagnosis: Sepsis ([address] [A41.9]  Pneumonia due to COVID-19 virus [U07.1, J12.82]  Hypoxemia [R09.02]  UTI (urinary tract infection) [N39.0]        Precautions: aspiration, COVID       ASSESSMENT :  Based on the objective data described below, the patient presents with severe oral dysphagia characterized by absent bolus acceptance of ice chip by spoon and thin liquid by straw despite verbal and tactile cues. Note patient is familiar to SLP during past admission in 03/2020 during which time MBS was completed and easy to chew/thin liquid diet was recommended. During that time, patient coughed after sips of thin liquid in absence of aspiration. At this time, patient is at high risk for aspiration secondary to AMS. Patient will benefit from skilled intervention to address the above impairments. Patients rehabilitation potential is considered to be Fair     PLAN :  Recommendations and Planned Interventions:  -NPO due to AMS  -Non-oral route for medications  -SLP to follow for swallowing re-evaluation when mental status improves  Frequency/Duration: Patient will be followed by speech-language pathology 3 times a week to address goals. Discharge Recommendations: To Be Determined     SUBJECTIVE:   Patient with no intelligible verbalizations.     OBJECTIVE:     Past Medical History:   Diagnosis Date    Acute kidney failure ([facility] 9/24/7672    Acute systolic heart failure ([address] 7/14/2016    Arrhythmia     CAD (coronary artery disease)     Heart failure ([facility]     Hypertension     Stroke [facility]) 2009    Stroke, left hand weakness and speech, peripheral vision affected Past Surgical History:   Procedure Laterality Date    HX UROLOGICAL  02/2018     Prior Level of Function/Home Situation:   Home Situation  Support Systems: Child(nicci)  Diet prior to admission: easy to chew/thin during past admission  Current Diet:  NPO   Cognitive and Communication Status:  Neurologic State: Alert, Confused  Orientation Level: Unable to verbalize  Cognition: Decreased attention/concentration, Decreased command following           Oral Assessment:  Oral Assessment  Labial: Other (comment) (unable to assess due to poor command following)  P.O. Trials:  Patient Position: upright in bed  Vocal quality prior to P.O.: No impairment  Consistency Presented: Ice chips; Thin liquid  How Presented: SLP-fed/presented;Spoon;Straw     Bolus Acceptance: Absent           Oral Phase Severity: Severe  Pharyngeal Phase Severity : Other (comment) (unable to assess due to oral dysphagia )    NOMS:   The NOMS functional outcome measure was used to quantify this patient's level of swallowing impairment. Based on the NOMS, the patient was determined to be at level 1 for swallow function       NOMS Swallowing Levels:  Level 1 (CN): NPO  Level 2 (CM): NPO but takes consistency in therapy  Level 3 (CL): Takes less than 50% of nutrition p.o. and continues with nonoral feedings; and/or safe with mod cues; and/or max diet restriction  Level 4 (CK): Safe swallow but needs mod cues; and/or mod diet restriction; and/or still requires some nonoral feeding/supplements  Level 5 (CJ): Safe swallow with min diet restriction; and/or needs min cues  Level 6 (CI): Independent with p.o.; rare cues; usually self cues; may need to avoid some foods or needs extra time  Level 7 ([address] [place] [address]): Independent for all p.o.  ANAYA. (2003). National Outcomes Measurement System (NOMS): Adult Speech-Language Pathology User's Guide.        Pain:  Pain Scale 1: Adult Nonverbal Pain Scale  Pain Intensity 1: 0       After treatment:   Patient left in no apparent distress in bed, Call bell within reach and Nursing notified    COMMUNICATION/EDUCATION:     The patient's plan of care including recommendations, planned interventions, and recommended diet changes were discussed with: Registered nurse. Patient is unable to participate in goal setting and plan of care.     Thank you for this referral.  DEREK Little  Time Calculation: 10 mins

## 2021-09-27 NOTE — PROGRESS NOTES
Bedside and Verbal shift change report given to Amilcar Sykes RN (oncoming nurse) by Dylon Wheeler RN (offgoing nurse). Report included the following information SBAR, Kardex, MAR, Accordion and Recent Results.

## 2021-09-27 NOTE — INTERDISCIPLINARY ROUNDS
Interdisciplinary Rounds were completed on 09/27/21 for this patient. Rounds included nursing, clinical care leader, pharmacy, and case management. Plan of care discussed. See clinical pathway and/or care plan for interventions and desired outcomes.

## 2021-09-27 NOTE — PROGRESS NOTES
End of Shift Note    Bedside shift change report given to Deandre Osborne RN (oncoming nurse) by Sommer Davis RN (offgoing nurse). Report included the following information SBAR, Kardex, Intake/Output and MAR    Shift worked:  [id number]     Shift summary and any significant changes:     Uneventful shift. Patient remains NPO.       Concerns for physician to address:  none     Zone phone for oncoming shift:              Sommer Davis RN

## 2021-09-27 NOTE — PROGRESS NOTES
Bedside shift change report given to Ulises Torres (oncoming nurse) by Theodor Or, RN (offgoing nurse). Report included the following information SBAR, Kardex, Intake/Output, MAR and Recent Results    Shift worked:  7am-7pm     Shift summary and any significant changes:    Patient is NPO. BP has been trending upward. MD aware since this am. No other significant changes noted.       Concerns for physician to address:  N/A     Zone phone for oncoming shift:   N/A

## 2021-09-28 NOTE — PROGRESS NOTES
Bedside shift change report given to Shruti Schaffer RN (oncoming nurse) by Cookie Kelly RN (offgoing nurse). Report included the following information SBAR, Kardex, Procedure Summary, Intake/Output, MAR, Accordion and Cardiac Rhythm NSR.

## 2021-09-28 NOTE — PROGRESS NOTES
Bedside shift change report given to Hollie Montano (oncoming nurse) by Javier Koch (offgoing nurse). Report included the following information SBAR, Kardex, Intake/Output, MAR, Recent Results and Cardiac Rhythm NSR.

## 2021-09-28 NOTE — PROGRESS NOTES
Problem: Dysphagia (Adult)  Goal: *Acute Goals and Plan of Care (Insert Text)  Description: Speech pathology goals  Initiated 9/27/2021  1. Patient will participate in swallowing re-evaluation within 7 days  Outcome: Not Progressing Towards Goal     SPEECH LANGUAGE PATHOLOGY DYSPHAGIA TREATMENT  Patient: Neo Lacey (25 y.o. female)  Date: 9/28/2021  Diagnosis: Sepsis ([address] [A41.9]  Pneumonia due to COVID-19 virus [U07.1, J12.82]  Hypoxemia [R09.02]  UTI (urinary tract infection) [N39.0] <principal problem not specified>       Precautions:      ASSESSMENT:  Patient continues to present with severe oral dysphagia on this date. Patient with impaired labial seal and no oral manipulation/mastication of ice chip, resulting in SLP expectoration of ice chip from oral cavity. Half tsp thin liquid via spoon led to L anterior spillage due to patient favoring L side in bed. Patient not stimulable to verbal or tactile cues. Patient with dry oral mucosa; Oral care attempted by SLP with patient gagging and moving head away from oral swab. Due to acute mental status, patient continues to be at risk for aspiration. Recommend NPO with stringent oral care. RN was educated on result's of today's re-eval.     PLAN:  Recommendations and Planned Interventions:  -- NPO  -- Stringent Oral Care  -- SLP to follow acutely    Patient continues to benefit from skilled intervention to address the above impairments. Continue treatment per established plan of care. Discharge Recommendations: To Be Determined     SUBJECTIVE:   No attempts to verbalize on this date.     OBJECTIVE:   Cognitive and Communication Status:  Neurologic State: Alert, Confused  Orientation Level: Unable to verbalize  Cognition: No command following, Decreased attention/concentration  Perception: Cues to maintain midline in sitting (Patient found to be leaning L in bed; required clinician cue)        Dysphagia Treatment:  Oral Assessment:  Oral Assessment  Labial: Other (comment) (Unable to assess due to poor command following)  Dentition: Natural  Oral Hygiene: Dry oral mucosa  Lingual: Other (comment) (Unable to assess due to poor command following)  Velum: Unable to visualize  Mandible: Other (comment) (Unable to assess due to poor command following)  P.O. Trials:  Patient Position: Upright in bed  Vocal quality prior to P.O.: Other (comment) (No vocalizations on this date)  Consistency Presented: Ice chips; Thin liquid  How Presented: SLP-fed/presented;Spoon     Bolus Acceptance: Absent                                     Oral Phase Severity: Severe  Pharyngeal Phase Severity : Other (comment) (Unable to assess due to oral dysphagia)    Pain:             After treatment:   Patient left in no apparent distress in bed, Call bell within reach and Nursing notified    COMMUNICATION/EDUCATION:   The patient's plan of care including recommendations, planned interventions, and recommended diet changes were discussed with: Registered nurse.      DEREK Stein  Time Calculation: 15 mins

## 2021-09-28 NOTE — PROGRESS NOTES
Patient having audible wheezing this AM, prn nebulizer treatment given. Patient noted have a lot of wet productive coughing this shift. BP still elevated despite administration of IV metoprolol.

## 2021-09-28 NOTE — PROGRESS NOTES
Hospitalist Progress Note    NAME: Sierra Burnett   :  1940   MRN:  [id number]     Interim Hospital Summary: 78 y.o. female whom presented on 2021 with      Assessment / Plan:  COVID-19 pneumonia with hypoxemia and sepsis  Metabolic encephalopathy  CTA chestno evidence of PE, no acute process. CT abdomen/pelvis with contrastno acute finding. CT head no acute findings  Sepsis criteria metfever, tachycardia, COVID-19 pneumonia  Continue remdesivir  Continue Solu-Medrol (allergic to dexamethasone)  Procalcitonin normal.  UA with no pyuria. DC abx  down to 1L, try wean off  continue gentle hydration. Still NPO. Failed yesterday. Waiting for re eval today     Elevated troponin  Troponin 0.35 outpatient no chest pain. Likely type II MI.  Trending troponin. Echo pending     History of A. fib  Hyperlipidemia  GERD  Hypertension  oral meds on hold. Continue IV metoprolol     Functional paraplegia. Wheelchair-bound.       18.5 - 24.9 Normal weight / Body mass index is 23.05 kg/m². Estimated discharge date:   Barriers:    Code status: DNR  Prophylaxis: Eliquis  Recommended Disposition: SNF/LTC       Subjective:     Chief Complaint / Reason for Physician Visit  Follow up of Covid, pneumonia, sepsis  Seems confused    Review of Systems:  Symptom Y/N Comments  Symptom Y/N Comments   Fever/Chills    Chest Pain     Poor Appetite    Edema     Cough    Abdominal Pain     Sputum    Joint Pain     SOB/CHRISTINE    Pruritis/Rash     Nausea/vomit    Tolerating PT/OT     Diarrhea    Tolerating Diet     Constipation    Other       Could NOT obtain due to: confused     PO intake: No data found. Objective:     VITALS:   Last 24hrs VS reviewed since prior progress note.  Most recent are:  Patient Vitals for the past 24 hrs:   Temp Pulse Resp BP SpO2   21 0830 98.6 °F (37 °C) 82 16 (!) 157/101 95 %   21 0210 98.7 °F (37.1 °C) 88 18 (!) 155/86 95 %   09/27/21 2316 99 °F (37.2 °C) 90 20 (!) 170/88 96 %   09/27/21 2046 99 °F (37.2 °C) 89 20 (!) 168/90 96 %   09/27/21 1958 99 °F (37.2 °C) (!) 103 19 (!) 145/83 95 %   09/27/21 1403    (!) 156/90    09/27/21 1256  (!) 123  128/81    09/27/21 1132 98 °F (36.7 °C) (!) 111 20 (!) 163/89 96 %     No intake or output data in the 24 hours ending 09/28/21 1121     I had a face to face encounter, and independently examined this patient on 9/28/2021, as outlined below:  PHYSICAL EXAM:  General: WD, WN. Alert, cooperative, no acute distress    EENT:  EOMI. Anicteric sclerae. MMM  Resp:  CTA bilaterally, no wheezing or rales. No accessory muscle use  CV:  Regular  rhythm,  No edema  GI:  Soft, Non distended, Non tender. +Bowel sounds  Neurologic:  Alert and not oriented, not answering questions and not following commands  Psych:   Poor insight. Not anxious nor agitated  Skin:  No rashes. No jaundice    Reviewed most current lab test results and cultures  YES  Reviewed most current radiology test results   YES  Review and summation of old records today    NO  Reviewed patient's current orders and MAR    YES  PMH/SH reviewed - no change compared to H&P  ________________________________________________________________________  Care Plan discussed with:    Comments   Patient      Family      RN     Care Manager     Consultant                        Multidiciplinary team rounds were held today with , nursing, pharmacist and clinical coordinator. Patient's plan of care was discussed; medications were reviewed and discharge planning was addressed.      ________________________________________________________________________  Total NON critical care TIME:  25   Minutes    Total CRITICAL CARE TIME Spent:   Minutes non procedure based      Comments   >50% of visit spent in counseling and coordination of care x     This includes time during multidisciplinary rounds if indicated above ________________________________________________________________________  Barkley Lanes, MD     Procedures: see electronic medical records for all procedures/Xrays and details which were not copied into this note but were reviewed prior to creation of Plan. LABS:  I reviewed today's most current labs and imaging studies.   Pertinent labs include:  Recent Labs     09/28/21  0456 09/27/21  0535 09/26/21  0100   WBC 7.6 3.6 3.7   HGB 11.5 11.6 10.0*   HCT 35.0 35.0 31.6*    143* 109*     Recent Labs     09/28/21  0456 09/27/21  0535 09/26/21  0100    144 143   K 3.5 3.9 3.7   * 113* 114*   CO2 22 23 27   * 166* 136*   BUN 24* 21* 17   CREA 0.85 0.83 0.90   CA 8.6 8.2* 7.3*   MG  --  2.0  --    ALB 2.4* 2.3* 2.4*   TBILI 0.5 0.6 0.5   ALT 13 15 11*

## 2021-09-29 NOTE — PROGRESS NOTES
Hospitalist Progress Note    NAME: Yeimi Pizarro   :  1940   MRN:  [id number]       Assessment / Plan:  COVID-19 pneumonia with hypoxemia and sepsis  Metabolic encephalopathy    CTA chestno evidence of PE, no acute process. CT abdomen/pelvis with contrastno acute finding. CT head no acute findings  Sepsis criteria metfever, tachycardia, COVID-19 pneumonia  Continue remdesivir  Continue Solu-Medrol (allergic to dexamethasone), increased to 4 liters today  Procalcitonin normal.  UA with no pyuria. DC abx  Remains NPO, d/t poor mental status, Speech and swallow following. Pulmonary consulted, as worse oxygen requirement and significant wheezing  Scheduled nebs added.     Elevated troponin  Troponin 0.35 - 0.33  Echo: Severe concentric hypertrophy, Mild-moderate mitral valve stenosis. Mild Tricuspid regurg.     History of A. fib  Hyperlipidemia  GERD  Hypertension  Oral meds on hold. Continue IV metoprolol     Functional paraplegia. Wheelchair-bound. 18.5 - 24.9 Normal weight / Body mass index is 23.05 kg/m².     Estimated discharge date: Oct 1  Barriers: Clinical improvement     Code status: DNR  Prophylaxis: Eliquis  Recommended Disposition: SNF/LTC     Subjective:     Chief Complaint / Reason for Physician Visit  Follow up for covid 19  She is on 4 liters today, audible wheezing. Review of Systems:  Symptom Y/N Comments  Symptom Y/N Comments   Fever/Chills n   Chest Pain n    Poor Appetite n   Edema n    Cough    Abdominal Pain     Sputum    Joint Pain     SOB/CHRISTINE    Pruritis/Rash     Nausea/vomit    Tolerating PT/OT     Diarrhea    Tolerating Diet     Constipation    Other       Could NOT obtain due to:      Objective:     VITALS:   Last 24hrs VS reviewed since prior progress note.  Most recent are:  Patient Vitals for the past 24 hrs:   Temp Pulse Resp BP SpO2   21 1204  95  (!) 155/97 94 %   21 1126 98.4 °F (36.9 °C) (!) 111 22 (!) 167/104 91 %   21 0902  (!) 109   94 %   09/29/21 0857  (!) 118   93 %   09/29/21 0749 97.4 °F (36.3 °C) (!) 114 20 (!) 160/92 90 %   09/29/21 0334 98.4 °F (36.9 °C) (!) 110 22 (!) 149/85 93 %   09/28/21 2338 98.3 °F (36.8 °C) (!) 107 17 (!) 154/86 95 %   09/28/21 1956 98.2 °F (36.8 °C) 100 18 (!) 179/96 95 %   09/28/21 1845  76      09/28/21 1753  (!) 110  (!) 147/86        Intake/Output Summary (Last 24 hours) at 9/29/2021 1438  Last data filed at 9/29/2021 1204  Gross per 24 hour   Intake    Output 200 ml   Net -200 ml        I had a face to face encounter and independently examined this patient on 9/29/2021, as outlined below:  PHYSICAL EXAM:  General: WD, WN. Alert, cooperative, no acute distress    EENT:  EOMI. Anicteric sclerae. MMM  Resp:  CTA bilaterally, no wheezing or rales. No accessory muscle use  CV:  Regular  rhythm,  No edema  GI:  Soft, Non distended, Non tender. +Bowel sounds  Neurologic:  Alert and oriented X 3, normal speech,   Psych:   Good insight. Not anxious nor agitated  Skin:  No rashes. No jaundice    Reviewed most current lab test results and cultures  YES  Reviewed most current radiology test results   YES  Review and summation of old records today    NO  Reviewed patient's current orders and MAR    YES  PMH/SH reviewed - no change compared to H&P  ________________________________________________________________________  Care Plan discussed with:    Comments   Patient y    Family      RN y    Care Manager     Consultant                        Multidiciplinary team rounds were held today with , nursing, pharmacist and clinical coordinator. Patient's plan of care was discussed; medications were reviewed and discharge planning was addressed.      ________________________________________________________________________  Total NON critical care TIME:  35  Minutes    Total CRITICAL CARE TIME Spent:   Minutes non procedure based      Comments   >50% of visit spent in counseling and coordination of care     ________________________________________________________________________  Amaury Christopher MD     Procedures: see electronic medical records for all procedures/Xrays and details which were not copied into this note but were reviewed prior to creation of Plan. LABS:  I reviewed today's most current labs and imaging studies.   Pertinent labs include:  Recent Labs     09/29/21 0308 09/28/21 0456 09/27/21  0535   WBC 6.6 7.6 3.6   HGB 11.7 11.5 11.6   HCT 35.9 35.0 35.0    166 143*     Recent Labs     09/29/21 0308 09/28/21 0456 09/27/21  0535    145 144   K 3.4* 3.5 3.9   * 113* 113*   CO2 24 22 23   * 168* 166*   BUN 26* 24* 21*   CREA 0.95 0.85 0.83   CA 8.6 8.6 8.2*   MG  --   --  2.0   ALB 2.7* 2.4* 2.3*   TBILI 0.6 0.5 0.6   ALT 11* 13 15       Signed: Amaury Christopher MD

## 2021-09-29 NOTE — PROGRESS NOTES
Problem: Falls - Risk of  Goal: *Absence of Falls  Description: Document Schmitt Reason Fall Risk and appropriate interventions in the flowsheet. Outcome: Progressing Towards Goal  Note: Fall Risk Interventions:  Mobility Interventions: Bed/chair exit alarm, Communicate number of staff needed for ambulation/transfer    Mentation Interventions: Adequate sleep, hydration, pain control, Bed/chair exit alarm, Evaluate medications/consider consulting pharmacy, Increase mobility, More frequent rounding, Toileting rounds, Update white board    Medication Interventions: Bed/chair exit alarm, Evaluate medications/consider consulting pharmacy    Elimination Interventions: Bed/chair exit alarm, Call light in reach, Stay With Me (per policy), Toileting schedule/hourly rounds              Problem: Airway Clearance - Ineffective  Goal: Achieve or maintain patent airway  Outcome: Progressing Towards Goal     Problem: Gas Exchange - Impaired  Goal: Absence of hypoxia  Outcome: Progressing Towards Goal  Goal: Promote optimal lung function  Outcome: Progressing Towards Goal     Problem: Breathing Pattern - Ineffective  Goal: Ability to achieve and maintain a regular respiratory rate  Outcome: Progressing Towards Goal     Problem:  Body Temperature -  Risk of, Imbalanced  Goal: Ability to maintain a body temperature within defined limits  Outcome: Progressing Towards Goal  Goal: Will regain or maintain usual level of consciousness  Outcome: Progressing Towards Goal  Goal: Complications related to the disease process, condition or treatment will be avoided or minimized  Outcome: Progressing Towards Goal     Problem: Isolation Precautions - Risk of Spread of Infection  Goal: Prevent transmission of infectious organism to others  Outcome: Progressing Towards Goal     Problem: Nutrition Deficits  Goal: Optimize nutrtional status  Outcome: Progressing Towards Goal     Problem: Risk for Fluid Volume Deficit  Goal: Maintain normal heart rhythm  Outcome: Progressing Towards Goal  Goal: Maintain absence of muscle cramping  Outcome: Progressing Towards Goal  Goal: Maintain normal serum potassium, sodium, calcium, phosphorus, and pH  Outcome: Progressing Towards Goal     Problem: Loneliness or Risk for Loneliness  Goal: Demonstrate positive use of time alone when socialization is not possible  Outcome: Progressing Towards Goal     Problem: Fatigue  Goal: Verbalize increase energy and improved vitality  Outcome: Progressing Towards Goal     Problem: Pressure Injury - Risk of  Goal: *Prevention of pressure injury  Description: Document Nando Scale and appropriate interventions in the flowsheet. Outcome: Progressing Towards Goal  Note: Pressure Injury Interventions:  Sensory Interventions: Assess changes in LOC, Float heels, Keep linens dry and wrinkle-free, Maintain/enhance activity level, Minimize linen layers, Turn and reposition approx. every two hours (pillows and wedges if needed)    Moisture Interventions: Absorbent underpads, Internal/External urinary devices, Maintain skin hydration (lotion/cream), Minimize layers    Activity Interventions: Increase time out of bed, Pressure redistribution bed/mattress(bed type)    Mobility Interventions: Float heels, HOB 30 degrees or less, Pressure redistribution bed/mattress (bed type), Turn and reposition approx.  every two hours(pillow and wedges)    Nutrition Interventions: Document food/fluid/supplement intake    Friction and Shear Interventions: HOB 30 degrees or less, Minimize layers

## 2021-09-29 NOTE — PROGRESS NOTES
Patient HR is in 110-120's. Notified Dr. Mohinder Mason and received an order to give metoprolol 2.5mg IV once.

## 2021-09-29 NOTE — CONSULTS
Pulmonary, Critical Care, and Sleep Medicine    Initial Patient Consult    Name: Saadia Myers MRN: [id number]   : 1940 Hospital: [facility]   Date: 2021        IMPRESSION:   · Acute hypoxic respiratory failure  · COVID pneumonia  · Wheezing - has home albuterol, presumably has some sort of underlying obstructive airways disease  · Much of her wheezing is due to upper airway congestion, however  · Metabolic encephalopathy  · Afib, GERD, hyperlipidemia  · Functional paraplegia - wheelchair bound      RECOMMENDATIONS:   · O2  · Check chest X-ray   · Upright positioning   · Add scheduled bronchodilators - note the typical COVID protocol is to not use nebulizers, but I am not sure she will get much benefit from MDI as she can not follow commands  · NT suctioning PRN  · Remdesivir completes today  · Systemic corticosteroids  · DVT prophylaxis      Subjective: This patient has been seen and evaluated at the request of Dr. Alfredo Burrows for COVID and wheezing. Patient is a 78 y.o. female admitted 4 days ago with COVID pneumonia and AMS. She has been on 1 LPM, but today she has been wheezing, and her O2 was increased to 4 LPM.  She is not following commands. She does not speak, but she did start moaning when I asked her to tell me her name. Past Medical History:   Diagnosis Date    [facility] failure [facility]     Acute systolic heart failure ([facility] 2016    Arrhythmia     CAD (coronary artery disease)     Heart failure ([facility]     Hypertension     Stroke [facility])     Stroke, left hand weakness and speech, peripheral vision affected      Past Surgical History:   Procedure Laterality Date    HX UROLOGICAL  2018      Prior to Admission medications    Medication Sig Start Date End Date Taking? Authorizing Provider   atorvastatin (LIPITOR) 80 mg tablet Take 1 Tab by mouth daily. 3/8/20   Ada Gonsales MD   aspirin 81 mg chewable tablet Take 1 Tab by mouth daily. 3/8/20   Bobby Warren MD   metoprolol tartrate (LOPRESSOR) 25 mg tablet Take 1 Tab by mouth every twelve (12) hours. 3/8/20   Bobby Warren MD   amLODIPine (NORVASC) 5 mg tablet Take 5 mg by mouth daily. Provider, Historical   allopurinol (ZYLOPRIM) 100 mg tablet Take 100 mg by mouth daily. Aung Villalobos DO   apixaban (ELIQUIS) 2.5 mg tablet Take 1 Tab by mouth two (2) times a day. 18   Rowena Hogan ANP   potassium chloride (K-DUR, KLOR-CON) 20 mEq tablet Take 20 mEq by mouth daily. MON, WED., SAT 18   Provider, Historical   famotidine (PEPCID) 20 mg tablet Take 20 mg by mouth daily. Provider, Historical   furosemide (LASIX) 20 mg tablet Take 20 mg by mouth Every Mon, Wed and Sat. Provider, Historical   nitroglycerin (NITROSTAT) 0.4 mg SL tablet 0.4 mg by SubLINGual route every five (5) minutes as needed for Chest Pain. Up to 3 doses.     Provider, Historical   albuterol (PROVENTIL HFA, VENTOLIN HFA, PROAIR HFA) 90 mcg/actuation inhaler Take 2 Puffs by inhalation every four (4) hours as needed for Wheezing. 16   Reanna Pryor PA     Allergies   Allergen Reactions    Decadron [Dexamethasone Sodium Phosphate] Hives    Pcn [Penicillins] Hives     Tolerated cefepime 2021      Social History     Tobacco Use    Smoking status: Former Smoker     Quit date: 2009     Years since quittin.0    Smokeless tobacco: Never Used   Substance Use Topics    Alcohol use: No     Alcohol/week: 0.0 standard drinks     Comment: Very little      Family History   Problem Relation Age of Onset    Coronary Artery Disease Other         grandmother age 76    Stroke Mother     Hypertension Mother         Current Facility-Administered Medications   Medication Dose Route Frequency    methylPREDNISolone (PF) (Solu-MEDROL) injection 60 mg  60 mg IntraVENous Q8H    metoprolol (LOPRESSOR) injection 5 mg  5 mg IntraVENous Q6H    enoxaparin (LOVENOX) injection 40 mg  40 mg SubCUTAneous Q24H  0.9% sodium chloride infusion  50 mL/hr IntraVENous CONTINUOUS    famotidine (PF) (PEPCID) 20 mg in 0.9% sodium chloride 10 mL injection  20 mg IntraVENous DAILY    insulin lispro (HUMALOG) injection   SubCUTAneous AC&HS    [Held by provider] amLODIPine (NORVASC) tablet 5 mg  5 mg Oral DAILY    [Held by provider] apixaban (ELIQUIS) tablet 2.5 mg  2.5 mg Oral BID    aspirin chewable tablet 81 mg  81 mg Oral DAILY    atorvastatin (LIPITOR) tablet 80 mg  80 mg Oral DAILY    [Held by provider] metoprolol tartrate (LOPRESSOR) tablet 25 mg  25 mg Oral Q12H    allopurinoL (ZYLOPRIM) tablet 100 mg  100 mg Oral DAILY    remdesivir 100 mg in 0.9% sodium chloride 250 mL IVPB  100 mg IntraVENous Q24H    guaiFENesin (ROBITUSSIN) 100 mg/5 mL oral liquid 100 mg  100 mg Oral QID       Review of Systems:  Review of systems not obtained due to patient factors. Objective:   Vital Signs:    Visit Vitals  BP (!) 155/97   Pulse 95   Temp 98.4 °F (36.9 °C)   Resp 22   Ht 5' (1.524 m)   Wt 53.5 kg (118 lb)   SpO2 94%   BMI 23.05 kg/m²       O2 Device: Nasal cannula   O2 Flow Rate (L/min): 4 l/min   Temp (24hrs), Av.2 °F (36.8 °C), Min:97.4 °F (36.3 °C), Max:98.4 °F (36.9 °C)       Intake/Output:   Last shift:      No intake/output data recorded. Last 3 shifts: No intake/output data recorded. No intake or output data in the 24 hours ending 21 1230   Physical Exam:   General:  Ill appearing, eyes open, not following commands   Head:  Normocephalic, without obvious abnormality, atraumatic. Eyes:  Conjunctivae/corneas clear. Nose: Nares normal. Septum midline. Mucosa normal.    Throat: Lips, mucosa, and tongue normal.     Neck: Supple, symmetrical, trachea midline    Back:   Symmetric, no curvature. ROM normal.   Lungs:   Scattered wheeze, lots of upper airway noise   Chest wall:  No tenderness or deformity. Heart:  Regular rate and rhythm    Abdomen:   Soft, non-tender.  Bowel sounds normal.    Extremities: Extremities normal, atraumatic, no cyanosis    Skin: Skin color, texture, turgor normal. No rashes or lesions   Lymph nodes: Cervical, supraclavicular nodes normal.   Neurologic: Not following commands     Data review:     Recent Results (from the past 24 hour(s))   GLUCOSE, POC    Collection Time: 09/28/21  4:01 PM   Result Value Ref Range    Glucose (POC) 120 (H) 65 - 117 mg/dL    Performed by Jr Hernández RN    GLUCOSE, POC    Collection Time: 09/28/21  8:52 PM   Result Value Ref Range    Glucose (POC) 140 (H) 65 - 117 mg/dL    Performed by Archie Campbell    METABOLIC PANEL, COMPREHENSIVE    Collection Time: 09/29/21  3:08 AM   Result Value Ref Range    Sodium 145 136 - 145 mmol/L    Potassium 3.4 (L) 3.5 - 5.1 mmol/L    Chloride 115 (H) 97 - 108 mmol/L    CO2 24 21 - 32 mmol/L    Anion gap 6 5 - 15 mmol/L    Glucose 152 (H) 65 - 100 mg/dL    BUN 26 (H) 6 - 20 MG/DL    Creatinine 0.95 0.55 - 1.02 MG/DL    BUN/Creatinine ratio 27 (H) 12 - 20      GFR est AA >60 >60 ml/min/1.73m2    GFR est non-AA 56 (L) >60 ml/min/1.73m2    Calcium 8.6 8.5 - 10.1 MG/DL    Bilirubin, total 0.6 0.2 - 1.0 MG/DL    ALT (SGPT) 11 (L) 12 - 78 U/L    AST (SGOT) 26 15 - 37 U/L    Alk.  phosphatase 80 45 - 117 U/L    Protein, total 6.8 6.4 - 8.2 g/dL    Albumin 2.7 (L) 3.5 - 5.0 g/dL    Globulin 4.1 (H) 2.0 - 4.0 g/dL    A-G Ratio 0.7 (L) 1.1 - 2.2     CBC W/O DIFF    Collection Time: 09/29/21  3:08 AM   Result Value Ref Range    WBC 6.6 3.6 - 11.0 K/uL    RBC 4.39 3.80 - 5.20 M/uL    HGB 11.7 11.5 - 16.0 g/dL    HCT 35.9 35.0 - 47.0 %    MCV 81.8 80.0 - 99.0 FL    MCH 26.7 26.0 - 34.0 PG    MCHC 32.6 30.0 - 36.5 g/dL    RDW 14.6 (H) 11.5 - 14.5 %    PLATELET 233 792 - 047 K/uL    MPV 12.0 8.9 - 12.9 FL    NRBC 0.5 (H) 0  WBC    ABSOLUTE NRBC 0.03 (H) 0.00 - 0.01 K/uL   C REACTIVE PROTEIN, QT    Collection Time: 09/29/21  3:08 AM   Result Value Ref Range    C-Reactive protein 0.96 (H) 0.00 - 0.60 mg/dL   GLUCOSE, POC    Collection Time: 09/29/21  7:59 AM   Result Value Ref Range    Glucose (POC) 174 (H) 65 - 117 mg/dL    Performed by LarayDataTorrentorn PCT    GLUCOSE, POC    Collection Time: 09/29/21 11:38 AM   Result Value Ref Range    Glucose (POC) 140 (H) 65 - 117 mg/dL    Performed by Larayne Washington PCT        Imaging:  I have personally reviewed the patients radiographs and have reviewed the reports:  Minimal patchy infiltrates on CT 9-25        Justine Zuniga MD

## 2021-09-29 NOTE — PROGRESS NOTES
Verbal shift change report given to Dirk (oncoming nurse) by Tyshawn Millan (offgoing nurse). Report included the following information SBAR.

## 2021-09-29 NOTE — PROGRESS NOTES
Speech pathology note  Reviewed chart and discussed case with RN. Patient with no improvement in mental status to suspect improvement in swallow function. Will follow for swallowing re-evaluation if/when mental status improves. Thank you.     Erika Obrien., CCC-SLP lexapro- caused bruising

## 2021-09-30 NOTE — PROGRESS NOTES
Hospitalist Progress Note    NAME: Tavia Nelson   :  1940   MRN:  [id number]       Assessment / Plan:  COVID-19 pneumonia with hypoxemia and sepsis  Metabolic encephalopathy  Dementia  History of CVA    CTA chestno evidence of PE, no acute process. CT abdomen/pelvis with contrastno acute finding. CT head no acute findings  Encephalomalacia in the left MCA territory consistent with large right MCA  territory infarction. Moderate to large left occipital infarction as well. Severe chronic microvascular change. Scattered small to moderate cerebellar  infarctions as well. Sepsis criteria metfever, tachycardia, COVID-19 pneumonia  Remdesivir completed. Continue Solu-Medrol (allergic to dexamethasone), on 4 liters today  Procalcitonin normal.  UA with no pyuria. DC abx  Remains NPO, d/t poor mental status, Speech and swallow following. Will call son during Speech and swallow eval, to see she follows him  Pulmonary following. As per son, her mental status is not great after her last stroke and also has some dementia, likely this is her baseline.       Elevated troponin  Troponin 0.35 - 0.33, likely type 2  Echo: Severe concentric hypertrophy, Mild-moderate mitral valve stenosis. Mild Tricuspid regurg. No reported chest pain     History of A. fib  Hyperlipidemia  GERD  Hypertension  Oral meds on hold. Continue IV metoprolol  Eliquis on hold as not tolerating PO, will start therapeutic lovenox     Functional paraplegia. Wheelchair-bound. 18.5 - 24.9 Normal weight / Body mass index is 23.05 kg/m².     Estimated discharge date:  Oct 4  Barriers: Clinical improvement     Code status: DNR  Prophylaxis: Eliquis  Recommended Disposition: SNF/LTC     Subjective:     Chief Complaint / Reason for Physician Visit  Follow up for covid 19  She remains on 4 liters    Review of Systems:  Symptom Y/N Comments  Symptom Y/N Comments   Fever/Chills    Chest Pain     Poor Appetite    Edema     Cough Abdominal Pain     Sputum    Joint Pain     SOB/CHRISTINE    Pruritis/Rash     Nausea/vomit    Tolerating PT/OT     Diarrhea    Tolerating Diet     Constipation    Other       Could NOT obtain due to: Confusion       Objective:     VITALS:   Last 24hrs VS reviewed since prior progress note. Most recent are:  Patient Vitals for the past 24 hrs:   Temp Pulse Resp BP SpO2   09/30/21 1416     95 %   09/30/21 1323 98.1 °F (36.7 °C) (!) 106 22 (!) 178/106 99 %   09/30/21 1054 98 °F (36.7 °C) 86 20  100 %   09/30/21 0830     95 %   09/30/21 0818 97.9 °F (36.6 °C) 84 18 122/83 100 %   09/30/21 0327 97 °F (36.1 °C) (!) 107 19 (!) 153/92 100 %   09/30/21 0115     95 %   09/29/21 2319 97.3 °F (36.3 °C) (!) 102 21 (!) 158/92 96 %   09/29/21 1802 99.1 °F (37.3 °C) (!) 104 22 (!) 148/91 97 %   09/29/21 1531     93 %   09/29/21 1516 98.7 °F (37.1 °C) (!) 104 26 (!) 143/82 (!) 88 %       Intake/Output Summary (Last 24 hours) at 9/30/2021 1449  Last data filed at 9/30/2021 0404  Gross per 24 hour   Intake 600 ml   Output 550 ml   Net 50 ml        I had a face to face encounter and independently examined this patient on 9/30/2021, as outlined below:  PHYSICAL EXAM:  General: Awake but doesn't follow any command  EENT:  EOMI. Anicteric sclerae. MMM  Resp:  CTA bilaterally, no wheezing or rales. No accessory muscle use  CV:  Regular  rhythm,  No edema  GI:  Soft, Non distended, Non tender. +Bowel sounds  Neurologic:  Alert and oriented X 0, normal speech,   Psych:   Good insight. Not anxious nor agitated  Skin:  No rashes.   No jaundice    Reviewed most current lab test results and cultures  YES  Reviewed most current radiology test results   YES  Review and summation of old records today    NO  Reviewed patient's current orders and MAR    YES  PMH/SH reviewed - no change compared to H&P  ________________________________________________________________________  Care Plan discussed with:    Comments   Patient y    Family RN y    Care Manager     Consultant                        Multidiciplinary team rounds were held today with , nursing, pharmacist and clinical coordinator. Patient's plan of care was discussed; medications were reviewed and discharge planning was addressed. ________________________________________________________________________  Total NON critical care TIME:  35  Minutes    Total CRITICAL CARE TIME Spent:   Minutes non procedure based      Comments   >50% of visit spent in counseling and coordination of care     ________________________________________________________________________  Eduin Mckeon MD     Procedures: see electronic medical records for all procedures/Xrays and details which were not copied into this note but were reviewed prior to creation of Plan. LABS:  I reviewed today's most current labs and imaging studies.   Pertinent labs include:  Recent Labs     09/30/21  0336 09/29/21  0308 09/28/21  0456   WBC 7.1 6.6 7.6   HGB 11.4* 11.7 11.5   HCT 34.6* 35.9 35.0    172 166     Recent Labs     09/30/21  0336 09/29/21  0308 09/28/21  0456   * 145 145   K 3.5 3.4* 3.5   * 115* 113*   CO2 23 24 22   * 152* 168*   BUN 25* 26* 24*   CREA 0.72 0.95 0.85   CA 8.3* 8.6 8.6   ALB 2.6* 2.7* 2.4*   TBILI 1.0 0.6 0.5   ALT 16 11* 13       Signed: Eduin Mckeon MD

## 2021-09-30 NOTE — PROGRESS NOTES
Bedside shift change report given to Jennifer (oncoming nurse) by Dirk  (offgoing nurse). Report included the following information SBAR, Kardex, Intake/Output, MAR and Recent Results.

## 2021-09-30 NOTE — PROGRESS NOTES
Problem: Dysphagia (Adult)  Goal: *Acute Goals and Plan of Care (Insert Text)  Description: Speech pathology goals  Initiated 9/27/2021  1. Patient will participate in swallowing re-evaluation within 7 days  Outcome: Not Progressing Towards Goal     SPEECH LANGUAGE PATHOLOGY DYSPHAGIA TREATMENT  Patient: Kenji Ortiz (1 y.o. female)  Date: 9/30/2021  Diagnosis: Sepsis ([address] [A41.9]  Pneumonia due to COVID-19 virus [U07.1, J12.82]  Hypoxemia [R09.02]  UTI (urinary tract infection) [N39.0] <principal problem not specified>       Precautions: aspiration      ASSESSMENT:  Patient seen with son present by phone to help encourage patient. Despite son's encouragement, patient continues with absent bolus acceptance of ice chip by spoon and thin liquid by straw (which is how she normally drinks per son report). When ice chip was placed in patient's oral cavity, patient with no manipulation and ice chip sat on her anterior tongue until SLP removed it. Increased work of breathing and wheezing noted. Patient remains at high risk for aspiration due to AMS resulting in absent bolus recognition. PLAN:  Recommendations and Planned Interventions:  -Patient remains inappropriate for PO intake  -Consider palliative consult to determine goals of care related to nutrition as patient has been NPO since admission 5 days ago with no improvement in mental status, therefore no improvement in swallow function  -SLP to follow, however little else for SLP to add until mental status improves  Patient continues to benefit from skilled intervention to address the above impairments. Continue treatment per established plan of care. Discharge Recommendations: To Be Determined     SUBJECTIVE:   Patient with no verbalizations.     OBJECTIVE:   Cognitive and Communication Status:  Neurologic State: Alert, Confused  Orientation Level: Unable to verbalize  Cognition: No command following  Perception: Cues to maintain midline in sitting (Patient found to be leaning L in bed; required clinician cue)        Dysphagia Treatment:     P.O. Trials:  Patient Position: upright in bed  Vocal quality prior to P.O.: No impairment  Consistency Presented: Ice chips; Thin liquid  How Presented: SLP-fed/presented;Spoon;Straw     Bolus Acceptance: Absent            Pain:  Pain Scale 1: Adult Nonverbal Pain Scale  Pain Intensity 1: 0       After treatment:   Patient left in no apparent distress in bed, Call bell within reach and Nursing notified    COMMUNICATION/EDUCATION:     The patient's plan of care including recommendations, planned interventions, and recommended diet changes were discussed with: Registered nurse and Physician.      DEREK Martinez  Time Calculation: 15 mins

## 2021-09-30 NOTE — PROGRESS NOTES
Bedside shift change report given to Terry Stephen RN (oncoming nurse) by Iván Maddox RN (offgoing nurse). Report included the following information SBAR, Kardex, OR Summary, Procedure Summary, Intake/Output and Cardiac Rhythm nsr/stach.    g

## 2021-09-30 NOTE — PROGRESS NOTES
Pulmonary, Critical Care, and Sleep Medicine    Name: Mk Cartwright MRN: [id number]   : 1940 Hospital: [facility]   Date: 2021        IMPRESSION:   · Acute hypoxic respiratory failure  · COVID pneumonia  · Wheezing - has home albuterol, presumably has some sort of underlying obstructive airways disease  · Much of her wheezing is due to upper airway congestion, however  · Metabolic encephalopathy  · Afib, GERD, hyperlipidemia  · Functional paraplegia - wheelchair bound      RECOMMENDATIONS:   · O2  · Check chest X-ray   · Upright positioning    · Scheduled bronchodilators - note the typical COVID protocol is to not use nebulizers, but I am not sure she will get much benefit from MDI as she can not follow commands (and she has not received any of my ordered meds because of this). If she is unable to get Combivent today and wheezing continues, will need to move to negative pressure room and change to Duoneb. · NT suctioning PRN  · Remdesivir completed  · Systemic corticosteroids  · Consider neurology evaluation if her mental status does not improve  · DVT prophylaxis      Subjective:     21: remains minimally interactive. Less wheezing    Initial history: This patient has been seen and evaluated at the request of Dr. Briseida Peterson for COVID and wheezing. Patient is a 78 y.o. female admitted 4 days ago with COVID pneumonia and AMS. She has been on 1 LPM, but today she has been wheezing, and her O2 was increased to 4 LPM.  She is not following commands. She does not speak, but she did start moaning when I asked her to tell me her name.      Past Medical History:   Diagnosis Date    Acute kidney failure ([facility]     Acute systolic heart failure ([address] 2016    Arrhythmia     CAD (coronary artery disease)     Heart failure ([facility]     Hypertension     Stroke [facility]) 2009    Stroke, left hand weakness and speech, peripheral vision affected      Past Surgical History: Procedure Laterality Date    HX UROLOGICAL  2018      Prior to Admission medications    Medication Sig Start Date End Date Taking? Authorizing Provider   atorvastatin (LIPITOR) 80 mg tablet Take 1 Tab by mouth daily. 3/8/20   Cristino Hyman MD   aspirin 81 mg chewable tablet Take 1 Tab by mouth daily. 3/8/20   Cristino Hyman MD   metoprolol tartrate (LOPRESSOR) 25 mg tablet Take 1 Tab by mouth every twelve (12) hours. 3/8/20   Cristino Hyman MD   amLODIPine (NORVASC) 5 mg tablet Take 5 mg by mouth daily. Provider, Historical   allopurinol (ZYLOPRIM) 100 mg tablet Take 100 mg by mouth daily. Sukumar Mederos DO   apixaban (ELIQUIS) 2.5 mg tablet Take 1 Tab by mouth two (2) times a day. 18   Rowena Hogan ANP   potassium chloride (K-DUR, KLOR-CON) 20 mEq tablet Take 20 mEq by mouth daily. MON, WED., SAT 18   Provider, Historical   famotidine (PEPCID) 20 mg tablet Take 20 mg by mouth daily. Provider, Historical   furosemide (LASIX) 20 mg tablet Take 20 mg by mouth Every Mon, Wed and Sat. Provider, Historical   nitroglycerin (NITROSTAT) 0.4 mg SL tablet 0.4 mg by SubLINGual route every five (5) minutes as needed for Chest Pain. Up to 3 doses.     Provider, Historical   albuterol (PROVENTIL HFA, VENTOLIN HFA, PROAIR HFA) 90 mcg/actuation inhaler Take 2 Puffs by inhalation every four (4) hours as needed for Wheezing. 16   Florence Pryor PA     Allergies   Allergen Reactions    Decadron [Dexamethasone Sodium Phosphate] Hives    Pcn [Penicillins] Hives     Tolerated cefepime 2021      Social History     Tobacco Use    Smoking status: Former Smoker     Quit date: 2009     Years since quittin.0    Smokeless tobacco: Never Used   Substance Use Topics    Alcohol use: No     Alcohol/week: 0.0 standard drinks     Comment: Very little      Family History   Problem Relation Age of Onset    Coronary Artery Disease Other         grandmother age 76    Stroke Mother     Hypertension Mother         Current Facility-Administered Medications   Medication Dose Route Frequency    methylPREDNISolone (PF) (Solu-MEDROL) injection 60 mg  60 mg IntraVENous Q8H    0.9% sodium chloride infusion  50 mL/hr IntraVENous CONTINUOUS    ipratropium-albuterol (COMBIVENT RESPIMAT) 20 mcg-100 mcg inhalation spray  1 Puff Inhalation Q4H RT    metoprolol (LOPRESSOR) injection 5 mg  5 mg IntraVENous Q6H    enoxaparin (LOVENOX) injection 40 mg  40 mg SubCUTAneous Q24H    famotidine (PF) (PEPCID) 20 mg in 0.9% sodium chloride 10 mL injection  20 mg IntraVENous DAILY    insulin lispro (HUMALOG) injection   SubCUTAneous AC&HS    [Held by provider] amLODIPine (NORVASC) tablet 5 mg  5 mg Oral DAILY    [Held by provider] apixaban (ELIQUIS) tablet 2.5 mg  2.5 mg Oral BID    aspirin chewable tablet 81 mg  81 mg Oral DAILY    atorvastatin (LIPITOR) tablet 80 mg  80 mg Oral DAILY    [Held by provider] metoprolol tartrate (LOPRESSOR) tablet 25 mg  25 mg Oral Q12H    allopurinoL (ZYLOPRIM) tablet 100 mg  100 mg Oral DAILY    guaiFENesin (ROBITUSSIN) 100 mg/5 mL oral liquid 100 mg  100 mg Oral QID       Review of Systems:  Review of systems not obtained due to patient factors. Objective:   Vital Signs:    Visit Vitals  /83 (BP 1 Location: Left upper arm, BP Patient Position: At rest)   Pulse 84   Temp 97.9 °F (36.6 °C)   Resp 18   Ht 5' (1.524 m)   Wt 53.5 kg (118 lb)   SpO2 95%   BMI 23.05 kg/m²       O2 Device: Nasal cannula   O2 Flow Rate (L/min): 4 l/min   Temp (24hrs), Av.1 °F (36.7 °C), Min:97 °F (36.1 °C), Max:99.1 °F (37.3 °C)       Intake/Output:   Last shift:      No intake/output data recorded.   Last 3 shifts:  1901 -  0700  In: 600 [I.V.:600]  Out: 750 [Urine:750]    Intake/Output Summary (Last 24 hours) at 2021 1052  Last data filed at 2021 0404  Gross per 24 hour   Intake 600 ml   Output 750 ml   Net -150 ml      Physical Exam:   General:  Ill appearing, eyes open, not following commands   Head:  Normocephalic, without obvious abnormality, atraumatic. Eyes:  Conjunctivae/corneas clear. Nose: Nares normal. Septum midline. Mucosa normal.    Throat: Lips, mucosa, and tongue normal.     Neck: Supple, symmetrical, trachea midline    Lungs:   Decreased wheezing and upper airway noise   Chest wall:  No tenderness or deformity. Heart:  Regular rate and rhythm    Abdomen:   Soft, non-tender. Bowel sounds normal.    Extremities: Extremities normal, atraumatic, no cyanosis    Skin: Skin color, texture, turgor normal. No rashes or lesions   Neurologic: Not following commands     Data:   Labs:  Recent Labs     09/30/21  0336 09/29/21  0308 09/28/21  0456   WBC 7.1 6.6 7.6   HGB 11.4* 11.7 11.5   HCT 34.6* 35.9 35.0    172 166     Recent Labs     09/30/21  0336 09/29/21  0308 09/28/21  0456   * 145 145   K 3.5 3.4* 3.5   * 115* 113*   CO2 23 24 22   * 152* 168*   BUN 25* 26* 24*   CREA 0.72 0.95 0.85   CA 8.3* 8.6 8.6   ALB 2.6* 2.7* 2.4*   TBILI 1.0 0.6 0.5   ALT 16 11* 13     No results for input(s): PHI, PCO2I, PO2I, HCO3I, FIO2I in the last 72 hours.     Imaging:  I have personally reviewed the patients radiographs:  Pending         Rosie Bass MD

## 2021-10-01 NOTE — PROGRESS NOTES
Verbal shift change report given to Tej Jaimes (oncoming nurse) by Raul Humphrey (offgoing nurse). Report included the following information SBAR.

## 2021-10-01 NOTE — PROGRESS NOTES
Problem: Falls - Risk of  Goal: *Absence of Falls  Description: Document Leida Hooker Fall Risk and appropriate interventions in the flowsheet. Outcome: Progressing Towards Goal  Note: Fall Risk Interventions:  Mobility Interventions: Bed/chair exit alarm    Mentation Interventions: Adequate sleep, hydration, pain control, Bed/chair exit alarm    Medication Interventions: Assess postural VS orthostatic hypotension, Bed/chair exit alarm    Elimination Interventions: Bed/chair exit alarm              Problem: Patient Education: Go to Patient Education Activity  Goal: Patient/Family Education  Outcome: Progressing Towards Goal     Problem: Airway Clearance - Ineffective  Goal: Achieve or maintain patent airway  Outcome: Progressing Towards Goal     Problem: Gas Exchange - Impaired  Goal: Absence of hypoxia  Outcome: Progressing Towards Goal  Goal: Promote optimal lung function  Outcome: Progressing Towards Goal     Problem: Breathing Pattern - Ineffective  Goal: Ability to achieve and maintain a regular respiratory rate  Outcome: Progressing Towards Goal     Problem:  Body Temperature -  Risk of, Imbalanced  Goal: Ability to maintain a body temperature within defined limits  Outcome: Progressing Towards Goal  Goal: Will regain or maintain usual level of consciousness  Outcome: Progressing Towards Goal  Goal: Complications related to the disease process, condition or treatment will be avoided or minimized  Outcome: Progressing Towards Goal     Problem: Isolation Precautions - Risk of Spread of Infection  Goal: Prevent transmission of infectious organism to others  Outcome: Progressing Towards Goal     Problem: Nutrition Deficits  Goal: Optimize nutrtional status  Outcome: Progressing Towards Goal     Problem: Risk for Fluid Volume Deficit  Goal: Maintain normal heart rhythm  Outcome: Progressing Towards Goal  Goal: Maintain absence of muscle cramping  Outcome: Progressing Towards Goal  Goal: Maintain normal serum potassium, sodium, calcium, phosphorus, and pH  Outcome: Progressing Towards Goal     Problem: Loneliness or Risk for Loneliness  Goal: Demonstrate positive use of time alone when socialization is not possible  Outcome: Progressing Towards Goal     Problem: Fatigue  Goal: Verbalize increase energy and improved vitality  Outcome: Progressing Towards Goal     Problem: Patient Education: Go to Patient Education Activity  Goal: Patient/Family Education  Outcome: Progressing Towards Goal     Problem: Pressure Injury - Risk of  Goal: *Prevention of pressure injury  Description: Document Nando Scale and appropriate interventions in the flowsheet.   Outcome: Progressing Towards Goal     Problem: Patient Education: Go to Patient Education Activity  Goal: Patient/Family Education  Outcome: Progressing Towards Goal     Problem: Patient Education: Go to Patient Education Activity  Goal: Patient/Family Education  Outcome: Progressing Towards Goal

## 2021-10-01 NOTE — PROGRESS NOTES
Spiritual Care Assessment/Progress Note  [facility]      NAME: Chadd Peng      MRN: [id number]  AGE: 78 y.o. SEX: female  Alevism Affiliation: Congregation   Language: English     10/1/2021     Total Time (in minutes): 6     Spiritual Assessment begun in [facility] 2 MED TELE through conversation with:         []Patient        [] Family    [] Friend(s)        Reason for Consult: Palliative Care, Initial/Spiritual Assessment     Spiritual beliefs: (Please include comment if needed)     [] Identifies with a irving tradition:         [] Supported by a irving community:            [] Claims no spiritual orientation:           [] Seeking spiritual identity:                [] Adheres to an individual form of spirituality:           [x] Not able to assess:                           Identified resources for coping:      [] Prayer                               [] Music                  [] Guided Imagery     [] Family/friends                 [] Pet visits     [] Devotional reading                         [x] Unknown     [] Other:                                             Interventions offered during this visit: (See comments for more details)    Patient Interventions: Initial visit     Family/Friend(s): Initial Assessment     Plan of Care:     [] Support spiritual and/or cultural needs    [] Support AMD and/or advance care planning process      [] Support grieving process   [] Coordinate Rites and/or Rituals    [] Coordination with community clergy   [] No spiritual needs identified at this time   [] Detailed Plan of Care below (See Comments)  [] Make referral to Music Therapy  [] Make referral to Pet Therapy     [] Make referral to Addiction services  [] Make referral to [facility]  [] Make referral to Spiritual Care Partner  [] No future visits requested        [x] Follow up upon further referrals     Comments:     Attempted to perform Initial Palliative Care assessment for Ms. Rhiannon Pierce in 2118.  Reviewed the chart before the attempt. Due to medical concern, attempt of visit was made by the phone. Pt did not answer the phone despite multiple attempts.  called her son Mr. Mitzy Mcadams, he did not answer. Unable to assess.      Leandro Louise M.Div,Th.M, [address] Provider   Paging Service [phone])

## 2021-10-01 NOTE — PROGRESS NOTES
Bedside and Verbal shift change report given to Deepthi Mcneal RN (oncoming nurse) by Natasha Damian RN (offgoing nurse). Report included the following information SBAR, Kardex, Intake/Output, MAR and Recent Results.

## 2021-10-01 NOTE — PROGRESS NOTES
Assumed care. Patient responds when name is called, [facility] \"

## 2021-10-01 NOTE — PROGRESS NOTES
Comprehensive Nutrition Assessment    Type and Reason for Visit: Initial, RD nutrition re-screen/LOS, NPO/clear liquid    Nutrition Recommendations/Plan:  · If aggressive nutrition interventions desired, recommend NGT for enteral nutrition support - Osmolite 1.2 @ 25 mL/hr and advance as tolerated by 10 mL q 8 hours to goal rate of 55 mL/hr + 100 mL water flushes q 4 hours (provides 1584 kcal, 73g protein, 208g CHO, and 1682 mL water)  · D/c IVF if TF started     Nutrition Assessment:     Patient medically noted for sepsis, pneumonia, COVID-19, UTI, and metabolic encephalopathy. PMH HTN, HF, CAD, and COPD. Chart reviewed for length of stay; patient NPO ~6 days. SLP has been following. Palliative care consult noted. If plans are to continue aggressive care, recommend NGT placement for enteral nutrition. TF recommendations provided above. Will continue to follow progress and plan of care. Estimated Daily Nutrient Needs:  Energy (kcal): 1474 kcal ( x 1.3AF +250kcal); Weight Used for Energy Requirements: Current  Protein (g): 56-67g (1.0-1.2 g/kg bw); Weight Used for Protein Requirements: Current  Fluid (ml/day): 1500 mL; Method Used for Fluid Requirements: 1 ml/kcal    Nutrition Related Findings:       Na 146, -[phone]  Atorvastatin, famotidine, Humalog, Solu-medrol, D5% IVF + KCl    Wounds:    None       Current Nutrition Therapies:  DIET NPO    Anthropometric Measures:  · Height:  5' (152.4 cm)  · Current Body Wt:  55.6 kg (122 lb 9.2 oz)   · BMI Category:  Normal weight (BMI 18.5-24. 9)       Nutrition Diagnosis:   · Inadequate protein-energy intake related to cognitive or neurological impairment, swallowing difficulty as evidenced by NPO or clear liquid status due to medical condition    Nutrition Interventions:   Food and/or Nutrient Delivery: Start tube feeding  Nutrition Education and Counseling: No recommendations at this time  Coordination of Nutrition Care: No recommendation at this time    Goals:  Determine nutrition plan of care next 1-3 days       Nutrition Monitoring and Evaluation:   Behavioral-Environmental Outcomes: None identified  Food/Nutrient Intake Outcomes: Diet advancement/tolerance, Enteral nutrition intake/tolerance  Physical Signs/Symptoms Outcomes: Biochemical data, Chewing or swallowing, Weight, Hemodynamic status, Fluid status or edema    Discharge Planning:     Too soon to determine     Electronically signed by Scott Lee RD on 10/1/2021 at 12:42 PM    Contact: ext [phone]

## 2021-10-01 NOTE — PROGRESS NOTES
Hospitalist Progress Note    NAME: Marco Michelle   :  1940   MRN:  [id number]       Assessment / Plan:  COVID-19 pneumonia with hypoxemia and sepsis  Metabolic encephalopathy  Dementia  History of CVA    CTA chestno evidence of PE, no acute process. CT abdomen/pelvis with contrastno acute finding. CT head no acute findings  Encephalomalacia in the left MCA territory consistent with large right MCA  territory infarction. Moderate to large left occipital infarction as well. Severe chronic microvascular change. Scattered small to moderate cerebellar  infarctions as well. Sepsis criteria metfever, tachycardia, COVID-19 pneumonia  Remdesivir completed. Continue Solu-Medrol (allergic to dexamethasone), on 4 liters today  Procalcitonin normal.  UA with no pyuria. Abx discontinued  Remains NPO, d/t poor mental status, Speech and swallow following. Pulmonary following. As per son, her mental status is not great after her last stroke and also has some dementia, likely this is her baseline.     Elevated troponin  Troponin 0.35 - 0.33, likely type 2  Echo: Severe concentric hypertrophy, Mild-moderate mitral valve stenosis. Mild Tricuspid regurg. No reported chest pain     History of A. fib  Hyperlipidemia  GERD  Hypertension  Oral meds on hold. Continue IV metoprolol  Eliquis on hold as not tolerating PO, c/w therapeutic lovenox     Functional paraplegia. Wheelchair-bound. 18.5 - 24.9 Normal weight / Body mass index is 23.05 kg/m².     Estimated discharge date: Oct 4  Barriers: Clinical improvement     Code status: DNR  Prophylaxis: Lovenox  Recommended Disposition: SNF/LTC     Subjective:     Chief Complaint / Reason for Physician Visit  Follow up for covid 19  She remains on 4 liters, intake is poor.   Mental status remains poor      Review of Systems:  Symptom Y/N Comments  Symptom Y/N Comments   Fever/Chills    Chest Pain     Poor Appetite    Edema     Cough    Abdominal Pain Sputum    Joint Pain     SOB/CHRISTINE    Pruritis/Rash     Nausea/vomit    Tolerating PT/OT     Diarrhea    Tolerating Diet     Constipation    Other       Could NOT obtain due to: Confusion       Objective:     VITALS:   Last 24hrs VS reviewed since prior progress note. Most recent are:  Patient Vitals for the past 24 hrs:   Temp Pulse Resp BP SpO2   10/01/21 1240 97.9 °F (36.6 °C) (!) 104 18 (!) 187/131 93 %   10/01/21 1146     96 %   10/01/21 0936 97.8 °F (36.6 °C) (!) 101 16 (!) 160/101 95 %   10/01/21 0749     97 %   10/01/21 0634  97  (!) 175/97    10/01/21 0430     96 %   10/01/21 0400 98 °F (36.7 °C) 98 22 (!) 146/87 95 %   10/01/21 0000 98.3 °F (36.8 °C) 90 22 (!) 189/93 93 %   09/30/21 2105     98 %   09/30/21 2010 98.5 °F (36.9 °C) 98 20 (!) 168/88 97 %   09/30/21 1545 98.2 °F (36.8 °C) 97 20 (!) 166/80 93 %   09/30/21 1416     95 %     No intake or output data in the 24 hours ending 10/01/21 1340     I had a face to face encounter and independently examined this patient on 10/1/2021, as outlined below:  PHYSICAL EXAM:  General: Awake but doesn't follow any command  EENT:  EOMI. Anicteric sclerae. MMM  Resp:  CTA bilaterally, no wheezing or rales. No accessory muscle use  CV:  Regular  rhythm,  No edema  GI:  Soft, Non distended, Non tender. +Bowel sounds  Neurologic:  Alert and oriented X 0, normal speech,   Psych:   Good insight. Not anxious nor agitated  Skin:  No rashes.   No jaundice    Reviewed most current lab test results and cultures  YES  Reviewed most current radiology test results   YES  Review and summation of old records today    NO  Reviewed patient's current orders and MAR    YES  PMH/SH reviewed - no change compared to H&P  ________________________________________________________________________  Care Plan discussed with:    Comments   Patient y    Family      RN y    Care Manager     Consultant                        Multidiciplinary team rounds were held today with , nursing, pharmacist and clinical coordinator. Patient's plan of care was discussed; medications were reviewed and discharge planning was addressed. ________________________________________________________________________  Total NON critical care TIME:  35  Minutes    Total CRITICAL CARE TIME Spent:   Minutes non procedure based      Comments   >50% of visit spent in counseling and coordination of care     ________________________________________________________________________  Jorgito Jimenez MD     Procedures: see electronic medical records for all procedures/Xrays and details which were not copied into this note but were reviewed prior to creation of Plan. LABS:  I reviewed today's most current labs and imaging studies.   Pertinent labs include:  Recent Labs     09/30/21  0336 09/29/21  0308   WBC 7.1 6.6   HGB 11.4* 11.7   HCT 34.6* 35.9    172     Recent Labs     09/30/21  0336 09/29/21  0308   * 145   K 3.5 3.4*   * 115*   CO2 23 24   * 152*   BUN 25* 26*   CREA 0.72 0.95   CA 8.3* 8.6   ALB 2.6* 2.7*   TBILI 1.0 0.6   ALT 16 11*       Signed: Jorgito Jimenez MD

## 2021-10-01 NOTE — CONSULTS
Palliative Medicine Consult  [place]: [phone])    Patient Name: Magdy Neri  YOB: 1940    Date of Initial Consult: 10/1/21  Reason for Consult: care decisions  Requesting Provider: Brandon Heredia MD   Primary Care Physician: Tarah Rowland DO     SUMMARY:   Magdy Neri is a 78 y.o. female with a past history of atrial fibrillation, hypertension, hyperlipidemia , history of stroke with functional paraplegia wheelchair-bound who was admitted on 9/25/2021 from  with a diagnosis of acute hypoxic respiratory failure due to COVID-19 pneumonia, s/p remdesivir and steroids. current medical issues leading to Palliative Medicine involvement include: metabolic  encephalopathy, dysphagia, no oral intake baseline debility, for care decision    Social : Lives with her only son Alan Dow at baseline she is wheelchair-bound, slurred speech, there is a family member cousin who who is primary caregiver. PALLIATIVE DIAGNOSES:   1. Metabolic encephalopathy  2. Dysphagia/feeding difficulty secondary to #1  3. Debility at baseline wheelchair-bound  4. Underlying dementia  5. Goals of care       PLAN:   1. To bedside RN Ye Gayle, patient is confused unable to participate in swallowing evaluation. 2. Spoke to her son/only child Soniya Valdez/legal next of kin. 3. Review medical condition. 4. We discussed she is unable to eat because of altered mental status. 5. Son would like a trial of his note, to see if she perks up , awake to eat,  He has understanding that she may pull it out. We discussed tube feeding is not an option given her debility, underlying dementia, she may be at risk of aspiration and may not improve her quality of life. 10. Son confirms DNR, patient would need a durable DNR prior to discharge. 7. Initial consult note routed to primary continuity provider and/or primary health care team members/dr Baldwin   8.  Communicated plan of care with: Palliative IDT, [facility] Health Care Team     GOALS OF CARE / TREATMENT PREFERENCES:     GOALS OF CARE:  Patient/Health Care Proxy Stated Goals: Prolong life    TREATMENT PREFERENCES:   Code Status: DNR    Patient's personal goals include: Unable to tell me    Important upcoming milestones or family events: Unable to tell me  The patient identifies the following as important for living well: unable to tell me . Advance Care Planning:  [x] The [facility] Interdisciplinary Team has updated the ACP Navigator with Health Care Decision Maker and Patient Capacity      Primary Decision Maker: Lakhwinder Cason  [phone]  Advance Care Planning 3/6/2020   Patient's Healthcare Decision Maker is: Legal Next of José 67   Primary Decision Maker Name -   Primary Decision Maker Phone Number -   Confirm Advance Directive Yes, not on file       Medical Interventions: Limited additional interventions       Other:    As far as possible, the palliative care team has discussed with patient / health care proxy about goals of care / treatment preferences for patient.      HISTORY:     History obtained from: Chart, bedside RN Cleve Gomez: None    HPI/SUBJECTIVE:    The patient is:   [] Verbal and participatory  [x] Non-participatory due to: altered mental status       Clinical Pain Assessment (nonverbal scale for severity on nonverbal patients):   Clinical Pain Assessment  Severity: 0     Activity (Movement): Lying quietly, normal position    Duration: for how long has pt been experiencing pain (e.g., 2 days, 1 month, years)  Frequency: how often pain is an issue (e.g., several times per day, once every few days, constant)     FUNCTIONAL ASSESSMENT:     Palliative Performance Scale (PPS):          PSYCHOSOCIAL/SPIRITUAL SCREENING:     Palliative IDT has assessed this patient for cultural preferences / practices and a referral made as appropriate to needs (Cultural Services, Patient Advocacy, Ethics, etc.)    Any spiritual / Faith concerns:  [] Yes /  [x] No    Caregiver Burnout:  [] Yes /  [x] No /  [] No Caregiver Present      Anticipatory grief assessment:   [x] Normal  / [] Maladaptive       ESAS Anxiety: Anxiety: 0    ESAS Depression:          REVIEW OF SYSTEMS:     Positive and pertinent negative findings in ROS are noted above in HPI. The following systems were [] reviewed / [x] unable to be reviewed as noted in HPI  Other findings are noted below. Systems: constitutional, ears/nose/mouth/throat, respiratory, gastrointestinal, genitourinary, musculoskeletal, integumentary, neurologic, psychiatric, endocrine. Positive findings noted below. Modified ESAS Completed by: provider           Pain: 0   Anxiety: 0 Nausea: 0     Dyspnea: 2                    PHYSICAL EXAM:     From RN flowsheet:  Wt Readings from Last 3 Encounters:   10/01/21 122 lb 8 oz (55.6 kg)   03/07/20 120 lb (54.4 kg)   03/04/20 114 lb (51.7 kg)     Blood pressure (!) 184/98, pulse 94, temperature 98.2 °F (36.8 °C), resp. rate 18, height 5' (1.524 m), weight 122 lb 8 oz (55.6 kg), SpO2 94 %. Pain Scale 1: Adult Nonverbal Pain Scale  Pain Intensity 1: 0                 Last bowel movement, if known: To prevent spread of infection I viewed her through the open door.     She is alert curled up in the bed confused  Breathing is not labored  No agitation       HISTORY:     Active Problems:    Hypoxemia (9/25/2021)      UTI (urinary tract infection) (9/25/2021)      Sepsis ([address] (9/25/2021)      Pneumonia due to COVID-19 virus (9/25/2021)      Past Medical History:   Diagnosis Date    Acute kidney failure ([address] 4/49/8474    Acute systolic heart failure ([address] 7/14/2016    Arrhythmia     CAD (coronary artery disease)     Heart failure ([address]     Hypertension     Stroke [facility]) 2009    Stroke, left hand weakness and speech, peripheral vision affected      Past Surgical History:   Procedure Laterality Date    HX UROLOGICAL  02/2018      Family History   Problem Relation Age of Onset    Coronary Artery Disease Other         grandmother age 76    Stroke Mother     Hypertension Mother       History reviewed, no pertinent family history.   Social History     Tobacco Use    Smoking status: Former Smoker     Quit date: 2009     Years since quittin.0    Smokeless tobacco: Never Used   Substance Use Topics    Alcohol use: No     Alcohol/week: 0.0 standard drinks     Comment: Very little     Allergies   Allergen Reactions    Decadron [Dexamethasone Sodium Phosphate] Hives    Pcn [Penicillins] Hives     Tolerated cefepime 2021      Current Facility-Administered Medications   Medication Dose Route Frequency    potassium chloride 10 mEq in 100 ml IVPB  10 mEq IntraVENous Q1H    dextrose 5% - 0.45% NaCl with KCl 10 mEq/L infusion  50 mL/hr IntraVENous CONTINUOUS    enoxaparin (LOVENOX) injection 50 mg  1 mg/kg SubCUTAneous Q12H    methylPREDNISolone (PF) (Solu-MEDROL) injection 60 mg  60 mg IntraVENous Q8H    ipratropium-albuterol (COMBIVENT RESPIMAT) 20 mcg-100 mcg inhalation spray  1 Puff Inhalation Q4H RT    albuterol (PROVENTIL HFA, VENTOLIN HFA, PROAIR HFA) inhaler 2 Puff  2 Puff Inhalation Q4H PRN    metoprolol (LOPRESSOR) injection 5 mg  5 mg IntraVENous Q6H    famotidine (PF) (PEPCID) 20 mg in 0.9% sodium chloride 10 mL injection  20 mg IntraVENous DAILY    hydrALAZINE (APRESOLINE) 20 mg/mL injection 10 mg  10 mg IntraVENous Q6H PRN    sodium chloride (NS) flush 5-10 mL  5-10 mL IntraVENous PRN    ondansetron (ZOFRAN) injection 4 mg  4 mg IntraVENous Q4H PRN    insulin lispro (HUMALOG) injection   SubCUTAneous AC&HS    glucose chewable tablet 16 g  4 Tablet Oral PRN    dextrose (D50W) injection syrg 12.5-25 g  12.5-25 g IntraVENous PRN    glucagon (GLUCAGEN) injection 1 mg  1 mg IntraMUSCular PRN    [Held by provider] amLODIPine (NORVASC) tablet 5 mg  5 mg Oral DAILY    aspirin chewable tablet 81 mg  81 mg Oral DAILY    atorvastatin (LIPITOR) tablet 80 mg  80 mg Oral DAILY    [Held by provider] metoprolol tartrate (LOPRESSOR) tablet 25 mg  25 mg Oral Q12H    allopurinoL (ZYLOPRIM) tablet 100 mg  100 mg Oral DAILY    guaiFENesin (ROBITUSSIN) 100 mg/5 mL oral liquid 100 mg  100 mg Oral QID          LAB AND IMAGING FINDINGS:     Lab Results   Component Value Date/Time    WBC 7.1 09/30/2021 03:36 AM    HGB 11.4 (L) 09/30/2021 03:36 AM    PLATELET 272 83/97/9027 03:36 AM     Lab Results   Component Value Date/Time    Sodium 144 10/02/2021 04:05 AM    Potassium 2.9 (L) 10/02/2021 04:05 AM    Chloride 111 (H) 10/02/2021 04:05 AM    CO2 29 10/02/2021 04:05 AM    BUN 21 (H) 10/02/2021 04:05 AM    Creatinine 0.79 10/02/2021 04:05 AM    Calcium 8.0 (L) 10/02/2021 04:05 AM    Magnesium 1.8 10/02/2021 04:05 AM    Phosphorus 3.6 03/05/2020 02:17 AM      Lab Results   Component Value Date/Time    Alk. phosphatase 66 10/02/2021 04:05 AM    Protein, total 5.9 (L) 10/02/2021 04:05 AM    Albumin 2.5 (L) 10/02/2021 04:05 AM    Globulin 3.4 10/02/2021 04:05 AM     Lab Results   Component Value Date/Time    INR 1.1 03/04/2020 09:38 AM    Prothrombin time 10.8 03/04/2020 09:38 AM    aPTT 24.3 05/06/2018 09:00 PM      Lab Results   Component Value Date/Time    Ferritin 488 (H) 09/27/2021 05:35 AM      Lab Results   Component Value Date/Time    pH 7.39 03/12/2017 10:40 AM    PCO2 45 03/12/2017 10:40 AM    PO2 87 03/12/2017 10:40 AM     No components found for: [place]   Lab Results   Component Value Date/Time     08/02/2017 08:31 AM    CK - MB 1.8 08/02/2017 08:31 AM                Total time: 50 mins  Counseling / coordination time, spent as noted above: 45 mins  > 50% counseling / coordination?: yes     Prolonged service was provided for  []30 min   []75 min in face to face time in the presence of the patient, spent as noted above. Time Start:   Time End:   Note: this can only be billed with 79713 (initial) or 52011 (follow up).   If multiple start / stop times, list each separately.

## 2021-10-01 NOTE — PROGRESS NOTES
Speech pathology note  Reviewed chart and note patient with no improvement in mental status to suggest improved swallow function. Patient has now been NPO x6 days with poor PO intake prior to admission per son report yesterday. Recommend discussion regarding goals of care related to nutrition. SLP to follow up next week if patient is appropriate. Thank you.     Paco Delgadillo., CCC-SLP

## 2021-10-02 NOTE — PROGRESS NOTES
Hospitalist Progress Note    NAME: Elma Alcantar   :  1940   MRN:  [id number]       Assessment / Plan:  COVID-19 pneumonia with hypoxemia and sepsis  Metabolic encephalopathy  Dementia  History of CVA    CTA chestno evidence of PE, no acute process. CT abdomen/pelvis with contrastno acute finding. CT head no acute findings  Encephalomalacia in the left MCA territory consistent with large right MCA  territory infarction. Moderate to large left occipital infarction as well. Severe chronic microvascular change. Scattered small to moderate cerebellar  infarctions as well. Sepsis criteria metfever, tachycardia, COVID-19 pneumonia  Remdesivir completed. Continue Solu-Medrol (allergic to dexamethasone), on 4 liters today  Procalcitonin normal.  UA with no pyuria. Abx discontinued  Remains NPO, d/t poor mental status, Speech and swallow following. Son wants to try NG tube and see if she perks up  Pulmonary following. As per son, her mental status is not great after her last stroke and also has some dementia, likely this is her baseline.     Elevated troponin  Troponin 0.35 - 0.33, likely type 2  Echo: Severe concentric hypertrophy, Mild-moderate mitral valve stenosis. Mild Tricuspid regurg. No reported chest pain    Hypokalemia  Repleted, repeat in AM     History of A. fib  Hyperlipidemia  GERD  Hypertension  Oral meds on hold. Continue IV metoprolol  Eliquis on hold as not tolerating PO, c/w therapeutic lovenox     Functional paraplegia. Wheelchair-bound. 18.5 - 24.9 Normal weight / Body mass index is 23.05 kg/m².     Estimated discharge date: Oct 4  Barriers: Clinical improvement     Code status: DNR  Prophylaxis: Lovenox  Recommended Disposition: SNF/LTC     Subjective:     Chief Complaint / Reason for Physician Visit  Follow up for covid 19  She remains on 4 liters, intake is poor.   Mental status remains poor      Review of Systems:  Symptom Y/N Comments  Symptom Y/N Comments Fever/Chills    Chest Pain     Poor Appetite    Edema     Cough    Abdominal Pain     Sputum    Joint Pain     SOB/CHRISTINE    Pruritis/Rash     Nausea/vomit    Tolerating PT/OT     Diarrhea    Tolerating Diet     Constipation    Other       Could NOT obtain due to: Confusion       Objective:     VITALS:   Last 24hrs VS reviewed since prior progress note. Most recent are:  Patient Vitals for the past 24 hrs:   Temp Pulse Resp BP SpO2   10/02/21 0839 98.2 °F (36.8 °C) 94  (!) 184/98 94 %   10/02/21 0757     94 %   10/02/21 0420 97.7 °F (36.5 °C) (!) 103  138/68 94 %   10/02/21 0104  86 18 (!) 194/93 93 %   10/01/21 2301 96.8 °F (36 °C) 98 20 (!) 174/92 91 %   10/01/21 2020 98.9 °F (37.2 °C) 91 22 (!) 152/84 90 %   10/01/21 1730  83  (!) 154/76    10/01/21 1655 97.9 °F (36.6 °C) 86 20 (!) 177/85 94 %   10/01/21 1653     96 %   10/01/21 1240 97.9 °F (36.6 °C) (!) 104 18 (!) 187/131 93 %       Intake/Output Summary (Last 24 hours) at 10/2/2021 1151  Last data filed at 10/2/2021 1140  Gross per 24 hour   Intake 1733.33 ml   Output 900 ml   Net 833.33 ml        I had a face to face encounter and independently examined this patient on 10/2/2021, as outlined below:  PHYSICAL EXAM:  General: Awake but doesn't follow any command  EENT:  EOMI. Anicteric sclerae. MMM  Resp:  CTA bilaterally, no wheezing or rales. No accessory muscle use  CV:  Regular  rhythm,  No edema  GI:  Soft, Non distended, Non tender. +Bowel sounds  Neurologic:  Alert and oriented X 0, normal speech,   Psych:   Good insight. Not anxious nor agitated  Skin:  No rashes.   No jaundice    Reviewed most current lab test results and cultures  YES  Reviewed most current radiology test results   YES  Review and summation of old records today    NO  Reviewed patient's current orders and MAR    YES  PMH/SH reviewed - no change compared to H&P  ________________________________________________________________________  Care Plan discussed with:    Comments Patient y    Family      RN y    Care Manager     Consultant                        Multidiciplinary team rounds were held today with , nursing, pharmacist and clinical coordinator. Patient's plan of care was discussed; medications were reviewed and discharge planning was addressed. ________________________________________________________________________  Total NON critical care TIME:  35  Minutes    Total CRITICAL CARE TIME Spent:   Minutes non procedure based      Comments   >50% of visit spent in counseling and coordination of care     ________________________________________________________________________  Amaury Christopher MD     Procedures: see electronic medical records for all procedures/Xrays and details which were not copied into this note but were reviewed prior to creation of Plan. LABS:  I reviewed today's most current labs and imaging studies.   Pertinent labs include:  Recent Labs     09/30/21  0336   WBC 7.1   HGB 11.4*   HCT 34.6*        Recent Labs     10/02/21  0405 09/30/21  0336    146*   K 2.9* 3.5   * 115*   CO2 29 23   * 146*   BUN 21* 25*   CREA 0.79 0.72   CA 8.0* 8.3*   MG 1.8  --    ALB 2.5* 2.6*   TBILI 0.8 1.0   ALT 16 16       Signed: Amaury Christopher MD

## 2021-10-02 NOTE — PROGRESS NOTES
Pt nonverbal at AM assessment and not following commands  Respiratory to bedside to administer an inhaler and pt was unable to follow command of a deep breath in to take the medication   MD ordered NG tube for feedings; RN notified MD that this would be inappropriate at this time given pt inability to swallow or follow commands. MD in agreement with plan. MD later advised to attempt an NG tube, RRT consulted in regards. RRT to bedside to assess pt and assist in NG tube insertion. Pt found to be in stridor. MD notified, medications administered, breathing treatment administered. MD in agreement with plan. NG tube attempt delayed until tomorrow given more pressing matter of stridor. RRT at bedside to administer medications regarding stridor.

## 2021-10-02 NOTE — PROGRESS NOTES
.End of Shift Note    Bedside shift change report given to *** (oncoming nurse) by Anjelica Parrish RN (offgoing nurse). Report included the following information {SBAR REPORTS IADD:89229}    Shift worked:  ***     Shift summary and any significant changes:     notified provider pt not good candidate for NG tube. Yousuf Ca can not follow commands, pt could not follow commands for R/T. Concerns for physician to address:  ***     Zone phone for oncoming shift:   ***       Activity:  Activity Level: Bed Rest  Number times ambulated in hallways past shift: {Numbers; 0-5:327730}  Number of times OOB to chair past shift: {Numbers; 0-5:301528}    Cardiac:   Cardiac Monitoring: {YES/NO:41401}      Cardiac Rhythm: Sinus Rhythm    Access:   Current line(s): {access:77146}     Genitourinary:   Urinary status: {:78017}    Respiratory:   O2 Device: Nasal cannula  Chronic home O2 use?: {YES/NO/NA:93267}  Incentive spirometer at bedside: {YES/NO/NA:00166}     GI:  Last Bowel Movement Date:  (no value)  Current diet:  DIET NPO  ADULT TUBE FEEDING Nasogastric; Other Tube Feeding (Specify); OSmolite 1.2; Delivery Method: Continuous; Continuous Initial Rate (mL/hr): 25; Continuous Advance Tube Feeding: Yes; Advancement Volume (mL/hr): 10; Advancement Frequency: Q 8 hours; C... Passing flatus: {YES/NO:95426}  Tolerating current diet: {YES/NO:20870}       Pain Management:   Patient states pain is manageable on current regimen: {YES/NO/NA:66700}    Skin:  Prosper Score: 9  Interventions: {prosper interventions:21723}    Patient Safety:  Fall Score:  Total Score: 3  Interventions: {fall interventions:55368}  High Fall Risk: Yes    Length of Stay:  Expected LOS: 5d 9h  Actual LOS: [address] RN

## 2021-10-02 NOTE — PROGRESS NOTES
End of Shift Note    Bedside shift change report given to Willie Greene RN (oncoming nurse) by Bobby Lopez (offgoing nurse). Report included the following information SBAR, Kardex, MAR and Recent Results    Shift worked:  5027-1394     Shift summary and any significant changes:     Patient remain alert, aphasic, no verbal responses throughout night, tele monitor reported 5 beat vtach , NP notified, labs ordered and drawn     Concerns for physician to address:  Lorence Socks, possibly correlated with the administration of IV Metoprolol     Zone phone for oncoming shift:          Activity:  Activity Level: Bed Rest  Number times ambulated in hallways past shift: 0  Number of times OOB to chair past shift: 0    Cardiac:   Cardiac Monitoring: Yes      Cardiac Rhythm: Sinus Rhythm    Access:   Current line(s): PIV     Genitourinary:   Urinary status: external catheter    Respiratory:   O2 Device: Nasal cannula  Chronic home O2 use?: NO  Incentive spirometer at bedside: NO     GI:  Last Bowel Movement Date:  (no value)  Current diet:  DIET NPO  Passing flatus: YES  Tolerating current diet: YES       Pain Management:   Patient states pain is manageable on current regimen: N/A    Skin:  Nando Score: 9  Interventions: turn team, increase time out of bed and internal/external urinary devices    Patient Safety:  Fall Score:  Total Score: 3  Interventions: gripper socks  High Fall Risk: Yes    Length of Stay:  Expected LOS: 5d 9h  Actual LOS: [facility]

## 2021-10-02 NOTE — PROGRESS NOTES
RAPID RESPONSE TEAM    0514 While rounding on unit was asked to evaluate 2118 and assist with NGT insertion    Patient with audible upper airway stridor, tongue protruding and edematous, secretions currently controlled. Patient AMS and not responding to evaluation questions. Per chart review, patient has encephalopathy this admission with dementia. Discussed with Dr. Verona Meléndez via telephone. Patient is Covid+ and code status is DNR, possible hospice Monday. Telephone orders received with read back for STAT RACEMIC EPI VIA NEB/BENADRYL 50 MG/SOLUMEDROL 125 MG. Pepcid 20 mg given daily. 1729 RT notified of epi order. Discussed risks and benefits of giving neb for medical emergency vs waiting for negative pressure room to become available. Katie 107 notified who discussed with AOC. 1800 Portable hepa filter brought to bedside by [employer]    2105 RRT RN administered racemic epi via neb. Sign placed on door stating no entry for 2 hours. Primary nurse updated on plan of care. 1830 Neb complete. Stridor much improved but still present. Tongue less swollen. Dr. Verona Meléndez updated. No further orders at this time. Visit Vitals  /73   Pulse 90   Temp 97.7 °F (36.5 °C)   Resp 18   Ht 5' (1.524 m)   Wt 55.6 kg (122 lb 8 oz)   SpO2 99%   BMI 23.92 kg/m²       Patient to remain in 2118. Please call back if needed.       Luan Stanton RN  Rapid Response Team  X. [phone]

## 2021-10-02 NOTE — PROGRESS NOTES
Respiratory Care;    Spoke with RR Nurse Mylene Burks) regarding nebulizing a COVID positive patient in a non negative pressure room. Rama spoke with physician and nursing supervisor regarding approval to nebulizer treatment. This is not a respiratory protocol but a  hospital protocol regarding nebulizing medication on COVID positive patient's. For all COVID positive patient nebulizers must be in a closed circuit system, we give MDIs for Covid patients, no one can enter the room for a rapid or code blue for an hour or more in a non negative pressure room. Informed by RR nurse that this would delay care, will follow up with [facility]).

## 2021-10-02 NOTE — PROGRESS NOTES
End of Shift Note    Bedside shift change report given to Jesus Rivas  (oncoming nurse) by Loraine Corona RN (offgoing nurse). Report included the following information SBAR    Shift worked:  7a-7p     Shift summary and any significant changes:    Pt nonverbal throughout shift although appeared to be expressing nonverbal signs of pain--MD notified and PRN morphine ordered and administered, pt NPO throughout day see progress note in regards to NG tube insertion, pt turned Q2, managed incontinence care PRN, assisted in ADLs, pt blood pressures elevated-- MD notified and PRN medications administered with some improvement see vital signs, RRT to bedside for NG tube insertion-- pt found to be in stridor, meds administered per [facility], MD notified in regards, pt turned Q2, blood sugars monitored and medications administered per MAR, night shift RN updated in regards to plan of care with close monitoring for signs of stridor    Concerns for physician to address:    Zone phone for oncoming shift:  [phone]     Activity:  Activity Level: Bed Rest  Number times ambulated in hallways past shift: 0  Number of times OOB to chair past shift: 0    Cardiac:   Cardiac Monitoring: Yes      Cardiac Rhythm: Sinus Rhythm    Access:   Current line(s): PIV     Genitourinary:   Urinary status: voiding, incontinent and external catheter    Respiratory:   O2 Device: Nasal cannula  Chronic home O2 use?: NO  Incentive spirometer at bedside: YES     GI:  Last Bowel Movement Date:  (no value)  Current diet:  DIET NPO  ADULT TUBE FEEDING Nasogastric; Other Tube Feeding (Specify); OSmolite 1.2; Delivery Method: Continuous; Continuous Initial Rate (mL/hr): 25; Continuous Advance Tube Feeding: Yes; Advancement Volume (mL/hr): 10; Advancement Frequency: Q 8 hours; C...   Passing flatus: YES  Tolerating current diet: NO       Pain Management:   Patient states pain is manageable on current regimen: YES    Skin:  Nando Score: 9  Interventions: turn team, speciality bed, float heels and increase time out of bed    Patient Safety:  Fall Score:  Total Score: 3  Interventions: bed/chair alarm, assistive device (walker, cane, etc), gripper socks and pt to call before getting OOB  High Fall Risk: Yes    Length of Stay:  Expected LOS: 5d 9h  Actual LOS: [address], RN

## 2021-10-02 NOTE — PROGRESS NOTES
Received notification from bedside RN about patient with regards to: 5 beats VT, asymptomatic  VS: /93, HR 86, RR 18, O2 sat 93% on NC 2 L    Intervention given: CMP, Magnesium, give prn Hydralazine to address elevated BP ordered

## 2021-10-02 NOTE — PROGRESS NOTES
Physician Progress Note      PATIENTStandlemichael Ill  CSN #:                  [id number]  :                       1940  ADMIT DATE:       2021 5:17 PM  [address] [place] DATE:  RESPONDING  PROVIDER #:        Altaf Perea MD          QUERY TEXT:    Pt admitted with Covid 19. Noted documentation of Acute Hypoxic respiratory failure on Pulmonary PN 21 by ordered Pulmonology consultant. If possible, please document in progress notes and discharge summary:    The medical record reflects the following:  Risk Factors: 78 y.o. female who presents with mental status, fatigue and decreased appetite for 3 days. Covid positive, Wheezing, hypoxia requiring supplemental oxygen up to 6ltrs NC. PMHx: A. fib, hypertension, hyperlipidemia, wheelchair-bound. Clinical Indicators:  21 H&P: COVID-19 pneumonia; Hypoxia  21 Pulmonary Consult: IMPRESSION:  Acute hypoxic respiratory failure; COVID pneumonia; Wheezing - has home albuterol, presumably has some sort of underlying obstructive airways disease.  1645 02 94% RA; 1915 02 93% NC 2ltr   0857 02 93% 6ltr NC; 0902 94% NC 4ltr  10/1 0749 02 97% 2ltr NC  Doc Flowsheet   0136 Resp. pattern Dyspnea with exertion, diminished middle lower lobes bilateral,   1700 coarse, lower, diminished bilateral breath sounds   0830 Coarse, inspiratory wheezing bilateral breath sounds   0840 Coarse, wheezing bilateral breath sounds   0810 Dyspnea at rest, coarse breath sounds bilateral  10/1 0800 Dyspnea with exertion, expiratory wheezing, coarse bilateral breath sounds    Treatment: Admit, hospitalist consult, pulmonary consult, supplemental oxygen, Combivent, Solu-Medrol, guaifenesin, albuterol,  oximetry, vitals per unit routine, suctioning as needed.   Options provided:  -- Acute Hypoxic respiratory failure confirmed present on admission  -- Acute Hypoxic respiratory failure confirmed not present on admission  -- Acute Hypoxic respiratory failure ruled out  -- Other - I will add my own diagnosis  -- Disagree - Not applicable / Not valid  -- Disagree - Clinically unable to determine / Unknown  -- Refer to Clinical Documentation Reviewer    PROVIDER RESPONSE TEXT:    The diagnosis of Acute Hypoxic respiratory failure was confirmed as present on admission.     Query created by: Garry Us on 10/1/2021 1:08 PM      Electronically signed by:  Sable Hashimoto MD 10/2/2021 10:06 AM

## 2021-10-03 NOTE — PROGRESS NOTES
RAPID RESPONSE TEAM- Follow Up     Rounded on patient due to rapid response for upper airway stridor and tongue swelling. No stridor or tongue swelling noted. No upper neck swelling. Upper airway wheezing. Patient cannot do inhalers d/t mentals status. Spoke with RT supervisor Prashanth   and hospitalist NP; now that HEPPA filter in room, okay to order neb. Duo-neb ordered; designated RT notified. SpO2 95 % on 2L NC. Other VSS. No further RRT interventions indicated at this time. Please call with any question or concerns.    Andrew Sanchez RN  Ext. [phone]    Visit Vitals  /70   Pulse 87   Temp 97.3 °F (36.3 °C)   Resp 19   Ht 5' (1.524 m)   Wt 55.6 kg (122 lb 8 oz)   SpO2 95%   BMI 23.92 kg/m²

## 2021-10-03 NOTE — PROGRESS NOTES
End of Shift Note    Bedside shift change report given to Alea Reynolds (oncoming nurse) by Jagjit Mcfarlane (offgoing nurse). Report included the following information SBAR, Kardex, MAR, Accordion and Recent Results    Shift worked:  7-7p     Shift summary and any significant changes:     NPO; stridor     Concerns for physician to address:  hospice ?      Zone phone for oncoming shift:   [phone]

## 2021-10-03 NOTE — PROGRESS NOTES
Hospitalist Progress Note    NAME: Sierra Burnett   :  1940   MRN:  [id number]       Assessment / Plan:  COVID-19 pneumonia with hypoxemia and sepsis  Metabolic encephalopathy  Dementia  History of CVA    CTA chestno evidence of PE, no acute process. CT abdomen/pelvis with contrastno acute finding. CT head no acute findings  Encephalomalacia in the left MCA territory consistent with large right MCA  territory infarction. Moderate to large left occipital infarction as well. Severe chronic microvascular change. Scattered small to moderate cerebellar  infarctions as well. Sepsis criteria metfever, tachycardia, COVID-19 pneumonia  Remdesivir completed. Continue Solu-Medrol (allergic to dexamethasone), on 3 liters today  Worsening wheezing/stridor on 10/2, given Racemic epi, nebs, Steroids, stiill has some wheezing. Procalcitonin normal.  UA with no pyuria. Abx discontinued  Remains NPO, d/t poor mental status, Speech and swallow following. NG tube couldn't be done d/t the stridor and not following commands, will likely switch to hospice tomorrow. As per son, her mental status is not great after her last stroke and also has some dementia, likely this is her baseline.     Elevated troponin  Troponin 0.35 - 0.33, likely type 2  Echo: Severe concentric hypertrophy, Mild-moderate mitral valve stenosis. Mild Tricuspid regurg. No reported chest pain    Hypokalemia  Repleted, repeat in AM     History of A. fib  Hyperlipidemia  GERD  Hypertension  Oral meds on hold. Continue IV metoprolol  Eliquis on hold as not tolerating PO, c/w therapeutic lovenox     Functional paraplegia. Wheelchair-bound. 18.5 - 24.9 Normal weight / Body mass index is 23.05 kg/m².     Estimated discharge date:  Oct 4  Barriers: Clinical improvement    Surrogate decision maker: Her son  Updated son over the phone today     Code status: DNR  Prophylaxis: Lovenox  Recommended Disposition: SNF/LTC     Subjective: Chief Complaint / Reason for Physician Visit  Follow up for covid 19  She remains on 4 liters, intake is poor. Has some wheezing. Review of Systems:  Symptom Y/N Comments  Symptom Y/N Comments   Fever/Chills    Chest Pain     Poor Appetite    Edema     Cough    Abdominal Pain     Sputum    Joint Pain     SOB/CHRISTINE    Pruritis/Rash     Nausea/vomit    Tolerating PT/OT     Diarrhea    Tolerating Diet     Constipation    Other       Could NOT obtain due to: Confusion       Objective:     VITALS:   Last 24hrs VS reviewed since prior progress note. Most recent are:  Patient Vitals for the past 24 hrs:   Temp Pulse Resp BP SpO2   10/03/21 1208 97.6 °F (36.4 °C) 96 18 108/85 98 %   10/03/21 0833 97.6 °F (36.4 °C) 93 18 (!) 147/90 95 %   10/03/21 0635  83 18 121/72 96 %   10/03/21 0528  81 18 (!) 141/77 97 %   10/03/21 0336 97.9 °F (36.6 °C) 88 22 (!) 175/93 95 %   10/03/21 0039  84  138/86    10/03/21 0030 97.5 °F (36.4 °C) 90 18 133/74 98 %   10/02/21 2318     97 %   10/02/21 2105 97.3 °F (36.3 °C) 87 19 132/70 95 %   10/02/21 2000 97.8 °F (36.6 °C) 84 18 (!) 142/76 98 %   10/02/21 1842  82      10/02/21 1738 97.7 °F (36.5 °C) 90 18 136/73 99 %   10/02/21 1618    (!) 178/95    10/02/21 1547 98.3 °F (36.8 °C) 97 18 (!) 195/105 98 %     No intake or output data in the 24 hours ending 10/03/21 1228     I had a face to face encounter and independently examined this patient on 10/3/2021, as outlined below:  PHYSICAL EXAM:  General: Awake but doesn't follow any command  EENT:  EOMI. Anicteric sclerae. MMM  Resp:  CTA bilaterally, no wheezing or rales. No accessory muscle use  CV:  Regular  rhythm,  No edema  GI:  Soft, Non distended, Non tender. +Bowel sounds  Neurologic:  Alert and oriented X 0, normal speech,   Psych:   Good insight. Not anxious nor agitated  Skin:  No rashes.   No jaundice    Reviewed most current lab test results and cultures  YES  Reviewed most current radiology test results YES  Review and summation of old records today    NO  Reviewed patient's current orders and MAR    YES  PMH/SH reviewed - no change compared to H&P  ________________________________________________________________________  Care Plan discussed with:    Comments   Patient y    Family      RN y    Care Manager     Consultant                        Multidiciplinary team rounds were held today with , nursing, pharmacist and clinical coordinator. Patient's plan of care was discussed; medications were reviewed and discharge planning was addressed. ________________________________________________________________________  Total NON critical care TIME:  35  Minutes    Total CRITICAL CARE TIME Spent:   Minutes non procedure based      Comments   >50% of visit spent in counseling and coordination of care     ________________________________________________________________________  Eduin Mckeon MD     Procedures: see electronic medical records for all procedures/Xrays and details which were not copied into this note but were reviewed prior to creation of Plan. LABS:  I reviewed today's most current labs and imaging studies. Pertinent labs include:  No results for input(s): WBC, HGB, HCT, PLT, HGBEXT, HCTEXT, PLTEXT, HGBEXT, HCTEXT, PLTEXT in the last 72 hours.   Recent Labs     10/03/21  0350 10/02/21  0405    144   K 4.0 2.9*    111*   CO2 27 29   * 201*   BUN 28* 21*   CREA 1.02 0.79   CA 8.2* 8.0*   MG  --  1.8   ALB  --  2.5*   TBILI  --  0.8   ALT  --  16       Signed: Eduin Mckeon MD

## 2021-10-04 NOTE — PROGRESS NOTES
Pulmonary, Critical Care, and Sleep Medicine    Name: Jhonny Oliver MRN: [id number]   : 1940 Hospital: [facility]   Date: 10/4/2021        IMPRESSION:   · Acute hypoxic respiratory failure  · COVID pneumonia  · Wheezing - has home albuterol, presumably has some sort of underlying obstructive airways disease  · Much of her wheezing is due to upper airway congestion, however  · Metabolic encephalopathy  · Afib, GERD, hyperlipidemia  · Functional paraplegia - wheelchair bound      RECOMMENDATIONS:   · O2, wean  · Upright positioning    · Scheduled bronchodilators; if wheezing is refractory to MDI, will need to move to negative pressure room and change to Duoneb. · Remdesivir completed  · Systemic corticosteroids   · Aspiration precautions  · DVT prophylaxis   · Will sign off, glad she is ok     Subjective:     10-4-21: Events of weekend discussed with . Notes and data reviewed. no history from patient. No new events. Shallow breathes but no distress. No congestion or wheeze. .     10-1-21: still no history from patient. No new events. Wheezing overall better. 21: remains minimally interactive. Less wheezing    Initial history: This patient has been seen and evaluated at the request of Dr. Dinesh Persaud for COVID and wheezing. Patient is a 78 y.o. female admitted 4 days ago with COVID pneumonia and AMS. She has been on 1 LPM, but today she has been wheezing, and her O2 was increased to 4 LPM.  She is not following commands. She does not speak, but she did start moaning when I asked her to tell me her name.      Past Medical History:   Diagnosis Date    Acute kidney failure ([address]     Acute systolic heart failure ([address] 2016    Arrhythmia     CAD (coronary artery disease)     [facility] [address]     Hypertension     Stroke [facility]) 2009    Stroke, left hand weakness and speech, peripheral vision affected      Past Surgical History:   Procedure Laterality Date    HX UROLOGICAL  2018      Prior to Admission medications    Medication Sig Start Date End Date Taking? Authorizing Provider   atorvastatin (LIPITOR) 80 mg tablet Take 1 Tab by mouth daily. 3/8/20   Abi Das MD   aspirin 81 mg chewable tablet Take 1 Tab by mouth daily. 3/8/20   Abi Das MD   metoprolol tartrate (LOPRESSOR) 25 mg tablet Take 1 Tab by mouth every twelve (12) hours. 3/8/20   Abi Das MD   amLODIPine (NORVASC) 5 mg tablet Take 5 mg by mouth daily. Provider, Historical   allopurinol (ZYLOPRIM) 100 mg tablet Take 100 mg by mouth daily. Jocelyn Blas DO   apixaban (ELIQUIS) 2.5 mg tablet Take 1 Tab by mouth two (2) times a day. 18   Rowena Hogan ANP   potassium chloride (K-DUR, KLOR-CON) 20 mEq tablet Take 20 mEq by mouth daily. MON, WED., SAT 18   Provider, Historical   famotidine (PEPCID) 20 mg tablet Take 20 mg by mouth daily. Provider, Historical   furosemide (LASIX) 20 mg tablet Take 20 mg by mouth Every Mon, Wed and Sat. Provider, Historical   nitroglycerin (NITROSTAT) 0.4 mg SL tablet 0.4 mg by SubLINGual route every five (5) minutes as needed for Chest Pain. Up to 3 doses.     Provider, Historical   albuterol (PROVENTIL HFA, VENTOLIN HFA, PROAIR HFA) 90 mcg/actuation inhaler Take 2 Puffs by inhalation every four (4) hours as needed for Wheezing. 16   Steven Pryor PA     Allergies   Allergen Reactions    Decadron [Dexamethasone Sodium Phosphate] Hives    Pcn [Penicillins] Hives     Tolerated cefepime 2021      Social History     Tobacco Use    Smoking status: Former Smoker     Quit date: 2009     Years since quittin.0    Smokeless tobacco: Never Used   Substance Use Topics    Alcohol use: No     Alcohol/week: 0.0 standard drinks     Comment: Very little      Family History   Problem Relation Age of Onset    Coronary Artery Disease Other         grandmother age 76    Stroke Mother     Hypertension Mother Current Facility-Administered Medications   Medication Dose Route Frequency    dextrose 5% - 0.45% NaCl with KCl 10 mEq/L infusion  50 mL/hr IntraVENous CONTINUOUS    enoxaparin (LOVENOX) injection 50 mg  1 mg/kg SubCUTAneous Q12H    methylPREDNISolone (PF) (Solu-MEDROL) injection 60 mg  60 mg IntraVENous Q8H    metoprolol (LOPRESSOR) injection 5 mg  5 mg IntraVENous Q6H    famotidine (PF) (PEPCID) 20 mg in 0.9% sodium chloride 10 mL injection  20 mg IntraVENous DAILY    insulin lispro (HUMALOG) injection   SubCUTAneous AC&HS    [Held by provider] amLODIPine (NORVASC) tablet 5 mg  5 mg Oral DAILY    aspirin chewable tablet 81 mg  81 mg Oral DAILY    atorvastatin (LIPITOR) tablet 80 mg  80 mg Oral DAILY    [Held by provider] metoprolol tartrate (LOPRESSOR) tablet 25 mg  25 mg Oral Q12H    allopurinoL (ZYLOPRIM) tablet 100 mg  100 mg Oral DAILY    guaiFENesin (ROBITUSSIN) 100 mg/5 mL oral liquid 100 mg  100 mg Oral QID       Review of Systems:  Review of systems not obtained due to patient factors. Objective:   Vital Signs:    Visit Vitals  BP (!) 180/127   Pulse (!) 106   Temp 97.4 °F (36.3 °C)   Resp 16   Ht 5' (1.524 m)   Wt 55.8 kg (123 lb 0.3 oz)   SpO2 95%   BMI 24.03 kg/m²       O2 Device: Nasal cannula   O2 Flow Rate (L/min): 2 l/min   Temp (24hrs), Av.6 °F (36.4 °C), Min:97.3 °F (36.3 °C), Max:98 °F (36.7 °C)       Intake/Output:   Last shift:      No intake/output data recorded. Last 3 shifts: 10/02 1901 - 10/04 0700  In: -   Out: 300 [Urine:300]    Intake/Output Summary (Last 24 hours) at 10/4/2021 1312  Last data filed at 10/4/2021 0551  Gross per 24 hour   Intake    Output 300 ml   Net -300 ml      Physical Exam:   General:  Ill appearing, eyes open, not following commands   Head:  Normocephalic, without obvious abnormality, atraumatic. Eyes:  Conjunctivae/corneas clear. Nose: Nares normal. Septum midline.  Mucosa normal.    Throat: Lips, mucosa, and tongue normal. Neck: Supple, symmetrical, trachea midline    Lungs:   Decreased wheezing and upper airway noise   Chest wall:  No tenderness or deformity. Heart:  Regular rate and rhythm    Abdomen:   Soft, non-tender. Bowel sounds normal.    Extremities: Extremities normal, atraumatic, no cyanosis    Skin: Skin color, texture, turgor normal. No rashes or lesions   Neurologic: Not following commands     Data:   Labs:  Recent Labs     10/04/21  0326   WBC 11.3*   HGB 11.0*   HCT 32.1*   *     Recent Labs     10/04/21  0326 10/03/21  0350 10/02/21  0405    140 144   K 4.1 4.0 2.9*   * 106 111*   CO2 29 27 29   * 231* 201*   BUN 30* 28* 21*   CREA 0.94 1.02 0.79   CA 8.0* 8.2* 8.0*   MG  --   --  1.8   ALB 2.5*  --  2.5*   TBILI 0.7  --  0.8   ALT 19  --  16     No results for input(s): PHI, PCO2I, PO2I, HCO3I, FIO2I in the last 72 hours.     Imaging:  I have personally reviewed the patients radiographs:  Pending         Giovanni Saunders MD

## 2021-10-04 NOTE — PROGRESS NOTES
2300 Bedside shift change report given to Carmen ANGULO (oncoming nurse) by Raulito Mckeon RN (offgoing nurse). Report included the following information SBAR, Kardex, Intake/Output, MAR and Recent Results. End of Shift Note    Bedside shift change report given to [address] [place] [address] (oncoming nurse) by Catarina Melton RN (offgoing nurse). Report included the following information SBAR, Kardex, Intake/Output, MAR and Recent Results    Shift worked:  Night     Shift summary and any significant changes:    Patient was given Hydralazine one time d/t increased Blood pressure. Patients still getting continuous fluids. Concerns for physician to address:       Zone phone for oncoming shift:          Activity:  Activity Level: Bed Rest  Number times ambulated in hallways past shift: 0  Number of times OOB to chair past shift: 0    Cardiac:   Cardiac Monitoring: Yes      Cardiac Rhythm: Sinus Rhythm    Access:   Current line(s): PIV     Genitourinary:   Urinary status: voiding and external catheter    Respiratory:   O2 Device: Nasal cannula  Chronic home O2 use?: NO  Incentive spirometer at bedside: NO     GI:  Last Bowel Movement Date:  (unknown - patient confused)  Current diet:  DIET NPO  ADULT TUBE FEEDING Nasogastric; Other Tube Feeding (Specify); OSmolite 1.2; Delivery Method: Continuous; Continuous Initial Rate (mL/hr): 25; Continuous Advance Tube Feeding: Yes; Advancement Volume (mL/hr): 10; Advancement Frequency: Q 8 hours; C... Passing flatus: YES  Tolerating current diet: YES       Pain Management:   Patient states pain is manageable on current regimen: N/A    Skin:  Nando Score: 10  Interventions: speciality bed and internal/external urinary devices    Patient Safety:  Fall Score:  Total Score: 3  Interventions: bed/chair alarm, gripper socks and pt to call before getting OOB  High Fall Risk: Yes    Length of Stay:  Expected LOS: 5d 9h  Actual LOS: 9      Catarina Melton RN

## 2021-10-04 NOTE — PROGRESS NOTES
Bedside and Verbal shift change report given to Na Welch RN (oncoming nurse) by Judith Joe RN (offgoing nurse). Report included the following information SBAR, Kardex, MAR and Recent Results.

## 2021-10-04 NOTE — PROGRESS NOTES
Problem: Falls - Risk of  Goal: *Absence of Falls  Description: Document [place] Fall Risk and appropriate interventions in the flowsheet.   Outcome: Progressing Towards Goal  Note: Fall Risk Interventions:  Mobility Interventions: Bed/chair exit alarm, PT Consult for assist device competence, PT Consult for mobility concerns, Patient to call before getting OOB, OT consult for ADLs    Mentation Interventions: Bed/chair exit alarm, Room close to nurse's station, Reorient patient    Medication Interventions: Bed/chair exit alarm, Teach patient to arise slowly, Patient to call before getting OOB    Elimination Interventions: Bed/chair exit alarm, Call light in reach, Patient to call for help with toileting needs              Problem: Patient Education: Go to Patient Education Activity  Goal: Patient/Family Education  Outcome: Progressing Towards Goal     Problem: Gas Exchange - Impaired  Goal: Absence of hypoxia  Outcome: Progressing Towards Goal

## 2021-10-04 NOTE — PROGRESS NOTES
Hospitalist Progress Note    NAME: Melissa Cedeño   :  1940   MRN:  [id number]       Assessment / Plan:  COVID-19 pneumonia with hypoxemia and sepsis  Metabolic encephalopathy  Dementia  History of CVA    CTA chestno evidence of PE, no acute process. CT abdomen/pelvis with contrastno acute finding. CT head no acute findings  Encephalomalacia in the left MCA territory consistent with large right MCA  territory infarction. Moderate to large left occipital infarction as well. Severe chronic microvascular change. Scattered small to moderate cerebellar  infarctions as well. Sepsis criteria metfever, tachycardia, COVID-19 pneumonia  Remdesivir completed. Weaning steroids now, on 3 liters today  Worsening wheezing/stridor on 10/2, given Racemic epi, nebs, Steroids, stiill has some wheezing. Procalcitonin normal.  UA with no pyuria. Abx discontinued  Remains NPO, d/t poor mental status, no improvement seen here  NG tube couldn't be done d/t the stridor and not following commands  As per son, her mental status is not great after her last stroke and also has some dementia, likely this is her baseline, her PO intake has been poor for several days PTA. Palliative following     Elevated troponin  Troponin 0.35 - 0.33, likely type 2  Echo: Severe concentric hypertrophy, Mild-moderate mitral valve stenosis. Mild Tricuspid regurg. No reported chest pain    Hypokalemia  Normal today     History of A. fib  Hyperlipidemia  GERD  Hypertension  Oral meds on hold. Continue IV metoprolol  Eliquis on hold as not tolerating PO, c/w therapeutic lovenox     Functional paraplegia. Wheelchair-bound. 18.5 - 24.9 Normal weight / Body mass index is 23.05 kg/m².     Estimated discharge date:  Oct 4  Barriers: Clinical improvement    Surrogate decision maker: Her son  Updated son over the phone today     Code status: DNR  Prophylaxis: Lovenox  Recommended Disposition: SNF/LTC     Subjective:     Chief Complaint / Reason for Physician Visit  Follow up for covid 19  She remains on 4 liters, intake is poor. Has some wheezing. Review of Systems:  Symptom Y/N Comments  Symptom Y/N Comments   Fever/Chills    Chest Pain     Poor Appetite    Edema     Cough    Abdominal Pain     Sputum    Joint Pain     SOB/CHRISTINE    Pruritis/Rash     Nausea/vomit    Tolerating PT/OT     Diarrhea    Tolerating Diet     Constipation    Other       Could NOT obtain due to: Confusion       Objective:     VITALS:   Last 24hrs VS reviewed since prior progress note. Most recent are:  Patient Vitals for the past 24 hrs:   Temp Pulse Resp BP SpO2   10/04/21 1148 97.4 °F (36.3 °C) (!) 106 16 (!) 180/127 95 %   10/04/21 0917 97.3 °F (36.3 °C) 100 16 (!) 155/116 96 %   10/04/21 0315 97.8 °F (36.6 °C) 79 18 (!) 191/86 96 %   10/03/21 2345     95 %   10/03/21 2243 97.3 °F (36.3 °C) 88 20 (!) 160/88 95 %   10/03/21 1548 98 °F (36.7 °C) 95 18 (!) 159/76 96 %       Intake/Output Summary (Last 24 hours) at 10/4/2021 1540  Last data filed at 10/4/2021 0551  Gross per 24 hour   Intake    Output 300 ml   Net -300 ml        I had a face to face encounter and independently examined this patient on 10/4/2021, as outlined below:  PHYSICAL EXAM:  General: Awake but doesn't follow any command  EENT:  EOMI. Anicteric sclerae. MMM  Resp:  CTA bilaterally, no wheezing or rales. No accessory muscle use  CV:  Regular  rhythm,  No edema  GI:  Soft, Non distended, Non tender. +Bowel sounds  Neurologic:  Alert and oriented X 0, normal speech,   Psych:   Good insight. Not anxious nor agitated  Skin:  No rashes.   No jaundice    Reviewed most current lab test results and cultures  YES  Reviewed most current radiology test results   YES  Review and summation of old records today    NO  Reviewed patient's current orders and MAR    YES  PMH/SH reviewed - no change compared to H&P  ________________________________________________________________________  Care Plan discussed with: Comments   Patient y    Family      RN y    Care Manager     Consultant                        Multidiciplinary team rounds were held today with , nursing, pharmacist and clinical coordinator. Patient's plan of care was discussed; medications were reviewed and discharge planning was addressed. ________________________________________________________________________  Total NON critical care TIME:  35  Minutes    Total CRITICAL CARE TIME Spent:   Minutes non procedure based      Comments   >50% of visit spent in counseling and coordination of care     ________________________________________________________________________  Corine Rowley MD     Procedures: see electronic medical records for all procedures/Xrays and details which were not copied into this note but were reviewed prior to creation of Plan. LABS:  I reviewed today's most current labs and imaging studies.   Pertinent labs include:  Recent Labs     10/04/21  0326   WBC 11.3*   HGB 11.0*   HCT 32.1*   *     Recent Labs     10/04/21  0326 10/03/21  0350 10/02/21  0405    140 144   K 4.1 4.0 2.9*   * 106 111*   CO2 29 27 29   * 231* 201*   BUN 30* 28* 21*   CREA 0.94 1.02 0.79   CA 8.0* 8.2* 8.0*   MG  --   --  1.8   ALB 2.5*  --  2.5*   TBILI 0.7  --  0.8   ALT 19  --  16       Signed: Corine Rowley MD

## 2021-10-04 NOTE — PROGRESS NOTES
Speech pathology note  Reviewed chart and discussed case with RN. Patient with no improvement in mental status since SLP first attempted evaluation 1 week ago. Patient has been unsafe for PO intake due to AMS. SLP will sign off at this time, however please re-consult if mental status improves and patient following commands. Thank you.     Erika Obrien., CCC-SLP

## 2021-10-04 NOTE — PROGRESS NOTES
Spiritual Care Assessment/Progress Note  [facility]      NAME: Yeimi Pizarro      MRN: [id number]  AGE: 78 y.o.  SEX: female  Gnosticist Affiliation: Worship   Language: English     10/4/2021     Total Time (in minutes): 8     Spiritual Assessment begun in [facility] 2 MED TELE through conversation with:         []Patient        [x] Family    [] Friend(s)        Reason for Consult: Initial/Spiritual assessment, patient floor     Spiritual beliefs: (Please include comment if needed)     [x] Identifies with a irving tradition:         [] Supported by a irving community:            [] Claims no spiritual orientation:           [] Seeking spiritual identity:                [] Adheres to an individual form of spirituality:           [] Not able to assess:                           Identified resources for coping:      [] Prayer                               [] Music                  [] Guided Imagery     [x] Family/friends                 [] Pet visits     [] Devotional reading                         [] Unknown     [] Other:                                           Interventions offered during this visit: (See comments for more details)    Patient Interventions: Initial visit     Family/Friend(s): Coping skills reviewed/reinforced, Gnosticist beliefs/image of God discussed, Initial Assessment     Plan of Care:     [] Support spiritual and/or cultural needs    [] Support AMD and/or advance care planning process      [] Support grieving process   [] Coordinate Rites and/or Rituals    [] Coordination with community clergy   [x] No spiritual needs identified at this time   [] Detailed Plan of Care below (See Comments)  [] Make referral to Music Therapy  [] Make referral to Pet Therapy     [] Make referral to Addiction services  [] Make referral to [facility]  [] Make referral to Spiritual Care Partner  [] No future visits requested        [] Follow up upon further referrals     Comments: Initial spiritual assessment attempted. Unable to interact with pt due to restrictions. Called pt's son and introduced self and role. Explored how son was coping. Son reported to be coping well at this time as family was supporting each other and in one accord. Did not identify any spiritual needs at this time, confirmed pt as Rubina Salazar  for call, stated that pt is not able to really hold a conversation at this time due to health history. Stated he would reach out to 's if needed. Chaplains will be available for support. RIVKA Miller. Div

## 2021-10-05 NOTE — PROGRESS NOTES
End of Shift Note    Bedside shift change report given to *** (oncoming nurse) by Sandi Aguilar RN (offgoing nurse). Report included the following information SBAR, Kardex, Intake/Output, MAR, Accordion and Recent Results    Shift worked:  6008-1975     Shift summary and any significant changes:     ***     Concerns for physician to address:  ***     Zone phone for oncoming shift:   [phone]       Activity:  Activity Level: Bed Rest  Number times ambulated in hallways past shift: 0  Number of times OOB to chair past shift: 0    Cardiac:   Cardiac Monitoring: Yes      Cardiac Rhythm: Sinus Rhythm    Access:   Current line(s): PIV     Genitourinary:   Urinary status: external catheter    Respiratory:   O2 Device: None (Room air)  Chronic home O2 use?: NO  Incentive spirometer at bedside: NO     GI:  Last Bowel Movement Date:  (unknown)  Current diet:  DIET NPO  Passing flatus: YES  Tolerating current diet: NO       Pain Management:   Patient states pain is manageable on current regimen: N/A    Skin:  Nando Score: 11  Interventions: turn team, speciality bed, float heels and internal/external urinary devices    Patient Safety:  Fall Score:  Total Score: 3  Interventions: pt to call before getting OOB and stay with me (per policy)  High Fall Risk: Yes    Length of Stay:  Expected LOS: 5d 9h  Actual LOS: 10      Sandi Aguilar RN

## 2021-10-05 NOTE — HOSPICE
[facility] RN note:  Consult noted. Reviewing chart. Meeting with son Chelly Hall [phone] arranged for 9:00am 10/6 in [facility].  Son would like to see pt. 24hr contact information provided. Thank you for the opportunity to care for this pt and family. Please contact hospice at [phone] with any questions or concerns.

## 2021-10-05 NOTE — PROGRESS NOTES
Hospitalist Progress Note    NAME: Shoshana Murray   :  1940   MRN:  [id number]       Assessment / Plan:  COVID-19 pneumonia with hypoxemia and sepsis  Metabolic encephalopathy  Dementia  History of CVA    CTA chestno evidence of PE, no acute process. CT abdomen/pelvis with contrastno acute finding. CT head no acute findings  Encephalomalacia in the left MCA territory consistent with large right MCA  territory infarction. Moderate to large left occipital infarction as well. Severe chronic microvascular change. Scattered small to moderate cerebellar  infarctions as well. Sepsis criteria met on admissionfever, tachycardia, COVID-19 pneumonia  Remdesivir completed. Weaning steroids now, on 3 liters, wean as tolerated. Worsening wheezing/stridor on 10/2, given Racemic epi, nebs, Steroids, stiill has some wheezing likely d/t airway congestion, duonebs as tolerated. Procalcitonin normal.  UA with no pyuria. Abx discontinued  Remains NPO, d/t poor mental status, no improvement seen here  NG tube couldn't be done d/t the stridor and not following commands, Dont think she would be candidate for PEG feeding  As per son, her mental status is not great after her last stroke and also has some dementia, likely this is her baseline, her PO intake has been poor for several days PTA. Discussed with son again 10/5, her Covid still positive, so he wont be able to visit her in hospital. I recommended home hospice ( he is not interested in SNF hospice), wants to know more about hospice, will put consult for them  Palliative following     Elevated troponin  Troponin 0.35 - 0.33, likely type 2  Echo: Severe concentric hypertrophy, Mild-moderate mitral valve stenosis. Mild Tricuspid regurg. No reported chest pain    Hypokalemia  Normal today     History of A. fib  Hyperlipidemia  GERD  Hypertension  Oral meds on hold.   Continue IV metoprolol, bp not controlled, iv hydralazine prn  Eliquis on hold as not tolerating PO, c/w therapeutic lovenox     Functional paraplegia. Wheelchair-bound. 18.5 - 24.9 Normal weight / Body mass index is 23.05 kg/m².     Estimated discharge date: 24-48 hours  Barriers: Hospice consult pending. Surrogate decision maker: Her son  Updated son over the phone 10/5     Code status: DNR  Prophylaxis: Lovenox  Recommended Disposition: Home with home health/ hospice     Subjective:     Chief Complaint / Reason for Physician Visit  Follow up for covid 19  She remains on 3 liters    Review of Systems:  Symptom Y/N Comments  Symptom Y/N Comments   Fever/Chills    Chest Pain     Poor Appetite    Edema     Cough    Abdominal Pain     Sputum    Joint Pain     SOB/CHRISTINE    Pruritis/Rash     Nausea/vomit    Tolerating PT/OT     Diarrhea    Tolerating Diet     Constipation    Other       Could NOT obtain due to: Confusion       Objective:     VITALS:   Last 24hrs VS reviewed since prior progress note. Most recent are:  Patient Vitals for the past 24 hrs:   Temp Pulse Resp BP SpO2   10/05/21 1511    (!) 221/118    10/05/21 1420  93  (!) 195/110    10/05/21 0540  70  (!) 166/76    10/04/21 2026 96.9 °F (36.1 °C) 94 16 (!) 180/92 94 %   10/04/21 1643 98.2 °F (36.8 °C) 96 16 (!) 171/93 94 %     No intake or output data in the 24 hours ending 10/05/21 1524     I had a face to face encounter and independently examined this patient on 10/5/2021, as outlined below:  PHYSICAL EXAM:  General: Awake but doesn't follow any command  EENT:  EOMI. Anicteric sclerae. MMM  Resp:  CTA bilaterally, no wheezing or rales. No accessory muscle use  CV:  Regular  rhythm,  No edema  GI:  Soft, Non distended, Non tender. +Bowel sounds  Neurologic:  Alert and oriented X 0, normal speech,   Psych:   Good insight. Not anxious nor agitated  Skin:  No rashes.   No jaundice    Reviewed most current lab test results and cultures  YES  Reviewed most current radiology test results   YES  Review and summation of old records today    NO  Reviewed patient's current orders and MAR    YES  PMH/SH reviewed - no change compared to H&P  ________________________________________________________________________  Care Plan discussed with:    Comments   Patient y    Family      RN y    Care Manager     Consultant                        Multidiciplinary team rounds were held today with , nursing, pharmacist and clinical coordinator. Patient's plan of care was discussed; medications were reviewed and discharge planning was addressed. ________________________________________________________________________  Total NON critical care TIME:  35  Minutes    Total CRITICAL CARE TIME Spent:   Minutes non procedure based      Comments   >50% of visit spent in counseling and coordination of care     ________________________________________________________________________  Timothy Carlos MD     Procedures: see electronic medical records for all procedures/Xrays and details which were not copied into this note but were reviewed prior to creation of Plan. LABS:  I reviewed today's most current labs and imaging studies.   Pertinent labs include:  Recent Labs     10/04/21  0326   WBC 11.3*   HGB 11.0*   HCT 32.1*   *     Recent Labs     10/04/21  0326 10/03/21  0350    140   K 4.1 4.0   * 106   CO2 29 27   * 231*   BUN 30* 28*   CREA 0.94 1.02   CA 8.0* 8.2*   ALB 2.5*  --    TBILI 0.7  --    ALT 19  --        Signed: Timothy Carlos MD

## 2021-10-05 NOTE — PROGRESS NOTES
Transition of Care Plan:     RUR: 22%  Disposition: TBD  Follow up appointments: TBD  DME needed: Has wheelchair at home  Transportation at Discharge: [facility]ane Jessica or means to access home:        IM Medicare Letter: Son has keys   Is patient a BCPI-A Bundle:  n/a                    If yes, was Bundle Letter given?:     Caregiver Contact: Juan Antonio Hawk [phone] (home) [phone] (cell phone)  Discharge Caregiver contacted prior to discharge? 3:30pm  Per consult, CM sent a referral to [facility]. CM will continue to follow. CM reviewed pt chart and will continue to follow.     Barrera Elena  Ext [phone]

## 2021-10-06 NOTE — ACP (ADVANCE CARE PLANNING)
Palliative Medicine  [place]: [phone]  [facility]: [phone])    Patient's son signed DDNR today, when he met with hospice. Copy of DDNR mailed to son Jairo Calle and also emailed to him. No firm plans made yet for hospice admission at home, per hospice team, son wanted to speak to family prior to making this decision.

## 2021-10-06 NOTE — PROGRESS NOTES
Hospitalist Progress Note    NAME: Ava Duke   :  1940   MRN:  [id number]       Assessment / Plan:    COVID-19 pneumonia with hypoxemia and sepsis  Metabolic encephalopathy  Dementia  History of CVA     CTA chestno evidence of PE, no acute process. CT abdomen/pelvis with contrastno acute finding. CT head no acute findings  Encephalomalacia in the left MCA territory consistent with large right MCA  territory infarction. Moderate to large left occipital infarction as well. Severe chronic microvascular change. Scattered small to moderate cerebellar  infarctions as well.     Sepsis criteria met on admissionfever, tachycardia, COVID-19 pneumonia  Remdesivir completed. Weaning steroids now, on 3 liters, wean as tolerated. Worsening wheezing/stridor on 10/2, given Racemic epi, nebs, Steroids, stiill has some wheezing likely d/t airway congestion, duonebs as tolerated. Procalcitonin normal.  UA with no pyuria. Abx discontinued  Remains NPO, d/t poor mental status, no improvement seen here  NG tube couldn't be done d/t the stridor and not following commands, Dont think she would be candidate for PEG feeding  As per son, her mental status is not great after her last stroke and also has some dementia, likely this is her baseline, her PO intake has been poor for several days PTA. Discussed with son again 10/5, her Covid still positive, so he wont be able to visit her in hospital. I recommended home hospice ( he is not interested in SNF hospice), wants to know more about hospice, will put consult for them  Palliative following     Elevated troponin  Troponin 0.35 - 0.33, likely type 2  Echo: Severe concentric hypertrophy, Mild-moderate mitral valve stenosis. Mild Tricuspid regurg.   No reported chest pain     Hypokalemia  Normal today     History of A. fib  Hyperlipidemia  GERD  Hypertension  Oral meds on hold.  Continue IV metoprolol, bp not controlled, iv hydralazine prn  Eliquis on hold as not tolerating PO, c/w therapeutic lovenox     Functional paraplegia. Wheelchair-bound.     18.5 - 24.9 Normal weight / Body mass index is 23.05 kg/m².     Estimated discharge date: 24-48 hours  Barriers: Hospice consult pending.     Surrogate decision maker: Her son  Updated son over the phone 10/5     Code status: DNR  Prophylaxis: Lovenox  Recommended Disposition: Home with home health/ hospice         Subjective:     Chief Complaint / Reason for Physician Visit    Discussed with RN events overnight. Review of Systems:  Symptom Y/N Comments  Symptom Y/N Comments   Fever/Chills    Chest Pain     Poor Appetite    Edema     Cough    Abdominal Pain     Sputum    Joint Pain     SOB/CHRISTINE    Pruritis/Rash     Nausea/vomit    Tolerating PT/OT     Diarrhea    Tolerating Diet     Constipation    Other       Could NOT obtain due to: Lethargic      Objective:     VITALS:   Last 24hrs VS reviewed since prior progress note. Most recent are:  Patient Vitals for the past 24 hrs:   Temp Pulse Resp BP SpO2   10/06/21 1106 97.7 °F (36.5 °C) (!) 107 18 (!) 166/96 93 %   10/06/21 0806 98.1 °F (36.7 °C) 81 16 (!) 177/120 94 %   10/06/21 0557  81  (!) 188/100    10/06/21 0029 98.1 °F (36.7 °C) 91 18 (!) 195/115 97 %   10/05/21 2339  95  (!) 214/114    10/05/21 2002 97.8 °F (36.6 °C)  17 (!) 217/213 97 %   10/05/21 1511    (!) 221/118    10/05/21 1420  93  (!) 195/110        Intake/Output Summary (Last 24 hours) at 10/6/2021 1355  Last data filed at 10/6/2021 0555  Gross per 24 hour   Intake    Output 700 ml   Net -700 ml        I had a face to face encounter and independently examined this patient on 10/6/2021, as outlined below:  PHYSICAL EXAM:  General: WD, WN. Alert, cooperative, no acute distress    EENT:  EOMI. Anicteric sclerae. MMM  Resp:  CTA bilaterally, no wheezing or rales. No accessory muscle use  CV:  Regular  rhythm,  No edema  GI:  Soft, Non distended, Non tender.   +Bowel sounds  Neurologic:  Lethargic Psych:   Good insight. Not anxious nor agitated  Skin:  No rashes. No jaundice    Reviewed most current lab test results and cultures  YES  Reviewed most current radiology test results   YES  Review and summation of old records today    NO  Reviewed patient's current orders and MAR    YES  PMH/SH reviewed - no change compared to H&P  ________________________________________________________________________  Care Plan discussed with:    Comments   Patient     Family      RN y    Care Manager     Consultant  y                      Multidiciplinary team rounds were held today with , nursing, pharmacist and clinical coordinator. Patient's plan of care was discussed; medications were reviewed and discharge planning was addressed. ________________________________________________________________________  Total NON critical care TIME: 35    Minutes    Total CRITICAL CARE TIME Spent:   Minutes non procedure based      Comments   >50% of visit spent in counseling and coordination of care y    ________________________________________________________________________  Xochitl Fitzgerald MD     Procedures: see electronic medical records for all procedures/Xrays and details which were not copied into this note but were reviewed prior to creation of Plan. LABS:  I reviewed today's most current labs and imaging studies. Pertinent labs include:  Recent Labs     10/04/21  0326   WBC 11.3*   HGB 11.0*   HCT 32.1*   *     Recent Labs     10/04/21  0326      K 4.1   *   CO2 29   *   BUN 30*   CREA 0.94   CA 8.0*   ALB 2.5*   TBILI 0.7   ALT 19       Signed:  Xochitl Fitzgerald MD

## 2021-10-06 NOTE — PROGRESS NOTES
Bedside and Verbal shift change report given to Naif Dickerson RN (oncoming nurse) by Marcelo Hoffmann RN (offgoing nurse). Report included the following information SBAR, Kardex, Intake/Output, MAR and Recent Results.

## 2021-10-06 NOTE — HOSPICE
[facility] RN Note: Met with patient's son and his cousin who is also full time caregiver for patient at home. Discussed what hospice in the home would look like for family and explained that patient's prognosis is short. We also talked about possible facility placement and explained that same visitation rules may apply to covid+ patients. Son would like to discuss with the rest of the family before making any decisions and will contact [phone] [facility] with an update.        Jens Howard, RN, BSN  Clinical Nurse Liaison  [phone]

## 2021-10-06 NOTE — PROGRESS NOTES
Problem: Airway Clearance - Ineffective  Goal: Achieve or maintain patent airway  Outcome: Progressing Towards Goal     Problem: Gas Exchange - Impaired  Goal: Absence of hypoxia  Outcome: Progressing Towards Goal  Goal: Promote optimal lung function  Outcome: Progressing Towards Goal     Problem: Breathing Pattern - Ineffective  Goal: Ability to achieve and maintain a regular respiratory rate  Outcome: Progressing Towards Goal     Problem:  Body Temperature -  Risk of, Imbalanced  Goal: Ability to maintain a body temperature within defined limits  Outcome: Progressing Towards Goal  Goal: Will regain or maintain usual level of consciousness  Outcome: Progressing Towards Goal

## 2021-10-07 NOTE — INTERDISCIPLINARY ROUNDS
Interdisciplinary Rounds were completed on 10/07/21 for this patient. Rounds included nursing, clinical care leader, pharmacy, and case management. Plan of care discussed. See clinical pathway and/or care plan for interventions and desired outcomes.

## 2021-10-07 NOTE — HOSPICE
[address] [facility]ospice RN Note:  Provided hospice info to son yesterday and reviewed home hospice v facility hospice. Son is concerned about brining her home as she is covid+ and does not want to put himself or other family members at risk. He is struggling with the idea of putting her into a nursing facility because he is concerned about the care she would receive. His main goal right now is to just be able to see his mother and assess what kind of state she is in so that he can make a decision about how to move forward. At this time she doesn't meet inpatient hospice criteria so there is not much hospice can do for patient until son decides disposition. Hospice will follow peripherally until discharge and monitor patient for decline as her clinical status evolves. Please contact hospice with questions or concerns at [phone].     Marylou Abel, RN, BSN  Clinical Nurse Liaison

## 2021-10-07 NOTE — PROGRESS NOTES
1921: Bedside and Verbal shift change report given to Josefina Castro RN (oncoming nurse) by Esther Hollis RN (offgoing nurse). Report included the following information SBAR, Kardex, Intake/Output, MAR, Accordion, Recent Results, Med Rec Status and Cardiac Rhythm NSR     Bedside and Verbal shift change report given to Carrie Garcia RN (oncoming nurse) by Josefina Castro RN (offgoing nurse). Report included the following information SBAR, Kardex, Intake/Output, MAR, Accordion, Recent Results, Med Rec Status and Cardiac Rhythm NSR-SB. Latisha Le

## 2021-10-07 NOTE — PROGRESS NOTES
Problem: Falls - Risk of  Goal: *Absence of Falls  Description: Document Schmitt Reason Fall Risk and appropriate interventions in the flowsheet. Outcome: Progressing Towards Goal  Note: Fall Risk Interventions:  Mobility Interventions: Bed/chair exit alarm, Communicate number of staff needed for ambulation/transfer    Mentation Interventions: Adequate sleep, hydration, pain control, Bed/chair exit alarm, Door open when patient unattended, Evaluate medications/consider consulting pharmacy, More frequent rounding, Reorient patient, Room close to nurse's station, Toileting rounds    Medication Interventions: Assess postural VS orthostatic hypotension, Bed/chair exit alarm, Evaluate medications/consider consulting pharmacy    Elimination Interventions: Bed/chair exit alarm, Call light in reach, Stay With Me (per policy), Toileting schedule/hourly rounds              Problem: Airway Clearance - Ineffective  Goal: Achieve or maintain patent airway  Outcome: Progressing Towards Goal     Problem: Gas Exchange - Impaired  Goal: Absence of hypoxia  Outcome: Progressing Towards Goal  Goal: Promote optimal lung function  Outcome: Progressing Towards Goal     Problem: Breathing Pattern - Ineffective  Goal: Ability to achieve and maintain a regular respiratory rate  Outcome: Progressing Towards Goal     Problem:  Body Temperature -  Risk of, Imbalanced  Goal: Ability to maintain a body temperature within defined limits  Outcome: Progressing Towards Goal  Goal: Will regain or maintain usual level of consciousness  Outcome: Progressing Towards Goal  Goal: Complications related to the disease process, condition or treatment will be avoided or minimized  Outcome: Progressing Towards Goal     Problem: Isolation Precautions - Risk of Spread of Infection  Goal: Prevent transmission of infectious organism to others  Outcome: Progressing Towards Goal     Problem: Risk for Fluid Volume Deficit  Goal: Maintain normal heart rhythm  Outcome: Progressing Towards Goal  Goal: Maintain absence of muscle cramping  Outcome: Progressing Towards Goal  Goal: Maintain normal serum potassium, sodium, calcium, phosphorus, and pH  Outcome: Progressing Towards Goal     Problem: Loneliness or Risk for Loneliness  Goal: Demonstrate positive use of time alone when socialization is not possible  Outcome: Progressing Towards Goal     Problem: Fatigue  Goal: Verbalize increase energy and improved vitality  Outcome: Progressing Towards Goal     Problem: Pressure Injury - Risk of  Goal: *Prevention of pressure injury  Description: Document Nando Scale and appropriate interventions in the flowsheet.   Outcome: Progressing Towards Goal

## 2021-10-07 NOTE — PROGRESS NOTES
Transition of Care Plan:     RUR: 16%  Disposition: TBD  Follow up appointments: TBD  DME needed: Has wheelchair at home  Transportation at [address] [place] [address] Street or means to access home:        IM Medicare Letter: Son nannette keys   Is patient a BCPI-A Bundle:  n/a                    If yes, was Bundle Letter given?:     Caregiver Contact: Juan Antonio Joseph [phone] (home) [phone] (cell phone)  Discharge Caregiver contacted prior to discharge?     CM left  for pt's son, requesting a return call.     Diania Leak  Ext [phone]

## 2021-10-07 NOTE — PROGRESS NOTES
Hospitalist Progress Note    NAME: Lolis Rodney   :  1940   MRN:  [id number]       Assessment / Plan:  COVID-19 pneumonia with hypoxemia and sepsis  Metabolic encephalopathy  Dementia  History of CVA     CTA chestno evidence of PE, no acute process. CT abdomen/pelvis with contrastno acute finding. CT head no acute findings  Encephalomalacia in the left MCA territory consistent with large right MCA  territory infarction. Moderate to large left occipital infarction as well. Severe chronic microvascular change. Scattered small to moderate cerebellar  infarctions as well.     Sepsis criteria met on admissionfever, tachycardia, COVID-19 pneumonia  Remdesivir completed. Weaning steroids now, on 3 liters, wean as tolerated. Worsening wheezing/stridor on 10/2, given Racemic epi, nebs, Steroids, stiill has some wheezing likely d/t airway congestion, duonebs as tolerated. Procalcitonin normal.  UA with no pyuria. Abx discontinued  Remains NPO, d/t poor mental status, no improvement seen here  NG tube couldn't be done d/t the stridor and not following commands, Dont think she would be candidate for PEG feeding  As per son, her mental status is not great after her last stroke and also has some dementia, likely this is her baseline, her PO intake has been poor for several days PTA. Discussed with son again 10/5, her Covid still positive, so he wont be able to visit her in hospital. I recommended home hospice ( he is not interested in SNF hospice), wants to know more about hospice, will put consult for them  Palliative following     Elevated troponin  Troponin 0.35 - 0.33, likely type 2  Echo: Severe concentric hypertrophy, Mild-moderate mitral valve stenosis. Mild Tricuspid regurg.   No reported chest pain     Hypokalemia  Normal today     History of A. fib  Hyperlipidemia  GERD  Hypertension  Oral meds on hold.  Continue IV metoprolol, bp not controlled, iv hydralazine prn  Eliquis on hold as not tolerating PO, c/w therapeutic lovenox     Functional paraplegia. Wheelchair-bound.     18.5 - 24.9 Normal weight / Body mass index is 23.05 kg/m².     Estimated discharge date: 24-48 hours  Barriers: Hospice consult pending.     Surrogate decision maker: Her son  Updated son over the phone 10/5     Code status: DNR  Prophylaxis: Lovenox  Recommended Disposition: Home with home health/ hospice            Subjective:     Chief Complaint / Reason for Physician Visit    Discussed with RN events overnight. No significant changes patient still lethargic    Review of Systems:  Symptom Y/N Comments  Symptom Y/N Comments   Fever/Chills    Chest Pain     Poor Appetite    Edema     Cough    Abdominal Pain     Sputum    Joint Pain     SOB/CHRISTINE    Pruritis/Rash     Nausea/vomit    Tolerating PT/OT     Diarrhea    Tolerating Diet     Constipation    Other       Could NOT obtain due to:  Lethargic     Objective:     VITALS:   Last 24hrs VS reviewed since prior progress note. Most recent are:  Patient Vitals for the past 24 hrs:   Temp Pulse Resp BP SpO2   10/07/21 1506 98 °F (36.7 °C) 98 20 (!) 196/102 97 %   10/07/21 1240    124/76    10/07/21 1136 98 °F (36.7 °C) 100 18 (!) 150/114 91 %   10/07/21 0848 96.9 °F (36.1 °C) 88 20 (!) 164/114 96 %   10/07/21 0651  79  (!) 156/93    10/07/21 0338 97.3 °F (36.3 °C) 93 19 (!) 160/91 96 %   10/06/21 2319 97 °F (36.1 °C) 99 20 (!) 166/111 93 %   10/06/21 1957 98.2 °F (36.8 °C) 70 18 (!) 143/93 96 %   10/06/21 1608    (!) 159/95    10/06/21 1543 98 °F (36.7 °C) (!) 101 20 (!) 156/104 94 %       Intake/Output Summary (Last 24 hours) at 10/7/2021 1510  Last data filed at 10/7/2021 0848  Gross per 24 hour   Intake 4841.67 ml   Output 100 ml   Net 4741.67 ml        I had a face to face encounter and independently examined this patient on 10/7/2021, as outlined below:  PHYSICAL EXAM:  General: WD, WN. Alert, cooperative, no acute distress    EENT:  EOMI. Anicteric sclerae. MMM  Resp:  CTA bilaterally, no wheezing or rales. No accessory muscle use  CV:  Regular  rhythm,  No edema  GI:  Soft, Non distended, Non tender. +Bowel sounds  Neurologic:      Lethargic  Psych:   Good insight. Not anxious nor agitated  Skin:  No rashes. No jaundice    Reviewed most current lab test results and cultures  YES  Reviewed most current radiology test results   YES  Review and summation of old records today    NO  Reviewed patient's current orders and MAR    YES  PMH/SH reviewed - no change compared to H&P  ________________________________________________________________________  Care Plan discussed with:    Comments   Patient     Family      RN y    Care Manager y    Consultant                       y Multidiciplinary team rounds were held today with , nursing, pharmacist and clinical coordinator. Patient's plan of care was discussed; medications were reviewed and discharge planning was addressed. ________________________________________________________________________  Total NON critical care TIME:  35   Minutes    Total CRITICAL CARE TIME Spent:   Minutes non procedure based      Comments   >50% of visit spent in counseling and coordination of care     ________________________________________________________________________  Roula Regalado MD     Procedures: see electronic medical records for all procedures/Xrays and details which were not copied into this note but were reviewed prior to creation of Plan. LABS:  I reviewed today's most current labs and imaging studies. Pertinent labs include:  No results for input(s): WBC, HGB, HCT, PLT, HGBEXT, HCTEXT, PLTEXT in the last 72 hours. No results for input(s): NA, K, CL, CO2, GLU, BUN, CREA, CA, MG, PHOS, ALB, TBIL, TBILI, ALT, INR, INREXT in the last 72 hours. No lab exists for component: SGOT    Signed:  Roula Regalado MD no pain, swelling or deformity of joints

## 2021-10-08 NOTE — PROGRESS NOTES
Hospitalist Progress Note    NAME: Shoshana Murray   :  1940   MRN:  [id number]       Assessment / Plan:  COVID-19 pneumonia with hypoxemia and sepsis  Metabolic encephalopathy  Dementia  History of CVA     CTA chestno evidence of PE, no acute process. CT abdomen/pelvis with contrastno acute finding. CT head no acute findings  Encephalomalacia in the left MCA territory consistent with large right MCA  territory infarction. Moderate to large left occipital infarction as well. Severe chronic microvascular change. Scattered small to moderate cerebellar  infarctions as well.     Sepsis criteria met on admissionfever, tachycardia, COVID-19 pneumonia  Remdesivir completed. Weaning steroids now, on 3 liters, wean as tolerated. Worsening wheezing/stridor on 10/2, given Racemic epi, nebs, Steroids, stiill has some wheezing likely d/t airway congestion, duonebs as tolerated. Procalcitonin normal.  UA with no pyuria. Abx discontinued  Remains NPO, d/t poor mental status, no improvement seen here  NG tube couldn't be done d/t the stridor and not following commands, Dont think she would be candidate for PEG feeding  As per son, her mental status is not great after her last stroke and also has some dementia, likely this is her baseline, her PO intake has been poor for several days PTA. Discussed with son again 10/5, her Covid still positive, so he wont be able to visit her in hospital. I recommended home hospice ( he is not interested in SNF hospice), wants to know more about hospice, will put consult for them  Palliative following     Elevated troponin  Troponin 0.35 - 0.33, likely type 2  Echo: Severe concentric hypertrophy, Mild-moderate mitral valve stenosis. Mild Tricuspid regurg.   No reported chest pain     Hypokalemia  Normal today     History of A. fib  Hyperlipidemia  GERD  Hypertension  Oral meds on hold.  Continue IV metoprolol, bp not controlled, iv hydralazine prn  Eliquis on hold as not tolerating PO, c/w therapeutic lovenox     Functional paraplegia. Wheelchair-bound.     18.5 - 24.9 Normal weight / Body mass index is 23.05 kg/m².     Estimated discharge date: 10/10/2021   Barriers: Hospice consult pending. Subjective:     Chief Complaint / Reason for Physician Visit  \". Discussed with RN events overnight. Waiting for hospice     Review of Systems:  Symptom Y/N Comments  Symptom Y/N Comments   Fever/Chills    Chest Pain     Poor Appetite    Edema     Cough    Abdominal Pain     Sputum    Joint Pain     SOB/CHRISTINE    Pruritis/Rash     Nausea/vomit    Tolerating PT/OT     Diarrhea    Tolerating Diet     Constipation    Other       Could NOT obtain due to: Lethargic      Objective:     VITALS:   Last 24hrs VS reviewed since prior progress note. Most recent are:  Patient Vitals for the past 24 hrs:   Temp Pulse Resp BP SpO2   10/08/21 1133 97.5 °F (36.4 °C) 93 19 (!) 152/104 91 %   10/08/21 0942 97.8 °F (36.6 °C) 93 20 (!) 127/100 93 %   10/08/21 0510 98.7 °F (37.1 °C) 85 20 (!) 140/94 90 %   10/08/21 0030 98.3 °F (36.8 °C) 88 18 (!) 148/72 97 %   10/07/21 2036 98.4 °F (36.9 °C) 81 20 (!) 179/118 96 %   10/07/21 2000     97 %   10/07/21 1506 98 °F (36.7 °C) 98 20 (!) 196/102 97 %       Intake/Output Summary (Last 24 hours) at 10/8/2021 1501  Last data filed at 10/7/2021 1506  Gross per 24 hour   Intake    Output 400 ml   Net -400 ml        I had a face to face encounter and independently examined this patient on 10/8/2021, as outlined below:  PHYSICAL EXAM:  General: letahrgic     HEENT:  EOMI. Anicteric sclerae. MMM  Resp:  CTA bilaterally, no wheezing or rales. No accessory muscle use  CV:  Regular  rhythm,  No edema  GI:  Soft, Non distended, Non tender. +Bowel sounds  Neurologic:  Lethargic   Psych:   Good insight. Not anxious nor agitated  Skin:  No rashes.   No jaundice    Reviewed most current lab test results and cultures  YES  Reviewed most current radiology test results YES  Review and summation of old records today    NO  Reviewed patient's current orders and MAR    YES  PMH/SH reviewed - no change compared to H&P  ________________________________________________________________________  Care Plan discussed with:    Comments   Patient y    Family      RN y    Care Manager     Consultant                        Multidiciplinary team rounds were held today with , nursing, pharmacist and clinical coordinator. Patient's plan of care was discussed; medications were reviewed and discharge planning was addressed. ________________________________________________________________________  Total NON critical care TIME:  35  Minutes    Total CRITICAL CARE TIME Spent:   Minutes non procedure based      Comments   >50% of visit spent in counseling and coordination of care y    ________________________________________________________________________  Zëo Fairchild MD     Procedures: see electronic medical records for all procedures/Xrays and details which were not copied into this note but were reviewed prior to creation of Plan. LABS:  I reviewed today's most current labs and imaging studies. Pertinent labs include:  Recent Labs     10/08/21  0449   WBC 8.7   HGB 10.4*   HCT 30.0*   PLT 73*     Recent Labs     10/08/21  0449      K 3.4*      CO2 31   *   BUN 17   CREA 0.73   CA 8.0*       Signed:  Zoë Fairchild MD

## 2021-10-08 NOTE — PROGRESS NOTES
Transition of Care Plan:     RUR: 20%  Disposition: TBD  Follow up appointments: TBD  DME needed: Has wheelchair at home  Transportation at [address] [place] [address] Street or means to access home:        IM Medicare Letter: Son has keys   Is patient a BCPI-A Bundle:  n/a                    If yes, was Bundle Letter given?:     Caregiver Contact: Juan Antonio Robles [phone] (home) [phone] (cell phone)  Discharge Caregiver contacted prior to discharge?     12:15pm  Son is waiting to talk w/uncle before making a decision. Son will call hospice nurse when a decision has been made regarding d/c. CM will continue to follow. CM left VM for son, requesting a return call. CM will continue to follow.     Zac Guerrero  Ext [phone]

## 2021-10-08 NOTE — PROGRESS NOTES
End of Shift Note    Bedside shift change report given to Jesus Rivas RN (oncoming nurse) by Ale Medina RN (offgoing nurse). Report included the following information SBAR and Kardex    Shift worked:  7a-7p     Shift summary and any significant changes:    Patient resting comfortably. Performed mouth care multiple times throughout the day. Previous CVA and left sided residual and overall very weak. Called son and held phone to her ear so she could hear his voice. Patient nodded appropriately to certain questions such as pain. Lungs are coarse. Concerns for physician to address:  none     Zone phone for oncoming shift:   [phone]       Activity:  Activity Level: Bed Rest  Number times ambulated in hallways past shift: 0  Number of times OOB to chair past shift: 0    Cardiac:   Cardiac Monitoring: Yes      Cardiac Rhythm: Sinus Rhythm    Access:   Current line(s): PIV     Genitourinary:   Urinary status: external catheter    Respiratory:   O2 Device: None (Room air)  Chronic home O2 use?: NO  Incentive spirometer at bedside: N/A     GI:  Last Bowel Movement Date: 10/07/21  Current diet:  DIET NPO  Passing flatus: YES  Tolerating current diet: NO       Pain Management:   Patient states pain is manageable on current regimen: YES    Skin:  Nando Score: 9  Interventions: float heels    Patient Safety:  Fall Score:  Total Score: 3  Interventions: bed/chair alarm  High Fall Risk: Yes    Length of Stay:  Expected LOS: 5d 9h  Actual LOS: 13      Cande Long RN

## 2021-10-08 NOTE — PROGRESS NOTES
Comprehensive Nutrition Assessment    Type and Reason for Visit: Reassess    Nutrition Recommendations/Plan:  Consider comfort measures/feedings     Nutrition Assessment:     Chart reviewed; medically noted for COVID-19 and dementia. Patient has remained NPO x ~13 days. No plans for nutrition support. Hospice is following. Nothing for nutrition to add at this point. Consider allowing comfort feedings per patient preference. Please consult RD if needs arise. Thanks. Estimated Daily Nutrient Needs:  Energy (kcal): 1474 kcal ( x 1.3AF +250kcal); Weight Used for Energy Requirements: Current  Protein (g): 56-67g (1.0-1.2 g/kg bw); Weight Used for Protein Requirements: Current  Fluid (ml/day): 1500 mL; Method Used for Fluid Requirements: 1 ml/kcal    Nutrition Related Findings:       K+ 3.4, -922-837-  BM 10/7  Atorvastatin, famotidine, humalog, Solu-medrol, Lopressor, D5% IVF    Wounds:    None       Current Nutrition Therapies:  DIET NPO    Anthropometric Measures:  · Height:  5' (152.4 cm)  · Current Body Wt:  55.8 kg (123 lb 0.3 oz)    · BMI Category:  Normal weight (BMI 18.5-24. 9)       Nutrition Diagnosis:   · Inadequate protein-energy intake related to cognitive or neurological impairment, swallowing difficulty as evidenced by NPO or clear liquid status due to medical condition    Nutrition Interventions:   Food and/or Nutrient Delivery:  (comfort measures/feeds?)  Nutrition Education and Counseling: No recommendations at this time  Coordination of Nutrition Care: No recommendation at this time    Goals:  transition to comfort measures next 5-7 days       Nutrition Monitoring and Evaluation:   Behavioral-Environmental Outcomes: None identified  Food/Nutrient Intake Outcomes: Diet advancement/tolerance  Physical Signs/Symptoms Outcomes: Biochemical data, Chewing or swallowing, Weight    Discharge Planning:     (Comfort/hospice?)     Electronically signed by Loyda Castle RD on 10/8/2021 at 9:14 AM    Contact: ext [phone]

## 2021-10-09 NOTE — PROGRESS NOTES
Bedside shift change report given to Venecia Means RN (oncoming nurse) by Megan Michele RN (offgoing nurse). Report included the following information SBAR, Kardex, Intake/Output, MAR and Recent Results. Son is asking if we can retest for covid.

## 2021-10-09 NOTE — PROGRESS NOTES
Hospitalist Progress Note    NAME: Deonte Ortiz   :  1940   MRN:  [id number]       Assessment / Plan:    COVID-19 pneumonia with hypoxemia and sepsis  Metabolic encephalopathy  Dementia  History of CVA     CTA chestno evidence of PE, no acute process. CT abdomen/pelvis with contrastno acute finding. CT head no acute findings  Encephalomalacia in the left MCA territory consistent with large right MCA  territory infarction. Moderate to large left occipital infarction as well. Severe chronic microvascular change. Scattered small to moderate cerebellar  infarctions as well.     Sepsis criteria met on admissionfever, tachycardia, COVID-19 pneumonia  Remdesivir completed. Weaning steroids now, on 3 liters, wean as tolerated. Worsening wheezing/stridor on 10/2, given Racemic epi, nebs, Steroids, stiill has some wheezing likely d/t airway congestion, duonebs as tolerated. Procalcitonin normal.  UA with no pyuria. Abx discontinued  Remains NPO, d/t poor mental status, no improvement seen here  NG tube couldn't be done d/t the stridor and not following commands, Dont think she would be candidate for PEG feeding  As per son, her mental status is not great after her last stroke and also has some dementia, likely this is her baseline, her PO intake has been poor for several days PTA. Discussed with son again 10/5, her Covid still positive, so he wont be able to visit her in hospital. I recommended home hospice ( he is not interested in SNF hospice), wants to know more about hospice, will put consult for them  Palliative following     Elevated troponin  Troponin 0.35 - 0.33, likely type 2  Echo: Severe concentric hypertrophy, Mild-moderate mitral valve stenosis. Mild Tricuspid regurg.   No reported chest pain     Hypokalemia  Normal today     History of A. fib  Hyperlipidemia  GERD  Hypertension  Oral meds on hold.  Continue IV metoprolol, bp not controlled, iv hydralazine prn  Eliquis on hold as not tolerating PO, c/w therapeutic lovenox     Functional paraplegia. Wheelchair-bound.     18.5 - 24.9 Normal weight / Body mass index is 23.05 kg/m².     Estimated discharge date: TBD   Barriers: Patient not to qualify for inpatient hospice         Subjective:     Chief Complaint / Reason for Physician Visit  \". Discussed with RN events overnight. As per hospitalist note patient not to qualify for inpatient hospice, patient may need reevaluation Monday    Review of Systems:  Symptom Y/N Comments  Symptom Y/N Comments   Fever/Chills    Chest Pain     Poor Appetite    Edema     Cough    Abdominal Pain     Sputum    Joint Pain     SOB/CHRISTINE    Pruritis/Rash     Nausea/vomit    Tolerating PT/OT     Diarrhea    Tolerating Diet     Constipation    Other       Could NOT obtain due to:  Lethargic     Objective:     VITALS:   Last 24hrs VS reviewed since prior progress note. Most recent are:  Patient Vitals for the past 24 hrs:   Temp Pulse Resp BP SpO2   10/09/21 1126 97.5 °F (36.4 °C) 73 18 (!) 198/102 94 %   10/09/21 0844 97.9 °F (36.6 °C) 69 20 102/79 93 %   10/09/21 0512  74  (!) 153/66    10/09/21 0108 97.8 °F (36.6 °C) 78 18 (!) 150/88 96 %   10/08/21 2155  72  (!) 153/84    10/08/21 2120  84  (!) 233/114    10/08/21 2109 97.9 °F (36.6 °C) 77 17 (!) 201/110 94 %   10/08/21 2000     95 %   10/08/21 1626 98.3 °F (36.8 °C) 76 19 (!) 152/81 95 %     No intake or output data in the 24 hours ending 10/09/21 1148     I had a face to face encounter and independently examined this patient on 10/9/2021, as outlined below:  PHYSICAL EXAM:  General: Lethargic  EENT:  EOMI. Anicteric sclerae. MMM  Resp:  CTA bilaterally, no wheezing or rales. No accessory muscle use  CV:  Regular  rhythm,  No edema  GI:  Soft, Non distended, Non tender. +Bowel sounds  Neurologic:  Lethargic  Psych:   Good insight. Not anxious nor agitated  Skin:  No rashes.   No jaundice    Reviewed most current lab test results and cultures YES  Reviewed most current radiology test results   YES  Review and summation of old records today    NO  Reviewed patient's current orders and MAR    YES  PMH/SH reviewed - no change compared to H&P  ________________________________________________________________________  Care Plan discussed with:    Comments   Patient y    Family      RN y    Care Manager     Consultant                        Multidiciplinary team rounds were held today with , nursing, pharmacist and clinical coordinator. Patient's plan of care was discussed; medications were reviewed and discharge planning was addressed. ________________________________________________________________________  Total NON critical care TIME: 35  Minutes    Total CRITICAL CARE TIME Spent:   Minutes non procedure based      Comments   >50% of visit spent in counseling and coordination of care y    ________________________________________________________________________  Kathy Pearson MD     Procedures: see electronic medical records for all procedures/Xrays and details which were not copied into this note but were reviewed prior to creation of Plan. LABS:  I reviewed today's most current labs and imaging studies. Pertinent labs include:  Recent Labs     10/08/21  0449   WBC 8.7   HGB 10.4*   HCT 30.0*   PLT 73*     Recent Labs     10/08/21  0449      K 3.4*      CO2 31   *   BUN 17   CREA 0.73   CA 8.0*       Signed:  Kathy Pearson MD

## 2021-10-10 NOTE — PROGRESS NOTES
Problem: Falls - Risk of  Goal: *Absence of Falls  Description: Document Clydene Bone Fall Risk and appropriate interventions in the flowsheet.   10/10/2021 1453 by Tor Salter  Outcome: Progressing Towards Goal  Note: Fall Risk Interventions:  Mobility Interventions: Communicate number of staff needed for ambulation/transfer    Mentation Interventions: Adequate sleep, hydration, pain control, Bed/chair exit alarm    Medication Interventions: Bed/chair exit alarm, Patient to call before getting OOB    Elimination Interventions: Patient to call for help with toileting needs, Call light in reach, Bed/chair exit alarm, Toileting schedule/hourly rounds, Toilet paper/wipes in reach           10/10/2021 1453 by Tor Salter  Outcome: Progressing Towards Goal  Note: Fall Risk Interventions:  Mobility Interventions: Communicate number of staff needed for ambulation/transfer    Mentation Interventions: Adequate sleep, hydration, pain control, Bed/chair exit alarm    Medication Interventions: Bed/chair exit alarm, Patient to call before getting OOB    Elimination Interventions: Patient to call for help with toileting needs, Call light in reach, Bed/chair exit alarm, Toileting schedule/hourly rounds, Toilet paper/wipes in reach              Problem: Patient Education: Go to Patient Education Activity  Goal: Patient/Family Education  10/10/2021 1453 by Tor Salter  Outcome: Progressing Towards Goal  10/10/2021 1453 by Tor Salter  Outcome: Progressing Towards Goal     Problem: Airway Clearance - Ineffective  Goal: Achieve or maintain patent airway  10/10/2021 1453 by Tor Salter  Outcome: Progressing Towards Goal  10/10/2021 1453 by Tor Salter  Outcome: Progressing Towards Goal     Problem: Gas Exchange - Impaired  Goal: Absence of hypoxia  10/10/2021 1453 by Tor Salter  Outcome: Progressing Towards Goal  10/10/2021 1453 by Tor Salter  Outcome: Progressing Towards Goal  Goal: Promote optimal lung function  10/10/2021 1453 by Tacho Gould  Outcome: Progressing Towards Goal  10/10/2021 1453 by Tacho Gould  Outcome: Progressing Towards Goal     Problem: Breathing Pattern - Ineffective  Goal: Ability to achieve and maintain a regular respiratory rate  10/10/2021 1453 by Tacho Gould  Outcome: Progressing Towards Goal  10/10/2021 1453 by Tacho Gould  Outcome: Progressing Towards Goal     Problem:  Body Temperature -  Risk of, Imbalanced  Goal: Ability to maintain a body temperature within defined limits  10/10/2021 1453 by Tacho Gould  Outcome: Progressing Towards Goal  10/10/2021 1453 by Tacho Gould  Outcome: Progressing Towards Goal  Goal: Will regain or maintain usual level of consciousness  10/10/2021 1453 by Tacho Gould  Outcome: Progressing Towards Goal  10/10/2021 1453 by Tacho Gould  Outcome: Progressing Towards Goal  Goal: Complications related to the disease process, condition or treatment will be avoided or minimized  10/10/2021 1453 by Tacho Gould  Outcome: Progressing Towards Goal  10/10/2021 1453 by Tacho Gould  Outcome: Progressing Towards Goal     Problem: Isolation Precautions - Risk of Spread of Infection  Goal: Prevent transmission of infectious organism to others  10/10/2021 1453 by Tacho Gould  Outcome: Progressing Towards Goal  10/10/2021 1453 by Tacho Gould  Outcome: Progressing Towards Goal     Problem: Nutrition Deficits  Goal: Optimize nutrtional status  10/10/2021 1453 by Tacho Gould  Outcome: Progressing Towards Goal  10/10/2021 1453 by Tacho Gould  Outcome: Progressing Towards Goal     Problem: Risk for Fluid Volume Deficit  Goal: Maintain normal heart rhythm  10/10/2021 1453 by Tacho Gould  Outcome: Progressing Towards Goal  10/10/2021 1453 by Tacho Gould  Outcome: Progressing Towards Goal  Goal: Maintain absence of muscle cramping  10/10/2021 1453 by Tacho Gould  Outcome: Progressing Towards Goal  10/10/2021 1453 by Tacho Gould  Outcome: Progressing Towards Goal  Goal: Maintain normal serum potassium, sodium, calcium, phosphorus, and pH  10/10/2021 1453 by Gill Barrios  Outcome: Progressing Towards Goal  10/10/2021 1453 by Gill Barrios  Outcome: Progressing Towards Goal     Problem: Loneliness or Risk for Loneliness  Goal: Demonstrate positive use of time alone when socialization is not possible  10/10/2021 1453 by Gill Barrios  Outcome: Progressing Towards Goal  10/10/2021 1453 by Gill Barrios  Outcome: Progressing Towards Goal     Problem: Fatigue  Goal: Verbalize increase energy and improved vitality  10/10/2021 1453 by Gill Barrios  Outcome: Progressing Towards Goal  10/10/2021 1453 by Gill Barrios  Outcome: Progressing Towards Goal     Problem: Patient Education: Go to Patient Education Activity  Goal: Patient/Family Education  10/10/2021 1453 by Gill Barrios  Outcome: Progressing Towards Goal  10/10/2021 1453 by Gill Barrios  Outcome: Progressing Towards Goal     Problem: Pressure Injury - Risk of  Goal: *Prevention of pressure injury  Description: Document Nando Scale and appropriate interventions in the flowsheet.   10/10/2021 1453 by Gill Barrios  Outcome: Progressing Towards Goal  10/10/2021 1453 by Gill Barrios  Outcome: Progressing Towards Goal     Problem: Patient Education: Go to Patient Education Activity  Goal: Patient/Family Education  10/10/2021 1453 by Gill Barrios  Outcome: Progressing Towards Goal  10/10/2021 1453 by Gill Barrios  Outcome: Progressing Towards Goal     Problem: Patient Education: Go to Patient Education Activity  Goal: Patient/Family Education  10/10/2021 1453 by Gill Barrios  Outcome: Progressing Towards Goal  10/10/2021 1453 by Gill Barrios  Outcome: Progressing Towards Goal

## 2021-10-10 NOTE — PROGRESS NOTES
0700H- Verbal shift change report given to HALI (oncoming nurse) by RN (offgoing nurse). Report included the following information SBAR, Kardex, ED Summary, Procedure Summary, Intake/Output, MAR and Recent Results. End of Shift Note    Bedside shift change report given to RN (oncoming nurse) by Jarod Hendrix (offgoing nurse). Report included the following information SBAR, Kardex, ED Summary, Procedure Summary, Intake/Output, MAR and Recent Results    Shift worked:  0700- 1900     Shift summary and any significant changes:     HYPERTENSIVE     Concerns for physician to address:  SBP >180  BS EVERY 6 HOURLY   [facility]   [facility]       Activity:  Activity Level: Bed Rest  Number times ambulated in hallways past shift: 0  Number of times OOB to chair past shift: 0    Cardiac:   Cardiac Monitoring: Yes      Cardiac Rhythm: Sinus Rhythm    Access:   Current line(s): PIV     Genitourinary:   Urinary status: external catheter    Respiratory:   O2 Device: Nasal cannula  Chronic home O2 use?: N/A  Incentive spirometer at bedside: YES     GI:  Last Bowel Movement Date: 10/07/21  Current diet:  DIET NPO  Passing flatus: NO  Tolerating current diet: YES       Pain Management:   Patient states pain is manageable on current regimen: N/A    Skin:  Nando Score: 11  Interventions: increase time out of bed    Patient Safety:  Fall Score:  Total Score: 3  Interventions: pt to call before getting OOB  High Fall Risk: Yes    Length of Stay:  Expected LOS: 5d 9h  Actual LOS: [facility]

## 2021-10-10 NOTE — PROGRESS NOTES
Hospitalist Progress Note    NAME: Corin Araujo   :  1940   MRN:  [id number]       Assessment / Plan:  COVID-19 pneumonia with hypoxemia and sepsis  Metabolic encephalopathy  Dementia  History of CVA     CTA chestno evidence of PE, no acute process. CT abdomen/pelvis with contrastno acute finding. CT head no acute findings  Encephalomalacia in the left MCA territory consistent with large right MCA  territory infarction. Moderate to large left occipital infarction as well. Severe chronic microvascular change. Scattered small to moderate cerebellar  infarctions as well.     Sepsis criteria met on admissionfever, tachycardia, COVID-19 pneumonia  Remdesivir completed. Weaning steroids now, on 3 liters, wean as tolerated. Worsening wheezing/stridor on 10/2, given Racemic epi, nebs, Steroids, stiill has some wheezing likely d/t airway congestion, duonebs as tolerated. Procalcitonin normal.  UA with no pyuria. Abx discontinued  Remains NPO, d/t poor mental status, no improvement seen here  NG tube couldn't be done d/t the stridor and not following commands, Dont think she would be candidate for PEG feeding  As per son, her mental status is not great after her last stroke and also has some dementia, likely this is her baseline, her PO intake has been poor for several days PTA. Discussed with son again 10/5, her Covid still positive, so he wont be able to visit her in hospital. I recommended home hospice ( he is not interested in SNF hospice), wants to know more about hospice, will put consult for them  Palliative following     Elevated troponin  Troponin 0.35 - 0.33, likely type 2  Echo: Severe concentric hypertrophy, Mild-moderate mitral valve stenosis. Mild Tricuspid regurg.   No reported chest pain     Hypokalemia  Normal today     History of A. fib  Hyperlipidemia  GERD  Hypertension  Oral meds on hold.  Continue IV metoprolol, bp not controlled, iv hydralazine prn  Eliquis on hold as not tolerating PO, c/w therapeutic lovenox     Functional paraplegia. Wheelchair-bound.     18.5 - 24.9 Normal weight / Body mass index is 23.05 kg/m².     Estimated discharge date: TBD   Barriers: Patient not to qualify for inpatient hospice              Subjective:     Chief Complaint / Reason for Physician Visit  . Discussed with RN events overnight. As per hospitalist note patient not qualify for inpatient hospice    Review of Systems:  Symptom Y/N Comments  Symptom Y/N Comments   Fever/Chills    Chest Pain     Poor Appetite    Edema     Cough    Abdominal Pain     Sputum    Joint Pain     SOB/CHRISTINE    Pruritis/Rash     Nausea/vomit    Tolerating PT/OT     Diarrhea    Tolerating Diet     Constipation    Other       Could NOT obtain due to:  Lethargic     Objective:     VITALS:   Last 24hrs VS reviewed since prior progress note. Most recent are:  Patient Vitals for the past 24 hrs:   Temp Pulse Resp BP SpO2   10/10/21 0829 97.7 °F (36.5 °C) 91 16 (!) 167/93 95 %   10/10/21 0330 97.7 °F (36.5 °C) 80 16 (!) 142/78 96 %   10/10/21 0147 97.8 °F (36.6 °C) 84 18 (!) 153/74 95 %   10/09/21 2000 97.7 °F (36.5 °C) 79 18 (!) 149/77 96 %   10/09/21 1527 98.8 °F (37.1 °C) 75 20 (!) 157/81 93 %   10/09/21 1126 97.5 °F (36.4 °C) 73 18 (!) 198/102 94 %       Intake/Output Summary (Last 24 hours) at 10/10/2021 0940  Last data filed at 10/9/2021 1126  Gross per 24 hour   Intake 0 ml   Output    Net 0 ml        I had a face to face encounter and independently examined this patient on 10/10/2021, as outlined below:  PHYSICAL EXAM:  General: Lethargic   EENT:  EOMI. Anicteric sclerae. MMM  Resp:  CTA bilaterally, no wheezing or rales. No accessory muscle use  CV:  Regular  rhythm,  No edema  GI:  Soft, Non distended, Non tender. +Bowel sounds  Neurologic:  Lethargic  Psych:   Good insight. Not anxious nor agitated  Skin:  No rashes.   No jaundice    Reviewed most current lab test results and cultures  YES  Reviewed most current radiology test results   YES  Review and summation of old records today    NO  Reviewed patient's current orders and MAR    YES  PMH/SH reviewed - no change compared to H&P  ________________________________________________________________________  Care Plan discussed with:    Comments   Patient     Family      RN y    Care Manager     Consultant  y                      Multidiciplinary team rounds were held today with , nursing, pharmacist and clinical coordinator. Patient's plan of care was discussed; medications were reviewed and discharge planning was addressed. ________________________________________________________________________  Total NON critical care TIME: 35    Minutes    Total CRITICAL CARE TIME Spent:   Minutes non procedure based      Comments   >50% of visit spent in counseling and coordination of care y    ________________________________________________________________________  Jerilyn Head MD     Procedures: see electronic medical records for all procedures/Xrays and details which were not copied into this note but were reviewed prior to creation of Plan. LABS:  I reviewed today's most current labs and imaging studies. Pertinent labs include:  Recent Labs     10/08/21  0449   WBC 8.7   HGB 10.4*   HCT 30.0*   PLT 73*     Recent Labs     10/10/21  0411 10/08/21  0449   * 137   K 4.2 3.4*    105   CO2 24 31   GLU 81 137*   BUN 15 17   CREA 0.80 0.73   CA 8.5 8.0*       Signed:  Jerilyn Head MD

## 2021-10-10 NOTE — HOSPICE
[employer] RN note:  [id number] [place] [employer] following peripherally, reviewing chart and discussing pt with staff RN daily. Pt continue to not require inpt hospice services due to low symptom burden, not requiring IV medications ie morphine, stable enough that hospice care can be provided outside acute care setting. Please notify hospice team should pt show signs of increased resp distress, pain, agitation while transitioning toward end of life. Thank you for the opportunity to care for this pt and family. Please contact hospice at [phone] with any questions or concerns.

## 2021-10-11 NOTE — PROGRESS NOTES
Goals are clear, hospice following peripherally, per hospice note,  patient does not qualify for in patient hospice. I will sign off . Thank you for allowing us to participate in the care of Ms Christa Allen .

## 2021-10-11 NOTE — HOSPICE
Hospice Liaison Note:    Patient continues to have low symptom burden and does not require symptom management therefore does not meet inpatient hospice criteria. T/c with son, Dotty Lynne this AM who states that he has decided on facility placement as he can no longer provide care for her at home. Dotty Lynne is requesting that pt be re-tested for COVID to determine if she is still positive so that he can determine appropriate facility placement. This RN notified, YASH Mahan of conversation with son and she will contact physician. Hospice will continue to follow as clinical status evolves and are available to answer questions and offer support. Please keep us informed of facility placement to determine [address] [place] [address] availability to serve.       Jose Roberto Morales RN  Clinical Nurse Liaison  [phone]

## 2021-10-11 NOTE — PROGRESS NOTES
Hospitalist Progress Note    NAME: Neida Ritter   :  1940   MRN:  [id number]       Assessment / Plan:    COVID-19 pneumonia with hypoxemia and sepsis  Metabolic encephalopathy  Dementia  History of CVA     CTA chestno evidence of PE, no acute process. CT abdomen/pelvis with contrastno acute finding. CT head no acute findings  Encephalomalacia in the left MCA territory consistent with large right MCA  territory infarction. Moderate to large left occipital infarction as well. Severe chronic microvascular change. Scattered small to moderate cerebellar  infarctions as well.     Sepsis criteria met on admissionfever, tachycardia, COVID-19 pneumonia  Remdesivir completed. Weaning steroids now, on 3 liters, wean as tolerated. Worsening wheezing/stridor on 10/2, given Racemic epi, nebs, Steroids, stiill has some wheezing likely d/t airway congestion, duonebs as tolerated. Procalcitonin normal.  UA with no pyuria. Abx discontinued  Remains NPO, d/t poor mental status, no improvement seen here  NG tube couldn't be done d/t the stridor and not following commands, Dont think she would be candidate for PEG feeding  As per son, her mental status is not great after her last stroke and also has some dementia, likely this is her baseline, her PO intake has been poor for several days PTA. Discussed with son again 10/5, her Covid still positive, so he wont be able to visit her in hospital. I recommended home hospice ( he is not interested in SNF hospice), wants to know more about hospice, will put consult for them  Palliative following     Elevated troponin  Troponin 0.35 - 0.33, likely type 2  Echo: Severe concentric hypertrophy, Mild-moderate mitral valve stenosis. Mild Tricuspid regurg.   No reported chest pain     Hypokalemia  Normal today     History of A. fib  Hyperlipidemia  GERD  Hypertension  Oral meds on hold.  Continue IV metoprolol, bp not controlled, iv hydralazine prn  Eliquis on hold as not tolerating PO, c/w therapeutic lovenox     Functional paraplegia. Wheelchair-bound.       18.5 - 24.9 Normal weight / Body mass index is 23.44 kg/m². Estimated discharge date: October 14  Barriers:    Code status: DNR  Prophylaxis: Lovenox  Recommended Disposition: Home w/Family     Subjective:     Chief Complaint / Reason for Physician Visit  \"\". Discussed with RN events overnight. Case was discussed with son and all questions was answered   Review of Systems:  Symptom Y/N Comments  Symptom Y/N Comments   Fever/Chills    Chest Pain     Poor Appetite    Edema     Cough    Abdominal Pain     Sputum    Joint Pain     SOB/CHRISTINE    Pruritis/Rash     Nausea/vomit    Tolerating PT/OT     Diarrhea    Tolerating Diet     Constipation    Other       Could NOT obtain due to: lethargic     Objective:     VITALS:   Last 24hrs VS reviewed since prior progress note. Most recent are:  Patient Vitals for the past 24 hrs:   Temp Pulse Resp BP SpO2   10/11/21 1118 97.8 °F (36.6 °C) 71 18 (!) 157/95 97 %   10/11/21 0927 98.6 °F (37 °C) 89 18 (!) 162/94 93 %   10/11/21 0520 98.7 °F (37.1 °C) 91 18 (!) 134/93 96 %   10/11/21 0059 97.5 °F (36.4 °C) 79 18 (!) 175/97 93 %   10/10/21 2108  79 16 (!) 182/84 94 %   10/10/21 1835    (!) 163/84      No intake or output data in the 24 hours ending 10/11/21 1812     I had a face to face encounter and independently examined this patient on 10/11/2021, as outlined below:  PHYSICAL EXAM:  General: Lethargic   EENT:  EOMI. Anicteric sclerae. MMM  Resp:  CTA bilaterally, no wheezing or rales. No accessory muscle use  CV:  Regular  rhythm,  No edema  GI:  Soft, Non distended, Non tender. +Bowel sounds  Neurologic:  Lethargic   Skin:  No rashes.   No jaundice    Reviewed most current lab test results and cultures  YES  Reviewed most current radiology test results   YES  Review and summation of old records today    NO  Reviewed patient's current orders and MAR    YES  PMH/SH reviewed - no change compared to H&P  ________________________________________________________________________  Care Plan discussed with:    Comments   Patient     Family  y    RN y    Care Manager     Consultant                       y Multidiciplinary team rounds were held today with , nursing, pharmacist and clinical coordinator. Patient's plan of care was discussed; medications were reviewed and discharge planning was addressed. ________________________________________________________________________  Total NON critical care TIME:  35   Minutes    Total CRITICAL CARE TIME Spent:   Minutes non procedure based      Comments   >50% of visit spent in counseling and coordination of care     ________________________________________________________________________  Silvana Quinn MD     Procedures: see electronic medical records for all procedures/Xrays and details which were not copied into this note but were reviewed prior to creation of Plan. LABS:  I reviewed today's most current labs and imaging studies. Pertinent labs include:  No results for input(s): WBC, HGB, HCT, PLT, HGBEXT, HCTEXT, PLTEXT in the last 72 hours. Recent Labs     10/10/21  0411   *   K 4.2      CO2 24   GLU 81   BUN 15   CREA 0.80   CA 8.5       Signed:  Silvana Quinn MD

## 2021-10-12 NOTE — PROGRESS NOTES
Transition of Care Plan:     RUR: 17%  Disposition: SNF  Follow up appointments:   DME needed: Has wheelchair at home  Transportation at [address] or means to access home:        IM Medicare Letter: Son has keys   Is patient a BCPI-A Bundle:  n/a                    If yes, was Bundle Letter given?:     Caregiver Contact: Juan Antonio Diaz [phone] (home) [phone] (cell phone)  Discharge Caregiver contacted prior to discharge?     4:36pm  CM sent several SNF referrals via cclink & allscripts    Per son's request, CM sent a referral to [facility]. CM will continue to follow.     Rowena Ro  Ext [phone] Refused

## 2021-10-12 NOTE — PROGRESS NOTES
Hospitalist Progress Note    NAME: Meghan Joyce   :  1940   MRN:  [id number]       Assessment / Plan:    COVID-19 pneumonia with hypoxemia and sepsis  Metabolic encephalopathy  Dementia  History of CVA     CTA chestno evidence of PE, no acute process. CT abdomen/pelvis with contrastno acute finding. CT head no acute findings  Encephalomalacia in the left MCA territory consistent with large right MCA  territory infarction. Moderate to large left occipital infarction as well. Severe chronic microvascular change. Scattered small to moderate cerebellar  infarctions as well.     Sepsis criteria met on admissionfever, tachycardia, COVID-19 pneumonia  Remdesivir completed. Weaning steroids now, on 3 liters, wean as tolerated. Worsening wheezing/stridor on 10/2, given Racemic epi, nebs, Steroids, stiill has some wheezing likely d/t airway congestion, duonebs as tolerated. Procalcitonin normal.  UA with no pyuria. Abx discontinued  Remains NPO, d/t poor mental status, no improvement seen here  NG tube couldn't be done d/t the stridor and not following commands, Dont think she would be candidate for PEG feeding  10/11 case was discussed with the son, son request patient to be tested for Covid, patient was retested yesterday and came back positive     Elevated troponin  Troponin 0.35 - 0.33, likely type 2  Echo: Severe concentric hypertrophy, Mild-moderate mitral valve stenosis. Mild Tricuspid regurg. No reported chest pain     Hypokalemia  Normal today     History of A. fib  Hyperlipidemia  GERD  Hypertension  Oral meds on hold.  Continue IV metoprolol, bp not controlled, iv hydralazine prn  Eliquis on hold as not tolerating PO, c/w therapeutic lovenox     Functional paraplegia.   Wheelchair-bound.        18.5 - 24.9 Normal weight / Body mass index is 23.44 kg/m².     Estimated discharge date:   Barriers:     Code status: DNR  Prophylaxis: Lovenox  Recommended Disposition: Home w/Family         Subjective:     Chief Complaint / Reason for Physician Visit  . Discussed with RN events overnight. Patient lethargic , waiting for hospice placement   Review of Systems:  Symptom Y/N Comments  Symptom Y/N Comments   Fever/Chills    Chest Pain     Poor Appetite    Edema     Cough    Abdominal Pain     Sputum    Joint Pain     SOB/CHRISTINE    Pruritis/Rash     Nausea/vomit    Tolerating PT/OT     Diarrhea    Tolerating Diet     Constipation    Other       Could NOT obtain due to: Lethargic      Objective:     VITALS:   Last 24hrs VS reviewed since prior progress note. Most recent are:  Patient Vitals for the past 24 hrs:   Temp Pulse Resp BP SpO2   10/12/21 0940 98.3 °F (36.8 °C) 86 18 (!) 175/82 97 %   10/12/21 0021 98.5 °F (36.9 °C) 76 17 130/89 97 %   10/11/21 1945 98.7 °F (37.1 °C) 76 17 (!) 144/96 96 %     No intake or output data in the 24 hours ending 10/12/21 1312     I had a face to face encounter and independently examined this patient on 10/12/2021, as outlined below:  PHYSICAL EXAM:  General: Lethargic   EENT:  EOMI. Anicteric sclerae. MMM  Resp:  CTA bilaterally, no wheezing or rales. No accessory muscle use  CV:  Regular  rhythm,  No edema  GI:  Soft, Non distended, Non tender. +Bowel sounds  Neurologic:  Lethargic   Psych:   Good insight. Not anxious nor agitated  Skin:  No rashes. No jaundice    Reviewed most current lab test results and cultures  YES  Reviewed most current radiology test results   YES  Review and summation of old records today    NO  Reviewed patient's current orders and MAR    YES  PMH/ reviewed - no change compared to H&P  ________________________________________________________________________  Care Plan discussed with:    Comments   Patient     Family      RN y    Care Manager y    Consultant  y                     y Multidiciplinary team rounds were held today with , nursing, pharmacist and clinical coordinator.   Patient's plan of care was discussed; medications were reviewed and discharge planning was addressed. ________________________________________________________________________  Total NON critical care TIME:  35    Minutes    Total CRITICAL CARE TIME Spent:   Minutes non procedure based      Comments   >50% of visit spent in counseling and coordination of care y    ________________________________________________________________________  Anish Patricia MD     Procedures: see electronic medical records for all procedures/Xrays and details which were not copied into this note but were reviewed prior to creation of Plan. LABS:  I reviewed today's most current labs and imaging studies. Pertinent labs include:  No results for input(s): WBC, HGB, HCT, PLT, HGBEXT, HCTEXT, PLTEXT in the last 72 hours. Recent Labs     10/10/21  0411   *   K 4.2      CO2 24   GLU 81   BUN 15   CREA 0.80   CA 8.5       Signed:  Anish Patricia MD

## 2021-10-12 NOTE — PROGRESS NOTES
Hospice consulted with patient today. Does not qualify for inpt hospice. COVID retested today and remains positive. Patient not oriented and incontinent.

## 2021-10-13 NOTE — HOSPICE
Hospice Liaison Note: Patient continues to have low symptom burden therefore does not meet inpatient hospice criteria. Spoke with son, Jose Raul Nuñez today and he is hoping patient will go to [facility] with hospice. CM sent referrals to [facility] and other facilities. Please keep hospice informed of disposition so that we may determine our availability to serve patient at facility.      Rick Murrya RN  Hospice Nurse Liaison  [phone]

## 2021-10-13 NOTE — PROGRESS NOTES
Hospitalist Progress Note    NAME: Cindi Marroquin   :  1940   MRN:  [id number]       Assessment / Plan:  COVID-19 pneumonia with hypoxemia and sepsis  Metabolic encephalopathy  Dementia  History of CVA  CTA chestno evidence of PE, no acute process. CT abdomen/pelvis with contrastno acute finding. CT head no acute findings  Encephalomalacia in the left MCA territory consistent with large right MCA  territory infarction. Moderate to large left occipital infarction as well. Severe chronic microvascular change. Scattered small to moderate cerebellar  infarctions as well. Sepsis criteria met on admissionfever, tachycardia, COVID-19 pneumonia  Remdesivir completed. Weaning steroids now, on 3 liters, wean as tolerated. Worsening wheezing/stridor on 10/2, given Racemic epi, nebs, Steroids, stiill has some wheezing likely d/t airway congestion, duonebs as tolerated. Procalcitonin normal.  UA with no pyuria. Abx discontinued  Remains NPO, d/t poor mental status, no improvement seen here  NG tube couldn't be done d/t the stridor and not following commands, Dont think she would be candidate for PEG feeding  10/11 case was discussed with the son, son request patient to be tested for Covid, patient was retested yesterday and came back positive  -Pt was transitioned to hospice yesterday and waiting on placement. Cont D5.  -Change Solu-Medrol to Decadron     Elevated troponin  Troponin 0.35 - 0.33, likely type 2  Echo: Severe concentric hypertrophy, Mild-moderate mitral valve stenosis. Mild Tricuspid regurg. No reported chest pain     Hypokalemia  Normal today     History of A. fib  Hyperlipidemia  GERD  Hypertension  Oral meds on hold.  Continue IV metoprolol, bp not controlled, iv hydralazine prn  Eliquis on hold as not tolerating PO, on lovenox  -Discussed with patient's son about medications. We have decided to just use comfort medications only for now.   Stop Lovenox     Functional paraplegia. Wheelchair-bound.        18.5 - 24.9 Normal weight / Body mass index is 23.44 kg/m².     Estimated discharge date: October 14  Barriers:     Code status: DNR  Prophylaxis: Lovenox  Recommended Disposition: Home w/Family         Subjective:     Chief Complaint / Reason for Physician Visit  Poor historian. No fever. Drowsy. Review of Systems:  Symptom Y/N Comments  Symptom Y/N Comments   Fever/Chills    Chest Pain     Poor Appetite    Edema     Cough    Abdominal Pain     Sputum    Joint Pain     SOB/CHRISTINE    Pruritis/Rash     Nausea/vomit    Tolerating PT/OT     Diarrhea    Tolerating Diet     Constipation    Other       Could NOT obtain due to: Lethargic      Objective:     VITALS:   Last 24hrs VS reviewed since prior progress note. Most recent are:  Patient Vitals for the past 24 hrs:   Temp Pulse Resp BP SpO2   10/13/21 0854 97 °F (36.1 °C) 74 18 (!) 142/75 95 %   10/13/21 0556  65      10/13/21 0009 (!) 96.4 °F (35.8 °C) 62 19 (!) 148/70 95 %   10/12/21 1526 97.2 °F (36.2 °C) 66 19 (!) 149/81 98 %       Intake/Output Summary (Last 24 hours) at 10/13/2021 1417  Last data filed at 10/13/2021 0113  Gross per 24 hour   Intake    Output 500 ml   Net -500 ml        I had a face to face encounter and independently examined this patient on 10/13/2021, as outlined below:  PHYSICAL EXAM:  General: Lethargic   EENT:  EOMI. Anicteric sclerae. MMM  Resp:  CTA bilaterally, no wheezing or rales. No accessory muscle use  CV:  Regular  rhythm,  No edema  GI:  Soft, Non distended, Non tender. +Bowel sounds  Neurologic:  Lethargic   Psych:   confused  Skin:  No rashes.   No jaundice    Reviewed most current lab test results and cultures  YES  Reviewed most current radiology test results   YES  Review and summation of old records today    NO  Reviewed patient's current orders and MAR    YES  PMH/SH reviewed - no change compared to H&P  ________________________________________________________________________  Care Plan discussed with:    Comments   Patient     Family      RN y    Care Manager y    Consultant  y                     y Multidiciplinary team rounds were held today with , nursing, pharmacist and clinical coordinator. Patient's plan of care was discussed; medications were reviewed and discharge planning was addressed. ________________________________________________________________________  Total NON critical care TIME:  35    Minutes    Total CRITICAL CARE TIME Spent:   Minutes non procedure based      Comments   >50% of visit spent in counseling and coordination of care y    ________________________________________________________________________  Bill Villalta MD     Procedures: see electronic medical records for all procedures/Xrays and details which were not copied into this note but were reviewed prior to creation of Plan. LABS:  I reviewed today's most current labs and imaging studies. Pertinent labs include:  Recent Labs     10/13/21  0512   WBC 8.3   HGB 9.1*   HCT 26.5*   PLT 69*     No results for input(s): NA, K, CL, CO2, GLU, BUN, CREA, CA, MG, PHOS, ALB, TBIL, TBILI, ALT, INR, INREXT, INREXT in the last 72 hours.     No lab exists for component: SGOT    Signed: Bill Villalta MD

## 2021-10-13 NOTE — ROUTINE PROCESS
0730- Report received per HS nurse  1100- Turned and positioned for comfort q 2 hrs. Pt alert makes sounds but unable to speak coherently. Wheezing noted through иван lobes. Pt does not yazan PO.  1600- ADL care received.

## 2021-10-13 NOTE — PROGRESS NOTES
Received notification from bedside RN about patient with regards to: bleeding site from previous central line, saturating dressing and gown  VS: /81, HR 66, RR 19, O2 sat 98% on RA    Intervention given: Silver nitrate topical applicator  x 1, hold evening dose of Lovenox, CBC in AM ordered

## 2021-10-13 NOTE — PROGRESS NOTES
End of Shift Note    Bedside shift change report given to Michael Murillo (oncoming nurse) by Maddie Thomas (offgoing nurse). Report included the following information SBAR, Kardex, Intake/Output, MAR and Recent Results    Shift worked:  5497-4177     Shift summary and any significant changes:     No significant changes, pt is no longer bleeding from site on R arm. Fluids still running. Hgb >9.      Concerns for physician to address:  none     Zone phone for oncoming shift:            Maddie Thmoas

## 2021-10-14 NOTE — HOSPICE
[facility] RN note:  Notified that pt will discharge to [facility] today. [address] is able to admit this afternoon.  Maranda Geronimo  Left message with son in attempt to obtain consent forms from son. DDNR on file for transport and facility placement. Faye Arora [birth year] #2118 COVID+ PNA Velma Quintero [phone]. 10/11 still covid positive. 10/13 comfort measures, discharging to [facility] 3:15pm delayed til 5:00   Dx:  [facility] CTI per Dr Agnes Balbuena   Discharge date: 10/14 to [facility]  DNR:   on file, printed for transportation   Consents: signed  via docusign ([facility])  Meds: provided by facility  DME:  provided by facility  Clinical Notes: pt is NPO due to dysphagia and lethargy, not a candidate for NG or PEG      Thank you for the opportunity to care for this pt and family. Please contact hospice at [phone] with any questions or concerns.

## 2021-10-14 NOTE — DISCHARGE SUMMARY
Hospitalist Discharge Summary     Patient ID:  Neida Ritter  [id number]  67 y.o.  1940  9/25/2021    PCP on record: Kaiser Adhikari DO    Admit date: 9/25/2021  Discharge date and time: 10/14/21      DISCHARGE DIAGNOSIS:    COVID-19 pneumonia with hypoxemia and sepsis  Metabolic encephalopathy  Dementia  History of CVA  Elevated troponin  Hypokalemia  History of A. fib  Hyperlipidemia  GERD  Hypertension  Functional paraplegia. CONSULTATIONS:  IP CONSULT TO HOSPITALIST  IP CONSULT TO PULMONOLOGY    Excerpted HPI from H&P of Shon Newell MD:  Darnell Meckel is a 78 y.o.  female who presents with mental status, fatigue and decreased appetite for 3 days. Patient past medical history of A. fib, hypertension, hyperlipidemia, wheelchair-bound. Patient is a poor historian and history is obtained from the son as well. According to the son patient was not feeling good, tired and not eating for the last 3 days. In the ER patient has a fever along with tachycardia. Patient tested positive for COVID-19 today. Patient has some shortness of breath with expiratory wheezing but no chest pain, no nausea or vomiting. Patient does report a diarrhea yesterday but no abdominal pain, no headache, no dizziness, no passing out episodes.       ______________________________________________________________________  DISCHARGE SUMMARY/HOSPITAL COURSE:  for full details see H&P, daily progress notes, labs, consult notes. Hospital course problem wise:      COVID-19 pneumonia with hypoxemia and sepsis  Metabolic encephalopathy  Dementia  History of CVA  -Patient was admitted to the hospital and underwent management as follows. CTA chestno evidence of PE, no acute process. CT abdomen/pelvis with contrastno acute finding. CT head no acute findings Encephalomalacia in the left MCA territory consistent with large right MCA  territory infarction.  Moderate to large left occipital infarction as well. Severe chronic microvascular change. Scattered small to moderate cerebellar  infarctions as well. Sepsis criteria met on admissionfever, tachycardia, COVID-19 pneumonia. Remdesivir completed. She was continued on Solu-Medrol and later steroids were changed to prednisone.  -She developed wheezing worsening wheezing/stridor on 10/2, given Racemic epi, nebs, Steroids, stiill has some wheezing likely d/t airway congestion, duonebs as tolerated. Procalcitonin normal.  UA with no pyuria. Abx discontinued  Remains NPO, d/t poor mental status, no improvement seen here  NG tube couldn't be done d/t the stridor and not following commands, Dont think she would be candidate for PEG feeding. Son did not want PEG tube at this time.  -Patient is being transitioned to hospice. I spoke to patient's son, we discussed current plan of care and he verbalized understanding. I notified him that patient is not eating but she will be on comfort feeds at nursing home.     Elevated troponin  Troponin 0.35 - 0.33, likely type 2  Echo: Severe concentric hypertrophy, Mild-moderate mitral valve stenosis. Mild Tricuspid regurg. No reported chest pain     Hypokalemia  -no labs checked that she is currently on hospice     History of A. fib  Hyperlipidemia  GERD  Hypertension  -She will be placed on clonidine patch for blood pressure control. Noncomforting medications have been discontinued after discussion with patient's son.     Functional paraplegia. Wheelchair-bound.        Patient seen and examined today, vital signs are stable, labs were not checked as she is on hospice and she is being released to skilled nursing facility on hospice.      _______________________________________________________________________  Patient seen and examined by me on discharge day. Pertinent Findings:  Gen:    Not in distress  Chest: Clear lungs  CVS:   Regular rhythm.   No edema  Abd:  Soft, not distended, not tender  Neuro: Alert, awake  _______________________________________________________________________  DISCHARGE MEDICATIONS:   Current Discharge Medication List      START taking these medications    Details   cloNIDine (CATAPRES) 0.2 mg/24 hr ptwk 1 Patch by TransDERmal route every seven (7) days for 30 days. Qty: 4 Patch, Refills: 0  Start date: 10/21/2021, End date: 11/20/2021      predniSONE (DELTASONE) 20 mg tablet Take 40 mg by mouth daily (with breakfast) for 5 doses. Qty: 10 Tablet, Refills: 0  Start date: 10/15/2021, End date: 10/20/2021         CONTINUE these medications which have NOT CHANGED    Details   albuterol (PROVENTIL HFA, VENTOLIN HFA, PROAIR HFA) 90 mcg/actuation inhaler Take 2 Puffs by inhalation every four (4) hours as needed for Wheezing. Qty: 1 Inhaler, Refills: 1         STOP taking these medications       atorvastatin (LIPITOR) 80 mg tablet Comments:   Reason for Stopping:         aspirin 81 mg chewable tablet Comments:   Reason for Stopping:         metoprolol tartrate (LOPRESSOR) 25 mg tablet Comments:   Reason for Stopping:         amLODIPine (NORVASC) 5 mg tablet Comments:   Reason for Stopping:         allopurinol (ZYLOPRIM) 100 mg tablet Comments:   Reason for Stopping:         apixaban (ELIQUIS) 2.5 mg tablet Comments:   Reason for Stopping:         potassium chloride (K-DUR, KLOR-CON) 20 mEq tablet Comments:   Reason for Stopping:         famotidine (PEPCID) 20 mg tablet Comments:   Reason for Stopping:         furosemide (LASIX) 20 mg tablet Comments:   Reason for Stopping:         nitroglycerin (NITROSTAT) 0.4 mg SL tablet Comments:   Reason for Stopping:                 Patient Follow Up Instructions:     1. Recommended diet: can eat for comfort    2. Recommended activity: Bedrest    3. If you experience any of the following symptoms then please call your primary care physician or return to the emergency room if you cannot get hold of your doctor:    4. Wound Care: none    5.  Lab work: none 6.Bring these papers with you to your follow up appointments.  The papers will help your doctors be sure to continue the care plan from the hospital.          Follow-up Information     Follow up With Specialties Details Why Contact Elliot Prado DO [facility]   [address] [facility] [place]  [address]  [phone]        ________________________________________________________________    Risk of deterioration: High    Condition at Discharge:  Stable  __________________________________________________________________    Disposition  SNF/LTC    ____________________________________________________________________    Code Status: DNR/DNI  ___________________________________________________________________      Total time in minutes spent coordinating this discharge (includes going over instructions, follow-up, prescriptions, and preparing report for sign off to her PCP) :  35  minutes    Signed:  Jim Whitaker MD

## 2021-10-14 NOTE — PROGRESS NOTES
14:20- /100    14:27- Notified Dr. Arpit Kuo of increased BP and that patient no longer had IV access (removed for discharge)    14:32- Received order for one time dose of PO clonidine     14:40- Clonidine given per order     15:55- /78     End of Shift Note    Bedside shift change report given to Yony Armstrong RN (oncoming nurse) by Martha Alvarado RN (offgoing nurse). Report included the following information SBAR and Kardex    Shift worked:  [id number]     Shift summary and any significant changes:     Patient to be discharged.       Concerns for physician to address:  none     Zone phone for oncoming shift:   [facility] [place], RN

## 2021-10-14 NOTE — PROGRESS NOTES
Patient is ready for d/c from CM standpoint    Transition of Care Plan:     RUR: 17%  Disposition: SNF-Fe Care  Follow up appointments: n/a  DME needed: Has wheelchair at home  [email or web address]. or means to access home:    n/a    IM Medicare Letter:  Is patient a BCPI-A Bundle:  n/a                    If yes, was Bundle Letter given?:     Caregiver Contact: Son Gwyn Monzon [phone] (home) [phone] (cell phone)  Discharge Caregiver contacted prior to discharge?     2:09pm  [facility] move transport time to 5pm.  CM notified hospice and SNF    Patient is d/c to SNF w/[facility]. No other needs or concerns identified. CM left vm for pt's son, requesting a return call. CM notified MD & RN. RN to call report to [phone]. Care Management Interventions  PCP Verified by CM:  Yes  Mode of Transport at Discharge: BLS  Transition of Care Consult (CM Consult): SNF  Partner SNF: Yes  Discharge Durable Medical Equipment: No  Physical Therapy Consult: No  Occupational Therapy Consult: No  Speech Therapy Consult: Yes  Support Systems: Child(nicci)  Confirm Follow Up Transport: Family  The Patient and/or Patient Representative was Provided with a Choice of Provider and Agrees with the Discharge Plan?: Yes  Name of the Patient Representative Who was Provided with a Choice of Provider and Agrees with the Discharge Plan: jose m, David ferrera of Choice List was Provided with Basic Dialogue that Supports the Patient's Individualized Plan of Care/Goals, Treatment Preferences and Shares the Quality Data Associated with the Providers?: Yes  Discharge Location  Discharge Placement: Skilled nursing facility ([address] [place] [address],[address] Floor W/[facility])      Zbigniew Vega  Ext [phone]

## 2021-10-14 NOTE — PROGRESS NOTES
Hospital to [facility] SBAR Handoff - Elma Alcantar                                                                        78 y.o.   female    [address]   Room: 2118/01    [facility] 2 MED TELE  Unit Phone# :  [phone]      [address]  [facility] 2 MED TELE  [address] [address]  Dept: [phone]  Loc: [phone]                    SITUATION     Admitted:  9/25/2021         Attending Provider:  Flakita De Los Santos MD       Consultations:  IP CONSULT TO HOSPITALIST  IP CONSULT TO PULMONOLOGY    PCP:  Lily Morel, [address]    Treatment Team: Attending Provider: Flakita De Los Santos MD; Consulting Provider: Ellis Hurtado MD; Care Manager: Sebastian Riley; Utilization Review: Bobby Geller RN; Consulting Provider: Peng Cardona NP; Care Manager: Denise Shrestha    Admitting Dx:  Sepsis ([facility] [address] [A41.9]  Pneumonia due to COVID-19 virus [U07.1, J12.82]  Hypoxemia [R09.02]  UTI (urinary tract infection) [N39.0]       Principal Problem: <principal problem not specified>    * No surgery found * of      BY: * Surgery not found *             ON: * No surgery found *                  Code Status: DNR                Advance Directives:   Advance Care Planning 10/6/2021   Patient's Healthcare Decision Maker is: Legal Next of José 67   Primary Decision Maker Name -   Primary Decision Maker Phone Number -   Confirm Advance Directive None   Patient Would Like to Complete Advance Directive Unable   Does the patient have other document types Do Not Resuscitate    (Send w/patient)   Not Received       Isolation:  Droplet Plus       MDRO: COVID-19    Pain Medications given:  NA        Special Equipment needed: no         BACKGROUND     Allergies:   Allergies   Allergen Reactions    Decadron [Dexamethasone Sodium Phosphate] Hives    Pcn [Penicillins] Hives     Tolerated cefepime 9/2021       Past Medical History:   Diagnosis Date    Acute kidney failure ([facility] [address] 7/14/2016    Acute systolic heart failure ([facility] [address] 7/14/2016    Arrhythmia     CAD (coronary artery disease)     Heart failure ([facility] [address] [facility]     Hypertension     Stroke [facility]) 2009    Stroke, left hand weakness and speech, peripheral vision affected       Past Surgical History:   Procedure Laterality Date    HX UROLOGICAL  02/2018       Medications Prior to Admission   Medication Sig    atorvastatin (LIPITOR) 80 mg tablet Take 1 Tab by mouth daily.  aspirin 81 mg chewable tablet Take 1 Tab by mouth daily.  metoprolol tartrate (LOPRESSOR) 25 mg tablet Take 1 Tab by mouth every twelve (12) hours.  amLODIPine (NORVASC) 5 mg tablet Take 5 mg by mouth daily.  allopurinol (ZYLOPRIM) 100 mg tablet Take 100 mg by mouth daily.  apixaban (ELIQUIS) 2.5 mg tablet Take 1 Tab by mouth two (2) times a day.  potassium chloride (K-DUR, KLOR-CON) 20 mEq tablet Take 20 mEq by mouth daily. MON, WED., SAT    famotidine (PEPCID) 20 mg tablet Take 20 mg by mouth daily.  furosemide (LASIX) 20 mg tablet Take 20 mg by mouth Every Mon, Wed and Sat.  nitroglycerin (NITROSTAT) 0.4 mg SL tablet 0.4 mg by SubLINGual route every five (5) minutes as needed for Chest Pain. Up to 3 doses.  albuterol (PROVENTIL HFA, VENTOLIN HFA, PROAIR HFA) 90 mcg/actuation inhaler Take 2 Puffs by inhalation every four (4) hours as needed for Wheezing. Vaccinations:    Immunization History   Administered Date(s) Administered    Influenza Vaccine 10/01/2016, 10/01/2017       Readmission Risks:    Known Risks: comorbidities        The Charlson CoMorbitiy Index tool is an evidenced based tool that has more automatic generated information. The tool looks at many different items such as the age of the patient, how many times they were admitted in the last calendar year, current length of stay in the hospital and their diagnosis.  All of these items are pulled automatically from information documented in the chart from various places and will generate a score that predicts whether a patient is at low (less than 13), medium (13-20) or high (21 or greater) risk of being readmitted.         ASSESSMENT                Temp: 97 °F (36.1 °C) (10/14/21 0905) Pulse (Heart Rate): 81 (10/14/21 0905)     Resp Rate: 18 (10/14/21 0905)           BP: (!) 160/77 (10/14/21 0905)     O2 Sat (%): 93 % (10/14/21 0905)     Weight: 54.4 kg (120 lb)    Height: 5' (152.4 cm) (10/08/21 0912)       If above not within 1 hour of discharge:    BP:_____  P:____  R:____ T:_____ O2 Sat: ___%  O2: ______    Active Orders   Diet    ADULT DIET Dysphagia - Soft & Bite Sized         Orientation: only aware of  person     Active Behaviors: None                                   Active Lines/Drains:  (Peg Tube / Parikh / CL or S/L?): no    Urinary Status: Incontinent, External catheter     Last BM: Last Bowel Movement Date: 10/07/21     Skin Integrity: Scars (comment)             Mobility: Very limited   Weight Bearing Status: NWB (Non Weight Bearing)                Lab Results   Component Value Date/Time    Glucose 81 10/10/2021 04:11 AM    Hemoglobin A1c 5.8 (H) 03/05/2020 02:17 AM    INR 1.1 03/04/2020 09:38 AM    INR 1.1 09/26/2018 10:26 AM    HGB 9.1 (L) 10/13/2021 05:12 AM    HGB 10.4 (L) 10/08/2021 04:49 AM        RECOMMENDATION     See After Visit Summary (AVS) for:  · Discharge instructions  · After [address]   · Special equipment needed (entered pre-discharge by Care Management)  · Medication Reconciliation    · Follow up Appointment(s)         Report given/sent by:  Chong Block RN                    Verbal report given to: Seb Abebe        Estimated discharge time:  10/14/2021 at 15:15

## 2021-10-14 NOTE — HOSPICE
[facility] RN note: Third delay for [facility] transport, new ETA 9:00am., original time schdduled for 3:15pm. Attempted to Update son Triny Abreu [phone], unable to leave message. YASH Gabriel attempted earlier transport time with [facility] and [facility], neither able to accommodate. YASH Obrien contacted [facility] to attempt an earlier time. If delayed further, plan is to have pt remain at [address] and reschedule transport in am.  [address] will admit pt tonight if transport arrived at 9:00am or before. Yoandy Lynne aware and will follow up as transport status evolves. Thank you for the opportunity to care for this pt and family. Please contact hospice at [phone] with any questions or concerns.

## 2021-10-14 NOTE — PROGRESS NOTES
14:20- /100    14:27- Notified Dr. Vicente Jacques of increased BP and that patient no longer had IV access (removed for discharge)    14:32- Received order for one time dose of PO clonidine     14:40- Clonidine given per order     15:55- /78     End of Shift Note    Bedside shift change report given to [address], [facility] (oncoming nurse) by Silvia Carrion RN (offgoing nurse). Report included the following information SBAR and Kardex    Shift worked:  [id number]     Shift summary and any significant changes:     Patient to be discharged.       Concerns for physician to address:  none     Zone phone for oncoming shift:   [phone]         Silvia Carrion, RN

## 2021-10-15 NOTE — HOSPICE
COVID +  [facility]: Late Affects of cerebral Vascular Accident  Diagnoses RELATED to the terminal prognosis: Protein Calorie Malnutrition  UNRELATED Diagnoses: COVID +, Dementia, Hypertension, Atrial Fibrillation  Date of Hospice Admission: 10/14/2021  Hospice Attending Elected by Patient: Dr. Indy Hernandez MD  PCP: Dr. Shahida Granger MD  Admitting RN: Luis Carlos Manuel, MSN, RN   RN Pedro Licona, RN  : Henry Lewis  : Alex Gibbons DNR - Yes   Home: Not discussed    ADMISSION ASSESSMENT/ DIRECT OBSERVATION:  Late admission and as a consequence Ms. Sandra Snyder was more somnolent, than her reported baseline. She was cooperative with the assessment and turned and repositioned with assistance without verbal or non-verbal indicators of pain/discomfort. Sacral area and heels were intact. Her respirations remained shallow, unlabored, mild wheezing noted to BUL. Ms. Sandra Snyder was 'tucked in' for the night with assist from unit staff. SOCIAL:   Resides at [facility]. This RN called her son, Cara Raygoza and confirmed her safe arrival to the unit. Confirmed that the unit had medications prescribed for her if she became restless or uncomfortable during the night. He confirmed that his mother 'never complains of pain, and that she has 'just been so weak' He had wanted to have a video visit with his mother the following day. Informed him that he could arrange this with the activity center at [facility]. HOSPITAL COURSE LEADING TO ADMISSION TO HOSPICE:   Excerpted HPI from H&P of Elaine Marie MD: - ED encounter. Rebel Bellamy is an 78 y.o.  female who presents with mental status, fatigue and decreased appetite for 3 days. Patient past medical history of A. fib, hypertension, hyperlipidemia, wheel-chair-bound. Patient is a poor historian and history is obtained from the son as well.   According to the son patient was not feeling good, tired and not eating for the last 3 days. In the ER patient has a fever along with tachycardia. Patient tested positive for COVID-19 today. Patient has some shortness of breath with expiratory wheezing but no chest pain, no nausea or vomiting. Patient does report a diarrhea yesterday but no abdominal pain, no headache, no dizziness, no passing out episodes  Hospital course problem wise:  COVID-19 pneumonia with hypoxemia and sepsis  Metabolic encephalopathy  Dementia  History of CVA  -Patient was admitted to the hospital and underwent management. CTA chest-no evidence of PE, no acute process. CT abdomen/pelvis with contrast-no acute finding.   -Sepsis criteria met on admission-fever, tachycardia, COVID-19 pneumonia. Remdesivir completed. She was continued on Solu-Medrol and later steroids were changed to prednisone.  -She developed wheezing worsening wheezing/stridor on 10/2, given Racemic epi, nebs, Steroids, stiill has some wheezing likely d/t airway congestion, duonebs as tolerated. -Procalcitonin normal.  UA with no pyuria. Abx discontinued  Remains NPO, d/t poor mental status, no improvement seen here  NG tube couldn't be done d/t the stridor and not following commands, Don't think she would be candidate for PEG feeding. Son did not want PEG tube at this time.  -Patient is being transitioned to hospice. I spoke to patient's son, we discussed current plan of care and he verbalized understanding. I notified him that patient is not eating but she will be on comfort feeds at nursing home. Objective information that support this patient's limited prognosis includes:   LCD Guidelines:   Patients will be considered to be in the terminal stage of stroke or coma (life expectancy of six months or less) if they meet the following criteria. Stroke:  ___30%_____  1. Karnofsky Performance Status (KPS) or Palliative Performance Scale (PPS) of 40% or less;  ___X_____  2.  Inability to maintain hydration and caloric intake with one of the following:        ________  a. Weight loss >10% in the last 6 months or >7.5% in the last 3 months;        ________  b. Serum albumin         ________  c. Current history of pulmonary aspiration not responsive to speech language                                 pathology intervention;                       ________  d. Sequential calorie counts documenting inadequate caloric/fluid intake;                       __X______  e. Dysphagia severe enough to prevent the patient from receiving food and fluids                   necessary to sustain life, in a patient who declines or does not receive artificial                                 nutrition and hydration. Documentation of the following factors will support eligibility for hospice care:    ________  Documentation of medical complications, in the context of progressive clinical decline, within                       the previous 12 months, which support a terminal prognosis:                       ________  a. Aspiration pneumonia;        ________  b. Upper urinary tract infection (pyelonephritis);                       ___X_____  c. Sepsis; COVID POSITIVE                       ________  d. Refractory stage 3-4 decubitus ulcers;                       ________  e. Fever recurrent after antibiotics. CT head no acute findings Encephalomalacia in the left MCA territory consistent with large right MCA  territory infarction. Moderate to large left occipital infarction as well. Severe chronic microvascular change. Scattered small to moderate cerebellar  infarctions as well. Lai Orozco MD agrees to serve as the Attending provider for Hospice and provided verbal certification of terminal illness with prognosis of 6 months or less life expectancy. Orders for hospice admission, medications and plan of treatment received.    Medication reconciliation completed with Dr. Lai Cotter, [facility], Clonidine and Prednisone covered  Change of MD form via Docusign to son to reflect Dr. Saul Ulloa MD  Admission Out of Hours.     DME: Facility  Supplies:   IDT communication to include MD, SN, SW, CH and support team.

## 2021-10-18 NOTE — HOSPICE
Patient received supine in hospital bed with her eyes closed  Respirations even and non labored on room air   Patient is unresponsive to verbal, tactile stimuli   No oral intake per staff   Patient  shows no S/S of pain or shortness of breath during this visit  Condition has declined remarkably since admission to hospice yesterday   Basically unresponsive now   Symptom medications on hand however not needed at this time  Patient appears to be comfortable   Paient is receiving toal adl care to include position changing Q 2-3 hours to prevent  skin breakdown   Patient is mouth breathing shallow respirations  Oral care administered by facility staff   Discussed patient's decline in status with facility staff   Hospice to follow up with daily visits to monitor patient for comfort at EOL   Call place to patient's son, Barrett Sanders, with update on his mother's major decline in status and hospice plan of care  Son expressed appreciation for update    To continue with hospice comfort care   Informed facility staff to call hospice with concerns /eeds 24/7    Undrstanding verbalized

## 2021-10-19 NOTE — HOSPICE
Patient placed back in bed after being in her [facility] for a couple hours. She was alert but unable to assess orientation because her speech can not be understood. She followed command to squeeze fingers. She has a strong right hand  but her left hand  is absent. Staff state she ate a few bites of lunch but choked on her cranberry juice. She does not appear to be in any pain. No open wounds seen. She has no edema. Her tongue is white. V.S. 110/64; temp 97.4; O2 sat 94% RA; pulse 70 irreg.; resp 20.

## 2021-10-19 NOTE — HOSPICE
LMSW conducted virtual visit via phone with patient's son Cara Raygoza as patient is still under COVID-19 precautions. Patient's son stated that he is surprised but pleased that the patient seems to be doing better today. LMSW provided validation of feeling and situation. LMSW explained social work role. Patient's son expressed appreciation for additional support. Patient's son stated that they do not have any immediate needs at this time.  home is still to be determined. LMSW will continue to be available for support and needs that arise.

## 2021-10-22 NOTE — HOSPICE
is visiting for an initial  visit with Long Nayak. Pt was not interactive at the time.  shared words of affirmation of irving, as learned from the pt's son, and offered encouragement and prayer.  offered prayer by bedside.

## 2021-10-27 PROBLEM — Z51.5 HOSPICE CARE: Status: ACTIVE | Noted: 2021-01-01

## 2021-10-27 NOTE — HOSPICE
Patient receved in hospital bed, minimally responsive  Very lethargic, drowsy  Non verbal with exception makes few mumbling sounds  Mouth breathing  On room air  No S/S of acute pain or shortness of breath during this visit  Staff reports they have not given patient any pain medication at all as patient seems comfortable   Intake poor to nil, only bites of pureed consistency foods, applesauce, yogurt  Monitored closely for swalowing lissues  Liquids nectar thickened   HIGH RISK for aspiration  HOB kept elevated at all times    Patient is declining as evidenced by poor oral intake, sleeping mostly, mental status decreasing, increase in pain   Staff to continue with current hospice plan of care to include new dose of fentanyl patch for pain   Instructed staff, Cristian Dee, to call hospice with concerns / needs   Understanding verbalized                              Medication, Fentanyl patch 25 mcg to be ordered by staff from [facility]   No medical supplies needed this visit

## 2021-11-02 NOTE — HOSPICE
Patient in bed with  and granddaughter at bedside. Patient will open eyes to tactile stimulation but is non-verbal.  She does not appear to be in any pain. Her tongue is no longer white. She has positive bowel sounds. She has inspiratory rhonchi in all lung fields. Levsin was given 4 hours ago. Staff nurse states she will give her more. Granddaughter states she is not eating at all but will swallow water if it is dripped in her mouth. V.S. 142/60; pulse 104 irreg.; O2 sat 88% RA; resp 12; temp 97. 7

## 2021-11-03 NOTE — HOSPICE
Patient awake but non-verbal. She looks around and occasionally makes noise. She is restless and frequently throws her right leg off the bed. Her right arm is often moving. Unable to feel a radial pulse or get a blood pressure. Unable to get a pulse ox reading. She has inspiratory rhonchi in her upper lobes and raspy sounding secretions. Levsin and Morphine 5mg given. Granddaughter in law came in to visit. [facility] also came in. Pulse 120 reg.; temp 98.0; resp 28. Son, Sheree Taylor calledbut it went right to voice mail.

## 2021-11-03 NOTE — HOSPICE
Patient awake but non-verbal.  She occasionally opens her eyes. She moans when her left arm is moved. She has insp. and exp. rhonchi in her upper lungs and her lower lung fields are diminished. She has raspy sounding secretions in her upper airway. Staff nurse to give her Levsin and dose of morphine for pain. Unable to obtain a blood pressure. No radial or pedal pulses felt. Unable to obtain a pulse ox reading. She is mouth breathing with respirations of 20. Mouth moistened with an oral swab. Temp 99.0; pulse 88. Son, Leatha Paredes called and given an update on his mother's condition. He was asked if he had a [facility] that they would be using. He said he had several in mind to choose from. He was told it would be best to pick one within the next 24 to 48 hours.

## 2021-11-04 NOTE — HOSPICE
LMSW met bedside with patient at [address] [place] [address],[address]. Patient was minimally responsive. LMSW read comforting poetry to patient and offered supportive presence bedside. LMSW checked in with facility staff who reported no concerns. LMSW and ADELE Chakraborty collaborated as Palmer arrived to assess patient. ADELE Chakraborty reported just calling patient's son to inquire about  home. Per ADELE Chakraborty, patient's son is contemplating between a few  homes but does not need  home resources at this time. LMSW will continue to be available to address needs that arise.

## 2021-11-04 NOTE — HOSPICE
Luis Snyder,  [birth year],  at [address] [facility]. TOD 1200 (noon). Patient pronounced by Lucie Woods, the D.O.NCyrus at the [address] [facility]. Family notified by staff. Medications wasted per facility protocol. [facility] picked up the body. Patient has no DME to be picked up.

## 2021-11-04 NOTE — HOSPICE
Patient asleep and lying on her right side. She is unresponsive to voice and touch. She has exp rhonchi in both upper lung fields and secretions in the upper airway. She appears very comfortable. Unable to obtain blood pressure or pulse ox reading. No radial or pedal pulses felt. Apical heart rate 106. resp 22. Patient has excoriated area on her buttocks. Zinc cream has been applied. Levsin given for secretions. Staff nurse is keeping patient comfortable.

## 2021-11-05 ENCOUNTER — HOME CARE VISIT (OUTPATIENT)
Dept: HOSPICE | Facility: HOSPICE | Age: 81
End: 2021-11-05
Payer: MEDICARE

## 2021-11-07 NOTE — HOSPICE
came for a routine EOL support visit.  provided a listening presence with the pt's grand daughter in law, affirmation of grief, life review, and prayer with Long Nayak.

## 2021-11-12 ENCOUNTER — HOME CARE VISIT (OUTPATIENT)
Dept: HOSPICE | Facility: HOSPICE | Age: 81
End: 2021-11-12
Payer: MEDICARE

## 2023-05-10 NOTE — ROUTINE PROCESS
"  Caller: Altagracia Rothman Karen \"Karen\"    Relationship: Self    Best call back number: [phone]    What is the best time to reach you: ANYTIME    Who are you requesting to speak with (clinical staff, provider,  specific staff member): CLINICAL STAFF    Do you know the name of the person who called: SELF    What was the call regarding: PATIENT STATES THAT SHE WOULD LIKE A CALL ABOUT A REFERRAL FOR A CANCEROUS SPOT ON HER VAGINAL AREA.    Do you require a callback: YES        " Bedside and Verbal shift change report given to Umer RN (oncoming nurse) by Jamie Levy RN (offgoing nurse).  Report included the following information SBAR, Kardex, Intake/Output and MAR.      Zone Phone:   [phone]          Significant changes during shift:  none              Patient Information  Julien Norwood  76 y.o.  5/6/2018  7:22 Jimena Simpson MD. Ailyn Uribeernsey admitted from Home      Problem List              Patient Active Problem List      Diagnosis Date Noted    Syncope 05/06/2018    CAD (coronary artery disease) 02/05/2018    Stroke ([address] 02/05/2018    Hypertensive urgency 08/01/2017    CHF (congestive heart failure) ([address] 08/01/2017    COPD exacerbation ([address] 03/07/2017    CAD (coronary artery disease), native coronary artery 07/15/2016    H/O: CVA (cerebrovascular accident) 07/15/2016    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 ([address] 07/15/2016    Severe sepsis ([address] 07/14/2016    Pneumonia 07/14/2016    Acute respiratory failure ([address] 07/14/2016    Elevated troponin 07/14/2016    Left bundle branch block (LBBB) on electrocardiogram 83/46/7028    Acute systolic heart failure ([address] 07/14/2016    Acute kidney failure ([address] 07/14/2016    Mixed hyperlipidemia 09/21/2012    Essential hypertension, benign 09/21/2012    Other left bundle branch block 09/21/2012    Coronary atherosclerosis of native coronary artery 09/21/2012    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 09/21/2012    Atherosclerosis of renal artery ([address] 09/21/2012    Atherosclerosis of native artery of extremity with intermittent claudication ([address] 09/21/2012    Tobacco use disorder 09/21/2012    Cerebrovascular accident ([address] 09/21/2012    Mitral valve disorders(424.0) 09/21/2012    Tricuspid valve disorder 09/21/2012               Past Medical History:   Diagnosis Date    Acute kidney failure ([address] 5/92/3255    Acute systolic heart failure ([address] 7/14/2016    CAD (coronary artery disease)       Heart failure [facility]       Stroke ([facility] 2009      Stroke    Stroke ([facility]                  Core Measures:      CVA: Yes, Yes  CHF:No No  PNA:No No      Activity Status:      OOB to Chair Yes  Ambulated this shift Yes   Bed Rest No          DVT prophylaxis:      DVT prophylaxis Med- Yes  DVT prophylaxis SCD or MALA- No       Wounds: (If Applicable)      Wounds- No      Patient Safety:      Falls Score Total Score: 4  Safety Level_______  Bed Alarm On? Yes  Sitter?  No      Plan for upcoming shift: safety, MRI              Discharge Plan: SNF when ready      Active Consults:  IP CONSULT TO CARDIOLOGY

## 2023-10-14 NOTE — ED NOTES
72 year old woman with concerns for ascending colon mass, pathology pending  - follow up colon biopsy pathology  - if malignant, would benefit from laparoscopic right hemicolectomy  - continue supportive care per primary team  - urology recommendations noted   Patient tolerated sips of water.

## 2024-08-30 NOTE — PROGRESS NOTES
"Priced Farxiga [facility] [phone]. Estimated cost of Farxiga is 4.77/30 d supply or 4.77 for 90 d supply. '"~"Met w/ patient at bedside to discuss above and DC planning. "~"Sister present at bedside. "~"Pt. anticipates DC to home over w/e. Pt. resides w/ spouse and he can assist as needed. "~"We reviewed DC plan for home w/ [facility] Transitional Care RN. "~"Discussed cost if Farxiga. Pt. does not wish to start this medication until she speaks w/ her Cards + Cassandra GARCIA. "~"Will cont. to be available for any needs that may arise. " Hospitalist Progress Note    NAME: Logan Friend   :  1940   MRN:  [id number]       Interim Hospital Summary: 64 y.o. female whom presented on 3/7/2017 with      Assessment / Plan:  Acute Resp failure POA  Due to Acute Bronchitis POA  May be component of acute on chronic systolic heart failure with EF 35% recently as wheezing started to get better with IV lasix  Taper steroids  Increase Lasix to 40mg IV BID  Monitor I/Os, daily weight and lytes  Continue mucolytics and antitussives  Flu test neg   Bronchodilators  Change Zithromax to Doxycycline due to QTc polongation   CT chest There is opacities in the right middle lobe and volume loss. This is  consistent with partial collapse of the right middle lobe. This was not present  previously. Follow-up to resolution is necessary. There does not appear to be an  endobronchial lesion but follow-up exam to evaluate for clearing or worsening is  Suggested  My partner discussed findings of CT with pulmonary and at time didn't show concern for mucous plugging  CXR stable  Dad stress in 2016 neg for ischemia  Seen by [facility] cardiology in the last admission  Pt seems confuse and somnolent, check ABGs    Acute renal Failure POA on ckd stage 3  Cr 2.0 at [facility] ED, baseline Cr ~ 1.3  Cr labile   Monitor on iv diuresis     HTN  CAD/Old LBBB on EKG (unchanged)  H/o CVA/aphasia at baseline  Hyperlipidemia  Cont ASA  Cont Coreg, Imdur  Holding Lisinopril   Holding lipitor for now    Code Status: Full  Surrogate Decision Maker: son      DVT Prophylaxis: SQ heparin  GI Prophylaxis: not indicated     Baseline: Pt lives with son at home, is independent with ADLs         Subjective: Pt seen and examined at bedside. Feels short of breath.  Overnight events d/w RN     Chief Complaint / Reason for Physician Visit: f/u \"shortness of breath\"    Review of Systems:  Symptom Y/N Comments  Symptom Y/N Comments   Fever/Chills n   Chest Pain n    Poor Appetite    Edema     Cough y Abdominal Pain n    Sputum y   Joint Pain     SOB/CHRISTINE y   Pruritis/Rash     Nausea/vomit    Tolerating PT/OT     Diarrhea n   Tolerating Diet     Constipation    Other       Could NOT obtain due to:      Objective:     VITALS:   Last 24hrs VS reviewed since prior progress note. Most recent are:  Patient Vitals for the past 24 hrs:   Temp Pulse Resp BP SpO2   03/12/17 0730 98.5 °F (36.9 °C) 71 18 152/76 99 %   03/12/17 0307 98.5 °F (36.9 °C) 86 16 141/66 99 %   03/12/17 0028 - 85 - 153/65 98 %   03/12/17 0006 98.2 °F (36.8 °C) 72 16 188/83 100 %   03/11/17 1913 98.5 °F (36.9 °C) 84 18 167/75 99 %   03/11/17 1554 98.1 °F (36.7 °C) 89 18 126/76 99 %   03/11/17 1116 98 °F (36.7 °C) 80 20 149/74 98 %       Intake/Output Summary (Last 24 hours) at 03/12/17 0841  Last data filed at 03/12/17 0730   Gross per 24 hour   Intake             1150 ml   Output             1375 ml   Net             -225 ml        PHYSICAL EXAM:  General: WD, WN. Alert, cooperative,  no acute distress    EENT:  EOMI. Anicteric sclerae. MMM  Resp:  B/l decreased air entry, + wheezing bilateral.  No accessory muscle use  CV:  Regular  rhythm,  No edema  GI:  Soft, Non distended, Non tender.  +Bowel sounds  Neurologic:  Alert and oriented X 3, normal speech,   Psych:   Good insight. Not anxious nor agitated  Skin:  No rashes. No jaundice    Reviewed most current lab test results and cultures  YES  Reviewed most current radiology test results   YES  Review and summation of old records today    NO  Reviewed patient's current orders and MAR    YES  PMH/SH reviewed - no change compared to H&P  ________________________________________________________________________  Care Plan discussed with:    Comments   Patient y    Family      RN y    Care Manager     Consultant                        Joyceiciplinary team rounds were held today with , nursing, pharmacist and clinical coordinator.   Patient's plan of care was discussed; medications were reviewed and discharge planning was addressed. ________________________________________________________________________  Total NON critical care TIME: 35 Minutes    Total CRITICAL CARE TIME Spent:   Minutes non procedure based      Comments   >50% of visit spent in counseling and coordination of care     ________________________________________________________________________  Maris Goodman MD     Procedures: see electronic medical records for all procedures/Xrays and details which were not copied into this note but were reviewed prior to creation of Plan. LABS:  I reviewed today's most current labs and imaging studies.   Pertinent labs include:  Recent Labs      03/11/17   0453  03/10/17   0415   WBC  11.5*  13.7*   HGB  11.7  11.8   HCT  33.9*  34.2*   PLT  156  144*     Recent Labs      03/12/17   0433  03/11/17   0453  03/10/17   0415   NA  140  141  141   K  4.0  4.2  4.1   CL  105  105  106   CO2  22  25  21   GLU  198*  167*  165*   BUN  65*  60*  52*   CREA  1.70*  1.84*  1.53*   CA  8.0*  8.1*  8.4*   MG   --    --   2.5*   ALB   --    --   3.4*   TBILI   --    --   0.3   SGOT   --    --   15   ALT   --    --   13       Signed: Maris Goodman MD

## (undated) DEVICE — (D)PREP SKN CHLRAPRP APPL 26ML -- CONVERT TO ITEM 371833

## (undated) DEVICE — 1200 GUARD II KIT W/5MM TUBE W/O VAC TUBE: Brand: GUARDIAN

## (undated) DEVICE — ROCKER SWITCH PENCIL BLADE ELECTRODE, HOLSTER: Brand: EDGE

## (undated) DEVICE — SUTURE VCRL SZ 4-0 L27IN ABSRB UD L19MM PS-2 3/8 CIR PRIM J426H

## (undated) DEVICE — INSULATED BLADE ELECTRODE;CAUTION: FOR MANUFACTURING, PROCESSING, OR REPACKING.: Brand: EDGE

## (undated) DEVICE — KENDALL SCD EXPRESS SLEEVES, KNEE LENGTH, MEDIUM: Brand: KENDALL SCD

## (undated) DEVICE — SUTURE VCRL SZ 3-0 L27IN ABSRB UD L26MM SH 1/2 CIR J416H

## (undated) DEVICE — INFECTION CONTROL KIT SYS

## (undated) DEVICE — X-RAY SPONGES,16 PLY: Brand: DERMACEA

## (undated) DEVICE — APPLICATOR BNDG 1MM ADH PREMIERPRO EXOFIN

## (undated) DEVICE — SPONGE LAP 18X18IN STRL -- 5/PK

## (undated) DEVICE — DEVON™ KNEE AND BODY STRAP 60" X 3" (1.5 M X 7.6 CM): Brand: DEVON

## (undated) DEVICE — STERILE POLYISOPRENE POWDER-FREE SURGICAL GLOVES: Brand: PROTEXIS

## (undated) DEVICE — INSULATED BLADE ELECTRODE: Brand: EDGE

## (undated) DEVICE — DBD-PACK,LAPAROTOMY,2 REINFORCED GOWNS: Brand: MEDLINE

## (undated) DEVICE — SUTURE VCRL SZ 2-0 L27IN ABSRB UD L26MM SH 1/2 CIR J417H

## (undated) DEVICE — SURGICAL PROCEDURE PACK BASIN MAJ SET CUST NO CAUT

## (undated) DEVICE — REM POLYHESIVE ADULT PATIENT RETURN ELECTRODE: Brand: VALLEYLAB

## (undated) DEVICE — SUTURE MCRYL SZ 4-0 L27IN ABSRB UD L19MM PS-2 1/2 CIR PRIM Y426H

## (undated) DEVICE — TOWEL SURG W17XL27IN STD BLU COT NONFENESTRATED PREWASHED

## (undated) DEVICE — SPONGE: SPECIALTY PEANUT XR 100/CS: Brand: MEDICAL ACTION INDUSTRIES

## (undated) DEVICE — SOLUTION IV 1000ML 0.9% SOD CHL

## (undated) DEVICE — SUT ETHBND 0 18IN MO6 MP GRN --

## (undated) DEVICE — STERILE POLYISOPRENE POWDER-FREE SURGICAL GLOVES WITH EMOLLIENT COATING: Brand: PROTEXIS

## (undated) DEVICE — HANDLE LT SNAP ON ULT DURABLE LENS FOR TRUMPF ALC DISPOSABLE